# Patient Record
Sex: MALE | Race: WHITE | NOT HISPANIC OR LATINO | Employment: OTHER | URBAN - METROPOLITAN AREA
[De-identification: names, ages, dates, MRNs, and addresses within clinical notes are randomized per-mention and may not be internally consistent; named-entity substitution may affect disease eponyms.]

---

## 2017-10-24 ENCOUNTER — HOSPITAL ENCOUNTER (OUTPATIENT)
Dept: MRI IMAGING | Facility: HOSPITAL | Age: 62
Discharge: HOME | End: 2017-10-24
Attending: ORTHOPAEDIC SURGERY | Admitting: ORTHOPAEDIC SURGERY

## 2018-04-08 ENCOUNTER — INPATIENT HOSPITAL (OUTPATIENT)
Dept: URBAN - METROPOLITAN AREA HOSPITAL 84 | Facility: HOSPITAL | Age: 63
End: 2018-04-08
Payer: COMMERCIAL

## 2018-04-08 DIAGNOSIS — I50.9 HEART FAILURE, UNSPECIFIED: ICD-10-CM

## 2018-04-08 DIAGNOSIS — R06.00 DYSPNEA, UNSPECIFIED: ICD-10-CM

## 2018-04-08 DIAGNOSIS — I48.91 UNSPECIFIED ATRIAL FIBRILLATION: ICD-10-CM

## 2018-04-08 DIAGNOSIS — D50.9 IRON DEFICIENCY ANEMIA, UNSPECIFIED: ICD-10-CM

## 2018-04-08 PROCEDURE — 99221 1ST HOSP IP/OBS SF/LOW 40: CPT | Performed by: NURSE PRACTITIONER

## 2018-04-09 ENCOUNTER — INPATIENT HOSPITAL (OUTPATIENT)
Dept: URBAN - METROPOLITAN AREA HOSPITAL 84 | Facility: HOSPITAL | Age: 63
End: 2018-04-09
Payer: COMMERCIAL

## 2018-04-09 DIAGNOSIS — K29.50 UNSPECIFIED CHRONIC GASTRITIS WITHOUT BLEEDING: ICD-10-CM

## 2018-04-09 DIAGNOSIS — K64.8 OTHER HEMORRHOIDS: ICD-10-CM

## 2018-04-09 DIAGNOSIS — K57.30 DIVERTICULOSIS OF LARGE INTESTINE WITHOUT PERFORATION OR ABS: ICD-10-CM

## 2018-04-09 DIAGNOSIS — D50.9 IRON DEFICIENCY ANEMIA, UNSPECIFIED: ICD-10-CM

## 2018-04-09 DIAGNOSIS — D12.3 BENIGN NEOPLASM OF TRANSVERSE COLON: ICD-10-CM

## 2018-04-09 DIAGNOSIS — K52.9 NONINFECTIVE GASTROENTERITIS AND COLITIS, UNSPECIFIED: ICD-10-CM

## 2018-04-09 DIAGNOSIS — K44.9 DIAPHRAGMATIC HERNIA WITHOUT OBSTRUCTION OR GANGRENE: ICD-10-CM

## 2018-04-09 PROCEDURE — 45385 COLONOSCOPY W/LESION REMOVAL: CPT | Performed by: INTERNAL MEDICINE

## 2018-04-09 PROCEDURE — 43239 EGD BIOPSY SINGLE/MULTIPLE: CPT | Performed by: INTERNAL MEDICINE

## 2019-11-07 ENCOUNTER — APPOINTMENT (OUTPATIENT)
Dept: GENERAL RADIOLOGY | Facility: HOSPITAL | Age: 64
End: 2019-11-07

## 2019-11-07 ENCOUNTER — HOSPITAL ENCOUNTER (INPATIENT)
Facility: HOSPITAL | Age: 64
LOS: 5 days | Discharge: LEFT BEFORE DISCHARGE | End: 2019-11-13
Attending: EMERGENCY MEDICINE | Admitting: FAMILY MEDICINE

## 2019-11-07 DIAGNOSIS — I25.10 CORONARY ARTERY DISEASE INVOLVING NATIVE CORONARY ARTERY OF NATIVE HEART WITHOUT ANGINA PECTORIS: ICD-10-CM

## 2019-11-07 DIAGNOSIS — I16.1 HYPERTENSIVE EMERGENCY: Primary | ICD-10-CM

## 2019-11-07 DIAGNOSIS — R06.02 SHORTNESS OF BREATH: ICD-10-CM

## 2019-11-07 DIAGNOSIS — I48.20 ATRIAL FIBRILLATION, CHRONIC (HCC): ICD-10-CM

## 2019-11-07 DIAGNOSIS — I50.32 CHRONIC DIASTOLIC CHF (CONGESTIVE HEART FAILURE) (HCC): ICD-10-CM

## 2019-11-07 DIAGNOSIS — I10 ESSENTIAL HYPERTENSION: ICD-10-CM

## 2019-11-07 DIAGNOSIS — I50.9 CONGESTIVE HEART FAILURE, UNSPECIFIED HF CHRONICITY, UNSPECIFIED HEART FAILURE TYPE (HCC): ICD-10-CM

## 2019-11-07 PROCEDURE — 80048 BASIC METABOLIC PNL TOTAL CA: CPT | Performed by: EMERGENCY MEDICINE

## 2019-11-07 PROCEDURE — 93005 ELECTROCARDIOGRAM TRACING: CPT | Performed by: EMERGENCY MEDICINE

## 2019-11-07 PROCEDURE — 99285 EMERGENCY DEPT VISIT HI MDM: CPT

## 2019-11-07 PROCEDURE — 83880 ASSAY OF NATRIURETIC PEPTIDE: CPT | Performed by: EMERGENCY MEDICINE

## 2019-11-07 PROCEDURE — 71045 X-RAY EXAM CHEST 1 VIEW: CPT

## 2019-11-07 PROCEDURE — 85025 COMPLETE CBC W/AUTO DIFF WBC: CPT | Performed by: EMERGENCY MEDICINE

## 2019-11-07 PROCEDURE — 85007 BL SMEAR W/DIFF WBC COUNT: CPT | Performed by: EMERGENCY MEDICINE

## 2019-11-07 PROCEDURE — 84484 ASSAY OF TROPONIN QUANT: CPT | Performed by: EMERGENCY MEDICINE

## 2019-11-07 RX ORDER — ASPIRIN 325 MG
325 TABLET ORAL ONCE
Status: COMPLETED | OUTPATIENT
Start: 2019-11-07 | End: 2019-11-07

## 2019-11-07 RX ORDER — SODIUM CHLORIDE 0.9 % (FLUSH) 0.9 %
10 SYRINGE (ML) INJECTION AS NEEDED
Status: DISCONTINUED | OUTPATIENT
Start: 2019-11-07 | End: 2019-11-13 | Stop reason: HOSPADM

## 2019-11-07 RX ADMIN — SODIUM CHLORIDE 5 MG/HR: 900 INJECTION, SOLUTION INTRAVENOUS at 22:55

## 2019-11-07 RX ADMIN — ASPIRIN 325 MG ORAL TABLET 325 MG: 325 PILL ORAL at 22:47

## 2019-11-08 PROBLEM — L40.9 PSORIASIS: Chronic | Status: ACTIVE | Noted: 2019-11-08

## 2019-11-08 PROBLEM — G62.9 PERIPHERAL POLYNEUROPATHY: Chronic | Status: ACTIVE | Noted: 2019-11-08

## 2019-11-08 PROBLEM — R06.02 SHORTNESS OF BREATH: Status: ACTIVE | Noted: 2019-11-08

## 2019-11-08 PROBLEM — F63.0: Chronic | Status: ACTIVE | Noted: 2019-11-08

## 2019-11-08 PROBLEM — I10 ESSENTIAL HYPERTENSION: Chronic | Status: ACTIVE | Noted: 2019-11-08

## 2019-11-08 PROBLEM — K21.9 GERD (GASTROESOPHAGEAL REFLUX DISEASE): Chronic | Status: ACTIVE | Noted: 2019-11-08

## 2019-11-08 PROBLEM — Z86.73 HISTORY OF CVA (CEREBROVASCULAR ACCIDENT): Chronic | Status: ACTIVE | Noted: 2019-11-08

## 2019-11-08 PROBLEM — M51.379 DDD (DEGENERATIVE DISC DISEASE), LUMBOSACRAL: Chronic | Status: ACTIVE | Noted: 2019-11-08

## 2019-11-08 PROBLEM — M51.37 DDD (DEGENERATIVE DISC DISEASE), LUMBOSACRAL: Chronic | Status: ACTIVE | Noted: 2019-11-08

## 2019-11-08 PROBLEM — I48.20 CHRONIC ATRIAL FIBRILLATION (HCC): Chronic | Status: ACTIVE | Noted: 2019-11-08

## 2019-11-08 PROBLEM — I25.10 CORONARY ARTERY DISEASE INVOLVING NATIVE CORONARY ARTERY OF NATIVE HEART WITHOUT ANGINA PECTORIS: Chronic | Status: ACTIVE | Noted: 2019-11-08

## 2019-11-08 PROBLEM — I50.32 CHRONIC DIASTOLIC CHF (CONGESTIVE HEART FAILURE) (HCC): Chronic | Status: ACTIVE | Noted: 2019-11-08

## 2019-11-08 PROBLEM — N18.30 CKD (CHRONIC KIDNEY DISEASE) STAGE 3, GFR 30-59 ML/MIN: Chronic | Status: ACTIVE | Noted: 2019-11-08

## 2019-11-08 PROBLEM — Z91.199 MEDICALLY NONCOMPLIANT: Chronic | Status: ACTIVE | Noted: 2019-11-08

## 2019-11-08 PROBLEM — I16.1 HYPERTENSIVE EMERGENCY: Status: ACTIVE | Noted: 2019-11-08

## 2019-11-08 LAB
ANION GAP SERPL CALCULATED.3IONS-SCNC: 14 MMOL/L (ref 5–15)
ANION GAP SERPL CALCULATED.3IONS-SCNC: 14 MMOL/L (ref 5–15)
ANISOCYTOSIS BLD QL: NORMAL
BASOPHILS # BLD AUTO: 0.1 10*3/MM3 (ref 0–0.2)
BASOPHILS NFR BLD AUTO: 0.7 % (ref 0–1.5)
BUN BLD-MCNC: 24 MG/DL (ref 8–23)
BUN BLD-MCNC: 27 MG/DL (ref 8–23)
BUN/CREAT SERPL: 22.9 (ref 7–25)
BUN/CREAT SERPL: 23.1 (ref 7–25)
CALCIUM SPEC-SCNC: 9.1 MG/DL (ref 8.6–10.5)
CALCIUM SPEC-SCNC: 9.2 MG/DL (ref 8.6–10.5)
CHLORIDE SERPL-SCNC: 102 MMOL/L (ref 98–107)
CHLORIDE SERPL-SCNC: 109 MMOL/L (ref 98–107)
CO2 SERPL-SCNC: 22 MMOL/L (ref 22–29)
CO2 SERPL-SCNC: 26 MMOL/L (ref 22–29)
CREAT BLD-MCNC: 1.04 MG/DL (ref 0.76–1.27)
CREAT BLD-MCNC: 1.18 MG/DL (ref 0.76–1.27)
D DIMER PPP FEU-MCNC: 1.7 MCGFEU/ML (ref 0.17–0.59)
DEPRECATED RDW RBC AUTO: 52.9 FL (ref 37–54)
EOSINOPHIL # BLD AUTO: 0.1 10*3/MM3 (ref 0–0.4)
EOSINOPHIL NFR BLD AUTO: 0.8 % (ref 0.3–6.2)
ERYTHROCYTE [DISTWIDTH] IN BLOOD BY AUTOMATED COUNT: 17.1 % (ref 12.3–15.4)
GFR SERPL CREATININE-BSD FRML MDRD: 62 ML/MIN/1.73
GFR SERPL CREATININE-BSD FRML MDRD: 72 ML/MIN/1.73
GLUCOSE BLD-MCNC: 119 MG/DL (ref 65–99)
GLUCOSE BLD-MCNC: 123 MG/DL (ref 65–99)
HCT VFR BLD AUTO: 44.5 % (ref 37.5–51)
HGB BLD-MCNC: 14.6 G/DL (ref 13–17.7)
INR PPP: 1.22 (ref 0.9–1.1)
LYMPHOCYTES # BLD AUTO: 1 10*3/MM3 (ref 0.7–3.1)
LYMPHOCYTES NFR BLD AUTO: 9.6 % (ref 19.6–45.3)
MAGNESIUM SERPL-MCNC: 1.9 MG/DL (ref 1.6–2.4)
MAGNESIUM SERPL-MCNC: 1.9 MG/DL (ref 1.6–2.4)
MCH RBC QN AUTO: 28.5 PG (ref 26.6–33)
MCHC RBC AUTO-ENTMCNC: 32.9 G/DL (ref 31.5–35.7)
MCV RBC AUTO: 86.8 FL (ref 79–97)
MONOCYTES # BLD AUTO: 0.9 10*3/MM3 (ref 0.1–0.9)
MONOCYTES NFR BLD AUTO: 8.3 % (ref 5–12)
NEUTROPHILS # BLD AUTO: 8.3 10*3/MM3 (ref 1.7–7)
NEUTROPHILS NFR BLD AUTO: 80.6 % (ref 42.7–76)
NEUTS VAC BLD QL SMEAR: NORMAL
NRBC BLD AUTO-RTO: 0.1 /100 WBC (ref 0–0.2)
NT-PROBNP SERPL-MCNC: 7550 PG/ML (ref 5–900)
PLAT MORPH BLD: NORMAL
PLATELET # BLD AUTO: 155 10*3/MM3 (ref 140–450)
PMV BLD AUTO: 9.4 FL (ref 6–12)
POTASSIUM BLD-SCNC: 3.1 MMOL/L (ref 3.5–5.2)
POTASSIUM BLD-SCNC: 3.8 MMOL/L (ref 3.5–5.2)
PROTHROMBIN TIME: 12.4 SECONDS (ref 9.6–11.7)
RBC # BLD AUTO: 5.13 10*6/MM3 (ref 4.14–5.8)
SODIUM BLD-SCNC: 142 MMOL/L (ref 136–145)
SODIUM BLD-SCNC: 145 MMOL/L (ref 136–145)
TOXIC GRANULATION: NORMAL
TROPONIN T SERPL-MCNC: 0.01 NG/ML (ref 0–0.03)
TROPONIN T SERPL-MCNC: 0.01 NG/ML (ref 0–0.03)
TROPONIN T SERPL-MCNC: 0.02 NG/ML (ref 0–0.03)
WBC NRBC COR # BLD: 10.3 10*3/MM3 (ref 3.4–10.8)

## 2019-11-08 PROCEDURE — 25010000002 FUROSEMIDE PER 20 MG: Performed by: NURSE PRACTITIONER

## 2019-11-08 PROCEDURE — 84484 ASSAY OF TROPONIN QUANT: CPT | Performed by: NURSE PRACTITIONER

## 2019-11-08 PROCEDURE — 80048 BASIC METABOLIC PNL TOTAL CA: CPT | Performed by: NURSE PRACTITIONER

## 2019-11-08 PROCEDURE — 85379 FIBRIN DEGRADATION QUANT: CPT | Performed by: NURSE PRACTITIONER

## 2019-11-08 PROCEDURE — 25010000002 FUROSEMIDE PER 20 MG: Performed by: EMERGENCY MEDICINE

## 2019-11-08 PROCEDURE — 83735 ASSAY OF MAGNESIUM: CPT | Performed by: NURSE PRACTITIONER

## 2019-11-08 PROCEDURE — 99222 1ST HOSP IP/OBS MODERATE 55: CPT | Performed by: INTERNAL MEDICINE

## 2019-11-08 PROCEDURE — 85610 PROTHROMBIN TIME: CPT | Performed by: NURSE PRACTITIONER

## 2019-11-08 PROCEDURE — 25010000002 ENOXAPARIN PER 10 MG: Performed by: FAMILY MEDICINE

## 2019-11-08 RX ORDER — ATORVASTATIN CALCIUM 10 MG/1
10 TABLET, FILM COATED ORAL DAILY
Status: DISCONTINUED | OUTPATIENT
Start: 2019-11-09 | End: 2019-11-13 | Stop reason: HOSPADM

## 2019-11-08 RX ORDER — CARVEDILOL 25 MG/1
25 TABLET ORAL 2 TIMES DAILY WITH MEALS
Status: DISCONTINUED | OUTPATIENT
Start: 2019-11-08 | End: 2019-11-13 | Stop reason: HOSPADM

## 2019-11-08 RX ORDER — FUROSEMIDE 10 MG/ML
40 INJECTION INTRAMUSCULAR; INTRAVENOUS EVERY 12 HOURS
Status: DISCONTINUED | OUTPATIENT
Start: 2019-11-08 | End: 2019-11-13 | Stop reason: HOSPADM

## 2019-11-08 RX ORDER — HYDRALAZINE HYDROCHLORIDE 25 MG/1
25 TABLET, FILM COATED ORAL 2 TIMES DAILY
COMMUNITY
End: 2019-12-17 | Stop reason: HOSPADM

## 2019-11-08 RX ORDER — HYDRALAZINE HYDROCHLORIDE 25 MG/1
50 TABLET, FILM COATED ORAL 2 TIMES DAILY
Status: DISCONTINUED | OUTPATIENT
Start: 2019-11-08 | End: 2019-11-13 | Stop reason: HOSPADM

## 2019-11-08 RX ORDER — SODIUM CHLORIDE 0.9 % (FLUSH) 0.9 %
10 SYRINGE (ML) INJECTION EVERY 12 HOURS SCHEDULED
Status: DISCONTINUED | OUTPATIENT
Start: 2019-11-08 | End: 2019-11-13 | Stop reason: HOSPADM

## 2019-11-08 RX ORDER — SODIUM CHLORIDE 0.9 % (FLUSH) 0.9 %
10 SYRINGE (ML) INJECTION AS NEEDED
Status: DISCONTINUED | OUTPATIENT
Start: 2019-11-08 | End: 2019-11-13 | Stop reason: HOSPADM

## 2019-11-08 RX ORDER — WARFARIN SODIUM 5 MG/1
5 TABLET ORAL
Status: DISCONTINUED | OUTPATIENT
Start: 2019-11-08 | End: 2019-11-11

## 2019-11-08 RX ORDER — HEPARIN SODIUM 5000 [USP'U]/ML
5000 INJECTION, SOLUTION INTRAVENOUS; SUBCUTANEOUS EVERY 12 HOURS SCHEDULED
Status: DISCONTINUED | OUTPATIENT
Start: 2019-11-08 | End: 2019-11-08

## 2019-11-08 RX ORDER — PRAVASTATIN SODIUM 40 MG
40 TABLET ORAL NIGHTLY
COMMUNITY

## 2019-11-08 RX ORDER — CARVEDILOL 25 MG/1
25 TABLET ORAL 2 TIMES DAILY WITH MEALS
COMMUNITY
End: 2020-09-12 | Stop reason: HOSPADM

## 2019-11-08 RX ORDER — SPIRONOLACTONE 25 MG/1
25 TABLET ORAL DAILY
COMMUNITY
End: 2020-09-12 | Stop reason: HOSPADM

## 2019-11-08 RX ORDER — FUROSEMIDE 10 MG/ML
40 INJECTION INTRAMUSCULAR; INTRAVENOUS ONCE
Status: COMPLETED | OUTPATIENT
Start: 2019-11-08 | End: 2019-11-08

## 2019-11-08 RX ORDER — POTASSIUM CHLORIDE 20 MEQ/1
20 TABLET, EXTENDED RELEASE ORAL 2 TIMES DAILY
COMMUNITY
End: 2020-08-23 | Stop reason: HOSPADM

## 2019-11-08 RX ORDER — POTASSIUM CHLORIDE 20 MEQ/1
20 TABLET, EXTENDED RELEASE ORAL 2 TIMES DAILY
Status: DISCONTINUED | OUTPATIENT
Start: 2019-11-08 | End: 2019-11-13 | Stop reason: HOSPADM

## 2019-11-08 RX ADMIN — HYDRALAZINE HYDROCHLORIDE 50 MG: 25 TABLET, FILM COATED ORAL at 15:16

## 2019-11-08 RX ADMIN — FUROSEMIDE 40 MG: 10 INJECTION, SOLUTION INTRAMUSCULAR; INTRAVENOUS at 00:18

## 2019-11-08 RX ADMIN — Medication 10 ML: at 20:06

## 2019-11-08 RX ADMIN — SODIUM CHLORIDE 5 MG/HR: 900 INJECTION, SOLUTION INTRAVENOUS at 13:46

## 2019-11-08 RX ADMIN — POTASSIUM CHLORIDE 20 MEQ: 1500 TABLET, EXTENDED RELEASE ORAL at 20:06

## 2019-11-08 RX ADMIN — ENOXAPARIN SODIUM 120 MG: 120 INJECTION SUBCUTANEOUS at 17:05

## 2019-11-08 RX ADMIN — CARVEDILOL 25 MG: 25 TABLET, FILM COATED ORAL at 17:06

## 2019-11-08 RX ADMIN — Medication 10 ML: at 08:51

## 2019-11-08 RX ADMIN — WARFARIN 5 MG: 5 TABLET ORAL at 17:06

## 2019-11-08 RX ADMIN — POTASSIUM CHLORIDE 20 MEQ: 1500 TABLET, EXTENDED RELEASE ORAL at 15:16

## 2019-11-08 RX ADMIN — SODIUM CHLORIDE 5 MG/HR: 900 INJECTION, SOLUTION INTRAVENOUS at 09:11

## 2019-11-08 RX ADMIN — HYDRALAZINE HYDROCHLORIDE 50 MG: 25 TABLET, FILM COATED ORAL at 20:06

## 2019-11-08 RX ADMIN — FUROSEMIDE 40 MG: 10 INJECTION, SOLUTION INTRAMUSCULAR; INTRAVENOUS at 06:24

## 2019-11-08 RX ADMIN — Medication 10 ML: at 03:27

## 2019-11-08 RX ADMIN — FUROSEMIDE 40 MG: 10 INJECTION, SOLUTION INTRAMUSCULAR; INTRAVENOUS at 17:06

## 2019-11-08 NOTE — PROGRESS NOTES
Discharge Planning Assessment   Yoseph     Patient Name: Carlos Whipple  MRN: [id number]  Today's Date: 11/8/2019    Admit Date: 11/7/2019    Discharge Needs Assessment     Row Name 11/08/19 0756       Living Environment    Lives With  alone    Current Living Arrangements  home/apartment/condo    Primary Care Provided by  self    Provides Primary Care For  no one, unable/limited ability to care for self    Family Caregiver if Needed  none    Able to Return to Prior Arrangements  yes       Resource/Environmental Concerns    Resource/Environmental Concerns  none    Transportation Concerns  public transportation, none available;rides, unreliable from others       Transition Planning    Patient/Family Anticipates Transition to  home    Patient/Family Anticipated Services at Transition  none    Transportation Anticipated  public transportation;family or friend will provide       Discharge Needs Assessment    Readmission Within the Last 30 Days  no previous admission in last 30 days    Concerns to be Addressed  denies needs/concerns at this time;patient refuses services    Equipment Currently Used at Home  none    Anticipated Changes Related to Illness  inability to care for self        Discharge Plan     Row Name 11/08/19 0757       Plan    Plan  Anticipate return to home.  Spoke with Social work to see for community resources.      Patient/Family in Agreement with Plan  yes    Plan Comments  Upon entering patient room, patient was asleep.  When he awoke, he was confused and making several attempts to get out of bed unassisted.  Primary RN and CM able to get patient to side of bed before getting back in to bed.  Primary RN states patient has already pulled one IV out and typically is confused upon awakening however gets more alert as the day goes on.  Patient states he lives alone and has a friend in the area who can assist if needed.  He reports he takes buses and rides from friends as needed.  He states he prepares  his own meals andmedications and does not require assistance.  Patient referral to  for possible community resources versus lifespan referral.          Demographic Summary     Row Name 11/08/19 0752       General Information    Admission Type  inpatient    Arrived From  emergency department    Required Notices Provided  Important Message from Medicare given by registration    Referral Source  emergency department    Reason for Consult  discharge planning;care coordination/care conference    Preferred Language  English     Used During This Interaction  no        Functional Status     Row Name 11/08/19 0753       Functional Status    Current Activity Tolerance  fair       Functional Status, IADL    Medications  independent Patient is reportedly non-compliant with taking medications for CHF.  States he handles them on his own and does not need assistance    Meal Preparation  independent    Housekeeping  independent    Laundry  independent    Shopping  independent       Mental Status    General Appearance WDL  appearance    General Appearance  body odor;unclean;unkempt;unshaven       Mental Status Summary    Recent Changes in Mental Status/Cognitive Functioning  unable to assess Patient confused upon awakening.  Attempted repeatedly to get out of the bed without assistance.  Assisted the patient back in bed with help of primary RN.  RN states patient is usually like this upon waking up and gets more alert and oriented.         Aimee Sadler RN    /Utilization Review  [id number] [place] [address]    [phone] office  [phone] fax  [email or web address]  [email or web address]    Hospital NPI:   Hospital Tax ID: [id number]

## 2019-11-08 NOTE — ED NOTES
Third call placed to Dr. Stauffer's cell phone with no answer. Message left. KAVYA Stein made aware that we have not been able to reach MD for acceptance of pt.     Poilna Najera, RN  11/08/19 05

## 2019-11-08 NOTE — H&P
Patient Care Team:  Marisol Larson APRN as PCP - General    Chief complaint Shortness of breath    Subjective     Patient presents to the emergency room with progressively worsening shortness of breath.  He is a very poor historian normally but also seems to be almost disoriented on exam today.  He relates this to just having woke up.  He denies any chest pain.  He has had some swelling in both of his legs for a few weeks.  He reports that he has not taken any of his medications for 4 months.  He is still gambling and this is why he cannot afford his medications.  He has missed or not made follow-up appointments repeatedly in the past.      Shortness of Breath   The current episode started yesterday. The problem occurs constantly. The problem has been gradually improving. Associated symptoms include leg swelling. Pertinent negatives include no abdominal pain, chest pain, fever, sputum production, vomiting or wheezing. The symptoms are aggravated by any activity. His past medical history is significant for CAD and a heart failure.       Review of Systems   Constitutional: Positive for fatigue. Negative for chills and fever.   Respiratory: Positive for shortness of breath. Negative for sputum production, chest tightness and wheezing.    Cardiovascular: Positive for leg swelling. Negative for chest pain.   Gastrointestinal: Negative for abdominal pain, nausea and vomiting.   Genitourinary: Negative for decreased urine volume, difficulty urinating and dysuria.   Musculoskeletal: Positive for arthralgias and back pain.   Neurological: Negative for dizziness, syncope, speech difficulty, weakness and light-headedness.        Past Medical History:   Diagnosis Date   • A-fib (CMS/[facility])    • CHF (congestive heart failure) (CMS/[facility])    • Chronic atrial fibrillation 11/8/2019   • Chronic diastolic CHF (congestive heart failure) (CMS/[facility]) 11/8/2019   • CKD (chronic kidney disease) stage 3, GFR 30-59 ml/min (CMS/[facility])  11/8/2019   • Coronary artery disease involving native coronary artery of native heart without angina pectoris 11/8/2019   • DDD (degenerative disc disease), lumbosacral 11/8/2019   • Essential hypertension 11/8/2019   • Gambling disorder, persistent 11/8/2019   • GERD (gastroesophageal reflux disease) 11/8/2019   • History of CVA (cerebrovascular accident) 11/8/2019   • Hyperlipidemia    • Hypertension    • Medically noncompliant 11/8/2019   • Peripheral polyneuropathy 11/8/2019   • Psoriasis 11/8/2019     Past Surgical History:   Procedure Laterality Date   • CARDIAC CATHETERIZATION       History reviewed. No pertinent family history.  Social History     Tobacco Use   • Smoking status: Never Smoker   • Smokeless tobacco: Never Used   Substance Use Topics   • Alcohol use: No     Frequency: Never   • Drug use: No       (Not in a hospital admission)  Allergies:  Toradol [ketorolac tromethamine]    Objective      Vital Signs  Temp:  [94.6 °F (34.8 °C)-97.5 °F (36.4 °C)] 97.5 °F (36.4 °C)  Heart Rate:  [] 98  Resp:  [19-20] 19  BP: (123-199)/() 136/75    Physical Exam   Constitutional: He is oriented to person, place, and time. He is easily aroused.   Slovenly, poor hygeine   Cardiovascular: An irregularly irregular rhythm present. Tachycardia present.   Pulmonary/Chest: Effort normal and breath sounds normal.   Abdominal: Soft. Bowel sounds are normal. He exhibits no distension. There is no tenderness.   Musculoskeletal: He exhibits edema.   Neurological: He is alert, oriented to person, place, and time and easily aroused.   Skin: Skin is warm and dry.   Psychiatric: He has a normal mood and affect. His speech is normal and behavior is normal. Thought content is not paranoid. He expresses impulsivity. He expresses no homicidal and no suicidal ideation.       Results Review:  Lab Results (last 24 hours)     Procedure Component Value Units Date/Time    CBC & Differential [[id number]] Collected:  11/07/19 2840     Specimen:  Blood Updated:  11/08/19 0025    Narrative:       The following orders were created for panel order CBC & Differential.  Procedure                               Abnormality         Status                     ---------                               -----------         ------                     CBC Auto Differential[[id number]]        Abnormal            Final result                 Please view results for these tests on the individual orders.    CBC Auto Differential [[id number]]  (Abnormal) Collected:  11/07/19 2336    Specimen:  Blood Updated:  11/08/19 0025     WBC 10.30 10*3/mm3      RBC 5.13 10*6/mm3      Hemoglobin 14.6 g/dL      Hematocrit 44.5 %      MCV 86.8 fL      MCH 28.5 pg      MCHC 32.9 g/dL      RDW 17.1 %      RDW-SD 52.9 fl      MPV 9.4 fL      Platelets 155 10*3/mm3      Neutrophil % 80.6 %      Lymphocyte % 9.6 %      Monocyte % 8.3 %      Eosinophil % 0.8 %      Basophil % 0.7 %      Neutrophils, Absolute 8.30 10*3/mm3      Lymphocytes, Absolute 1.00 10*3/mm3      Monocytes, Absolute 0.90 10*3/mm3      Eosinophils, Absolute 0.10 10*3/mm3      Basophils, Absolute 0.10 10*3/mm3      nRBC 0.1 /100 WBC     Narrative:       Appended report. These results have been appended to a previously verified report.    Scan Slide [[id number]] Collected:  11/07/19 2336    Specimen:  Blood Updated:  11/08/19 0025     Anisocytosis Slight/1+     Toxic Granulation Slight/1+     Vacuolated Neutrophils Slight/1+     Platelet Morphology Normal    Troponin [[id number]]  (Normal) Collected:  11/07/19 2336    Specimen:  Blood from Hand, Right Updated:  11/08/19 0012     Troponin T 0.012 ng/mL     Narrative:       Troponin T Reference Range:  <= 0.03 ng/mL-   Negative for AMI  >0.03 ng/mL-     Abnormal for myocardial necrosis.  Clinicians would have to utilize clinical acumen, EKG, Troponin and serial changes to determine if it is an Acute Myocardial Infarction or myocardial injury due to an underlying chronic  condition.     Basic Metabolic Panel [[id number]]  (Abnormal) Collected:  11/07/19 2336    Specimen:  Blood from Hand, Right Updated:  11/08/19 0002     Glucose 119 mg/dL      BUN 27 mg/dL      Creatinine 1.18 mg/dL      Sodium 145 mmol/L      Potassium 3.8 mmol/L      Chloride 109 mmol/L      CO2 22.0 mmol/L      Calcium 9.1 mg/dL      eGFR Non African Amer 62 mL/min/1.73      BUN/Creatinine Ratio 22.9     Anion Gap 14.0 mmol/L     Narrative:       GFR Normal >60  Chronic Kidney Disease <60  Kidney Failure <15    BNP [[id number]]  (Abnormal) Collected:  11/07/19 2336    Specimen:  Blood from Hand, Right Updated:  11/08/19 0001     proBNP 7,550.0 pg/mL     Narrative:       Among patients with dyspnea, NT-proBNP is highly sensitive for the detection of acute congestive heart failure. In addition NT-proBNP of <300 pg/ml effectively rules out acute congestive heart failure with 99% negative predictive value.    Manual Differential [[id number]] Updated:  11/07/19 2325    Specimen:  Blood          Imaging Results (Last 24 Hours)     Procedure Component Value Units Date/Time    XR Chest 1 View [[id number]] Collected:  11/07/19 2229     Updated:  11/07/19 2232    Narrative:          DATE OF EXAM:   11/7/2019 10:16 PM     PROCEDURE:   XR CHEST 1 VW-     INDICATIONS:   Short of breath     COMPARISON:  10/13/2018     TECHNIQUE:   [Portable chest radiograph]     FINDINGS:    The patient is rotated to the right. The heart is enlarged. No  pneumothorax or large effusion. No focal area of consolidation. Osseous  structures grossly intact. Central pulmonary vascular congestion  suspected.       Impression:       1. Cardiomegaly and central pulmonary vascular congestion.  2. Clear lungs.     Electronically Signed By-Fareed Garcia On:11/7/2019 10:30 PM  This report was finalized on [id number] by  Fareed Garcia, .           I reviewed the patient's new clinical results.      Assessment/Plan       Shortness of breath    Hypertensive  emergency    Chronic atrial fibrillation    Chronic diastolic CHF (congestive heart failure) (CMS/[facility])    CKD (chronic kidney disease) stage 3, GFR 30-59 ml/min (CMS/[facility])    Essential hypertension    Gambling disorder, persistent    Coronary artery disease involving native coronary artery of native heart without angina pectoris    GERD (gastroesophageal reflux disease)    Medically noncompliant          Plan:   (Consult cardiology.  Continue Cardene drip.Continue gentle diuresis and monitor kidney function.  Consult social work as he continues to miguel and this is why he cannot afford his medications.).       I discussed the patients findings and my recommendations with patient, nursing staff and primary care team    CLARISSA Meraz  11/08/19  1:07 PM

## 2019-11-08 NOTE — SIGNIFICANT NOTE
Patient accepted in orders placed before confirmation that patient belongs to Dr. Stauffer's group.  ER to notify Dr. Stauffer.  We will be happy to see the patient if he does not belong to Dr. Stauffer's group.

## 2019-11-08 NOTE — ED NOTES
Message left at 's cell phone notifying him that hospitalist service had accepted pt and later realized the pt belonged to Marisol Larson. No answer-awating call back.      Polina Najera, JULIEN  11/08/19 7664

## 2019-11-08 NOTE — ED NOTES
Second call placed to 's cell phone inquiring about accepting this patient. Still no answer.     Polina Najera RN  11/08/19 0510

## 2019-11-08 NOTE — ED PROVIDER NOTES
Subjective   History of Present Illness  64-year-old male with a history of high blood pressure is been out of his medicine for about a week and now complains of increasing shortness of breath as well as swelling in the legs.  Patient denies any pain fever chills cough or congestion.  He denies any nausea or vomiting.  He has had previous episodes of congestive heart failure and has been noncompliant.  Previously  Review of Systems   Constitutional: Positive for fatigue.   HENT: Positive for congestion.    Eyes: Negative.    Respiratory: Positive for shortness of breath.    Cardiovascular: Positive for leg swelling.   Gastrointestinal: Negative.    Endocrine: Negative.    Genitourinary: Negative.    Musculoskeletal: Positive for back pain.   Skin: Negative.    Allergic/Immunologic: Negative.    Neurological: Negative.    Hematological: Negative.    Psychiatric/Behavioral: Negative.        No past medical history on file.    Allergies   Allergen Reactions   • Toradol [Ketorolac Tromethamine] Unknown (See Comments)     unknown       No past surgical history on file.    No family history on file.    Social History     Socioeconomic History   • Marital status: Single     Spouse name: Not on file   • Number of children: Not on file   • Years of education: Not on file   • Highest education level: Not on file           Objective   Physical Exam  Patient is awake and alert afebrile with stable vital signs the pressure was elevated at 177/154 initially and has decreased to 170/120.  The HEENT exam is remarkable his neck is supple with JVD his chest reveals rales in both bases cardiovascular exam reveals a regular rhythm the patient has pitting edema both lower extremities abdomen was soft nontender he has full range of motion of the extremities no rashes present neurologic exam is normal  Procedures           ED Course      EKG shows an atrial fibrillation with a rate of 122 and left ventricular hypertrophy which is  old    Results for orders placed or performed during the hospital encounter of 11/07/19   Basic Metabolic Panel   Result Value Ref Range    Glucose 119 (H) 65 - 99 mg/dL    BUN 27 (H) 8 - 23 mg/dL    Creatinine 1.18 0.76 - 1.27 mg/dL    Sodium 145 136 - 145 mmol/L    Potassium 3.8 3.5 - 5.2 mmol/L    Chloride 109 (H) 98 - 107 mmol/L    CO2 22.0 22.0 - 29.0 mmol/L    Calcium 9.1 8.6 - 10.5 mg/dL    eGFR Non African Amer 62 >60 mL/min/1.73    BUN/Creatinine Ratio 22.9 7.0 - 25.0    Anion Gap 14.0 5.0 - 15.0 mmol/L   BNP   Result Value Ref Range    proBNP 7,550.0 (H) 5.0 - 900.0 pg/mL   CBC Auto Differential   Result Value Ref Range    WBC 10.30 3.40 - 10.80 10*3/mm3    RBC 5.13 4.14 - 5.80 10*6/mm3    Hemoglobin 14.6 13.0 - 17.7 g/dL    Hematocrit 44.5 37.5 - 51.0 %    MCV 86.8 79.0 - 97.0 fL    MCH 28.5 26.6 - 33.0 pg    MCHC 32.9 31.5 - 35.7 g/dL    RDW 17.1 (H) 12.3 - 15.4 %    RDW-SD 52.9 37.0 - 54.0 fl    MPV 9.4 6.0 - 12.0 fL    Platelets 155 140 - 450 10*3/mm3     Medications   sodium chloride 0.9 % flush 10 mL (not administered)   niCARdipine (CARDENE) 25 mg/250 mL (0.1 mg/mL) 0.9% NS VTB infusion (5 mg/hr Intravenous Currently Infusing 11/7/19 7499)   aspirin tablet 325 mg (325 mg Oral Given 11/7/19 4608)     Xr Chest 1 View    Result Date: 11/7/2019  1. Cardiomegaly and central pulmonary vascular congestion. 2. Clear lungs.  Electronically Signed By-Fareed Garcia On:11/7/2019 10:30 PM This report was finalized on [id number] by  Fareed Garcia, .    Results for orders placed or performed during the hospital encounter of 11/07/19   Basic Metabolic Panel   Result Value Ref Range    Glucose 119 (H) 65 - 99 mg/dL    BUN 27 (H) 8 - 23 mg/dL    Creatinine 1.18 0.76 - 1.27 mg/dL    Sodium 145 136 - 145 mmol/L    Potassium 3.8 3.5 - 5.2 mmol/L    Chloride 109 (H) 98 - 107 mmol/L    CO2 22.0 22.0 - 29.0 mmol/L    Calcium 9.1 8.6 - 10.5 mg/dL    eGFR Non African Amer 62 >60 mL/min/1.73    BUN/Creatinine Ratio 22.9 7.0 -  25.0    Anion Gap 14.0 5.0 - 15.0 mmol/L   BNP   Result Value Ref Range    proBNP 7,550.0 (H) 5.0 - 900.0 pg/mL   CBC Auto Differential   Result Value Ref Range    WBC 10.30 3.40 - 10.80 10*3/mm3    RBC 5.13 4.14 - 5.80 10*6/mm3    Hemoglobin 14.6 13.0 - 17.7 g/dL    Hematocrit 44.5 37.5 - 51.0 %    MCV 86.8 79.0 - 97.0 fL    MCH 28.5 26.6 - 33.0 pg    MCHC 32.9 31.5 - 35.7 g/dL    RDW 17.1 (H) 12.3 - 15.4 %    RDW-SD 52.9 37.0 - 54.0 fl    MPV 9.4 6.0 - 12.0 fL    Platelets 155 140 - 450 10*3/mm3     Medications   sodium chloride 0.9 % flush 10 mL (not administered)   niCARdipine (CARDENE) 25 mg/250 mL (0.1 mg/mL) 0.9% NS VTB infusion (5 mg/hr Intravenous Currently Infusing 11/7/19 6050)   aspirin tablet 325 mg (325 mg Oral Given 11/7/19 2247)     Xr Chest 1 View    Result Date: 11/7/2019  1. Cardiomegaly and central pulmonary vascular congestion. 2. Clear lungs.  Electronically Signed By-Fareed Garcia On:11/7/2019 10:30 PM This report was finalized on [id number] by  Fareed Garcia, .            MDM  The patient's initial elevated blood pressure of 165/121 was treated with Cardene and his systolic was reduced to 145.  The patient was also given diuretics and felt much improved.  The patient's BNP was elevated at 7550 his hemoglobin was 14.6 basic metabolic shows an elevated BUN of 27 with a creatinine of 1.18.  Troponin is pending but he did not have any elevation of ST segment on EKG although he does have chronic atrial fibrillation.  The patient will be admitted for further hypertension control as well as diuretics  Final diagnoses:   Hypertensive emergency   Congestive heart failure, unspecified HF chronicity, unspecified heart failure type (CMS/HCC)   Atrial fibrillation, chronic              GravesMilind MD  11/08/19 0009

## 2019-11-08 NOTE — PLAN OF CARE
Problem: Fluid Volume Excess (Adult)  Goal: Identify Related Risk Factors and Signs and Symptoms  Outcome: Ongoing (interventions implemented as appropriate)    Goal: Optimal Fluid Balance  Outcome: Ongoing (interventions implemented as appropriate)      Problem: Hypertensive Disease/Crisis (Arterial) (Adult)  Goal: Signs and Symptoms of Listed Potential Problems Will be Absent, Minimized or Managed (Hypertensive Disease/Crisis)  Outcome: Ongoing (interventions implemented as appropriate)

## 2019-11-08 NOTE — ED NOTES
At 0225 patient stood up to urinate and pulled out IV. Cardene temporarily paused. New IV accessed and Cardene continued at 0235.      Polina Najera RN  11/08/19 4668

## 2019-11-08 NOTE — PROGRESS NOTES
Continued Stay Note  KAE Hameed     Patient Name: Carlos Whipple  MRN: [id number]  Today's Date: 11/8/2019    Admit Date: 11/7/2019    Discharge Plan     Row Name 11/08/19 1333       Plan    Plan Comments  SW attempted to see patient regarding potential need for community resources (possibly appropriate for [facility] referral) on 11/8, but pt was sleeping at time of attempt.      SHAKIR Das    Phone: [phone]  Cell: [phone]  Fax: [phone]  [email or web address]

## 2019-11-09 ENCOUNTER — APPOINTMENT (OUTPATIENT)
Dept: NUCLEAR MEDICINE | Facility: HOSPITAL | Age: 64
End: 2019-11-09

## 2019-11-09 ENCOUNTER — HOSPITAL ENCOUNTER (INPATIENT)
Dept: CARDIOLOGY | Facility: HOSPITAL | Age: 64
Discharge: HOME | End: 2019-11-09

## 2019-11-09 VITALS
SYSTOLIC BLOOD PRESSURE: 144 MMHG | DIASTOLIC BLOOD PRESSURE: 93 MMHG | HEIGHT: 72 IN | WEIGHT: 269 LBS | BODY MASS INDEX: 36.44 KG/M2

## 2019-11-09 LAB
ANION GAP SERPL CALCULATED.3IONS-SCNC: 11 MMOL/L (ref 5–15)
BASOPHILS # BLD AUTO: 0.1 10*3/MM3 (ref 0–0.2)
BASOPHILS NFR BLD AUTO: 0.7 % (ref 0–1.5)
BH CV NUCLEAR PRIOR STUDY: 3
BH CV STRESS BP STAGE 1: NORMAL
BH CV STRESS BP STAGE 2: NORMAL
BH CV STRESS BP STAGE 3: NORMAL
BH CV STRESS BP STAGE 4: NORMAL
BH CV STRESS COMMENTS STAGE 1: NORMAL
BH CV STRESS DOSE REGADENOSON STAGE 1: 0.4
BH CV STRESS DURATION MIN STAGE 1: 1
BH CV STRESS DURATION MIN STAGE 2: 1
BH CV STRESS DURATION MIN STAGE 3: 1
BH CV STRESS DURATION SEC STAGE 2: 0
BH CV STRESS DURATION SEC STAGE 4: 1
BH CV STRESS HR STAGE 1: 93
BH CV STRESS HR STAGE 2: 99
BH CV STRESS HR STAGE 3: 108
BH CV STRESS HR STAGE 4: 108
BH CV STRESS PROTOCOL 1: NORMAL
BH CV STRESS RECOVERY BP: 0 MMHG
BH CV STRESS RECOVERY HR: 0 BPM
BH CV STRESS STAGE 1: 1
BH CV STRESS STAGE 2: 2
BH CV STRESS STAGE 3: 3
BH CV STRESS STAGE 4: 4
BUN BLD-MCNC: 25 MG/DL (ref 8–23)
BUN/CREAT SERPL: 21.9 (ref 7–25)
CALCIUM SPEC-SCNC: 9.1 MG/DL (ref 8.6–10.5)
CHLORIDE SERPL-SCNC: 106 MMOL/L (ref 98–107)
CHOLEST SERPL-MCNC: 107 MG/DL (ref 0–200)
CO2 SERPL-SCNC: 25 MMOL/L (ref 22–29)
CREAT BLD-MCNC: 1.14 MG/DL (ref 0.76–1.27)
DEPRECATED RDW RBC AUTO: 52.9 FL (ref 37–54)
EOSINOPHIL # BLD AUTO: 0.2 10*3/MM3 (ref 0–0.4)
EOSINOPHIL NFR BLD AUTO: 2.1 % (ref 0.3–6.2)
ERYTHROCYTE [DISTWIDTH] IN BLOOD BY AUTOMATED COUNT: 17.2 % (ref 12.3–15.4)
GFR SERPL CREATININE-BSD FRML MDRD: 65 ML/MIN/1.73
GLUCOSE BLD-MCNC: 101 MG/DL (ref 65–99)
HBA1C MFR BLD: 5.1 % (ref 3.5–5.6)
HCT VFR BLD AUTO: 41 % (ref 37.5–51)
HDLC SERPL-MCNC: 25 MG/DL (ref 40–60)
HGB BLD-MCNC: 13.5 G/DL (ref 13–17.7)
INR PPP: 1.24 (ref 0.9–1.1)
LDLC SERPL CALC-MCNC: 67 MG/DL (ref 0–100)
LDLC/HDLC SERPL: 2.7 {RATIO}
LV EF NUC BP: 32 %
LYMPHOCYTES # BLD AUTO: 1.8 10*3/MM3 (ref 0.7–3.1)
LYMPHOCYTES NFR BLD AUTO: 21 % (ref 19.6–45.3)
MAGNESIUM SERPL-MCNC: 1.9 MG/DL (ref 1.6–2.4)
MAXIMAL PREDICTED HEART RATE: 157 BPM
MCH RBC QN AUTO: 28.6 PG (ref 26.6–33)
MCHC RBC AUTO-ENTMCNC: 32.8 G/DL (ref 31.5–35.7)
MCV RBC AUTO: 87.1 FL (ref 79–97)
MONOCYTES # BLD AUTO: 0.7 10*3/MM3 (ref 0.1–0.9)
MONOCYTES NFR BLD AUTO: 8.5 % (ref 5–12)
NEUTROPHILS # BLD AUTO: 5.7 10*3/MM3 (ref 1.7–7)
NEUTROPHILS NFR BLD AUTO: 67.7 % (ref 42.7–76)
NRBC BLD AUTO-RTO: 0.1 /100 WBC (ref 0–0.2)
NT-PROBNP SERPL-MCNC: 2112 PG/ML (ref 5–900)
PERCENT MAX PREDICTED HR: 68.79 %
PHOSPHATE SERPL-MCNC: 3.6 MG/DL (ref 2.5–4.5)
PLATELET # BLD AUTO: 165 10*3/MM3 (ref 140–450)
PMV BLD AUTO: 9.2 FL (ref 6–12)
POTASSIUM BLD-SCNC: 3.5 MMOL/L (ref 3.5–5.2)
PROTHROMBIN TIME: 12.5 SECONDS (ref 9.6–11.7)
RBC # BLD AUTO: 4.71 10*6/MM3 (ref 4.14–5.8)
SODIUM BLD-SCNC: 142 MMOL/L (ref 136–145)
STRESS BASELINE BP: NORMAL MMHG
STRESS BASELINE HR: 93 BPM
STRESS PERCENT HR: 81 %
STRESS POST PEAK BP: NORMAL MMHG
STRESS POST PEAK HR: 108 BPM
STRESS TARGET HR: 133 BPM
T4 FREE SERPL-MCNC: 1.07 NG/DL (ref 0.93–1.7)
TRIGL SERPL-MCNC: 73 MG/DL (ref 0–150)
TSH SERPL DL<=0.05 MIU/L-ACNC: 4.65 UIU/ML (ref 0.27–4.2)
VLDLC SERPL-MCNC: 14.6 MG/DL
WBC NRBC COR # BLD: 8.5 10*3/MM3 (ref 3.4–10.8)

## 2019-11-09 PROCEDURE — 84439 ASSAY OF FREE THYROXINE: CPT | Performed by: NURSE PRACTITIONER

## 2019-11-09 PROCEDURE — 84443 ASSAY THYROID STIM HORMONE: CPT | Performed by: NURSE PRACTITIONER

## 2019-11-09 PROCEDURE — 83880 ASSAY OF NATRIURETIC PEPTIDE: CPT | Performed by: NURSE PRACTITIONER

## 2019-11-09 PROCEDURE — A9500 TC99M SESTAMIBI: HCPCS | Performed by: FAMILY MEDICINE

## 2019-11-09 PROCEDURE — 93016 CV STRESS TEST SUPVJ ONLY: CPT | Performed by: NURSE PRACTITIONER

## 2019-11-09 PROCEDURE — 84100 ASSAY OF PHOSPHORUS: CPT | Performed by: NURSE PRACTITIONER

## 2019-11-09 PROCEDURE — 0 TECHNETIUM SESTAMIBI: Performed by: FAMILY MEDICINE

## 2019-11-09 PROCEDURE — 25010000002 REGADENOSON 0.4 MG/5ML SOLUTION: Performed by: FAMILY MEDICINE

## 2019-11-09 PROCEDURE — 78452 HT MUSCLE IMAGE SPECT MULT: CPT

## 2019-11-09 PROCEDURE — 80061 LIPID PANEL: CPT | Performed by: NURSE PRACTITIONER

## 2019-11-09 PROCEDURE — 93017 CV STRESS TEST TRACING ONLY: CPT

## 2019-11-09 PROCEDURE — 93005 ELECTROCARDIOGRAM TRACING: CPT | Performed by: NURSE PRACTITIONER

## 2019-11-09 PROCEDURE — 25010000002 FUROSEMIDE PER 20 MG: Performed by: NURSE PRACTITIONER

## 2019-11-09 PROCEDURE — 83036 HEMOGLOBIN GLYCOSYLATED A1C: CPT | Performed by: NURSE PRACTITIONER

## 2019-11-09 PROCEDURE — 93018 CV STRESS TEST I&R ONLY: CPT | Performed by: INTERNAL MEDICINE

## 2019-11-09 PROCEDURE — 78452 HT MUSCLE IMAGE SPECT MULT: CPT | Performed by: INTERNAL MEDICINE

## 2019-11-09 PROCEDURE — 80048 BASIC METABOLIC PNL TOTAL CA: CPT | Performed by: NURSE PRACTITIONER

## 2019-11-09 PROCEDURE — 93306 TTE W/DOPPLER COMPLETE: CPT

## 2019-11-09 PROCEDURE — 83735 ASSAY OF MAGNESIUM: CPT | Performed by: NURSE PRACTITIONER

## 2019-11-09 PROCEDURE — 99232 SBSQ HOSP IP/OBS MODERATE 35: CPT | Performed by: INTERNAL MEDICINE

## 2019-11-09 PROCEDURE — 85027 COMPLETE CBC AUTOMATED: CPT | Performed by: NURSE PRACTITIONER

## 2019-11-09 PROCEDURE — 85610 PROTHROMBIN TIME: CPT | Performed by: NURSE PRACTITIONER

## 2019-11-09 PROCEDURE — 25010000002 ENOXAPARIN PER 10 MG: Performed by: FAMILY MEDICINE

## 2019-11-09 RX ORDER — HYDRALAZINE HYDROCHLORIDE 20 MG/ML
10 INJECTION INTRAMUSCULAR; INTRAVENOUS EVERY 6 HOURS PRN
Status: DISCONTINUED | OUTPATIENT
Start: 2019-11-09 | End: 2019-11-13 | Stop reason: HOSPADM

## 2019-11-09 RX ORDER — LEVOTHYROXINE SODIUM 0.03 MG/1
25 TABLET ORAL
Status: DISCONTINUED | OUTPATIENT
Start: 2019-11-09 | End: 2019-11-13 | Stop reason: HOSPADM

## 2019-11-09 RX ORDER — ECHINACEA PURPUREA EXTRACT 125 MG
1 TABLET ORAL AS NEEDED
Status: DISCONTINUED | OUTPATIENT
Start: 2019-11-09 | End: 2019-11-09

## 2019-11-09 RX ORDER — ECHINACEA PURPUREA EXTRACT 125 MG
1 TABLET ORAL AS NEEDED
Status: DISCONTINUED | OUTPATIENT
Start: 2019-11-09 | End: 2019-11-13 | Stop reason: HOSPADM

## 2019-11-09 RX ADMIN — LEVOTHYROXINE SODIUM 25 MCG: 25 TABLET ORAL at 13:07

## 2019-11-09 RX ADMIN — HYDRALAZINE HYDROCHLORIDE 50 MG: 25 TABLET, FILM COATED ORAL at 20:47

## 2019-11-09 RX ADMIN — TECHNETIUM TC 99M SESTAMIBI 1 DOSE: 1 INJECTION INTRAVENOUS at 10:05

## 2019-11-09 RX ADMIN — SALINE NASAL SPRAY 1 SPRAY: 1.5 SOLUTION NASAL at 13:07

## 2019-11-09 RX ADMIN — CARVEDILOL 25 MG: 25 TABLET, FILM COATED ORAL at 11:33

## 2019-11-09 RX ADMIN — CARVEDILOL 25 MG: 25 TABLET, FILM COATED ORAL at 18:04

## 2019-11-09 RX ADMIN — POTASSIUM CHLORIDE 20 MEQ: 1500 TABLET, EXTENDED RELEASE ORAL at 07:46

## 2019-11-09 RX ADMIN — Medication 10 ML: at 20:58

## 2019-11-09 RX ADMIN — TECHNETIUM TC 99M SESTAMIBI 1 DOSE: 1 INJECTION INTRAVENOUS at 06:45

## 2019-11-09 RX ADMIN — Medication 10 ML: at 07:46

## 2019-11-09 RX ADMIN — FUROSEMIDE 40 MG: 10 INJECTION, SOLUTION INTRAMUSCULAR; INTRAVENOUS at 05:57

## 2019-11-09 RX ADMIN — WARFARIN 5 MG: 5 TABLET ORAL at 18:03

## 2019-11-09 RX ADMIN — POTASSIUM CHLORIDE 20 MEQ: 1500 TABLET, EXTENDED RELEASE ORAL at 20:47

## 2019-11-09 RX ADMIN — ENOXAPARIN SODIUM 120 MG: 120 INJECTION SUBCUTANEOUS at 05:57

## 2019-11-09 RX ADMIN — ENOXAPARIN SODIUM 120 MG: 120 INJECTION SUBCUTANEOUS at 18:04

## 2019-11-09 RX ADMIN — REGADENOSON 0.4 MG: 0.08 INJECTION, SOLUTION INTRAVENOUS at 10:05

## 2019-11-09 RX ADMIN — FUROSEMIDE 40 MG: 10 INJECTION, SOLUTION INTRAMUSCULAR; INTRAVENOUS at 18:03

## 2019-11-09 RX ADMIN — HYDRALAZINE HYDROCHLORIDE 50 MG: 25 TABLET, FILM COATED ORAL at 07:46

## 2019-11-09 RX ADMIN — ATORVASTATIN CALCIUM 10 MG: 10 TABLET, FILM COATED ORAL at 07:47

## 2019-11-09 NOTE — PROGRESS NOTES
LOS: 1 day   Patient Care Team:  Marisol Larson APRN as PCP - General    Chief Complaint:  Edema, SOA    Subjective     Interval History:     Patient Complaints: No c/o today  Patient Denies:  CP, SOA  History taken from: patient    Review of Systems:   Review of Systems   Constitutional: Negative.    HENT: Negative.    Eyes: Negative.    Respiratory: Negative.    Cardiovascular: Negative.    Gastrointestinal: Negative.    Endocrine: Negative.    Genitourinary: Negative.    Musculoskeletal: Negative.    Skin: Negative.    Allergic/Immunologic: Negative.    Neurological: Negative.    Hematological: Negative.    Psychiatric/Behavioral: Negative.    All other systems reviewed and are negative.    Objective     Vital Signs  Temp:  [97.5 °F (36.4 °C)-98.9 °F (37.2 °C)] 97.6 °F (36.4 °C)  Heart Rate:  [] 87  Resp:  [15-20] 16  BP: (115-168)/() 168/101    Physical Exam:     General Appearance:    Alert, cooperative, in no acute distress   Head:    Normocephalic, without obvious abnormality, atraumatic   Eyes:            Lids and lashes normal, conjunctivae and sclerae normal   Ears:    Ears appear intact with no abnormalities noted   Throat:   No oral lesions, no thrush, oral mucosa moist   Neck:   No adenopathy, supple, trachea midline, no thyromegaly, no   carotid bruit, no JVD   Back:     No kyphosis present, no scoliosis present, no skin lesions,      erythema or scars, no tenderness to percussion or                   palpation,   range of motion normal   Lungs:     Clear to auscultation,respirations regular, even and                  unlabored    Heart:    Sl. irreg rhythm and normal rate, normal S1 and S2, no            murmur, no gallop, no rub, no click   Chest Wall:    No abnormalities observed   Abdomen:     Normal bowel sounds, no masses, no organomegaly, soft        non-tender, non-distended, no guarding, no rebound                tenderness   Rectal:     Deferred   Extremities:   Moves all  extremities well, mild lower ext edema, no cyanosis, no             redness   Pulses:   Pulses palpable and equal bilaterally   Skin:   No bleeding, bruising or rash   Lymph nodes:   No palpable adenopathy   Neurologic:   Cranial nerves 2 - 12 grossly intact, sensation intact        Results Review:     I reviewed the patient's new clinical results.  I reviewed the patient's new imaging results and agree with the interpretation.    Medication Review:   Scheduled Meds:  atorvastatin 10 mg Oral Daily   carvedilol 25 mg Oral BID With Meals   enoxaparin 120 mg Subcutaneous Q12H   furosemide 40 mg Intravenous Q12H   hydrALAZINE 50 mg Oral BID   [START ON 11/10/2019] levothyroxine 25 mcg Oral Q AM   potassium chloride 20 mEq Oral BID   sodium chloride 10 mL Intravenous Q12H   warfarin 5 mg Oral Daily     Continuous Infusions:  niCARdipine 5-15 mg/hr Last Rate: Stopped (11/08/19 2000)   Pharmacy to dose warfarin       PRN Meds:.[COMPLETED] Insert peripheral IV **AND** sodium chloride  •  sodium chloride    Labs:  Lab Results (last 24 hours)     Procedure Component Value Units Date/Time    BNP [[id number]]  (Abnormal) Collected:  11/09/19 0311    Specimen:  Blood Updated:  11/09/19 0400     proBNP 2,112.0 pg/mL     Narrative:       Among patients with dyspnea, NT-proBNP is highly sensitive for the detection of acute congestive heart failure. In addition NT-proBNP of <300 pg/ml effectively rules out acute congestive heart failure with 99% negative predictive value.    TSH [[id number]]  (Abnormal) Collected:  11/09/19 0311    Specimen:  Blood Updated:  11/09/19 0400     TSH 4.650 uIU/mL     T4, Free [[id number]]  (Normal) Collected:  11/09/19 0311    Specimen:  Blood Updated:  11/09/19 0400     Free T4 1.07 ng/dL      Comment: Results may be falsely increased if patient taking Biotin.       Basic Metabolic Panel [[id number]]  (Abnormal) Collected:  11/09/19 0311    Specimen:  Blood Updated:  11/09/19 0359     Glucose 101 mg/dL       BUN 25 mg/dL      Creatinine 1.14 mg/dL      Sodium 142 mmol/L      Potassium 3.5 mmol/L      Chloride 106 mmol/L      CO2 25.0 mmol/L      Calcium 9.1 mg/dL      eGFR Non African Amer 65 mL/min/1.73      BUN/Creatinine Ratio 21.9     Anion Gap 11.0 mmol/L     Narrative:       GFR Normal >60  Chronic Kidney Disease <60  Kidney Failure <15    Magnesium [[id number]]  (Normal) Collected:  11/09/19 0311    Specimen:  Blood Updated:  11/09/19 0359     Magnesium 1.9 mg/dL     Lipid Panel [[id number]]  (Abnormal) Collected:  11/09/19 0311    Specimen:  Blood Updated:  11/09/19 0359     Total Cholesterol 107 mg/dL      Triglycerides 73 mg/dL      HDL Cholesterol 25 mg/dL      LDL Cholesterol  67 mg/dL      VLDL Cholesterol 14.6 mg/dL      LDL/HDL Ratio 2.70    Narrative:       Cholesterol Reference Ranges  (U.S. Department of Health and Human Services ATP III Classifications)    Desirable          <200 mg/dL  Borderline High    200-239 mg/dL  High Risk          >240 mg/dL      Triglyceride Reference Ranges  (U.S. Department of Health and Human Services ATP III Classifications)    Normal           <150 mg/dL  Borderline High  150-199 mg/dL  High             200-499 mg/dL  Very High        >500 mg/dL    HDL Reference Ranges  (U.S. Department of Health and Human Services ATP III Classifcations)    Low     <40 mg/dl (major risk factor for CHD)  High    >60 mg/dl ('negative' risk factor for CHD)        LDL Reference Ranges  (U.S. Department of Health and Human Services ATP III Classifcations)    Optimal          <100 mg/dL  Near Optimal     100-129 mg/dL  Borderline High  130-159 mg/dL  High             160-189 mg/dL  Very High        >189 mg/dL    Phosphorus [[id number]]  (Normal) Collected:  11/09/19 0311    Specimen:  Blood Updated:  11/09/19 0359     Phosphorus 3.6 mg/dL     CBC & Differential [[id number]] Collected:  11/09/19 0311    Specimen:  Blood Updated:  11/09/19 0338    Narrative:       The following orders were created  for panel order CBC & Differential.  Procedure                               Abnormality         Status                     ---------                               -----------         ------                     Manual Differential[[id number]]                                                         CBC Auto Differential[[id number]]        Abnormal            Final result                 Please view results for these tests on the individual orders.    CBC Auto Differential [[id number]]  (Abnormal) Collected:  11/09/19 0311    Specimen:  Blood Updated:  11/09/19 0338     WBC 8.50 10*3/mm3      RBC 4.71 10*6/mm3      Hemoglobin 13.5 g/dL      Hematocrit 41.0 %      MCV 87.1 fL      MCH 28.6 pg      MCHC 32.8 g/dL      RDW 17.2 %      RDW-SD 52.9 fl      MPV 9.2 fL      Platelets 165 10*3/mm3      Neutrophil % 67.7 %      Lymphocyte % 21.0 %      Monocyte % 8.5 %      Eosinophil % 2.1 %      Basophil % 0.7 %      Neutrophils, Absolute 5.70 10*3/mm3      Lymphocytes, Absolute 1.80 10*3/mm3      Monocytes, Absolute 0.70 10*3/mm3      Eosinophils, Absolute 0.20 10*3/mm3      Basophils, Absolute 0.10 10*3/mm3      nRBC 0.1 /100 WBC     Protime-INR [[id number]]  (Abnormal) Collected:  11/09/19 0311    Specimen:  Blood Updated:  11/09/19 0337     Protime 12.5 Seconds      INR 1.24    Hemoglobin A1c [[id number]] Collected:  11/09/19 0311    Specimen:  Blood Updated:  11/09/19 0326    Troponin [[id number]]  (Normal) Collected:  11/08/19 1702    Specimen:  Blood Updated:  11/08/19 1814     Troponin T 0.014 ng/mL     Narrative:       Troponin T Reference Range:  <= 0.03 ng/mL-   Negative for AMI  >0.03 ng/mL-     Abnormal for myocardial necrosis.  Clinicians would have to utilize clinical acumen, EKG, Troponin and serial changes to determine if it is an Acute Myocardial Infarction or myocardial injury due to an underlying chronic condition.     Troponin [[id number]]  (Normal) Collected:  11/08/19 1443    Specimen:  Blood Updated:   11/08/19 1532     Troponin T 0.017 ng/mL     Narrative:       Troponin T Reference Range:  <= 0.03 ng/mL-   Negative for AMI  >0.03 ng/mL-     Abnormal for myocardial necrosis.  Clinicians would have to utilize clinical acumen, EKG, Troponin and serial changes to determine if it is an Acute Myocardial Infarction or myocardial injury due to an underlying chronic condition.     Basic Metabolic Panel [[id number]]  (Abnormal) Collected:  11/08/19 1443    Specimen:  Blood Updated:  11/08/19 1532     Glucose 123 mg/dL      BUN 24 mg/dL      Creatinine 1.04 mg/dL      Sodium 142 mmol/L      Potassium 3.1 mmol/L      Chloride 102 mmol/L      CO2 26.0 mmol/L      Calcium 9.2 mg/dL      eGFR Non African Amer 72 mL/min/1.73      BUN/Creatinine Ratio 23.1     Anion Gap 14.0 mmol/L     Narrative:       GFR Normal >60  Chronic Kidney Disease <60  Kidney Failure <15    Magnesium [[id number]]  (Normal) Collected:  11/08/19 1443    Specimen:  Blood Updated:  11/08/19 1532     Magnesium 1.9 mg/dL     D-dimer, Quantitative [[id number]]  (Abnormal) Collected:  11/08/19 1443    Specimen:  Blood Updated:  11/08/19 1524     D-Dimer, Quantitative 1.70 MCGFEU/mL     Narrative:       Reference Range  --------------------------------------------------------------------     < 0.50   Negative Predictive Value  0.50-0.59   Indeterminate    >= 0.60   Probable VTE             A very low percentage of patients with DVT may yield D-Dimer results   below the cut-off of 0.50 MCGFEU/mL.  This is known to be more   prevalent in patients with distal DVT.             Results of this test should always be interpreted in conjunction with   the patient's medical history, clinical presentation and other   findings.  Clinical diagnosis should not be based on the result of   [facility] D-Dimer alone.    Protime-INR [[id number]]  (Abnormal) Collected:  11/08/19 1443    Specimen:  Blood Updated:  11/08/19 1524     Protime 12.4 Seconds      INR 1.22    Magnesium  [[id number]]  (Normal) Collected:  11/08/19 1443    Specimen:  Blood Updated:  11/08/19 1524     Magnesium 1.9 mg/dL            Assessment/Plan       Shortness of breath    Hypertensive emergency    Chronic atrial fibrillation    Chronic diastolic CHF (congestive heart failure) (CMS/[facility])    CKD (chronic kidney disease) stage 3, GFR 30-59 ml/min (CMS/[facility])    Essential hypertension    Gambling disorder, persistent    Coronary artery disease involving native coronary artery of native heart without angina pectoris    GERD (gastroesophageal reflux disease)    Medically noncompliant      Pt had chemical stress test this AM-await results, cont IV lasix as BNP remains >2000        Daylin Burris, CLARISSA  11/09/19  11:42 AM

## 2019-11-09 NOTE — PLAN OF CARE
Problem: Fluid Volume Excess (Adult)  Goal: Identify Related Risk Factors and Signs and Symptoms  Outcome: Ongoing (interventions implemented as appropriate)    Goal: Optimal Fluid Balance  Outcome: Ongoing (interventions implemented as appropriate)      Problem: Hypertensive Disease/Crisis (Arterial) (Adult)  Goal: Signs and Symptoms of Listed Potential Problems Will be Absent, Minimized or Managed (Hypertensive Disease/Crisis)  Outcome: Ongoing (interventions implemented as appropriate)      Problem: Fall Risk (Adult)  Goal: Identify Related Risk Factors and Signs and Symptoms  Outcome: Ongoing (interventions implemented as appropriate)    Goal: Absence of Fall  Outcome: Ongoing (interventions implemented as appropriate)

## 2019-11-09 NOTE — PROGRESS NOTES
"Referring Provider: Hospitalist    Reason for follow-up: Shortness of breath and congestive heart failure     Patient Care Team:  Marisol Larson APRN as PCP - General    Subjective .  Complains of shortness of breath but no chest pain    Objective  Lying in bed comfortably     Review of Systems   Constitution: Negative for fever and malaise/fatigue.   Cardiovascular: Negative for chest pain, dyspnea on exertion and palpitations.   Respiratory: Positive for shortness of breath. Negative for cough.    Skin: Negative for rash.   Gastrointestinal: Negative for abdominal pain, nausea and vomiting.   Neurological: Negative for focal weakness and headaches.   All other systems reviewed and are negative.      Toradol [ketorolac tromethamine]    Scheduled Meds:    atorvastatin 10 mg Oral Daily   carvedilol 25 mg Oral BID With Meals   enoxaparin 120 mg Subcutaneous Q12H   furosemide 40 mg Intravenous Q12H   hydrALAZINE 50 mg Oral BID   levothyroxine 25 mcg Oral Q AM   potassium chloride 20 mEq Oral BID   sodium chloride 10 mL Intravenous Q12H   warfarin 5 mg Oral Daily     Continuous Infusions:    niCARdipine 5-15 mg/hr Last Rate: Stopped (11/08/19 2000)   Pharmacy to dose warfarin       PRN Meds:.[COMPLETED] Insert peripheral IV **AND** sodium chloride  •  sodium chloride  •  sodium chloride        VITAL SIGNS  Vitals:    11/09/19 0509 11/09/19 0710 11/09/19 0746 11/09/19 1123   BP: (!) 128/25 144/93 144/93 (!) 168/101   BP Location:    Left arm   Patient Position:    Lying   Pulse: 93 87  87   Resp:  15  16   Temp:  98.2 °F (36.8 °C)  97.6 °F (36.4 °C)   TempSrc:  Oral     SpO2:  95%  96%   Weight:  122 kg (269 lb 13.5 oz)     Height:           Flowsheet Rows      First Filed Value   Admission Height  182.9 cm (72\") Documented at 11/07/2019 2146   Admission Weight  124 kg (273 lb 5.9 oz) Documented at 11/07/2019 2146           TELEMETRY: Atrial fibrillation    Physical Exam:  Physical Exam   Constitutional: He appears " well-developed and well-nourished.   HENT:   Head: Normocephalic and atraumatic.   Eyes: Conjunctivae are normal. No scleral icterus.   Neck: Normal range of motion. Neck supple. No JVD present. Carotid bruit is not present.   Cardiovascular: Normal rate, S1 normal, S2 normal, normal heart sounds and intact distal pulses. An irregularly irregular rhythm present. PMI is not displaced.   Pulmonary/Chest: Effort normal and breath sounds normal. He has no wheezes. He has no rales.   Abdominal: Soft. Bowel sounds are normal.   Neurological: He is alert. He has normal strength.   Skin: Skin is warm and dry. No rash noted.        Results Review:   I reviewed the patient's new clinical results.  Lab Results (last 24 hours)     Procedure Component Value Units Date/Time    Hemoglobin A1c [[id number]]  (Normal) Collected:  11/09/19 0311    Specimen:  Blood Updated:  11/09/19 1142     Hemoglobin A1C 5.1 %     Narrative:       Hemoglobin A1C Reference Range:    <5.7 %        Normal  5.7-6.4 %     Increased risk for diabetes  > 6.4 %        Diabetes       These guidelines have been recommended by the American Diabetic Association for Hgb A1c.      The following 2010 guidelines have been recommended by the American Diabetes Association for Hemoglobin A1c.    HBA1c 5.7-6.4% Increased risk for future diabetes (pre-diabetes)  HBA1c     >6.4% Diabetes    BNP [[id number]]  (Abnormal) Collected:  11/09/19 0311    Specimen:  Blood Updated:  11/09/19 0400     proBNP 2,112.0 pg/mL     Narrative:       Among patients with dyspnea, NT-proBNP is highly sensitive for the detection of acute congestive heart failure. In addition NT-proBNP of <300 pg/ml effectively rules out acute congestive heart failure with 99% negative predictive value.    TSH [[id number]]  (Abnormal) Collected:  11/09/19 0311    Specimen:  Blood Updated:  11/09/19 0400     TSH 4.650 uIU/mL     T4, Free [[id number]]  (Normal) Collected:  11/09/19 0311    Specimen:  Blood Updated:   11/09/19 0400     Free T4 1.07 ng/dL      Comment: Results may be falsely increased if patient taking Biotin.       Basic Metabolic Panel [[id number]]  (Abnormal) Collected:  11/09/19 0311    Specimen:  Blood Updated:  11/09/19 0359     Glucose 101 mg/dL      BUN 25 mg/dL      Creatinine 1.14 mg/dL      Sodium 142 mmol/L      Potassium 3.5 mmol/L      Chloride 106 mmol/L      CO2 25.0 mmol/L      Calcium 9.1 mg/dL      eGFR Non African Amer 65 mL/min/1.73      BUN/Creatinine Ratio 21.9     Anion Gap 11.0 mmol/L     Narrative:       GFR Normal >60  Chronic Kidney Disease <60  Kidney Failure <15    Magnesium [[id number]]  (Normal) Collected:  11/09/19 0311    Specimen:  Blood Updated:  11/09/19 0359     Magnesium 1.9 mg/dL     Lipid Panel [[id number]]  (Abnormal) Collected:  11/09/19 0311    Specimen:  Blood Updated:  11/09/19 0359     Total Cholesterol 107 mg/dL      Triglycerides 73 mg/dL      HDL Cholesterol 25 mg/dL      LDL Cholesterol  67 mg/dL      VLDL Cholesterol 14.6 mg/dL      LDL/HDL Ratio 2.70    Narrative:       Cholesterol Reference Ranges  (U.S. Department of Health and Human Services ATP III Classifications)    Desirable          <200 mg/dL  Borderline High    200-239 mg/dL  High Risk          >240 mg/dL      Triglyceride Reference Ranges  (U.S. Department of Health and Human Services ATP III Classifications)    Normal           <150 mg/dL  Borderline High  150-199 mg/dL  High             200-499 mg/dL  Very High        >500 mg/dL    HDL Reference Ranges  (U.S. Department of Health and Human Services ATP III Classifcations)    Low     <40 mg/dl (major risk factor for CHD)  High    >60 mg/dl ('negative' risk factor for CHD)        LDL Reference Ranges  (U.S. Department of Health and Human Services ATP III Classifcations)    Optimal          <100 mg/dL  Near Optimal     100-129 mg/dL  Borderline High  130-159 mg/dL  High             160-189 mg/dL  Very High        >189 mg/dL    Phosphorus [[id number]]   (Normal) Collected:  11/09/19 0311    Specimen:  Blood Updated:  11/09/19 0359     Phosphorus 3.6 mg/dL     CBC & Differential [[id number]] Collected:  11/09/19 0311    Specimen:  Blood Updated:  11/09/19 0338    Narrative:       The following orders were created for panel order CBC & Differential.  Procedure                               Abnormality         Status                     ---------                               -----------         ------                     Manual Differential[[id number]]                                                         CBC Auto Differential[[id number]]        Abnormal            Final result                 Please view results for these tests on the individual orders.    CBC Auto Differential [[id number]]  (Abnormal) Collected:  11/09/19 0311    Specimen:  Blood Updated:  11/09/19 0338     WBC 8.50 10*3/mm3      RBC 4.71 10*6/mm3      Hemoglobin 13.5 g/dL      Hematocrit 41.0 %      MCV 87.1 fL      MCH 28.6 pg      MCHC 32.8 g/dL      RDW 17.2 %      RDW-SD 52.9 fl      MPV 9.2 fL      Platelets 165 10*3/mm3      Neutrophil % 67.7 %      Lymphocyte % 21.0 %      Monocyte % 8.5 %      Eosinophil % 2.1 %      Basophil % 0.7 %      Neutrophils, Absolute 5.70 10*3/mm3      Lymphocytes, Absolute 1.80 10*3/mm3      Monocytes, Absolute 0.70 10*3/mm3      Eosinophils, Absolute 0.20 10*3/mm3      Basophils, Absolute 0.10 10*3/mm3      nRBC 0.1 /100 WBC     Protime-INR [[id number]]  (Abnormal) Collected:  11/09/19 0311    Specimen:  Blood Updated:  11/09/19 0337     Protime 12.5 Seconds      INR 1.24    Troponin [[id number]]  (Normal) Collected:  11/08/19 1702    Specimen:  Blood Updated:  11/08/19 1814     Troponin T 0.014 ng/mL     Narrative:       Troponin T Reference Range:  <= 0.03 ng/mL-   Negative for AMI  >0.03 ng/mL-     Abnormal for myocardial necrosis.  Clinicians would have to utilize clinical acumen, EKG, Troponin and serial changes to determine if it is an Acute Myocardial  Infarction or myocardial injury due to an underlying chronic condition.     Troponin [[id number]]  (Normal) Collected:  11/08/19 1443    Specimen:  Blood Updated:  11/08/19 1532     Troponin T 0.017 ng/mL     Narrative:       Troponin T Reference Range:  <= 0.03 ng/mL-   Negative for AMI  >0.03 ng/mL-     Abnormal for myocardial necrosis.  Clinicians would have to utilize clinical acumen, EKG, Troponin and serial changes to determine if it is an Acute Myocardial Infarction or myocardial injury due to an underlying chronic condition.     Basic Metabolic Panel [[id number]]  (Abnormal) Collected:  11/08/19 1443    Specimen:  Blood Updated:  11/08/19 1532     Glucose 123 mg/dL      BUN 24 mg/dL      Creatinine 1.04 mg/dL      Sodium 142 mmol/L      Potassium 3.1 mmol/L      Chloride 102 mmol/L      CO2 26.0 mmol/L      Calcium 9.2 mg/dL      eGFR Non African Amer 72 mL/min/1.73      BUN/Creatinine Ratio 23.1     Anion Gap 14.0 mmol/L     Narrative:       GFR Normal >60  Chronic Kidney Disease <60  Kidney Failure <15    Magnesium [[id number]]  (Normal) Collected:  11/08/19 1443    Specimen:  Blood Updated:  11/08/19 1532     Magnesium 1.9 mg/dL     D-dimer, Quantitative [[id number]]  (Abnormal) Collected:  11/08/19 1443    Specimen:  Blood Updated:  11/08/19 1524     D-Dimer, Quantitative 1.70 MCGFEU/mL     Narrative:       Reference Range  --------------------------------------------------------------------     < 0.50   Negative Predictive Value  0.50-0.59   Indeterminate    >= 0.60   Probable VTE             A very low percentage of patients with DVT may yield D-Dimer results   below the cut-off of 0.50 MCGFEU/mL.  This is known to be more   prevalent in patients with distal DVT.             Results of this test should always be interpreted in conjunction with   the patient's medical history, clinical presentation and other   findings.  Clinical diagnosis should not be based on the result of   [facility] D-Dimer alone.     Protime-INR [[id number]]  (Abnormal) Collected:  11/08/19 1443    Specimen:  Blood Updated:  11/08/19 1524     Protime 12.4 Seconds      INR 1.22    Magnesium [[id number]]  (Normal) Collected:  11/08/19 1443    Specimen:  Blood Updated:  11/08/19 1524     Magnesium 1.9 mg/dL           Imaging Results (Last 24 Hours)     ** No results found for the last 24 hours. **          EKG      I personally viewed and interpreted the patient's EKG/Telemetry data:    ECHOCARDIOGRAM:    STRESS MYOVIEW:    CARDIAC CATHETERIZATION:    OTHER:         Assessment/Plan     #1 shortness of breath  2.  Congestive heart failure  3.  Coronary artery disease  4.  Chronic atrial fibrillation  5.  Hypertension  6.  Chronic renal insufficiency  7.  Hyperlipidemia  8.  Medical noncompliance    Patient presented with shortness of breath and has congestive heart failure  Patient is ruled out for MI by EKG and enzymes  Patient is having an echocardiogram and a stress Myoview study performed today  Patient was not taking any medicines and was medically noncompliant  Patient is restarted on medications including warfarin Coreg atorvastatin and hydralazine  Patient cannot take ACE inhibitors because of renal insufficiency  I discussed the patients findings and my recommendations with patient and nurse    Edy Thomas MD  11/09/19  12:38 PM

## 2019-11-09 NOTE — CONSULTS
Referring Provider: Hospitalist  Reason for Consultation: Shortness of breath, congestive heart failure    Patient Care Team:  Marisol Larson APRN as PCP - General    Chief complaint shortness of breath    Subjective .     History of present illness:  Carlos Whipple is a 64 y.o. male with history of coronary artery disease congestive heart failure chronic atrial fibrillation hypertension chronic renal insufficiency hyperlipidemia history of CVA in the past presented to the hospital with complaints of shortness of breath of increasing severity.  Patient does not have any symptoms of chest pain.  No complaints of any PND orthopnea.  No palpitations dizziness syncope.  He has some swelling of the feet.  He says that is not taking his medicines regularly.  He admits to having a gambling problem and has not had any medications.    Review of Systems   Constitution: Negative for fever and malaise/fatigue.   HENT: Negative for ear pain and nosebleeds.    Eyes: Negative for blurred vision and double vision.   Cardiovascular: Positive for leg swelling. Negative for chest pain, dyspnea on exertion and palpitations.   Respiratory: Positive for shortness of breath. Negative for cough.    Skin: Negative for rash.   Musculoskeletal: Negative for joint pain.   Gastrointestinal: Negative for abdominal pain, nausea and vomiting.   Neurological: Negative for focal weakness and headaches.   Psychiatric/Behavioral: Negative for depression. The patient is not nervous/anxious.    All other systems reviewed and are negative.      History  Past Medical History:   Diagnosis Date   • A-fib (CMS/[facility])    • CHF (congestive heart failure) (CMS/[facility])    • Chronic atrial fibrillation 11/8/2019   • Chronic diastolic CHF (congestive heart failure) (CMS/[facility]) 11/8/2019   • CKD (chronic kidney disease) stage 3, GFR 30-59 ml/min (CMS/[facility]) 11/8/2019   • Coronary artery disease involving native coronary artery of native heart without angina pectoris  11/8/2019   • DDD (degenerative disc disease), lumbosacral 11/8/2019   • Essential hypertension 11/8/2019   • Gambling disorder, persistent 11/8/2019   • GERD (gastroesophageal reflux disease) 11/8/2019   • History of CVA (cerebrovascular accident) 11/8/2019   • Hyperlipidemia    • Hypertension    • Medically noncompliant 11/8/2019   • Peripheral polyneuropathy 11/8/2019   • Psoriasis 11/8/2019       Past Surgical History:   Procedure Laterality Date   • CARDIAC CATHETERIZATION         History reviewed. No pertinent family history.  There is history of coronary disease in the family    Social History     Tobacco Use   • Smoking status: Never Smoker   • Smokeless tobacco: Never Used   Substance Use Topics   • Alcohol use: No     Frequency: Never   • Drug use: No        Medications Prior to Admission   Medication Sig Dispense Refill Last Dose   • carvedilol (COREG) 25 MG tablet Take 25 mg by mouth 2 (Two) Times a Day With Meals.      • hydrALAZINE (APRESOLINE) 50 MG tablet Take 50 mg by mouth 2 (Two) Times a Day.      • potassium chloride (K-DUR,KLOR-CON) 20 MEQ CR tablet Take 20 mEq by mouth 2 (Two) Times a Day.      • pravastatin (PRAVACHOL) 40 MG tablet Take 40 mg by mouth Every Night.      • spironolactone (ALDACTONE) 25 MG tablet Take 25 mg by mouth Daily.            Toradol [ketorolac tromethamine]    Scheduled Meds:    [START ON 11/9/2019] atorvastatin 10 mg Oral Daily   carvedilol 25 mg Oral BID With Meals   [START ON 11/9/2019] enoxaparin 120 mg Subcutaneous Q12H   furosemide 40 mg Intravenous Q12H   hydrALAZINE 50 mg Oral BID   Pharmacy to dose warfarin  Does not apply Daily   potassium chloride 20 mEq Oral BID   sodium chloride 10 mL Intravenous Q12H   warfarin 5 mg Oral Daily     Continuous Infusions:    niCARdipine 5-15 mg/hr Last Rate: 7.5 mg/hr (11/08/19 1827)     PRN Meds:.[COMPLETED] Insert peripheral IV **AND** sodium chloride  •  sodium chloride    Objective     VITAL SIGNS  Vitals:    11/08/19 0621  "11/08/19 1100 11/08/19 1559 11/08/19 1900   BP: 133/82 136/75 164/83 140/75   BP Location:  Left arm     Patient Position:  Lying     Pulse: 98  97 102   Resp:  19 20 18   Temp:   98.4 °F (36.9 °C) 98.9 °F (37.2 °C)   TempSrc:   Oral Oral   SpO2: 91%  93% 98%   Weight:   122 kg (268 lb 15.4 oz)    Height:   182.9 cm (72\")        Flowsheet Rows      First Filed Value   Admission Height  182.9 cm (72\") Documented at 11/07/2019 2146   Admission Weight  124 kg (273 lb 5.9 oz) Documented at 11/07/2019 2146           TELEMETRY: Atrial fibrillation    Physical Exam:  Physical Exam   Constitutional: He appears well-developed and well-nourished.   HENT:   Head: Normocephalic and atraumatic.   Eyes: Conjunctivae and EOM are normal. Pupils are equal, round, and reactive to light. No scleral icterus.   Neck: Normal range of motion. Neck supple. No JVD present. Carotid bruit is not present.   Cardiovascular: Normal rate, S1 normal, S2 normal and intact distal pulses. An irregularly irregular rhythm present. PMI is not displaced.   Murmur heard.  Pulmonary/Chest: Effort normal and breath sounds normal. He has no wheezes. He has no rales.   Abdominal: Soft. Bowel sounds are normal.   Musculoskeletal: Normal range of motion.   Neurological: He is alert. He has normal strength.   No focal deficits   Skin: Skin is warm and dry. No rash noted.   Psychiatric: He has a normal mood and affect.        Results Review:   I reviewed the patient's new clinical results.  Lab Results (last 24 hours)     Procedure Component Value Units Date/Time    Troponin [[id number]]  (Normal) Collected:  11/08/19 1702    Specimen:  Blood Updated:  11/08/19 1814     Troponin T 0.014 ng/mL     Narrative:       Troponin T Reference Range:  <= 0.03 ng/mL-   Negative for AMI  >0.03 ng/mL-     Abnormal for myocardial necrosis.  Clinicians would have to utilize clinical acumen, EKG, Troponin and serial changes to determine if it is an Acute Myocardial Infarction or " myocardial injury due to an underlying chronic condition.     Troponin [[id number]]  (Normal) Collected:  11/08/19 1443    Specimen:  Blood Updated:  11/08/19 1532     Troponin T 0.017 ng/mL     Narrative:       Troponin T Reference Range:  <= 0.03 ng/mL-   Negative for AMI  >0.03 ng/mL-     Abnormal for myocardial necrosis.  Clinicians would have to utilize clinical acumen, EKG, Troponin and serial changes to determine if it is an Acute Myocardial Infarction or myocardial injury due to an underlying chronic condition.     Basic Metabolic Panel [[id number]]  (Abnormal) Collected:  11/08/19 1443    Specimen:  Blood Updated:  11/08/19 1532     Glucose 123 mg/dL      BUN 24 mg/dL      Creatinine 1.04 mg/dL      Sodium 142 mmol/L      Potassium 3.1 mmol/L      Chloride 102 mmol/L      CO2 26.0 mmol/L      Calcium 9.2 mg/dL      eGFR Non African Amer 72 mL/min/1.73      BUN/Creatinine Ratio 23.1     Anion Gap 14.0 mmol/L     Narrative:       GFR Normal >60  Chronic Kidney Disease <60  Kidney Failure <15    Magnesium [[id number]]  (Normal) Collected:  11/08/19 1443    Specimen:  Blood Updated:  11/08/19 1532     Magnesium 1.9 mg/dL     D-dimer, Quantitative [[id number]]  (Abnormal) Collected:  11/08/19 1443    Specimen:  Blood Updated:  11/08/19 1524     D-Dimer, Quantitative 1.70 MCGFEU/mL     Narrative:       Reference Range  --------------------------------------------------------------------     < 0.50   Negative Predictive Value  0.50-0.59   Indeterminate    >= 0.60   Probable VTE             A very low percentage of patients with DVT may yield D-Dimer results   below the cut-off of 0.50 MCGFEU/mL.  This is known to be more   prevalent in patients with distal DVT.             Results of this test should always be interpreted in conjunction with   the patient's medical history, clinical presentation and other   findings.  Clinical diagnosis should not be based on the result of   [place] D-Dimer alone.    Protime-INR  [[id number]]  (Abnormal) Collected:  11/08/19 1443    Specimen:  Blood Updated:  11/08/19 1524     Protime 12.4 Seconds      INR 1.22    Magnesium [[id number]]  (Normal) Collected:  11/08/19 1443    Specimen:  Blood Updated:  11/08/19 1524     Magnesium 1.9 mg/dL     CBC & Differential [[id number]] Collected:  11/07/19 2336    Specimen:  Blood Updated:  11/08/19 0025    Narrative:       The following orders were created for panel order CBC & Differential.  Procedure                               Abnormality         Status                     ---------                               -----------         ------                     CBC Auto Differential[[id number]]        Abnormal            Final result                 Please view results for these tests on the individual orders.    CBC Auto Differential [[id number]]  (Abnormal) Collected:  11/07/19 2336    Specimen:  Blood Updated:  11/08/19 0025     WBC 10.30 10*3/mm3      RBC 5.13 10*6/mm3      Hemoglobin 14.6 g/dL      Hematocrit 44.5 %      MCV 86.8 fL      MCH 28.5 pg      MCHC 32.9 g/dL      RDW 17.1 %      RDW-SD 52.9 fl      MPV 9.4 fL      Platelets 155 10*3/mm3      Neutrophil % 80.6 %      Lymphocyte % 9.6 %      Monocyte % 8.3 %      Eosinophil % 0.8 %      Basophil % 0.7 %      Neutrophils, Absolute 8.30 10*3/mm3      Lymphocytes, Absolute 1.00 10*3/mm3      Monocytes, Absolute 0.90 10*3/mm3      Eosinophils, Absolute 0.10 10*3/mm3      Basophils, Absolute 0.10 10*3/mm3      nRBC 0.1 /100 WBC     Narrative:       Appended report. These results have been appended to a previously verified report.    Scan Slide [[id number]] Collected:  11/07/19 2336    Specimen:  Blood Updated:  11/08/19 0025     Anisocytosis Slight/1+     Toxic Granulation Slight/1+     Vacuolated Neutrophils Slight/1+     Platelet Morphology Normal    Troponin [[id number]]  (Normal) Collected:  11/07/19 2336    Specimen:  Blood from Hand, Right Updated:  11/08/19 0012     Troponin T 0.012 ng/mL      Narrative:       Troponin T Reference Range:  <= 0.03 ng/mL-   Negative for AMI  >0.03 ng/mL-     Abnormal for myocardial necrosis.  Clinicians would have to utilize clinical acumen, EKG, Troponin and serial changes to determine if it is an Acute Myocardial Infarction or myocardial injury due to an underlying chronic condition.     Basic Metabolic Panel [[id number]]  (Abnormal) Collected:  11/07/19 2336    Specimen:  Blood from Hand, Right Updated:  11/08/19 0002     Glucose 119 mg/dL      BUN 27 mg/dL      Creatinine 1.18 mg/dL      Sodium 145 mmol/L      Potassium 3.8 mmol/L      Chloride 109 mmol/L      CO2 22.0 mmol/L      Calcium 9.1 mg/dL      eGFR Non African Amer 62 mL/min/1.73      BUN/Creatinine Ratio 22.9     Anion Gap 14.0 mmol/L     Narrative:       GFR Normal >60  Chronic Kidney Disease <60  Kidney Failure <15    BNP [[id number]]  (Abnormal) Collected:  11/07/19 2336    Specimen:  Blood from Hand, Right Updated:  11/08/19 0001     proBNP 7,550.0 pg/mL     Narrative:       Among patients with dyspnea, NT-proBNP is highly sensitive for the detection of acute congestive heart failure. In addition NT-proBNP of <300 pg/ml effectively rules out acute congestive heart failure with 99% negative predictive value.          Imaging Results (Last 24 Hours)     Procedure Component Value Units Date/Time    XR Chest 1 View [[id number]] Collected:  11/07/19 2229     Updated:  11/07/19 2232    Narrative:          DATE OF EXAM:   11/7/2019 10:16 PM     PROCEDURE:   XR CHEST 1 VW-     INDICATIONS:   Short of breath     COMPARISON:  10/13/2018     TECHNIQUE:   [Portable chest radiograph]     FINDINGS:    The patient is rotated to the right. The heart is enlarged. No  pneumothorax or large effusion. No focal area of consolidation. Osseous  structures grossly intact. Central pulmonary vascular congestion  suspected.       Impression:       1. Cardiomegaly and central pulmonary vascular congestion.  2. Clear lungs.      Electronically Signed By-Fareed Garcia On:11/7/2019 10:30 PM  This report was finalized on [id number] by  Fareed Garcia, .          EKG      I personally viewed and interpreted the patient's EKG/Telemetry data:    ECHOCARDIOGRAM:      STRESS MYOVIEW:    CARDIAC CATHETERIZATION:    OTHER:         Assessment/Plan     #1 shortness of breath  2.  History of coronary disease  3.  Congestive heart failure  4.  Chronic atrial fibrillation  5.  Hypertension with hypertensive emergency  6.  Chronic renal insufficiency  7.  Hyperlipidemia  8.  Gastroesophageal veins disease  9.  Medical noncompliance    Patient states that he has not been taking his medicines regularly and has significant swelling of the feet and shortness of breath  Patient has elevated BNP level  Patient also appears to have renal insufficiency  Patient will be ruled out for MI by EKG and enzymes  Patient will have an echocardiogram for LV function and valvular abnormalities  Patient will also have a stress Myoview to rule out ischemia.  Blood pressure and heart are stable with medications  Discussed with patient about importance of medications and being compliant with medications diet and exercise.  I discussed the patients findings and my recommendations with patient     Edy Thomas MD  11/08/19  7:53 PM

## 2019-11-09 NOTE — PLAN OF CARE
Problem: Fluid Volume Excess (Adult)  Goal: Identify Related Risk Factors and Signs and Symptoms  Outcome: Ongoing (interventions implemented as appropriate)   11/09/19 1535   Fluid Volume Excess (Adult)   Signs and Symptoms (Fluid Volume Excess) blood pressure/heart rate changes     Goal: Optimal Fluid Balance  Outcome: Ongoing (interventions implemented as appropriate)   11/09/19 1535   Fluid Volume Excess (Adult)   Optimal Fluid Balance making progress toward outcome       Problem: Hypertensive Disease/Crisis (Arterial) (Adult)  Goal: Signs and Symptoms of Listed Potential Problems Will be Absent, Minimized or Managed (Hypertensive Disease/Crisis)  Outcome: Ongoing (interventions implemented as appropriate)   11/09/19 1535   Goal/Outcome Evaluation   Problems Assessed (Hypertensive Disease/Crisis (Arterial)) chronic vascular complications;situational response   Problems Present (Hypertensive Disease) chronic vascular complications;situational response       Problem: Fall Risk (Adult)  Goal: Identify Related Risk Factors and Signs and Symptoms  Outcome: Ongoing (interventions implemented as appropriate)   11/09/19 1535   Fall Risk (Adult)   Related Risk Factors (Fall Risk) confusion/agitation;environment unfamiliar   Signs and Symptoms (Fall Risk) presence of risk factors     Goal: Absence of Fall  Outcome: Ongoing (interventions implemented as appropriate)   11/09/19 1535   Fall Risk (Adult)   Absence of Fall making progress toward outcome

## 2019-11-10 LAB
ALBUMIN SERPL-MCNC: 3.9 G/DL (ref 3.5–5.2)
ALBUMIN/GLOB SERPL: 1.2 G/DL
ALP SERPL-CCNC: 52 U/L (ref 39–117)
ALT SERPL W P-5'-P-CCNC: 20 U/L (ref 1–41)
ANION GAP SERPL CALCULATED.3IONS-SCNC: 12 MMOL/L (ref 5–15)
ANION GAP SERPL CALCULATED.3IONS-SCNC: 15 MMOL/L (ref 5–15)
AST SERPL-CCNC: 18 U/L (ref 1–40)
BASOPHILS # BLD AUTO: 0 10*3/MM3 (ref 0–0.2)
BASOPHILS # BLD AUTO: 0.1 10*3/MM3 (ref 0–0.2)
BASOPHILS NFR BLD AUTO: 0.2 % (ref 0–1.5)
BASOPHILS NFR BLD AUTO: 1.1 % (ref 0–1.5)
BH CV ECHO MEAS - % IVS THICK: 12.9 %
BH CV ECHO MEAS - % LVPW THICK: 18.3 %
BH CV ECHO MEAS - ACS: 2 CM
BH CV ECHO MEAS - AO MAX PG (FULL): 0.34 MMHG
BH CV ECHO MEAS - AO MAX PG: 2.9 MMHG
BH CV ECHO MEAS - AO MEAN PG (FULL): 0.39 MMHG
BH CV ECHO MEAS - AO MEAN PG: 1.7 MMHG
BH CV ECHO MEAS - AO ROOT AREA (BSA CORRECTED): 1.5
BH CV ECHO MEAS - AO ROOT AREA: 11 CM^2
BH CV ECHO MEAS - AO ROOT DIAM: 3.7 CM
BH CV ECHO MEAS - AO V2 MAX: 85.3 CM/SEC
BH CV ECHO MEAS - AO V2 MEAN: 63.3 CM/SEC
BH CV ECHO MEAS - AO V2 VTI: 14.2 CM
BH CV ECHO MEAS - AVA(I,A): 4.9 CM^2
BH CV ECHO MEAS - AVA(I,D): 4.9 CM^2
BH CV ECHO MEAS - AVA(V,A): 4.4 CM^2
BH CV ECHO MEAS - AVA(V,D): 4.4 CM^2
BH CV ECHO MEAS - BSA(HAYCOCK): 2.5 M^2
BH CV ECHO MEAS - BSA: 2.4 M^2
BH CV ECHO MEAS - BZI_BMI: 36.5 KILOGRAMS/M^2
BH CV ECHO MEAS - BZI_METRIC_HEIGHT: 182.9 CM
BH CV ECHO MEAS - BZI_METRIC_WEIGHT: 122 KG
BH CV ECHO MEAS - EDV(CUBED): 142.8 ML
BH CV ECHO MEAS - EDV(TEICH): 131.1 ML
BH CV ECHO MEAS - EF(CUBED): 44.6 %
BH CV ECHO MEAS - EF(MOD-BP): 37 %
BH CV ECHO MEAS - EF(TEICH): 36.9 %
BH CV ECHO MEAS - ESV(CUBED): 79.1 ML
BH CV ECHO MEAS - ESV(TEICH): 82.7 ML
BH CV ECHO MEAS - FS: 17.9 %
BH CV ECHO MEAS - IVS/LVPW: 0.95
BH CV ECHO MEAS - IVSD: 1.7 CM
BH CV ECHO MEAS - IVSS: 1.9 CM
BH CV ECHO MEAS - LA DIMENSION(2D): 5.2 CM
BH CV ECHO MEAS - LV MASS(C)D: 429.3 GRAMS
BH CV ECHO MEAS - LV MASS(C)DI: 177.7 GRAMS/M^2
BH CV ECHO MEAS - LV MASS(C)S: 411.8 GRAMS
BH CV ECHO MEAS - LV MASS(C)SI: 170.4 GRAMS/M^2
BH CV ECHO MEAS - LV MAX PG: 2.6 MMHG
BH CV ECHO MEAS - LV MEAN PG: 1.3 MMHG
BH CV ECHO MEAS - LV V1 MAX: 80.2 CM/SEC
BH CV ECHO MEAS - LV V1 MEAN: 52.2 CM/SEC
BH CV ECHO MEAS - LV V1 VTI: 14.8 CM
BH CV ECHO MEAS - LVIDD: 5.2 CM
BH CV ECHO MEAS - LVIDS: 4.3 CM
BH CV ECHO MEAS - LVOT AREA: 4.7 CM^2
BH CV ECHO MEAS - LVOT DIAM: 2.4 CM
BH CV ECHO MEAS - LVPWD: 1.8 CM
BH CV ECHO MEAS - LVPWS: 2.1 CM
BH CV ECHO MEAS - MV A MAX VEL: 0.58 CM/SEC
BH CV ECHO MEAS - MV DEC SLOPE: 1050 CM/SEC^2
BH CV ECHO MEAS - MV DEC TIME: 0.12 SEC
BH CV ECHO MEAS - MV E MAX VEL: 127.8 CM/SEC
BH CV ECHO MEAS - MV E/A: 220.5
BH CV ECHO MEAS - MV MAX PG: 6.5 MMHG
BH CV ECHO MEAS - MV MEAN PG: 2.6 MMHG
BH CV ECHO MEAS - MV V2 MAX: 127.9 CM/SEC
BH CV ECHO MEAS - MV V2 MEAN: 73.6 CM/SEC
BH CV ECHO MEAS - MV V2 VTI: 21.6 CM
BH CV ECHO MEAS - MVA(VTI): 3.2 CM^2
BH CV ECHO MEAS - PA ACC TIME: 0.1 SEC
BH CV ECHO MEAS - PA MAX PG (FULL): 0.34 MMHG
BH CV ECHO MEAS - PA MAX PG: 2.1 MMHG
BH CV ECHO MEAS - PA MEAN PG (FULL): 0.25 MMHG
BH CV ECHO MEAS - PA MEAN PG: 1.1 MMHG
BH CV ECHO MEAS - PA PR(ACCEL): 35.4 MMHG
BH CV ECHO MEAS - PA V2 MAX: 72.3 CM/SEC
BH CV ECHO MEAS - PA V2 MEAN: 50.2 CM/SEC
BH CV ECHO MEAS - PA V2 VTI: 14.2 CM
BH CV ECHO MEAS - RAP SYSTOLE: 10 MMHG
BH CV ECHO MEAS - RV MAX PG: 1.8 MMHG
BH CV ECHO MEAS - RV MEAN PG: 0.87 MMHG
BH CV ECHO MEAS - RV V1 MAX: 66.2 CM/SEC
BH CV ECHO MEAS - RV V1 MEAN: 44.2 CM/SEC
BH CV ECHO MEAS - RV V1 VTI: 12.7 CM
BH CV ECHO MEAS - RVDD: 3.3 CM
BH CV ECHO MEAS - RVSP: 29 MMHG
BH CV ECHO MEAS - SI(AO): 64.8 ML/M^2
BH CV ECHO MEAS - SI(CUBED): 26.4 ML/M^2
BH CV ECHO MEAS - SI(LVOT): 28.8 ML/M^2
BH CV ECHO MEAS - SI(TEICH): 20 ML/M^2
BH CV ECHO MEAS - SV(AO): 156.7 ML
BH CV ECHO MEAS - SV(CUBED): 63.7 ML
BH CV ECHO MEAS - SV(LVOT): 69.6 ML
BH CV ECHO MEAS - SV(TEICH): 48.3 ML
BH CV ECHO MEAS - TR MAX VEL: 218.1 CM/SEC
BILIRUB SERPL-MCNC: 0.7 MG/DL (ref 0.2–1.2)
BUN BLD-MCNC: 24 MG/DL (ref 8–23)
BUN BLD-MCNC: 27 MG/DL (ref 8–23)
BUN/CREAT SERPL: 20.5 (ref 7–25)
BUN/CREAT SERPL: 23.1 (ref 7–25)
CALCIUM SPEC-SCNC: 9.3 MG/DL (ref 8.6–10.5)
CALCIUM SPEC-SCNC: 9.9 MG/DL (ref 8.6–10.5)
CHLORIDE SERPL-SCNC: 102 MMOL/L (ref 98–107)
CHLORIDE SERPL-SCNC: 104 MMOL/L (ref 98–107)
CO2 SERPL-SCNC: 25 MMOL/L (ref 22–29)
CO2 SERPL-SCNC: 26 MMOL/L (ref 22–29)
CREAT BLD-MCNC: 1.17 MG/DL (ref 0.76–1.27)
CREAT BLD-MCNC: 1.17 MG/DL (ref 0.76–1.27)
DEPRECATED RDW RBC AUTO: 49.4 FL (ref 37–54)
DEPRECATED RDW RBC AUTO: 51.6 FL (ref 37–54)
EOSINOPHIL # BLD AUTO: 0.2 10*3/MM3 (ref 0–0.4)
EOSINOPHIL # BLD AUTO: 0.2 10*3/MM3 (ref 0–0.4)
EOSINOPHIL NFR BLD AUTO: 2.6 % (ref 0.3–6.2)
EOSINOPHIL NFR BLD AUTO: 3 % (ref 0.3–6.2)
ERYTHROCYTE [DISTWIDTH] IN BLOOD BY AUTOMATED COUNT: 16.4 % (ref 12.3–15.4)
ERYTHROCYTE [DISTWIDTH] IN BLOOD BY AUTOMATED COUNT: 17 % (ref 12.3–15.4)
GFR SERPL CREATININE-BSD FRML MDRD: 63 ML/MIN/1.73
GFR SERPL CREATININE-BSD FRML MDRD: 63 ML/MIN/1.73
GLOBULIN UR ELPH-MCNC: 3.2 GM/DL
GLUCOSE BLD-MCNC: 111 MG/DL (ref 65–99)
GLUCOSE BLD-MCNC: 135 MG/DL (ref 65–99)
HCT VFR BLD AUTO: 43 % (ref 37.5–51)
HCT VFR BLD AUTO: 44.4 % (ref 37.5–51)
HGB BLD-MCNC: 14.2 G/DL (ref 13–17.7)
HGB BLD-MCNC: 15.2 G/DL (ref 13–17.7)
INR PPP: 1.17 (ref 0.9–1.1)
LYMPHOCYTES # BLD AUTO: 1.4 10*3/MM3 (ref 0.7–3.1)
LYMPHOCYTES # BLD AUTO: 1.6 10*3/MM3 (ref 0.7–3.1)
LYMPHOCYTES NFR BLD AUTO: 18.3 % (ref 19.6–45.3)
LYMPHOCYTES NFR BLD AUTO: 20.9 % (ref 19.6–45.3)
MAGNESIUM SERPL-MCNC: 1.8 MG/DL (ref 1.6–2.4)
MAGNESIUM SERPL-MCNC: 2 MG/DL (ref 1.6–2.4)
MAXIMAL PREDICTED HEART RATE: 157 BPM
MCH RBC QN AUTO: 28.8 PG (ref 26.6–33)
MCH RBC QN AUTO: 29.6 PG (ref 26.6–33)
MCHC RBC AUTO-ENTMCNC: 33 G/DL (ref 31.5–35.7)
MCHC RBC AUTO-ENTMCNC: 34.2 G/DL (ref 31.5–35.7)
MCV RBC AUTO: 86.6 FL (ref 79–97)
MCV RBC AUTO: 87.1 FL (ref 79–97)
MONOCYTES # BLD AUTO: 0.8 10*3/MM3 (ref 0.1–0.9)
MONOCYTES # BLD AUTO: 0.9 10*3/MM3 (ref 0.1–0.9)
MONOCYTES NFR BLD AUTO: 10.8 % (ref 5–12)
MONOCYTES NFR BLD AUTO: 12 % (ref 5–12)
NEUTROPHILS # BLD AUTO: 5 10*3/MM3 (ref 1.7–7)
NEUTROPHILS # BLD AUTO: 5 10*3/MM3 (ref 1.7–7)
NEUTROPHILS NFR BLD AUTO: 64.3 % (ref 42.7–76)
NEUTROPHILS NFR BLD AUTO: 66.8 % (ref 42.7–76)
NRBC BLD AUTO-RTO: 0 /100 WBC (ref 0–0.2)
NRBC BLD AUTO-RTO: 0.2 /100 WBC (ref 0–0.2)
NT-PROBNP SERPL-MCNC: 2130 PG/ML (ref 5–900)
PLATELET # BLD AUTO: 169 10*3/MM3 (ref 140–450)
PLATELET # BLD AUTO: 182 10*3/MM3 (ref 140–450)
PMV BLD AUTO: 9.3 FL (ref 6–12)
PMV BLD AUTO: 9.6 FL (ref 6–12)
POTASSIUM BLD-SCNC: 3.3 MMOL/L (ref 3.5–5.2)
POTASSIUM BLD-SCNC: 3.8 MMOL/L (ref 3.5–5.2)
PROT SERPL-MCNC: 7.1 G/DL (ref 6–8.5)
PROTHROMBIN TIME: 11.9 SECONDS (ref 9.6–11.7)
RBC # BLD AUTO: 4.94 10*6/MM3 (ref 4.14–5.8)
RBC # BLD AUTO: 5.12 10*6/MM3 (ref 4.14–5.8)
SODIUM BLD-SCNC: 142 MMOL/L (ref 136–145)
SODIUM BLD-SCNC: 142 MMOL/L (ref 136–145)
STRESS TARGET HR: 133 BPM
WBC NRBC COR # BLD: 7.4 10*3/MM3 (ref 3.4–10.8)
WBC NRBC COR # BLD: 7.7 10*3/MM3 (ref 3.4–10.8)

## 2019-11-10 PROCEDURE — 80053 COMPREHEN METABOLIC PANEL: CPT | Performed by: NURSE PRACTITIONER

## 2019-11-10 PROCEDURE — 85025 COMPLETE CBC W/AUTO DIFF WBC: CPT | Performed by: NURSE PRACTITIONER

## 2019-11-10 PROCEDURE — 25010000002 ENOXAPARIN PER 10 MG: Performed by: FAMILY MEDICINE

## 2019-11-10 PROCEDURE — 85610 PROTHROMBIN TIME: CPT | Performed by: NURSE PRACTITIONER

## 2019-11-10 PROCEDURE — 93306 TTE W/DOPPLER COMPLETE: CPT | Performed by: INTERNAL MEDICINE

## 2019-11-10 PROCEDURE — 85025 COMPLETE CBC W/AUTO DIFF WBC: CPT | Performed by: INTERNAL MEDICINE

## 2019-11-10 PROCEDURE — 83735 ASSAY OF MAGNESIUM: CPT | Performed by: NURSE PRACTITIONER

## 2019-11-10 PROCEDURE — 99232 SBSQ HOSP IP/OBS MODERATE 35: CPT | Performed by: INTERNAL MEDICINE

## 2019-11-10 PROCEDURE — 83880 ASSAY OF NATRIURETIC PEPTIDE: CPT | Performed by: NURSE PRACTITIONER

## 2019-11-10 PROCEDURE — 25010000002 FUROSEMIDE PER 20 MG: Performed by: NURSE PRACTITIONER

## 2019-11-10 RX ORDER — MIDAZOLAM HYDROCHLORIDE 1 MG/ML
1 INJECTION INTRAMUSCULAR; INTRAVENOUS ONCE
Status: CANCELLED | OUTPATIENT
Start: 2019-11-10 | End: 2019-11-10

## 2019-11-10 RX ORDER — SODIUM CHLORIDE 9 MG/ML
1-3 INJECTION, SOLUTION INTRAVENOUS CONTINUOUS
Status: CANCELLED | OUTPATIENT
Start: 2019-11-10

## 2019-11-10 RX ORDER — FENTANYL CITRATE 50 UG/ML
25 INJECTION, SOLUTION INTRAMUSCULAR; INTRAVENOUS ONCE
Status: CANCELLED | OUTPATIENT
Start: 2019-11-10 | End: 2019-11-10

## 2019-11-10 RX ADMIN — HYDRALAZINE HYDROCHLORIDE 50 MG: 25 TABLET, FILM COATED ORAL at 21:25

## 2019-11-10 RX ADMIN — POTASSIUM CHLORIDE 20 MEQ: 1500 TABLET, EXTENDED RELEASE ORAL at 09:59

## 2019-11-10 RX ADMIN — CARVEDILOL 25 MG: 25 TABLET, FILM COATED ORAL at 17:54

## 2019-11-10 RX ADMIN — Medication 10 ML: at 21:25

## 2019-11-10 RX ADMIN — LEVOTHYROXINE SODIUM 25 MCG: 25 TABLET ORAL at 06:20

## 2019-11-10 RX ADMIN — ENOXAPARIN SODIUM 120 MG: 120 INJECTION SUBCUTANEOUS at 06:18

## 2019-11-10 RX ADMIN — ATORVASTATIN CALCIUM 10 MG: 10 TABLET, FILM COATED ORAL at 10:00

## 2019-11-10 RX ADMIN — HYDRALAZINE HYDROCHLORIDE 50 MG: 25 TABLET, FILM COATED ORAL at 09:59

## 2019-11-10 RX ADMIN — CARVEDILOL 25 MG: 25 TABLET, FILM COATED ORAL at 09:59

## 2019-11-10 RX ADMIN — FUROSEMIDE 40 MG: 10 INJECTION, SOLUTION INTRAMUSCULAR; INTRAVENOUS at 06:19

## 2019-11-10 RX ADMIN — FUROSEMIDE 40 MG: 10 INJECTION, SOLUTION INTRAMUSCULAR; INTRAVENOUS at 17:54

## 2019-11-10 RX ADMIN — Medication 10 ML: at 10:00

## 2019-11-10 RX ADMIN — POTASSIUM CHLORIDE 20 MEQ: 1500 TABLET, EXTENDED RELEASE ORAL at 21:25

## 2019-11-10 NOTE — PLAN OF CARE
Problem: Fall Risk (Adult)  Goal: Absence of Fall  Outcome: Ongoing (interventions implemented as appropriate)   11/10/19 0431   Fall Risk (Adult)   Absence of Fall achieves outcome

## 2019-11-10 NOTE — PROGRESS NOTES
LOS: 2 days   Patient Care Team:  Marisol Larson APRN as PCP - General    Chief Complaint:  Edema, SOA    Subjective     Interval History:     Patient Complaints: No c/o today  Patient Denies:  CP, SOA  History taken from: patient    Review of Systems:   Review of Systems   Constitutional: Negative.    HENT: Negative.    Eyes: Negative.    Respiratory: Negative.    Cardiovascular: Negative.    Gastrointestinal: Negative.    Endocrine: Negative.    Genitourinary: Negative.    Musculoskeletal: Negative.    Skin: Negative.    Allergic/Immunologic: Negative.    Neurological: Negative.    Hematological: Negative.    Psychiatric/Behavioral: Negative.    All other systems reviewed and are negative.    Objective     Vital Signs  Temp:  [97 °F (36.1 °C)-98.2 °F (36.8 °C)] 97.5 °F (36.4 °C)  Heart Rate:  [77-92] 85  Resp:  [14-16] 14  BP: (119-168)/() 155/119    Physical Exam:     General Appearance:    Alert, cooperative, in no acute distress   Head:    Normocephalic, without obvious abnormality, atraumatic   Eyes:            Lids and lashes normal, conjunctivae and sclerae normal   Ears:    Ears appear intact with no abnormalities noted   Throat:   No oral lesions, no thrush, oral mucosa moist   Neck:   No adenopathy, supple, trachea midline, no thyromegaly, no   carotid bruit, no JVD   Back:     No kyphosis present, no scoliosis present, no skin lesions,      erythema or scars, no tenderness to percussion or                   palpation,   range of motion normal   Lungs:     Clear to auscultation,respirations regular, even and                  unlabored    Heart:    Sl. irreg rhythm and normal rate, normal S1 and S2, no            murmur, no gallop, no rub, no click   Chest Wall:    No abnormalities observed   Abdomen:     Normal bowel sounds, no masses, no organomegaly, soft        non-tender, non-distended, no guarding, no rebound                tenderness   Rectal:     Deferred   Extremities:   Moves all  extremities well, mild lower ext edema, no cyanosis, no             redness   Pulses:   Pulses palpable and equal bilaterally   Skin:   No bleeding, bruising or rash   Lymph nodes:   No palpable adenopathy   Neurologic:   Cranial nerves 2 - 12 grossly intact, sensation intact        Results Review:     I reviewed the patient's new clinical results.  I reviewed the patient's new imaging results and agree with the interpretation.    Medication Review:   Scheduled Meds:    atorvastatin 10 mg Oral Daily   carvedilol 25 mg Oral BID With Meals   enoxaparin 120 mg Subcutaneous Q12H   furosemide 40 mg Intravenous Q12H   hydrALAZINE 50 mg Oral BID   levothyroxine 25 mcg Oral Q AM   potassium chloride 20 mEq Oral BID   sodium chloride 10 mL Intravenous Q12H   warfarin 5 mg Oral Daily     Continuous Infusions:    niCARdipine 5-15 mg/hr Last Rate: Stopped (11/08/19 2000)   Pharmacy to dose warfarin       PRN Meds:.hydrALAZINE  •  influenza vaccine  •  [COMPLETED] Insert peripheral IV **AND** sodium chloride  •  sodium chloride  •  sodium chloride    Labs:  Lab Results (last 24 hours)     Procedure Component Value Units Date/Time    Magnesium [[id number]]  (Normal) Collected:  11/10/19 0240    Specimen:  Blood Updated:  11/10/19 0334     Magnesium 2.0 mg/dL     Comprehensive Metabolic Panel [[id number]]  (Abnormal) Collected:  11/10/19 0240    Specimen:  Blood Updated:  11/10/19 0334     Glucose 111 mg/dL      BUN 24 mg/dL      Creatinine 1.17 mg/dL      Sodium 142 mmol/L      Potassium 3.3 mmol/L      Chloride 104 mmol/L      CO2 26.0 mmol/L      Calcium 9.3 mg/dL      Total Protein 7.1 g/dL      Albumin 3.90 g/dL      ALT (SGPT) 20 U/L      AST (SGOT) 18 U/L      Alkaline Phosphatase 52 U/L      Total Bilirubin 0.7 mg/dL      eGFR Non African Amer 63 mL/min/1.73      Globulin 3.2 gm/dL      A/G Ratio 1.2 g/dL      BUN/Creatinine Ratio 20.5     Anion Gap 12.0 mmol/L     Narrative:       GFR Normal >60  Chronic Kidney Disease  <60  Kidney Failure <15    BNP [[id number]]  (Abnormal) Collected:  11/10/19 0240    Specimen:  Blood Updated:  11/10/19 0330     proBNP 2,130.0 pg/mL     Narrative:       Among patients with dyspnea, NT-proBNP is highly sensitive for the detection of acute congestive heart failure. In addition NT-proBNP of <300 pg/ml effectively rules out acute congestive heart failure with 99% negative predictive value.    Protime-INR [[id number]]  (Abnormal) Collected:  11/10/19 0240    Specimen:  Blood Updated:  11/10/19 0319     Protime 11.9 Seconds      INR 1.17    CBC & Differential [[id number]] Collected:  11/10/19 0240    Specimen:  Blood Updated:  11/10/19 0310    Narrative:       The following orders were created for panel order CBC & Differential.  Procedure                               Abnormality         Status                     ---------                               -----------         ------                     CBC Auto Differential[[id number]]        Abnormal            Final result                 Please view results for these tests on the individual orders.    CBC Auto Differential [[id number]]  (Abnormal) Collected:  11/10/19 0240    Specimen:  Blood Updated:  11/10/19 0310     WBC 7.70 10*3/mm3      RBC 4.94 10*6/mm3      Hemoglobin 14.2 g/dL      Hematocrit 43.0 %      MCV 87.1 fL      MCH 28.8 pg      MCHC 33.0 g/dL      RDW 16.4 %      RDW-SD 49.4 fl      MPV 9.6 fL      Platelets 169 10*3/mm3      Neutrophil % 64.3 %      Lymphocyte % 20.9 %      Monocyte % 12.0 %      Eosinophil % 2.6 %      Basophil % 0.2 %      Neutrophils, Absolute 5.00 10*3/mm3      Lymphocytes, Absolute 1.60 10*3/mm3      Monocytes, Absolute 0.90 10*3/mm3      Eosinophils, Absolute 0.20 10*3/mm3      Basophils, Absolute 0.00 10*3/mm3      nRBC 0.0 /100 WBC     Hemoglobin A1c [[id number]]  (Normal) Collected:  11/09/19 0311    Specimen:  Blood Updated:  11/09/19 1142     Hemoglobin A1C 5.1 %     Narrative:       Hemoglobin A1C Reference  Range:    <5.7 %        Normal  5.7-6.4 %     Increased risk for diabetes  > 6.4 %        Diabetes       These guidelines have been recommended by the American Diabetic Association for Hgb A1c.      The following 2010 guidelines have been recommended by the American Diabetes Association for Hemoglobin A1c.    HBA1c 5.7-6.4% Increased risk for future diabetes (pre-diabetes)  HBA1c     >6.4% Diabetes           Assessment/Plan       Shortness of breath    Hypertensive emergency    Chronic atrial fibrillation    Chronic diastolic CHF (congestive heart failure) (CMS/[facility])    CKD (chronic kidney disease) stage 3, GFR 30-59 ml/min (CMS/[facility])    Essential hypertension    Gambling disorder, persistent    Coronary artery disease involving native coronary artery of native heart without angina pectoris    GERD (gastroesophageal reflux disease)    Medically noncompliant      Pt had chemical stress test yesterday that was abnormal, showed large area of ischemia-awaiting cardiology recommendations, cont IV lasix as BNP remains >2000, BP remains extremely elevated at times-cont to monitor and give PRN IV meds as needed        Daylin Burris, CLARISSA  11/10/19  8:46 AM

## 2019-11-10 NOTE — PLAN OF CARE
Problem: Fluid Volume Excess (Adult)  Goal: Identify Related Risk Factors and Signs and Symptoms  Outcome: Ongoing (interventions implemented as appropriate)   11/10/19 1540   Fluid Volume Excess (Adult)   Signs and Symptoms (Fluid Volume Excess) blood pressure/heart rate changes     Goal: Optimal Fluid Balance  Outcome: Ongoing (interventions implemented as appropriate)   11/09/19 1535   Fluid Volume Excess (Adult)   Optimal Fluid Balance making progress toward outcome       Problem: Hypertensive Disease/Crisis (Arterial) (Adult)  Goal: Signs and Symptoms of Listed Potential Problems Will be Absent, Minimized or Managed (Hypertensive Disease/Crisis)  Outcome: Ongoing (interventions implemented as appropriate)   11/09/19 1535   Goal/Outcome Evaluation   Problems Assessed (Hypertensive Disease/Crisis (Arterial)) chronic vascular complications;situational response   Problems Present (Hypertensive Disease) chronic vascular complications;situational response

## 2019-11-10 NOTE — PROGRESS NOTES
"Referring Provider: Hospitalist    Reason for follow-up: Shortness of breath and congestive heart failure     Patient Care Team:  Marisol Larson APRN as PCP - General    Subjective .  Complains of shortness of breath but no chest pain    Objective  Lying in bed comfortably     Review of Systems   Constitution: Negative for fever and malaise/fatigue.   Cardiovascular: Negative for chest pain, dyspnea on exertion and palpitations.   Respiratory: Positive for shortness of breath. Negative for cough.    Skin: Negative for rash.   Gastrointestinal: Negative for abdominal pain, nausea and vomiting.   Neurological: Negative for focal weakness and headaches.   All other systems reviewed and are negative.      Toradol [ketorolac tromethamine]    Scheduled Meds:    atorvastatin 10 mg Oral Daily   carvedilol 25 mg Oral BID With Meals   enoxaparin 120 mg Subcutaneous Q12H   furosemide 40 mg Intravenous Q12H   hydrALAZINE 50 mg Oral BID   levothyroxine 25 mcg Oral Q AM   potassium chloride 20 mEq Oral BID   sodium chloride 10 mL Intravenous Q12H   warfarin 5 mg Oral Daily     Continuous Infusions:    niCARdipine 5-15 mg/hr Last Rate: Stopped (11/08/19 2000)   Pharmacy to dose warfarin       PRN Meds:.hydrALAZINE  •  influenza vaccine  •  [COMPLETED] Insert peripheral IV **AND** sodium chloride  •  sodium chloride  •  sodium chloride        VITAL SIGNS  Vitals:    11/10/19 0300 11/10/19 0700 11/10/19 1022 11/10/19 1410   BP: 156/99 (!) 155/119 157/96 154/99   BP Location:   Left arm Left arm   Patient Position:   Lying Sitting   Pulse: 77 85 85 97   Resp: 14 14 22 20   Temp: 97 °F (36.1 °C) 97.5 °F (36.4 °C) 97.8 °F (36.6 °C) 97.8 °F (36.6 °C)   TempSrc:   Oral Oral   SpO2: 95% 94% 95% 98%   Weight:  119 kg (262 lb 12.6 oz)     Height:           Flowsheet Rows      First Filed Value   Admission Height  182.9 cm (72\") Documented at 11/07/2019 2146   Admission Weight  124 kg (273 lb 5.9 oz) Documented at 11/07/2019 2146         "   TELEMETRY: Atrial fibrillation    Physical Exam:  Physical Exam   Constitutional: He appears well-developed and well-nourished.   HENT:   Head: Normocephalic and atraumatic.   Eyes: Conjunctivae are normal. No scleral icterus.   Neck: Normal range of motion. Neck supple. No JVD present. Carotid bruit is not present.   Cardiovascular: Normal rate, S1 normal, S2 normal, normal heart sounds and intact distal pulses. An irregularly irregular rhythm present. PMI is not displaced.   Pulmonary/Chest: Effort normal and breath sounds normal. He has no wheezes. He has no rales.   Abdominal: Soft. Bowel sounds are normal.   Neurological: He is alert. He has normal strength.   Skin: Skin is warm and dry. No rash noted.        Results Review:   I reviewed the patient's new clinical results.  Lab Results (last 24 hours)     Procedure Component Value Units Date/Time    Magnesium [[id number]]  (Normal) Collected:  11/08/19 1702    Specimen:  Blood Updated:  11/10/19 1142     Magnesium 1.8 mg/dL     Magnesium [[id number]]  (Normal) Collected:  11/10/19 0240    Specimen:  Blood Updated:  11/10/19 0334     Magnesium 2.0 mg/dL     Comprehensive Metabolic Panel [[id number]]  (Abnormal) Collected:  11/10/19 0240    Specimen:  Blood Updated:  11/10/19 0334     Glucose 111 mg/dL      BUN 24 mg/dL      Creatinine 1.17 mg/dL      Sodium 142 mmol/L      Potassium 3.3 mmol/L      Chloride 104 mmol/L      CO2 26.0 mmol/L      Calcium 9.3 mg/dL      Total Protein 7.1 g/dL      Albumin 3.90 g/dL      ALT (SGPT) 20 U/L      AST (SGOT) 18 U/L      Alkaline Phosphatase 52 U/L      Total Bilirubin 0.7 mg/dL      eGFR Non African Amer 63 mL/min/1.73      Globulin 3.2 gm/dL      A/G Ratio 1.2 g/dL      BUN/Creatinine Ratio 20.5     Anion Gap 12.0 mmol/L     Narrative:       GFR Normal >60  Chronic Kidney Disease <60  Kidney Failure <15    BNP [[id number]]  (Abnormal) Collected:  11/10/19 0240    Specimen:  Blood Updated:  11/10/19 0330     proBNP 2,130.0  pg/mL     Narrative:       Among patients with dyspnea, NT-proBNP is highly sensitive for the detection of acute congestive heart failure. In addition NT-proBNP of <300 pg/ml effectively rules out acute congestive heart failure with 99% negative predictive value.    Protime-INR [[id number]]  (Abnormal) Collected:  11/10/19 0240    Specimen:  Blood Updated:  11/10/19 0319     Protime 11.9 Seconds      INR 1.17    CBC & Differential [[id number]] Collected:  11/10/19 0240    Specimen:  Blood Updated:  11/10/19 0310    Narrative:       The following orders were created for panel order CBC & Differential.  Procedure                               Abnormality         Status                     ---------                               -----------         ------                     CBC Auto Differential[[id number]]        Abnormal            Final result                 Please view results for these tests on the individual orders.    CBC Auto Differential [[id number]]  (Abnormal) Collected:  11/10/19 0240    Specimen:  Blood Updated:  11/10/19 0310     WBC 7.70 10*3/mm3      RBC 4.94 10*6/mm3      Hemoglobin 14.2 g/dL      Hematocrit 43.0 %      MCV 87.1 fL      MCH 28.8 pg      MCHC 33.0 g/dL      RDW 16.4 %      RDW-SD 49.4 fl      MPV 9.6 fL      Platelets 169 10*3/mm3      Neutrophil % 64.3 %      Lymphocyte % 20.9 %      Monocyte % 12.0 %      Eosinophil % 2.6 %      Basophil % 0.2 %      Neutrophils, Absolute 5.00 10*3/mm3      Lymphocytes, Absolute 1.60 10*3/mm3      Monocytes, Absolute 0.90 10*3/mm3      Eosinophils, Absolute 0.20 10*3/mm3      Basophils, Absolute 0.00 10*3/mm3      nRBC 0.0 /100 WBC           Imaging Results (Last 24 Hours)     ** No results found for the last 24 hours. **          EKG      I personally viewed and interpreted the patient's EKG/Telemetry data:    ECHOCARDIOGRAM:    STRESS MYOVIEW:    CARDIAC CATHETERIZATION:    OTHER:         Assessment/Plan     #1 shortness of breath  2.  Congestive  heart failure  3.  Coronary artery disease  4.  Chronic atrial fibrillation  5.  Hypertension  6.  Chronic renal insufficiency  7.  Hyperlipidemia  8.  Medical noncompliance    Patient presented with shortness of breath and has congestive heart failure  Patient is ruled out for MI by EKG and enzymes  Patient had an echocardiogram which showed LV dysfunction.  He had a stress Myoview which is abnormal  Patient will have a cardiac catheterization performed.  Discussed with patient about procedure risks and benefits  Patient was not taking any medicines and was medically noncompliant  Patient is restarted on medications including warfarin Coreg atorvastatin and hydralazine  Patient cannot take ACE inhibitors because of renal insufficiency  I discussed the patients findings and my recommendations with patient and nurse    Edy Thomas MD  11/10/19  3:16 PM

## 2019-11-11 LAB
ALBUMIN SERPL-MCNC: 4 G/DL (ref 3.5–5.2)
ALBUMIN/GLOB SERPL: 1 G/DL
ALP SERPL-CCNC: 59 U/L (ref 39–117)
ALT SERPL W P-5'-P-CCNC: 23 U/L (ref 1–41)
ANION GAP SERPL CALCULATED.3IONS-SCNC: 13 MMOL/L (ref 5–15)
AST SERPL-CCNC: 24 U/L (ref 1–40)
BASOPHILS # BLD AUTO: 0.1 10*3/MM3 (ref 0–0.2)
BASOPHILS NFR BLD AUTO: 1 % (ref 0–1.5)
BILIRUB SERPL-MCNC: 0.9 MG/DL (ref 0.2–1.2)
BUN BLD-MCNC: 26 MG/DL (ref 8–23)
BUN/CREAT SERPL: 22 (ref 7–25)
CALCIUM SPEC-SCNC: 9.8 MG/DL (ref 8.6–10.5)
CHLORIDE SERPL-SCNC: 102 MMOL/L (ref 98–107)
CO2 SERPL-SCNC: 23 MMOL/L (ref 22–29)
CREAT BLD-MCNC: 1.18 MG/DL (ref 0.76–1.27)
DEPRECATED RDW RBC AUTO: 50.3 FL (ref 37–54)
EOSINOPHIL # BLD AUTO: 0.2 10*3/MM3 (ref 0–0.4)
EOSINOPHIL NFR BLD AUTO: 1.7 % (ref 0.3–6.2)
ERYTHROCYTE [DISTWIDTH] IN BLOOD BY AUTOMATED COUNT: 16.8 % (ref 12.3–15.4)
GFR SERPL CREATININE-BSD FRML MDRD: 62 ML/MIN/1.73
GLOBULIN UR ELPH-MCNC: 3.9 GM/DL
GLUCOSE BLD-MCNC: 113 MG/DL (ref 65–99)
HCT VFR BLD AUTO: 49.4 % (ref 37.5–51)
HGB BLD-MCNC: 16.4 G/DL (ref 13–17.7)
INR PPP: 1.17 (ref 0.9–1.1)
LYMPHOCYTES # BLD AUTO: 0.9 10*3/MM3 (ref 0.7–3.1)
LYMPHOCYTES NFR BLD AUTO: 9.1 % (ref 19.6–45.3)
MAGNESIUM SERPL-MCNC: 2.3 MG/DL (ref 1.6–2.4)
MCH RBC QN AUTO: 28.8 PG (ref 26.6–33)
MCHC RBC AUTO-ENTMCNC: 33.3 G/DL (ref 31.5–35.7)
MCV RBC AUTO: 86.5 FL (ref 79–97)
MONOCYTES # BLD AUTO: 1.1 10*3/MM3 (ref 0.1–0.9)
MONOCYTES NFR BLD AUTO: 11.1 % (ref 5–12)
NEUTROPHILS # BLD AUTO: 7.4 10*3/MM3 (ref 1.7–7)
NEUTROPHILS NFR BLD AUTO: 77.1 % (ref 42.7–76)
NRBC BLD AUTO-RTO: 0 /100 WBC (ref 0–0.2)
NT-PROBNP SERPL-MCNC: 2721 PG/ML (ref 5–900)
PLATELET # BLD AUTO: 159 10*3/MM3 (ref 140–450)
PMV BLD AUTO: 9.4 FL (ref 6–12)
POTASSIUM BLD-SCNC: 4 MMOL/L (ref 3.5–5.2)
PROT SERPL-MCNC: 7.9 G/DL (ref 6–8.5)
PROTHROMBIN TIME: 11.9 SECONDS (ref 9.6–11.7)
RBC # BLD AUTO: 5.71 10*6/MM3 (ref 4.14–5.8)
SODIUM BLD-SCNC: 138 MMOL/L (ref 136–145)
WBC NRBC COR # BLD: 9.5 10*3/MM3 (ref 3.4–10.8)

## 2019-11-11 PROCEDURE — 80053 COMPREHEN METABOLIC PANEL: CPT | Performed by: NURSE PRACTITIONER

## 2019-11-11 PROCEDURE — 25010000002 LORAZEPAM PER 2 MG: Performed by: FAMILY MEDICINE

## 2019-11-11 PROCEDURE — 83880 ASSAY OF NATRIURETIC PEPTIDE: CPT | Performed by: NURSE PRACTITIONER

## 2019-11-11 PROCEDURE — 99232 SBSQ HOSP IP/OBS MODERATE 35: CPT | Performed by: INTERNAL MEDICINE

## 2019-11-11 PROCEDURE — 25010000002 FUROSEMIDE PER 20 MG: Performed by: NURSE PRACTITIONER

## 2019-11-11 PROCEDURE — 83735 ASSAY OF MAGNESIUM: CPT | Performed by: NURSE PRACTITIONER

## 2019-11-11 PROCEDURE — 25010000002 HYDRALAZINE PER 20 MG: Performed by: FAMILY MEDICINE

## 2019-11-11 PROCEDURE — 85025 COMPLETE CBC W/AUTO DIFF WBC: CPT | Performed by: NURSE PRACTITIONER

## 2019-11-11 PROCEDURE — 85610 PROTHROMBIN TIME: CPT | Performed by: NURSE PRACTITIONER

## 2019-11-11 RX ORDER — WARFARIN SODIUM 6 MG/1
6 TABLET ORAL
Status: DISCONTINUED | OUTPATIENT
Start: 2019-11-11 | End: 2019-11-12

## 2019-11-11 RX ORDER — LORAZEPAM 2 MG/ML
1 INJECTION INTRAMUSCULAR EVERY 4 HOURS PRN
Status: DISCONTINUED | OUTPATIENT
Start: 2019-11-11 | End: 2019-11-13 | Stop reason: HOSPADM

## 2019-11-11 RX ADMIN — FUROSEMIDE 40 MG: 10 INJECTION, SOLUTION INTRAMUSCULAR; INTRAVENOUS at 17:20

## 2019-11-11 RX ADMIN — LORAZEPAM 1 MG: 2 INJECTION INTRAMUSCULAR at 11:37

## 2019-11-11 RX ADMIN — Medication 10 ML: at 21:18

## 2019-11-11 RX ADMIN — WARFARIN SODIUM 6 MG: 6 TABLET ORAL at 17:20

## 2019-11-11 RX ADMIN — LORAZEPAM 1 MG: 2 INJECTION INTRAMUSCULAR at 01:32

## 2019-11-11 RX ADMIN — HYDRALAZINE HYDROCHLORIDE 10 MG: 20 INJECTION INTRAMUSCULAR; INTRAVENOUS at 11:37

## 2019-11-11 RX ADMIN — HYDRALAZINE HYDROCHLORIDE 50 MG: 25 TABLET, FILM COATED ORAL at 21:17

## 2019-11-11 RX ADMIN — POTASSIUM CHLORIDE 20 MEQ: 1500 TABLET, EXTENDED RELEASE ORAL at 21:17

## 2019-11-11 RX ADMIN — CARVEDILOL 25 MG: 25 TABLET, FILM COATED ORAL at 17:20

## 2019-11-11 RX ADMIN — Medication 10 ML: at 08:17

## 2019-11-11 NOTE — NURSING NOTE
Mr. Whipple has become more cofused. In bed now with all rails up and alarm off. He can sneak out of bed quickly. He was confused last night but not like this. He forgets this is his room. Was out at nursing station talking to an aide and nurse. They got him back in bed.resting now.

## 2019-11-11 NOTE — PROGRESS NOTES
"Referring Provider: Hospitalist    Reason for follow-up: Shortness of breath and congestive heart failure     Patient Care Team:  Marisol Larson APRN as PCP - General    Subjective .  No chest pain or shortness of breath  Objective  Lying in bed comfortably     Review of Systems   Constitution: Negative for fever and malaise/fatigue.   Cardiovascular: Negative for chest pain, dyspnea on exertion and palpitations.   Respiratory: Negative for cough.    Skin: Negative for rash.   Gastrointestinal: Negative for abdominal pain, nausea and vomiting.   Neurological: Negative for focal weakness and headaches.   All other systems reviewed and are negative.      Toradol [ketorolac tromethamine]    Scheduled Meds:    atorvastatin 10 mg Oral Daily   carvedilol 25 mg Oral BID With Meals   enoxaparin 120 mg Subcutaneous Q12H   furosemide 40 mg Intravenous Q12H   hydrALAZINE 50 mg Oral BID   levothyroxine 25 mcg Oral Q AM   potassium chloride 20 mEq Oral BID   sodium chloride 10 mL Intravenous Q12H   warfarin 6 mg Oral Daily     Continuous Infusions:    niCARdipine 5-15 mg/hr Last Rate: Stopped (11/08/19 2000)   Pharmacy to dose warfarin       PRN Meds:.hydrALAZINE  •  influenza vaccine  •  LORazepam  •  [COMPLETED] Insert peripheral IV **AND** sodium chloride  •  sodium chloride  •  sodium chloride        VITAL SIGNS  Vitals:    11/11/19 0300 11/11/19 0626 11/11/19 1115 11/11/19 1505   BP:  161/91 (!) 163/103 148/48   BP Location:       Patient Position:       Pulse: 102 87 95 106   Resp: 18 18 12 16   Temp: 97.5 °F (36.4 °C) 97.2 °F (36.2 °C) 99.5 °F (37.5 °C) 98.5 °F (36.9 °C)   TempSrc:  Axillary     SpO2: 97% 96% 93% 99%   Weight:  118 kg (260 lb 9.3 oz)     Height:           Flowsheet Rows      First Filed Value   Admission Height  182.9 cm (72\") Documented at 11/07/2019 2146   Admission Weight  124 kg (273 lb 5.9 oz) Documented at 11/07/2019 2146           TELEMETRY: Atrial fibrillation    Physical Exam:  Physical Exam "   Constitutional: He appears well-developed and well-nourished.   HENT:   Head: Normocephalic and atraumatic.   Eyes: Conjunctivae are normal. No scleral icterus.   Neck: Normal range of motion. Neck supple. No JVD present. Carotid bruit is not present.   Cardiovascular: Normal rate, S1 normal, S2 normal, normal heart sounds and intact distal pulses. An irregularly irregular rhythm present. PMI is not displaced.   Pulmonary/Chest: Effort normal and breath sounds normal. He has no wheezes. He has no rales.   Abdominal: Soft. Bowel sounds are normal.   Neurological: He is alert. He has normal strength.   Skin: Skin is warm and dry. No rash noted.        Results Review:   I reviewed the patient's new clinical results.  Lab Results (last 24 hours)     Procedure Component Value Units Date/Time    Comprehensive Metabolic Panel [[id number]]  (Abnormal) Collected:  11/11/19 0529    Specimen:  Blood Updated:  11/11/19 0610     Glucose 113 mg/dL      BUN 26 mg/dL      Creatinine 1.18 mg/dL      Sodium 138 mmol/L      Potassium 4.0 mmol/L      Chloride 102 mmol/L      CO2 23.0 mmol/L      Calcium 9.8 mg/dL      Total Protein 7.9 g/dL      Albumin 4.00 g/dL      ALT (SGPT) 23 U/L      AST (SGOT) 24 U/L      Alkaline Phosphatase 59 U/L      Total Bilirubin 0.9 mg/dL      eGFR Non African Amer 62 mL/min/1.73      Globulin 3.9 gm/dL      A/G Ratio 1.0 g/dL      BUN/Creatinine Ratio 22.0     Anion Gap 13.0 mmol/L     Narrative:       GFR Normal >60  Chronic Kidney Disease <60  Kidney Failure <15    Magnesium [[id number]]  (Normal) Collected:  11/11/19 0529    Specimen:  Blood Updated:  11/11/19 0610     Magnesium 2.3 mg/dL     BNP [[id number]]  (Abnormal) Collected:  11/11/19 0529    Specimen:  Blood Updated:  11/11/19 0609     proBNP 2,721.0 pg/mL     Narrative:       Among patients with dyspnea, NT-proBNP is highly sensitive for the detection of acute congestive heart failure. In addition NT-proBNP of <300 pg/ml effectively rules  out acute congestive heart failure with 99% negative predictive value.    Protime-INR [[id number]]  (Abnormal) Collected:  11/11/19 0529    Specimen:  Blood Updated:  11/11/19 0606     Protime 11.9 Seconds      INR 1.17    CBC & Differential [[id number]] Collected:  11/11/19 0529    Specimen:  Blood Updated:  11/11/19 0553    Narrative:       The following orders were created for panel order CBC & Differential.  Procedure                               Abnormality         Status                     ---------                               -----------         ------                     CBC Auto Differential[[id number]]        Abnormal            Final result                 Please view results for these tests on the individual orders.    CBC Auto Differential [[id number]]  (Abnormal) Collected:  11/11/19 0529    Specimen:  Blood Updated:  11/11/19 0553     WBC 9.50 10*3/mm3      RBC 5.71 10*6/mm3      Hemoglobin 16.4 g/dL      Hematocrit 49.4 %      MCV 86.5 fL      MCH 28.8 pg      MCHC 33.3 g/dL      RDW 16.8 %      RDW-SD 50.3 fl      MPV 9.4 fL      Platelets 159 10*3/mm3      Neutrophil % 77.1 %      Lymphocyte % 9.1 %      Monocyte % 11.1 %      Eosinophil % 1.7 %      Basophil % 1.0 %      Neutrophils, Absolute 7.40 10*3/mm3      Lymphocytes, Absolute 0.90 10*3/mm3      Monocytes, Absolute 1.10 10*3/mm3      Eosinophils, Absolute 0.20 10*3/mm3      Basophils, Absolute 0.10 10*3/mm3      nRBC 0.0 /100 WBC     Basic Metabolic Panel [[id number]]  (Abnormal) Collected:  11/10/19 1808    Specimen:  Blood Updated:  11/10/19 1915     Glucose 135 mg/dL      BUN 27 mg/dL      Creatinine 1.17 mg/dL      Sodium 142 mmol/L      Potassium 3.8 mmol/L      Chloride 102 mmol/L      CO2 25.0 mmol/L      Calcium 9.9 mg/dL      eGFR Non African Amer 63 mL/min/1.73      BUN/Creatinine Ratio 23.1     Anion Gap 15.0 mmol/L     Narrative:       GFR Normal >60  Chronic Kidney Disease <60  Kidney Failure <15    CBC & Differential [[id number]]  Collected:  11/10/19 1808    Specimen:  Blood Updated:  11/10/19 1858    Narrative:       The following orders were created for panel order CBC & Differential.  Procedure                               Abnormality         Status                     ---------                               -----------         ------                     CBC Auto Differential[[id number]]        Abnormal            Final result                 Please view results for these tests on the individual orders.    CBC Auto Differential [[id number]]  (Abnormal) Collected:  11/10/19 1808    Specimen:  Blood Updated:  11/10/19 1858     WBC 7.40 10*3/mm3      RBC 5.12 10*6/mm3      Hemoglobin 15.2 g/dL      Hematocrit 44.4 %      MCV 86.6 fL      MCH 29.6 pg      MCHC 34.2 g/dL      RDW 17.0 %      RDW-SD 51.6 fl      MPV 9.3 fL      Platelets 182 10*3/mm3      Neutrophil % 66.8 %      Lymphocyte % 18.3 %      Monocyte % 10.8 %      Eosinophil % 3.0 %      Basophil % 1.1 %      Neutrophils, Absolute 5.00 10*3/mm3      Lymphocytes, Absolute 1.40 10*3/mm3      Monocytes, Absolute 0.80 10*3/mm3      Eosinophils, Absolute 0.20 10*3/mm3      Basophils, Absolute 0.10 10*3/mm3      nRBC 0.2 /100 WBC           Imaging Results (Last 24 Hours)     ** No results found for the last 24 hours. **          EKG      I personally viewed and interpreted the patient's EKG/Telemetry data:    ECHOCARDIOGRAM:    STRESS MYOVIEW:    CARDIAC CATHETERIZATION:    OTHER:         Assessment/Plan     #1 shortness of breath  2.  Congestive heart failure  3.  Coronary artery disease  4.  Chronic atrial fibrillation  5.  Hypertension  6.  Chronic renal insufficiency  7.  Hyperlipidemia  8.  Medical noncompliance    Patient presented with shortness of breath and has congestive heart failure  Patient is ruled out for MI by EKG and enzymes  Patient had an echocardiogram which showed LV dysfunction.  He had a stress Myoview which is abnormal  Patient will have a cardiac catheterization  performed.  Discussed with patient about procedure risks and benefits  Patient wants to think about it in the meantime has some mild mental status changes and hence the procedure has been postponed.  Patient was not taking any medicines and was medically noncompliant  Patient is restarted on medications including warfarin Coreg atorvastatin and hydralazine  Patient cannot take ACE inhibitors because of renal insufficiency  I discussed the patients findings and my recommendations with patient and nurse    Edy Thomas MD  11/11/19  5:41 PM

## 2019-11-11 NOTE — PAYOR COMM NOTE
"AUTHORIZATION PENDING:   PLEASE CALL OR FAX DETERMINATION TO CONTACT BELOW. THANK YOU.        Allison Chaudhry RN MSN  /UR  [facility]  [phone] office  [phone] fax  [email or web address]    [facility]  NPI: [id number]  Tax: [id number]      Carlos Whipple (64 y.o. Male) 1955  Member ID# [id number]        PA Form with clincials for inpatient precert authorization.      Date of Birth Social Security Number Address Home Phone MRN    1955  [id number] [address] [phone] [id number]    Jain Marital Status          [employer] [facility]       Admission Date Admission Type Admitting Provider Attending Provider Department, Room/Bed    11/7/19 Emergency Dre Stauffer MD Heimer, Brian T, MD [facility] PROGRESS CARE, 2124/1    Discharge Date Discharge Disposition Discharge Destination                       Attending Provider:  Dre Stauffer MD    Allergies:  Toradol [Ketorolac Tromethamine]    Isolation:  None   Infection:  None   Code Status:  CPR    Ht:  182.9 cm (72\")   Wt:  118 kg (260 lb 9.3 oz)    Admission Cmt:  None   Principal Problem:  Shortness of breath [R06.02]                 Active Insurance as of 11/7/2019     Primary Coverage     Payor Plan Insurance Group Employer/Plan Group    [facility] MEDICARE REPLACEMENT AARP MEDICARE COMPLETE [id number]     Payor Plan Address Payor Plan Phone Number Payor Plan Fax Number Effective Dates    [facility] [phone]  4/1/2019 - None Entered    [address]       [facility] [address]       Subscriber Name Subscriber Birth Date Member ID       CARLOS WHIPPLE 1955 [id number]                 Emergency Contacts      (Rel.) Home Phone Work Phone Mobile Phone    KACEY DOW (Friend) [phone] -- [phone]          11/08/19 0011  Inpatient Admission Once    Completed   Level of Care: Telemetry    Diagnosis: Hypertensive emergency " [960050]    Admitting Physician: LOUANN SAEZ [[id number]]    Attending Physician: LOUANN SAEZ [[id number]]    Bed Request Comments: PCU    Certification: I certify that inpatient hospital services are medically necessary for greater than 2 midnights.           DX:  I50.32 CHF  R06.00 Dyspnea              History & Physical      Marisol Larson APRN at 11/08/19 1306     Attestation signed by Paty Polk DO at 11/08/19 1819    I reviewed the above documentation and agree.                        Patient Care Team:  Marisol Larson APRN as PCP - General    Chief complaint Shortness of breath    Subjective     Patient presents to the emergency room with progressively worsening shortness of breath.  He is a very poor historian normally but also seems to be almost disoriented on exam today.  He relates this to just having woke up.  He denies any chest pain.  He has had some swelling in both of his legs for a few weeks.  He reports that he has not taken any of his medications for 4 months.  He is still gambling and this is why he cannot afford his medications.  He has missed or not made follow-up appointments repeatedly in the past.      Shortness of Breath   The current episode started yesterday. The problem occurs constantly. The problem has been gradually improving. Associated symptoms include leg swelling. Pertinent negatives include no abdominal pain, chest pain, fever, sputum production, vomiting or wheezing. The symptoms are aggravated by any activity. His past medical history is significant for CAD and a heart failure.       Review of Systems   Constitutional: Positive for fatigue. Negative for chills and fever.   Respiratory: Positive for shortness of breath. Negative for sputum production, chest tightness and wheezing.    Cardiovascular: Positive for leg swelling. Negative for chest pain.   Gastrointestinal: Negative for abdominal pain, nausea and vomiting.   Genitourinary:  Negative for decreased urine volume, difficulty urinating and dysuria.   Musculoskeletal: Positive for arthralgias and back pain.   Neurological: Negative for dizziness, syncope, speech difficulty, weakness and light-headedness.        Past Medical History:   Diagnosis Date   • A-fib (CMS/[facility])    • CHF (congestive heart failure) (CMS/[facility])    • Chronic atrial fibrillation 11/8/2019   • Chronic diastolic CHF (congestive heart failure) (CMS/[facility]) 11/8/2019   • CKD (chronic kidney disease) stage 3, GFR 30-59 ml/min (CMS/[facility]) 11/8/2019   • Coronary artery disease involving native coronary artery of native heart without angina pectoris 11/8/2019   • DDD (degenerative disc disease), lumbosacral 11/8/2019   • Essential hypertension 11/8/2019   • Gambling disorder, persistent 11/8/2019   • GERD (gastroesophageal reflux disease) 11/8/2019   • History of CVA (cerebrovascular accident) 11/8/2019   • Hyperlipidemia    • Hypertension    • Medically noncompliant 11/8/2019   • Peripheral polyneuropathy 11/8/2019   • Psoriasis 11/8/2019     Past Surgical History:   Procedure Laterality Date   • CARDIAC CATHETERIZATION       History reviewed. No pertinent family history.  Social History     Tobacco Use   • Smoking status: Never Smoker   • Smokeless tobacco: Never Used   Substance Use Topics   • Alcohol use: No     Frequency: Never   • Drug use: No       (Not in a hospital admission)  Allergies:  Toradol [ketorolac tromethamine]    Objective      Vital Signs  Temp:  [94.6 °F (34.8 °C)-97.5 °F (36.4 °C)] 97.5 °F (36.4 °C)  Heart Rate:  [] 98  Resp:  [19-20] 19  BP: (123-199)/() 136/75    Physical Exam   Constitutional: He is oriented to person, place, and time. He is easily aroused.   Slovenly, poor hygeine   Cardiovascular: An irregularly irregular rhythm present. Tachycardia present.   Pulmonary/Chest: Effort normal and breath sounds normal.   Abdominal: Soft. Bowel sounds are normal. He exhibits no distension. There is no  tenderness.   Musculoskeletal: He exhibits edema.   Neurological: He is alert, oriented to person, place, and time and easily aroused.   Skin: Skin is warm and dry.   Psychiatric: He has a normal mood and affect. His speech is normal and behavior is normal. Thought content is not paranoid. He expresses impulsivity. He expresses no homicidal and no suicidal ideation.       Results Review:  Lab Results (last 24 hours)     Procedure Component Value Units Date/Time    CBC & Differential [[id number]] Collected:  11/07/19 2336    Specimen:  Blood Updated:  11/08/19 0025    Narrative:       The following orders were created for panel order CBC & Differential.  Procedure                               Abnormality         Status                     ---------                               -----------         ------                     CBC Auto Differential[[id number]]        Abnormal            Final result                 Please view results for these tests on the individual orders.    CBC Auto Differential [[id number]]  (Abnormal) Collected:  11/07/19 2336    Specimen:  Blood Updated:  11/08/19 0025     WBC 10.30 10*3/mm3      RBC 5.13 10*6/mm3      Hemoglobin 14.6 g/dL      Hematocrit 44.5 %      MCV 86.8 fL      MCH 28.5 pg      MCHC 32.9 g/dL      RDW 17.1 %      RDW-SD 52.9 fl      MPV 9.4 fL      Platelets 155 10*3/mm3      Neutrophil % 80.6 %      Lymphocyte % 9.6 %      Monocyte % 8.3 %      Eosinophil % 0.8 %      Basophil % 0.7 %      Neutrophils, Absolute 8.30 10*3/mm3      Lymphocytes, Absolute 1.00 10*3/mm3      Monocytes, Absolute 0.90 10*3/mm3      Eosinophils, Absolute 0.10 10*3/mm3      Basophils, Absolute 0.10 10*3/mm3      nRBC 0.1 /100 WBC     Narrative:       Appended report. These results have been appended to a previously verified report.    Scan Slide [[id number]] Collected:  11/07/19 2336    Specimen:  Blood Updated:  11/08/19 0025     Anisocytosis Slight/1+     Toxic Granulation Slight/1+     Vacuolated  Neutrophils Slight/1+     Platelet Morphology Normal    Troponin [[id number]]  (Normal) Collected:  11/07/19 2336    Specimen:  Blood from Hand, Right Updated:  11/08/19 0012     Troponin T 0.012 ng/mL     Narrative:       Troponin T Reference Range:  <= 0.03 ng/mL-   Negative for AMI  >0.03 ng/mL-     Abnormal for myocardial necrosis.  Clinicians would have to utilize clinical acumen, EKG, Troponin and serial changes to determine if it is an Acute Myocardial Infarction or myocardial injury due to an underlying chronic condition.     Basic Metabolic Panel [[id number]]  (Abnormal) Collected:  11/07/19 2336    Specimen:  Blood from Hand, Right Updated:  11/08/19 0002     Glucose 119 mg/dL      BUN 27 mg/dL      Creatinine 1.18 mg/dL      Sodium 145 mmol/L      Potassium 3.8 mmol/L      Chloride 109 mmol/L      CO2 22.0 mmol/L      Calcium 9.1 mg/dL      eGFR Non African Amer 62 mL/min/1.73      BUN/Creatinine Ratio 22.9     Anion Gap 14.0 mmol/L     Narrative:       GFR Normal >60  Chronic Kidney Disease <60  Kidney Failure <15    BNP [[id number]]  (Abnormal) Collected:  11/07/19 2336    Specimen:  Blood from Hand, Right Updated:  11/08/19 0001     proBNP 7,550.0 pg/mL     Narrative:       Among patients with dyspnea, NT-proBNP is highly sensitive for the detection of acute congestive heart failure. In addition NT-proBNP of <300 pg/ml effectively rules out acute congestive heart failure with 99% negative predictive value.    Manual Differential [[id number]] Updated:  11/07/19 2325    Specimen:  Blood          Imaging Results (Last 24 Hours)     Procedure Component Value Units Date/Time    XR Chest 1 View [[id number]] Collected:  11/07/19 2229     Updated:  11/07/19 2232    Narrative:          DATE OF EXAM:   11/7/2019 10:16 PM     PROCEDURE:   XR CHEST 1 VW-     INDICATIONS:   Short of breath     COMPARISON:  10/13/2018     TECHNIQUE:   [Portable chest radiograph]     FINDINGS:    The patient is rotated to the right. The  heart is enlarged. No  pneumothorax or large effusion. No focal area of consolidation. Osseous  structures grossly intact. Central pulmonary vascular congestion  suspected.       Impression:       1. Cardiomegaly and central pulmonary vascular congestion.  2. Clear lungs.     Electronically Signed By-Fareed Garcia On:11/7/2019 10:30 PM  This report was finalized on [id number] by  Fareed Garcia, .           I reviewed the patient's new clinical results.      Assessment/Plan       Shortness of breath    Hypertensive emergency    Chronic atrial fibrillation    Chronic diastolic CHF (congestive heart failure) (CMS/[facility])    CKD (chronic kidney disease) stage 3, GFR 30-59 ml/min (CMS/[facility])    Essential hypertension    Gambling disorder, persistent    Coronary artery disease involving native coronary artery of native heart without angina pectoris    GERD (gastroesophageal reflux disease)    Medically noncompliant          Plan:   (Consult cardiology.  Continue Cardene drip.Continue gentle diuresis and monitor kidney function.  Consult social work as he continues to miguel and this is why he cannot afford his medications.).       I discussed the patients findings and my recommendations with patient, nursing staff and primary care team    CLARISSA Meraz  11/08/19  1:07 PM        Electronically signed by Paty Polk DO at 11/08/19 1813          Emergency Department Notes      Milind Troy MD at 11/07/19 2202          Subjective   History of Present Illness  64-year-old male with a history of high blood pressure is been out of his medicine for about a week and now complains of increasing shortness of breath as well as swelling in the legs.  Patient denies any pain fever chills cough or congestion.  He denies any nausea or vomiting.  He has had previous episodes of congestive heart failure and has been noncompliant.  Previously  Review of Systems   Constitutional: Positive for fatigue.   HENT: Positive for  congestion.    Eyes: Negative.    Respiratory: Positive for shortness of breath.    Cardiovascular: Positive for leg swelling.   Gastrointestinal: Negative.    Endocrine: Negative.    Genitourinary: Negative.    Musculoskeletal: Positive for back pain.   Skin: Negative.    Allergic/Immunologic: Negative.    Neurological: Negative.    Hematological: Negative.    Psychiatric/Behavioral: Negative.        No past medical history on file.    Allergies   Allergen Reactions   • Toradol [Ketorolac Tromethamine] Unknown (See Comments)     unknown       No past surgical history on file.    No family history on file.    Social History     Socioeconomic History   • Marital status: Single     Spouse name: Not on file   • Number of children: Not on file   • Years of education: Not on file   • Highest education level: Not on file           Objective   Physical Exam  Patient is awake and alert afebrile with stable vital signs the pressure was elevated at 177/154 initially and has decreased to 170/120.  The HEENT exam is remarkable his neck is supple with JVD his chest reveals rales in both bases cardiovascular exam reveals a regular rhythm the patient has pitting edema both lower extremities abdomen was soft nontender he has full range of motion of the extremities no rashes present neurologic exam is normal  Procedures          ED Course      EKG shows an atrial fibrillation with a rate of 122 and left ventricular hypertrophy which is old    Results for orders placed or performed during the hospital encounter of 11/07/19   Basic Metabolic Panel   Result Value Ref Range    Glucose 119 (H) 65 - 99 mg/dL    BUN 27 (H) 8 - 23 mg/dL    Creatinine 1.18 0.76 - 1.27 mg/dL    Sodium 145 136 - 145 mmol/L    Potassium 3.8 3.5 - 5.2 mmol/L    Chloride 109 (H) 98 - 107 mmol/L    CO2 22.0 22.0 - 29.0 mmol/L    Calcium 9.1 8.6 - 10.5 mg/dL    eGFR Non African Amer 62 >60 mL/min/1.73    BUN/Creatinine Ratio 22.9 7.0 - 25.0    Anion Gap 14.0 5.0 -  15.0 mmol/L   BNP   Result Value Ref Range    proBNP 7,550.0 (H) 5.0 - 900.0 pg/mL   CBC Auto Differential   Result Value Ref Range    WBC 10.30 3.40 - 10.80 10*3/mm3    RBC 5.13 4.14 - 5.80 10*6/mm3    Hemoglobin 14.6 13.0 - 17.7 g/dL    Hematocrit 44.5 37.5 - 51.0 %    MCV 86.8 79.0 - 97.0 fL    MCH 28.5 26.6 - 33.0 pg    MCHC 32.9 31.5 - 35.7 g/dL    RDW 17.1 (H) 12.3 - 15.4 %    RDW-SD 52.9 37.0 - 54.0 fl    MPV 9.4 6.0 - 12.0 fL    Platelets 155 140 - 450 10*3/mm3     Medications   sodium chloride 0.9 % flush 10 mL (not administered)   niCARdipine (CARDENE) 25 mg/250 mL (0.1 mg/mL) 0.9% NS VTB infusion (5 mg/hr Intravenous Currently Infusing 11/7/19 7646)   aspirin tablet 325 mg (325 mg Oral Given 11/7/19 8977)     Xr Chest 1 View    Result Date: 11/7/2019  1. Cardiomegaly and central pulmonary vascular congestion. 2. Clear lungs.  Electronically Signed By-Fareed Garcia On:11/7/2019 10:30 PM This report was finalized on [id number] by  Fareed Garcia, .    Results for orders placed or performed during the hospital encounter of 11/07/19   Basic Metabolic Panel   Result Value Ref Range    Glucose 119 (H) 65 - 99 mg/dL    BUN 27 (H) 8 - 23 mg/dL    Creatinine 1.18 0.76 - 1.27 mg/dL    Sodium 145 136 - 145 mmol/L    Potassium 3.8 3.5 - 5.2 mmol/L    Chloride 109 (H) 98 - 107 mmol/L    CO2 22.0 22.0 - 29.0 mmol/L    Calcium 9.1 8.6 - 10.5 mg/dL    eGFR Non African Amer 62 >60 mL/min/1.73    BUN/Creatinine Ratio 22.9 7.0 - 25.0    Anion Gap 14.0 5.0 - 15.0 mmol/L   BNP   Result Value Ref Range    proBNP 7,550.0 (H) 5.0 - 900.0 pg/mL   CBC Auto Differential   Result Value Ref Range    WBC 10.30 3.40 - 10.80 10*3/mm3    RBC 5.13 4.14 - 5.80 10*6/mm3    Hemoglobin 14.6 13.0 - 17.7 g/dL    Hematocrit 44.5 37.5 - 51.0 %    MCV 86.8 79.0 - 97.0 fL    MCH 28.5 26.6 - 33.0 pg    MCHC 32.9 31.5 - 35.7 g/dL    RDW 17.1 (H) 12.3 - 15.4 %    RDW-SD 52.9 37.0 - 54.0 fl    MPV 9.4 6.0 - 12.0 fL    Platelets 155 140 - 450 10*3/mm3      Medications   sodium chloride 0.9 % flush 10 mL (not administered)   niCARdipine (CARDENE) 25 mg/250 mL (0.1 mg/mL) 0.9% NS VTB infusion (5 mg/hr Intravenous Currently Infusing 11/7/19 7291)   aspirin tablet 325 mg (325 mg Oral Given 11/7/19 8037)     Xr Chest 1 View    Result Date: 11/7/2019  1. Cardiomegaly and central pulmonary vascular congestion. 2. Clear lungs.  Electronically Signed By-Fareed Garcia On:11/7/2019 10:30 PM This report was finalized on [id number] by  Fareed Garcia, .            MDM  The patient's initial elevated blood pressure of 165/121 was treated with Cardene and his systolic was reduced to 145.  The patient was also given diuretics and felt much improved.  The patient's BNP was elevated at 7550 his hemoglobin was 14.6 basic metabolic shows an elevated BUN of 27 with a creatinine of 1.18.  Troponin is pending but he did not have any elevation of ST segment on EKG although he does have chronic atrial fibrillation.  The patient will be admitted for further hypertension control as well as diuretics  Final diagnoses:   Hypertensive emergency   Congestive heart failure, unspecified HF chronicity, unspecified heart failure type (CMS/[facility])   Atrial fibrillation, chronic              Milind Troy MD  11/08/19 0009      Electronically signed by Milind Troy MD at 11/08/19 0009     Polina Najera, RN at 11/08/19 0303        At 0225 patient stood up to urinate and pulled out IV. Cardene temporarily paused. New IV accessed and Cardene continued at 0235.      Polina Najera, RN  11/08/19 0306      Electronically signed by Polina Najera, RN at 11/08/19 0306     Polina Najera, RN at 11/08/19 0311        Message left at 's cell phone notifying him that hospitalist service had accepted pt and later realized the pt belonged to Marisol Larson. No answer-awating call back.      Polina Najera, JULIEN  11/08/19 0312      Electronically signed by Polina Najera,  RN at 11/08/19 0312     Polina Najera RN at 11/08/19 0513        Second call placed to 's cell phone inquiring about accepting this patient. Still no answer.     Polina Najera RN  11/08/19 0514      Electronically signed by Polina Najera RN at 11/08/19 0514     Polina Najera RN at 11/08/19 0535        Third call placed to Dr. Stauffer's cell phone with no answer. Message left. NP Sarita made aware that we have not been able to reach MD for acceptance of pt.     Polina Najera RN  11/08/19 0548      Electronically signed by Polina Najera RN at 11/08/19 0548     Polina Najera RN at 11/08/19 0613         returned message and has accepted pt.     Polina Najera RN  11/08/19 0614      Electronically signed by Polina Najera RN at 11/08/19 0614          Physician Progress Notes (last 72 hours) (Notes from 11/08/19 1636 through 11/11/19 1636)      Dre Stauffer MD at 11/11/19 0838               LOS: 3 days   Patient Care Team:  Marisol Larson APRN as PCP - General    Subjective:  Mental status changes last night    Objective:   Afebrile      Review of Systems:   Review of Systems   Reason unable to perform ROS: Quite sleepy.           Vital Signs  Temp:  [97.2 °F (36.2 °C)-98.1 °F (36.7 °C)] 97.2 °F (36.2 °C)  Heart Rate:  [] 87  Resp:  [18-22] 18  BP: (154-176)/() 161/91    Physical Exam:  Physical Exam   Constitutional: He appears well-developed and well-nourished.   HENT:   Head: Normocephalic and atraumatic.   Eyes: EOM are normal. Pupils are equal, round, and reactive to light.   Cardiovascular: Normal heart sounds.   Pulmonary/Chest: Effort normal and breath sounds normal.   Abdominal: Soft.   Musculoskeletal: Normal range of motion.   Neurological: He is alert.   Skin: Skin is warm.   Nursing note and vitals reviewed.       Radiology:  Xr Chest 1 View    Result Date: 11/7/2019  1. Cardiomegaly and central pulmonary vascular congestion.  2. Clear lungs.  Electronically Signed By-Fareed Garcia On:11/7/2019 10:30 PM This report was finalized on [id number] by  Fareed Garcia, .         Results Review:     I reviewed the patient's new clinical results.  I reviewed the patient's new imaging results and agree with the interpretation.    Medication Review:   Scheduled Meds:  atorvastatin 10 mg Oral Daily   carvedilol 25 mg Oral BID With Meals   enoxaparin 120 mg Subcutaneous Q12H   furosemide 40 mg Intravenous Q12H   hydrALAZINE 50 mg Oral BID   levothyroxine 25 mcg Oral Q AM   potassium chloride 20 mEq Oral BID   sodium chloride 10 mL Intravenous Q12H   warfarin 5 mg Oral Daily     Continuous Infusions:  niCARdipine 5-15 mg/hr Last Rate: Stopped (11/08/19 2000)   Pharmacy to dose warfarin       PRN Meds:.hydrALAZINE  •  influenza vaccine  •  LORazepam  •  [COMPLETED] Insert peripheral IV **AND** sodium chloride  •  sodium chloride  •  sodium chloride    Labs:    CBC    Results from last 7 days   Lab Units 11/11/19  0529 11/10/19  1808 11/10/19  0240 11/09/19  0311 11/07/19  2336   WBC 10*3/mm3 9.50 7.40 7.70 8.50 10.30   HEMOGLOBIN g/dL 16.4 15.2 14.2 13.5 14.6   PLATELETS 10*3/mm3 159 182 169 165 155     BMP Results from last 7 days   Lab Units 11/11/19  0529 11/10/19  1808 11/10/19  0240 11/09/19  0311 11/08/19  1702 11/08/19  1443 11/07/19  2336   SODIUM mmol/L 138 142 142 142  --  142 145   POTASSIUM mmol/L 4.0 3.8 3.3* 3.5  --  3.1* 3.8   CHLORIDE mmol/L 102 102 104 106  --  102 109*   CO2 mmol/L 23.0 25.0 26.0 25.0  --  26.0 22.0   BUN mg/dL 26* 27* 24* 25*  --  24* 27*   CREATININE mg/dL 1.18 1.17 1.17 1.14  --  1.04 1.18   GLUCOSE mg/dL 113* 135* 111* 101*  --  123* 119*   MAGNESIUM mg/dL 2.3  --  2.0 1.9 1.8 1.9  1.9  --    PHOSPHORUS mg/dL  --   --   --  3.6  --   --   --      Cr Clearance Estimated Creatinine Clearance: 85 mL/min (by C-G formula based on SCr of 1.18 mg/dL).  Coag   Results from last 7 days   Lab Units 11/11/19  8856  11/10/19  0240 11/09/19  0311 11/08/19  1443   INR  1.17* 1.17* 1.24* 1.22*     HbA1C   Lab Results   Component Value Date    HGBA1C 5.1 11/09/2019    HGBA1C 5.6 10/10/2018     Blood Glucose No results found for: POCGLU  Infection     CMP Results from last 7 days   Lab Units 11/11/19  0529 11/10/19  1808 11/10/19  0240 11/09/19  0311 11/08/19  1443 11/07/19  2336   SODIUM mmol/L 138 142 142 142 142 145   POTASSIUM mmol/L 4.0 3.8 3.3* 3.5 3.1* 3.8   CHLORIDE mmol/L 102 102 104 106 102 109*   CO2 mmol/L 23.0 25.0 26.0 25.0 26.0 22.0   BUN mg/dL 26* 27* 24* 25* 24* 27*   CREATININE mg/dL 1.18 1.17 1.17 1.14 1.04 1.18   GLUCOSE mg/dL 113* 135* 111* 101* 123* 119*   ALBUMIN g/dL 4.00  --  3.90  --   --   --    BILIRUBIN mg/dL 0.9  --  0.7  --   --   --    ALK PHOS U/L 59  --  52  --   --   --    AST (SGOT) U/L 24  --  18  --   --   --    ALT (SGPT) U/L 23  --  20  --   --   --      UA      Radiology(recent) No radiology results for the last day   Assessment:       Shortness of breath    Hypertensive emergency    Chronic atrial fibrillation    Chronic diastolic CHF (congestive heart failure) (CMS/[facility])    CKD (chronic kidney disease) stage 3, GFR 30-59 ml/min (CMS/[facility])    Essential hypertension    Gambling disorder, persistent    Coronary artery disease involving native coronary artery of native heart without angina pectoris    GERD (gastroesophageal reflux disease)    Medically noncompliant    Mental status changes with acute toxic metabolic encephalopathy        Pt had chemical stress test yesterday that was abnormal, showed large area of ischemia-awaiting cardiology recommendations, cont IV lasix as BNP remains >2000, BP remains extremely elevated at times-cont to monitor and give PRN IV meds as needed    Plan:  Coronary catheterization today        Dre Stauffer MD  11/11/19  8:38 AM          Electronically signed by Dre Stauffer MD at 11/11/19 2138     Edy Thomas MD at 11/10/19 7217          Referring  "Provider: Hospitalist    Reason for follow-up: Shortness of breath and congestive heart failure     Patient Care Team:  Marisol Larson APRN as PCP - General    Subjective .  Complains of shortness of breath but no chest pain    Objective  Lying in bed comfortably     Review of Systems   Constitution: Negative for fever and malaise/fatigue.   Cardiovascular: Negative for chest pain, dyspnea on exertion and palpitations.   Respiratory: Positive for shortness of breath. Negative for cough.    Skin: Negative for rash.   Gastrointestinal: Negative for abdominal pain, nausea and vomiting.   Neurological: Negative for focal weakness and headaches.   All other systems reviewed and are negative.      Toradol [ketorolac tromethamine]    Scheduled Meds:    atorvastatin 10 mg Oral Daily   carvedilol 25 mg Oral BID With Meals   enoxaparin 120 mg Subcutaneous Q12H   furosemide 40 mg Intravenous Q12H   hydrALAZINE 50 mg Oral BID   levothyroxine 25 mcg Oral Q AM   potassium chloride 20 mEq Oral BID   sodium chloride 10 mL Intravenous Q12H   warfarin 5 mg Oral Daily     Continuous Infusions:    niCARdipine 5-15 mg/hr Last Rate: Stopped (11/08/19 2000)   Pharmacy to dose warfarin       PRN Meds:.hydrALAZINE  •  influenza vaccine  •  [COMPLETED] Insert peripheral IV **AND** sodium chloride  •  sodium chloride  •  sodium chloride        VITAL SIGNS  Vitals:    11/10/19 0300 11/10/19 0700 11/10/19 1022 11/10/19 1410   BP: 156/99 (!) 155/119 157/96 154/99   BP Location:   Left arm Left arm   Patient Position:   Lying Sitting   Pulse: 77 85 85 97   Resp: 14 14 22 20   Temp: 97 °F (36.1 °C) 97.5 °F (36.4 °C) 97.8 °F (36.6 °C) 97.8 °F (36.6 °C)   TempSrc:   Oral Oral   SpO2: 95% 94% 95% 98%   Weight:  119 kg (262 lb 12.6 oz)     Height:           Flowsheet Rows      First Filed Value   Admission Height  182.9 cm (72\") Documented at 11/07/2019 2146   Admission Weight  124 kg (273 lb 5.9 oz) Documented at 11/07/2019 2146           TELEMETRY: " Atrial fibrillation    Physical Exam:  Physical Exam   Constitutional: He appears well-developed and well-nourished.   HENT:   Head: Normocephalic and atraumatic.   Eyes: Conjunctivae are normal. No scleral icterus.   Neck: Normal range of motion. Neck supple. No JVD present. Carotid bruit is not present.   Cardiovascular: Normal rate, S1 normal, S2 normal, normal heart sounds and intact distal pulses. An irregularly irregular rhythm present. PMI is not displaced.   Pulmonary/Chest: Effort normal and breath sounds normal. He has no wheezes. He has no rales.   Abdominal: Soft. Bowel sounds are normal.   Neurological: He is alert. He has normal strength.   Skin: Skin is warm and dry. No rash noted.        Results Review:   I reviewed the patient's new clinical results.  Lab Results (last 24 hours)     Procedure Component Value Units Date/Time    Magnesium [[id number]]  (Normal) Collected:  11/08/19 1702    Specimen:  Blood Updated:  11/10/19 1142     Magnesium 1.8 mg/dL     Magnesium [[id number]]  (Normal) Collected:  11/10/19 0240    Specimen:  Blood Updated:  11/10/19 0334     Magnesium 2.0 mg/dL     Comprehensive Metabolic Panel [[id number]]  (Abnormal) Collected:  11/10/19 0240    Specimen:  Blood Updated:  11/10/19 0334     Glucose 111 mg/dL      BUN 24 mg/dL      Creatinine 1.17 mg/dL      Sodium 142 mmol/L      Potassium 3.3 mmol/L      Chloride 104 mmol/L      CO2 26.0 mmol/L      Calcium 9.3 mg/dL      Total Protein 7.1 g/dL      Albumin 3.90 g/dL      ALT (SGPT) 20 U/L      AST (SGOT) 18 U/L      Alkaline Phosphatase 52 U/L      Total Bilirubin 0.7 mg/dL      eGFR Non African Amer 63 mL/min/1.73      Globulin 3.2 gm/dL      A/G Ratio 1.2 g/dL      BUN/Creatinine Ratio 20.5     Anion Gap 12.0 mmol/L     Narrative:       GFR Normal >60  Chronic Kidney Disease <60  Kidney Failure <15    BNP [[id number]]  (Abnormal) Collected:  11/10/19 0240    Specimen:  Blood Updated:  11/10/19 0330     proBNP 2,130.0 pg/mL      Narrative:       Among patients with dyspnea, NT-proBNP is highly sensitive for the detection of acute congestive heart failure. In addition NT-proBNP of <300 pg/ml effectively rules out acute congestive heart failure with 99% negative predictive value.    Protime-INR [[id number]]  (Abnormal) Collected:  11/10/19 0240    Specimen:  Blood Updated:  11/10/19 0319     Protime 11.9 Seconds      INR 1.17    CBC & Differential [[id number]] Collected:  11/10/19 0240    Specimen:  Blood Updated:  11/10/19 0310    Narrative:       The following orders were created for panel order CBC & Differential.  Procedure                               Abnormality         Status                     ---------                               -----------         ------                     CBC Auto Differential[[id number]]        Abnormal            Final result                 Please view results for these tests on the individual orders.    CBC Auto Differential [[id number]]  (Abnormal) Collected:  11/10/19 0240    Specimen:  Blood Updated:  11/10/19 0310     WBC 7.70 10*3/mm3      RBC 4.94 10*6/mm3      Hemoglobin 14.2 g/dL      Hematocrit 43.0 %      MCV 87.1 fL      MCH 28.8 pg      MCHC 33.0 g/dL      RDW 16.4 %      RDW-SD 49.4 fl      MPV 9.6 fL      Platelets 169 10*3/mm3      Neutrophil % 64.3 %      Lymphocyte % 20.9 %      Monocyte % 12.0 %      Eosinophil % 2.6 %      Basophil % 0.2 %      Neutrophils, Absolute 5.00 10*3/mm3      Lymphocytes, Absolute 1.60 10*3/mm3      Monocytes, Absolute 0.90 10*3/mm3      Eosinophils, Absolute 0.20 10*3/mm3      Basophils, Absolute 0.00 10*3/mm3      nRBC 0.0 /100 WBC           Imaging Results (Last 24 Hours)     ** No results found for the last 24 hours. **          EKG      I personally viewed and interpreted the patient's EKG/Telemetry data:    ECHOCARDIOGRAM:    STRESS MYOVIEW:    CARDIAC CATHETERIZATION:    OTHER:         Assessment/Plan     #1 shortness of breath  2.  Congestive heart  failure  3.  Coronary artery disease  4.  Chronic atrial fibrillation  5.  Hypertension  6.  Chronic renal insufficiency  7.  Hyperlipidemia  8.  Medical noncompliance    Patient presented with shortness of breath and has congestive heart failure  Patient is ruled out for MI by EKG and enzymes  Patient had an echocardiogram which showed LV dysfunction.  He had a stress Myoview which is abnormal  Patient will have a cardiac catheterization performed.  Discussed with patient about procedure risks and benefits  Patient was not taking any medicines and was medically noncompliant  Patient is restarted on medications including warfarin Coreg atorvastatin and hydralazine  Patient cannot take ACE inhibitors because of renal insufficiency  I discussed the patients findings and my recommendations with patient and nurse    Edy Thomas MD  11/10/19  3:16 PM                Electronically signed by Edy Thomas MD at 11/10/19 0566     Dre Stauffer MD at 11/10/19 8087               LOS: 2 days   Patient Care Team:  Marisol Larson APRN as PCP - General    Chief Complaint:  Edema, SOA    Subjective     Interval History:     Patient Complaints: No c/o today  Patient Denies:  CP, SOA  History taken from: patient    Review of Systems:   Review of Systems   Constitutional: Negative.    HENT: Negative.    Eyes: Negative.    Respiratory: Negative.    Cardiovascular: Negative.    Gastrointestinal: Negative.    Endocrine: Negative.    Genitourinary: Negative.    Musculoskeletal: Negative.    Skin: Negative.    Allergic/Immunologic: Negative.    Neurological: Negative.    Hematological: Negative.    Psychiatric/Behavioral: Negative.    All other systems reviewed and are negative.    Objective     Vital Signs  Temp:  [97 °F (36.1 °C)-98.2 °F (36.8 °C)] 97.5 °F (36.4 °C)  Heart Rate:  [77-92] 85  Resp:  [14-16] 14  BP: (119-168)/() 155/119    Physical Exam:     General Appearance:    Alert, cooperative, in no acute distress    Head:    Normocephalic, without obvious abnormality, atraumatic   Eyes:            Lids and lashes normal, conjunctivae and sclerae normal   Ears:    Ears appear intact with no abnormalities noted   Throat:   No oral lesions, no thrush, oral mucosa moist   Neck:   No adenopathy, supple, trachea midline, no thyromegaly, no   carotid bruit, no JVD   Back:     No kyphosis present, no scoliosis present, no skin lesions,      erythema or scars, no tenderness to percussion or                   palpation,   range of motion normal   Lungs:     Clear to auscultation,respirations regular, even and                  unlabored    Heart:    Sl. irreg rhythm and normal rate, normal S1 and S2, no            murmur, no gallop, no rub, no click   Chest Wall:    No abnormalities observed   Abdomen:     Normal bowel sounds, no masses, no organomegaly, soft        non-tender, non-distended, no guarding, no rebound                tenderness   Rectal:     Deferred   Extremities:   Moves all extremities well, mild lower ext edema, no cyanosis, no             redness   Pulses:   Pulses palpable and equal bilaterally   Skin:   No bleeding, bruising or rash   Lymph nodes:   No palpable adenopathy   Neurologic:   Cranial nerves 2 - 12 grossly intact, sensation intact        Results Review:     I reviewed the patient's new clinical results.  I reviewed the patient's new imaging results and agree with the interpretation.    Medication Review:   Scheduled Meds:    atorvastatin 10 mg Oral Daily   carvedilol 25 mg Oral BID With Meals   enoxaparin 120 mg Subcutaneous Q12H   furosemide 40 mg Intravenous Q12H   hydrALAZINE 50 mg Oral BID   levothyroxine 25 mcg Oral Q AM   potassium chloride 20 mEq Oral BID   sodium chloride 10 mL Intravenous Q12H   warfarin 5 mg Oral Daily     Continuous Infusions:    niCARdipine 5-15 mg/hr Last Rate: Stopped (11/08/19 2000)   Pharmacy to dose warfarin       PRN Meds:.hydrALAZINE  •  influenza vaccine  •   [COMPLETED] Insert peripheral IV **AND** sodium chloride  •  sodium chloride  •  sodium chloride    Labs:  Lab Results (last 24 hours)     Procedure Component Value Units Date/Time    Magnesium [[id number]]  (Normal) Collected:  11/10/19 0240    Specimen:  Blood Updated:  11/10/19 0334     Magnesium 2.0 mg/dL     Comprehensive Metabolic Panel [[id number]]  (Abnormal) Collected:  11/10/19 0240    Specimen:  Blood Updated:  11/10/19 0334     Glucose 111 mg/dL      BUN 24 mg/dL      Creatinine 1.17 mg/dL      Sodium 142 mmol/L      Potassium 3.3 mmol/L      Chloride 104 mmol/L      CO2 26.0 mmol/L      Calcium 9.3 mg/dL      Total Protein 7.1 g/dL      Albumin 3.90 g/dL      ALT (SGPT) 20 U/L      AST (SGOT) 18 U/L      Alkaline Phosphatase 52 U/L      Total Bilirubin 0.7 mg/dL      eGFR Non African Amer 63 mL/min/1.73      Globulin 3.2 gm/dL      A/G Ratio 1.2 g/dL      BUN/Creatinine Ratio 20.5     Anion Gap 12.0 mmol/L     Narrative:       GFR Normal >60  Chronic Kidney Disease <60  Kidney Failure <15    BNP [[id number]]  (Abnormal) Collected:  11/10/19 0240    Specimen:  Blood Updated:  11/10/19 0330     proBNP 2,130.0 pg/mL     Narrative:       Among patients with dyspnea, NT-proBNP is highly sensitive for the detection of acute congestive heart failure. In addition NT-proBNP of <300 pg/ml effectively rules out acute congestive heart failure with 99% negative predictive value.    Protime-INR [[id number]]  (Abnormal) Collected:  11/10/19 0240    Specimen:  Blood Updated:  11/10/19 0319     Protime 11.9 Seconds      INR 1.17    CBC & Differential [[id number]] Collected:  11/10/19 0240    Specimen:  Blood Updated:  11/10/19 0310    Narrative:       The following orders were created for panel order CBC & Differential.  Procedure                               Abnormality         Status                     ---------                               -----------         ------                     CBC Auto Differential[[id number]]         Abnormal            Final result                 Please view results for these tests on the individual orders.    CBC Auto Differential [[id number]]  (Abnormal) Collected:  11/10/19 0240    Specimen:  Blood Updated:  11/10/19 0310     WBC 7.70 10*3/mm3      RBC 4.94 10*6/mm3      Hemoglobin 14.2 g/dL      Hematocrit 43.0 %      MCV 87.1 fL      MCH 28.8 pg      MCHC 33.0 g/dL      RDW 16.4 %      RDW-SD 49.4 fl      MPV 9.6 fL      Platelets 169 10*3/mm3      Neutrophil % 64.3 %      Lymphocyte % 20.9 %      Monocyte % 12.0 %      Eosinophil % 2.6 %      Basophil % 0.2 %      Neutrophils, Absolute 5.00 10*3/mm3      Lymphocytes, Absolute 1.60 10*3/mm3      Monocytes, Absolute 0.90 10*3/mm3      Eosinophils, Absolute 0.20 10*3/mm3      Basophils, Absolute 0.00 10*3/mm3      nRBC 0.0 /100 WBC     Hemoglobin A1c [[id number]]  (Normal) Collected:  11/09/19 0311    Specimen:  Blood Updated:  11/09/19 1142     Hemoglobin A1C 5.1 %     Narrative:       Hemoglobin A1C Reference Range:    <5.7 %        Normal  5.7-6.4 %     Increased risk for diabetes  > 6.4 %        Diabetes       These guidelines have been recommended by the American Diabetic Association for Hgb A1c.      The following 2010 guidelines have been recommended by the American Diabetes Association for Hemoglobin A1c.    HBA1c 5.7-6.4% Increased risk for future diabetes (pre-diabetes)  HBA1c     >6.4% Diabetes           Assessment/Plan       Shortness of breath    Hypertensive emergency    Chronic atrial fibrillation    Chronic diastolic CHF (congestive heart failure) (CMS/[facility])    CKD (chronic kidney disease) stage 3, GFR 30-59 ml/min (CMS/[facility])    Essential hypertension    Gambling disorder, persistent    Coronary artery disease involving native coronary artery of native heart without angina pectoris    GERD (gastroesophageal reflux disease)    Medically noncompliant      Pt had chemical stress test yesterday that was abnormal, showed large area of  ischemia-awaiting cardiology recommendations, cont IV lasix as BNP remains >2000, BP remains extremely elevated at times-cont to monitor and give PRN IV meds as needed        CLARSISA Luna  11/10/19  8:46 AM        Electronically signed by Dre Stauffer MD at 11/11/19 0854     Edy Thomas MD at 11/09/19 8443          Referring Provider: Hospitalist    Reason for follow-up: Shortness of breath and congestive heart failure     Patient Care Team:  Marisol Larson APRN as PCP - General    Subjective .  Complains of shortness of breath but no chest pain    Objective  Lying in bed comfortably     Review of Systems   Constitution: Negative for fever and malaise/fatigue.   Cardiovascular: Negative for chest pain, dyspnea on exertion and palpitations.   Respiratory: Positive for shortness of breath. Negative for cough.    Skin: Negative for rash.   Gastrointestinal: Negative for abdominal pain, nausea and vomiting.   Neurological: Negative for focal weakness and headaches.   All other systems reviewed and are negative.      Toradol [ketorolac tromethamine]    Scheduled Meds:    atorvastatin 10 mg Oral Daily   carvedilol 25 mg Oral BID With Meals   enoxaparin 120 mg Subcutaneous Q12H   furosemide 40 mg Intravenous Q12H   hydrALAZINE 50 mg Oral BID   levothyroxine 25 mcg Oral Q AM   potassium chloride 20 mEq Oral BID   sodium chloride 10 mL Intravenous Q12H   warfarin 5 mg Oral Daily     Continuous Infusions:    niCARdipine 5-15 mg/hr Last Rate: Stopped (11/08/19 2000)   Pharmacy to dose warfarin       PRN Meds:.[COMPLETED] Insert peripheral IV **AND** sodium chloride  •  sodium chloride  •  sodium chloride        VITAL SIGNS  Vitals:    11/09/19 0509 11/09/19 0710 11/09/19 0746 11/09/19 1123   BP: (!) 128/25 144/93 144/93 (!) 168/101   BP Location:    Left arm   Patient Position:    Lying   Pulse: 93 87  87   Resp:  15  16   Temp:  98.2 °F (36.8 °C)  97.6 °F (36.4 °C)   TempSrc:  Oral     SpO2:  95%  96%  "  Weight:  122 kg (269 lb 13.5 oz)     Height:           Flowsheet Rows      First Filed Value   Admission Height  182.9 cm (72\") Documented at 11/07/2019 2146   Admission Weight  124 kg (273 lb 5.9 oz) Documented at 11/07/2019 2146           TELEMETRY: Atrial fibrillation    Physical Exam:  Physical Exam   Constitutional: He appears well-developed and well-nourished.   HENT:   Head: Normocephalic and atraumatic.   Eyes: Conjunctivae are normal. No scleral icterus.   Neck: Normal range of motion. Neck supple. No JVD present. Carotid bruit is not present.   Cardiovascular: Normal rate, S1 normal, S2 normal, normal heart sounds and intact distal pulses. An irregularly irregular rhythm present. PMI is not displaced.   Pulmonary/Chest: Effort normal and breath sounds normal. He has no wheezes. He has no rales.   Abdominal: Soft. Bowel sounds are normal.   Neurological: He is alert. He has normal strength.   Skin: Skin is warm and dry. No rash noted.        Results Review:   I reviewed the patient's new clinical results.  Lab Results (last 24 hours)     Procedure Component Value Units Date/Time    Hemoglobin A1c [[id number]]  (Normal) Collected:  11/09/19 0311    Specimen:  Blood Updated:  11/09/19 1142     Hemoglobin A1C 5.1 %     Narrative:       Hemoglobin A1C Reference Range:    <5.7 %        Normal  5.7-6.4 %     Increased risk for diabetes  > 6.4 %        Diabetes       These guidelines have been recommended by the American Diabetic Association for Hgb A1c.      The following 2010 guidelines have been recommended by the American Diabetes Association for Hemoglobin A1c.    HBA1c 5.7-6.4% Increased risk for future diabetes (pre-diabetes)  HBA1c     >6.4% Diabetes    BNP [[id number]]  (Abnormal) Collected:  11/09/19 0311    Specimen:  Blood Updated:  11/09/19 0400     proBNP 2,112.0 pg/mL     Narrative:       Among patients with dyspnea, NT-proBNP is highly sensitive for the detection of acute congestive heart failure. " In addition NT-proBNP of <300 pg/ml effectively rules out acute congestive heart failure with 99% negative predictive value.    TSH [[id number]]  (Abnormal) Collected:  11/09/19 0311    Specimen:  Blood Updated:  11/09/19 0400     TSH 4.650 uIU/mL     T4, Free [[id number]]  (Normal) Collected:  11/09/19 0311    Specimen:  Blood Updated:  11/09/19 0400     Free T4 1.07 ng/dL      Comment: Results may be falsely increased if patient taking Biotin.       Basic Metabolic Panel [[id number]]  (Abnormal) Collected:  11/09/19 0311    Specimen:  Blood Updated:  11/09/19 0359     Glucose 101 mg/dL      BUN 25 mg/dL      Creatinine 1.14 mg/dL      Sodium 142 mmol/L      Potassium 3.5 mmol/L      Chloride 106 mmol/L      CO2 25.0 mmol/L      Calcium 9.1 mg/dL      eGFR Non African Amer 65 mL/min/1.73      BUN/Creatinine Ratio 21.9     Anion Gap 11.0 mmol/L     Narrative:       GFR Normal >60  Chronic Kidney Disease <60  Kidney Failure <15    Magnesium [[id number]]  (Normal) Collected:  11/09/19 0311    Specimen:  Blood Updated:  11/09/19 0359     Magnesium 1.9 mg/dL     Lipid Panel [[id number]]  (Abnormal) Collected:  11/09/19 0311    Specimen:  Blood Updated:  11/09/19 0359     Total Cholesterol 107 mg/dL      Triglycerides 73 mg/dL      HDL Cholesterol 25 mg/dL      LDL Cholesterol  67 mg/dL      VLDL Cholesterol 14.6 mg/dL      LDL/HDL Ratio 2.70    Narrative:       Cholesterol Reference Ranges  (U.S. Department of Health and Human Services ATP III Classifications)    Desirable          <200 mg/dL  Borderline High    200-239 mg/dL  High Risk          >240 mg/dL      Triglyceride Reference Ranges  (U.S. Department of Health and Human Services ATP III Classifications)    Normal           <150 mg/dL  Borderline High  150-199 mg/dL  High             200-499 mg/dL  Very High        >500 mg/dL    HDL Reference Ranges  (U.S. Department of Health and Human Services ATP III Classifcations)    Low     <40 mg/dl (major risk factor for  CHD)  High    >60 mg/dl ('negative' risk factor for CHD)        LDL Reference Ranges  (U.S. Department of Health and Human Services ATP III Classifcations)    Optimal          <100 mg/dL  Near Optimal     100-129 mg/dL  Borderline High  130-159 mg/dL  High             160-189 mg/dL  Very High        >189 mg/dL    Phosphorus [[id number]]  (Normal) Collected:  11/09/19 0311    Specimen:  Blood Updated:  11/09/19 0359     Phosphorus 3.6 mg/dL     CBC & Differential [[id number]] Collected:  11/09/19 0311    Specimen:  Blood Updated:  11/09/19 0338    Narrative:       The following orders were created for panel order CBC & Differential.  Procedure                               Abnormality         Status                     ---------                               -----------         ------                     Manual Differential[[id number]]                                                         CBC Auto Differential[[id number]]        Abnormal            Final result                 Please view results for these tests on the individual orders.    CBC Auto Differential [[id number]]  (Abnormal) Collected:  11/09/19 0311    Specimen:  Blood Updated:  11/09/19 0338     WBC 8.50 10*3/mm3      RBC 4.71 10*6/mm3      Hemoglobin 13.5 g/dL      Hematocrit 41.0 %      MCV 87.1 fL      MCH 28.6 pg      MCHC 32.8 g/dL      RDW 17.2 %      RDW-SD 52.9 fl      MPV 9.2 fL      Platelets 165 10*3/mm3      Neutrophil % 67.7 %      Lymphocyte % 21.0 %      Monocyte % 8.5 %      Eosinophil % 2.1 %      Basophil % 0.7 %      Neutrophils, Absolute 5.70 10*3/mm3      Lymphocytes, Absolute 1.80 10*3/mm3      Monocytes, Absolute 0.70 10*3/mm3      Eosinophils, Absolute 0.20 10*3/mm3      Basophils, Absolute 0.10 10*3/mm3      nRBC 0.1 /100 WBC     Protime-INR [[id number]]  (Abnormal) Collected:  11/09/19 0311    Specimen:  Blood Updated:  11/09/19 0337     Protime 12.5 Seconds      INR 1.24    Troponin [[id number]]  (Normal) Collected:  11/08/19 1702     Specimen:  Blood Updated:  11/08/19 1814     Troponin T 0.014 ng/mL     Narrative:       Troponin T Reference Range:  <= 0.03 ng/mL-   Negative for AMI  >0.03 ng/mL-     Abnormal for myocardial necrosis.  Clinicians would have to utilize clinical acumen, EKG, Troponin and serial changes to determine if it is an Acute Myocardial Infarction or myocardial injury due to an underlying chronic condition.     Troponin [[id number]]  (Normal) Collected:  11/08/19 1443    Specimen:  Blood Updated:  11/08/19 1532     Troponin T 0.017 ng/mL     Narrative:       Troponin T Reference Range:  <= 0.03 ng/mL-   Negative for AMI  >0.03 ng/mL-     Abnormal for myocardial necrosis.  Clinicians would have to utilize clinical acumen, EKG, Troponin and serial changes to determine if it is an Acute Myocardial Infarction or myocardial injury due to an underlying chronic condition.     Basic Metabolic Panel [[id number]]  (Abnormal) Collected:  11/08/19 1443    Specimen:  Blood Updated:  11/08/19 1532     Glucose 123 mg/dL      BUN 24 mg/dL      Creatinine 1.04 mg/dL      Sodium 142 mmol/L      Potassium 3.1 mmol/L      Chloride 102 mmol/L      CO2 26.0 mmol/L      Calcium 9.2 mg/dL      eGFR Non African Amer 72 mL/min/1.73      BUN/Creatinine Ratio 23.1     Anion Gap 14.0 mmol/L     Narrative:       GFR Normal >60  Chronic Kidney Disease <60  Kidney Failure <15    Magnesium [[id number]]  (Normal) Collected:  11/08/19 1443    Specimen:  Blood Updated:  11/08/19 1532     Magnesium 1.9 mg/dL     D-dimer, Quantitative [[id number]]  (Abnormal) Collected:  11/08/19 1443    Specimen:  Blood Updated:  11/08/19 1524     D-Dimer, Quantitative 1.70 MCGFEU/mL     Narrative:       Reference Range  --------------------------------------------------------------------     < 0.50   Negative Predictive Value  0.50-0.59   Indeterminate    >= 0.60   Probable VTE             A very low percentage of patients with DVT may yield D-Dimer results   below the cut-off  of 0.50 MCGFEU/mL.  This is known to be more   prevalent in patients with distal DVT.             Results of this test should always be interpreted in conjunction with   the patient's medical history, clinical presentation and other   findings.  Clinical diagnosis should not be based on the result of   [place] D-Dimer alone.    Protime-INR [[id number]]  (Abnormal) Collected:  11/08/19 1443    Specimen:  Blood Updated:  11/08/19 1524     Protime 12.4 Seconds      INR 1.22    Magnesium [[id number]]  (Normal) Collected:  11/08/19 1443    Specimen:  Blood Updated:  11/08/19 1524     Magnesium 1.9 mg/dL           Imaging Results (Last 24 Hours)     ** No results found for the last 24 hours. **          EKG      I personally viewed and interpreted the patient's EKG/Telemetry data:    ECHOCARDIOGRAM:    STRESS MYOVIEW:    CARDIAC CATHETERIZATION:    OTHER:         Assessment/Plan     #1 shortness of breath  2.  Congestive heart failure  3.  Coronary artery disease  4.  Chronic atrial fibrillation  5.  Hypertension  6.  Chronic renal insufficiency  7.  Hyperlipidemia  8.  Medical noncompliance    Patient presented with shortness of breath and has congestive heart failure  Patient is ruled out for MI by EKG and enzymes  Patient is having an echocardiogram and a stress Myoview study performed today  Patient was not taking any medicines and was medically noncompliant  Patient is restarted on medications including warfarin Coreg atorvastatin and hydralazine  Patient cannot take ACE inhibitors because of renal insufficiency  I discussed the patients findings and my recommendations with patient and nurse    Edy Thomas MD  11/09/19  12:38 PM                Electronically signed by Edy Thomas MD at 11/09/19 1240     Dre Stauffer MD at 11/09/19 1141               LOS: 1 day   Patient Care Team:  Marisol Larson APRN as PCP - General    Chief Complaint:  Edema, SOA    Subjective     Interval History:     Patient Complaints:  No c/o today  Patient Denies:  CP, SOA  History taken from: patient    Review of Systems:   Review of Systems   Constitutional: Negative.    HENT: Negative.    Eyes: Negative.    Respiratory: Negative.    Cardiovascular: Negative.    Gastrointestinal: Negative.    Endocrine: Negative.    Genitourinary: Negative.    Musculoskeletal: Negative.    Skin: Negative.    Allergic/Immunologic: Negative.    Neurological: Negative.    Hematological: Negative.    Psychiatric/Behavioral: Negative.    All other systems reviewed and are negative.    Objective     Vital Signs  Temp:  [97.5 °F (36.4 °C)-98.9 °F (37.2 °C)] 97.6 °F (36.4 °C)  Heart Rate:  [] 87  Resp:  [15-20] 16  BP: (115-168)/() 168/101    Physical Exam:     General Appearance:    Alert, cooperative, in no acute distress   Head:    Normocephalic, without obvious abnormality, atraumatic   Eyes:            Lids and lashes normal, conjunctivae and sclerae normal   Ears:    Ears appear intact with no abnormalities noted   Throat:   No oral lesions, no thrush, oral mucosa moist   Neck:   No adenopathy, supple, trachea midline, no thyromegaly, no   carotid bruit, no JVD   Back:     No kyphosis present, no scoliosis present, no skin lesions,      erythema or scars, no tenderness to percussion or                   palpation,   range of motion normal   Lungs:     Clear to auscultation,respirations regular, even and                  unlabored    Heart:    Sl. irreg rhythm and normal rate, normal S1 and S2, no            murmur, no gallop, no rub, no click   Chest Wall:    No abnormalities observed   Abdomen:     Normal bowel sounds, no masses, no organomegaly, soft        non-tender, non-distended, no guarding, no rebound                tenderness   Rectal:     Deferred   Extremities:   Moves all extremities well, mild lower ext edema, no cyanosis, no             redness   Pulses:   Pulses palpable and equal bilaterally   Skin:   No bleeding, bruising or rash    Lymph nodes:   No palpable adenopathy   Neurologic:   Cranial nerves 2 - 12 grossly intact, sensation intact        Results Review:     I reviewed the patient's new clinical results.  I reviewed the patient's new imaging results and agree with the interpretation.    Medication Review:   Scheduled Meds:  atorvastatin 10 mg Oral Daily   carvedilol 25 mg Oral BID With Meals   enoxaparin 120 mg Subcutaneous Q12H   furosemide 40 mg Intravenous Q12H   hydrALAZINE 50 mg Oral BID   [START ON 11/10/2019] levothyroxine 25 mcg Oral Q AM   potassium chloride 20 mEq Oral BID   sodium chloride 10 mL Intravenous Q12H   warfarin 5 mg Oral Daily     Continuous Infusions:  niCARdipine 5-15 mg/hr Last Rate: Stopped (11/08/19 2000)   Pharmacy to dose warfarin       PRN Meds:.[COMPLETED] Insert peripheral IV **AND** sodium chloride  •  sodium chloride    Labs:  Lab Results (last 24 hours)     Procedure Component Value Units Date/Time    BNP [[id number]]  (Abnormal) Collected:  11/09/19 0311    Specimen:  Blood Updated:  11/09/19 0400     proBNP 2,112.0 pg/mL     Narrative:       Among patients with dyspnea, NT-proBNP is highly sensitive for the detection of acute congestive heart failure. In addition NT-proBNP of <300 pg/ml effectively rules out acute congestive heart failure with 99% negative predictive value.    TSH [[id number]]  (Abnormal) Collected:  11/09/19 0311    Specimen:  Blood Updated:  11/09/19 0400     TSH 4.650 uIU/mL     T4, Free [[id number]]  (Normal) Collected:  11/09/19 0311    Specimen:  Blood Updated:  11/09/19 0400     Free T4 1.07 ng/dL      Comment: Results may be falsely increased if patient taking Biotin.       Basic Metabolic Panel [[id number]]  (Abnormal) Collected:  11/09/19 0311    Specimen:  Blood Updated:  11/09/19 0359     Glucose 101 mg/dL      BUN 25 mg/dL      Creatinine 1.14 mg/dL      Sodium 142 mmol/L      Potassium 3.5 mmol/L      Chloride 106 mmol/L      CO2 25.0 mmol/L      Calcium 9.1 mg/dL       eGFR Non African Amer 65 mL/min/1.73      BUN/Creatinine Ratio 21.9     Anion Gap 11.0 mmol/L     Narrative:       GFR Normal >60  Chronic Kidney Disease <60  Kidney Failure <15    Magnesium [[id number]]  (Normal) Collected:  11/09/19 0311    Specimen:  Blood Updated:  11/09/19 0359     Magnesium 1.9 mg/dL     Lipid Panel [[id number]]  (Abnormal) Collected:  11/09/19 0311    Specimen:  Blood Updated:  11/09/19 0359     Total Cholesterol 107 mg/dL      Triglycerides 73 mg/dL      HDL Cholesterol 25 mg/dL      LDL Cholesterol  67 mg/dL      VLDL Cholesterol 14.6 mg/dL      LDL/HDL Ratio 2.70    Narrative:       Cholesterol Reference Ranges  (U.S. Department of Health and Human Services ATP III Classifications)    Desirable          <200 mg/dL  Borderline High    200-239 mg/dL  High Risk          >240 mg/dL      Triglyceride Reference Ranges  (U.S. Department of Health and Human Services ATP III Classifications)    Normal           <150 mg/dL  Borderline High  150-199 mg/dL  High             200-499 mg/dL  Very High        >500 mg/dL    HDL Reference Ranges  (U.S. Department of Health and Human Services ATP III Classifcations)    Low     <40 mg/dl (major risk factor for CHD)  High    >60 mg/dl ('negative' risk factor for CHD)        LDL Reference Ranges  (U.S. Department of Health and Human Services ATP III Classifcations)    Optimal          <100 mg/dL  Near Optimal     100-129 mg/dL  Borderline High  130-159 mg/dL  High             160-189 mg/dL  Very High        >189 mg/dL    Phosphorus [[id number]]  (Normal) Collected:  11/09/19 0311    Specimen:  Blood Updated:  11/09/19 0359     Phosphorus 3.6 mg/dL     CBC & Differential [[id number]] Collected:  11/09/19 0311    Specimen:  Blood Updated:  11/09/19 0338    Narrative:       The following orders were created for panel order CBC & Differential.  Procedure                               Abnormality         Status                     ---------                                -----------         ------                     Manual Differential[[id number]]                                                         CBC Auto Differential[[id number]]        Abnormal            Final result                 Please view results for these tests on the individual orders.    CBC Auto Differential [[id number]]  (Abnormal) Collected:  11/09/19 0311    Specimen:  Blood Updated:  11/09/19 0338     WBC 8.50 10*3/mm3      RBC 4.71 10*6/mm3      Hemoglobin 13.5 g/dL      Hematocrit 41.0 %      MCV 87.1 fL      MCH 28.6 pg      MCHC 32.8 g/dL      RDW 17.2 %      RDW-SD 52.9 fl      MPV 9.2 fL      Platelets 165 10*3/mm3      Neutrophil % 67.7 %      Lymphocyte % 21.0 %      Monocyte % 8.5 %      Eosinophil % 2.1 %      Basophil % 0.7 %      Neutrophils, Absolute 5.70 10*3/mm3      Lymphocytes, Absolute 1.80 10*3/mm3      Monocytes, Absolute 0.70 10*3/mm3      Eosinophils, Absolute 0.20 10*3/mm3      Basophils, Absolute 0.10 10*3/mm3      nRBC 0.1 /100 WBC     Protime-INR [[id number]]  (Abnormal) Collected:  11/09/19 0311    Specimen:  Blood Updated:  11/09/19 0337     Protime 12.5 Seconds      INR 1.24    Hemoglobin A1c [[id number]] Collected:  11/09/19 0311    Specimen:  Blood Updated:  11/09/19 0326    Troponin [[id number]]  (Normal) Collected:  11/08/19 1702    Specimen:  Blood Updated:  11/08/19 1814     Troponin T 0.014 ng/mL     Narrative:       Troponin T Reference Range:  <= 0.03 ng/mL-   Negative for AMI  >0.03 ng/mL-     Abnormal for myocardial necrosis.  Clinicians would have to utilize clinical acumen, EKG, Troponin and serial changes to determine if it is an Acute Myocardial Infarction or myocardial injury due to an underlying chronic condition.     Troponin [[id number]]  (Normal) Collected:  11/08/19 1443    Specimen:  Blood Updated:  11/08/19 1532     Troponin T 0.017 ng/mL     Narrative:       Troponin T Reference Range:  <= 0.03 ng/mL-   Negative for AMI  >0.03 ng/mL-     Abnormal for myocardial  necrosis.  Clinicians would have to utilize clinical acumen, EKG, Troponin and serial changes to determine if it is an Acute Myocardial Infarction or myocardial injury due to an underlying chronic condition.     Basic Metabolic Panel [[id number]]  (Abnormal) Collected:  11/08/19 1443    Specimen:  Blood Updated:  11/08/19 1532     Glucose 123 mg/dL      BUN 24 mg/dL      Creatinine 1.04 mg/dL      Sodium 142 mmol/L      Potassium 3.1 mmol/L      Chloride 102 mmol/L      CO2 26.0 mmol/L      Calcium 9.2 mg/dL      eGFR Non African Amer 72 mL/min/1.73      BUN/Creatinine Ratio 23.1     Anion Gap 14.0 mmol/L     Narrative:       GFR Normal >60  Chronic Kidney Disease <60  Kidney Failure <15    Magnesium [[id number]]  (Normal) Collected:  11/08/19 1443    Specimen:  Blood Updated:  11/08/19 1532     Magnesium 1.9 mg/dL     D-dimer, Quantitative [[id number]]  (Abnormal) Collected:  11/08/19 1443    Specimen:  Blood Updated:  11/08/19 1524     D-Dimer, Quantitative 1.70 MCGFEU/mL     Narrative:       Reference Range  --------------------------------------------------------------------     < 0.50   Negative Predictive Value  0.50-0.59   Indeterminate    >= 0.60   Probable VTE             A very low percentage of patients with DVT may yield D-Dimer results   below the cut-off of 0.50 MCGFEU/mL.  This is known to be more   prevalent in patients with distal DVT.             Results of this test should always be interpreted in conjunction with   the patient's medical history, clinical presentation and other   findings.  Clinical diagnosis should not be based on the result of   INNOVANCE D-Dimer alone.    Protime-INR [[id number]]  (Abnormal) Collected:  11/08/19 1443    Specimen:  Blood Updated:  11/08/19 1524     Protime 12.4 Seconds      INR 1.22    Magnesium [[id number]]  (Normal) Collected:  11/08/19 1443    Specimen:  Blood Updated:  11/08/19 1524     Magnesium 1.9 mg/dL            Assessment/Plan       Shortness of breath     Hypertensive emergency    Chronic atrial fibrillation    Chronic diastolic CHF (congestive heart failure) (CMS/[facility])    CKD (chronic kidney disease) stage 3, GFR 30-59 ml/min (CMS/[facility])    Essential hypertension    Gambling disorder, persistent    Coronary artery disease involving native coronary artery of native heart without angina pectoris    GERD (gastroesophageal reflux disease)    Medically noncompliant      Pt had chemical stress test this AM-await results, cont IV lasix as BNP remains >2000        CLARISSA Luna  11/09/19  11:42 AM        Electronically signed by Dre Stauffer MD at 11/11/19 0856          Consult Notes (last 72 hours) (Notes from 11/08/19 1636 through 11/11/19 1636)      Edy Thomas MD at 11/08/19 1953            Referring Provider: Hospitalist  Reason for Consultation: Shortness of breath, congestive heart failure    Patient Care Team:  Marisol Larson APRN as PCP - General    Chief complaint shortness of breath    Subjective .     History of present illness:  Carlos Whipple is a 64 y.o. male with history of coronary artery disease congestive heart failure chronic atrial fibrillation hypertension chronic renal insufficiency hyperlipidemia history of CVA in the past presented to the hospital with complaints of shortness of breath of increasing severity.  Patient does not have any symptoms of chest pain.  No complaints of any PND orthopnea.  No palpitations dizziness syncope.  He has some swelling of the feet.  He says that is not taking his medicines regularly.  He admits to having a gambling problem and has not had any medications.    Review of Systems   Constitution: Negative for fever and malaise/fatigue.   HENT: Negative for ear pain and nosebleeds.    Eyes: Negative for blurred vision and double vision.   Cardiovascular: Positive for leg swelling. Negative for chest pain, dyspnea on exertion and palpitations.   Respiratory: Positive for shortness of breath. Negative for  cough.    Skin: Negative for rash.   Musculoskeletal: Negative for joint pain.   Gastrointestinal: Negative for abdominal pain, nausea and vomiting.   Neurological: Negative for focal weakness and headaches.   Psychiatric/Behavioral: Negative for depression. The patient is not nervous/anxious.    All other systems reviewed and are negative.      History  Past Medical History:   Diagnosis Date   • A-fib (CMS/[facility])    • CHF (congestive heart failure) (CMS/[facility])    • Chronic atrial fibrillation 11/8/2019   • Chronic diastolic CHF (congestive heart failure) (CMS/[facility]) 11/8/2019   • CKD (chronic kidney disease) stage 3, GFR 30-59 ml/min (CMS/[facility]) 11/8/2019   • Coronary artery disease involving native coronary artery of native heart without angina pectoris 11/8/2019   • DDD (degenerative disc disease), lumbosacral 11/8/2019   • Essential hypertension 11/8/2019   • Gambling disorder, persistent 11/8/2019   • GERD (gastroesophageal reflux disease) 11/8/2019   • History of CVA (cerebrovascular accident) 11/8/2019   • Hyperlipidemia    • Hypertension    • Medically noncompliant 11/8/2019   • Peripheral polyneuropathy 11/8/2019   • Psoriasis 11/8/2019       Past Surgical History:   Procedure Laterality Date   • CARDIAC CATHETERIZATION         History reviewed. No pertinent family history.  There is history of coronary disease in the family    Social History     Tobacco Use   • Smoking status: Never Smoker   • Smokeless tobacco: Never Used   Substance Use Topics   • Alcohol use: No     Frequency: Never   • Drug use: No        Medications Prior to Admission   Medication Sig Dispense Refill Last Dose   • carvedilol (COREG) 25 MG tablet Take 25 mg by mouth 2 (Two) Times a Day With Meals.      • hydrALAZINE (APRESOLINE) 50 MG tablet Take 50 mg by mouth 2 (Two) Times a Day.      • potassium chloride (K-DUR,KLOR-CON) 20 MEQ CR tablet Take 20 mEq by mouth 2 (Two) Times a Day.      • pravastatin (PRAVACHOL) 40 MG tablet Take 40 mg by  "mouth Every Night.      • spironolactone (ALDACTONE) 25 MG tablet Take 25 mg by mouth Daily.            Toradol [ketorolac tromethamine]    Scheduled Meds:    [START ON 11/9/2019] atorvastatin 10 mg Oral Daily   carvedilol 25 mg Oral BID With Meals   [START ON 11/9/2019] enoxaparin 120 mg Subcutaneous Q12H   furosemide 40 mg Intravenous Q12H   hydrALAZINE 50 mg Oral BID   Pharmacy to dose warfarin  Does not apply Daily   potassium chloride 20 mEq Oral BID   sodium chloride 10 mL Intravenous Q12H   warfarin 5 mg Oral Daily     Continuous Infusions:    niCARdipine 5-15 mg/hr Last Rate: 7.5 mg/hr (11/08/19 1827)     PRN Meds:.[COMPLETED] Insert peripheral IV **AND** sodium chloride  •  sodium chloride    Objective     VITAL SIGNS  Vitals:    11/08/19 0621 11/08/19 1100 11/08/19 1559 11/08/19 1900   BP: 133/82 136/75 164/83 140/75   BP Location:  Left arm     Patient Position:  Lying     Pulse: 98  97 102   Resp:  19 20 18   Temp:   98.4 °F (36.9 °C) 98.9 °F (37.2 °C)   TempSrc:   Oral Oral   SpO2: 91%  93% 98%   Weight:   122 kg (268 lb 15.4 oz)    Height:   182.9 cm (72\")        Flowsheet Rows      First Filed Value   Admission Height  182.9 cm (72\") Documented at 11/07/2019 2146   Admission Weight  124 kg (273 lb 5.9 oz) Documented at 11/07/2019 2146           TELEMETRY: Atrial fibrillation    Physical Exam:  Physical Exam   Constitutional: He appears well-developed and well-nourished.   HENT:   Head: Normocephalic and atraumatic.   Eyes: Conjunctivae and EOM are normal. Pupils are equal, round, and reactive to light. No scleral icterus.   Neck: Normal range of motion. Neck supple. No JVD present. Carotid bruit is not present.   Cardiovascular: Normal rate, S1 normal, S2 normal and intact distal pulses. An irregularly irregular rhythm present. PMI is not displaced.   Murmur heard.  Pulmonary/Chest: Effort normal and breath sounds normal. He has no wheezes. He has no rales.   Abdominal: Soft. Bowel sounds are normal. "   Musculoskeletal: Normal range of motion.   Neurological: He is alert. He has normal strength.   No focal deficits   Skin: Skin is warm and dry. No rash noted.   Psychiatric: He has a normal mood and affect.        Results Review:   I reviewed the patient's new clinical results.  Lab Results (last 24 hours)     Procedure Component Value Units Date/Time    Troponin [[id number]]  (Normal) Collected:  11/08/19 1702    Specimen:  Blood Updated:  11/08/19 1814     Troponin T 0.014 ng/mL     Narrative:       Troponin T Reference Range:  <= 0.03 ng/mL-   Negative for AMI  >0.03 ng/mL-     Abnormal for myocardial necrosis.  Clinicians would have to utilize clinical acumen, EKG, Troponin and serial changes to determine if it is an Acute Myocardial Infarction or myocardial injury due to an underlying chronic condition.     Troponin [[id number]]  (Normal) Collected:  11/08/19 1443    Specimen:  Blood Updated:  11/08/19 1532     Troponin T 0.017 ng/mL     Narrative:       Troponin T Reference Range:  <= 0.03 ng/mL-   Negative for AMI  >0.03 ng/mL-     Abnormal for myocardial necrosis.  Clinicians would have to utilize clinical acumen, EKG, Troponin and serial changes to determine if it is an Acute Myocardial Infarction or myocardial injury due to an underlying chronic condition.     Basic Metabolic Panel [[id number]]  (Abnormal) Collected:  11/08/19 1443    Specimen:  Blood Updated:  11/08/19 1532     Glucose 123 mg/dL      BUN 24 mg/dL      Creatinine 1.04 mg/dL      Sodium 142 mmol/L      Potassium 3.1 mmol/L      Chloride 102 mmol/L      CO2 26.0 mmol/L      Calcium 9.2 mg/dL      eGFR Non African Amer 72 mL/min/1.73      BUN/Creatinine Ratio 23.1     Anion Gap 14.0 mmol/L     Narrative:       GFR Normal >60  Chronic Kidney Disease <60  Kidney Failure <15    Magnesium [[id number]]  (Normal) Collected:  11/08/19 1443    Specimen:  Blood Updated:  11/08/19 1532     Magnesium 1.9 mg/dL     D-dimer, Quantitative [[id number]]   (Abnormal) Collected:  11/08/19 1443    Specimen:  Blood Updated:  11/08/19 1524     D-Dimer, Quantitative 1.70 MCGFEU/mL     Narrative:       Reference Range  --------------------------------------------------------------------     < 0.50   Negative Predictive Value  0.50-0.59   Indeterminate    >= 0.60   Probable VTE             A very low percentage of patients with DVT may yield D-Dimer results   below the cut-off of 0.50 MCGFEU/mL.  This is known to be more   prevalent in patients with distal DVT.             Results of this test should always be interpreted in conjunction with   the patient's medical history, clinical presentation and other   findings.  Clinical diagnosis should not be based on the result of   INNOVANCE D-Dimer alone.    Protime-INR [[id number]]  (Abnormal) Collected:  11/08/19 1443    Specimen:  Blood Updated:  11/08/19 1524     Protime 12.4 Seconds      INR 1.22    Magnesium [[id number]]  (Normal) Collected:  11/08/19 1443    Specimen:  Blood Updated:  11/08/19 1524     Magnesium 1.9 mg/dL     CBC & Differential [[id number]] Collected:  11/07/19 2336    Specimen:  Blood Updated:  11/08/19 0025    Narrative:       The following orders were created for panel order CBC & Differential.  Procedure                               Abnormality         Status                     ---------                               -----------         ------                     CBC Auto Differential[[id number]]        Abnormal            Final result                 Please view results for these tests on the individual orders.    CBC Auto Differential [[id number]]  (Abnormal) Collected:  11/07/19 2336    Specimen:  Blood Updated:  11/08/19 0025     WBC 10.30 10*3/mm3      RBC 5.13 10*6/mm3      Hemoglobin 14.6 g/dL      Hematocrit 44.5 %      MCV 86.8 fL      MCH 28.5 pg      MCHC 32.9 g/dL      RDW 17.1 %      RDW-SD 52.9 fl      MPV 9.4 fL      Platelets 155 10*3/mm3      Neutrophil % 80.6 %      Lymphocyte % 9.6 %       Monocyte % 8.3 %      Eosinophil % 0.8 %      Basophil % 0.7 %      Neutrophils, Absolute 8.30 10*3/mm3      Lymphocytes, Absolute 1.00 10*3/mm3      Monocytes, Absolute 0.90 10*3/mm3      Eosinophils, Absolute 0.10 10*3/mm3      Basophils, Absolute 0.10 10*3/mm3      nRBC 0.1 /100 WBC     Narrative:       Appended report. These results have been appended to a previously verified report.    Scan Slide [[id number]] Collected:  11/07/19 2336    Specimen:  Blood Updated:  11/08/19 0025     Anisocytosis Slight/1+     Toxic Granulation Slight/1+     Vacuolated Neutrophils Slight/1+     Platelet Morphology Normal    Troponin [[id number]]  (Normal) Collected:  11/07/19 2336    Specimen:  Blood from Hand, Right Updated:  11/08/19 0012     Troponin T 0.012 ng/mL     Narrative:       Troponin T Reference Range:  <= 0.03 ng/mL-   Negative for AMI  >0.03 ng/mL-     Abnormal for myocardial necrosis.  Clinicians would have to utilize clinical acumen, EKG, Troponin and serial changes to determine if it is an Acute Myocardial Infarction or myocardial injury due to an underlying chronic condition.     Basic Metabolic Panel [[id number]]  (Abnormal) Collected:  11/07/19 2336    Specimen:  Blood from Hand, Right Updated:  11/08/19 0002     Glucose 119 mg/dL      BUN 27 mg/dL      Creatinine 1.18 mg/dL      Sodium 145 mmol/L      Potassium 3.8 mmol/L      Chloride 109 mmol/L      CO2 22.0 mmol/L      Calcium 9.1 mg/dL      eGFR Non African Amer 62 mL/min/1.73      BUN/Creatinine Ratio 22.9     Anion Gap 14.0 mmol/L     Narrative:       GFR Normal >60  Chronic Kidney Disease <60  Kidney Failure <15    BNP [[id number]]  (Abnormal) Collected:  11/07/19 2336    Specimen:  Blood from Hand, Right Updated:  11/08/19 0001     proBNP 7,550.0 pg/mL     Narrative:       Among patients with dyspnea, NT-proBNP is highly sensitive for the detection of acute congestive heart failure. In addition NT-proBNP of <300 pg/ml effectively rules out acute  congestive heart failure with 99% negative predictive value.          Imaging Results (Last 24 Hours)     Procedure Component Value Units Date/Time    XR Chest 1 View [[id number]] Collected:  11/07/19 2229     Updated:  11/07/19 2232    Narrative:          DATE OF EXAM:   11/7/2019 10:16 PM     PROCEDURE:   XR CHEST 1 VW-     INDICATIONS:   Short of breath     COMPARISON:  10/13/2018     TECHNIQUE:   [Portable chest radiograph]     FINDINGS:    The patient is rotated to the right. The heart is enlarged. No  pneumothorax or large effusion. No focal area of consolidation. Osseous  structures grossly intact. Central pulmonary vascular congestion  suspected.       Impression:       1. Cardiomegaly and central pulmonary vascular congestion.  2. Clear lungs.     Electronically Signed By-Fareed Garcia On:11/7/2019 10:30 PM  This report was finalized on [id number] by  Fareed Garcia, .          EKG      I personally viewed and interpreted the patient's EKG/Telemetry data:    ECHOCARDIOGRAM:      STRESS MYOVIEW:    CARDIAC CATHETERIZATION:    OTHER:         Assessment/Plan     #1 shortness of breath  2.  History of coronary disease  3.  Congestive heart failure  4.  Chronic atrial fibrillation  5.  Hypertension with hypertensive emergency  6.  Chronic renal insufficiency  7.  Hyperlipidemia  8.  Gastroesophageal veins disease  9.  Medical noncompliance    Patient states that he has not been taking his medicines regularly and has significant swelling of the feet and shortness of breath  Patient has elevated BNP level  Patient also appears to have renal insufficiency  Patient will be ruled out for MI by EKG and enzymes  Patient will have an echocardiogram for LV function and valvular abnormalities  Patient will also have a stress Myoview to rule out ischemia.  Blood pressure and heart are stable with medications  Discussed with patient about importance of medications and being compliant with medications diet and exercise.  I  discussed the patients findings and my recommendations with patient     Edy Thomas MD  11/08/19  7:53 PM              Electronically signed by Edy Thomas MD at 11/08/19 1957

## 2019-11-11 NOTE — NURSING NOTE
Patient very confused. Getting out of bed did find him out of room starting to go to anothre room. Pooped in bed was not aware he did that. You tell him something he repeats it to you. Called md.Dr. Seun rouse has no meds to help. She ordered ativan 1mg iv q 4hr prn dose given at 0123. He is resting.

## 2019-11-11 NOTE — PROGRESS NOTES
LOS: 3 days   Patient Care Team:  Marisol Larson APRN as PCP - General    Subjective:  Mental status changes last night    Objective:   Afebrile      Review of Systems:   Review of Systems   Reason unable to perform ROS: Quite sleepy.           Vital Signs  Temp:  [97.2 °F (36.2 °C)-98.1 °F (36.7 °C)] 97.2 °F (36.2 °C)  Heart Rate:  [] 87  Resp:  [18-22] 18  BP: (154-176)/() 161/91    Physical Exam:  Physical Exam   Constitutional: He appears well-developed and well-nourished.   HENT:   Head: Normocephalic and atraumatic.   Eyes: EOM are normal. Pupils are equal, round, and reactive to light.   Cardiovascular: Normal heart sounds.   Pulmonary/Chest: Effort normal and breath sounds normal.   Abdominal: Soft.   Musculoskeletal: Normal range of motion.   Neurological: He is alert.   Skin: Skin is warm.   Nursing note and vitals reviewed.       Radiology:  Xr Chest 1 View    Result Date: 11/7/2019  1. Cardiomegaly and central pulmonary vascular congestion. 2. Clear lungs.  Electronically Signed By-Fareed Garcia On:11/7/2019 10:30 PM This report was finalized on [id number] by  Fareed Garcia, .         Results Review:     I reviewed the patient's new clinical results.  I reviewed the patient's new imaging results and agree with the interpretation.    Medication Review:   Scheduled Meds:  atorvastatin 10 mg Oral Daily   carvedilol 25 mg Oral BID With Meals   enoxaparin 120 mg Subcutaneous Q12H   furosemide 40 mg Intravenous Q12H   hydrALAZINE 50 mg Oral BID   levothyroxine 25 mcg Oral Q AM   potassium chloride 20 mEq Oral BID   sodium chloride 10 mL Intravenous Q12H   warfarin 5 mg Oral Daily     Continuous Infusions:  niCARdipine 5-15 mg/hr Last Rate: Stopped (11/08/19 2000)   Pharmacy to dose warfarin       PRN Meds:.hydrALAZINE  •  influenza vaccine  •  LORazepam  •  [COMPLETED] Insert peripheral IV **AND** sodium chloride  •  sodium chloride  •  sodium chloride    Labs:    CBC    Results from last 7  days   Lab Units 11/11/19  0529 11/10/19  1808 11/10/19  0240 11/09/19  0311 11/07/19  2336   WBC 10*3/mm3 9.50 7.40 7.70 8.50 10.30   HEMOGLOBIN g/dL 16.4 15.2 14.2 13.5 14.6   PLATELETS 10*3/mm3 159 182 169 165 155     BMP Results from last 7 days   Lab Units 11/11/19  0529 11/10/19  1808 11/10/19  0240 11/09/19  0311 11/08/19  1702 11/08/19  1443 11/07/19  2336   SODIUM mmol/L 138 142 142 142  --  142 145   POTASSIUM mmol/L 4.0 3.8 3.3* 3.5  --  3.1* 3.8   CHLORIDE mmol/L 102 102 104 106  --  102 109*   CO2 mmol/L 23.0 25.0 26.0 25.0  --  26.0 22.0   BUN mg/dL 26* 27* 24* 25*  --  24* 27*   CREATININE mg/dL 1.18 1.17 1.17 1.14  --  1.04 1.18   GLUCOSE mg/dL 113* 135* 111* 101*  --  123* 119*   MAGNESIUM mg/dL 2.3  --  2.0 1.9 1.8 1.9  1.9  --    PHOSPHORUS mg/dL  --   --   --  3.6  --   --   --      Cr Clearance Estimated Creatinine Clearance: 85 mL/min (by C-G formula based on SCr of 1.18 mg/dL).  Coag   Results from last 7 days   Lab Units 11/11/19  0529 11/10/19  0240 11/09/19  0311 11/08/19  1443   INR  1.17* 1.17* 1.24* 1.22*     HbA1C   Lab Results   Component Value Date    HGBA1C 5.1 11/09/2019    HGBA1C 5.6 10/10/2018     Blood Glucose No results found for: POCGLU  Infection     CMP Results from last 7 days   Lab Units 11/11/19  0529 11/10/19  1808 11/10/19  0240 11/09/19  0311 11/08/19  1443 11/07/19  2336   SODIUM mmol/L 138 142 142 142 142 145   POTASSIUM mmol/L 4.0 3.8 3.3* 3.5 3.1* 3.8   CHLORIDE mmol/L 102 102 104 106 102 109*   CO2 mmol/L 23.0 25.0 26.0 25.0 26.0 22.0   BUN mg/dL 26* 27* 24* 25* 24* 27*   CREATININE mg/dL 1.18 1.17 1.17 1.14 1.04 1.18   GLUCOSE mg/dL 113* 135* 111* 101* 123* 119*   ALBUMIN g/dL 4.00  --  3.90  --   --   --    BILIRUBIN mg/dL 0.9  --  0.7  --   --   --    ALK PHOS U/L 59  --  52  --   --   --    AST (SGOT) U/L 24  --  18  --   --   --    ALT (SGPT) U/L 23  --  20  --   --   --      UA      Radiology(recent) No radiology results for the last day   Assessment:        Shortness of breath    Hypertensive emergency    Chronic atrial fibrillation    Chronic diastolic CHF (congestive heart failure) (CMS/[facility])    CKD (chronic kidney disease) stage 3, GFR 30-59 ml/min (CMS/[facility])    Essential hypertension    Gambling disorder, persistent    Coronary artery disease involving native coronary artery of native heart without angina pectoris    GERD (gastroesophageal reflux disease)    Medically noncompliant    Mental status changes with acute toxic metabolic encephalopathy        Pt had chemical stress test yesterday that was abnormal, showed large area of ischemia-awaiting cardiology recommendations, cont IV lasix as BNP remains >2000, BP remains extremely elevated at times-cont to monitor and give PRN IV meds as needed    Plan:  Coronary catheterization today        Dre Stauffer MD  11/11/19  8:38 AM

## 2019-11-11 NOTE — PROGRESS NOTES
Continued Stay Note  KAE Hameed     Patient Name: Carlos Whipple  MRN: [id number]  Today's Date: 11/11/2019    Admit Date: 11/7/2019    Discharge Plan     Row Name 11/11/19 1517       Plan    Plan  DC Plan: Anticipate return to home per CM's note.  Simultaneous filing. User may be unaware of other data.    Plan Comments  SW called [facility] ([phone]) & left voicemail to see if pt is current with their services, if so who the assigned  is. Waiting for return call at this time. Simultaneous filing. User may be unaware of other data.                     Florentin Lynn RN

## 2019-11-12 LAB
ALBUMIN SERPL-MCNC: 4 G/DL (ref 3.5–5.2)
ALBUMIN/GLOB SERPL: 1.1 G/DL
ALP SERPL-CCNC: 58 U/L (ref 39–117)
ALT SERPL W P-5'-P-CCNC: 31 U/L (ref 1–41)
ANION GAP SERPL CALCULATED.3IONS-SCNC: 14 MMOL/L (ref 5–15)
APTT PPP: 24.6 SECONDS (ref 24–31)
AST SERPL-CCNC: 28 U/L (ref 1–40)
BASOPHILS # BLD AUTO: 0 10*3/MM3 (ref 0–0.2)
BASOPHILS NFR BLD AUTO: 0.4 % (ref 0–1.5)
BILIRUB SERPL-MCNC: 1.1 MG/DL (ref 0.2–1.2)
BUN BLD-MCNC: 23 MG/DL (ref 8–23)
BUN/CREAT SERPL: 20.9 (ref 7–25)
CALCIUM SPEC-SCNC: 9.6 MG/DL (ref 8.6–10.5)
CHLORIDE SERPL-SCNC: 100 MMOL/L (ref 98–107)
CO2 SERPL-SCNC: 24 MMOL/L (ref 22–29)
CREAT BLD-MCNC: 1.1 MG/DL (ref 0.76–1.27)
DEPRECATED RDW RBC AUTO: 52.1 FL (ref 37–54)
EOSINOPHIL # BLD AUTO: 0.1 10*3/MM3 (ref 0–0.4)
EOSINOPHIL NFR BLD AUTO: 1 % (ref 0.3–6.2)
ERYTHROCYTE [DISTWIDTH] IN BLOOD BY AUTOMATED COUNT: 17.2 % (ref 12.3–15.4)
GFR SERPL CREATININE-BSD FRML MDRD: 68 ML/MIN/1.73
GLOBULIN UR ELPH-MCNC: 3.6 GM/DL
GLUCOSE BLD-MCNC: 106 MG/DL (ref 65–99)
HCT VFR BLD AUTO: 46.2 % (ref 37.5–51)
HGB BLD-MCNC: 16.1 G/DL (ref 13–17.7)
INR PPP: 1.24 (ref 0.9–1.1)
LYMPHOCYTES # BLD AUTO: 1 10*3/MM3 (ref 0.7–3.1)
LYMPHOCYTES NFR BLD AUTO: 11.9 % (ref 19.6–45.3)
MAGNESIUM SERPL-MCNC: 2.3 MG/DL (ref 1.6–2.4)
MCH RBC QN AUTO: 30 PG (ref 26.6–33)
MCHC RBC AUTO-ENTMCNC: 34.9 G/DL (ref 31.5–35.7)
MCV RBC AUTO: 86.2 FL (ref 79–97)
MONOCYTES # BLD AUTO: 1 10*3/MM3 (ref 0.1–0.9)
MONOCYTES NFR BLD AUTO: 12.2 % (ref 5–12)
NEUTROPHILS # BLD AUTO: 6.3 10*3/MM3 (ref 1.7–7)
NEUTROPHILS NFR BLD AUTO: 74.5 % (ref 42.7–76)
NRBC BLD AUTO-RTO: 0.1 /100 WBC (ref 0–0.2)
NT-PROBNP SERPL-MCNC: 1711 PG/ML (ref 5–900)
PLATELET # BLD AUTO: 165 10*3/MM3 (ref 140–450)
PMV BLD AUTO: 8.8 FL (ref 6–12)
POTASSIUM BLD-SCNC: 3.7 MMOL/L (ref 3.5–5.2)
PROT SERPL-MCNC: 7.6 G/DL (ref 6–8.5)
PROTHROMBIN TIME: 12.5 SECONDS (ref 9.6–11.7)
RBC # BLD AUTO: 5.35 10*6/MM3 (ref 4.14–5.8)
SODIUM BLD-SCNC: 138 MMOL/L (ref 136–145)
WBC NRBC COR # BLD: 8.5 10*3/MM3 (ref 3.4–10.8)

## 2019-11-12 PROCEDURE — 83880 ASSAY OF NATRIURETIC PEPTIDE: CPT | Performed by: NURSE PRACTITIONER

## 2019-11-12 PROCEDURE — 83735 ASSAY OF MAGNESIUM: CPT | Performed by: NURSE PRACTITIONER

## 2019-11-12 PROCEDURE — 25010000002 HYDRALAZINE PER 20 MG: Performed by: FAMILY MEDICINE

## 2019-11-12 PROCEDURE — 99232 SBSQ HOSP IP/OBS MODERATE 35: CPT | Performed by: INTERNAL MEDICINE

## 2019-11-12 PROCEDURE — 85025 COMPLETE CBC W/AUTO DIFF WBC: CPT | Performed by: NURSE PRACTITIONER

## 2019-11-12 PROCEDURE — 25010000002 ENOXAPARIN PER 10 MG: Performed by: FAMILY MEDICINE

## 2019-11-12 PROCEDURE — 25010000002 FUROSEMIDE PER 20 MG: Performed by: NURSE PRACTITIONER

## 2019-11-12 PROCEDURE — 85730 THROMBOPLASTIN TIME PARTIAL: CPT | Performed by: FAMILY MEDICINE

## 2019-11-12 PROCEDURE — 85610 PROTHROMBIN TIME: CPT | Performed by: NURSE PRACTITIONER

## 2019-11-12 PROCEDURE — 80053 COMPREHEN METABOLIC PANEL: CPT | Performed by: NURSE PRACTITIONER

## 2019-11-12 RX ORDER — WARFARIN SODIUM 6 MG/1
6 TABLET ORAL
Status: DISCONTINUED | OUTPATIENT
Start: 2019-11-13 | End: 2019-11-13 | Stop reason: HOSPADM

## 2019-11-12 RX ADMIN — Medication 10 ML: at 08:32

## 2019-11-12 RX ADMIN — LEVOTHYROXINE SODIUM 25 MCG: 25 TABLET ORAL at 06:27

## 2019-11-12 RX ADMIN — POTASSIUM CHLORIDE 20 MEQ: 1500 TABLET, EXTENDED RELEASE ORAL at 21:23

## 2019-11-12 RX ADMIN — Medication 10 ML: at 21:23

## 2019-11-12 RX ADMIN — FUROSEMIDE 40 MG: 10 INJECTION, SOLUTION INTRAMUSCULAR; INTRAVENOUS at 17:06

## 2019-11-12 RX ADMIN — HYDRALAZINE HYDROCHLORIDE 50 MG: 25 TABLET, FILM COATED ORAL at 08:19

## 2019-11-12 RX ADMIN — HYDRALAZINE HYDROCHLORIDE 10 MG: 20 INJECTION INTRAMUSCULAR; INTRAVENOUS at 02:07

## 2019-11-12 RX ADMIN — CARVEDILOL 25 MG: 25 TABLET, FILM COATED ORAL at 08:20

## 2019-11-12 RX ADMIN — FUROSEMIDE 40 MG: 10 INJECTION, SOLUTION INTRAMUSCULAR; INTRAVENOUS at 06:27

## 2019-11-12 RX ADMIN — CARVEDILOL 25 MG: 25 TABLET, FILM COATED ORAL at 17:06

## 2019-11-12 RX ADMIN — HYDRALAZINE HYDROCHLORIDE 50 MG: 25 TABLET, FILM COATED ORAL at 21:23

## 2019-11-12 RX ADMIN — ENOXAPARIN SODIUM 120 MG: 120 INJECTION SUBCUTANEOUS at 17:07

## 2019-11-12 NOTE — PROGRESS NOTES
"Referring Provider: Hospitalist    Reason for follow-up: Shortness of breath and congestive heart failure     Patient Care Team:  Marisol Larson APRN as PCP - General    Subjective .  No chest pain or shortness of breath  Objective  Lying in bed comfortably     Review of Systems   Constitution: Negative for fever and malaise/fatigue.   Cardiovascular: Negative for chest pain, dyspnea on exertion and palpitations.   Respiratory: Negative for cough.    Skin: Negative for rash.   Gastrointestinal: Negative for abdominal pain, nausea and vomiting.   Neurological: Negative for focal weakness and headaches.   All other systems reviewed and are negative.      Toradol [ketorolac tromethamine]    Scheduled Meds:    atorvastatin 10 mg Oral Daily   carvedilol 25 mg Oral BID With Meals   enoxaparin 120 mg Subcutaneous Q12H   furosemide 40 mg Intravenous Q12H   hydrALAZINE 50 mg Oral BID   levothyroxine 25 mcg Oral Q AM   potassium chloride 20 mEq Oral BID   sodium chloride 10 mL Intravenous Q12H   warfarin 6 mg Oral Daily     Continuous Infusions:    niCARdipine 5-15 mg/hr Last Rate: Stopped (11/08/19 2000)   Pharmacy to dose warfarin       PRN Meds:.hydrALAZINE  •  influenza vaccine  •  LORazepam  •  [COMPLETED] Insert peripheral IV **AND** sodium chloride  •  sodium chloride  •  sodium chloride        VITAL SIGNS  Vitals:    11/12/19 0205 11/12/19 0423 11/12/19 0553 11/12/19 1021   BP: (!) 170/107  161/86 158/88   BP Location:   Left arm    Patient Position:   Lying    Pulse: 94  113 106   Resp:   16 17   Temp:   98.8 °F (37.1 °C) 98.4 °F (36.9 °C)   TempSrc:   Oral Oral   SpO2: 97%  95% 93%   Weight:  112 kg (247 lb 2.2 oz)     Height:           Flowsheet Rows      First Filed Value   Admission Height  182.9 cm (72\") Documented at 11/07/2019 2146   Admission Weight  124 kg (273 lb 5.9 oz) Documented at 11/07/2019 2146           TELEMETRY: Atrial fibrillation    Physical Exam:  Physical Exam   Constitutional: He appears " well-developed and well-nourished.   HENT:   Head: Normocephalic and atraumatic.   Eyes: Conjunctivae are normal. No scleral icterus.   Neck: Normal range of motion. Neck supple. No JVD present. Carotid bruit is not present.   Cardiovascular: Normal rate, S1 normal, S2 normal, normal heart sounds and intact distal pulses. An irregularly irregular rhythm present. PMI is not displaced.   Pulmonary/Chest: Effort normal and breath sounds normal. He has no wheezes. He has no rales.   Abdominal: Soft. Bowel sounds are normal.   Neurological: He is alert. He has normal strength.   Skin: Skin is warm and dry. No rash noted.        Results Review:   I reviewed the patient's new clinical results.  Lab Results (last 24 hours)     Procedure Component Value Units Date/Time    Magnesium [[id number]]  (Normal) Collected:  11/12/19 0722    Specimen:  Blood Updated:  11/12/19 0826     Magnesium 2.3 mg/dL     Comprehensive Metabolic Panel [[id number]]  (Abnormal) Collected:  11/12/19 0722    Specimen:  Blood Updated:  11/12/19 0826     Glucose 106 mg/dL      BUN 23 mg/dL      Creatinine 1.10 mg/dL      Sodium 138 mmol/L      Potassium 3.7 mmol/L      Chloride 100 mmol/L      CO2 24.0 mmol/L      Calcium 9.6 mg/dL      Total Protein 7.6 g/dL      Albumin 4.00 g/dL      ALT (SGPT) 31 U/L      AST (SGOT) 28 U/L      Alkaline Phosphatase 58 U/L      Total Bilirubin 1.1 mg/dL      eGFR Non African Amer 68 mL/min/1.73      Globulin 3.6 gm/dL      A/G Ratio 1.1 g/dL      BUN/Creatinine Ratio 20.9     Anion Gap 14.0 mmol/L     Narrative:       GFR Normal >60  Chronic Kidney Disease <60  Kidney Failure <15    BNP [[id number]]  (Abnormal) Collected:  11/12/19 0722    Specimen:  Blood Updated:  11/12/19 0820     proBNP 1,711.0 pg/mL     Narrative:       Among patients with dyspnea, NT-proBNP is highly sensitive for the detection of acute congestive heart failure. In addition NT-proBNP of <300 pg/ml effectively rules out acute congestive heart  failure with 99% negative predictive value.    Protime-INR [[id number]]  (Abnormal) Collected:  11/12/19 0246    Specimen:  Blood Updated:  11/12/19 0353     Protime 12.5 Seconds      INR 1.24    aPTT [[id number]]  (Normal) Collected:  11/12/19 0246    Specimen:  Blood Updated:  11/12/19 0353     PTT 24.6 seconds     CBC & Differential [[id number]] Collected:  11/12/19 0246    Specimen:  Blood Updated:  11/12/19 0347    Narrative:       The following orders were created for panel order CBC & Differential.  Procedure                               Abnormality         Status                     ---------                               -----------         ------                     CBC Auto Differential[[id number]]        Abnormal            Final result                 Please view results for these tests on the individual orders.    CBC Auto Differential [[id number]]  (Abnormal) Collected:  11/12/19 0246    Specimen:  Blood Updated:  11/12/19 0347     WBC 8.50 10*3/mm3      RBC 5.35 10*6/mm3      Hemoglobin 16.1 g/dL      Hematocrit 46.2 %      MCV 86.2 fL      MCH 30.0 pg      MCHC 34.9 g/dL      RDW 17.2 %      RDW-SD 52.1 fl      MPV 8.8 fL      Platelets 165 10*3/mm3      Neutrophil % 74.5 %      Lymphocyte % 11.9 %      Monocyte % 12.2 %      Eosinophil % 1.0 %      Basophil % 0.4 %      Neutrophils, Absolute 6.30 10*3/mm3      Lymphocytes, Absolute 1.00 10*3/mm3      Monocytes, Absolute 1.00 10*3/mm3      Eosinophils, Absolute 0.10 10*3/mm3      Basophils, Absolute 0.00 10*3/mm3      nRBC 0.1 /100 WBC           Imaging Results (Last 24 Hours)     ** No results found for the last 24 hours. **          EKG      I personally viewed and interpreted the patient's EKG/Telemetry data:    ECHOCARDIOGRAM:    STRESS MYOVIEW:    CARDIAC CATHETERIZATION:    OTHER:         Assessment/Plan     #1 shortness of breath  2.  Congestive heart failure  3.  Coronary artery disease  4.  Chronic atrial fibrillation  5.  Hypertension  6.   Chronic renal insufficiency  7.  Hyperlipidemia  8.  Medical noncompliance    Patient presented with shortness of breath and has congestive heart failure  Patient is ruled out for MI by EKG and enzymes  Patient had an echocardiogram which showed LV dysfunction.  He had a stress Myoview which is abnormal  Patient will have a cardiac catheterization performed.  Discussed with patient about procedure risks and benefits  Patient wants to think about it in the meantime has some mild mental status changes and hence the procedure has been postponed.  Patient was not taking any medicines and was medically noncompliant  Patient is restarted on medications including warfarin Coreg atorvastatin and hydralazine  Patient cannot take ACE inhibitors because of renal insufficiency  I discussed the patients findings and my recommendations with patient and nurse    Edy Thomas MD  11/12/19  1:12 PM

## 2019-11-12 NOTE — PAYOR COMM NOTE
"Clinical update for case# [id number]    Patient remains on PCU   Acute CHF. CXR vascular congestion.   Abnormal myoview, patient to have cardiac cath today  IV lasix BID  V/s; tachycardia noted  ----------------  [place]:  Heart Failure (M-190)  Admission is indicated by 1 or more of the following  Hemodynamic instability as indicated by 1 or more of the following(1)(2)(3)(4)(5)(6)(7):   · Vital sign abnormality not readily corrected by appropriate treatment within 12 to 24 hours as indicated by 1 or more of the following:   · Tachycardia that persists despite appropriate treatment (eg, volume repletion, treatment of pain, treatment of underlying cause)  ------------------------    AUTHORIZATION PENDING:   PLEASE FAX OR CALL DETERMINATION TO CONTACT BELOW:     THANK YOU,  KATIUSKA Hansen, RN  Utilization Review  [facility]  Phone: [phone]  Fax: [phone]        Carlos Whipple (64 y.o. Male)     Date of Birth Social Security Number Address Home Phone MRN    1955  [address] [place] [address] [phone] [id number]    Jewish Marital Status          Buddhism Single       Admission Date Admission Type Admitting Provider Attending Provider Department, Room/Bed    11/7/19 Emergency Dre Stauffer MD Heimer, Brian T, MD [facility] PROGRESS CARE, 2124/1    Discharge Date Discharge Disposition Discharge Destination                       Attending Provider:  Dre Stauffer MD    Allergies:  Toradol [Ketorolac Tromethamine]    Isolation:  None   Infection:  None   Code Status:  CPR    Ht:  182.9 cm (72\")   Wt:  112 kg (247 lb 2.2 oz)    Admission Cmt:  None   Principal Problem:  Shortness of breath [R06.02]                 Active Insurance as of 11/7/2019     Primary Coverage     Payor Plan Insurance Group Employer/Plan Group    UNITED HEALTHC[employer] MEDICARE REPLACEMENT AARP MEDICARE COMPLETE [id number]     Payor Plan Address Payor Plan Phone Number Payor Plan " Fax Number Effective Dates    [facility] [phone]  4/1/2019 - None Entered    [address]       [facility] [address]       Subscriber Name Subscriber Birth Date Member ID       CHRISTOPH GOVEA [id number]                 Emergency Contacts      (Rel.) Home Phone Work Phone Mobile Phone    KACEY DOW (Friend) [phone] -- [phone]            Vital Signs (last day)     Date/Time   Temp   Temp src   Pulse   Resp   BP   Patient Position   SpO2    11/12/19 1021   98.4 (36.9)   Oral   106   17   158/88   --   93    11/12/19 0553   98.8 (37.1)   Oral   113   16   161/86   Lying   95    11/12/19 0205   --   --   94   --   170/107  (Abnormal)    --   97    11/12/19 0155   98.7 (37.1)   Oral   88   14   170/107  (Abnormal)    Lying   96    11/11/19 2130   97.7 (36.5)   Oral   87   20   155/88   Lying   97    11/11/19 2117   --   --   90   --   155/88   --   --    11/11/19 1815   98.8 (37.1)   --   88   16   147/69   --   93    11/11/19 1505   98.5 (36.9)   --   106   16   148/48   --   99    11/11/19 1115   99.5 (37.5)   --   95   12   163/103  (Abnormal)    --   93    11/11/19 0626   97.2 (36.2)   Axillary   87   18   161/91   --   96    11/11/19 0300   97.5 (36.4)   --   102   18   --   --   97              Hospital Medications (active)       Dose Frequency Start End    atorvastatin (LIPITOR) tablet 10 mg 10 mg Daily 11/9/2019     Sig - Route: Take 1 tablet by mouth Daily. - Oral    carvedilol (COREG) tablet 25 mg 25 mg 2 Times Daily With Meals 11/8/2019     Sig - Route: Take 1 tablet by mouth 2 (Two) Times a Day With Meals. - Oral    enoxaparin (LOVENOX) syringe 120 mg 120 mg Every 12 Hours 11/9/2019     Sig - Route: Inject 0.8 mL under the skin into the appropriate area as directed Every 12 (Twelve) Hours. - Subcutaneous    furosemide (LASIX) injection 40 mg 40 mg Every 12 Hours 11/8/2019     Sig - Route: Infuse 4 mL into a venous catheter Every 12 (Twelve) Hours. - Intravenous  "   hydrALAZINE (APRESOLINE) injection 10 mg 10 mg Every 6 Hours PRN 11/9/2019     Sig - Route: Infuse 0.5 mL into a venous catheter Every 6 (Six) Hours As Needed for High Blood Pressure. - Intravenous    hydrALAZINE (APRESOLINE) tablet 50 mg 50 mg 2 Times Daily 11/8/2019     Sig - Route: Take 2 tablets by mouth 2 (Two) Times a Day. - Oral    influenza vac split quad (FLUZONE,FLUARIX,AFLURIA) injection 0.5 mL 0.5 mL During Hospitalization 11/10/2019     Sig - Route: Inject 0.5 mL into the appropriate muscle as directed by prescriber During Hospitalization for Immunization. - Intramuscular    levothyroxine (SYNTHROID, LEVOTHROID) tablet 25 mcg 25 mcg Every Early Morning 11/9/2019     Sig - Route: Take 1 tablet by mouth Every Morning. - Oral    LORazepam (ATIVAN) injection 1 mg 1 mg Every 4 Hours PRN 11/11/2019 11/21/2019    Sig - Route: Infuse 0.5 mL into a venous catheter Every 4 (Four) Hours As Needed for Anxiety or Agitation. - Intravenous    niCARdipine (CARDENE) 25 mg/250 mL (0.1 mg/mL) 0.9% NS VTB infusion 5-15 mg/hr Titrated 11/7/2019     Sig - Route: Infuse 5-15 mg/hr into a venous catheter Dose Adjusted By Provider As Needed. - Intravenous    Pharmacy to dose warfarin  Continuous 11/9/2019     Sig - Route: Continuous. - Does not apply    potassium chloride (K-DUR,KLOR-CON) CR tablet 20 mEq 20 mEq 2 Times Daily 11/8/2019     Sig - Route: Take 1 tablet by mouth 2 (Two) Times a Day. - Oral    sodium chloride 0.9 % flush 10 mL 10 mL As Needed 11/7/2019     Sig - Route: Infuse 10 mL into a venous catheter As Needed for Line Care. - Intravenous    Linked Group 1:  \"And\" Linked Group Details        sodium chloride 0.9 % flush 10 mL 10 mL Every 12 Hours Scheduled 11/8/2019     Sig - Route: Infuse 10 mL into a venous catheter Every 12 (Twelve) Hours. - Intravenous    sodium chloride 0.9 % flush 10 mL 10 mL As Needed 11/8/2019     Sig - Route: Infuse 10 mL into a venous catheter As Needed for Line Care. - Intravenous "    sodium chloride nasal spray 1 spray 1 spray As Needed 11/9/2019     Sig - Route: 1 spray by Each Nare route As Needed for Congestion. - Each Nare    warfarin (COUMADIN) tablet 6 mg 6 mg Daily Warfarin 11/11/2019     Sig - Route: Take 1 tablet by mouth Daily. - Oral    warfarin (COUMADIN) tablet 5 mg (Discontinued) 5 mg Daily Warfarin 11/8/2019 11/11/2019    Sig - Route: Take 1 tablet by mouth Daily. - Oral             Physician Progress Notes (last 24 hours) (Notes from 11/11/19 1353 through 11/12/19 1353)      Edy Thomas MD at 11/12/19 1312          Referring Provider: Hospitalist    Reason for follow-up: Shortness of breath and congestive heart failure     Patient Care Team:  Marisol Larson APRN as PCP - General    Subjective .  No chest pain or shortness of breath  Objective  Lying in bed comfortably     Review of Systems   Constitution: Negative for fever and malaise/fatigue.   Cardiovascular: Negative for chest pain, dyspnea on exertion and palpitations.   Respiratory: Negative for cough.    Skin: Negative for rash.   Gastrointestinal: Negative for abdominal pain, nausea and vomiting.   Neurological: Negative for focal weakness and headaches.   All other systems reviewed and are negative.      Toradol [ketorolac tromethamine]    Scheduled Meds:    atorvastatin 10 mg Oral Daily   carvedilol 25 mg Oral BID With Meals   enoxaparin 120 mg Subcutaneous Q12H   furosemide 40 mg Intravenous Q12H   hydrALAZINE 50 mg Oral BID   levothyroxine 25 mcg Oral Q AM   potassium chloride 20 mEq Oral BID   sodium chloride 10 mL Intravenous Q12H   warfarin 6 mg Oral Daily     Continuous Infusions:    niCARdipine 5-15 mg/hr Last Rate: Stopped (11/08/19 2000)   Pharmacy to dose warfarin       PRN Meds:.hydrALAZINE  •  influenza vaccine  •  LORazepam  •  [COMPLETED] Insert peripheral IV **AND** sodium chloride  •  sodium chloride  •  sodium chloride        VITAL SIGNS  Vitals:    11/12/19 0205 11/12/19 0423 11/12/19 0553  "11/12/19 1021   BP: (!) 170/107  161/86 158/88   BP Location:   Left arm    Patient Position:   Lying    Pulse: 94  113 106   Resp:   16 17   Temp:   98.8 °F (37.1 °C) 98.4 °F (36.9 °C)   TempSrc:   Oral Oral   SpO2: 97%  95% 93%   Weight:  112 kg (247 lb 2.2 oz)     Height:           Flowsheet Rows      First Filed Value   Admission Height  182.9 cm (72\") Documented at 11/07/2019 2146   Admission Weight  124 kg (273 lb 5.9 oz) Documented at 11/07/2019 2146           TELEMETRY: Atrial fibrillation    Physical Exam:  Physical Exam   Constitutional: He appears well-developed and well-nourished.   HENT:   Head: Normocephalic and atraumatic.   Eyes: Conjunctivae are normal. No scleral icterus.   Neck: Normal range of motion. Neck supple. No JVD present. Carotid bruit is not present.   Cardiovascular: Normal rate, S1 normal, S2 normal, normal heart sounds and intact distal pulses. An irregularly irregular rhythm present. PMI is not displaced.   Pulmonary/Chest: Effort normal and breath sounds normal. He has no wheezes. He has no rales.   Abdominal: Soft. Bowel sounds are normal.   Neurological: He is alert. He has normal strength.   Skin: Skin is warm and dry. No rash noted.        Results Review:   I reviewed the patient's new clinical results.  Lab Results (last 24 hours)     Procedure Component Value Units Date/Time    Magnesium [[id number]]  (Normal) Collected:  11/12/19 0722    Specimen:  Blood Updated:  11/12/19 0826     Magnesium 2.3 mg/dL     Comprehensive Metabolic Panel [[id number]]  (Abnormal) Collected:  11/12/19 0722    Specimen:  Blood Updated:  11/12/19 0826     Glucose 106 mg/dL      BUN 23 mg/dL      Creatinine 1.10 mg/dL      Sodium 138 mmol/L      Potassium 3.7 mmol/L      Chloride 100 mmol/L      CO2 24.0 mmol/L      Calcium 9.6 mg/dL      Total Protein 7.6 g/dL      Albumin 4.00 g/dL      ALT (SGPT) 31 U/L      AST (SGOT) 28 U/L      Alkaline Phosphatase 58 U/L      Total Bilirubin 1.1 mg/dL      eGFR " Non  Amer 68 mL/min/1.73      Globulin 3.6 gm/dL      A/G Ratio 1.1 g/dL      BUN/Creatinine Ratio 20.9     Anion Gap 14.0 mmol/L     Narrative:       GFR Normal >60  Chronic Kidney Disease <60  Kidney Failure <15    BNP [[id number]]  (Abnormal) Collected:  11/12/19 0722    Specimen:  Blood Updated:  11/12/19 0820     proBNP 1,711.0 pg/mL     Narrative:       Among patients with dyspnea, NT-proBNP is highly sensitive for the detection of acute congestive heart failure. In addition NT-proBNP of <300 pg/ml effectively rules out acute congestive heart failure with 99% negative predictive value.    Protime-INR [[id number]]  (Abnormal) Collected:  11/12/19 0246    Specimen:  Blood Updated:  11/12/19 0353     Protime 12.5 Seconds      INR 1.24    aPTT [[id number]]  (Normal) Collected:  11/12/19 0246    Specimen:  Blood Updated:  11/12/19 0353     PTT 24.6 seconds     CBC & Differential [[id number]] Collected:  11/12/19 0246    Specimen:  Blood Updated:  11/12/19 0347    Narrative:       The following orders were created for panel order CBC & Differential.  Procedure                               Abnormality         Status                     ---------                               -----------         ------                     CBC Auto Differential[[id number]]        Abnormal            Final result                 Please view results for these tests on the individual orders.    CBC Auto Differential [[id number]]  (Abnormal) Collected:  11/12/19 0246    Specimen:  Blood Updated:  11/12/19 0347     WBC 8.50 10*3/mm3      RBC 5.35 10*6/mm3      Hemoglobin 16.1 g/dL      Hematocrit 46.2 %      MCV 86.2 fL      MCH 30.0 pg      MCHC 34.9 g/dL      RDW 17.2 %      RDW-SD 52.1 fl      MPV 8.8 fL      Platelets 165 10*3/mm3      Neutrophil % 74.5 %      Lymphocyte % 11.9 %      Monocyte % 12.2 %      Eosinophil % 1.0 %      Basophil % 0.4 %      Neutrophils, Absolute 6.30 10*3/mm3      Lymphocytes, Absolute 1.00 10*3/mm3       Monocytes, Absolute 1.00 10*3/mm3      Eosinophils, Absolute 0.10 10*3/mm3      Basophils, Absolute 0.00 10*3/mm3      nRBC 0.1 /100 WBC           Imaging Results (Last 24 Hours)     ** No results found for the last 24 hours. **          EKG      I personally viewed and interpreted the patient's EKG/Telemetry data:    ECHOCARDIOGRAM:    STRESS MYOVIEW:    CARDIAC CATHETERIZATION:    OTHER:         Assessment/Plan     #1 shortness of breath  2.  Congestive heart failure  3.  Coronary artery disease  4.  Chronic atrial fibrillation  5.  Hypertension  6.  Chronic renal insufficiency  7.  Hyperlipidemia  8.  Medical noncompliance    Patient presented with shortness of breath and has congestive heart failure  Patient is ruled out for MI by EKG and enzymes  Patient had an echocardiogram which showed LV dysfunction.  He had a stress Myoview which is abnormal  Patient will have a cardiac catheterization performed.  Discussed with patient about procedure risks and benefits  Patient wants to think about it in the meantime has some mild mental status changes and hence the procedure has been postponed.  Patient was not taking any medicines and was medically noncompliant  Patient is restarted on medications including warfarin Coreg atorvastatin and hydralazine  Patient cannot take ACE inhibitors because of renal insufficiency  I discussed the patients findings and my recommendations with patient and nurse    Edy Thomas MD  11/12/19  1:12 PM                Electronically signed by Edy Thomas MD at 11/12/19 1312     Dre Stauffer MD at 11/12/19 0813               LOS: 4 days   Patient Care Team:  Marisol Larson APRN as PCP - General    Subjective:  Overall    Objective:   Afebrile    Review of Systems:   Review of Systems   HENT: Negative.    Respiratory: Positive for chest tightness and shortness of breath.    Cardiovascular: Positive for chest pain and palpitations.   Gastrointestinal: Negative.    Genitourinary:  Negative.    Musculoskeletal: Positive for arthralgias and back pain.   Neurological: Positive for weakness.           Vital Signs  Temp:  [97.7 °F (36.5 °C)-99.5 °F (37.5 °C)] 98.8 °F (37.1 °C)  Heart Rate:  [] 113  Resp:  [12-20] 16  BP: (147-170)/() 161/86    Physical Exam:  Physical Exam   Constitutional: He is oriented to person, place, and time. He appears well-developed and well-nourished.   HENT:   Head: Normocephalic and atraumatic.   Eyes: Pupils are equal, round, and reactive to light.   Neck: Normal range of motion.   Cardiovascular: Normal heart sounds.   Pulmonary/Chest: Effort normal.   Abdominal: Soft.   Musculoskeletal: Normal range of motion.   Neurological: He is alert and oriented to person, place, and time.   Skin: Skin is warm.   Nursing note and vitals reviewed.       Radiology:  Xr Chest 1 View    Result Date: 11/7/2019  1. Cardiomegaly and central pulmonary vascular congestion. 2. Clear lungs.  Electronically Signed By-Fareed Garcia On:11/7/2019 10:30 PM This report was finalized on [id number] by  Fareed Garcia, .         Results Review:     I reviewed the patient's new clinical results.  I reviewed the patient's new imaging results and agree with the interpretation.    Medication Review:   Scheduled Meds:  atorvastatin 10 mg Oral Daily   carvedilol 25 mg Oral BID With Meals   enoxaparin 120 mg Subcutaneous Q12H   furosemide 40 mg Intravenous Q12H   hydrALAZINE 50 mg Oral BID   levothyroxine 25 mcg Oral Q AM   potassium chloride 20 mEq Oral BID   sodium chloride 10 mL Intravenous Q12H   warfarin 6 mg Oral Daily     Continuous Infusions:  niCARdipine 5-15 mg/hr Last Rate: Stopped (11/08/19 2000)   Pharmacy to dose warfarin       PRN Meds:.hydrALAZINE  •  influenza vaccine  •  LORazepam  •  [COMPLETED] Insert peripheral IV **AND** sodium chloride  •  sodium chloride  •  sodium chloride    Labs:    CBC    Results from last 7 days   Lab Units 11/12/19  0246 11/11/19  8645  11/10/19  1808 11/10/19  0240 11/09/19  0311 11/07/19  2336   WBC 10*3/mm3 8.50 9.50 7.40 7.70 8.50 10.30   HEMOGLOBIN g/dL 16.1 16.4 15.2 14.2 13.5 14.6   PLATELETS 10*3/mm3 165 159 182 169 165 155     BMP Results from last 7 days   Lab Units 11/12/19  0722 11/11/19  0529 11/10/19  1808 11/10/19  0240 11/09/19 0311 11/08/19  1702 11/08/19  1443 11/07/19  2336   SODIUM mmol/L 138 138 142 142 142  --  142 145   POTASSIUM mmol/L 3.7 4.0 3.8 3.3* 3.5  --  3.1* 3.8   CHLORIDE mmol/L 100 102 102 104 106  --  102 109*   CO2 mmol/L 24.0 23.0 25.0 26.0 25.0  --  26.0 22.0   BUN mg/dL 23 26* 27* 24* 25*  --  24* 27*   CREATININE mg/dL 1.10 1.18 1.17 1.17 1.14  --  1.04 1.18   GLUCOSE mg/dL 106* 113* 135* 111* 101*  --  123* 119*   MAGNESIUM mg/dL 2.3 2.3  --  2.0 1.9 1.8 1.9  1.9  --    PHOSPHORUS mg/dL  --   --   --   --  3.6  --   --   --      Cr Clearance Estimated Creatinine Clearance: 88.9 mL/min (by C-G formula based on SCr of 1.1 mg/dL).  Coag   Results from last 7 days   Lab Units 11/12/19  0246 11/11/19  0529 11/10/19  0240 11/09/19 0311 11/08/19  1443   INR  1.24* 1.17* 1.17* 1.24* 1.22*   APTT seconds 24.6  --   --   --   --      HbA1C   Lab Results   Component Value Date    HGBA1C 5.1 11/09/2019    HGBA1C 5.6 10/10/2018     Blood Glucose No results found for: POCGLU  Infection     CMP Results from last 7 days   Lab Units 11/12/19  0722 11/11/19  0529 11/10/19  1808 11/10/19  0240 11/09/19  0311 11/08/19  1443 11/07/19  2336   SODIUM mmol/L 138 138 142 142 142 142 145   POTASSIUM mmol/L 3.7 4.0 3.8 3.3* 3.5 3.1* 3.8   CHLORIDE mmol/L 100 102 102 104 106 102 109*   CO2 mmol/L 24.0 23.0 25.0 26.0 25.0 26.0 22.0   BUN mg/dL 23 26* 27* 24* 25* 24* 27*   CREATININE mg/dL 1.10 1.18 1.17 1.17 1.14 1.04 1.18   GLUCOSE mg/dL 106* 113* 135* 111* 101* 123* 119*   ALBUMIN g/dL 4.00 4.00  --  3.90  --   --   --    BILIRUBIN mg/dL 1.1 0.9  --  0.7  --   --   --    ALK PHOS U/L 58 59  --  52  --   --   --    AST (SGOT) U/L 28  24  --  18  --   --   --    ALT (SGPT) U/L 31 23  --  20  --   --   --      UA      Radiology(recent) No radiology results for the last day   Assessment:    Shortness of breath    Hypertensive emergency    Chronic atrial fibrillation    Chronic systolic CHF with acute exacerbation (CHFrEF) (CMS/[facility])    CKD (chronic kidney disease) stage 3, GFR 30-59 ml/min (CMS/[facility])    Essential hypertension    Gambling disorder, persistent    Coronary artery disease involving native coronary artery of native heart without angina pectoris    GERD (gastroesophageal reflux disease)    Medically noncompliant    Mental status changes with acute toxic metabolic encephalopathy    Plan:  Coronary catheterization today        Dre Stauffer MD  11/12/19  8:33 AM          Electronically signed by Dre Stauffer MD at 11/12/19 0835     Dre Stauffer MD at 11/12/19 0833        Acute exacerbation of chronic systolic congestive heart failure    Electronically signed by Dre Stauffer MD at 11/12/19 0833     Edy Thomas MD at 11/11/19 1740          Referring Provider: Hospitalist    Reason for follow-up: Shortness of breath and congestive heart failure     Patient Care Team:  Marisol Larson APRN as PCP - General    Subjective .  No chest pain or shortness of breath  Objective  Lying in bed comfortably     Review of Systems   Constitution: Negative for fever and malaise/fatigue.   Cardiovascular: Negative for chest pain, dyspnea on exertion and palpitations.   Respiratory: Negative for cough.    Skin: Negative for rash.   Gastrointestinal: Negative for abdominal pain, nausea and vomiting.   Neurological: Negative for focal weakness and headaches.   All other systems reviewed and are negative.      Toradol [ketorolac tromethamine]    Scheduled Meds:    atorvastatin 10 mg Oral Daily   carvedilol 25 mg Oral BID With Meals   enoxaparin 120 mg Subcutaneous Q12H   furosemide 40 mg Intravenous Q12H   hydrALAZINE 50 mg Oral BID   levothyroxine  "25 mcg Oral Q AM   potassium chloride 20 mEq Oral BID   sodium chloride 10 mL Intravenous Q12H   warfarin 6 mg Oral Daily     Continuous Infusions:    niCARdipine 5-15 mg/hr Last Rate: Stopped (11/08/19 2000)   Pharmacy to dose warfarin       PRN Meds:.hydrALAZINE  •  influenza vaccine  •  LORazepam  •  [COMPLETED] Insert peripheral IV **AND** sodium chloride  •  sodium chloride  •  sodium chloride        VITAL SIGNS  Vitals:    11/11/19 0300 11/11/19 0626 11/11/19 1115 11/11/19 1505   BP:  161/91 (!) 163/103 148/48   BP Location:       Patient Position:       Pulse: 102 87 95 106   Resp: 18 18 12 16   Temp: 97.5 °F (36.4 °C) 97.2 °F (36.2 °C) 99.5 °F (37.5 °C) 98.5 °F (36.9 °C)   TempSrc:  Axillary     SpO2: 97% 96% 93% 99%   Weight:  118 kg (260 lb 9.3 oz)     Height:           Flowsheet Rows      First Filed Value   Admission Height  182.9 cm (72\") Documented at 11/07/2019 2146   Admission Weight  124 kg (273 lb 5.9 oz) Documented at 11/07/2019 2146           TELEMETRY: Atrial fibrillation    Physical Exam:  Physical Exam   Constitutional: He appears well-developed and well-nourished.   HENT:   Head: Normocephalic and atraumatic.   Eyes: Conjunctivae are normal. No scleral icterus.   Neck: Normal range of motion. Neck supple. No JVD present. Carotid bruit is not present.   Cardiovascular: Normal rate, S1 normal, S2 normal, normal heart sounds and intact distal pulses. An irregularly irregular rhythm present. PMI is not displaced.   Pulmonary/Chest: Effort normal and breath sounds normal. He has no wheezes. He has no rales.   Abdominal: Soft. Bowel sounds are normal.   Neurological: He is alert. He has normal strength.   Skin: Skin is warm and dry. No rash noted.        Results Review:   I reviewed the patient's new clinical results.  Lab Results (last 24 hours)     Procedure Component Value Units Date/Time    Comprehensive Metabolic Panel [[id number]]  (Abnormal) Collected:  11/11/19 0529    Specimen:  Blood " Updated:  11/11/19 0610     Glucose 113 mg/dL      BUN 26 mg/dL      Creatinine 1.18 mg/dL      Sodium 138 mmol/L      Potassium 4.0 mmol/L      Chloride 102 mmol/L      CO2 23.0 mmol/L      Calcium 9.8 mg/dL      Total Protein 7.9 g/dL      Albumin 4.00 g/dL      ALT (SGPT) 23 U/L      AST (SGOT) 24 U/L      Alkaline Phosphatase 59 U/L      Total Bilirubin 0.9 mg/dL      eGFR Non African Amer 62 mL/min/1.73      Globulin 3.9 gm/dL      A/G Ratio 1.0 g/dL      BUN/Creatinine Ratio 22.0     Anion Gap 13.0 mmol/L     Narrative:       GFR Normal >60  Chronic Kidney Disease <60  Kidney Failure <15    Magnesium [[id number]]  (Normal) Collected:  11/11/19 0529    Specimen:  Blood Updated:  11/11/19 0610     Magnesium 2.3 mg/dL     BNP [[id number]]  (Abnormal) Collected:  11/11/19 0529    Specimen:  Blood Updated:  11/11/19 0609     proBNP 2,721.0 pg/mL     Narrative:       Among patients with dyspnea, NT-proBNP is highly sensitive for the detection of acute congestive heart failure. In addition NT-proBNP of <300 pg/ml effectively rules out acute congestive heart failure with 99% negative predictive value.    Protime-INR [[id number]]  (Abnormal) Collected:  11/11/19 0529    Specimen:  Blood Updated:  11/11/19 0606     Protime 11.9 Seconds      INR 1.17    CBC & Differential [[id number]] Collected:  11/11/19 0529    Specimen:  Blood Updated:  11/11/19 0553    Narrative:       The following orders were created for panel order CBC & Differential.  Procedure                               Abnormality         Status                     ---------                               -----------         ------                     CBC Auto Differential[[id number]]        Abnormal            Final result                 Please view results for these tests on the individual orders.    CBC Auto Differential [[id number]]  (Abnormal) Collected:  11/11/19 0529    Specimen:  Blood Updated:  11/11/19 0553     WBC 9.50 10*3/mm3      RBC 5.71 10*6/mm3       Hemoglobin 16.4 g/dL      Hematocrit 49.4 %      MCV 86.5 fL      MCH 28.8 pg      MCHC 33.3 g/dL      RDW 16.8 %      RDW-SD 50.3 fl      MPV 9.4 fL      Platelets 159 10*3/mm3      Neutrophil % 77.1 %      Lymphocyte % 9.1 %      Monocyte % 11.1 %      Eosinophil % 1.7 %      Basophil % 1.0 %      Neutrophils, Absolute 7.40 10*3/mm3      Lymphocytes, Absolute 0.90 10*3/mm3      Monocytes, Absolute 1.10 10*3/mm3      Eosinophils, Absolute 0.20 10*3/mm3      Basophils, Absolute 0.10 10*3/mm3      nRBC 0.0 /100 WBC     Basic Metabolic Panel [[id number]]  (Abnormal) Collected:  11/10/19 1808    Specimen:  Blood Updated:  11/10/19 1915     Glucose 135 mg/dL      BUN 27 mg/dL      Creatinine 1.17 mg/dL      Sodium 142 mmol/L      Potassium 3.8 mmol/L      Chloride 102 mmol/L      CO2 25.0 mmol/L      Calcium 9.9 mg/dL      eGFR Non African Amer 63 mL/min/1.73      BUN/Creatinine Ratio 23.1     Anion Gap 15.0 mmol/L     Narrative:       GFR Normal >60  Chronic Kidney Disease <60  Kidney Failure <15    CBC & Differential [[id number]] Collected:  11/10/19 1808    Specimen:  Blood Updated:  11/10/19 1858    Narrative:       The following orders were created for panel order CBC & Differential.  Procedure                               Abnormality         Status                     ---------                               -----------         ------                     CBC Auto Differential[[id number]]        Abnormal            Final result                 Please view results for these tests on the individual orders.    CBC Auto Differential [[id number]]  (Abnormal) Collected:  11/10/19 1808    Specimen:  Blood Updated:  11/10/19 1858     WBC 7.40 10*3/mm3      RBC 5.12 10*6/mm3      Hemoglobin 15.2 g/dL      Hematocrit 44.4 %      MCV 86.6 fL      MCH 29.6 pg      MCHC 34.2 g/dL      RDW 17.0 %      RDW-SD 51.6 fl      MPV 9.3 fL      Platelets 182 10*3/mm3      Neutrophil % 66.8 %      Lymphocyte % 18.3 %      Monocyte % 10.8  %      Eosinophil % 3.0 %      Basophil % 1.1 %      Neutrophils, Absolute 5.00 10*3/mm3      Lymphocytes, Absolute 1.40 10*3/mm3      Monocytes, Absolute 0.80 10*3/mm3      Eosinophils, Absolute 0.20 10*3/mm3      Basophils, Absolute 0.10 10*3/mm3      nRBC 0.2 /100 WBC           Imaging Results (Last 24 Hours)     ** No results found for the last 24 hours. **          EKG      I personally viewed and interpreted the patient's EKG/Telemetry data:    ECHOCARDIOGRAM:    STRESS MYOVIEW:    CARDIAC CATHETERIZATION:    OTHER:         Assessment/Plan     #1 shortness of breath  2.  Congestive heart failure  3.  Coronary artery disease  4.  Chronic atrial fibrillation  5.  Hypertension  6.  Chronic renal insufficiency  7.  Hyperlipidemia  8.  Medical noncompliance    Patient presented with shortness of breath and has congestive heart failure  Patient is ruled out for MI by EKG and enzymes  Patient had an echocardiogram which showed LV dysfunction.  He had a stress Myoview which is abnormal  Patient will have a cardiac catheterization performed.  Discussed with patient about procedure risks and benefits  Patient wants to think about it in the meantime has some mild mental status changes and hence the procedure has been postponed.  Patient was not taking any medicines and was medically noncompliant  Patient is restarted on medications including warfarin Coreg atorvastatin and hydralazine  Patient cannot take ACE inhibitors because of renal insufficiency  I discussed the patients findings and my recommendations with patient and nurse    Edy Thomas MD  11/11/19  5:41 PM                Electronically signed by Edy Thomas MD at 11/11/19 7956

## 2019-11-12 NOTE — PROGRESS NOTES
Continued Stay Note  KAE Hameed     Patient Name: Carlos Whipple  MRN: [id number]  Today's Date: 11/12/2019    Admit Date: 11/7/2019    Discharge Plan     Row Name 11/12/19 1410       Plan    Plan Comments  Barriers to D/C: Cardiac cath postponed until 11/13 due to altered mental status per MD note.             Elizabeth Jones

## 2019-11-12 NOTE — PROGRESS NOTES
Continued Stay Note  KAE Hameed     Patient Name: Carlos Whipple  MRN: [id number]  Today's Date: 11/12/2019    Admit Date: 11/7/2019    Discharge Plan     Row Name 11/12/19 1524       Plan    Plan Comments  SW met with patient at bedside regarding community resource needs. Patient reported to SW that his sister (Baylee) recently moved back to the area & lives right next door to him (two apartment home they rent). Per patient he frequents soup marivel d/t need for meals from lack of income/uninterested in making his own meals. Pt agreed to [facility] referral, but SW is unable to make until SW obtains pt's working number. Pt reports number on file is not correct. Pt reports that he doesn't work, but receives a disability income. SW will be providing pt with [facility] resource guide to connect to local resource needs on 11/13.      SHAKIR Das    Phone: [phone]  Cell: [phone]  Fax: [phone]  [email or web address]

## 2019-11-12 NOTE — PLAN OF CARE
Problem: Patient Care Overview  Goal: Plan of Care Review  Outcome: Ongoing (interventions implemented as appropriate)  Patient much more alert and oriented today but still refusing any type of home health or assistance with his gambling addiction. Patient extremely forgetful and impulsive. VSS will continue to monitor.

## 2019-11-12 NOTE — PLAN OF CARE
Problem: Fluid Volume Excess (Adult)  Goal: Identify Related Risk Factors and Signs and Symptoms  Outcome: Outcome(s) achieved Date Met: 11/12/19    Goal: Optimal Fluid Balance  Outcome: Ongoing (interventions implemented as appropriate)      Problem: Hypertensive Disease/Crisis (Arterial) (Adult)  Goal: Signs and Symptoms of Listed Potential Problems Will be Absent, Minimized or Managed (Hypertensive Disease/Crisis)  Outcome: Ongoing (interventions implemented as appropriate)      Problem: Fall Risk (Adult)  Goal: Identify Related Risk Factors and Signs and Symptoms  Outcome: Outcome(s) achieved Date Met: 11/12/19    Goal: Absence of Fall  Outcome: Ongoing (interventions implemented as appropriate)      Problem: Skin Injury Risk (Adult)  Goal: Identify Related Risk Factors and Signs and Symptoms  Outcome: Outcome(s) achieved Date Met: 11/12/19    Goal: Skin Health and Integrity  Outcome: Ongoing (interventions implemented as appropriate)      Problem: Cardiac: ACS (Acute Coronary Syndrome) (Adult)  Goal: Signs and Symptoms of Listed Potential Problems Will be Absent, Minimized or Managed (Cardiac: ACS)  Outcome: Ongoing (interventions implemented as appropriate)      Problem: Patient Care Overview  Goal: Plan of Care Review  Outcome: Ongoing (interventions implemented as appropriate)   11/12/19 0600   Coping/Psychosocial   Plan of Care Reviewed With patient   Plan of Care Review   Progress no change   OTHER   Outcome Summary Patient's BP became elevated overnight and one dose IV hydralazine. Patient remained confused throughout the night. No other changes noted.     Goal: Individualization and Mutuality  Outcome: Ongoing (interventions implemented as appropriate)    Goal: Discharge Needs Assessment  Outcome: Ongoing (interventions implemented as appropriate)

## 2019-11-12 NOTE — PROGRESS NOTES
LOS: 4 days   Patient Care Team:  Marisol Larson APRN as PCP - General    Subjective:  Overall    Objective:   Afebrile    Review of Systems:   Review of Systems   HENT: Negative.    Respiratory: Positive for chest tightness and shortness of breath.    Cardiovascular: Positive for chest pain and palpitations.   Gastrointestinal: Negative.    Genitourinary: Negative.    Musculoskeletal: Positive for arthralgias and back pain.   Neurological: Positive for weakness.           Vital Signs  Temp:  [97.7 °F (36.5 °C)-99.5 °F (37.5 °C)] 98.8 °F (37.1 °C)  Heart Rate:  [] 113  Resp:  [12-20] 16  BP: (147-170)/() 161/86    Physical Exam:  Physical Exam   Constitutional: He is oriented to person, place, and time. He appears well-developed and well-nourished.   HENT:   Head: Normocephalic and atraumatic.   Eyes: Pupils are equal, round, and reactive to light.   Neck: Normal range of motion.   Cardiovascular: Normal heart sounds.   Pulmonary/Chest: Effort normal.   Abdominal: Soft.   Musculoskeletal: Normal range of motion.   Neurological: He is alert and oriented to person, place, and time.   Skin: Skin is warm.   Nursing note and vitals reviewed.       Radiology:  Xr Chest 1 View    Result Date: 11/7/2019  1. Cardiomegaly and central pulmonary vascular congestion. 2. Clear lungs.  Electronically Signed By-Fareed Garcia On:11/7/2019 10:30 PM This report was finalized on [id number] by  Fareed Garcia, .         Results Review:     I reviewed the patient's new clinical results.  I reviewed the patient's new imaging results and agree with the interpretation.    Medication Review:   Scheduled Meds:  atorvastatin 10 mg Oral Daily   carvedilol 25 mg Oral BID With Meals   enoxaparin 120 mg Subcutaneous Q12H   furosemide 40 mg Intravenous Q12H   hydrALAZINE 50 mg Oral BID   levothyroxine 25 mcg Oral Q AM   potassium chloride 20 mEq Oral BID   sodium chloride 10 mL Intravenous Q12H   warfarin 6 mg Oral Daily      Continuous Infusions:  niCARdipine 5-15 mg/hr Last Rate: Stopped (11/08/19 2000)   Pharmacy to dose warfarin       PRN Meds:.hydrALAZINE  •  influenza vaccine  •  LORazepam  •  [COMPLETED] Insert peripheral IV **AND** sodium chloride  •  sodium chloride  •  sodium chloride    Labs:    CBC    Results from last 7 days   Lab Units 11/12/19  0246 11/11/19  0529 11/10/19  1808 11/10/19  0240 11/09/19  0311 11/07/19  2336   WBC 10*3/mm3 8.50 9.50 7.40 7.70 8.50 10.30   HEMOGLOBIN g/dL 16.1 16.4 15.2 14.2 13.5 14.6   PLATELETS 10*3/mm3 165 159 182 169 165 155     BMP Results from last 7 days   Lab Units 11/12/19  0722 11/11/19  0529 11/10/19  1808 11/10/19  0240 11/09/19  0311 11/08/19  1702 11/08/19  1443 11/07/19  2336   SODIUM mmol/L 138 138 142 142 142  --  142 145   POTASSIUM mmol/L 3.7 4.0 3.8 3.3* 3.5  --  3.1* 3.8   CHLORIDE mmol/L 100 102 102 104 106  --  102 109*   CO2 mmol/L 24.0 23.0 25.0 26.0 25.0  --  26.0 22.0   BUN mg/dL 23 26* 27* 24* 25*  --  24* 27*   CREATININE mg/dL 1.10 1.18 1.17 1.17 1.14  --  1.04 1.18   GLUCOSE mg/dL 106* 113* 135* 111* 101*  --  123* 119*   MAGNESIUM mg/dL 2.3 2.3  --  2.0 1.9 1.8 1.9  1.9  --    PHOSPHORUS mg/dL  --   --   --   --  3.6  --   --   --      Cr Clearance Estimated Creatinine Clearance: 88.9 mL/min (by C-G formula based on SCr of 1.1 mg/dL).  Coag   Results from last 7 days   Lab Units 11/12/19  0246 11/11/19  0529 11/10/19  0240 11/09/19  0311 11/08/19  1443   INR  1.24* 1.17* 1.17* 1.24* 1.22*   APTT seconds 24.6  --   --   --   --      HbA1C   Lab Results   Component Value Date    HGBA1C 5.1 11/09/2019    HGBA1C 5.6 10/10/2018     Blood Glucose No results found for: POCGLU  Infection     CMP Results from last 7 days   Lab Units 11/12/19  0722 11/11/19  0529 11/10/19  1808 11/10/19  0240 11/09/19  0311 11/08/19  1443 11/07/19  2336   SODIUM mmol/L 138 138 142 142 142 142 145   POTASSIUM mmol/L 3.7 4.0 3.8 3.3* 3.5 3.1* 3.8   CHLORIDE mmol/L 100 102 102 104 106  102 109*   CO2 mmol/L 24.0 23.0 25.0 26.0 25.0 26.0 22.0   BUN mg/dL 23 26* 27* 24* 25* 24* 27*   CREATININE mg/dL 1.10 1.18 1.17 1.17 1.14 1.04 1.18   GLUCOSE mg/dL 106* 113* 135* 111* 101* 123* 119*   ALBUMIN g/dL 4.00 4.00  --  3.90  --   --   --    BILIRUBIN mg/dL 1.1 0.9  --  0.7  --   --   --    ALK PHOS U/L 58 59  --  52  --   --   --    AST (SGOT) U/L 28 24  --  18  --   --   --    ALT (SGPT) U/L 31 23  --  20  --   --   --      UA      Radiology(recent) No radiology results for the last day   Assessment:    Shortness of breath    Hypertensive emergency    Chronic atrial fibrillation    Chronic systolic CHF with acute exacerbation (CHFrEF) (CMS/[facility])    CKD (chronic kidney disease) stage 3, GFR 30-59 ml/min (CMS/[facility])    Essential hypertension    Gambling disorder, persistent    Coronary artery disease involving native coronary artery of native heart without angina pectoris    GERD (gastroesophageal reflux disease)    Medically noncompliant    Mental status changes with acute toxic metabolic encephalopathy    Plan:  Coronary catheterization today        Dre Stauffer MD  11/12/19  8:33 AM

## 2019-11-13 VITALS
HEIGHT: 72 IN | SYSTOLIC BLOOD PRESSURE: 150 MMHG | RESPIRATION RATE: 14 BRPM | OXYGEN SATURATION: 94 % | HEART RATE: 81 BPM | WEIGHT: 247.14 LBS | BODY MASS INDEX: 33.47 KG/M2 | TEMPERATURE: 99.1 F | DIASTOLIC BLOOD PRESSURE: 87 MMHG

## 2019-11-13 LAB
ALBUMIN SERPL-MCNC: 3.9 G/DL (ref 3.5–5.2)
ALBUMIN/GLOB SERPL: 1.1 G/DL
ALP SERPL-CCNC: 56 U/L (ref 39–117)
ALT SERPL W P-5'-P-CCNC: 31 U/L (ref 1–41)
ANION GAP SERPL CALCULATED.3IONS-SCNC: 14 MMOL/L (ref 5–15)
AST SERPL-CCNC: 24 U/L (ref 1–40)
BASOPHILS # BLD AUTO: 0.1 10*3/MM3 (ref 0–0.2)
BASOPHILS NFR BLD AUTO: 1 % (ref 0–1.5)
BILIRUB SERPL-MCNC: 0.8 MG/DL (ref 0.2–1.2)
BUN BLD-MCNC: 28 MG/DL (ref 8–23)
BUN/CREAT SERPL: 24.6 (ref 7–25)
CALCIUM SPEC-SCNC: 9.3 MG/DL (ref 8.6–10.5)
CHLORIDE SERPL-SCNC: 100 MMOL/L (ref 98–107)
CO2 SERPL-SCNC: 24 MMOL/L (ref 22–29)
CREAT BLD-MCNC: 1.14 MG/DL (ref 0.76–1.27)
DEPRECATED RDW RBC AUTO: 51.2 FL (ref 37–54)
EOSINOPHIL # BLD AUTO: 0 10*3/MM3 (ref 0–0.4)
EOSINOPHIL NFR BLD AUTO: 0.5 % (ref 0.3–6.2)
ERYTHROCYTE [DISTWIDTH] IN BLOOD BY AUTOMATED COUNT: 16.8 % (ref 12.3–15.4)
GFR SERPL CREATININE-BSD FRML MDRD: 65 ML/MIN/1.73
GLOBULIN UR ELPH-MCNC: 3.5 GM/DL
GLUCOSE BLD-MCNC: 97 MG/DL (ref 65–99)
HCT VFR BLD AUTO: 49.2 % (ref 37.5–51)
HGB BLD-MCNC: 16 G/DL (ref 13–17.7)
INR PPP: 1.31 (ref 0.9–1.1)
LYMPHOCYTES # BLD AUTO: 0.9 10*3/MM3 (ref 0.7–3.1)
LYMPHOCYTES NFR BLD AUTO: 11.9 % (ref 19.6–45.3)
MCH RBC QN AUTO: 28.4 PG (ref 26.6–33)
MCHC RBC AUTO-ENTMCNC: 32.6 G/DL (ref 31.5–35.7)
MCV RBC AUTO: 87.1 FL (ref 79–97)
MONOCYTES # BLD AUTO: 1.2 10*3/MM3 (ref 0.1–0.9)
MONOCYTES NFR BLD AUTO: 15.5 % (ref 5–12)
NEUTROPHILS # BLD AUTO: 5.6 10*3/MM3 (ref 1.7–7)
NEUTROPHILS NFR BLD AUTO: 71.1 % (ref 42.7–76)
NRBC BLD AUTO-RTO: 0.1 /100 WBC (ref 0–0.2)
NT-PROBNP SERPL-MCNC: 1213 PG/ML (ref 5–900)
PLATELET # BLD AUTO: 146 10*3/MM3 (ref 140–450)
PMV BLD AUTO: 9.5 FL (ref 6–12)
POTASSIUM BLD-SCNC: 3.5 MMOL/L (ref 3.5–5.2)
PROT SERPL-MCNC: 7.4 G/DL (ref 6–8.5)
PROTHROMBIN TIME: 13.1 SECONDS (ref 9.6–11.7)
RBC # BLD AUTO: 5.66 10*6/MM3 (ref 4.14–5.8)
SODIUM BLD-SCNC: 138 MMOL/L (ref 136–145)
WBC NRBC COR # BLD: 7.9 10*3/MM3 (ref 3.4–10.8)

## 2019-11-13 PROCEDURE — 99232 SBSQ HOSP IP/OBS MODERATE 35: CPT | Performed by: INTERNAL MEDICINE

## 2019-11-13 PROCEDURE — 83880 ASSAY OF NATRIURETIC PEPTIDE: CPT | Performed by: NURSE PRACTITIONER

## 2019-11-13 PROCEDURE — 85025 COMPLETE CBC W/AUTO DIFF WBC: CPT | Performed by: NURSE PRACTITIONER

## 2019-11-13 PROCEDURE — 25010000002 FUROSEMIDE PER 20 MG: Performed by: NURSE PRACTITIONER

## 2019-11-13 PROCEDURE — 80053 COMPREHEN METABOLIC PANEL: CPT | Performed by: NURSE PRACTITIONER

## 2019-11-13 PROCEDURE — 85610 PROTHROMBIN TIME: CPT | Performed by: NURSE PRACTITIONER

## 2019-11-13 RX ADMIN — LEVOTHYROXINE SODIUM 25 MCG: 25 TABLET ORAL at 05:53

## 2019-11-13 RX ADMIN — FUROSEMIDE 40 MG: 10 INJECTION, SOLUTION INTRAMUSCULAR; INTRAVENOUS at 05:53

## 2019-11-13 RX ADMIN — Medication 10 ML: at 08:30

## 2019-11-13 RX ADMIN — ATORVASTATIN CALCIUM 10 MG: 10 TABLET, FILM COATED ORAL at 08:30

## 2019-11-13 RX ADMIN — CARVEDILOL 25 MG: 25 TABLET, FILM COATED ORAL at 08:30

## 2019-11-13 RX ADMIN — POTASSIUM CHLORIDE 20 MEQ: 1500 TABLET, EXTENDED RELEASE ORAL at 08:30

## 2019-11-13 RX ADMIN — HYDRALAZINE HYDROCHLORIDE 50 MG: 25 TABLET, FILM COATED ORAL at 08:30

## 2019-11-13 NOTE — NURSING NOTE
"Patient is refusing heart cath. Patient said \"I want to put off the heart cath until next week. Talked and explained to patient that the stress test  was abnormal and the importance of getting a heart cath to see and blockages and possible fix them. Patient verbalized understanding of education. States I will call and make an appointment in about 2 weeks to get a heart cath.     Call placed to Dr. parikh and informed of patients refusal.  And messaged Dr. Stauffer with a response that patient will have to leave AMA. Instructed patient on AMA. Patient verbalized understanding.   "

## 2019-11-13 NOTE — PROGRESS NOTES
Case Management Discharge Note    Final Note: Home-left AMA         Final Discharge Disposition Code: 07 - left AMA

## 2019-11-13 NOTE — PROGRESS NOTES
"Referring Provider: Hospitalist    Reason for follow-up: Shortness of breath and congestive heart failure     Patient Care Team:  Marisol Larson APRN as PCP - General    Subjective .  No chest pain or shortness of breath  Objective  Lying in bed comfortably     Review of Systems   Constitution: Negative for fever and malaise/fatigue.   Cardiovascular: Negative for chest pain, dyspnea on exertion and palpitations.   Respiratory: Negative for cough.    Skin: Negative for rash.   Gastrointestinal: Negative for abdominal pain, nausea and vomiting.   Neurological: Negative for focal weakness and headaches.   All other systems reviewed and are negative.      Toradol [ketorolac tromethamine]    Scheduled Meds:    Continuous Infusions:    No current facility-administered medications for this encounter.   PRN Meds:.        VITAL SIGNS  Vitals:    11/12/19 2123 11/12/19 2139 11/13/19 0130 11/13/19 0536   BP: 147/94 169/94 156/99 150/87   BP Location:  Left arm Right arm Right arm   Patient Position:  Lying Lying Lying   Pulse: 89 91 101 81   Resp:  20 14 14   Temp:  98 °F (36.7 °C) 98 °F (36.7 °C) 99.1 °F (37.3 °C)   TempSrc:  Oral Oral Oral   SpO2:  95% 99% 94%   Weight:    112 kg (247 lb 2.2 oz)   Height:           Flowsheet Rows      First Filed Value   Admission Height  182.9 cm (72\") Documented at 11/07/2019 2146   Admission Weight  124 kg (273 lb 5.9 oz) Documented at 11/07/2019 2146           TELEMETRY: Atrial fibrillation    Physical Exam:  Physical Exam   Constitutional: He appears well-developed and well-nourished.   HENT:   Head: Normocephalic and atraumatic.   Eyes: Conjunctivae are normal. No scleral icterus.   Neck: Normal range of motion. Neck supple. No JVD present. Carotid bruit is not present.   Cardiovascular: Normal rate, S1 normal, S2 normal, normal heart sounds and intact distal pulses. An irregularly irregular rhythm present. PMI is not displaced.   Pulmonary/Chest: Effort normal and breath sounds " normal. He has no wheezes. He has no rales.   Abdominal: Soft. Bowel sounds are normal.   Neurological: He is alert. He has normal strength.   Skin: Skin is warm and dry. No rash noted.        Results Review:   I reviewed the patient's new clinical results.  Lab Results (last 24 hours)     Procedure Component Value Units Date/Time    BNP [[id number]]  (Abnormal) Collected:  11/13/19 0301    Specimen:  Blood Updated:  11/13/19 0359     proBNP 1,213.0 pg/mL     Narrative:       Among patients with dyspnea, NT-proBNP is highly sensitive for the detection of acute congestive heart failure. In addition NT-proBNP of <300 pg/ml effectively rules out acute congestive heart failure with 99% negative predictive value.    Comprehensive Metabolic Panel [[id number]]  (Abnormal) Collected:  11/13/19 0301    Specimen:  Blood Updated:  11/13/19 0353     Glucose 97 mg/dL      BUN 28 mg/dL      Creatinine 1.14 mg/dL      Sodium 138 mmol/L      Potassium 3.5 mmol/L      Chloride 100 mmol/L      CO2 24.0 mmol/L      Calcium 9.3 mg/dL      Total Protein 7.4 g/dL      Albumin 3.90 g/dL      ALT (SGPT) 31 U/L      AST (SGOT) 24 U/L      Alkaline Phosphatase 56 U/L      Total Bilirubin 0.8 mg/dL      eGFR Non African Amer 65 mL/min/1.73      Globulin 3.5 gm/dL      A/G Ratio 1.1 g/dL      BUN/Creatinine Ratio 24.6     Anion Gap 14.0 mmol/L     Narrative:       GFR Normal >60  Chronic Kidney Disease <60  Kidney Failure <15    Protime-INR [[id number]]  (Abnormal) Collected:  11/13/19 0301    Specimen:  Blood Updated:  11/13/19 0347     Protime 13.1 Seconds      INR 1.31    CBC & Differential [[id number]] Collected:  11/13/19 0301    Specimen:  Blood Updated:  11/13/19 0333    Narrative:       The following orders were created for panel order CBC & Differential.  Procedure                               Abnormality         Status                     ---------                               -----------         ------                     CBC Auto  Differential[[id number]]        Abnormal            Final result                 Please view results for these tests on the individual orders.    CBC Auto Differential [[id number]]  (Abnormal) Collected:  11/13/19 0301    Specimen:  Blood Updated:  11/13/19 0333     WBC 7.90 10*3/mm3      RBC 5.66 10*6/mm3      Hemoglobin 16.0 g/dL      Hematocrit 49.2 %      MCV 87.1 fL      MCH 28.4 pg      MCHC 32.6 g/dL      RDW 16.8 %      RDW-SD 51.2 fl      MPV 9.5 fL      Platelets 146 10*3/mm3      Neutrophil % 71.1 %      Lymphocyte % 11.9 %      Monocyte % 15.5 %      Eosinophil % 0.5 %      Basophil % 1.0 %      Neutrophils, Absolute 5.60 10*3/mm3      Lymphocytes, Absolute 0.90 10*3/mm3      Monocytes, Absolute 1.20 10*3/mm3      Eosinophils, Absolute 0.00 10*3/mm3      Basophils, Absolute 0.10 10*3/mm3      nRBC 0.1 /100 WBC           Imaging Results (Last 24 Hours)     ** No results found for the last 24 hours. **          EKG      I personally viewed and interpreted the patient's EKG/Telemetry data:    ECHOCARDIOGRAM:    STRESS MYOVIEW:    CARDIAC CATHETERIZATION:    OTHER:         Assessment/Plan     #1 shortness of breath  2.  Congestive heart failure  3.  Coronary artery disease  4.  Chronic atrial fibrillation  5.  Hypertension  6.  Chronic renal insufficiency  7.  Hyperlipidemia  8.  Medical noncompliance    Patient presented with shortness of breath and has congestive heart failure  Patient is ruled out for MI by EKG and enzymes  Patient had an echocardiogram which showed LV dysfunction.  He had a stress Myoview which is abnormal  Patient needs cardiac catheterization because of his abnormal stress Myoview study  Patient was explained the risks and benefits  Patient had changed his decision several times whether he should have his cardiac catheterization or not.  Patient finally is leaving AGAINST MEDICAL ADVICE.  He understands the risks and benefits of cardiac catheterization.  He also understands the risk of  stroke MI and death.  Patient is currently on adequate medical therapy.  Patient cannot take ACE inhibitors because of renal insufficiency  I discussed the patients findings and my recommendations with patient and nurse    Edy Thomas MD  11/13/19  3:24 PM

## 2019-11-13 NOTE — PLAN OF CARE
Problem: Fluid Volume Excess (Adult)  Goal: Identify Related Risk Factors and Signs and Symptoms  Outcome: Ongoing (interventions implemented as appropriate)    Goal: Optimal Fluid Balance  Outcome: Ongoing (interventions implemented as appropriate)      Problem: Hypertensive Disease/Crisis (Arterial) (Adult)  Goal: Signs and Symptoms of Listed Potential Problems Will be Absent, Minimized or Managed (Hypertensive Disease/Crisis)  Outcome: Ongoing (interventions implemented as appropriate)      Problem: Fall Risk (Adult)  Goal: Identify Related Risk Factors and Signs and Symptoms  Outcome: Ongoing (interventions implemented as appropriate)    Goal: Absence of Fall  Outcome: Ongoing (interventions implemented as appropriate)      Problem: Skin Injury Risk (Adult)  Goal: Identify Related Risk Factors and Signs and Symptoms  Outcome: Ongoing (interventions implemented as appropriate)    Goal: Skin Health and Integrity  Outcome: Ongoing (interventions implemented as appropriate)      Problem: Cardiac: ACS (Acute Coronary Syndrome) (Adult)  Goal: Signs and Symptoms of Listed Potential Problems Will be Absent, Minimized or Managed (Cardiac: ACS)  Outcome: Ongoing (interventions implemented as appropriate)         [facility]

## 2019-11-13 NOTE — PROGRESS NOTES
LOS: 5 days   Patient Care Team:  Marisol Larson APRN as PCP - General    Subjective:  More alert and oriented    Objective:   Afebrile      Review of Systems:   Review of Systems   Constitutional: Negative.    HENT: Negative.    Respiratory: Negative.    Cardiovascular: Positive for palpitations.   Gastrointestinal: Negative.    Genitourinary: Negative.    Neurological: Negative.            Vital Signs  Temp:  [97.4 °F (36.3 °C)-99.1 °F (37.3 °C)] 99.1 °F (37.3 °C)  Heart Rate:  [] 81  Resp:  [14-20] 14  BP: (140-169)/(71-99) 150/87    Physical Exam:  Physical Exam   Constitutional: He is oriented to person, place, and time. He appears well-developed and well-nourished.   HENT:   Head: Normocephalic and atraumatic.   Eyes: Pupils are equal, round, and reactive to light.   Neck: Normal range of motion.   Cardiovascular: Regular rhythm.   Pulmonary/Chest: Effort normal.   Musculoskeletal: Normal range of motion.   Neurological: He is alert and oriented to person, place, and time.   Skin: Skin is warm.   Nursing note and vitals reviewed.       Radiology:  Xr Chest 1 View    Result Date: 11/7/2019  1. Cardiomegaly and central pulmonary vascular congestion. 2. Clear lungs.  Electronically Signed By-Fareed Garcia On:11/7/2019 10:30 PM This report was finalized on [id number] by  Fareed Garcia, .         Results Review:     I reviewed the patient's new clinical results.  I reviewed the patient's new imaging results and agree with the interpretation.    Medication Review:   Scheduled Meds:  atorvastatin 10 mg Oral Daily   carvedilol 25 mg Oral BID With Meals   enoxaparin 120 mg Subcutaneous Q12H   furosemide 40 mg Intravenous Q12H   hydrALAZINE 50 mg Oral BID   levothyroxine 25 mcg Oral Q AM   potassium chloride 20 mEq Oral BID   sodium chloride 10 mL Intravenous Q12H   warfarin 6 mg Oral Daily     Continuous Infusions:  niCARdipine 5-15 mg/hr Last Rate: Stopped (11/08/19 2000)   Pharmacy to dose warfarin        PRN Meds:.hydrALAZINE  •  influenza vaccine  •  LORazepam  •  [COMPLETED] Insert peripheral IV **AND** sodium chloride  •  sodium chloride  •  sodium chloride    Labs:    CBC    Results from last 7 days   Lab Units 11/13/19  0301 11/12/19  0246 11/11/19  0529 11/10/19  1808 11/10/19  0240 11/09/19  0311 11/07/19  2336   WBC 10*3/mm3 7.90 8.50 9.50 7.40 7.70 8.50 10.30   HEMOGLOBIN g/dL 16.0 16.1 16.4 15.2 14.2 13.5 14.6   PLATELETS 10*3/mm3 146 165 159 182 169 165 155     BMP Results from last 7 days   Lab Units 11/13/19 0301 11/12/19  0722 11/11/19  0529 11/10/19  1808 11/10/19  0240 11/09/19  0311 11/08/19  1702 11/08/19  1443   SODIUM mmol/L 138 138 138 142 142 142  --  142   POTASSIUM mmol/L 3.5 3.7 4.0 3.8 3.3* 3.5  --  3.1*   CHLORIDE mmol/L 100 100 102 102 104 106  --  102   CO2 mmol/L 24.0 24.0 23.0 25.0 26.0 25.0  --  26.0   BUN mg/dL 28* 23 26* 27* 24* 25*  --  24*   CREATININE mg/dL 1.14 1.10 1.18 1.17 1.17 1.14  --  1.04   GLUCOSE mg/dL 97 106* 113* 135* 111* 101*  --  123*   MAGNESIUM mg/dL  --  2.3 2.3  --  2.0 1.9 1.8 1.9  1.9   PHOSPHORUS mg/dL  --   --   --   --   --  3.6  --   --      Cr Clearance Estimated Creatinine Clearance: 85.7 mL/min (by C-G formula based on SCr of 1.14 mg/dL).  Coag   Results from last 7 days   Lab Units 11/13/19  0301 11/12/19  0246 11/11/19  0529 11/10/19  0240 11/09/19  0311 11/08/19  1443   INR  1.31* 1.24* 1.17* 1.17* 1.24* 1.22*   APTT seconds  --  24.6  --   --   --   --      HbA1C   Lab Results   Component Value Date    HGBA1C 5.1 11/09/2019    HGBA1C 5.6 10/10/2018     Blood Glucose No results found for: POCGLU  Infection     CMP Results from last 7 days   Lab Units 11/13/19  0301 11/12/19  0722 11/11/19  0529 11/10/19  1808 11/10/19  0240 11/09/19  0311 11/08/19  1443   SODIUM mmol/L 138 138 138 142 142 142 142   POTASSIUM mmol/L 3.5 3.7 4.0 3.8 3.3* 3.5 3.1*   CHLORIDE mmol/L 100 100 102 102 104 106 102   CO2 mmol/L 24.0 24.0 23.0 25.0 26.0 25.0 26.0   BUN  mg/dL 28* 23 26* 27* 24* 25* 24*   CREATININE mg/dL 1.14 1.10 1.18 1.17 1.17 1.14 1.04   GLUCOSE mg/dL 97 106* 113* 135* 111* 101* 123*   ALBUMIN g/dL 3.90 4.00 4.00  --  3.90  --   --    BILIRUBIN mg/dL 0.8 1.1 0.9  --  0.7  --   --    ALK PHOS U/L 56 58 59  --  52  --   --    AST (SGOT) U/L 24 28 24  --  18  --   --    ALT (SGPT) U/L 31 31 23  --  20  --   --      UA      Radiology(recent) No radiology results for the last day   Assessment:      Shortness of breath    Hypertensive emergency    Chronic atrial fibrillation    Chronic systolic CHF with acute exacerbation (CHFrEF) (CMS/[facility])    CKD (chronic kidney disease) stage 3, GFR 30-59 ml/min (CMS/[facility])    Essential hypertension    Gambling disorder, persistent    Coronary artery disease involving native coronary artery of native heart without angina pectoris    GERD (gastroesophageal reflux disease)    Medically noncompliant    Mental status changes with acute toxic metabolic encephalopathy    Plan:    Coronary catheterization today      Dre Stauffer MD  11/13/19  7:58 AM

## 2019-11-14 NOTE — PAYOR COMM NOTE
"Discharge notice for case# [id number]    Patient left AMA on 11/13.  No MD d/c summary available.   --------------------------------    AUTHORIZATION PENDING:   PLEASE FAX OR CALL DETERMINATION TO CONTACT BELOW:     THANK YOU,  KATIUSKA Hansen, RN  Utilization Review  [facility]  Phone: [phone]  Fax: [phone]          Carlos Whipple (64 y.o. Male)     Date of Birth Social Security Number Address Home Phone MRN    1955  [id number] [address] [phone] [id number]    Congregation Marital Status          [employer] Single       Admission Date Admission Type Admitting Provider Attending Provider Department, Room/Bed    11/7/19 Emergency Dre Stauffer MD  [facility], 2124/1    Discharge Date Discharge Disposition Discharge Destination        11/13/2019 Left Against Medical Advice              Attending Provider:  (none)   Allergies:  Toradol [Ketorolac Tromethamine]    Isolation:  None   Infection:  None   Code Status:  Prior    Ht:  182.9 cm (72\")   Wt:  112 kg (247 lb 2.2 oz)    Admission Cmt:  None   Principal Problem:  Shortness of breath [R06.02]                 Active Insurance as of 11/7/2019     Primary Coverage     Payor Plan Insurance Group Employer/Plan Group    [facility] MEDICARE REPLACEMENT AARP MEDICARE COMPLETE [id number]     Payor Plan Address Payor Plan Phone Number Payor Plan Fax Number Effective Dates    [facility] [phone]  4/1/2019 - None Entered    [address]       [facility] [address]       Subscriber Name Subscriber Birth Date Member ID       CARLOS WHIPPLE 1955 [id number]                 Emergency Contacts      (Rel.) Home Phone Work Phone Mobile Phone    KACYE DOW (Friend) [phone] -- [phone]               Physician Progress Notes (last 72 hours) (Notes from 11/11/19 0845 through 11/14/19 0845)      Edy Thomas MD at 11/13/19 1310          Referring Provider: " "Hospitalist    Reason for follow-up: Shortness of breath and congestive heart failure     Patient Care Team:  Marisol Larson APRN as PCP - General    Subjective .  No chest pain or shortness of breath  Objective  Lying in bed comfortably     Review of Systems   Constitution: Negative for fever and malaise/fatigue.   Cardiovascular: Negative for chest pain, dyspnea on exertion and palpitations.   Respiratory: Negative for cough.    Skin: Negative for rash.   Gastrointestinal: Negative for abdominal pain, nausea and vomiting.   Neurological: Negative for focal weakness and headaches.   All other systems reviewed and are negative.      Toradol [ketorolac tromethamine]    Scheduled Meds:    Continuous Infusions:    No current facility-administered medications for this encounter.   PRN Meds:.        VITAL SIGNS  Vitals:    11/12/19 2123 11/12/19 2139 11/13/19 0130 11/13/19 0536   BP: 147/94 169/94 156/99 150/87   BP Location:  Left arm Right arm Right arm   Patient Position:  Lying Lying Lying   Pulse: 89 91 101 81   Resp:  20 14 14   Temp:  98 °F (36.7 °C) 98 °F (36.7 °C) 99.1 °F (37.3 °C)   TempSrc:  Oral Oral Oral   SpO2:  95% 99% 94%   Weight:    112 kg (247 lb 2.2 oz)   Height:           Flowsheet Rows      First Filed Value   Admission Height  182.9 cm (72\") Documented at 11/07/2019 2146   Admission Weight  124 kg (273 lb 5.9 oz) Documented at 11/07/2019 2146           TELEMETRY: Atrial fibrillation    Physical Exam:  Physical Exam   Constitutional: He appears well-developed and well-nourished.   HENT:   Head: Normocephalic and atraumatic.   Eyes: Conjunctivae are normal. No scleral icterus.   Neck: Normal range of motion. Neck supple. No JVD present. Carotid bruit is not present.   Cardiovascular: Normal rate, S1 normal, S2 normal, normal heart sounds and intact distal pulses. An irregularly irregular rhythm present. PMI is not displaced.   Pulmonary/Chest: Effort normal and breath sounds normal. He has no " wheezes. He has no rales.   Abdominal: Soft. Bowel sounds are normal.   Neurological: He is alert. He has normal strength.   Skin: Skin is warm and dry. No rash noted.        Results Review:   I reviewed the patient's new clinical results.  Lab Results (last 24 hours)     Procedure Component Value Units Date/Time    BNP [[id number]]  (Abnormal) Collected:  11/13/19 0301    Specimen:  Blood Updated:  11/13/19 0359     proBNP 1,213.0 pg/mL     Narrative:       Among patients with dyspnea, NT-proBNP is highly sensitive for the detection of acute congestive heart failure. In addition NT-proBNP of <300 pg/ml effectively rules out acute congestive heart failure with 99% negative predictive value.    Comprehensive Metabolic Panel [[id number]]  (Abnormal) Collected:  11/13/19 0301    Specimen:  Blood Updated:  11/13/19 0353     Glucose 97 mg/dL      BUN 28 mg/dL      Creatinine 1.14 mg/dL      Sodium 138 mmol/L      Potassium 3.5 mmol/L      Chloride 100 mmol/L      CO2 24.0 mmol/L      Calcium 9.3 mg/dL      Total Protein 7.4 g/dL      Albumin 3.90 g/dL      ALT (SGPT) 31 U/L      AST (SGOT) 24 U/L      Alkaline Phosphatase 56 U/L      Total Bilirubin 0.8 mg/dL      eGFR Non African Amer 65 mL/min/1.73      Globulin 3.5 gm/dL      A/G Ratio 1.1 g/dL      BUN/Creatinine Ratio 24.6     Anion Gap 14.0 mmol/L     Narrative:       GFR Normal >60  Chronic Kidney Disease <60  Kidney Failure <15    Protime-INR [[id number]]  (Abnormal) Collected:  11/13/19 0301    Specimen:  Blood Updated:  11/13/19 0347     Protime 13.1 Seconds      INR 1.31    CBC & Differential [[id number]] Collected:  11/13/19 0301    Specimen:  Blood Updated:  11/13/19 0333    Narrative:       The following orders were created for panel order CBC & Differential.  Procedure                               Abnormality         Status                     ---------                               -----------         ------                     CBC Auto  Differential[[id number]]        Abnormal            Final result                 Please view results for these tests on the individual orders.    CBC Auto Differential [[id number]]  (Abnormal) Collected:  11/13/19 0301    Specimen:  Blood Updated:  11/13/19 0333     WBC 7.90 10*3/mm3      RBC 5.66 10*6/mm3      Hemoglobin 16.0 g/dL      Hematocrit 49.2 %      MCV 87.1 fL      MCH 28.4 pg      MCHC 32.6 g/dL      RDW 16.8 %      RDW-SD 51.2 fl      MPV 9.5 fL      Platelets 146 10*3/mm3      Neutrophil % 71.1 %      Lymphocyte % 11.9 %      Monocyte % 15.5 %      Eosinophil % 0.5 %      Basophil % 1.0 %      Neutrophils, Absolute 5.60 10*3/mm3      Lymphocytes, Absolute 0.90 10*3/mm3      Monocytes, Absolute 1.20 10*3/mm3      Eosinophils, Absolute 0.00 10*3/mm3      Basophils, Absolute 0.10 10*3/mm3      nRBC 0.1 /100 WBC           Imaging Results (Last 24 Hours)     ** No results found for the last 24 hours. **          EKG      I personally viewed and interpreted the patient's EKG/Telemetry data:    ECHOCARDIOGRAM:    STRESS MYOVIEW:    CARDIAC CATHETERIZATION:    OTHER:         Assessment/Plan     #1 shortness of breath  2.  Congestive heart failure  3.  Coronary artery disease  4.  Chronic atrial fibrillation  5.  Hypertension  6.  Chronic renal insufficiency  7.  Hyperlipidemia  8.  Medical noncompliance    Patient presented with shortness of breath and has congestive heart failure  Patient is ruled out for MI by EKG and enzymes  Patient had an echocardiogram which showed LV dysfunction.  He had a stress Myoview which is abnormal  Patient needs cardiac catheterization because of his abnormal stress Myoview study  Patient was explained the risks and benefits  Patient had changed his decision several times whether he should have his cardiac catheterization or not.  Patient finally is leaving AGAINST MEDICAL ADVICE.  He understands the risks and benefits of cardiac catheterization.  He also understands the risk of  stroke MI and death.  Patient is currently on adequate medical therapy.  Patient cannot take ACE inhibitors because of renal insufficiency  I discussed the patients findings and my recommendations with patient and nurse    Edy Thomas MD  11/13/19  3:24 PM                Electronically signed by Edy Thomas MD at 11/13/19 1527     Dre Stauffer MD at 11/13/19 0514               LOS: 5 days   Patient Care Team:  Marisol Larson APRN as PCP - General    Subjective:  More alert and oriented    Objective:   Afebrile      Review of Systems:   Review of Systems   Constitutional: Negative.    HENT: Negative.    Respiratory: Negative.    Cardiovascular: Positive for palpitations.   Gastrointestinal: Negative.    Genitourinary: Negative.    Neurological: Negative.            Vital Signs  Temp:  [97.4 °F (36.3 °C)-99.1 °F (37.3 °C)] 99.1 °F (37.3 °C)  Heart Rate:  [] 81  Resp:  [14-20] 14  BP: (140-169)/(71-99) 150/87    Physical Exam:  Physical Exam   Constitutional: He is oriented to person, place, and time. He appears well-developed and well-nourished.   HENT:   Head: Normocephalic and atraumatic.   Eyes: Pupils are equal, round, and reactive to light.   Neck: Normal range of motion.   Cardiovascular: Regular rhythm.   Pulmonary/Chest: Effort normal.   Musculoskeletal: Normal range of motion.   Neurological: He is alert and oriented to person, place, and time.   Skin: Skin is warm.   Nursing note and vitals reviewed.       Radiology:  Xr Chest 1 View    Result Date: 11/7/2019  1. Cardiomegaly and central pulmonary vascular congestion. 2. Clear lungs.  Electronically Signed By-Fareed Garcia On:11/7/2019 10:30 PM This report was finalized on [id number] by  Fareed Garcia, .         Results Review:     I reviewed the patient's new clinical results.  I reviewed the patient's new imaging results and agree with the interpretation.    Medication Review:   Scheduled Meds:  atorvastatin 10 mg Oral Daily   carvedilol 25  mg Oral BID With Meals   enoxaparin 120 mg Subcutaneous Q12H   furosemide 40 mg Intravenous Q12H   hydrALAZINE 50 mg Oral BID   levothyroxine 25 mcg Oral Q AM   potassium chloride 20 mEq Oral BID   sodium chloride 10 mL Intravenous Q12H   warfarin 6 mg Oral Daily     Continuous Infusions:  niCARdipine 5-15 mg/hr Last Rate: Stopped (11/08/19 2000)   Pharmacy to dose warfarin       PRN Meds:.hydrALAZINE  •  influenza vaccine  •  LORazepam  •  [COMPLETED] Insert peripheral IV **AND** sodium chloride  •  sodium chloride  •  sodium chloride    Labs:    CBC    Results from last 7 days   Lab Units 11/13/19  0301 11/12/19  0246 11/11/19  0529 11/10/19  1808 11/10/19  0240 11/09/19  0311 11/07/19  2336   WBC 10*3/mm3 7.90 8.50 9.50 7.40 7.70 8.50 10.30   HEMOGLOBIN g/dL 16.0 16.1 16.4 15.2 14.2 13.5 14.6   PLATELETS 10*3/mm3 146 165 159 182 169 165 155     BMP Results from last 7 days   Lab Units 11/13/19  0301 11/12/19  0722 11/11/19  0529 11/10/19  1808 11/10/19  0240 11/09/19  0311 11/08/19  1702 11/08/19  1443   SODIUM mmol/L 138 138 138 142 142 142  --  142   POTASSIUM mmol/L 3.5 3.7 4.0 3.8 3.3* 3.5  --  3.1*   CHLORIDE mmol/L 100 100 102 102 104 106  --  102   CO2 mmol/L 24.0 24.0 23.0 25.0 26.0 25.0  --  26.0   BUN mg/dL 28* 23 26* 27* 24* 25*  --  24*   CREATININE mg/dL 1.14 1.10 1.18 1.17 1.17 1.14  --  1.04   GLUCOSE mg/dL 97 106* 113* 135* 111* 101*  --  123*   MAGNESIUM mg/dL  --  2.3 2.3  --  2.0 1.9 1.8 1.9  1.9   PHOSPHORUS mg/dL  --   --   --   --   --  3.6  --   --      Cr Clearance Estimated Creatinine Clearance: 85.7 mL/min (by C-G formula based on SCr of 1.14 mg/dL).  Coag   Results from last 7 days   Lab Units 11/13/19  0301 11/12/19  0246 11/11/19  0529 11/10/19  0240 11/09/19  0311 11/08/19  1443   INR  1.31* 1.24* 1.17* 1.17* 1.24* 1.22*   APTT seconds  --  24.6  --   --   --   --      HbA1C   Lab Results   Component Value Date    HGBA1C 5.1 11/09/2019    HGBA1C 5.6 10/10/2018     Blood Glucose No  results found for: POCGLU  Infection     CMP Results from last 7 days   Lab Units 11/13/19  0301 11/12/19  0722 11/11/19  0529 11/10/19  1808 11/10/19  0240 11/09/19  0311 11/08/19  1443   SODIUM mmol/L 138 138 138 142 142 142 142   POTASSIUM mmol/L 3.5 3.7 4.0 3.8 3.3* 3.5 3.1*   CHLORIDE mmol/L 100 100 102 102 104 106 102   CO2 mmol/L 24.0 24.0 23.0 25.0 26.0 25.0 26.0   BUN mg/dL 28* 23 26* 27* 24* 25* 24*   CREATININE mg/dL 1.14 1.10 1.18 1.17 1.17 1.14 1.04   GLUCOSE mg/dL 97 106* 113* 135* 111* 101* 123*   ALBUMIN g/dL 3.90 4.00 4.00  --  3.90  --   --    BILIRUBIN mg/dL 0.8 1.1 0.9  --  0.7  --   --    ALK PHOS U/L 56 58 59  --  52  --   --    AST (SGOT) U/L 24 28 24  --  18  --   --    ALT (SGPT) U/L 31 31 23  --  20  --   --      UA      Radiology(recent) No radiology results for the last day   Assessment:      Shortness of breath    Hypertensive emergency    Chronic atrial fibrillation    Chronic systolic CHF with acute exacerbation (CHFrEF) (CMS/[facility])    CKD (chronic kidney disease) stage 3, GFR 30-59 ml/min (CMS/[facility])    Essential hypertension    Gambling disorder, persistent    Coronary artery disease involving native coronary artery of native heart without angina pectoris    GERD (gastroesophageal reflux disease)    Medically noncompliant    Mental status changes with acute toxic metabolic encephalopathy    Plan:    Coronary catheterization today      Dre Stauffer MD  11/13/19  7:58 AM          Electronically signed by Dre Stauffer MD at 11/13/19 3631     Edy Thomas MD at 11/12/19 7367          Referring Provider: Hospitalist    Reason for follow-up: Shortness of breath and congestive heart failure     Patient Care Team:  Marisol Larson APRN as PCP - General    Subjective .  No chest pain or shortness of breath  Objective  Lying in bed comfortably     Review of Systems   Constitution: Negative for fever and malaise/fatigue.   Cardiovascular: Negative for chest pain, dyspnea on exertion and  "palpitations.   Respiratory: Negative for cough.    Skin: Negative for rash.   Gastrointestinal: Negative for abdominal pain, nausea and vomiting.   Neurological: Negative for focal weakness and headaches.   All other systems reviewed and are negative.      Toradol [ketorolac tromethamine]    Scheduled Meds:    atorvastatin 10 mg Oral Daily   carvedilol 25 mg Oral BID With Meals   enoxaparin 120 mg Subcutaneous Q12H   furosemide 40 mg Intravenous Q12H   hydrALAZINE 50 mg Oral BID   levothyroxine 25 mcg Oral Q AM   potassium chloride 20 mEq Oral BID   sodium chloride 10 mL Intravenous Q12H   warfarin 6 mg Oral Daily     Continuous Infusions:    niCARdipine 5-15 mg/hr Last Rate: Stopped (11/08/19 2000)   Pharmacy to dose warfarin       PRN Meds:.hydrALAZINE  •  influenza vaccine  •  LORazepam  •  [COMPLETED] Insert peripheral IV **AND** sodium chloride  •  sodium chloride  •  sodium chloride        VITAL SIGNS  Vitals:    11/12/19 0205 11/12/19 0423 11/12/19 0553 11/12/19 1021   BP: (!) 170/107  161/86 158/88   BP Location:   Left arm    Patient Position:   Lying    Pulse: 94  113 106   Resp:   16 17   Temp:   98.8 °F (37.1 °C) 98.4 °F (36.9 °C)   TempSrc:   Oral Oral   SpO2: 97%  95% 93%   Weight:  112 kg (247 lb 2.2 oz)     Height:           Flowsheet Rows      First Filed Value   Admission Height  182.9 cm (72\") Documented at 11/07/2019 2146   Admission Weight  124 kg (273 lb 5.9 oz) Documented at 11/07/2019 2146           TELEMETRY: Atrial fibrillation    Physical Exam:  Physical Exam   Constitutional: He appears well-developed and well-nourished.   HENT:   Head: Normocephalic and atraumatic.   Eyes: Conjunctivae are normal. No scleral icterus.   Neck: Normal range of motion. Neck supple. No JVD present. Carotid bruit is not present.   Cardiovascular: Normal rate, S1 normal, S2 normal, normal heart sounds and intact distal pulses. An irregularly irregular rhythm present. PMI is not displaced.   Pulmonary/Chest: " Effort normal and breath sounds normal. He has no wheezes. He has no rales.   Abdominal: Soft. Bowel sounds are normal.   Neurological: He is alert. He has normal strength.   Skin: Skin is warm and dry. No rash noted.        Results Review:   I reviewed the patient's new clinical results.  Lab Results (last 24 hours)     Procedure Component Value Units Date/Time    Magnesium [[id number]]  (Normal) Collected:  11/12/19 0722    Specimen:  Blood Updated:  11/12/19 0826     Magnesium 2.3 mg/dL     Comprehensive Metabolic Panel [[id number]]  (Abnormal) Collected:  11/12/19 0722    Specimen:  Blood Updated:  11/12/19 0826     Glucose 106 mg/dL      BUN 23 mg/dL      Creatinine 1.10 mg/dL      Sodium 138 mmol/L      Potassium 3.7 mmol/L      Chloride 100 mmol/L      CO2 24.0 mmol/L      Calcium 9.6 mg/dL      Total Protein 7.6 g/dL      Albumin 4.00 g/dL      ALT (SGPT) 31 U/L      AST (SGOT) 28 U/L      Alkaline Phosphatase 58 U/L      Total Bilirubin 1.1 mg/dL      eGFR Non African Amer 68 mL/min/1.73      Globulin 3.6 gm/dL      A/G Ratio 1.1 g/dL      BUN/Creatinine Ratio 20.9     Anion Gap 14.0 mmol/L     Narrative:       GFR Normal >60  Chronic Kidney Disease <60  Kidney Failure <15    BNP [[id number]]  (Abnormal) Collected:  11/12/19 0722    Specimen:  Blood Updated:  11/12/19 0820     proBNP 1,711.0 pg/mL     Narrative:       Among patients with dyspnea, NT-proBNP is highly sensitive for the detection of acute congestive heart failure. In addition NT-proBNP of <300 pg/ml effectively rules out acute congestive heart failure with 99% negative predictive value.    Protime-INR [[id number]]  (Abnormal) Collected:  11/12/19 0246    Specimen:  Blood Updated:  11/12/19 0353     Protime 12.5 Seconds      INR 1.24    aPTT [[id number]]  (Normal) Collected:  11/12/19 0246    Specimen:  Blood Updated:  11/12/19 0353     PTT 24.6 seconds     CBC & Differential [[id number]] Collected:  11/12/19 0246    Specimen:  Blood Updated:   11/12/19 0347    Narrative:       The following orders were created for panel order CBC & Differential.  Procedure                               Abnormality         Status                     ---------                               -----------         ------                     CBC Auto Differential[[id number]]        Abnormal            Final result                 Please view results for these tests on the individual orders.    CBC Auto Differential [[id number]]  (Abnormal) Collected:  11/12/19 0246    Specimen:  Blood Updated:  11/12/19 0347     WBC 8.50 10*3/mm3      RBC 5.35 10*6/mm3      Hemoglobin 16.1 g/dL      Hematocrit 46.2 %      MCV 86.2 fL      MCH 30.0 pg      MCHC 34.9 g/dL      RDW 17.2 %      RDW-SD 52.1 fl      MPV 8.8 fL      Platelets 165 10*3/mm3      Neutrophil % 74.5 %      Lymphocyte % 11.9 %      Monocyte % 12.2 %      Eosinophil % 1.0 %      Basophil % 0.4 %      Neutrophils, Absolute 6.30 10*3/mm3      Lymphocytes, Absolute 1.00 10*3/mm3      Monocytes, Absolute 1.00 10*3/mm3      Eosinophils, Absolute 0.10 10*3/mm3      Basophils, Absolute 0.00 10*3/mm3      nRBC 0.1 /100 WBC           Imaging Results (Last 24 Hours)     ** No results found for the last 24 hours. **          EKG      I personally viewed and interpreted the patient's EKG/Telemetry data:    ECHOCARDIOGRAM:    STRESS MYOVIEW:    CARDIAC CATHETERIZATION:    OTHER:         Assessment/Plan     #1 shortness of breath  2.  Congestive heart failure  3.  Coronary artery disease  4.  Chronic atrial fibrillation  5.  Hypertension  6.  Chronic renal insufficiency  7.  Hyperlipidemia  8.  Medical noncompliance    Patient presented with shortness of breath and has congestive heart failure  Patient is ruled out for MI by EKG and enzymes  Patient had an echocardiogram which showed LV dysfunction.  He had a stress Myoview which is abnormal  Patient will have a cardiac catheterization performed.  Discussed with patient about procedure risks  and benefits  Patient wants to think about it in the meantime has some mild mental status changes and hence the procedure has been postponed.  Patient was not taking any medicines and was medically noncompliant  Patient is restarted on medications including warfarin Coreg atorvastatin and hydralazine  Patient cannot take ACE inhibitors because of renal insufficiency  I discussed the patients findings and my recommendations with patient and nurse    Edy Thomas MD  11/12/19  1:12 PM                Electronically signed by Edy Thomas MD at 11/12/19 1312     Dre Stauffer MD at 11/12/19 0892               LOS: 4 days   Patient Care Team:  Marisol Larson APRN as PCP - General    Subjective:  Overall    Objective:   Afebrile    Review of Systems:   Review of Systems   HENT: Negative.    Respiratory: Positive for chest tightness and shortness of breath.    Cardiovascular: Positive for chest pain and palpitations.   Gastrointestinal: Negative.    Genitourinary: Negative.    Musculoskeletal: Positive for arthralgias and back pain.   Neurological: Positive for weakness.           Vital Signs  Temp:  [97.7 °F (36.5 °C)-99.5 °F (37.5 °C)] 98.8 °F (37.1 °C)  Heart Rate:  [] 113  Resp:  [12-20] 16  BP: (147-170)/() 161/86    Physical Exam:  Physical Exam   Constitutional: He is oriented to person, place, and time. He appears well-developed and well-nourished.   HENT:   Head: Normocephalic and atraumatic.   Eyes: Pupils are equal, round, and reactive to light.   Neck: Normal range of motion.   Cardiovascular: Normal heart sounds.   Pulmonary/Chest: Effort normal.   Abdominal: Soft.   Musculoskeletal: Normal range of motion.   Neurological: He is alert and oriented to person, place, and time.   Skin: Skin is warm.   Nursing note and vitals reviewed.       Radiology:  Xr Chest 1 View    Result Date: 11/7/2019  1. Cardiomegaly and central pulmonary vascular congestion. 2. Clear lungs.  Electronically Signed  By-Fareed Garcia On:11/7/2019 10:30 PM This report was finalized on [id number] by  Fareed Garcia, .         Results Review:     I reviewed the patient's new clinical results.  I reviewed the patient's new imaging results and agree with the interpretation.    Medication Review:   Scheduled Meds:  atorvastatin 10 mg Oral Daily   carvedilol 25 mg Oral BID With Meals   enoxaparin 120 mg Subcutaneous Q12H   furosemide 40 mg Intravenous Q12H   hydrALAZINE 50 mg Oral BID   levothyroxine 25 mcg Oral Q AM   potassium chloride 20 mEq Oral BID   sodium chloride 10 mL Intravenous Q12H   warfarin 6 mg Oral Daily     Continuous Infusions:  niCARdipine 5-15 mg/hr Last Rate: Stopped (11/08/19 2000)   Pharmacy to dose warfarin       PRN Meds:.hydrALAZINE  •  influenza vaccine  •  LORazepam  •  [COMPLETED] Insert peripheral IV **AND** sodium chloride  •  sodium chloride  •  sodium chloride    Labs:    CBC    Results from last 7 days   Lab Units 11/12/19  0246 11/11/19  0529 11/10/19  1808 11/10/19  0240 11/09/19  0311 11/07/19  2336   WBC 10*3/mm3 8.50 9.50 7.40 7.70 8.50 10.30   HEMOGLOBIN g/dL 16.1 16.4 15.2 14.2 13.5 14.6   PLATELETS 10*3/mm3 165 159 182 169 165 155     BMP Results from last 7 days   Lab Units 11/12/19  0722 11/11/19  0529 11/10/19  1808 11/10/19  0240 11/09/19  0311 11/08/19  1702 11/08/19  1443 11/07/19  2336   SODIUM mmol/L 138 138 142 142 142  --  142 145   POTASSIUM mmol/L 3.7 4.0 3.8 3.3* 3.5  --  3.1* 3.8   CHLORIDE mmol/L 100 102 102 104 106  --  102 109*   CO2 mmol/L 24.0 23.0 25.0 26.0 25.0  --  26.0 22.0   BUN mg/dL 23 26* 27* 24* 25*  --  24* 27*   CREATININE mg/dL 1.10 1.18 1.17 1.17 1.14  --  1.04 1.18   GLUCOSE mg/dL 106* 113* 135* 111* 101*  --  123* 119*   MAGNESIUM mg/dL 2.3 2.3  --  2.0 1.9 1.8 1.9  1.9  --    PHOSPHORUS mg/dL  --   --   --   --  3.6  --   --   --      Cr Clearance Estimated Creatinine Clearance: 88.9 mL/min (by C-G formula based on SCr of 1.1 mg/dL).  Coag   Results from last  7 days   Lab Units 11/12/19  0246 11/11/19  0529 11/10/19  0240 11/09/19  0311 11/08/19  1443   INR  1.24* 1.17* 1.17* 1.24* 1.22*   APTT seconds 24.6  --   --   --   --      HbA1C   Lab Results   Component Value Date    HGBA1C 5.1 11/09/2019    HGBA1C 5.6 10/10/2018     Blood Glucose No results found for: POCGLU  Infection     CMP Results from last 7 days   Lab Units 11/12/19  0722 11/11/19  0529 11/10/19  1808 11/10/19  0240 11/09/19  0311 11/08/19  1443 11/07/19  2336   SODIUM mmol/L 138 138 142 142 142 142 145   POTASSIUM mmol/L 3.7 4.0 3.8 3.3* 3.5 3.1* 3.8   CHLORIDE mmol/L 100 102 102 104 106 102 109*   CO2 mmol/L 24.0 23.0 25.0 26.0 25.0 26.0 22.0   BUN mg/dL 23 26* 27* 24* 25* 24* 27*   CREATININE mg/dL 1.10 1.18 1.17 1.17 1.14 1.04 1.18   GLUCOSE mg/dL 106* 113* 135* 111* 101* 123* 119*   ALBUMIN g/dL 4.00 4.00  --  3.90  --   --   --    BILIRUBIN mg/dL 1.1 0.9  --  0.7  --   --   --    ALK PHOS U/L 58 59  --  52  --   --   --    AST (SGOT) U/L 28 24  --  18  --   --   --    ALT (SGPT) U/L 31 23  --  20  --   --   --      UA      Radiology(recent) No radiology results for the last day   Assessment:    Shortness of breath    Hypertensive emergency    Chronic atrial fibrillation    Chronic systolic CHF with acute exacerbation (CHFrEF) (CMS/[facility])    CKD (chronic kidney disease) stage 3, GFR 30-59 ml/min (CMS/[facility])    Essential hypertension    Gambling disorder, persistent    Coronary artery disease involving native coronary artery of native heart without angina pectoris    GERD (gastroesophageal reflux disease)    Medically noncompliant    Mental status changes with acute toxic metabolic encephalopathy    Plan:  Coronary catheterization today        Dre Stauffer MD  11/12/19  8:33 AM          Electronically signed by Dre Stauffer MD at 11/12/19 0835     Dre Stauffer MD at 11/12/19 0833        Acute exacerbation of chronic systolic congestive heart failure    Electronically signed by Dre Stauffer,  "MD at 11/12/19 0824     Edy Thomas MD at 11/11/19 1740          Referring Provider: Hospitalist    Reason for follow-up: Shortness of breath and congestive heart failure     Patient Care Team:  Marisol Larson APRN as PCP - General    Subjective .  No chest pain or shortness of breath  Objective  Lying in bed comfortably     Review of Systems   Constitution: Negative for fever and malaise/fatigue.   Cardiovascular: Negative for chest pain, dyspnea on exertion and palpitations.   Respiratory: Negative for cough.    Skin: Negative for rash.   Gastrointestinal: Negative for abdominal pain, nausea and vomiting.   Neurological: Negative for focal weakness and headaches.   All other systems reviewed and are negative.      Toradol [ketorolac tromethamine]    Scheduled Meds:    atorvastatin 10 mg Oral Daily   carvedilol 25 mg Oral BID With Meals   enoxaparin 120 mg Subcutaneous Q12H   furosemide 40 mg Intravenous Q12H   hydrALAZINE 50 mg Oral BID   levothyroxine 25 mcg Oral Q AM   potassium chloride 20 mEq Oral BID   sodium chloride 10 mL Intravenous Q12H   warfarin 6 mg Oral Daily     Continuous Infusions:    niCARdipine 5-15 mg/hr Last Rate: Stopped (11/08/19 2000)   Pharmacy to dose warfarin       PRN Meds:.hydrALAZINE  •  influenza vaccine  •  LORazepam  •  [COMPLETED] Insert peripheral IV **AND** sodium chloride  •  sodium chloride  •  sodium chloride        VITAL SIGNS  Vitals:    11/11/19 0300 11/11/19 0626 11/11/19 1115 11/11/19 1505   BP:  161/91 (!) 163/103 148/48   BP Location:       Patient Position:       Pulse: 102 87 95 106   Resp: 18 18 12 16   Temp: 97.5 °F (36.4 °C) 97.2 °F (36.2 °C) 99.5 °F (37.5 °C) 98.5 °F (36.9 °C)   TempSrc:  Axillary     SpO2: 97% 96% 93% 99%   Weight:  118 kg (260 lb 9.3 oz)     Height:           Flowsheet Rows      First Filed Value   Admission Height  182.9 cm (72\") Documented at 11/07/2019 2146   Admission Weight  124 kg (273 lb 5.9 oz) Documented at 11/07/2019 2146         "   TELEMETRY: Atrial fibrillation    Physical Exam:  Physical Exam   Constitutional: He appears well-developed and well-nourished.   HENT:   Head: Normocephalic and atraumatic.   Eyes: Conjunctivae are normal. No scleral icterus.   Neck: Normal range of motion. Neck supple. No JVD present. Carotid bruit is not present.   Cardiovascular: Normal rate, S1 normal, S2 normal, normal heart sounds and intact distal pulses. An irregularly irregular rhythm present. PMI is not displaced.   Pulmonary/Chest: Effort normal and breath sounds normal. He has no wheezes. He has no rales.   Abdominal: Soft. Bowel sounds are normal.   Neurological: He is alert. He has normal strength.   Skin: Skin is warm and dry. No rash noted.        Results Review:   I reviewed the patient's new clinical results.  Lab Results (last 24 hours)     Procedure Component Value Units Date/Time    Comprehensive Metabolic Panel [[id number]]  (Abnormal) Collected:  11/11/19 0529    Specimen:  Blood Updated:  11/11/19 0610     Glucose 113 mg/dL      BUN 26 mg/dL      Creatinine 1.18 mg/dL      Sodium 138 mmol/L      Potassium 4.0 mmol/L      Chloride 102 mmol/L      CO2 23.0 mmol/L      Calcium 9.8 mg/dL      Total Protein 7.9 g/dL      Albumin 4.00 g/dL      ALT (SGPT) 23 U/L      AST (SGOT) 24 U/L      Alkaline Phosphatase 59 U/L      Total Bilirubin 0.9 mg/dL      eGFR Non African Amer 62 mL/min/1.73      Globulin 3.9 gm/dL      A/G Ratio 1.0 g/dL      BUN/Creatinine Ratio 22.0     Anion Gap 13.0 mmol/L     Narrative:       GFR Normal >60  Chronic Kidney Disease <60  Kidney Failure <15    Magnesium [[id number]]  (Normal) Collected:  11/11/19 0529    Specimen:  Blood Updated:  11/11/19 0610     Magnesium 2.3 mg/dL     BNP [[id number]]  (Abnormal) Collected:  11/11/19 0529    Specimen:  Blood Updated:  11/11/19 0609     proBNP 2,721.0 pg/mL     Narrative:       Among patients with dyspnea, NT-proBNP is highly sensitive for the detection of acute congestive heart  failure. In addition NT-proBNP of <300 pg/ml effectively rules out acute congestive heart failure with 99% negative predictive value.    Protime-INR [[id number]]  (Abnormal) Collected:  11/11/19 0529    Specimen:  Blood Updated:  11/11/19 0606     Protime 11.9 Seconds      INR 1.17    CBC & Differential [[id number]] Collected:  11/11/19 0529    Specimen:  Blood Updated:  11/11/19 0553    Narrative:       The following orders were created for panel order CBC & Differential.  Procedure                               Abnormality         Status                     ---------                               -----------         ------                     CBC Auto Differential[[id number]]        Abnormal            Final result                 Please view results for these tests on the individual orders.    CBC Auto Differential [[id number]]  (Abnormal) Collected:  11/11/19 0529    Specimen:  Blood Updated:  11/11/19 0553     WBC 9.50 10*3/mm3      RBC 5.71 10*6/mm3      Hemoglobin 16.4 g/dL      Hematocrit 49.4 %      MCV 86.5 fL      MCH 28.8 pg      MCHC 33.3 g/dL      RDW 16.8 %      RDW-SD 50.3 fl      MPV 9.4 fL      Platelets 159 10*3/mm3      Neutrophil % 77.1 %      Lymphocyte % 9.1 %      Monocyte % 11.1 %      Eosinophil % 1.7 %      Basophil % 1.0 %      Neutrophils, Absolute 7.40 10*3/mm3      Lymphocytes, Absolute 0.90 10*3/mm3      Monocytes, Absolute 1.10 10*3/mm3      Eosinophils, Absolute 0.20 10*3/mm3      Basophils, Absolute 0.10 10*3/mm3      nRBC 0.0 /100 WBC     Basic Metabolic Panel [[id number]]  (Abnormal) Collected:  11/10/19 1808    Specimen:  Blood Updated:  11/10/19 1915     Glucose 135 mg/dL      BUN 27 mg/dL      Creatinine 1.17 mg/dL      Sodium 142 mmol/L      Potassium 3.8 mmol/L      Chloride 102 mmol/L      CO2 25.0 mmol/L      Calcium 9.9 mg/dL      eGFR Non African Amer 63 mL/min/1.73      BUN/Creatinine Ratio 23.1     Anion Gap 15.0 mmol/L     Narrative:       GFR Normal >60  Chronic Kidney  Disease <60  Kidney Failure <15    CBC & Differential [[id number]] Collected:  11/10/19 1808    Specimen:  Blood Updated:  11/10/19 1858    Narrative:       The following orders were created for panel order CBC & Differential.  Procedure                               Abnormality         Status                     ---------                               -----------         ------                     CBC Auto Differential[[id number]]        Abnormal            Final result                 Please view results for these tests on the individual orders.    CBC Auto Differential [[id number]]  (Abnormal) Collected:  11/10/19 1808    Specimen:  Blood Updated:  11/10/19 1858     WBC 7.40 10*3/mm3      RBC 5.12 10*6/mm3      Hemoglobin 15.2 g/dL      Hematocrit 44.4 %      MCV 86.6 fL      MCH 29.6 pg      MCHC 34.2 g/dL      RDW 17.0 %      RDW-SD 51.6 fl      MPV 9.3 fL      Platelets 182 10*3/mm3      Neutrophil % 66.8 %      Lymphocyte % 18.3 %      Monocyte % 10.8 %      Eosinophil % 3.0 %      Basophil % 1.1 %      Neutrophils, Absolute 5.00 10*3/mm3      Lymphocytes, Absolute 1.40 10*3/mm3      Monocytes, Absolute 0.80 10*3/mm3      Eosinophils, Absolute 0.20 10*3/mm3      Basophils, Absolute 0.10 10*3/mm3      nRBC 0.2 /100 WBC           Imaging Results (Last 24 Hours)     ** No results found for the last 24 hours. **          EKG      I personally viewed and interpreted the patient's EKG/Telemetry data:    ECHOCARDIOGRAM:    STRESS MYOVIEW:    CARDIAC CATHETERIZATION:    OTHER:         Assessment/Plan     #1 shortness of breath  2.  Congestive heart failure  3.  Coronary artery disease  4.  Chronic atrial fibrillation  5.  Hypertension  6.  Chronic renal insufficiency  7.  Hyperlipidemia  8.  Medical noncompliance    Patient presented with shortness of breath and has congestive heart failure  Patient is ruled out for MI by EKG and enzymes  Patient had an echocardiogram which showed LV dysfunction.  He had a stress  Myoview which is abnormal  Patient will have a cardiac catheterization performed.  Discussed with patient about procedure risks and benefits  Patient wants to think about it in the meantime has some mild mental status changes and hence the procedure has been postponed.  Patient was not taking any medicines and was medically noncompliant  Patient is restarted on medications including warfarin Coreg atorvastatin and hydralazine  Patient cannot take ACE inhibitors because of renal insufficiency  I discussed the patients findings and my recommendations with patient and nurse    Edy Thomas MD  11/11/19  5:41 PM                Electronically signed by Edy Thomas MD at 11/11/19 6739

## 2019-11-15 PROCEDURE — 93010 ELECTROCARDIOGRAM REPORT: CPT | Performed by: INTERNAL MEDICINE

## 2019-11-26 NOTE — DISCHARGE SUMMARY
The patient is a 64-year-old  male who was admitted to the hospital with hypertensive emergency and shortness of breath.  He had the assessment as below and was in the process of undergoing an ischemic cardiac evaluation.  He refused coronary catheterization and simply left the hospital.    His disposition is unknown and his whereabouts are unknown.  Final diagnoses as below:         Shortness of breath    Hypertensive emergency    Chronic atrial fibrillation    Chronic systolic CHF with acute exacerbation (CHFrEF) (CMS/[facility])    CKD (chronic kidney disease) stage 3, GFR 30-59 ml/min (CMS/[facility])    Essential hypertension    Gambling disorder, persistent    Coronary artery disease involving native coronary artery of native heart without angina pectoris    GERD (gastroesophageal reflux disease)    Medically noncompliant    Mental status changes with acute toxic metabolic encephalopathy      Patient left hospital AMA

## 2019-11-29 ENCOUNTER — HOSPITAL ENCOUNTER (EMERGENCY)
Facility: HOSPITAL | Age: 64
Discharge: HOME | End: 2019-11-29
Attending: EMERGENCY MEDICINE | Admitting: EMERGENCY MEDICINE

## 2019-11-29 VITALS
RESPIRATION RATE: 18 BRPM | SYSTOLIC BLOOD PRESSURE: 150 MMHG | TEMPERATURE: 97 F | OXYGEN SATURATION: 95 % | DIASTOLIC BLOOD PRESSURE: 69 MMHG | WEIGHT: 265.65 LBS | HEIGHT: 72 IN | HEART RATE: 111 BPM | BODY MASS INDEX: 35.98 KG/M2

## 2019-11-29 DIAGNOSIS — I10 UNCONTROLLED HYPERTENSION: Primary | ICD-10-CM

## 2019-11-29 DIAGNOSIS — I48.91 ATRIAL FIBRILLATION, UNSPECIFIED TYPE (HCC): ICD-10-CM

## 2019-11-29 PROCEDURE — 93005 ELECTROCARDIOGRAM TRACING: CPT

## 2019-11-29 PROCEDURE — 99283 EMERGENCY DEPT VISIT LOW MDM: CPT

## 2019-11-29 RX ORDER — CARVEDILOL 3.12 MG/1
25 TABLET ORAL 2 TIMES DAILY WITH MEALS
Qty: 20 TABLET | Refills: 0 | Status: SHIPPED | OUTPATIENT
Start: 2019-11-29 | End: 2019-12-06

## 2019-11-29 RX ORDER — HYDRALAZINE HYDROCHLORIDE 25 MG/1
50 TABLET, FILM COATED ORAL 2 TIMES DAILY
Qty: 20 TABLET | Refills: 0 | Status: SHIPPED | OUTPATIENT
Start: 2019-11-29 | End: 2019-12-06

## 2019-12-06 ENCOUNTER — APPOINTMENT (OUTPATIENT)
Dept: GENERAL RADIOLOGY | Facility: HOSPITAL | Age: 64
End: 2019-12-06

## 2019-12-06 ENCOUNTER — HOSPITAL ENCOUNTER (INPATIENT)
Facility: HOSPITAL | Age: 64
LOS: 11 days | Discharge: HOME | End: 2019-12-17
Attending: EMERGENCY MEDICINE | Admitting: INTERNAL MEDICINE

## 2019-12-06 DIAGNOSIS — I48.91 ATRIAL FIBRILLATION WITH RVR (HCC): ICD-10-CM

## 2019-12-06 DIAGNOSIS — I20.0 UNSTABLE ANGINA (HCC): ICD-10-CM

## 2019-12-06 DIAGNOSIS — I25.10 CORONARY ARTERY DISEASE INVOLVING NATIVE CORONARY ARTERY OF NATIVE HEART WITHOUT ANGINA PECTORIS: ICD-10-CM

## 2019-12-06 DIAGNOSIS — I50.9 ACUTE CONGESTIVE HEART FAILURE, UNSPECIFIED HEART FAILURE TYPE (HCC): ICD-10-CM

## 2019-12-06 DIAGNOSIS — R06.03 ACUTE RESPIRATORY DISTRESS: Primary | ICD-10-CM

## 2019-12-06 LAB
ANION GAP SERPL CALCULATED.3IONS-SCNC: 15 MMOL/L (ref 5–15)
ANISOCYTOSIS BLD QL: ABNORMAL
BUN BLD-MCNC: 27 MG/DL (ref 8–23)
BUN/CREAT SERPL: 22.9 (ref 7–25)
CALCIUM SPEC-SCNC: 9.3 MG/DL (ref 8.6–10.5)
CHLORIDE SERPL-SCNC: 108 MMOL/L (ref 98–107)
CO2 SERPL-SCNC: 19 MMOL/L (ref 22–29)
CREAT BLD-MCNC: 1.18 MG/DL (ref 0.76–1.27)
DEPRECATED RDW RBC AUTO: 50.3 FL (ref 37–54)
ERYTHROCYTE [DISTWIDTH] IN BLOOD BY AUTOMATED COUNT: 16.7 % (ref 12.3–15.4)
GFR SERPL CREATININE-BSD FRML MDRD: 62 ML/MIN/1.73
GLUCOSE BLD-MCNC: 117 MG/DL (ref 65–99)
HCT VFR BLD AUTO: 43 % (ref 37.5–51)
HGB BLD-MCNC: 14.3 G/DL (ref 13–17.7)
LYMPHOCYTES # BLD MANUAL: 1.33 10*3/MM3 (ref 0.7–3.1)
LYMPHOCYTES NFR BLD MANUAL: 14 % (ref 19.6–45.3)
LYMPHOCYTES NFR BLD MANUAL: 8 % (ref 5–12)
MAGNESIUM SERPL-MCNC: 2.1 MG/DL (ref 1.6–2.4)
MCH RBC QN AUTO: 28.6 PG (ref 26.6–33)
MCHC RBC AUTO-ENTMCNC: 33.2 G/DL (ref 31.5–35.7)
MCV RBC AUTO: 86 FL (ref 79–97)
MICROCYTES BLD QL: ABNORMAL
MONOCYTES # BLD AUTO: 0.76 10*3/MM3 (ref 0.1–0.9)
NEUTROPHILS # BLD AUTO: 7.41 10*3/MM3 (ref 1.7–7)
NEUTROPHILS NFR BLD MANUAL: 78 % (ref 42.7–76)
NT-PROBNP SERPL-MCNC: ABNORMAL PG/ML (ref 5–900)
PLAT MORPH BLD: NORMAL
PLATELET # BLD AUTO: 188 10*3/MM3 (ref 140–450)
PMV BLD AUTO: 8.9 FL (ref 6–12)
POLYCHROMASIA BLD QL SMEAR: ABNORMAL
POTASSIUM BLD-SCNC: 3.8 MMOL/L (ref 3.5–5.2)
RBC # BLD AUTO: 5 10*6/MM3 (ref 4.14–5.8)
SCAN SLIDE: NORMAL
SODIUM BLD-SCNC: 142 MMOL/L (ref 136–145)
TOXIC GRANULATION: ABNORMAL
TROPONIN T SERPL-MCNC: 0.02 NG/ML (ref 0–0.03)
WBC NRBC COR # BLD: 9.5 10*3/MM3 (ref 3.4–10.8)

## 2019-12-06 PROCEDURE — 93005 ELECTROCARDIOGRAM TRACING: CPT

## 2019-12-06 PROCEDURE — 83735 ASSAY OF MAGNESIUM: CPT | Performed by: EMERGENCY MEDICINE

## 2019-12-06 PROCEDURE — 85007 BL SMEAR W/DIFF WBC COUNT: CPT | Performed by: EMERGENCY MEDICINE

## 2019-12-06 PROCEDURE — 99284 EMERGENCY DEPT VISIT MOD MDM: CPT

## 2019-12-06 PROCEDURE — 71045 X-RAY EXAM CHEST 1 VIEW: CPT

## 2019-12-06 PROCEDURE — 93005 ELECTROCARDIOGRAM TRACING: CPT | Performed by: EMERGENCY MEDICINE

## 2019-12-06 PROCEDURE — 83880 ASSAY OF NATRIURETIC PEPTIDE: CPT | Performed by: EMERGENCY MEDICINE

## 2019-12-06 PROCEDURE — 80048 BASIC METABOLIC PNL TOTAL CA: CPT | Performed by: EMERGENCY MEDICINE

## 2019-12-06 PROCEDURE — 25010000002 FUROSEMIDE PER 20 MG: Performed by: EMERGENCY MEDICINE

## 2019-12-06 PROCEDURE — 85025 COMPLETE CBC W/AUTO DIFF WBC: CPT | Performed by: EMERGENCY MEDICINE

## 2019-12-06 PROCEDURE — 25010000002 ENOXAPARIN PER 10 MG: Performed by: EMERGENCY MEDICINE

## 2019-12-06 PROCEDURE — 84484 ASSAY OF TROPONIN QUANT: CPT | Performed by: EMERGENCY MEDICINE

## 2019-12-06 RX ORDER — SODIUM CHLORIDE 0.9 % (FLUSH) 0.9 %
10 SYRINGE (ML) INJECTION AS NEEDED
Status: DISCONTINUED | OUTPATIENT
Start: 2019-12-06 | End: 2019-12-17 | Stop reason: HOSPADM

## 2019-12-06 RX ORDER — DILTIAZEM HCL IN NACL,ISO-OSM 125 MG/125
5-15 PLASTIC BAG, INJECTION (ML) INTRAVENOUS
Status: DISCONTINUED | OUTPATIENT
Start: 2019-12-06 | End: 2019-12-16

## 2019-12-06 RX ORDER — FUROSEMIDE 10 MG/ML
40 INJECTION INTRAMUSCULAR; INTRAVENOUS ONCE
Status: COMPLETED | OUTPATIENT
Start: 2019-12-06 | End: 2019-12-06

## 2019-12-06 RX ORDER — DILTIAZEM HYDROCHLORIDE 5 MG/ML
20 INJECTION INTRAVENOUS ONCE
Status: COMPLETED | OUTPATIENT
Start: 2019-12-06 | End: 2019-12-06

## 2019-12-06 RX ORDER — LABETALOL HYDROCHLORIDE 5 MG/ML
20 INJECTION, SOLUTION INTRAVENOUS ONCE
Status: COMPLETED | OUTPATIENT
Start: 2019-12-06 | End: 2019-12-06

## 2019-12-06 RX ADMIN — DILTIAZEM HYDROCHLORIDE 5 MG/HR: 5 INJECTION INTRAVENOUS at 22:05

## 2019-12-06 RX ADMIN — LABETALOL 20 MG/4 ML (5 MG/ML) INTRAVENOUS SYRINGE 20 MG: at 23:07

## 2019-12-06 RX ADMIN — ENOXAPARIN SODIUM 130 MG: 150 INJECTION SUBCUTANEOUS at 23:07

## 2019-12-06 RX ADMIN — DILTIAZEM HYDROCHLORIDE 20 MG: 5 INJECTION INTRAVENOUS at 22:05

## 2019-12-06 RX ADMIN — FUROSEMIDE 40 MG: 10 INJECTION, SOLUTION INTRAMUSCULAR; INTRAVENOUS at 23:07

## 2019-12-07 LAB
ALBUMIN SERPL-MCNC: 3.8 G/DL (ref 3.5–5.2)
ALBUMIN/GLOB SERPL: 1 G/DL
ALP SERPL-CCNC: 58 U/L (ref 39–117)
ALT SERPL W P-5'-P-CCNC: 23 U/L (ref 1–41)
ANION GAP SERPL CALCULATED.3IONS-SCNC: 16 MMOL/L (ref 5–15)
AST SERPL-CCNC: 20 U/L (ref 1–40)
BASOPHILS # BLD AUTO: 0.1 10*3/MM3 (ref 0–0.2)
BASOPHILS NFR BLD AUTO: 0.8 % (ref 0–1.5)
BILIRUB SERPL-MCNC: 1.5 MG/DL (ref 0.2–1.2)
BUN BLD-MCNC: 26 MG/DL (ref 8–23)
BUN/CREAT SERPL: 21 (ref 7–25)
CALCIUM SPEC-SCNC: 9.3 MG/DL (ref 8.6–10.5)
CHLORIDE SERPL-SCNC: 106 MMOL/L (ref 98–107)
CO2 SERPL-SCNC: 19 MMOL/L (ref 22–29)
CREAT BLD-MCNC: 1.24 MG/DL (ref 0.76–1.27)
DEPRECATED RDW RBC AUTO: 52.5 FL (ref 37–54)
EOSINOPHIL # BLD AUTO: 0.1 10*3/MM3 (ref 0–0.4)
EOSINOPHIL NFR BLD AUTO: 0.7 % (ref 0.3–6.2)
ERYTHROCYTE [DISTWIDTH] IN BLOOD BY AUTOMATED COUNT: 17.1 % (ref 12.3–15.4)
GFR SERPL CREATININE-BSD FRML MDRD: 59 ML/MIN/1.73
GLOBULIN UR ELPH-MCNC: 3.7 GM/DL
GLUCOSE BLD-MCNC: 125 MG/DL (ref 65–99)
HCT VFR BLD AUTO: 40.6 % (ref 37.5–51)
HGB BLD-MCNC: 13.8 G/DL (ref 13–17.7)
LYMPHOCYTES # BLD AUTO: 1.2 10*3/MM3 (ref 0.7–3.1)
LYMPHOCYTES NFR BLD AUTO: 15.2 % (ref 19.6–45.3)
MAGNESIUM SERPL-MCNC: 1.9 MG/DL (ref 1.6–2.4)
MCH RBC QN AUTO: 29.5 PG (ref 26.6–33)
MCHC RBC AUTO-ENTMCNC: 34 G/DL (ref 31.5–35.7)
MCV RBC AUTO: 86.7 FL (ref 79–97)
MONOCYTES # BLD AUTO: 0.9 10*3/MM3 (ref 0.1–0.9)
MONOCYTES NFR BLD AUTO: 11.2 % (ref 5–12)
NEUTROPHILS # BLD AUTO: 5.6 10*3/MM3 (ref 1.7–7)
NEUTROPHILS NFR BLD AUTO: 72.1 % (ref 42.7–76)
NRBC BLD AUTO-RTO: 0.1 /100 WBC (ref 0–0.2)
NT-PROBNP SERPL-MCNC: 7573 PG/ML (ref 5–900)
NT-PROBNP SERPL-MCNC: 8648 PG/ML (ref 5–900)
PLATELET # BLD AUTO: 163 10*3/MM3 (ref 140–450)
PMV BLD AUTO: 9.7 FL (ref 6–12)
POTASSIUM BLD-SCNC: 3.3 MMOL/L (ref 3.5–5.2)
PROT SERPL-MCNC: 7.5 G/DL (ref 6–8.5)
RBC # BLD AUTO: 4.68 10*6/MM3 (ref 4.14–5.8)
SODIUM BLD-SCNC: 141 MMOL/L (ref 136–145)
TROPONIN T SERPL-MCNC: 0.02 NG/ML (ref 0–0.03)
TROPONIN T SERPL-MCNC: 0.02 NG/ML (ref 0–0.03)
TSH SERPL DL<=0.05 MIU/L-ACNC: 3.07 UIU/ML (ref 0.27–4.2)
WBC NRBC COR # BLD: 7.8 10*3/MM3 (ref 3.4–10.8)

## 2019-12-07 PROCEDURE — 83880 ASSAY OF NATRIURETIC PEPTIDE: CPT | Performed by: NURSE PRACTITIONER

## 2019-12-07 PROCEDURE — 84484 ASSAY OF TROPONIN QUANT: CPT | Performed by: FAMILY MEDICINE

## 2019-12-07 PROCEDURE — 83735 ASSAY OF MAGNESIUM: CPT | Performed by: NURSE PRACTITIONER

## 2019-12-07 PROCEDURE — 85025 COMPLETE CBC W/AUTO DIFF WBC: CPT | Performed by: NURSE PRACTITIONER

## 2019-12-07 PROCEDURE — 25010000002 HYDRALAZINE PER 20 MG

## 2019-12-07 PROCEDURE — 99222 1ST HOSP IP/OBS MODERATE 55: CPT | Performed by: NURSE PRACTITIONER

## 2019-12-07 PROCEDURE — 93005 ELECTROCARDIOGRAM TRACING: CPT | Performed by: FAMILY MEDICINE

## 2019-12-07 PROCEDURE — 84443 ASSAY THYROID STIM HORMONE: CPT | Performed by: NURSE PRACTITIONER

## 2019-12-07 PROCEDURE — 25010000002 FUROSEMIDE PER 20 MG: Performed by: NURSE PRACTITIONER

## 2019-12-07 PROCEDURE — 84484 ASSAY OF TROPONIN QUANT: CPT | Performed by: NURSE PRACTITIONER

## 2019-12-07 PROCEDURE — 80053 COMPREHEN METABOLIC PANEL: CPT | Performed by: NURSE PRACTITIONER

## 2019-12-07 PROCEDURE — 25010000002 ENOXAPARIN PER 10 MG: Performed by: NURSE PRACTITIONER

## 2019-12-07 RX ORDER — ONDANSETRON 2 MG/ML
4 INJECTION INTRAMUSCULAR; INTRAVENOUS EVERY 6 HOURS PRN
Status: DISCONTINUED | OUTPATIENT
Start: 2019-12-07 | End: 2019-12-17 | Stop reason: HOSPADM

## 2019-12-07 RX ORDER — ACETAMINOPHEN 325 MG/1
650 TABLET ORAL EVERY 6 HOURS PRN
Status: DISCONTINUED | OUTPATIENT
Start: 2019-12-07 | End: 2019-12-17 | Stop reason: HOSPADM

## 2019-12-07 RX ORDER — HYDRALAZINE HYDROCHLORIDE 25 MG/1
25 TABLET, FILM COATED ORAL 2 TIMES DAILY
Status: DISCONTINUED | OUTPATIENT
Start: 2019-12-07 | End: 2019-12-11

## 2019-12-07 RX ORDER — LABETALOL HYDROCHLORIDE 5 MG/ML
INJECTION, SOLUTION INTRAVENOUS
Status: COMPLETED
Start: 2019-12-07 | End: 2019-12-07

## 2019-12-07 RX ORDER — MORPHINE SULFATE 4 MG/ML
2 INJECTION, SOLUTION INTRAMUSCULAR; INTRAVENOUS
Status: ACTIVE | OUTPATIENT
Start: 2019-12-07 | End: 2019-12-17

## 2019-12-07 RX ORDER — FUROSEMIDE 10 MG/ML
40 INJECTION INTRAMUSCULAR; INTRAVENOUS
Status: DISCONTINUED | OUTPATIENT
Start: 2019-12-07 | End: 2019-12-17 | Stop reason: HOSPADM

## 2019-12-07 RX ORDER — LABETALOL HYDROCHLORIDE 5 MG/ML
20 INJECTION, SOLUTION INTRAVENOUS ONCE
Status: COMPLETED | OUTPATIENT
Start: 2019-12-07 | End: 2019-12-07

## 2019-12-07 RX ORDER — AMLODIPINE BESYLATE 5 MG/1
5 TABLET ORAL
Status: DISCONTINUED | OUTPATIENT
Start: 2019-12-07 | End: 2019-12-11

## 2019-12-07 RX ORDER — POTASSIUM CHLORIDE 20 MEQ/1
20 TABLET, EXTENDED RELEASE ORAL 2 TIMES DAILY
Status: DISCONTINUED | OUTPATIENT
Start: 2019-12-07 | End: 2019-12-17 | Stop reason: HOSPADM

## 2019-12-07 RX ORDER — HYDRALAZINE HYDROCHLORIDE 20 MG/ML
20 INJECTION INTRAMUSCULAR; INTRAVENOUS EVERY 6 HOURS PRN
Status: DISCONTINUED | OUTPATIENT
Start: 2019-12-07 | End: 2019-12-17 | Stop reason: HOSPADM

## 2019-12-07 RX ORDER — CARVEDILOL 25 MG/1
25 TABLET ORAL 2 TIMES DAILY WITH MEALS
Status: DISCONTINUED | OUTPATIENT
Start: 2019-12-07 | End: 2019-12-17 | Stop reason: HOSPADM

## 2019-12-07 RX ORDER — ATORVASTATIN CALCIUM 10 MG/1
10 TABLET, FILM COATED ORAL NIGHTLY
Status: DISCONTINUED | OUTPATIENT
Start: 2019-12-07 | End: 2019-12-12

## 2019-12-07 RX ORDER — HYDRALAZINE HYDROCHLORIDE 20 MG/ML
INJECTION INTRAMUSCULAR; INTRAVENOUS
Status: COMPLETED
Start: 2019-12-07 | End: 2019-12-07

## 2019-12-07 RX ORDER — NITROGLYCERIN 20 MG/100ML
10-50 INJECTION INTRAVENOUS
Status: DISCONTINUED | OUTPATIENT
Start: 2019-12-07 | End: 2019-12-17

## 2019-12-07 RX ORDER — SPIRONOLACTONE 25 MG/1
25 TABLET ORAL DAILY
Status: DISCONTINUED | OUTPATIENT
Start: 2019-12-07 | End: 2019-12-17 | Stop reason: HOSPADM

## 2019-12-07 RX ADMIN — FUROSEMIDE 40 MG: 10 INJECTION, SOLUTION INTRAMUSCULAR; INTRAVENOUS at 09:51

## 2019-12-07 RX ADMIN — HYDRALAZINE HYDROCHLORIDE 20 MG: 20 INJECTION INTRAMUSCULAR; INTRAVENOUS at 00:47

## 2019-12-07 RX ADMIN — HYDRALAZINE HYDROCHLORIDE 25 MG: 25 TABLET, FILM COATED ORAL at 20:20

## 2019-12-07 RX ADMIN — ENOXAPARIN SODIUM 130 MG: 150 INJECTION SUBCUTANEOUS at 13:43

## 2019-12-07 RX ADMIN — ATORVASTATIN CALCIUM 10 MG: 10 TABLET, FILM COATED ORAL at 20:20

## 2019-12-07 RX ADMIN — NITROGLYCERIN 10 MCG/MIN: 20 INJECTION INTRAVENOUS at 09:52

## 2019-12-07 RX ADMIN — CARVEDILOL 25 MG: 25 TABLET, FILM COATED ORAL at 16:57

## 2019-12-07 RX ADMIN — LABETALOL HYDROCHLORIDE 20 MG: 5 INJECTION, SOLUTION INTRAVENOUS at 03:12

## 2019-12-07 RX ADMIN — ENOXAPARIN SODIUM 130 MG: 150 INJECTION SUBCUTANEOUS at 23:50

## 2019-12-07 RX ADMIN — AMLODIPINE BESYLATE 5 MG: 5 TABLET ORAL at 16:58

## 2019-12-07 RX ADMIN — FUROSEMIDE 40 MG: 10 INJECTION, SOLUTION INTRAMUSCULAR; INTRAVENOUS at 16:58

## 2019-12-07 RX ADMIN — DILTIAZEM HYDROCHLORIDE 10 MG/HR: 5 INJECTION INTRAVENOUS at 09:28

## 2019-12-07 RX ADMIN — NITROGLYCERIN 10 MCG/MIN: 20 INJECTION INTRAVENOUS at 10:01

## 2019-12-07 RX ADMIN — LABETALOL 20 MG/4 ML (5 MG/ML) INTRAVENOUS SYRINGE 20 MG: at 03:12

## 2019-12-07 RX ADMIN — POTASSIUM CHLORIDE 20 MEQ: 1500 TABLET, EXTENDED RELEASE ORAL at 20:20

## 2019-12-08 LAB
ALBUMIN SERPL-MCNC: 3.6 G/DL (ref 3.5–5.2)
ALBUMIN/GLOB SERPL: 1.1 G/DL
ALP SERPL-CCNC: 53 U/L (ref 39–117)
ALT SERPL W P-5'-P-CCNC: 20 U/L (ref 1–41)
ANION GAP SERPL CALCULATED.3IONS-SCNC: 14 MMOL/L (ref 5–15)
AST SERPL-CCNC: 19 U/L (ref 1–40)
BASOPHILS # BLD AUTO: 0.1 10*3/MM3 (ref 0–0.2)
BASOPHILS NFR BLD AUTO: 0.9 % (ref 0–1.5)
BILIRUB SERPL-MCNC: 1 MG/DL (ref 0.2–1.2)
BUN BLD-MCNC: 25 MG/DL (ref 8–23)
BUN/CREAT SERPL: 21.2 (ref 7–25)
CALCIUM SPEC-SCNC: 9.1 MG/DL (ref 8.6–10.5)
CHLORIDE SERPL-SCNC: 103 MMOL/L (ref 98–107)
CO2 SERPL-SCNC: 24 MMOL/L (ref 22–29)
CREAT BLD-MCNC: 1.18 MG/DL (ref 0.76–1.27)
DEPRECATED RDW RBC AUTO: 49.9 FL (ref 37–54)
EOSINOPHIL # BLD AUTO: 0.2 10*3/MM3 (ref 0–0.4)
EOSINOPHIL NFR BLD AUTO: 1.9 % (ref 0.3–6.2)
ERYTHROCYTE [DISTWIDTH] IN BLOOD BY AUTOMATED COUNT: 16.8 % (ref 12.3–15.4)
GFR SERPL CREATININE-BSD FRML MDRD: 62 ML/MIN/1.73
GLOBULIN UR ELPH-MCNC: 3.2 GM/DL
GLUCOSE BLD-MCNC: 116 MG/DL (ref 65–99)
HCT VFR BLD AUTO: 39.4 % (ref 37.5–51)
HGB BLD-MCNC: 13.1 G/DL (ref 13–17.7)
LYMPHOCYTES # BLD AUTO: 1.2 10*3/MM3 (ref 0.7–3.1)
LYMPHOCYTES NFR BLD AUTO: 13.9 % (ref 19.6–45.3)
MAGNESIUM SERPL-MCNC: 1.9 MG/DL (ref 1.6–2.4)
MCH RBC QN AUTO: 28.3 PG (ref 26.6–33)
MCHC RBC AUTO-ENTMCNC: 33.4 G/DL (ref 31.5–35.7)
MCV RBC AUTO: 84.8 FL (ref 79–97)
MONOCYTES # BLD AUTO: 1 10*3/MM3 (ref 0.1–0.9)
MONOCYTES NFR BLD AUTO: 11.9 % (ref 5–12)
NEUTROPHILS # BLD AUTO: 6.2 10*3/MM3 (ref 1.7–7)
NEUTROPHILS NFR BLD AUTO: 71.4 % (ref 42.7–76)
NRBC BLD AUTO-RTO: 0.1 /100 WBC (ref 0–0.2)
NT-PROBNP SERPL-MCNC: 3333 PG/ML (ref 5–900)
PLATELET # BLD AUTO: 155 10*3/MM3 (ref 140–450)
PMV BLD AUTO: 8.7 FL (ref 6–12)
POTASSIUM BLD-SCNC: 3.1 MMOL/L (ref 3.5–5.2)
PROT SERPL-MCNC: 6.8 G/DL (ref 6–8.5)
RBC # BLD AUTO: 4.64 10*6/MM3 (ref 4.14–5.8)
SODIUM BLD-SCNC: 141 MMOL/L (ref 136–145)
WBC NRBC COR # BLD: 8.7 10*3/MM3 (ref 3.4–10.8)

## 2019-12-08 PROCEDURE — 83880 ASSAY OF NATRIURETIC PEPTIDE: CPT | Performed by: NURSE PRACTITIONER

## 2019-12-08 PROCEDURE — 80053 COMPREHEN METABOLIC PANEL: CPT | Performed by: NURSE PRACTITIONER

## 2019-12-08 PROCEDURE — 83735 ASSAY OF MAGNESIUM: CPT | Performed by: NURSE PRACTITIONER

## 2019-12-08 PROCEDURE — 99232 SBSQ HOSP IP/OBS MODERATE 35: CPT | Performed by: INTERNAL MEDICINE

## 2019-12-08 PROCEDURE — 85025 COMPLETE CBC W/AUTO DIFF WBC: CPT | Performed by: NURSE PRACTITIONER

## 2019-12-08 PROCEDURE — 25010000002 FUROSEMIDE PER 20 MG: Performed by: NURSE PRACTITIONER

## 2019-12-08 PROCEDURE — 93005 ELECTROCARDIOGRAM TRACING: CPT | Performed by: FAMILY MEDICINE

## 2019-12-08 PROCEDURE — 25010000002 ENOXAPARIN PER 10 MG: Performed by: NURSE PRACTITIONER

## 2019-12-08 RX ORDER — POTASSIUM CHLORIDE 20 MEQ/1
40 TABLET, EXTENDED RELEASE ORAL AS NEEDED
Status: DISCONTINUED | OUTPATIENT
Start: 2019-12-08 | End: 2019-12-17 | Stop reason: HOSPADM

## 2019-12-08 RX ORDER — POTASSIUM CHLORIDE 7.45 MG/ML
10 INJECTION INTRAVENOUS
Status: DISCONTINUED | OUTPATIENT
Start: 2019-12-08 | End: 2019-12-17 | Stop reason: HOSPADM

## 2019-12-08 RX ORDER — NYSTATIN 100000 U/G
CREAM TOPICAL EVERY 12 HOURS SCHEDULED
Status: DISCONTINUED | OUTPATIENT
Start: 2019-12-08 | End: 2019-12-17 | Stop reason: HOSPADM

## 2019-12-08 RX ORDER — POTASSIUM CHLORIDE 1.5 G/1.77G
40 POWDER, FOR SOLUTION ORAL AS NEEDED
Status: DISCONTINUED | OUTPATIENT
Start: 2019-12-08 | End: 2019-12-17 | Stop reason: HOSPADM

## 2019-12-08 RX ADMIN — POTASSIUM CHLORIDE 40 MEQ: 1500 TABLET, EXTENDED RELEASE ORAL at 11:47

## 2019-12-08 RX ADMIN — CARVEDILOL 25 MG: 25 TABLET, FILM COATED ORAL at 07:56

## 2019-12-08 RX ADMIN — ENOXAPARIN SODIUM 130 MG: 150 INJECTION SUBCUTANEOUS at 22:21

## 2019-12-08 RX ADMIN — POTASSIUM CHLORIDE 20 MEQ: 1500 TABLET, EXTENDED RELEASE ORAL at 20:28

## 2019-12-08 RX ADMIN — ATORVASTATIN CALCIUM 10 MG: 10 TABLET, FILM COATED ORAL at 20:27

## 2019-12-08 RX ADMIN — FUROSEMIDE 40 MG: 10 INJECTION, SOLUTION INTRAMUSCULAR; INTRAVENOUS at 17:56

## 2019-12-08 RX ADMIN — NYSTATIN: 100000 CREAM TOPICAL at 11:48

## 2019-12-08 RX ADMIN — HYDRALAZINE HYDROCHLORIDE 25 MG: 25 TABLET, FILM COATED ORAL at 20:27

## 2019-12-08 RX ADMIN — DILTIAZEM HYDROCHLORIDE 5 MG/HR: 5 INJECTION INTRAVENOUS at 13:02

## 2019-12-08 RX ADMIN — SPIRONOLACTONE 25 MG: 25 TABLET, FILM COATED ORAL at 07:56

## 2019-12-08 RX ADMIN — FUROSEMIDE 40 MG: 10 INJECTION, SOLUTION INTRAMUSCULAR; INTRAVENOUS at 07:56

## 2019-12-08 RX ADMIN — CARVEDILOL 25 MG: 25 TABLET, FILM COATED ORAL at 17:56

## 2019-12-08 RX ADMIN — NITROGLYCERIN 50 MCG/MIN: 20 INJECTION INTRAVENOUS at 04:45

## 2019-12-08 RX ADMIN — AMLODIPINE BESYLATE 5 MG: 5 TABLET ORAL at 07:56

## 2019-12-08 RX ADMIN — POTASSIUM CHLORIDE 20 MEQ: 1500 TABLET, EXTENDED RELEASE ORAL at 07:56

## 2019-12-08 RX ADMIN — ENOXAPARIN SODIUM 130 MG: 150 INJECTION SUBCUTANEOUS at 10:37

## 2019-12-08 RX ADMIN — HYDRALAZINE HYDROCHLORIDE 25 MG: 25 TABLET, FILM COATED ORAL at 07:55

## 2019-12-08 RX ADMIN — SODIUM CHLORIDE, PRESERVATIVE FREE 10 ML: 5 INJECTION INTRAVENOUS at 07:57

## 2019-12-08 RX ADMIN — NYSTATIN: 100000 CREAM TOPICAL at 20:28

## 2019-12-09 LAB
ALBUMIN SERPL-MCNC: 3.6 G/DL (ref 3.5–5.2)
ALBUMIN/GLOB SERPL: 1.1 G/DL
ALP SERPL-CCNC: 58 U/L (ref 39–117)
ALT SERPL W P-5'-P-CCNC: 16 U/L (ref 1–41)
ANION GAP SERPL CALCULATED.3IONS-SCNC: 11 MMOL/L (ref 5–15)
AST SERPL-CCNC: 13 U/L (ref 1–40)
BASOPHILS # BLD AUTO: 0.1 10*3/MM3 (ref 0–0.2)
BASOPHILS NFR BLD AUTO: 0.7 % (ref 0–1.5)
BILIRUB SERPL-MCNC: 0.8 MG/DL (ref 0.2–1.2)
BUN BLD-MCNC: 22 MG/DL (ref 8–23)
BUN/CREAT SERPL: 20.8 (ref 7–25)
CALCIUM SPEC-SCNC: 9.2 MG/DL (ref 8.6–10.5)
CHLORIDE SERPL-SCNC: 102 MMOL/L (ref 98–107)
CO2 SERPL-SCNC: 26 MMOL/L (ref 22–29)
CREAT BLD-MCNC: 1.06 MG/DL (ref 0.76–1.27)
DEPRECATED RDW RBC AUTO: 48.6 FL (ref 37–54)
EOSINOPHIL # BLD AUTO: 0.2 10*3/MM3 (ref 0–0.4)
EOSINOPHIL NFR BLD AUTO: 2.2 % (ref 0.3–6.2)
ERYTHROCYTE [DISTWIDTH] IN BLOOD BY AUTOMATED COUNT: 16.4 % (ref 12.3–15.4)
GFR SERPL CREATININE-BSD FRML MDRD: 71 ML/MIN/1.73
GLOBULIN UR ELPH-MCNC: 3.4 GM/DL
GLUCOSE BLD-MCNC: 107 MG/DL (ref 65–99)
HCT VFR BLD AUTO: 38.9 % (ref 37.5–51)
HGB BLD-MCNC: 13.1 G/DL (ref 13–17.7)
LYMPHOCYTES # BLD AUTO: 1.5 10*3/MM3 (ref 0.7–3.1)
LYMPHOCYTES NFR BLD AUTO: 18.8 % (ref 19.6–45.3)
MCH RBC QN AUTO: 28.6 PG (ref 26.6–33)
MCHC RBC AUTO-ENTMCNC: 33.8 G/DL (ref 31.5–35.7)
MCV RBC AUTO: 84.6 FL (ref 79–97)
MONOCYTES # BLD AUTO: 1 10*3/MM3 (ref 0.1–0.9)
MONOCYTES NFR BLD AUTO: 13 % (ref 5–12)
NEUTROPHILS # BLD AUTO: 5.1 10*3/MM3 (ref 1.7–7)
NEUTROPHILS NFR BLD AUTO: 65.3 % (ref 42.7–76)
NRBC BLD AUTO-RTO: 0.4 /100 WBC (ref 0–0.2)
PLATELET # BLD AUTO: 170 10*3/MM3 (ref 140–450)
PMV BLD AUTO: 8.5 FL (ref 6–12)
POTASSIUM BLD-SCNC: 3.3 MMOL/L (ref 3.5–5.2)
PROT SERPL-MCNC: 7 G/DL (ref 6–8.5)
RBC # BLD AUTO: 4.6 10*6/MM3 (ref 4.14–5.8)
SODIUM BLD-SCNC: 139 MMOL/L (ref 136–145)
WBC NRBC COR # BLD: 7.7 10*3/MM3 (ref 3.4–10.8)

## 2019-12-09 PROCEDURE — 97162 PT EVAL MOD COMPLEX 30 MIN: CPT

## 2019-12-09 PROCEDURE — 25010000002 FUROSEMIDE PER 20 MG: Performed by: NURSE PRACTITIONER

## 2019-12-09 PROCEDURE — 25010000002 HYDRALAZINE PER 20 MG: Performed by: NURSE PRACTITIONER

## 2019-12-09 PROCEDURE — 99233 SBSQ HOSP IP/OBS HIGH 50: CPT | Performed by: INTERNAL MEDICINE

## 2019-12-09 PROCEDURE — 25010000002 ENOXAPARIN PER 10 MG: Performed by: NURSE PRACTITIONER

## 2019-12-09 PROCEDURE — 85025 COMPLETE CBC W/AUTO DIFF WBC: CPT | Performed by: NURSE PRACTITIONER

## 2019-12-09 PROCEDURE — 80053 COMPREHEN METABOLIC PANEL: CPT | Performed by: NURSE PRACTITIONER

## 2019-12-09 PROCEDURE — 97116 GAIT TRAINING THERAPY: CPT

## 2019-12-09 RX ADMIN — SODIUM CHLORIDE, PRESERVATIVE FREE 10 ML: 5 INJECTION INTRAVENOUS at 20:39

## 2019-12-09 RX ADMIN — HYDRALAZINE HYDROCHLORIDE 25 MG: 25 TABLET, FILM COATED ORAL at 20:39

## 2019-12-09 RX ADMIN — SODIUM CHLORIDE, PRESERVATIVE FREE 10 ML: 5 INJECTION INTRAVENOUS at 08:33

## 2019-12-09 RX ADMIN — NYSTATIN: 100000 CREAM TOPICAL at 08:34

## 2019-12-09 RX ADMIN — SPIRONOLACTONE 25 MG: 25 TABLET, FILM COATED ORAL at 08:31

## 2019-12-09 RX ADMIN — AMLODIPINE BESYLATE 5 MG: 5 TABLET ORAL at 08:32

## 2019-12-09 RX ADMIN — FUROSEMIDE 40 MG: 10 INJECTION, SOLUTION INTRAMUSCULAR; INTRAVENOUS at 18:28

## 2019-12-09 RX ADMIN — HYDRALAZINE HYDROCHLORIDE 25 MG: 25 TABLET, FILM COATED ORAL at 08:32

## 2019-12-09 RX ADMIN — FUROSEMIDE 40 MG: 10 INJECTION, SOLUTION INTRAMUSCULAR; INTRAVENOUS at 08:32

## 2019-12-09 RX ADMIN — POTASSIUM CHLORIDE 40 MEQ: 1500 TABLET, EXTENDED RELEASE ORAL at 08:32

## 2019-12-09 RX ADMIN — NITROGLYCERIN 20 MCG/MIN: 20 INJECTION INTRAVENOUS at 21:02

## 2019-12-09 RX ADMIN — POTASSIUM CHLORIDE 20 MEQ: 1500 TABLET, EXTENDED RELEASE ORAL at 20:39

## 2019-12-09 RX ADMIN — POTASSIUM CHLORIDE 40 MEQ: 1500 TABLET, EXTENDED RELEASE ORAL at 18:28

## 2019-12-09 RX ADMIN — CARVEDILOL 25 MG: 25 TABLET, FILM COATED ORAL at 08:31

## 2019-12-09 RX ADMIN — DILTIAZEM HYDROCHLORIDE 2.5 MG/HR: 5 INJECTION INTRAVENOUS at 21:00

## 2019-12-09 RX ADMIN — POTASSIUM CHLORIDE 20 MEQ: 1500 TABLET, EXTENDED RELEASE ORAL at 08:33

## 2019-12-09 RX ADMIN — ENOXAPARIN SODIUM 130 MG: 150 INJECTION SUBCUTANEOUS at 15:32

## 2019-12-09 RX ADMIN — NITROGLYCERIN 15 MCG/MIN: 20 INJECTION INTRAVENOUS at 13:06

## 2019-12-09 RX ADMIN — HYDRALAZINE HYDROCHLORIDE 20 MG: 20 INJECTION INTRAMUSCULAR; INTRAVENOUS at 02:03

## 2019-12-09 RX ADMIN — ATORVASTATIN CALCIUM 10 MG: 10 TABLET, FILM COATED ORAL at 20:39

## 2019-12-09 RX ADMIN — DILTIAZEM HYDROCHLORIDE 2.5 MG/HR: 5 INJECTION INTRAVENOUS at 13:06

## 2019-12-09 RX ADMIN — NYSTATIN: 100000 CREAM TOPICAL at 20:39

## 2019-12-09 RX ADMIN — CARVEDILOL 25 MG: 25 TABLET, FILM COATED ORAL at 18:28

## 2019-12-10 PROBLEM — I20.0 UNSTABLE ANGINA (HCC): Status: ACTIVE | Noted: 2019-12-06

## 2019-12-10 LAB
ALBUMIN SERPL-MCNC: 3.6 G/DL (ref 3.5–5.2)
ALBUMIN/GLOB SERPL: 1 G/DL
ALP SERPL-CCNC: 59 U/L (ref 39–117)
ALT SERPL W P-5'-P-CCNC: 21 U/L (ref 1–41)
ANION GAP SERPL CALCULATED.3IONS-SCNC: 11 MMOL/L (ref 5–15)
ANION GAP SERPL CALCULATED.3IONS-SCNC: 12 MMOL/L (ref 5–15)
APTT PPP: 27.7 SECONDS (ref 24–31)
AST SERPL-CCNC: 19 U/L (ref 1–40)
BASOPHILS # BLD AUTO: 0 10*3/MM3 (ref 0–0.2)
BASOPHILS # BLD AUTO: 0.1 10*3/MM3 (ref 0–0.2)
BASOPHILS NFR BLD AUTO: 0.6 % (ref 0–1.5)
BASOPHILS NFR BLD AUTO: 1.1 % (ref 0–1.5)
BILIRUB SERPL-MCNC: 0.6 MG/DL (ref 0.2–1.2)
BUN BLD-MCNC: 21 MG/DL (ref 8–23)
BUN BLD-MCNC: 24 MG/DL (ref 8–23)
BUN/CREAT SERPL: 19.4 (ref 7–25)
BUN/CREAT SERPL: 21.1 (ref 7–25)
CALCIUM SPEC-SCNC: 9.4 MG/DL (ref 8.6–10.5)
CALCIUM SPEC-SCNC: 9.8 MG/DL (ref 8.6–10.5)
CHLORIDE SERPL-SCNC: 100 MMOL/L (ref 98–107)
CHLORIDE SERPL-SCNC: 102 MMOL/L (ref 98–107)
CO2 SERPL-SCNC: 26 MMOL/L (ref 22–29)
CO2 SERPL-SCNC: 30 MMOL/L (ref 22–29)
CREAT BLD-MCNC: 1.08 MG/DL (ref 0.76–1.27)
CREAT BLD-MCNC: 1.14 MG/DL (ref 0.76–1.27)
DEPRECATED RDW RBC AUTO: 52.9 FL (ref 37–54)
DEPRECATED RDW RBC AUTO: 52.9 FL (ref 37–54)
EOSINOPHIL # BLD AUTO: 0.2 10*3/MM3 (ref 0–0.4)
EOSINOPHIL # BLD AUTO: 0.2 10*3/MM3 (ref 0–0.4)
EOSINOPHIL NFR BLD AUTO: 2.7 % (ref 0.3–6.2)
EOSINOPHIL NFR BLD AUTO: 2.8 % (ref 0.3–6.2)
ERYTHROCYTE [DISTWIDTH] IN BLOOD BY AUTOMATED COUNT: 17.1 % (ref 12.3–15.4)
ERYTHROCYTE [DISTWIDTH] IN BLOOD BY AUTOMATED COUNT: 17.2 % (ref 12.3–15.4)
GFR SERPL CREATININE-BSD FRML MDRD: 65 ML/MIN/1.73
GFR SERPL CREATININE-BSD FRML MDRD: 69 ML/MIN/1.73
GLOBULIN UR ELPH-MCNC: 3.6 GM/DL
GLUCOSE BLD-MCNC: 101 MG/DL (ref 65–99)
GLUCOSE BLD-MCNC: 120 MG/DL (ref 65–99)
HCT VFR BLD AUTO: 41.5 % (ref 37.5–51)
HCT VFR BLD AUTO: 45 % (ref 37.5–51)
HGB BLD-MCNC: 13.8 G/DL (ref 13–17.7)
HGB BLD-MCNC: 14.8 G/DL (ref 13–17.7)
INR PPP: 1.09 (ref 0.9–1.1)
LYMPHOCYTES # BLD AUTO: 1 10*3/MM3 (ref 0.7–3.1)
LYMPHOCYTES # BLD AUTO: 1.5 10*3/MM3 (ref 0.7–3.1)
LYMPHOCYTES NFR BLD AUTO: 14 % (ref 19.6–45.3)
LYMPHOCYTES NFR BLD AUTO: 19 % (ref 19.6–45.3)
MAGNESIUM SERPL-MCNC: 2 MG/DL (ref 1.6–2.4)
MCH RBC QN AUTO: 28.7 PG (ref 26.6–33)
MCH RBC QN AUTO: 28.9 PG (ref 26.6–33)
MCHC RBC AUTO-ENTMCNC: 32.9 G/DL (ref 31.5–35.7)
MCHC RBC AUTO-ENTMCNC: 33.1 G/DL (ref 31.5–35.7)
MCV RBC AUTO: 87.2 FL (ref 79–97)
MCV RBC AUTO: 87.4 FL (ref 79–97)
MONOCYTES # BLD AUTO: 1 10*3/MM3 (ref 0.1–0.9)
MONOCYTES # BLD AUTO: 1.1 10*3/MM3 (ref 0.1–0.9)
MONOCYTES NFR BLD AUTO: 14 % (ref 5–12)
MONOCYTES NFR BLD AUTO: 14.7 % (ref 5–12)
NEUTROPHILS # BLD AUTO: 4.9 10*3/MM3 (ref 1.7–7)
NEUTROPHILS # BLD AUTO: 4.9 10*3/MM3 (ref 1.7–7)
NEUTROPHILS NFR BLD AUTO: 63 % (ref 42.7–76)
NEUTROPHILS NFR BLD AUTO: 68.1 % (ref 42.7–76)
NRBC BLD AUTO-RTO: 0 /100 WBC (ref 0–0.2)
NRBC BLD AUTO-RTO: 0.1 /100 WBC (ref 0–0.2)
PLATELET # BLD AUTO: 189 10*3/MM3 (ref 140–450)
PLATELET # BLD AUTO: 199 10*3/MM3 (ref 140–450)
PMV BLD AUTO: 9.1 FL (ref 6–12)
PMV BLD AUTO: 9.5 FL (ref 6–12)
POTASSIUM BLD-SCNC: 4.7 MMOL/L (ref 3.5–5.2)
POTASSIUM BLD-SCNC: 4.8 MMOL/L (ref 3.5–5.2)
PROT SERPL-MCNC: 7.2 G/DL (ref 6–8.5)
PROTHROMBIN TIME: 11.3 SECONDS (ref 9.6–11.7)
RBC # BLD AUTO: 4.76 10*6/MM3 (ref 4.14–5.8)
RBC # BLD AUTO: 5.15 10*6/MM3 (ref 4.14–5.8)
SODIUM BLD-SCNC: 140 MMOL/L (ref 136–145)
SODIUM BLD-SCNC: 141 MMOL/L (ref 136–145)
WBC NRBC COR # BLD: 7.1 10*3/MM3 (ref 3.4–10.8)
WBC NRBC COR # BLD: 7.7 10*3/MM3 (ref 3.4–10.8)

## 2019-12-10 PROCEDURE — 93454 CORONARY ARTERY ANGIO S&I: CPT | Performed by: INTERNAL MEDICINE

## 2019-12-10 PROCEDURE — 25010000002 FUROSEMIDE PER 20 MG: Performed by: NURSE PRACTITIONER

## 2019-12-10 PROCEDURE — C1894 INTRO/SHEATH, NON-LASER: HCPCS | Performed by: INTERNAL MEDICINE

## 2019-12-10 PROCEDURE — 85610 PROTHROMBIN TIME: CPT | Performed by: INTERNAL MEDICINE

## 2019-12-10 PROCEDURE — 85025 COMPLETE CBC W/AUTO DIFF WBC: CPT | Performed by: INTERNAL MEDICINE

## 2019-12-10 PROCEDURE — 0 IOPAMIDOL PER 1 ML: Performed by: INTERNAL MEDICINE

## 2019-12-10 PROCEDURE — B2111ZZ FLUOROSCOPY OF MULTIPLE CORONARY ARTERIES USING LOW OSMOLAR CONTRAST: ICD-10-PCS | Performed by: INTERNAL MEDICINE

## 2019-12-10 PROCEDURE — 4A023N7 MEASUREMENT OF CARDIAC SAMPLING AND PRESSURE, LEFT HEART, PERCUTANEOUS APPROACH: ICD-10-PCS | Performed by: INTERNAL MEDICINE

## 2019-12-10 PROCEDURE — 25010000002 MIDAZOLAM PER 1 MG: Performed by: INTERNAL MEDICINE

## 2019-12-10 PROCEDURE — 25010000002 FENTANYL CITRATE (PF) 100 MCG/2ML SOLUTION: Performed by: INTERNAL MEDICINE

## 2019-12-10 PROCEDURE — 83735 ASSAY OF MAGNESIUM: CPT | Performed by: FAMILY MEDICINE

## 2019-12-10 PROCEDURE — 99232 SBSQ HOSP IP/OBS MODERATE 35: CPT | Performed by: INTERNAL MEDICINE

## 2019-12-10 PROCEDURE — C1769 GUIDE WIRE: HCPCS | Performed by: INTERNAL MEDICINE

## 2019-12-10 PROCEDURE — 85730 THROMBOPLASTIN TIME PARTIAL: CPT | Performed by: INTERNAL MEDICINE

## 2019-12-10 PROCEDURE — 80053 COMPREHEN METABOLIC PANEL: CPT | Performed by: INTERNAL MEDICINE

## 2019-12-10 RX ORDER — ATROPINE SULFATE 1 MG/ML
0.5 INJECTION, SOLUTION INTRAMUSCULAR; INTRAVENOUS; SUBCUTANEOUS
Status: DISCONTINUED | OUTPATIENT
Start: 2019-12-10 | End: 2019-12-17 | Stop reason: HOSPADM

## 2019-12-10 RX ORDER — FENTANYL CITRATE 50 UG/ML
INJECTION, SOLUTION INTRAMUSCULAR; INTRAVENOUS AS NEEDED
Status: DISCONTINUED | OUTPATIENT
Start: 2019-12-10 | End: 2019-12-10 | Stop reason: HOSPADM

## 2019-12-10 RX ORDER — SODIUM CHLORIDE 9 MG/ML
75 INJECTION, SOLUTION INTRAVENOUS CONTINUOUS
Status: DISCONTINUED | OUTPATIENT
Start: 2019-12-10 | End: 2019-12-11

## 2019-12-10 RX ORDER — LORAZEPAM 2 MG/ML
0.5 INJECTION INTRAMUSCULAR EVERY 6 HOURS PRN
Status: DISCONTINUED | OUTPATIENT
Start: 2019-12-10 | End: 2019-12-11

## 2019-12-10 RX ORDER — SODIUM CHLORIDE 9 MG/ML
1-3 INJECTION, SOLUTION INTRAVENOUS CONTINUOUS
Status: DISCONTINUED | OUTPATIENT
Start: 2019-12-10 | End: 2019-12-10

## 2019-12-10 RX ORDER — SODIUM CHLORIDE 9 MG/ML
250 INJECTION, SOLUTION INTRAVENOUS ONCE AS NEEDED
Status: DISCONTINUED | OUTPATIENT
Start: 2019-12-10 | End: 2019-12-17 | Stop reason: HOSPADM

## 2019-12-10 RX ORDER — MIDAZOLAM HYDROCHLORIDE 1 MG/ML
1 INJECTION INTRAMUSCULAR; INTRAVENOUS ONCE
Status: DISCONTINUED | OUTPATIENT
Start: 2019-12-10 | End: 2019-12-10

## 2019-12-10 RX ORDER — MIDAZOLAM HYDROCHLORIDE 1 MG/ML
INJECTION INTRAMUSCULAR; INTRAVENOUS AS NEEDED
Status: DISCONTINUED | OUTPATIENT
Start: 2019-12-10 | End: 2019-12-10 | Stop reason: HOSPADM

## 2019-12-10 RX ORDER — LIDOCAINE HYDROCHLORIDE 20 MG/ML
INJECTION, SOLUTION INFILTRATION; PERINEURAL AS NEEDED
Status: DISCONTINUED | OUTPATIENT
Start: 2019-12-10 | End: 2019-12-10 | Stop reason: HOSPADM

## 2019-12-10 RX ORDER — FENTANYL CITRATE 50 UG/ML
25 INJECTION, SOLUTION INTRAMUSCULAR; INTRAVENOUS ONCE
Status: DISCONTINUED | OUTPATIENT
Start: 2019-12-10 | End: 2019-12-17 | Stop reason: HOSPADM

## 2019-12-10 RX ADMIN — CARVEDILOL 25 MG: 25 TABLET, FILM COATED ORAL at 19:58

## 2019-12-10 RX ADMIN — NYSTATIN: 100000 CREAM TOPICAL at 19:58

## 2019-12-10 RX ADMIN — SODIUM CHLORIDE, PRESERVATIVE FREE 10 ML: 5 INJECTION INTRAVENOUS at 08:39

## 2019-12-10 RX ADMIN — NYSTATIN: 100000 CREAM TOPICAL at 08:39

## 2019-12-10 RX ADMIN — SODIUM CHLORIDE, PRESERVATIVE FREE 10 ML: 5 INJECTION INTRAVENOUS at 19:58

## 2019-12-10 RX ADMIN — SPIRONOLACTONE 25 MG: 25 TABLET, FILM COATED ORAL at 08:39

## 2019-12-10 RX ADMIN — HYDRALAZINE HYDROCHLORIDE 25 MG: 25 TABLET, FILM COATED ORAL at 08:39

## 2019-12-10 RX ADMIN — HYDRALAZINE HYDROCHLORIDE 25 MG: 25 TABLET, FILM COATED ORAL at 19:58

## 2019-12-10 RX ADMIN — POTASSIUM CHLORIDE 20 MEQ: 1500 TABLET, EXTENDED RELEASE ORAL at 08:39

## 2019-12-10 RX ADMIN — CARVEDILOL 25 MG: 25 TABLET, FILM COATED ORAL at 08:39

## 2019-12-10 RX ADMIN — FUROSEMIDE 40 MG: 10 INJECTION, SOLUTION INTRAMUSCULAR; INTRAVENOUS at 08:39

## 2019-12-10 RX ADMIN — ATORVASTATIN CALCIUM 10 MG: 10 TABLET, FILM COATED ORAL at 19:58

## 2019-12-10 RX ADMIN — SODIUM CHLORIDE 75 ML/HR: 900 INJECTION, SOLUTION INTRAVENOUS at 19:58

## 2019-12-10 RX ADMIN — AMLODIPINE BESYLATE 5 MG: 5 TABLET ORAL at 08:39

## 2019-12-10 RX ADMIN — POTASSIUM CHLORIDE 20 MEQ: 1500 TABLET, EXTENDED RELEASE ORAL at 19:58

## 2019-12-11 LAB
ANION GAP SERPL CALCULATED.3IONS-SCNC: 10 MMOL/L (ref 5–15)
ARTERIAL PATENCY WRIST A: POSITIVE
ATMOSPHERIC PRESS: ABNORMAL MM[HG]
BASE EXCESS BLDA CALC-SCNC: 4.7 MMOL/L (ref 0–3)
BASOPHILS # BLD AUTO: 0 10*3/MM3 (ref 0–0.2)
BASOPHILS NFR BLD AUTO: 0.6 % (ref 0–1.5)
BDY SITE: ABNORMAL
BUN BLD-MCNC: 24 MG/DL (ref 8–23)
BUN/CREAT SERPL: 23.5 (ref 7–25)
CA-I BLDA-SCNC: 1.04 MMOL/L (ref 1.15–1.33)
CALCIUM SPEC-SCNC: 9.3 MG/DL (ref 8.6–10.5)
CHLORIDE SERPL-SCNC: 103 MMOL/L (ref 98–107)
CO2 BLDA-SCNC: 29.6 MMOL/L (ref 22–29)
CO2 SERPL-SCNC: 25 MMOL/L (ref 22–29)
CREAT BLD-MCNC: 1.02 MG/DL (ref 0.76–1.27)
D-LACTATE SERPL-SCNC: 0.9 MMOL/L (ref 0.5–2)
DEPRECATED RDW RBC AUTO: 49.9 FL (ref 37–54)
DEPRECATED RDW RBC AUTO: 52.1 FL (ref 37–54)
EOSINOPHIL # BLD AUTO: 0.2 10*3/MM3 (ref 0–0.4)
EOSINOPHIL NFR BLD AUTO: 3.1 % (ref 0.3–6.2)
ERYTHROCYTE [DISTWIDTH] IN BLOOD BY AUTOMATED COUNT: 16.5 % (ref 12.3–15.4)
ERYTHROCYTE [DISTWIDTH] IN BLOOD BY AUTOMATED COUNT: 16.8 % (ref 12.3–15.4)
GFR SERPL CREATININE-BSD FRML MDRD: 74 ML/MIN/1.73
GLUCOSE BLD-MCNC: 88 MG/DL (ref 65–99)
GLUCOSE BLDC GLUCOMTR-MCNC: 104 MG/DL (ref 70–105)
GLUCOSE BLDC GLUCOMTR-MCNC: 109 MG/DL (ref 74–100)
HCO3 BLDA-SCNC: 28.4 MMOL/L (ref 21–28)
HCT VFR BLD AUTO: 43.4 % (ref 37.5–51)
HCT VFR BLD AUTO: 46.7 % (ref 37.5–51)
HCT VFR BLDA CALC: 49 % (ref 38–51)
HEMODILUTION: NO
HGB BLD-MCNC: 14.3 G/DL (ref 13–17.7)
HGB BLD-MCNC: 15.4 G/DL (ref 13–17.7)
HGB BLDA-MCNC: 16.8 G/DL (ref 12–17)
HOROWITZ INDEX BLD+IHG-RTO: 21 %
LYMPHOCYTES # BLD AUTO: 1.2 10*3/MM3 (ref 0.7–3.1)
LYMPHOCYTES NFR BLD AUTO: 16.1 % (ref 19.6–45.3)
MCH RBC QN AUTO: 28.3 PG (ref 26.6–33)
MCH RBC QN AUTO: 28.8 PG (ref 26.6–33)
MCHC RBC AUTO-ENTMCNC: 32.9 G/DL (ref 31.5–35.7)
MCHC RBC AUTO-ENTMCNC: 32.9 G/DL (ref 31.5–35.7)
MCV RBC AUTO: 85.8 FL (ref 79–97)
MCV RBC AUTO: 87.5 FL (ref 79–97)
MODALITY: ABNORMAL
MONOCYTES # BLD AUTO: 1.4 10*3/MM3 (ref 0.1–0.9)
MONOCYTES NFR BLD AUTO: 17.9 % (ref 5–12)
NEUTROPHILS # BLD AUTO: 4.7 10*3/MM3 (ref 1.7–7)
NEUTROPHILS NFR BLD AUTO: 62.3 % (ref 42.7–76)
NRBC BLD AUTO-RTO: 0.1 /100 WBC (ref 0–0.2)
PCO2 BLDA: 38.2 MM HG (ref 35–48)
PH BLDA: 7.48 PH UNITS (ref 7.35–7.45)
PLATELET # BLD AUTO: 199 10*3/MM3 (ref 140–450)
PLATELET # BLD AUTO: 202 10*3/MM3 (ref 140–450)
PMV BLD AUTO: 8.8 FL (ref 6–12)
PMV BLD AUTO: 8.9 FL (ref 6–12)
PO2 BLDA: 82.7 MM HG (ref 83–108)
POTASSIUM BLD-SCNC: 5 MMOL/L (ref 3.5–5.2)
POTASSIUM BLDA-SCNC: 4.1 MMOL/L (ref 3.5–4.5)
RBC # BLD AUTO: 4.96 10*6/MM3 (ref 4.14–5.8)
RBC # BLD AUTO: 5.44 10*6/MM3 (ref 4.14–5.8)
SAO2 % BLDCOA: 96.9 % (ref 94–98)
SODIUM BLD-SCNC: 138 MMOL/L (ref 136–145)
SODIUM BLDA-SCNC: 137 MMOL/L (ref 138–146)
WBC NRBC COR # BLD: 7.2 10*3/MM3 (ref 3.4–10.8)
WBC NRBC COR # BLD: 7.5 10*3/MM3 (ref 3.4–10.8)

## 2019-12-11 PROCEDURE — 80053 COMPREHEN METABOLIC PANEL: CPT | Performed by: INTERNAL MEDICINE

## 2019-12-11 PROCEDURE — 99232 SBSQ HOSP IP/OBS MODERATE 35: CPT | Performed by: INTERNAL MEDICINE

## 2019-12-11 PROCEDURE — 25010000002 FUROSEMIDE PER 20 MG: Performed by: INTERNAL MEDICINE

## 2019-12-11 PROCEDURE — 85018 HEMOGLOBIN: CPT

## 2019-12-11 PROCEDURE — 84484 ASSAY OF TROPONIN QUANT: CPT | Performed by: FAMILY MEDICINE

## 2019-12-11 PROCEDURE — 85610 PROTHROMBIN TIME: CPT | Performed by: FAMILY MEDICINE

## 2019-12-11 PROCEDURE — 94799 UNLISTED PULMONARY SVC/PX: CPT

## 2019-12-11 PROCEDURE — 82803 BLOOD GASES ANY COMBINATION: CPT

## 2019-12-11 PROCEDURE — 36600 WITHDRAWAL OF ARTERIAL BLOOD: CPT

## 2019-12-11 PROCEDURE — 82330 ASSAY OF CALCIUM: CPT

## 2019-12-11 PROCEDURE — 80051 ELECTROLYTE PANEL: CPT

## 2019-12-11 PROCEDURE — 93010 ELECTROCARDIOGRAM REPORT: CPT | Performed by: INTERNAL MEDICINE

## 2019-12-11 PROCEDURE — 85025 COMPLETE CBC W/AUTO DIFF WBC: CPT | Performed by: FAMILY MEDICINE

## 2019-12-11 PROCEDURE — 82962 GLUCOSE BLOOD TEST: CPT

## 2019-12-11 PROCEDURE — 85027 COMPLETE CBC AUTOMATED: CPT | Performed by: INTERNAL MEDICINE

## 2019-12-11 PROCEDURE — 25010000002 LORAZEPAM PER 2 MG: Performed by: FAMILY MEDICINE

## 2019-12-11 PROCEDURE — 85730 THROMBOPLASTIN TIME PARTIAL: CPT | Performed by: FAMILY MEDICINE

## 2019-12-11 PROCEDURE — 97166 OT EVAL MOD COMPLEX 45 MIN: CPT

## 2019-12-11 PROCEDURE — 83605 ASSAY OF LACTIC ACID: CPT

## 2019-12-11 RX ORDER — HYDRALAZINE HYDROCHLORIDE 20 MG/ML
10 INJECTION INTRAMUSCULAR; INTRAVENOUS EVERY 6 HOURS PRN
Status: DISCONTINUED | OUTPATIENT
Start: 2019-12-11 | End: 2019-12-17 | Stop reason: HOSPADM

## 2019-12-11 RX ORDER — HYDRALAZINE HYDROCHLORIDE 25 MG/1
50 TABLET, FILM COATED ORAL EVERY 8 HOURS SCHEDULED
Status: DISCONTINUED | OUTPATIENT
Start: 2019-12-11 | End: 2019-12-17 | Stop reason: HOSPADM

## 2019-12-11 RX ORDER — AMLODIPINE BESYLATE 5 MG/1
10 TABLET ORAL
Status: DISCONTINUED | OUTPATIENT
Start: 2019-12-11 | End: 2019-12-17 | Stop reason: HOSPADM

## 2019-12-11 RX ORDER — LORAZEPAM 2 MG/ML
0.5 INJECTION INTRAMUSCULAR EVERY 6 HOURS PRN
Status: DISPENSED | OUTPATIENT
Start: 2019-12-11 | End: 2019-12-13

## 2019-12-11 RX ADMIN — POTASSIUM CHLORIDE 20 MEQ: 1500 TABLET, EXTENDED RELEASE ORAL at 19:33

## 2019-12-11 RX ADMIN — APIXABAN 5 MG: 5 TABLET, FILM COATED ORAL at 19:33

## 2019-12-11 RX ADMIN — FUROSEMIDE 40 MG: 10 INJECTION, SOLUTION INTRAMUSCULAR; INTRAVENOUS at 09:47

## 2019-12-11 RX ADMIN — HYDRALAZINE HYDROCHLORIDE 50 MG: 25 TABLET, FILM COATED ORAL at 19:33

## 2019-12-11 RX ADMIN — SODIUM CHLORIDE, PRESERVATIVE FREE 10 ML: 5 INJECTION INTRAVENOUS at 19:33

## 2019-12-11 RX ADMIN — CARVEDILOL 25 MG: 25 TABLET, FILM COATED ORAL at 18:26

## 2019-12-11 RX ADMIN — LORAZEPAM 0.5 MG: 2 INJECTION INTRAMUSCULAR; INTRAVENOUS at 13:56

## 2019-12-11 RX ADMIN — FUROSEMIDE 40 MG: 10 INJECTION, SOLUTION INTRAMUSCULAR; INTRAVENOUS at 18:26

## 2019-12-11 RX ADMIN — CARVEDILOL 25 MG: 25 TABLET, FILM COATED ORAL at 09:46

## 2019-12-11 RX ADMIN — NYSTATIN: 100000 CREAM TOPICAL at 19:34

## 2019-12-11 RX ADMIN — AMLODIPINE BESYLATE 10 MG: 5 TABLET ORAL at 09:46

## 2019-12-11 RX ADMIN — HYDRALAZINE HYDROCHLORIDE 50 MG: 25 TABLET, FILM COATED ORAL at 13:56

## 2019-12-11 RX ADMIN — ATORVASTATIN CALCIUM 10 MG: 10 TABLET, FILM COATED ORAL at 19:33

## 2019-12-11 RX ADMIN — APIXABAN 5 MG: 5 TABLET, FILM COATED ORAL at 13:56

## 2019-12-11 RX ADMIN — NYSTATIN: 100000 CREAM TOPICAL at 09:47

## 2019-12-11 RX ADMIN — SPIRONOLACTONE 25 MG: 25 TABLET, FILM COATED ORAL at 09:46

## 2019-12-12 ENCOUNTER — APPOINTMENT (OUTPATIENT)
Dept: CT IMAGING | Facility: HOSPITAL | Age: 64
End: 2019-12-12

## 2019-12-12 ENCOUNTER — APPOINTMENT (OUTPATIENT)
Dept: GENERAL RADIOLOGY | Facility: HOSPITAL | Age: 64
End: 2019-12-12

## 2019-12-12 ENCOUNTER — APPOINTMENT (OUTPATIENT)
Dept: MRI IMAGING | Facility: HOSPITAL | Age: 64
End: 2019-12-12

## 2019-12-12 LAB
ALBUMIN SERPL-MCNC: 3.7 G/DL (ref 3.5–5.2)
ALBUMIN/GLOB SERPL: 0.9 G/DL
ALP SERPL-CCNC: 63 U/L (ref 39–117)
ALT SERPL W P-5'-P-CCNC: 33 U/L (ref 1–41)
ANION GAP SERPL CALCULATED.3IONS-SCNC: 12 MMOL/L (ref 5–15)
APTT PPP: 25.3 SECONDS (ref 24–31)
AST SERPL-CCNC: 27 U/L (ref 1–40)
BILIRUB SERPL-MCNC: 0.4 MG/DL (ref 0.2–1.2)
BUN BLD-MCNC: 27 MG/DL (ref 8–23)
BUN/CREAT SERPL: 24.5 (ref 7–25)
CALCIUM SPEC-SCNC: 9.8 MG/DL (ref 8.6–10.5)
CHLORIDE SERPL-SCNC: 100 MMOL/L (ref 98–107)
CHOLEST SERPL-MCNC: 117 MG/DL (ref 0–200)
CO2 SERPL-SCNC: 27 MMOL/L (ref 22–29)
CREAT BLD-MCNC: 1.1 MG/DL (ref 0.76–1.27)
DEPRECATED RDW RBC AUTO: 49.4 FL (ref 37–54)
ERYTHROCYTE [DISTWIDTH] IN BLOOD BY AUTOMATED COUNT: 16.5 % (ref 12.3–15.4)
GFR SERPL CREATININE-BSD FRML MDRD: 68 ML/MIN/1.73
GLOBULIN UR ELPH-MCNC: 4.2 GM/DL
GLUCOSE BLD-MCNC: 115 MG/DL (ref 65–99)
GLUCOSE BLDC GLUCOMTR-MCNC: 116 MG/DL (ref 70–105)
GLUCOSE BLDC GLUCOMTR-MCNC: 128 MG/DL (ref 70–105)
HBA1C MFR BLD: 5.5 % (ref 3.5–5.6)
HCT VFR BLD AUTO: 48.3 % (ref 37.5–51)
HDLC SERPL-MCNC: 31 MG/DL (ref 40–60)
HGB BLD-MCNC: 15.7 G/DL (ref 13–17.7)
INR PPP: 1.15 (ref 0.9–1.1)
LDLC SERPL CALC-MCNC: 70 MG/DL (ref 0–100)
LDLC/HDLC SERPL: 2.26 {RATIO}
MCH RBC QN AUTO: 27.8 PG (ref 26.6–33)
MCHC RBC AUTO-ENTMCNC: 32.5 G/DL (ref 31.5–35.7)
MCV RBC AUTO: 85.6 FL (ref 79–97)
PLATELET # BLD AUTO: 199 10*3/MM3 (ref 140–450)
PMV BLD AUTO: 9.1 FL (ref 6–12)
POTASSIUM BLD-SCNC: 4.2 MMOL/L (ref 3.5–5.2)
PROT SERPL-MCNC: 7.9 G/DL (ref 6–8.5)
PROTHROMBIN TIME: 11.8 SECONDS (ref 9.6–11.7)
RBC # BLD AUTO: 5.64 10*6/MM3 (ref 4.14–5.8)
SODIUM BLD-SCNC: 139 MMOL/L (ref 136–145)
TRIGL SERPL-MCNC: 80 MG/DL (ref 0–150)
TROPONIN T SERPL-MCNC: 0.01 NG/ML (ref 0–0.03)
TSH SERPL DL<=0.05 MIU/L-ACNC: 3.6 UIU/ML (ref 0.27–4.2)
VIT B12 BLD-MCNC: 594 PG/ML (ref 211–946)
VLDLC SERPL-MCNC: 16 MG/DL
WBC NRBC COR # BLD: 7.1 10*3/MM3 (ref 3.4–10.8)

## 2019-12-12 PROCEDURE — 70450 CT HEAD/BRAIN W/O DYE: CPT

## 2019-12-12 PROCEDURE — 97164 PT RE-EVAL EST PLAN CARE: CPT

## 2019-12-12 PROCEDURE — 25010000002 GADOTERIDOL PER 1 ML: Performed by: FAMILY MEDICINE

## 2019-12-12 PROCEDURE — 85027 COMPLETE CBC AUTOMATED: CPT | Performed by: FAMILY MEDICINE

## 2019-12-12 PROCEDURE — A9576 INJ PROHANCE MULTIPACK: HCPCS | Performed by: FAMILY MEDICINE

## 2019-12-12 PROCEDURE — 83036 HEMOGLOBIN GLYCOSYLATED A1C: CPT | Performed by: FAMILY MEDICINE

## 2019-12-12 PROCEDURE — 25010000002 LORAZEPAM PER 2 MG: Performed by: FAMILY MEDICINE

## 2019-12-12 PROCEDURE — 70549 MR ANGIOGRAPH NECK W/O&W/DYE: CPT

## 2019-12-12 PROCEDURE — 92523 SPEECH SOUND LANG COMPREHEN: CPT

## 2019-12-12 PROCEDURE — 99232 SBSQ HOSP IP/OBS MODERATE 35: CPT | Performed by: INTERNAL MEDICINE

## 2019-12-12 PROCEDURE — 99222 1ST HOSP IP/OBS MODERATE 55: CPT | Performed by: NURSE PRACTITIONER

## 2019-12-12 PROCEDURE — 70544 MR ANGIOGRAPHY HEAD W/O DYE: CPT

## 2019-12-12 PROCEDURE — 25010000002 HYDRALAZINE PER 20 MG: Performed by: NURSE PRACTITIONER

## 2019-12-12 PROCEDURE — 80061 LIPID PANEL: CPT | Performed by: FAMILY MEDICINE

## 2019-12-12 PROCEDURE — 74018 RADEX ABDOMEN 1 VIEW: CPT

## 2019-12-12 PROCEDURE — 82962 GLUCOSE BLOOD TEST: CPT

## 2019-12-12 PROCEDURE — 84443 ASSAY THYROID STIM HORMONE: CPT | Performed by: FAMILY MEDICINE

## 2019-12-12 PROCEDURE — 70551 MRI BRAIN STEM W/O DYE: CPT

## 2019-12-12 PROCEDURE — 97116 GAIT TRAINING THERAPY: CPT

## 2019-12-12 PROCEDURE — 25010000002 FUROSEMIDE PER 20 MG: Performed by: INTERNAL MEDICINE

## 2019-12-12 PROCEDURE — 71045 X-RAY EXAM CHEST 1 VIEW: CPT

## 2019-12-12 PROCEDURE — 97168 OT RE-EVAL EST PLAN CARE: CPT

## 2019-12-12 PROCEDURE — 82607 VITAMIN B-12: CPT | Performed by: FAMILY MEDICINE

## 2019-12-12 PROCEDURE — 97535 SELF CARE MNGMENT TRAINING: CPT

## 2019-12-12 RX ORDER — LORAZEPAM 2 MG/ML
1 INJECTION INTRAMUSCULAR ONCE
Status: COMPLETED | OUTPATIENT
Start: 2019-12-12 | End: 2019-12-12

## 2019-12-12 RX ORDER — ATORVASTATIN CALCIUM 40 MG/1
40 TABLET, FILM COATED ORAL NIGHTLY
Status: DISCONTINUED | OUTPATIENT
Start: 2019-12-12 | End: 2019-12-17 | Stop reason: HOSPADM

## 2019-12-12 RX ADMIN — POTASSIUM CHLORIDE 20 MEQ: 1500 TABLET, EXTENDED RELEASE ORAL at 09:04

## 2019-12-12 RX ADMIN — FUROSEMIDE 40 MG: 10 INJECTION, SOLUTION INTRAMUSCULAR; INTRAVENOUS at 17:38

## 2019-12-12 RX ADMIN — NYSTATIN: 100000 CREAM TOPICAL at 13:05

## 2019-12-12 RX ADMIN — GADOTERIDOL 20 ML: 279.3 INJECTION, SOLUTION INTRAVENOUS at 15:17

## 2019-12-12 RX ADMIN — LORAZEPAM 1 MG: 2 INJECTION INTRAMUSCULAR; INTRAVENOUS at 08:59

## 2019-12-12 RX ADMIN — LORAZEPAM 1 MG: 2 INJECTION INTRAMUSCULAR; INTRAVENOUS at 14:16

## 2019-12-12 RX ADMIN — CARVEDILOL 25 MG: 25 TABLET, FILM COATED ORAL at 17:38

## 2019-12-12 RX ADMIN — HYDRALAZINE HYDROCHLORIDE 50 MG: 25 TABLET, FILM COATED ORAL at 05:23

## 2019-12-12 RX ADMIN — CARVEDILOL 25 MG: 25 TABLET, FILM COATED ORAL at 09:03

## 2019-12-12 RX ADMIN — HYDRALAZINE HYDROCHLORIDE 10 MG: 20 INJECTION INTRAMUSCULAR; INTRAVENOUS at 00:24

## 2019-12-12 RX ADMIN — FUROSEMIDE 40 MG: 10 INJECTION, SOLUTION INTRAMUSCULAR; INTRAVENOUS at 13:05

## 2019-12-12 RX ADMIN — HYDRALAZINE HYDROCHLORIDE 50 MG: 25 TABLET, FILM COATED ORAL at 14:17

## 2019-12-12 RX ADMIN — HYDRALAZINE HYDROCHLORIDE 50 MG: 25 TABLET, FILM COATED ORAL at 20:55

## 2019-12-12 RX ADMIN — APIXABAN 5 MG: 5 TABLET, FILM COATED ORAL at 09:01

## 2019-12-12 RX ADMIN — AMLODIPINE BESYLATE 10 MG: 5 TABLET ORAL at 09:02

## 2019-12-12 RX ADMIN — SPIRONOLACTONE 25 MG: 25 TABLET, FILM COATED ORAL at 09:04

## 2019-12-13 ENCOUNTER — APPOINTMENT (OUTPATIENT)
Dept: CARDIOLOGY | Facility: HOSPITAL | Age: 64
End: 2019-12-13

## 2019-12-13 LAB
BH CV XLRA MEAS LEFT CCA RATIO VEL: 159 CM/SEC
BH CV XLRA MEAS LEFT DIST CCA EDV: 9.8 CM/SEC
BH CV XLRA MEAS LEFT DIST CCA PSV: 43.4 CM/SEC
BH CV XLRA MEAS LEFT DIST ICA EDV: -10 CM/SEC
BH CV XLRA MEAS LEFT DIST ICA PSV: -45.8 CM/SEC
BH CV XLRA MEAS LEFT ICA RATIO VEL: -45.8 CM/SEC
BH CV XLRA MEAS LEFT ICA/CCA RATIO: -0.29
BH CV XLRA MEAS LEFT PROX CCA EDV: 23.4 CM/SEC
BH CV XLRA MEAS LEFT PROX CCA PSV: 159 CM/SEC
BH CV XLRA MEAS LEFT PROX ECA PSV: -78.6 CM/SEC
BH CV XLRA MEAS LEFT PROX ICA EDV: -11.3 CM/SEC
BH CV XLRA MEAS LEFT PROX ICA PSV: -29.8 CM/SEC
BH CV XLRA MEAS LEFT PROX SCLA PSV: 101 CM/SEC
BH CV XLRA MEAS LEFT VERTEBRAL A PSV: 44.5 CM/SEC
BH CV XLRA MEAS RIGHT CCA RATIO VEL: -61.9 CM/SEC
BH CV XLRA MEAS RIGHT DIST CCA EDV: 13.2 CM/SEC
BH CV XLRA MEAS RIGHT DIST CCA PSV: 59.7 CM/SEC
BH CV XLRA MEAS RIGHT DIST ICA EDV: -11.4 CM/SEC
BH CV XLRA MEAS RIGHT DIST ICA PSV: -34.3 CM/SEC
BH CV XLRA MEAS RIGHT ICA RATIO VEL: -34.3 CM/SEC
BH CV XLRA MEAS RIGHT ICA/CCA RATIO: 0.55
BH CV XLRA MEAS RIGHT PROX CCA EDV: -14.5 CM/SEC
BH CV XLRA MEAS RIGHT PROX CCA PSV: -61.9 CM/SEC
BH CV XLRA MEAS RIGHT PROX ECA PSV: -90.7 CM/SEC
BH CV XLRA MEAS RIGHT PROX ICA EDV: -9.3 CM/SEC
BH CV XLRA MEAS RIGHT PROX ICA PSV: -32.1 CM/SEC
BH CV XLRA MEAS RIGHT PROX SCLA PSV: 84.5 CM/SEC
BH CV XLRA MEAS RIGHT VERTEBRAL A PSV: -40.8 CM/SEC

## 2019-12-13 PROCEDURE — 97116 GAIT TRAINING THERAPY: CPT

## 2019-12-13 PROCEDURE — 99232 SBSQ HOSP IP/OBS MODERATE 35: CPT | Performed by: NURSE PRACTITIONER

## 2019-12-13 PROCEDURE — 99232 SBSQ HOSP IP/OBS MODERATE 35: CPT | Performed by: INTERNAL MEDICINE

## 2019-12-13 PROCEDURE — 25010000002 FUROSEMIDE PER 20 MG: Performed by: INTERNAL MEDICINE

## 2019-12-13 PROCEDURE — 97535 SELF CARE MNGMENT TRAINING: CPT

## 2019-12-13 PROCEDURE — 97530 THERAPEUTIC ACTIVITIES: CPT

## 2019-12-13 PROCEDURE — 92507 TX SP LANG VOICE COMM INDIV: CPT

## 2019-12-13 PROCEDURE — 93880 EXTRACRANIAL BILAT STUDY: CPT

## 2019-12-13 RX ADMIN — POTASSIUM CHLORIDE 20 MEQ: 1500 TABLET, EXTENDED RELEASE ORAL at 09:50

## 2019-12-13 RX ADMIN — NYSTATIN: 100000 CREAM TOPICAL at 09:52

## 2019-12-13 RX ADMIN — CARVEDILOL 25 MG: 25 TABLET, FILM COATED ORAL at 09:51

## 2019-12-13 RX ADMIN — AMLODIPINE BESYLATE 10 MG: 5 TABLET ORAL at 09:50

## 2019-12-13 RX ADMIN — CARVEDILOL 25 MG: 25 TABLET, FILM COATED ORAL at 17:53

## 2019-12-13 RX ADMIN — FUROSEMIDE 40 MG: 10 INJECTION, SOLUTION INTRAMUSCULAR; INTRAVENOUS at 09:51

## 2019-12-13 RX ADMIN — POTASSIUM CHLORIDE 20 MEQ: 1500 TABLET, EXTENDED RELEASE ORAL at 21:53

## 2019-12-13 RX ADMIN — SPIRONOLACTONE 25 MG: 25 TABLET, FILM COATED ORAL at 09:50

## 2019-12-13 RX ADMIN — APIXABAN 5 MG: 5 TABLET, FILM COATED ORAL at 09:51

## 2019-12-13 RX ADMIN — FUROSEMIDE 40 MG: 10 INJECTION, SOLUTION INTRAMUSCULAR; INTRAVENOUS at 17:53

## 2019-12-13 RX ADMIN — HYDRALAZINE HYDROCHLORIDE 50 MG: 25 TABLET, FILM COATED ORAL at 15:05

## 2019-12-13 RX ADMIN — HYDRALAZINE HYDROCHLORIDE 50 MG: 25 TABLET, FILM COATED ORAL at 21:53

## 2019-12-13 RX ADMIN — APIXABAN 5 MG: 5 TABLET, FILM COATED ORAL at 21:53

## 2019-12-13 RX ADMIN — ATORVASTATIN CALCIUM 40 MG: 40 TABLET, FILM COATED ORAL at 21:53

## 2019-12-13 RX ADMIN — SODIUM CHLORIDE, PRESERVATIVE FREE 10 ML: 5 INJECTION INTRAVENOUS at 09:51

## 2019-12-14 LAB
ALBUMIN SERPL-MCNC: 3.8 G/DL (ref 3.5–5.2)
ALBUMIN/GLOB SERPL: 0.9 G/DL
ALP SERPL-CCNC: 68 U/L (ref 39–117)
ALT SERPL W P-5'-P-CCNC: 43 U/L (ref 1–41)
ANION GAP SERPL CALCULATED.3IONS-SCNC: 13 MMOL/L (ref 5–15)
AST SERPL-CCNC: 36 U/L (ref 1–40)
BASOPHILS # BLD AUTO: 0 10*3/MM3 (ref 0–0.2)
BASOPHILS NFR BLD AUTO: 0.6 % (ref 0–1.5)
BILIRUB SERPL-MCNC: 0.5 MG/DL (ref 0.2–1.2)
BUN BLD-MCNC: 38 MG/DL (ref 8–23)
BUN/CREAT SERPL: 32.8 (ref 7–25)
CALCIUM SPEC-SCNC: 9.7 MG/DL (ref 8.6–10.5)
CHLORIDE SERPL-SCNC: 99 MMOL/L (ref 98–107)
CO2 SERPL-SCNC: 25 MMOL/L (ref 22–29)
CREAT BLD-MCNC: 1.16 MG/DL (ref 0.76–1.27)
DEPRECATED RDW RBC AUTO: 49 FL (ref 37–54)
EOSINOPHIL # BLD AUTO: 0.2 10*3/MM3 (ref 0–0.4)
EOSINOPHIL NFR BLD AUTO: 2 % (ref 0.3–6.2)
ERYTHROCYTE [DISTWIDTH] IN BLOOD BY AUTOMATED COUNT: 16.4 % (ref 12.3–15.4)
GFR SERPL CREATININE-BSD FRML MDRD: 64 ML/MIN/1.73
GLOBULIN UR ELPH-MCNC: 4.3 GM/DL
GLUCOSE BLD-MCNC: 107 MG/DL (ref 65–99)
HCT VFR BLD AUTO: 49.1 % (ref 37.5–51)
HGB BLD-MCNC: 16.2 G/DL (ref 13–17.7)
LYMPHOCYTES # BLD AUTO: 1.5 10*3/MM3 (ref 0.7–3.1)
LYMPHOCYTES NFR BLD AUTO: 19.5 % (ref 19.6–45.3)
MAGNESIUM SERPL-MCNC: 2.6 MG/DL (ref 1.6–2.4)
MCH RBC QN AUTO: 28.1 PG (ref 26.6–33)
MCHC RBC AUTO-ENTMCNC: 32.9 G/DL (ref 31.5–35.7)
MCV RBC AUTO: 85.4 FL (ref 79–97)
MONOCYTES # BLD AUTO: 1.1 10*3/MM3 (ref 0.1–0.9)
MONOCYTES NFR BLD AUTO: 13.3 % (ref 5–12)
NEUTROPHILS # BLD AUTO: 5.1 10*3/MM3 (ref 1.7–7)
NEUTROPHILS NFR BLD AUTO: 64.6 % (ref 42.7–76)
NRBC BLD AUTO-RTO: 0.3 /100 WBC (ref 0–0.2)
PLATELET # BLD AUTO: 224 10*3/MM3 (ref 140–450)
PMV BLD AUTO: 9 FL (ref 6–12)
POTASSIUM BLD-SCNC: 4.6 MMOL/L (ref 3.5–5.2)
PROT SERPL-MCNC: 8.1 G/DL (ref 6–8.5)
RBC # BLD AUTO: 5.75 10*6/MM3 (ref 4.14–5.8)
SODIUM BLD-SCNC: 137 MMOL/L (ref 136–145)
WBC NRBC COR # BLD: 7.9 10*3/MM3 (ref 3.4–10.8)

## 2019-12-14 PROCEDURE — 80053 COMPREHEN METABOLIC PANEL: CPT | Performed by: NURSE PRACTITIONER

## 2019-12-14 PROCEDURE — 85025 COMPLETE CBC W/AUTO DIFF WBC: CPT | Performed by: NURSE PRACTITIONER

## 2019-12-14 PROCEDURE — 83735 ASSAY OF MAGNESIUM: CPT | Performed by: NURSE PRACTITIONER

## 2019-12-14 PROCEDURE — 25010000002 FUROSEMIDE PER 20 MG: Performed by: INTERNAL MEDICINE

## 2019-12-14 RX ADMIN — ATORVASTATIN CALCIUM 40 MG: 40 TABLET, FILM COATED ORAL at 20:20

## 2019-12-14 RX ADMIN — SODIUM CHLORIDE, PRESERVATIVE FREE 10 ML: 5 INJECTION INTRAVENOUS at 20:21

## 2019-12-14 RX ADMIN — APIXABAN 5 MG: 5 TABLET, FILM COATED ORAL at 08:28

## 2019-12-14 RX ADMIN — POTASSIUM CHLORIDE 20 MEQ: 1500 TABLET, EXTENDED RELEASE ORAL at 20:20

## 2019-12-14 RX ADMIN — CARVEDILOL 25 MG: 25 TABLET, FILM COATED ORAL at 17:33

## 2019-12-14 RX ADMIN — HYDRALAZINE HYDROCHLORIDE 50 MG: 25 TABLET, FILM COATED ORAL at 21:23

## 2019-12-14 RX ADMIN — NYSTATIN: 100000 CREAM TOPICAL at 20:23

## 2019-12-14 RX ADMIN — SPIRONOLACTONE 25 MG: 25 TABLET, FILM COATED ORAL at 08:28

## 2019-12-14 RX ADMIN — HYDRALAZINE HYDROCHLORIDE 50 MG: 25 TABLET, FILM COATED ORAL at 05:12

## 2019-12-14 RX ADMIN — FUROSEMIDE 40 MG: 10 INJECTION, SOLUTION INTRAMUSCULAR; INTRAVENOUS at 08:21

## 2019-12-14 RX ADMIN — AMLODIPINE BESYLATE 10 MG: 5 TABLET ORAL at 08:28

## 2019-12-14 RX ADMIN — APIXABAN 5 MG: 5 TABLET, FILM COATED ORAL at 20:20

## 2019-12-14 RX ADMIN — CARVEDILOL 25 MG: 25 TABLET, FILM COATED ORAL at 08:22

## 2019-12-14 RX ADMIN — HYDRALAZINE HYDROCHLORIDE 50 MG: 25 TABLET, FILM COATED ORAL at 13:27

## 2019-12-14 RX ADMIN — NYSTATIN: 100000 CREAM TOPICAL at 08:29

## 2019-12-14 RX ADMIN — FUROSEMIDE 40 MG: 10 INJECTION, SOLUTION INTRAMUSCULAR; INTRAVENOUS at 17:32

## 2019-12-14 RX ADMIN — POTASSIUM CHLORIDE 20 MEQ: 1500 TABLET, EXTENDED RELEASE ORAL at 08:22

## 2019-12-15 PROCEDURE — 93010 ELECTROCARDIOGRAM REPORT: CPT | Performed by: INTERNAL MEDICINE

## 2019-12-15 PROCEDURE — 25010000002 FUROSEMIDE PER 20 MG: Performed by: INTERNAL MEDICINE

## 2019-12-15 RX ADMIN — APIXABAN 5 MG: 5 TABLET, FILM COATED ORAL at 21:24

## 2019-12-15 RX ADMIN — POTASSIUM CHLORIDE 20 MEQ: 1500 TABLET, EXTENDED RELEASE ORAL at 21:24

## 2019-12-15 RX ADMIN — NYSTATIN: 100000 CREAM TOPICAL at 21:26

## 2019-12-15 RX ADMIN — ATORVASTATIN CALCIUM 40 MG: 40 TABLET, FILM COATED ORAL at 21:24

## 2019-12-15 RX ADMIN — FUROSEMIDE 40 MG: 10 INJECTION, SOLUTION INTRAMUSCULAR; INTRAVENOUS at 10:40

## 2019-12-15 RX ADMIN — HYDRALAZINE HYDROCHLORIDE 50 MG: 25 TABLET, FILM COATED ORAL at 21:24

## 2019-12-15 RX ADMIN — NYSTATIN: 100000 CREAM TOPICAL at 10:42

## 2019-12-15 RX ADMIN — POTASSIUM CHLORIDE 20 MEQ: 1500 TABLET, EXTENDED RELEASE ORAL at 10:41

## 2019-12-15 RX ADMIN — CARVEDILOL 25 MG: 25 TABLET, FILM COATED ORAL at 18:41

## 2019-12-15 RX ADMIN — FUROSEMIDE 40 MG: 10 INJECTION, SOLUTION INTRAMUSCULAR; INTRAVENOUS at 18:41

## 2019-12-15 RX ADMIN — SODIUM CHLORIDE, PRESERVATIVE FREE 10 ML: 5 INJECTION INTRAVENOUS at 21:24

## 2019-12-15 RX ADMIN — APIXABAN 5 MG: 5 TABLET, FILM COATED ORAL at 10:41

## 2019-12-15 RX ADMIN — CARVEDILOL 25 MG: 25 TABLET, FILM COATED ORAL at 10:41

## 2019-12-15 RX ADMIN — HYDRALAZINE HYDROCHLORIDE 50 MG: 25 TABLET, FILM COATED ORAL at 05:57

## 2019-12-15 RX ADMIN — AMLODIPINE BESYLATE 10 MG: 5 TABLET ORAL at 10:40

## 2019-12-15 RX ADMIN — SPIRONOLACTONE 25 MG: 25 TABLET, FILM COATED ORAL at 10:42

## 2019-12-16 PROCEDURE — 25010000002 FUROSEMIDE PER 20 MG: Performed by: INTERNAL MEDICINE

## 2019-12-16 PROCEDURE — 97112 NEUROMUSCULAR REEDUCATION: CPT

## 2019-12-16 PROCEDURE — 97535 SELF CARE MNGMENT TRAINING: CPT

## 2019-12-16 PROCEDURE — 97116 GAIT TRAINING THERAPY: CPT

## 2019-12-16 PROCEDURE — 92507 TX SP LANG VOICE COMM INDIV: CPT

## 2019-12-16 PROCEDURE — 97530 THERAPEUTIC ACTIVITIES: CPT

## 2019-12-16 RX ADMIN — CARVEDILOL 25 MG: 25 TABLET, FILM COATED ORAL at 18:02

## 2019-12-16 RX ADMIN — SODIUM CHLORIDE, PRESERVATIVE FREE 10 ML: 5 INJECTION INTRAVENOUS at 09:38

## 2019-12-16 RX ADMIN — HYDRALAZINE HYDROCHLORIDE 50 MG: 25 TABLET, FILM COATED ORAL at 13:44

## 2019-12-16 RX ADMIN — ATORVASTATIN CALCIUM 40 MG: 40 TABLET, FILM COATED ORAL at 20:21

## 2019-12-16 RX ADMIN — APIXABAN 5 MG: 5 TABLET, FILM COATED ORAL at 20:21

## 2019-12-16 RX ADMIN — HYDRALAZINE HYDROCHLORIDE 50 MG: 25 TABLET, FILM COATED ORAL at 20:42

## 2019-12-16 RX ADMIN — APIXABAN 5 MG: 5 TABLET, FILM COATED ORAL at 09:38

## 2019-12-16 RX ADMIN — AMLODIPINE BESYLATE 10 MG: 5 TABLET ORAL at 09:39

## 2019-12-16 RX ADMIN — SPIRONOLACTONE 25 MG: 25 TABLET, FILM COATED ORAL at 09:39

## 2019-12-16 RX ADMIN — FUROSEMIDE 40 MG: 10 INJECTION, SOLUTION INTRAMUSCULAR; INTRAVENOUS at 09:40

## 2019-12-16 RX ADMIN — FUROSEMIDE 40 MG: 10 INJECTION, SOLUTION INTRAMUSCULAR; INTRAVENOUS at 18:02

## 2019-12-16 RX ADMIN — CARVEDILOL 25 MG: 25 TABLET, FILM COATED ORAL at 09:39

## 2019-12-16 RX ADMIN — POTASSIUM CHLORIDE 20 MEQ: 1500 TABLET, EXTENDED RELEASE ORAL at 20:22

## 2019-12-16 RX ADMIN — SODIUM CHLORIDE, PRESERVATIVE FREE 10 ML: 5 INJECTION INTRAVENOUS at 20:22

## 2019-12-16 RX ADMIN — NYSTATIN: 100000 CREAM TOPICAL at 09:40

## 2019-12-16 RX ADMIN — HYDRALAZINE HYDROCHLORIDE 50 MG: 25 TABLET, FILM COATED ORAL at 05:53

## 2019-12-16 RX ADMIN — POTASSIUM CHLORIDE 20 MEQ: 1500 TABLET, EXTENDED RELEASE ORAL at 09:38

## 2019-12-16 RX ADMIN — NYSTATIN 1 APPLICATION: 100000 CREAM TOPICAL at 20:23

## 2019-12-17 VITALS
HEART RATE: 67 BPM | HEIGHT: 72 IN | DIASTOLIC BLOOD PRESSURE: 77 MMHG | WEIGHT: 246.91 LBS | OXYGEN SATURATION: 94 % | RESPIRATION RATE: 17 BRPM | BODY MASS INDEX: 33.44 KG/M2 | TEMPERATURE: 97.4 F | SYSTOLIC BLOOD PRESSURE: 113 MMHG

## 2019-12-17 PROBLEM — IMO0002 DEGENERATION OF INTERVERTEBRAL DISC: Status: ACTIVE | Noted: 2019-12-17

## 2019-12-17 PROBLEM — E66.9 OBESITY: Status: ACTIVE | Noted: 2019-12-17

## 2019-12-17 PROBLEM — G95.9 CERVICAL MYELOPATHY (HCC): Status: ACTIVE | Noted: 2017-10-17

## 2019-12-17 PROBLEM — M54.17 LUMBOSACRAL RADICULOPATHY: Status: ACTIVE | Noted: 2017-10-17

## 2019-12-17 PROBLEM — I48.0 PAROXYSMAL ATRIAL FIBRILLATION (HCC): Status: ACTIVE | Noted: 2019-12-17

## 2019-12-17 PROBLEM — M51.04 THORACIC DISC DISEASE WITH MYELOPATHY: Status: ACTIVE | Noted: 2017-10-17

## 2019-12-17 PROBLEM — M50.10 CERVICAL DISC DISORDER WITH RADICULOPATHY: Status: ACTIVE | Noted: 2017-11-08

## 2019-12-17 PROBLEM — I10 ESSENTIAL (PRIMARY) HYPERTENSION: Status: ACTIVE | Noted: 2019-12-17

## 2019-12-17 PROBLEM — I63.9 COMPLETED STROKE (HCC): Status: ACTIVE | Noted: 2019-12-17

## 2019-12-17 PROBLEM — M48.061 SPINAL STENOSIS OF LUMBAR REGION: Status: ACTIVE | Noted: 2017-10-17

## 2019-12-17 PROBLEM — G31.84 MILD COGNITIVE IMPAIRMENT: Status: ACTIVE | Noted: 2019-12-17

## 2019-12-17 PROBLEM — I51.89 IMPAIRED LEFT VENTRICULAR FUNCTION: Status: ACTIVE | Noted: 2019-12-17

## 2019-12-17 PROCEDURE — 25010000002 FUROSEMIDE PER 20 MG: Performed by: INTERNAL MEDICINE

## 2019-12-17 RX ORDER — HYDRALAZINE HYDROCHLORIDE 50 MG/1
50 TABLET, FILM COATED ORAL 3 TIMES DAILY
Qty: 90 TABLET | Refills: 0 | OUTPATIENT
Start: 2019-12-17 | End: 2020-09-12 | Stop reason: HOSPADM

## 2019-12-17 RX ORDER — AMLODIPINE BESYLATE 10 MG/1
10 TABLET ORAL
Qty: 30 TABLET | Refills: 3 | Status: SHIPPED | OUTPATIENT
Start: 2019-12-17 | End: 2020-08-04 | Stop reason: HOSPADM

## 2019-12-17 RX ADMIN — NYSTATIN: 100000 CREAM TOPICAL at 10:22

## 2019-12-17 RX ADMIN — APIXABAN 5 MG: 5 TABLET, FILM COATED ORAL at 10:21

## 2019-12-17 RX ADMIN — POTASSIUM CHLORIDE 20 MEQ: 1500 TABLET, EXTENDED RELEASE ORAL at 10:20

## 2019-12-17 RX ADMIN — CARVEDILOL 25 MG: 25 TABLET, FILM COATED ORAL at 10:20

## 2019-12-17 RX ADMIN — SODIUM CHLORIDE, PRESERVATIVE FREE 10 ML: 5 INJECTION INTRAVENOUS at 10:23

## 2019-12-17 RX ADMIN — HYDRALAZINE HYDROCHLORIDE 50 MG: 25 TABLET, FILM COATED ORAL at 06:32

## 2019-12-17 RX ADMIN — FUROSEMIDE 40 MG: 10 INJECTION, SOLUTION INTRAMUSCULAR; INTRAVENOUS at 10:19

## 2019-12-27 ENCOUNTER — TELEPHONE (OUTPATIENT)
Dept: CARDIAC REHAB | Facility: HOSPITAL | Age: 64
End: 2019-12-27

## 2020-04-19 ENCOUNTER — HOSPITAL ENCOUNTER (EMERGENCY)
Facility: HOSPITAL | Age: 65
Discharge: HOME | End: 2020-04-19
Admitting: EMERGENCY MEDICINE

## 2020-04-19 ENCOUNTER — APPOINTMENT (OUTPATIENT)
Dept: GENERAL RADIOLOGY | Facility: HOSPITAL | Age: 65
End: 2020-04-19

## 2020-04-19 VITALS
BODY MASS INDEX: 36.57 KG/M2 | OXYGEN SATURATION: 99 % | TEMPERATURE: 97.8 F | SYSTOLIC BLOOD PRESSURE: 180 MMHG | HEART RATE: 95 BPM | WEIGHT: 270 LBS | RESPIRATION RATE: 18 BRPM | DIASTOLIC BLOOD PRESSURE: 109 MMHG | HEIGHT: 72 IN

## 2020-04-19 DIAGNOSIS — S92.102A CLOSED DISPLACED FRACTURE OF LEFT TALUS, UNSPECIFIED PORTION OF TALUS, INITIAL ENCOUNTER: ICD-10-CM

## 2020-04-19 DIAGNOSIS — S92.255A CLOSED NONDISPLACED FRACTURE OF NAVICULAR BONE OF LEFT FOOT, INITIAL ENCOUNTER: Primary | ICD-10-CM

## 2020-04-19 PROCEDURE — 73610 X-RAY EXAM OF ANKLE: CPT

## 2020-04-19 PROCEDURE — 99283 EMERGENCY DEPT VISIT LOW MDM: CPT

## 2020-04-19 PROCEDURE — 73630 X-RAY EXAM OF FOOT: CPT

## 2020-04-19 RX ORDER — HYDROCODONE BITARTRATE AND ACETAMINOPHEN 5; 325 MG/1; MG/1
1 TABLET ORAL EVERY 6 HOURS PRN
Qty: 12 TABLET | Refills: 0 | Status: SHIPPED | OUTPATIENT
Start: 2020-04-19 | End: 2020-04-21 | Stop reason: SDUPTHER

## 2020-04-19 NOTE — ED PROVIDER NOTES
Subjective   Chief complaint: Leg pain      Context: Patient is a 64-year-old male who comes in by private vehicle with complaints of left foot and ankle pain after he stepped in a hole in his floor.  He denies any knee hip pain.  He denies any other injuries from the incident.  He states his right leg went into the hole and his left leg twisted out in its the left foot and ankle that are bothering him.    Duration: Shortly prior to arrival    Timing:  Waxes and wanes  Severity: Moderate    Associated symptoms: Worse with weightbearing          PCP: yolanda            Review of Systems   Constitutional: Negative for fever.   HENT: Negative for congestion.    Respiratory: Negative for shortness of breath.    Cardiovascular: Negative for chest pain.   Gastrointestinal: Negative for abdominal pain.   Genitourinary: Negative.    Musculoskeletal: Positive for arthralgias.   Skin: Positive for wound.   Neurological:        Chronic neuropathy in the bilateral lower extremities       Past Medical History:   Diagnosis Date   • A-fib (CMS/[facility])    • CHF (congestive heart failure) (CMS/[facility])    • Chronic atrial fibrillation 11/8/2019   • Chronic diastolic CHF (congestive heart failure) (CMS/[facility]) 11/8/2019   • CKD (chronic kidney disease) stage 3, GFR 30-59 ml/min (CMS/[facility]) 11/8/2019   • Coronary artery disease involving native coronary artery of native heart without angina pectoris 11/8/2019   • DDD (degenerative disc disease), lumbosacral 11/8/2019   • Essential hypertension 11/8/2019   • Gambling disorder, persistent 11/8/2019   • GERD (gastroesophageal reflux disease) 11/8/2019   • History of CVA (cerebrovascular accident) 11/8/2019   • Hyperlipidemia    • Hypertension    • Medically noncompliant 11/8/2019   • Peripheral polyneuropathy 11/8/2019   • Psoriasis 11/8/2019       Allergies   Allergen Reactions   • Ketorolac Tromethamine Anaphylaxis     unknown       Past Surgical History:   Procedure Laterality Date   • CARDIAC  CATHETERIZATION     • CARDIAC CATHETERIZATION N/A 12/10/2019    Procedure: Left Heart Cath and coronary angiogram;  Surgeon: Edy Thomas MD;  [facility];  Service: Cardiovascular       History reviewed. No pertinent family history.    Social History     Socioeconomic History   • Marital status: Single     Spouse name: Not on file   • Number of children: Not on file   • Years of education: Not on file   • Highest education level: Not on file   Tobacco Use   • Smoking status: Never Smoker   • Smokeless tobacco: Never Used   Substance and Sexual Activity   • Alcohol use: No     Frequency: Never   • Drug use: No   • Sexual activity: Not Currently   Social History Narrative    Gambling addiction           Objective   Physical Exam     Vital signs and traige nurse note reviewed.  Constitutional:  Awake, alert; well developed and well nourished.  No acute distress, the patient is examined in hospital gown.  HEENT:  Normocephalic, atraumatic;  with intact EOM; oropharynx is pink and moist without edema or erythema.  Neck: Supple, full range of motion without pain;   Cardiovascular: Regular rate and rhythm,    Pulmonary: Respiratory effort regular, nonlabored;   Musculoskeletal: pain over the bilateral malleolus and proximal foot without ecchymosis or dislocation.  Superficial abrasion noted over the anterior left knee without any underlying swelling, no laxity with varus valgus and Lachman's.  Neuro: Alert oriented x3, speech is clear and appropriate.      Procedures           ED Course      Labs Reviewed - No data to display  Medications - No data to display  Xr Ankle 3+ View Left    Result Date: 4/19/2020   1. Minimally displaced avulsion fractures at the dorsum of the talar head and dorsal base of the navicular. 2. Multifocal enthesophytes, most prominent at the posterior calcaneus. 3. Nonspecific lucency at the distal cuboid, possible cyst. 4. Mild DJD at the tibiotalar joint and mild  multifocal DJD at the midfoot.  Electronically Signed By-Trevin Arroyo On:4/19/2020 7:15 PM This report was finalized on [id number] by  Trevin Arroyo, .    Xr Foot 3+ View Left    Result Date: 4/19/2020   1. Minimally displaced avulsion fractures at the dorsum of the talar head and dorsal base of the navicular. 2. Multifocal enthesophytes, most prominent at the posterior calcaneus. 3. Nonspecific lucency at the distal cuboid, possible cyst. 4. Mild DJD at the tibiotalar joint and mild multifocal DJD at the midfoot.  Electronically Signed By-Trevin Arroyo On:4/19/2020 7:15 PM This report was finalized on [id number] by  Trevin Arroyo, .                                         MDM  Number of Diagnoses or Management Options  Closed displaced fracture of left talus, unspecified portion of talus, initial encounter:   Closed nondisplaced fracture of navicular bone of left foot, initial encounter:   Diagnosis management comments:       Comorbidities:  has a past medical history of A-fib (CMS/[facility]), CHF (congestive heart failure) (CMS/[facility]), Chronic atrial fibrillation (11/8/2019), Chronic diastolic CHF (congestive heart failure) (CMS/[facility]) (11/8/2019), CKD (chronic kidney disease) stage 3, GFR 30-59 ml/min (CMS/[facility]) (11/8/2019), Coronary artery disease involving native coronary artery of native heart without angina pectoris (11/8/2019), DDD (degenerative disc disease), lumbosacral (11/8/2019), Essential hypertension (11/8/2019), Gambling disorder, persistent (11/8/2019), GERD (gastroesophageal reflux disease) (11/8/2019), History of CVA (cerebrovascular accident) (11/8/2019), Hyperlipidemia, Hypertension, Medically noncompliant (11/8/2019), Peripheral polyneuropathy (11/8/2019), and Psoriasis (11/8/2019).  Differentials: Fracture contusion not all inclusive of differentials considered  Radiology interpretation:  X-rays reviewed by me and interpreted by radiologist,   Xr Ankle 3+ View Left    Result Date: 4/19/2020   1.  Minimally displaced avulsion fractures at the dorsum of the talar head and dorsal base of the navicular. 2. Multifocal enthesophytes, most prominent at the posterior calcaneus. 3. Nonspecific lucency at the distal cuboid, possible cyst. 4. Mild DJD at the tibiotalar joint and mild multifocal DJD at the midfoot.  Electronically Signed By-Trevin Arroyo On:4/19/2020 7:15 PM This report was finalized on [id number] by  Trevin Arroyo .    Xr Foot 3+ View Left    Result Date: 4/19/2020   1. Minimally displaced avulsion fractures at the dorsum of the talar head and dorsal base of the navicular. 2. Multifocal enthesophytes, most prominent at the posterior calcaneus. 3. Nonspecific lucency at the distal cuboid, possible cyst. 4. Mild DJD at the tibiotalar joint and mild multifocal DJD at the midfoot.  Electronically Signed By-Trevin Arroyo On:4/19/2020 7:15 PM This report was finalized on [id number] by  Trevin Arroyo, .    Patient was placed in a tall cam walker and given a walker    Appropriate PPE worn during exam.  His blood pressure was noted to be mildly elevated on exam and he states he has not had his blood pressure medication this evening but he denies any symptoms associated with hypertensive crisis    i discussed findings with patient who voices understanding of discharge instructions, signs and symptoms requiring return to ED; discharged improved and in stable condition with follow up for re-evaluation.      Patient Progress  Patient progress: stable      Final diagnoses:   Closed nondisplaced fracture of navicular bone of left foot, initial encounter   Closed displaced fracture of left talus, unspecified portion of talus, initial encounter            Lynda Gibson, APRN  04/19/20 2005

## 2020-04-21 ENCOUNTER — HOSPITAL ENCOUNTER (EMERGENCY)
Facility: HOSPITAL | Age: 65
Discharge: HOME | End: 2020-04-21
Admitting: EMERGENCY MEDICINE

## 2020-04-21 VITALS
RESPIRATION RATE: 19 BRPM | DIASTOLIC BLOOD PRESSURE: 99 MMHG | HEIGHT: 72 IN | HEART RATE: 89 BPM | BODY MASS INDEX: 36.31 KG/M2 | WEIGHT: 268.08 LBS | SYSTOLIC BLOOD PRESSURE: 176 MMHG | TEMPERATURE: 98.2 F | OXYGEN SATURATION: 97 %

## 2020-04-21 DIAGNOSIS — S92.102D CLOSED DISPLACED FRACTURE OF LEFT TALUS WITH ROUTINE HEALING, UNSPECIFIED PORTION OF TALUS, SUBSEQUENT ENCOUNTER: ICD-10-CM

## 2020-04-21 DIAGNOSIS — S92.255A CLOSED NONDISPLACED FRACTURE OF NAVICULAR BONE OF LEFT FOOT, INITIAL ENCOUNTER: ICD-10-CM

## 2020-04-21 DIAGNOSIS — S92.255D CLOSED NONDISPLACED FRACTURE OF NAVICULAR BONE OF LEFT FOOT WITH ROUTINE HEALING, SUBSEQUENT ENCOUNTER: Primary | ICD-10-CM

## 2020-04-21 PROCEDURE — 99282 EMERGENCY DEPT VISIT SF MDM: CPT

## 2020-04-21 RX ORDER — HYDROCODONE BITARTRATE AND ACETAMINOPHEN 5; 325 MG/1; MG/1
1 TABLET ORAL EVERY 6 HOURS PRN
Qty: 12 TABLET | Refills: 0 | Status: SHIPPED | OUTPATIENT
Start: 2020-04-21 | End: 2020-07-30

## 2020-04-21 NOTE — ED PROVIDER NOTES
Subjective   History: Patient is a 64-year-old male who presents to the ER with worsening left ankle pain.  He reports that he lost his Rx for Norco after he left the ER.  He reports that his pain is slightly better with the boot but not completely under control with over-the-counter medication.  Denies any other symptoms such as nausea vomiting lightheadedness dizziness fever fatigue cough.      Onset: 2 days  Location: left ankle  Duration: constant  Character: sharp pain   Aggravating/Alleviating factors: None  Radiation None  Severity: moderate            Review of Systems   Constitutional: Negative for activity change, appetite change, chills, fatigue and fever.   HENT: Negative.    Respiratory: Negative for cough, choking, chest tightness and shortness of breath.    Cardiovascular: Negative for chest pain.   Gastrointestinal: Negative for abdominal distention, abdominal pain, nausea and vomiting.   Genitourinary: Negative.    Musculoskeletal:        Left foot and ankle pain   Skin: Negative.    Neurological: Negative.    Psychiatric/Behavioral: Negative.        Past Medical History:   Diagnosis Date   • A-fib (CMS/[facility])    • CHF (congestive heart failure) (CMS/[facility])    • Chronic atrial fibrillation 11/8/2019   • Chronic diastolic CHF (congestive heart failure) (CMS/[facility]) 11/8/2019   • CKD (chronic kidney disease) stage 3, GFR 30-59 ml/min (CMS/[facility]) 11/8/2019   • Coronary artery disease involving native coronary artery of native heart without angina pectoris 11/8/2019   • DDD (degenerative disc disease), lumbosacral 11/8/2019   • Essential hypertension 11/8/2019   • Gambling disorder, persistent 11/8/2019   • GERD (gastroesophageal reflux disease) 11/8/2019   • History of CVA (cerebrovascular accident) 11/8/2019   • Hyperlipidemia    • Hypertension    • Medically noncompliant 11/8/2019   • Peripheral polyneuropathy 11/8/2019   • Psoriasis 11/8/2019       Allergies   Allergen Reactions   • Ketorolac Tromethamine  Anaphylaxis     unknown       Past Surgical History:   Procedure Laterality Date   • CARDIAC CATHETERIZATION     • CARDIAC CATHETERIZATION N/A 12/10/2019    Procedure: Left Heart Cath and coronary angiogram;  Surgeon: Edy Thomas MD;  Location: [facility] CATH INVASIVE LOCATION;  Service: Cardiovascular       No family history on file.    Social History     Socioeconomic History   • Marital status: Single     Spouse name: Not on file   • Number of children: Not on file   • Years of education: Not on file   • Highest education level: Not on file   Tobacco Use   • Smoking status: Never Smoker   • Smokeless tobacco: Never Used   Substance and Sexual Activity   • Alcohol use: No     Frequency: Never   • Drug use: No   • Sexual activity: Not Currently   Social History Narrative    Gambling addiction           Objective   Physical Exam   Constitutional: He is oriented to person, place, and time. He appears well-developed and well-nourished.   HENT:   Head: Normocephalic and atraumatic.   Eyes: Pupils are equal, round, and reactive to light.   Neck: Normal range of motion.   Pulmonary/Chest: Effort normal.   Musculoskeletal: Normal range of motion.   Left ankle: No obvious edema erythema ecchymosis noted.  Increase in pain with left ankle ROM.  Pulses 2+.   Neurological: He is alert and oriented to person, place, and time.   Skin: Skin is warm and dry.   Psychiatric: He has a normal mood and affect. His behavior is normal.       Procedures           ED Course      Xr Ankle 3+ View Left    Result Date: 4/19/2020   1. Minimally displaced avulsion fractures at the dorsum of the talar head and dorsal base of the navicular. 2. Multifocal enthesophytes, most prominent at the posterior calcaneus. 3. Nonspecific lucency at the distal cuboid, possible cyst. 4. Mild DJD at the tibiotalar joint and mild multifocal DJD at the midfoot.  Electronically Signed By-Trevin Arroyo On:4/19/2020 7:15 PM This report was finalized on [id number]  by  Trevin Arroyo, .    Xr Foot 3+ View Left    Result Date: 4/19/2020   1. Minimally displaced avulsion fractures at the dorsum of the talar head and dorsal base of the navicular. 2. Multifocal enthesophytes, most prominent at the posterior calcaneus. 3. Nonspecific lucency at the distal cuboid, possible cyst. 4. Mild DJD at the tibiotalar joint and mild multifocal DJD at the midfoot.  Electronically Signed By-Trevin Arroyo On:4/19/2020 7:15 PM This report was finalized on [id number] by  Trevin Arroyo, .    Labs Reviewed - No data to display  Medications - No data to display                                       MDM  Number of Diagnoses or Management Options  Closed displaced fracture of left talus with routine healing, unspecified portion of talus, subsequent encounter:   Closed nondisplaced fracture of navicular bone of left foot with routine healing, subsequent encounter:   Diagnosis management comments: I examined the patient using the appropriate personal protective equipment.      DISPOSITION:   Chart Review:  Comorbidity:  has a past medical history of A-fib (CMS/[facility]), CHF (congestive heart failure) (CMS/[facility]), Chronic atrial fibrillation (11/8/2019), Chronic diastolic CHF (congestive heart failure) (CMS/[facility]) (11/8/2019), CKD (chronic kidney disease) stage 3, GFR 30-59 ml/min (CMS/[facility]) (11/8/2019), Coronary artery disease involving native coronary artery of native heart without angina pectoris (11/8/2019), DDD (degenerative disc disease), lumbosacral (11/8/2019), Essential hypertension (11/8/2019), Gambling disorder, persistent (11/8/2019), GERD (gastroesophageal reflux disease) (11/8/2019), History of CVA (cerebrovascular accident) (11/8/2019), Hyperlipidemia, Hypertension, Medically noncompliant (11/8/2019), Peripheral polyneuropathy (11/8/2019), and Psoriasis (11/8/2019).  Differentials:this list is not all inclusive and does not constitute the entirety of considered causes --> Worsening pain    Imaging: Was  interpreted by physician and reviewed by myself:  Xr Ankle 3+ View Left    Result Date: 4/19/2020   1. Minimally displaced avulsion fractures at the dorsum of the talar head and dorsal base of the navicular. 2. Multifocal enthesophytes, most prominent at the posterior calcaneus. 3. Nonspecific lucency at the distal cuboid, possible cyst. 4. Mild DJD at the tibiotalar joint and mild multifocal DJD at the midfoot.  Electronically Signed By-Trevin Arroyo On:4/19/2020 7:15 PM This report was finalized on [id number] by  Trevin Arroyo .    Xr Foot 3+ View Left    Result Date: 4/19/2020   1. Minimally displaced avulsion fractures at the dorsum of the talar head and dorsal base of the navicular. 2. Multifocal enthesophytes, most prominent at the posterior calcaneus. 3. Nonspecific lucency at the distal cuboid, possible cyst. 4. Mild DJD at the tibiotalar joint and mild multifocal DJD at the midfoot.  Electronically Signed By-Trevin Arroyo On:4/19/2020 7:15 PM This report was finalized on [id number] by  Trevin Arroyo, .      Disposition/Treatment:  Patient is 61-year-old male who was seen here over the weekend and was diagnosed with a navicular and talar fracture.  Patient lost his Rx for pain medication.  Inspect was performed and patient has not felt any pain medication in the last 2 years.  Patient's pain medication was refilled.  Patient did have slightly elevated blood pressure he was again counseled on following up with his primary care provider for further management of this.  He was stable and in agreement with plan.      Patient Progress  Patient progress: stable      Final diagnoses:   Closed nondisplaced fracture of navicular bone of left foot with routine healing, subsequent encounter   Closed displaced fracture of left talus with routine healing, unspecified portion of talus, subsequent encounter            Elida Barrett PA-C  04/21/20 1011

## 2020-04-21 NOTE — DISCHARGE INSTRUCTIONS
Return to the ER for any worsening symptoms  Follow-up with orthopedist today for appointment next week for further management.

## 2020-07-30 ENCOUNTER — APPOINTMENT (OUTPATIENT)
Dept: GENERAL RADIOLOGY | Facility: HOSPITAL | Age: 65
End: 2020-07-30

## 2020-07-30 ENCOUNTER — HOSPITAL ENCOUNTER (INPATIENT)
Facility: HOSPITAL | Age: 65
LOS: 5 days | Discharge: HOME | End: 2020-08-04
Attending: EMERGENCY MEDICINE | Admitting: FAMILY MEDICINE

## 2020-07-30 DIAGNOSIS — I50.32 CHRONIC DIASTOLIC CHF (CONGESTIVE HEART FAILURE) (HCC): Chronic | ICD-10-CM

## 2020-07-30 DIAGNOSIS — I50.9 ACUTE CONGESTIVE HEART FAILURE, UNSPECIFIED HEART FAILURE TYPE (HCC): Primary | ICD-10-CM

## 2020-07-30 DIAGNOSIS — I16.1 HYPERTENSIVE EMERGENCY: ICD-10-CM

## 2020-07-30 LAB
ALBUMIN SERPL-MCNC: 4 G/DL (ref 3.5–5.2)
ALBUMIN/GLOB SERPL: 1.1 G/DL
ALP SERPL-CCNC: 60 U/L (ref 39–117)
ALT SERPL W P-5'-P-CCNC: 17 U/L (ref 1–41)
ANION GAP SERPL CALCULATED.3IONS-SCNC: 14 MMOL/L (ref 5–15)
AST SERPL-CCNC: 21 U/L (ref 1–40)
BASOPHILS # BLD AUTO: 0 10*3/MM3 (ref 0–0.2)
BASOPHILS NFR BLD AUTO: 0.6 % (ref 0–1.5)
BILIRUB SERPL-MCNC: 0.8 MG/DL (ref 0–1.2)
BUN SERPL-MCNC: 19 MG/DL (ref 8–23)
BUN SERPL-MCNC: ABNORMAL MG/DL
BUN/CREAT SERPL: ABNORMAL
CALCIUM SPEC-SCNC: 9.1 MG/DL (ref 8.6–10.5)
CHLORIDE SERPL-SCNC: 106 MMOL/L (ref 98–107)
CO2 SERPL-SCNC: 24 MMOL/L (ref 22–29)
CREAT SERPL-MCNC: 1.25 MG/DL (ref 0.76–1.27)
D DIMER PPP FEU-MCNC: 2.6 MCGFEU/ML (ref 0.17–0.59)
DEPRECATED RDW RBC AUTO: 49 FL (ref 37–54)
EOSINOPHIL # BLD AUTO: 0.1 10*3/MM3 (ref 0–0.4)
EOSINOPHIL NFR BLD AUTO: 1.4 % (ref 0.3–6.2)
ERYTHROCYTE [DISTWIDTH] IN BLOOD BY AUTOMATED COUNT: 17 % (ref 12.3–15.4)
GFR SERPL CREATININE-BSD FRML MDRD: 58 ML/MIN/1.73
GLOBULIN UR ELPH-MCNC: 3.6 GM/DL
GLUCOSE BLDC GLUCOMTR-MCNC: 155 MG/DL (ref 70–105)
GLUCOSE SERPL-MCNC: 117 MG/DL (ref 65–99)
HCT VFR BLD AUTO: 43.3 % (ref 37.5–51)
HGB BLD-MCNC: 14.1 G/DL (ref 13–17.7)
LYMPHOCYTES # BLD AUTO: 1 10*3/MM3 (ref 0.7–3.1)
LYMPHOCYTES NFR BLD AUTO: 12.8 % (ref 19.6–45.3)
MCH RBC QN AUTO: 27 PG (ref 26.6–33)
MCHC RBC AUTO-ENTMCNC: 32.6 G/DL (ref 31.5–35.7)
MCV RBC AUTO: 82.6 FL (ref 79–97)
MONOCYTES # BLD AUTO: 0.5 10*3/MM3 (ref 0.1–0.9)
MONOCYTES NFR BLD AUTO: 6.3 % (ref 5–12)
NEUTROPHILS NFR BLD AUTO: 6.3 10*3/MM3 (ref 1.7–7)
NEUTROPHILS NFR BLD AUTO: 78.9 % (ref 42.7–76)
NRBC BLD AUTO-RTO: 0 /100 WBC (ref 0–0.2)
NT-PROBNP SERPL-MCNC: 8969 PG/ML (ref 0–900)
PLATELET # BLD AUTO: 157 10*3/MM3 (ref 140–450)
PMV BLD AUTO: 9 FL (ref 6–12)
POTASSIUM SERPL-SCNC: 3.1 MMOL/L (ref 3.5–5.2)
PROT SERPL-MCNC: 7.6 G/DL (ref 6–8.5)
RBC # BLD AUTO: 5.24 10*6/MM3 (ref 4.14–5.8)
SARS-COV-2 RNA PNL SPEC NAA+PROBE: NOT DETECTED
SODIUM SERPL-SCNC: 144 MMOL/L (ref 136–145)
TROPONIN T SERPL-MCNC: <0.01 NG/ML (ref 0–0.03)
TROPONIN T SERPL-MCNC: <0.01 NG/ML (ref 0–0.03)
WBC # BLD AUTO: 8 10*3/MM3 (ref 3.4–10.8)

## 2020-07-30 PROCEDURE — 71045 X-RAY EXAM CHEST 1 VIEW: CPT

## 2020-07-30 PROCEDURE — 25010000002 FUROSEMIDE PER 20 MG: Performed by: EMERGENCY MEDICINE

## 2020-07-30 PROCEDURE — 80053 COMPREHEN METABOLIC PANEL: CPT | Performed by: EMERGENCY MEDICINE

## 2020-07-30 PROCEDURE — 84484 ASSAY OF TROPONIN QUANT: CPT | Performed by: EMERGENCY MEDICINE

## 2020-07-30 PROCEDURE — G0378 HOSPITAL OBSERVATION PER HR: HCPCS

## 2020-07-30 PROCEDURE — 25010000002 FUROSEMIDE PER 20 MG: Performed by: FAMILY MEDICINE

## 2020-07-30 PROCEDURE — 83880 ASSAY OF NATRIURETIC PEPTIDE: CPT | Performed by: EMERGENCY MEDICINE

## 2020-07-30 PROCEDURE — 85025 COMPLETE CBC W/AUTO DIFF WBC: CPT | Performed by: EMERGENCY MEDICINE

## 2020-07-30 PROCEDURE — 93005 ELECTROCARDIOGRAM TRACING: CPT | Performed by: EMERGENCY MEDICINE

## 2020-07-30 PROCEDURE — 82962 GLUCOSE BLOOD TEST: CPT

## 2020-07-30 PROCEDURE — 85379 FIBRIN DEGRADATION QUANT: CPT | Performed by: FAMILY MEDICINE

## 2020-07-30 PROCEDURE — 93005 ELECTROCARDIOGRAM TRACING: CPT | Performed by: FAMILY MEDICINE

## 2020-07-30 PROCEDURE — 25010000002 HYDRALAZINE PER 20 MG: Performed by: FAMILY MEDICINE

## 2020-07-30 PROCEDURE — 87635 SARS-COV-2 COVID-19 AMP PRB: CPT | Performed by: EMERGENCY MEDICINE

## 2020-07-30 PROCEDURE — 84484 ASSAY OF TROPONIN QUANT: CPT | Performed by: FAMILY MEDICINE

## 2020-07-30 PROCEDURE — 99284 EMERGENCY DEPT VISIT MOD MDM: CPT

## 2020-07-30 RX ORDER — SODIUM CHLORIDE 0.9 % (FLUSH) 0.9 %
10 SYRINGE (ML) INJECTION AS NEEDED
Status: DISCONTINUED | OUTPATIENT
Start: 2020-07-30 | End: 2020-08-04 | Stop reason: HOSPADM

## 2020-07-30 RX ORDER — FUROSEMIDE 10 MG/ML
20 INJECTION INTRAMUSCULAR; INTRAVENOUS ONCE
Status: COMPLETED | OUTPATIENT
Start: 2020-07-30 | End: 2020-07-30

## 2020-07-30 RX ORDER — HYDRALAZINE HYDROCHLORIDE 25 MG/1
50 TABLET, FILM COATED ORAL 3 TIMES DAILY
Status: DISCONTINUED | OUTPATIENT
Start: 2020-07-30 | End: 2020-08-01

## 2020-07-30 RX ORDER — ATORVASTATIN CALCIUM 10 MG/1
10 TABLET, FILM COATED ORAL NIGHTLY
Status: DISCONTINUED | OUTPATIENT
Start: 2020-07-30 | End: 2020-08-04 | Stop reason: HOSPADM

## 2020-07-30 RX ORDER — POTASSIUM CHLORIDE 20 MEQ/1
20 TABLET, EXTENDED RELEASE ORAL 2 TIMES DAILY
Status: DISCONTINUED | OUTPATIENT
Start: 2020-07-30 | End: 2020-08-04 | Stop reason: HOSPADM

## 2020-07-30 RX ORDER — GABAPENTIN 100 MG/1
100 CAPSULE ORAL NIGHTLY
COMMUNITY
End: 2020-09-12 | Stop reason: HOSPADM

## 2020-07-30 RX ORDER — HYDRALAZINE HYDROCHLORIDE 50 MG/1
50 TABLET, FILM COATED ORAL 3 TIMES DAILY
COMMUNITY
End: 2020-08-04 | Stop reason: HOSPADM

## 2020-07-30 RX ORDER — POTASSIUM CHLORIDE 20 MEQ/1
40 TABLET, EXTENDED RELEASE ORAL ONCE
Status: COMPLETED | OUTPATIENT
Start: 2020-07-30 | End: 2020-07-30

## 2020-07-30 RX ORDER — SPIRONOLACTONE 25 MG/1
25 TABLET ORAL DAILY
Status: DISCONTINUED | OUTPATIENT
Start: 2020-07-31 | End: 2020-08-04 | Stop reason: HOSPADM

## 2020-07-30 RX ORDER — CARVEDILOL 25 MG/1
25 TABLET ORAL 2 TIMES DAILY WITH MEALS
Status: DISCONTINUED | OUTPATIENT
Start: 2020-07-30 | End: 2020-08-04 | Stop reason: HOSPADM

## 2020-07-30 RX ORDER — NITROGLYCERIN 20 MG/100ML
10-50 INJECTION INTRAVENOUS
Status: DISCONTINUED | OUTPATIENT
Start: 2020-07-30 | End: 2020-08-01

## 2020-07-30 RX ORDER — HYDRALAZINE HYDROCHLORIDE 20 MG/ML
10 INJECTION INTRAMUSCULAR; INTRAVENOUS EVERY 6 HOURS PRN
Status: DISCONTINUED | OUTPATIENT
Start: 2020-07-30 | End: 2020-07-30

## 2020-07-30 RX ORDER — DILTIAZEM HYDROCHLORIDE 5 MG/ML
20 INJECTION INTRAVENOUS ONCE
Status: COMPLETED | OUTPATIENT
Start: 2020-07-30 | End: 2020-07-30

## 2020-07-30 RX ORDER — LABETALOL HYDROCHLORIDE 5 MG/ML
10 INJECTION, SOLUTION INTRAVENOUS ONCE
Status: COMPLETED | OUTPATIENT
Start: 2020-07-30 | End: 2020-07-30

## 2020-07-30 RX ORDER — HYDRALAZINE HYDROCHLORIDE 20 MG/ML
20 INJECTION INTRAMUSCULAR; INTRAVENOUS EVERY 6 HOURS PRN
Status: DISCONTINUED | OUTPATIENT
Start: 2020-07-30 | End: 2020-08-04 | Stop reason: HOSPADM

## 2020-07-30 RX ORDER — FUROSEMIDE 10 MG/ML
40 INJECTION INTRAMUSCULAR; INTRAVENOUS ONCE
Status: COMPLETED | OUTPATIENT
Start: 2020-07-30 | End: 2020-07-30

## 2020-07-30 RX ADMIN — CARVEDILOL 25 MG: 25 TABLET, FILM COATED ORAL at 18:57

## 2020-07-30 RX ADMIN — LABETALOL 20 MG/4 ML (5 MG/ML) INTRAVENOUS SYRINGE 10 MG: at 12:28

## 2020-07-30 RX ADMIN — POTASSIUM CHLORIDE 40 MEQ: 1500 TABLET, EXTENDED RELEASE ORAL at 12:28

## 2020-07-30 RX ADMIN — RIVAROXABAN 20 MG: 20 TABLET, FILM COATED ORAL at 20:49

## 2020-07-30 RX ADMIN — HYDRALAZINE HYDROCHLORIDE 50 MG: 25 TABLET, FILM COATED ORAL at 20:49

## 2020-07-30 RX ADMIN — ATORVASTATIN CALCIUM 10 MG: 10 TABLET, FILM COATED ORAL at 20:49

## 2020-07-30 RX ADMIN — HYDRALAZINE HYDROCHLORIDE 10 MG: 20 INJECTION INTRAMUSCULAR; INTRAVENOUS at 17:33

## 2020-07-30 RX ADMIN — POTASSIUM CHLORIDE 20 MEQ: 1500 TABLET, EXTENDED RELEASE ORAL at 20:49

## 2020-07-30 RX ADMIN — NITROGLYCERIN 5 MCG/MIN: 20 INJECTION INTRAVENOUS at 09:56

## 2020-07-30 RX ADMIN — DILTIAZEM HYDROCHLORIDE 20 MG: 5 INJECTION INTRAVENOUS at 20:42

## 2020-07-30 RX ADMIN — Medication 10 ML: at 20:49

## 2020-07-30 RX ADMIN — FUROSEMIDE 20 MG: 10 INJECTION, SOLUTION INTRAMUSCULAR; INTRAVENOUS at 18:57

## 2020-07-30 RX ADMIN — SODIUM CHLORIDE 10 MG/HR: 900 INJECTION, SOLUTION INTRAVENOUS at 20:42

## 2020-07-30 RX ADMIN — FUROSEMIDE 40 MG: 10 INJECTION, SOLUTION INTRAMUSCULAR; INTRAVENOUS at 09:57

## 2020-07-31 ENCOUNTER — APPOINTMENT (OUTPATIENT)
Dept: CT IMAGING | Facility: HOSPITAL | Age: 65
End: 2020-07-31

## 2020-07-31 LAB
ANION GAP SERPL CALCULATED.3IONS-SCNC: 13 MMOL/L (ref 5–15)
BASOPHILS # BLD AUTO: 0 10*3/MM3 (ref 0–0.2)
BASOPHILS NFR BLD AUTO: 0.4 % (ref 0–1.5)
BUN SERPL-MCNC: 18 MG/DL (ref 8–23)
BUN SERPL-MCNC: ABNORMAL MG/DL
BUN/CREAT SERPL: ABNORMAL
CALCIUM SPEC-SCNC: 9.3 MG/DL (ref 8.6–10.5)
CHLORIDE SERPL-SCNC: 104 MMOL/L (ref 98–107)
CO2 SERPL-SCNC: 26 MMOL/L (ref 22–29)
CREAT SERPL-MCNC: 1.37 MG/DL (ref 0.76–1.27)
DEPRECATED RDW RBC AUTO: 48.6 FL (ref 37–54)
EOSINOPHIL # BLD AUTO: 0.1 10*3/MM3 (ref 0–0.4)
EOSINOPHIL NFR BLD AUTO: 1 % (ref 0.3–6.2)
ERYTHROCYTE [DISTWIDTH] IN BLOOD BY AUTOMATED COUNT: 16.9 % (ref 12.3–15.4)
GFR SERPL CREATININE-BSD FRML MDRD: 52 ML/MIN/1.73
GLUCOSE SERPL-MCNC: 131 MG/DL (ref 65–99)
HCT VFR BLD AUTO: 41.5 % (ref 37.5–51)
HGB BLD-MCNC: 13.4 G/DL (ref 13–17.7)
LYMPHOCYTES # BLD AUTO: 1.3 10*3/MM3 (ref 0.7–3.1)
LYMPHOCYTES NFR BLD AUTO: 12.7 % (ref 19.6–45.3)
MCH RBC QN AUTO: 26.7 PG (ref 26.6–33)
MCHC RBC AUTO-ENTMCNC: 32.2 G/DL (ref 31.5–35.7)
MCV RBC AUTO: 82.7 FL (ref 79–97)
MONOCYTES # BLD AUTO: 1.1 10*3/MM3 (ref 0.1–0.9)
MONOCYTES NFR BLD AUTO: 10.4 % (ref 5–12)
NEUTROPHILS NFR BLD AUTO: 7.9 10*3/MM3 (ref 1.7–7)
NEUTROPHILS NFR BLD AUTO: 75.5 % (ref 42.7–76)
NRBC BLD AUTO-RTO: 0 /100 WBC (ref 0–0.2)
PLATELET # BLD AUTO: 166 10*3/MM3 (ref 140–450)
PMV BLD AUTO: 9.1 FL (ref 6–12)
POTASSIUM SERPL-SCNC: 4 MMOL/L (ref 3.5–5.2)
RBC # BLD AUTO: 5.01 10*6/MM3 (ref 4.14–5.8)
SODIUM SERPL-SCNC: 143 MMOL/L (ref 136–145)
TROPONIN T SERPL-MCNC: <0.01 NG/ML (ref 0–0.03)
WBC # BLD AUTO: 10.5 10*3/MM3 (ref 3.4–10.8)

## 2020-07-31 PROCEDURE — 84484 ASSAY OF TROPONIN QUANT: CPT | Performed by: FAMILY MEDICINE

## 2020-07-31 PROCEDURE — 80048 BASIC METABOLIC PNL TOTAL CA: CPT | Performed by: FAMILY MEDICINE

## 2020-07-31 PROCEDURE — 71275 CT ANGIOGRAPHY CHEST: CPT

## 2020-07-31 PROCEDURE — 99222 1ST HOSP IP/OBS MODERATE 55: CPT | Performed by: INTERNAL MEDICINE

## 2020-07-31 PROCEDURE — 25010000002 FUROSEMIDE PER 20 MG: Performed by: FAMILY MEDICINE

## 2020-07-31 PROCEDURE — 85025 COMPLETE CBC W/AUTO DIFF WBC: CPT | Performed by: FAMILY MEDICINE

## 2020-07-31 PROCEDURE — 0 IOPAMIDOL PER 1 ML: Performed by: FAMILY MEDICINE

## 2020-07-31 RX ORDER — ISOSORBIDE MONONITRATE 20 MG/1
30 TABLET ORAL
Status: DISCONTINUED | OUTPATIENT
Start: 2020-07-31 | End: 2020-08-04 | Stop reason: HOSPADM

## 2020-07-31 RX ORDER — FUROSEMIDE 10 MG/ML
20 INJECTION INTRAMUSCULAR; INTRAVENOUS ONCE
Status: COMPLETED | OUTPATIENT
Start: 2020-07-31 | End: 2020-07-31

## 2020-07-31 RX ORDER — DILTIAZEM HYDROCHLORIDE 180 MG/1
180 CAPSULE, COATED, EXTENDED RELEASE ORAL
Status: DISCONTINUED | OUTPATIENT
Start: 2020-07-31 | End: 2020-08-04 | Stop reason: HOSPADM

## 2020-07-31 RX ADMIN — IOPAMIDOL 100 ML: 755 INJECTION, SOLUTION INTRAVENOUS at 05:15

## 2020-07-31 RX ADMIN — POTASSIUM CHLORIDE 20 MEQ: 1500 TABLET, EXTENDED RELEASE ORAL at 08:25

## 2020-07-31 RX ADMIN — ATORVASTATIN CALCIUM 10 MG: 10 TABLET, FILM COATED ORAL at 21:19

## 2020-07-31 RX ADMIN — CARVEDILOL 25 MG: 25 TABLET, FILM COATED ORAL at 08:25

## 2020-07-31 RX ADMIN — HYDRALAZINE HYDROCHLORIDE 50 MG: 25 TABLET, FILM COATED ORAL at 08:25

## 2020-07-31 RX ADMIN — DILTIAZEM HYDROCHLORIDE 180 MG: 180 CAPSULE, COATED, EXTENDED RELEASE ORAL at 11:49

## 2020-07-31 RX ADMIN — HYDRALAZINE HYDROCHLORIDE 50 MG: 25 TABLET, FILM COATED ORAL at 21:19

## 2020-07-31 RX ADMIN — POTASSIUM CHLORIDE 20 MEQ: 1500 TABLET, EXTENDED RELEASE ORAL at 21:19

## 2020-07-31 RX ADMIN — Medication 10 ML: at 08:26

## 2020-07-31 RX ADMIN — Medication 10 ML: at 21:18

## 2020-07-31 RX ADMIN — FUROSEMIDE 40 MG: 10 INJECTION, SOLUTION INTRAMUSCULAR; INTRAVENOUS at 08:29

## 2020-07-31 RX ADMIN — NITROGLYCERIN 15 MCG/MIN: 20 INJECTION INTRAVENOUS at 06:36

## 2020-07-31 RX ADMIN — CARVEDILOL 25 MG: 25 TABLET, FILM COATED ORAL at 17:59

## 2020-07-31 RX ADMIN — HYDRALAZINE HYDROCHLORIDE 50 MG: 25 TABLET, FILM COATED ORAL at 17:58

## 2020-07-31 RX ADMIN — RIVAROXABAN 20 MG: 20 TABLET, FILM COATED ORAL at 17:59

## 2020-07-31 RX ADMIN — SPIRONOLACTONE 25 MG: 25 TABLET ORAL at 08:25

## 2020-08-01 LAB
ANION GAP SERPL CALCULATED.3IONS-SCNC: 12 MMOL/L (ref 5–15)
BACTERIA UR QL AUTO: ABNORMAL /HPF
BILIRUB UR QL STRIP: NEGATIVE
BUN SERPL-MCNC: 20 MG/DL (ref 8–23)
BUN SERPL-MCNC: ABNORMAL MG/DL
BUN/CREAT SERPL: ABNORMAL
CALCIUM SPEC-SCNC: 9.1 MG/DL (ref 8.6–10.5)
CHLORIDE SERPL-SCNC: 103 MMOL/L (ref 98–107)
CLARITY UR: CLEAR
CO2 SERPL-SCNC: 23 MMOL/L (ref 22–29)
COLOR UR: YELLOW
CREAT SERPL-MCNC: 1.15 MG/DL (ref 0.76–1.27)
DEPRECATED RDW RBC AUTO: 49 FL (ref 37–54)
ERYTHROCYTE [DISTWIDTH] IN BLOOD BY AUTOMATED COUNT: 17.2 % (ref 12.3–15.4)
GFR SERPL CREATININE-BSD FRML MDRD: 64 ML/MIN/1.73
GLUCOSE SERPL-MCNC: 122 MG/DL (ref 65–99)
GLUCOSE UR STRIP-MCNC: NEGATIVE MG/DL
HCT VFR BLD AUTO: 38.9 % (ref 37.5–51)
HGB BLD-MCNC: 12.8 G/DL (ref 13–17.7)
HGB UR QL STRIP.AUTO: NEGATIVE
HYALINE CASTS UR QL AUTO: ABNORMAL /LPF
KETONES UR QL STRIP: NEGATIVE
LEUKOCYTE ESTERASE UR QL STRIP.AUTO: NEGATIVE
MCH RBC QN AUTO: 27.1 PG (ref 26.6–33)
MCHC RBC AUTO-ENTMCNC: 32.9 G/DL (ref 31.5–35.7)
MCV RBC AUTO: 82.4 FL (ref 79–97)
NITRITE UR QL STRIP: NEGATIVE
PH UR STRIP.AUTO: 5.5 [PH] (ref 5–8)
PLATELET # BLD AUTO: 142 10*3/MM3 (ref 140–450)
PMV BLD AUTO: 9.5 FL (ref 6–12)
POTASSIUM SERPL-SCNC: 3.6 MMOL/L (ref 3.5–5.2)
PROT UR QL STRIP: ABNORMAL
RBC # BLD AUTO: 4.72 10*6/MM3 (ref 4.14–5.8)
RBC # UR: ABNORMAL /HPF
REF LAB TEST METHOD: ABNORMAL
SODIUM SERPL-SCNC: 138 MMOL/L (ref 136–145)
SP GR UR STRIP: 1.02 (ref 1–1.03)
SQUAMOUS #/AREA URNS HPF: ABNORMAL /HPF
UROBILINOGEN UR QL STRIP: ABNORMAL
WBC # BLD AUTO: 9.6 10*3/MM3 (ref 3.4–10.8)
WBC UR QL AUTO: ABNORMAL /HPF

## 2020-08-01 PROCEDURE — 85027 COMPLETE CBC AUTOMATED: CPT | Performed by: FAMILY MEDICINE

## 2020-08-01 PROCEDURE — 80048 BASIC METABOLIC PNL TOTAL CA: CPT | Performed by: FAMILY MEDICINE

## 2020-08-01 PROCEDURE — 81001 URINALYSIS AUTO W/SCOPE: CPT | Performed by: NURSE PRACTITIONER

## 2020-08-01 PROCEDURE — 99232 SBSQ HOSP IP/OBS MODERATE 35: CPT | Performed by: INTERNAL MEDICINE

## 2020-08-01 RX ORDER — HYDRALAZINE HYDROCHLORIDE 25 MG/1
75 TABLET, FILM COATED ORAL 3 TIMES DAILY
Status: DISCONTINUED | OUTPATIENT
Start: 2020-08-01 | End: 2020-08-04 | Stop reason: HOSPADM

## 2020-08-01 RX ADMIN — POTASSIUM CHLORIDE 20 MEQ: 1500 TABLET, EXTENDED RELEASE ORAL at 20:22

## 2020-08-01 RX ADMIN — POTASSIUM CHLORIDE 20 MEQ: 1500 TABLET, EXTENDED RELEASE ORAL at 08:55

## 2020-08-01 RX ADMIN — DILTIAZEM HYDROCHLORIDE 180 MG: 180 CAPSULE, COATED, EXTENDED RELEASE ORAL at 08:55

## 2020-08-01 RX ADMIN — Medication 10 ML: at 20:22

## 2020-08-01 RX ADMIN — HYDRALAZINE HYDROCHLORIDE 50 MG: 25 TABLET, FILM COATED ORAL at 08:55

## 2020-08-01 RX ADMIN — SPIRONOLACTONE 25 MG: 25 TABLET ORAL at 08:55

## 2020-08-01 RX ADMIN — ATORVASTATIN CALCIUM 10 MG: 10 TABLET, FILM COATED ORAL at 20:22

## 2020-08-01 RX ADMIN — Medication 10 ML: at 08:55

## 2020-08-01 RX ADMIN — CARVEDILOL 25 MG: 25 TABLET, FILM COATED ORAL at 17:33

## 2020-08-01 RX ADMIN — ISOSORBIDE MONONITRATE 30 MG: 20 TABLET ORAL at 17:33

## 2020-08-01 RX ADMIN — RIVAROXABAN 20 MG: 20 TABLET, FILM COATED ORAL at 17:33

## 2020-08-01 RX ADMIN — ISOSORBIDE MONONITRATE 30 MG: 20 TABLET ORAL at 08:55

## 2020-08-01 RX ADMIN — HYDRALAZINE HYDROCHLORIDE 75 MG: 25 TABLET, FILM COATED ORAL at 20:22

## 2020-08-01 RX ADMIN — NITROGLYCERIN 30 MCG/MIN: 20 INJECTION INTRAVENOUS at 09:52

## 2020-08-01 RX ADMIN — CARVEDILOL 25 MG: 25 TABLET, FILM COATED ORAL at 08:55

## 2020-08-01 RX ADMIN — HYDRALAZINE HYDROCHLORIDE 75 MG: 25 TABLET, FILM COATED ORAL at 15:22

## 2020-08-01 NOTE — PLAN OF CARE
Pt remains on Nitro gtt to maintain systolic BP near 140. Pt denies any pain or distress at this time. Pt ambulates to bathroom with 1 assist, pt high falls risk, does not maintain balance well when walking. Bed alarm on. Will continue to monitor.

## 2020-08-01 NOTE — PROGRESS NOTES
"Attempted to do ABG and EKG on patient.  Explained ABG procedure to patient and he seemed to be cooperative and said ok to having abg done.   As I was getting ready to stick patient he pulled his hand away and said \"what are you doing\".  I told him I had just explained procedure to him and that he agreed.  Patient stated No he was not going to have that done.  Explained EKG to patient and he said he wasn't having that done either.  Miladis Yanez RN notified.  "

## 2020-08-01 NOTE — SIGNIFICANT NOTE
"This note also relates to the following rows which could not be included:  Heart Rate - Cannot attach notes to unvalidated device data    Attempted to do ABG and EKG on patient.  Explained ABG procedure to patient and he seemed to be cooperative and said ok to having abg done.   As I was getting ready to stick patient he pulled his hand away and said \"what are you doing\".  I told him I had just explained procedure to him and that he agreed.  Patient stated No he was not going to have that done.  Explained EKG to patient and he said he wasn't having that done either.  Miladis Yanez RN notified  "

## 2020-08-01 NOTE — PLAN OF CARE
Patient alert, oriented. Beginning of shift patient was oriented x3. By mid morning patient was increasingly forgetful, stating he couldn't remember what happened to him for him to be in the hospital. Patient was able to state he was in [place] in the hospital, and thought it was July 2020. Provider ordered labs, ecg, and ua, but patient refused. RN did talk patient into urinating for a clean catch, but RN was not allowed to touch patient. Attempted to call family in hopes of encouraging visitation to patient to help with confusion, frustration, but no answer. Patient weaned from nitro gtt. BP stable at this time. Discharge planned to [facility], pending acceptance

## 2020-08-01 NOTE — PROGRESS NOTES
LOS: 1 day   Admiting Physician- Dre Stauffer MD    Reason For Followup:    Atrial fibrillation  Hypertension  CAD  Hyperlipidemia  History of noncompliance    Subjective     Patient appears to be mildly confused.  He denies any chest pain.    Objective     Heart rate is 78 and blood pressure is stable patient is in atrial fibrillation    Review of Systems:   Review of Systems   Constitution: Negative for chills and fever.   HENT: Negative for ear discharge and nosebleeds.    Eyes: Negative for discharge and redness.   Cardiovascular: Negative for chest pain, orthopnea, palpitations, paroxysmal nocturnal dyspnea and syncope.   Respiratory: Positive for shortness of breath. Negative for cough and wheezing.    Endocrine: Negative for heat intolerance.   Skin: Negative for rash.   Musculoskeletal: Negative for arthritis and myalgias.   Gastrointestinal: Negative for abdominal pain, melena, nausea and vomiting.   Genitourinary: Negative for dysuria and hematuria.   Neurological: Negative for dizziness, light-headedness, numbness and tremors.   Psychiatric/Behavioral: Positive for altered mental status. Negative for depression. The patient is not nervous/anxious.          Vital Signs  Vitals:    08/01/20 0847 08/01/20 0900 08/01/20 1004 08/01/20 1130   BP: 179/83  132/78 133/67   BP Location: Left arm  Left arm Left arm   Patient Position: Sitting  Sitting Sitting   Pulse: 98   78   Resp: 17 19 22 20   Temp:   97.7 °F (36.5 °C)    TempSrc:   Oral    SpO2: (!) 87%  93% 96%   Weight:       Height:         Wt Readings from Last 1 Encounters:   08/01/20 115 kg (253 lb 15.5 oz)       Intake/Output Summary (Last 24 hours) at 8/1/2020 1313  Last data filed at 8/1/2020 1241  Gross per 24 hour   Intake 1012 ml   Output 425 ml   Net 587 ml     Physical Exam:  Physical Exam   Constitutional: He is oriented to person, place, and time. He appears well-developed and well-nourished.   HENT:   Head: Normocephalic and atraumatic.     Eyes: No scleral icterus.   Neck: No thyromegaly present.   Cardiovascular: Normal rate and normal heart sounds. Exam reveals no gallop and no friction rub.   No murmur heard.  Heart rate is irregular   Pulmonary/Chest: Effort normal and breath sounds normal. No respiratory distress. He has no wheezes. He has no rales.   Abdominal: There is no tenderness.   Musculoskeletal: He exhibits no edema.   Lymphadenopathy:     He has no cervical adenopathy.   Neurological: He is alert and oriented to person, place, and time.   Skin: No rash noted. No erythema.   Psychiatric: He has a normal mood and affect.       Results Review:   Lab Results (last 24 hours)     Procedure Component Value Units Date/Time    BUN [[id number]]  (Normal) Collected:  08/01/20 0310    Specimen:  Blood Updated:  08/01/20 0745     BUN 20 mg/dL     Basic Metabolic Panel [[id number]]  (Abnormal) Collected:  08/01/20 0310    Specimen:  Blood Updated:  08/01/20 0400     Glucose 122 mg/dL      BUN --     Comment: Testing performed by alternate method        Creatinine 1.15 mg/dL      Sodium 138 mmol/L      Potassium 3.6 mmol/L      Chloride 103 mmol/L      CO2 23.0 mmol/L      Calcium 9.1 mg/dL      eGFR Non African Amer 64 mL/min/1.73      BUN/Creatinine Ratio --     Comment: Testing not performed        Anion Gap 12.0 mmol/L     Narrative:       GFR Normal >60  Chronic Kidney Disease <60  Kidney Failure <15      CBC (No Diff) [[id number]]  (Abnormal) Collected:  08/01/20 0310    Specimen:  Blood Updated:  08/01/20 0338     WBC 9.60 10*3/mm3      RBC 4.72 10*6/mm3      Hemoglobin 12.8 g/dL      Hematocrit 38.9 %      MCV 82.4 fL      MCH 27.1 pg      MCHC 32.9 g/dL      RDW 17.2 %      RDW-SD 49.0 fl      MPV 9.5 fL      Platelets 142 10*3/mm3         Imaging Results (Last 72 Hours)     Procedure Component Value Units Date/Time    CT Chest Pulmonary Embolism [[id number]] Collected:  07/31/20 0230     Updated:  07/31/20 0435    Narrative:       EXAMINATION:   CTA CHEST WITH IV CONTRAST, CT PULMONARY ANGIOGRAM    DATE OF EXAM: 7/31/2020 4:08 AM    HISTORY: Chest pain. Elevated d-dimer.    TECHNIQUE: CTA examination of the chest was performed following the intravenous administration of 100 mL of Isovue 370. Sagittal, coronal and MIP reformatted images were provided. CT dose lowering techniques were used, to include: automated exposure   control, adjustment for patient size, and or use of iterative reconstruction.    COMPARISON: None.    VESSELS:    Aorta: The aorta shows no dissection or aneurysm.    Pulmonary Arteries: Normal.    FINDINGS:    Lungs: Extensive atelectasis in the left lower lobe. Mild mosaic attenuation of the lungs. Respiratory motion.    Pleura: Moderate left pleural fluid collection, 4 to 5 cm depth.    Mediastinum and Emily: Reactive appearing small mediastinal lymph nodes. Small hiatal hernia.    Cardiovascular: Cardiomegaly. Trace atherosclerotic calcification of the left anterior descending coronary artery.    Upper Abdomen: Normal.     Musculoskeletal Chest: Advanced degenerative disc disease in the thoracic spine.      Impression:         1. No pulmonary embolism, aortic dissection, or aortic aneurysm.  2. Cardiomegaly. Moderate left pleural fluid collection, 4 to 5 cm depth.  3. Compressive atelectasis in the left lower lobe.  4. Mild mosaic attenuation of the lungs, compatible with asthma in the correct clinical setting.    Electronically signed by:  Rodrigo Alva M.D.    7/31/2020 2:34 AM    XR Chest 1 View [[id number]] Collected:  07/30/20 1033     Updated:  07/30/20 1037    Narrative:       DATE OF EXAM:  7/30/2020 10:16 AM     PROCEDURE:  XR CHEST 1 VW-     INDICATIONS:  soa  shortness of breath.     COMPARISON:  12/12/2019     TECHNIQUE:   Single radiographic view of the chest was obtained.     FINDINGS:  There is new opacity in the left base suggesting combination of moderate  pleural effusions with associated atelectasis or infiltrate. This  is  difficult to more fully characterize due to the overlying enlarged heart  border. There is mild pulmonary vascular congestion.  There is  degenerative change in the spine.       Impression:          1. Cardiomegaly with pulmonary vascular congestion.  2. Left basilar opacity suggesting combination of moderate pleural  effusion with overlying infiltrate or atelectasis.     Electronically Signed By-Mandeep Connor On:7/30/2020 10:35 AM  This report was finalized on [id number] by  Mandeep Connor, .        ECG/EMG Results (most recent)     Procedure Component Value Units Date/Time    ECG 12 Lead [[id number]] Collected:  07/30/20 2001     Updated:  07/30/20 2003    Narrative:       HEART RATE= 107  bpm  RR Interval= 561  ms  SD Interval=   ms  P Horizontal Axis=   deg  P Front Axis=   deg  QRSD Interval= 90  ms  QT Interval= 336  ms  QRS Axis= 21  deg  T Wave Axis= 181  deg  - ABNORMAL ECG -  Atrial fibrillation  Ventricular premature complex  LVH with secondary repolarization abnormality  Electronically Signed By:   Date and Time of Study: 2020-07-30 20:01:57    ECG 12 Lead [[id number]] Collected:  07/30/20 0929     Updated:  07/31/20 1353    Narrative:       HEART RATE= 92  bpm  RR Interval= 566  ms  SD Interval=   ms  P Horizontal Axis=   deg  P Front Axis=   deg  QRSD Interval= 98  ms  QT Interval= 322  ms  QRS Axis= 8  deg  T Wave Axis= 162  deg  - ABNORMAL ECG -  Atrial fibrillation  Ventricular premature complex  LVH with secondary repolarization abnormality  When compared with ECG of 08-Dec-2019 10:38:36,  Significant repolarization change  Electronically Signed By: Theo Fuentes (Dav) 31-Jul-2020 13:49:02  Date and Time of Study: 2020-07-30 09:29:57        CBC    Results from last 7 days   Lab Units 08/01/20  0310 07/31/20  0226 07/30/20  0954   WBC 10*3/mm3 9.60 10.50 8.00   HEMOGLOBIN g/dL 12.8* 13.4 14.1   PLATELETS 10*3/mm3 142 166 157     BMP   Results from last 7 days   Lab Units 08/01/20 0310  07/31/20  0226 07/30/20  1107   SODIUM mmol/L 138 143 144   POTASSIUM mmol/L 3.6 4.0 3.1*   CHLORIDE mmol/L 103 104 106   CO2 mmol/L 23.0 26.0 24.0   BUN  20 18 19   CREATININE mg/dL 1.15 1.37* 1.25   GLUCOSE mg/dL 122* 131* 117*     CMP   Results from last 7 days   Lab Units 08/01/20  0310 07/31/20  0226 07/30/20  1107   SODIUM mmol/L 138 143 144   POTASSIUM mmol/L 3.6 4.0 3.1*   CHLORIDE mmol/L 103 104 106   CO2 mmol/L 23.0 26.0 24.0   BUN  20 18 19   CREATININE mg/dL 1.15 1.37* 1.25   GLUCOSE mg/dL 122* 131* 117*   ALBUMIN g/dL  --   --  4.00   BILIRUBIN mg/dL  --   --  0.8   ALK PHOS U/L  --   --  60   AST (SGOT) U/L  --   --  21   ALT (SGPT) U/L  --   --  17     Cardiac Studies:  Echo-   Results for orders placed during the hospital encounter of 11/07/19   Adult Transthoracic Echo Complete W/ Cont if Necessary Per Protocol    Narrative · The left ventricular cavity is severely dilated.  · Left ventricular systolic function is moderately decreased.  · Left atrial cavity size is moderately dilated.  · Moderate mitral valve regurgitation is present  · Moderate tricuspid valve regurgitation is present.  · The following left ventricular wall segments are hypokinetic: mid   anterior, apical anterior, basal anterolateral, mid anterolateral, apical   lateral, basal inferolateral, mid inferolateral, apical inferior, mid   inferior, apical septal, basal inferoseptal, mid inferoseptal, apex   hypokinetic, mid anteroseptal, basal anterior, basal inferior and basal   inferoseptal.  · LV ejection fraction is about 30 to 35%  · No pericardial effusion noted        Stress Myoview-  Cath-      Medication Review:   Scheduled Meds:  atorvastatin 10 mg Oral Nightly   carvedilol 25 mg Oral BID With Meals   dilTIAZem  mg Oral Q24H   hydrALAZINE 50 mg Oral TID   isosorbide mononitrate 30 mg Oral BID - Nitrates   potassium chloride 20 mEq Oral BID   rivaroxaban 20 mg Oral Daily With Dinner   spironolactone 25 mg Oral Daily      Continuous Infusions:   PRN Meds:.hydrALAZINE  •  [COMPLETED] Insert peripheral IV **AND** sodium chloride      Assessment/Plan   Patient Active Problem List   Diagnosis   • Hypertensive emergency   • Chronic atrial fibrillation (CMS/[facility])   • Chronic diastolic CHF (congestive heart failure) (CMS/[facility])   • CKD (chronic kidney disease) stage 3, GFR 30-59 ml/min (CMS/[facility])   • Essential hypertension   • Gambling disorder, persistent   • Shortness of breath   • Coronary artery disease involving native coronary artery of native heart without angina pectoris   • Psoriasis   • History of CVA (cerebrovascular accident)   • DDD (degenerative disc disease), lumbosacral   • Peripheral polyneuropathy   • GERD (gastroesophageal reflux disease)   • Medically noncompliant   • Acute respiratory distress   • Unstable angina (CMS/[facility])   • Cervical disc disorder with radiculopathy   • Completed stroke (CMS/[facility])   • Degeneration of intervertebral disc   • Excessive anticoagulation   • Impaired left ventricular function   • Liver lesion   • Lumbar radiculopathy   • Cervical myelopathy (CMS/[facility])   • Lumbosacral radiculopathy   • Spinal stenosis of lumbar region   • Obesity   • Mild cognitive impairment   • Thoracic back pain   • Thoracic disc disease with myelopathy   • Paroxysmal atrial fibrillation (CMS/[facility])   • Essential (primary) hypertension   • Acute congestive heart failure (CMS/[facility])     Plan:    Patient is slightly confused but no respiratory distress.  Heart rate is reasonably well controlled.  Patient is on Xarelto and beta-blocker and calcium channel blocker.  Patient has been noncompliant in the past.  I would discontinue intravenous nitroglycerin.  We shall follow    Kurt Mccullough MD  08/01/20  13:13

## 2020-08-01 NOTE — PROGRESS NOTES
LOS: 1 day   Patient Care Team:  Marisol Larson APRN as PCP - General    Subjective     Interval History:    Patient Complaints: soa   History taken from: patient    Review of Systems   Constitutional: Positive for fatigue. Negative for fever.   HENT: Negative for sore throat and trouble swallowing.    Eyes: Negative for visual disturbance.   Respiratory: Positive for shortness of breath and stridor. Negative for cough, chest tightness and wheezing.    Cardiovascular: Positive for leg swelling. Negative for chest pain and palpitations.   Gastrointestinal: Negative for abdominal pain, constipation, diarrhea, nausea and vomiting.   Genitourinary: Negative for difficulty urinating.   Musculoskeletal: Negative for gait problem.   Skin: Negative for pallor.   Psychiatric/Behavioral: Positive for confusion (aware of self/ person- is in [place] - otherwise not certain where he is/ knows the year is 2020  [facility] month day of the week or date ).           Objective     Vital Signs  Temp:  [97.7 °F (36.5 °C)-99 °F (37.2 °C)] 97.7 °F (36.5 °C)  Heart Rate:  [61-98] 78  Resp:  [17-22] 20  BP: (124-179)/() 133/67    Physical Exam:     General Appearance:    Asleep arouses with verbal stimulus and falls back to slep quickly - soa with conversation , cooperative, in no acute distress,   Head:    Normocephalic, without obvious abnormality, atraumatic   Eyes:            Lids and lashes normal, conjunctivae and sclerae normal, no   icterus, no pallor, corneas clear, PERRLA   Ears:    Ears appear intact with no abnormalities noted   Throat:   No oral lesions, no thrush, oral mucosa moist   Neck:   No adenopathy, supple, trachea midline, no thyromegaly, no   carotid bruit, no JVD   Lungs:     Left lung diminished. R lung clear respirations regular, even and                  unlabored    Heart:    IRR IRR and normal rate, normal S1 and S2,  no gallop, no rub, no click   Chest Wall:    No abnormalities observed   Abdomen:      Normal bowel sounds, no masses, no organomegaly, soft        Non-tender non-distended, no guarding,   Extremities:   Moves all extremities well, extremities with brawny edema tu , no cyanosis, no             Redness   Pulses:   Pulses palpable and equal bilaterally   Skin:   No bleeding, bruising or rash   Lymph nodes:   No palpable adenopathy   Neurologic:   Cranial nerves 2 - 12 grossly intact, sensation intact, DTR       present and equal bilaterally. Pt is confused.        Results Review:    Lab Results (last 24 hours)     Procedure Component Value Units Date/Time    BUN [[id number]]  (Normal) Collected:  08/01/20 0310    Specimen:  Blood Updated:  08/01/20 0745     BUN 20 mg/dL     Basic Metabolic Panel [[id number]]  (Abnormal) Collected:  08/01/20 0310    Specimen:  Blood Updated:  08/01/20 0400     Glucose 122 mg/dL      BUN --     Comment: Testing performed by alternate method        Creatinine 1.15 mg/dL      Sodium 138 mmol/L      Potassium 3.6 mmol/L      Chloride 103 mmol/L      CO2 23.0 mmol/L      Calcium 9.1 mg/dL      eGFR Non African Amer 64 mL/min/1.73      BUN/Creatinine Ratio --     Comment: Testing not performed        Anion Gap 12.0 mmol/L     Narrative:       GFR Normal >60  Chronic Kidney Disease <60  Kidney Failure <15      CBC (No Diff) [[id number]]  (Abnormal) Collected:  08/01/20 0310    Specimen:  Blood Updated:  08/01/20 0338     WBC 9.60 10*3/mm3      RBC 4.72 10*6/mm3      Hemoglobin 12.8 g/dL      Hematocrit 38.9 %      MCV 82.4 fL      MCH 27.1 pg      MCHC 32.9 g/dL      RDW 17.2 %      RDW-SD 49.0 fl      MPV 9.5 fL      Platelets 142 10*3/mm3            Imaging Results (Last 24 Hours)     ** No results found for the last 24 hours. **               I reviewed the patient's new clinical results.    Medication Review:   Scheduled Meds:  atorvastatin 10 mg Oral Nightly   carvedilol 25 mg Oral BID With Meals   dilTIAZem  mg Oral Q24H   hydrALAZINE 50 mg Oral TID   isosorbide  mononitrate 30 mg Oral BID - Nitrates   potassium chloride 20 mEq Oral BID   rivaroxaban 20 mg Oral Daily With Dinner   spironolactone 25 mg Oral Daily     Continuous Infusions:   PRN Meds:.hydrALAZINE  •  [COMPLETED] Insert peripheral IV **AND** sodium chloride     Assessment/Plan       Acute congestive heart failure (CMS/HCC)    1. CHF_ on spironolactone- IV ntg stopped per Cardio today   2.  Afib- on coreg/ cardizem rate controlled  3.  HTN w CKD stage 3- bp intermittently up - increasing hydralazine to      75 tid monitor lytes - BP stable on  hydralazine    4. Confusion - check ammonia level/ UA/ ABG  5. Left pleural effusion - consult Pulmonology [facility]   6. CAD- on isosorbide   Plan for disposition: TBANT Ngo, CLARISSA  08/01/20  13:35

## 2020-08-02 ENCOUNTER — APPOINTMENT (OUTPATIENT)
Dept: GENERAL RADIOLOGY | Facility: HOSPITAL | Age: 65
End: 2020-08-02

## 2020-08-02 PROCEDURE — 25010000002 FUROSEMIDE PER 20 MG: Performed by: INTERNAL MEDICINE

## 2020-08-02 PROCEDURE — 71046 X-RAY EXAM CHEST 2 VIEWS: CPT

## 2020-08-02 PROCEDURE — 99232 SBSQ HOSP IP/OBS MODERATE 35: CPT | Performed by: INTERNAL MEDICINE

## 2020-08-02 RX ORDER — TRIAMCINOLONE ACETONIDE 1 MG/G
CREAM TOPICAL EVERY 12 HOURS SCHEDULED
Status: DISCONTINUED | OUTPATIENT
Start: 2020-08-02 | End: 2020-08-04 | Stop reason: HOSPADM

## 2020-08-02 RX ORDER — FUROSEMIDE 10 MG/ML
40 INJECTION INTRAMUSCULAR; INTRAVENOUS
Status: DISCONTINUED | OUTPATIENT
Start: 2020-08-02 | End: 2020-08-04 | Stop reason: HOSPADM

## 2020-08-02 RX ADMIN — HYDRALAZINE HYDROCHLORIDE 75 MG: 25 TABLET, FILM COATED ORAL at 09:38

## 2020-08-02 RX ADMIN — POTASSIUM CHLORIDE 20 MEQ: 1500 TABLET, EXTENDED RELEASE ORAL at 20:25

## 2020-08-02 RX ADMIN — ISOSORBIDE MONONITRATE 30 MG: 20 TABLET ORAL at 09:39

## 2020-08-02 RX ADMIN — TRIAMCINOLONE ACETONIDE: 1 CREAM TOPICAL at 16:11

## 2020-08-02 RX ADMIN — SPIRONOLACTONE 25 MG: 25 TABLET ORAL at 09:40

## 2020-08-02 RX ADMIN — ISOSORBIDE MONONITRATE 30 MG: 20 TABLET ORAL at 17:28

## 2020-08-02 RX ADMIN — HYDRALAZINE HYDROCHLORIDE 75 MG: 25 TABLET, FILM COATED ORAL at 20:25

## 2020-08-02 RX ADMIN — DILTIAZEM HYDROCHLORIDE 180 MG: 180 CAPSULE, COATED, EXTENDED RELEASE ORAL at 09:38

## 2020-08-02 RX ADMIN — POTASSIUM CHLORIDE 20 MEQ: 1500 TABLET, EXTENDED RELEASE ORAL at 09:40

## 2020-08-02 RX ADMIN — ATORVASTATIN CALCIUM 10 MG: 10 TABLET, FILM COATED ORAL at 20:25

## 2020-08-02 RX ADMIN — RIVAROXABAN 20 MG: 20 TABLET, FILM COATED ORAL at 17:28

## 2020-08-02 RX ADMIN — FUROSEMIDE 40 MG: 10 INJECTION, SOLUTION INTRAMUSCULAR; INTRAVENOUS at 18:45

## 2020-08-02 RX ADMIN — HYDRALAZINE HYDROCHLORIDE 75 MG: 25 TABLET, FILM COATED ORAL at 15:36

## 2020-08-02 RX ADMIN — CARVEDILOL 25 MG: 25 TABLET, FILM COATED ORAL at 17:28

## 2020-08-02 RX ADMIN — CARVEDILOL 25 MG: 25 TABLET, FILM COATED ORAL at 09:40

## 2020-08-02 NOTE — PROGRESS NOTES
LOS: 2 days   Admiting Physician- Dre Stauffer MD    Reason For Followup:    Atrial fibrillation  Hypertension  CAD  Hyperlipidemia  History of noncompliance    Subjective     His mental status has improved slightly but he is still confused.    Objective     Patient still in atrial fibrillation    Review of Systems:   Review of Systems   Constitution: Negative for chills and fever.   HENT: Negative for ear discharge and nosebleeds.    Eyes: Negative for discharge and redness.   Cardiovascular: Negative for chest pain, orthopnea, palpitations, paroxysmal nocturnal dyspnea and syncope.   Respiratory: Positive for shortness of breath. Negative for cough and wheezing.    Endocrine: Negative for heat intolerance.   Skin: Negative for rash.   Musculoskeletal: Negative for arthritis and myalgias.   Gastrointestinal: Negative for abdominal pain, melena, nausea and vomiting.   Genitourinary: Negative for dysuria and hematuria.   Neurological: Negative for dizziness, light-headedness, numbness and tremors.   Psychiatric/Behavioral: Positive for altered mental status. Negative for depression. The patient is not nervous/anxious.          Vital Signs  Vitals:    08/02/20 0210 08/02/20 0600 08/02/20 0958 08/02/20 1408   BP: 143/81 176/92 138/89    BP Location: Right arm Right arm Right arm    Patient Position: Lying Lying Sitting    Pulse: 62 84 73    Resp: 12 12 21 22   Temp: 98.6 °F (37 °C) 98.6 °F (37 °C) 98.2 °F (36.8 °C) 97.9 °F (36.6 °C)   TempSrc: Oral Oral Oral Oral   SpO2: 93% 95% 94% 96%   Weight:  116 kg (256 lb 13.4 oz)     Height:         Wt Readings from Last 1 Encounters:   08/02/20 116 kg (256 lb 13.4 oz)       Intake/Output Summary (Last 24 hours) at 8/2/2020 1421  Last data filed at 8/2/2020 1217  Gross per 24 hour   Intake 1256 ml   Output 1000 ml   Net 256 ml     Physical Exam:  Physical Exam   Constitutional: He is oriented to person, place, and time. He appears well-developed and well-nourished.      HENT:   Head: Normocephalic and atraumatic.   Eyes: No scleral icterus.   Neck: No thyromegaly present.   Cardiovascular: Normal rate and normal heart sounds. Exam reveals no gallop and no friction rub.   No murmur heard.  Heart rate is irregular   Pulmonary/Chest: Effort normal and breath sounds normal. No respiratory distress. He has no wheezes. He has no rales.   Abdominal: There is no tenderness.   Musculoskeletal: He exhibits no edema.   Lymphadenopathy:     He has no cervical adenopathy.   Neurological: He is alert and oriented to person, place, and time.   Skin: No rash noted. No erythema.   Psychiatric: He has a normal mood and affect.       Results Review:   Lab Results (last 24 hours)     Procedure Component Value Units Date/Time    Urinalysis With Culture If Indicated - Urine, Clean Catch [[id number]]  (Abnormal) Collected:  08/01/20 1516    Specimen:  Urine, Clean Catch Updated:  08/01/20 1540     Color, UA Yellow     Appearance, UA Clear     pH, UA 5.5     Specific Gravity, UA 1.025     Glucose, UA Negative     Ketones, UA Negative     Bilirubin, UA Negative     Blood, UA Negative     Protein, UA 30 mg/dL (1+)     Leuk Esterase, UA Negative     Nitrite, UA Negative     Urobilinogen, UA 0.2 E.U./dL    Urinalysis, Microscopic Only - Urine, Clean Catch [[id number]]  (Abnormal) Collected:  08/01/20 1516    Specimen:  Urine, Clean Catch Updated:  08/01/20 1540     RBC, UA 0-2 /HPF      WBC, UA 0-2 /HPF      Bacteria, UA None Seen /HPF      Squamous Epithelial Cells, UA None Seen /HPF      Hyaline Casts, UA 0-2 /LPF      Methodology Automated Microscopy        Imaging Results (Last 72 Hours)     Procedure Component Value Units Date/Time    XR Chest PA & Lateral [[id number]] Resulted:  08/02/20 1358     Updated:  08/02/20 1409    CT Chest Pulmonary Embolism [[id number]] Collected:  07/31/20 0230     Updated:  07/31/20 0435    Narrative:       EXAMINATION:  CTA CHEST WITH IV CONTRAST, CT PULMONARY  ANGIOGRAM    DATE OF EXAM: 7/31/2020 4:08 AM    HISTORY: Chest pain. Elevated d-dimer.    TECHNIQUE: CTA examination of the chest was performed following the intravenous administration of 100 mL of Isovue 370. Sagittal, coronal and MIP reformatted images were provided. CT dose lowering techniques were used, to include: automated exposure   control, adjustment for patient size, and or use of iterative reconstruction.    COMPARISON: None.    VESSELS:    Aorta: The aorta shows no dissection or aneurysm.    Pulmonary Arteries: Normal.    FINDINGS:    Lungs: Extensive atelectasis in the left lower lobe. Mild mosaic attenuation of the lungs. Respiratory motion.    Pleura: Moderate left pleural fluid collection, 4 to 5 cm depth.    Mediastinum and Emily: Reactive appearing small mediastinal lymph nodes. Small hiatal hernia.    Cardiovascular: Cardiomegaly. Trace atherosclerotic calcification of the left anterior descending coronary artery.    Upper Abdomen: Normal.     Musculoskeletal Chest: Advanced degenerative disc disease in the thoracic spine.      Impression:         1. No pulmonary embolism, aortic dissection, or aortic aneurysm.  2. Cardiomegaly. Moderate left pleural fluid collection, 4 to 5 cm depth.  3. Compressive atelectasis in the left lower lobe.  4. Mild mosaic attenuation of the lungs, compatible with asthma in the correct clinical setting.    Electronically signed by:  Rodrigo Alva M.D.    7/31/2020 2:34 AM        ECG/EMG Results (most recent)     Procedure Component Value Units Date/Time    ECG 12 Lead [[id number]] Collected:  07/30/20 2001     Updated:  07/30/20 2003    Narrative:       HEART RATE= 107  bpm  RR Interval= 561  ms  ND Interval=   ms  P Horizontal Axis=   deg  P Front Axis=   deg  QRSD Interval= 90  ms  QT Interval= 336  ms  QRS Axis= 21  deg  T Wave Axis= 181  deg  - ABNORMAL ECG -  Atrial fibrillation  Ventricular premature complex  LVH with secondary repolarization  abnormality  Electronically Signed By:   Date and Time of Study: 2020-07-30 20:01:57    ECG 12 Lead [[id number]] Collected:  07/30/20 0929     Updated:  07/31/20 1353    Narrative:       HEART RATE= 92  bpm  RR Interval= 566  ms  MI Interval=   ms  P Horizontal Axis=   deg  P Front Axis=   deg  QRSD Interval= 98  ms  QT Interval= 322  ms  QRS Axis= 8  deg  T Wave Axis= 162  deg  - ABNORMAL ECG -  Atrial fibrillation  Ventricular premature complex  LVH with secondary repolarization abnormality  When compared with ECG of 08-Dec-2019 10:38:36,  Significant repolarization change  Electronically Signed By: Theo Fuentes (Dav) 31-Jul-2020 13:49:02  Date and Time of Study: 2020-07-30 09:29:57        CBC    Results from last 7 days   Lab Units 08/01/20 0310 07/31/20 0226 07/30/20  0954   WBC 10*3/mm3 9.60 10.50 8.00   HEMOGLOBIN g/dL 12.8* 13.4 14.1   PLATELETS 10*3/mm3 142 166 157     BMP   Results from last 7 days   Lab Units 08/01/20 0310 07/31/20 0226 07/30/20  1107   SODIUM mmol/L 138 143 144   POTASSIUM mmol/L 3.6 4.0 3.1*   CHLORIDE mmol/L 103 104 106   CO2 mmol/L 23.0 26.0 24.0   BUN  20 18 19   CREATININE mg/dL 1.15 1.37* 1.25   GLUCOSE mg/dL 122* 131* 117*     CMP   Results from last 7 days   Lab Units 08/01/20 0310 07/31/20 0226 07/30/20  1107   SODIUM mmol/L 138 143 144   POTASSIUM mmol/L 3.6 4.0 3.1*   CHLORIDE mmol/L 103 104 106   CO2 mmol/L 23.0 26.0 24.0   BUN  20 18 19   CREATININE mg/dL 1.15 1.37* 1.25   GLUCOSE mg/dL 122* 131* 117*   ALBUMIN g/dL  --   --  4.00   BILIRUBIN mg/dL  --   --  0.8   ALK PHOS U/L  --   --  60   AST (SGOT) U/L  --   --  21   ALT (SGPT) U/L  --   --  17     Cardiac Studies:  Echo-   Results for orders placed during the hospital encounter of 11/07/19   Adult Transthoracic Echo Complete W/ Cont if Necessary Per Protocol    Narrative · The left ventricular cavity is severely dilated.  · Left ventricular systolic function is moderately decreased.  · Left atrial cavity size is  moderately dilated.  · Moderate mitral valve regurgitation is present  · Moderate tricuspid valve regurgitation is present.  · The following left ventricular wall segments are hypokinetic: mid   anterior, apical anterior, basal anterolateral, mid anterolateral, apical   lateral, basal inferolateral, mid inferolateral, apical inferior, mid   inferior, apical septal, basal inferoseptal, mid inferoseptal, apex   hypokinetic, mid anteroseptal, basal anterior, basal inferior and basal   inferoseptal.  · LV ejection fraction is about 30 to 35%  · No pericardial effusion noted        Stress Myoview-  Cath-      Medication Review:   Scheduled Meds:    atorvastatin 10 mg Oral Nightly   carvedilol 25 mg Oral BID With Meals   dilTIAZem  mg Oral Q24H   hydrALAZINE 75 mg Oral TID   isosorbide mononitrate 30 mg Oral BID - Nitrates   potassium chloride 20 mEq Oral BID   rivaroxaban 20 mg Oral Daily With Dinner   spironolactone 25 mg Oral Daily   triamcinolone  Topical Q12H     Continuous Infusions:   PRN Meds:.hydrALAZINE  •  [COMPLETED] Insert peripheral IV **AND** sodium chloride      Assessment/Plan   Patient Active Problem List   Diagnosis   • Hypertensive emergency   • Chronic atrial fibrillation (CMS/[facility])   • Chronic diastolic CHF (congestive heart failure) (CMS/[facility])   • CKD (chronic kidney disease) stage 3, GFR 30-59 ml/min (CMS/[facility])   • Essential hypertension   • Gambling disorder, persistent   • Shortness of breath   • Coronary artery disease involving native coronary artery of native heart without angina pectoris   • Psoriasis   • History of CVA (cerebrovascular accident)   • DDD (degenerative disc disease), lumbosacral   • Peripheral polyneuropathy   • GERD (gastroesophageal reflux disease)   • Medically noncompliant   • Acute respiratory distress   • Unstable angina (CMS/[facility])   • Cervical disc disorder with radiculopathy   • Completed stroke (CMS/[facility])   • Degeneration of intervertebral disc   • Excessive  anticoagulation   • Impaired left ventricular function   • Liver lesion   • Lumbar radiculopathy   • Cervical myelopathy (CMS/HCC)   • Lumbosacral radiculopathy   • Spinal stenosis of lumbar region   • Obesity   • Mild cognitive impairment   • Thoracic back pain   • Thoracic disc disease with myelopathy   • Paroxysmal atrial fibrillation (CMS/HCC)   • Essential (primary) hypertension   • Acute congestive heart failure (CMS/HCC)     Plan:    Clinically from cardiac standpoint patient is stable and heart rate is well controlled and patient is on rate control and anticoagulation.  Patient will need cardioversion in future after 4 to 6 weeks of anticoagulation.  From a cardiac standpoint patient can be discharged.  Primary cardiologist to follow in the morning      Kurt Mccullough MD  08/02/20  14:21

## 2020-08-02 NOTE — PROGRESS NOTES
LOS: 2 days   Patient Care Team:  Marisol Larson APRN as PCP - General    Subjective     Interval History:    Patient Complaints: soa    History taken from: patient    Review of Systems   Constitutional: Positive for fatigue. Negative for chills and fever.   HENT: Negative for sinus pressure, sore throat and trouble swallowing.    Eyes: Negative for visual disturbance.   Respiratory: Positive for cough and shortness of breath. Negative for chest tightness.    Cardiovascular: Positive for leg swelling. Negative for chest pain and palpitations.   Gastrointestinal: Negative for abdominal pain.   Genitourinary: Negative for difficulty urinating.   Skin: Positive for rash (pt with hx psoriasis- scalp and abd lesion).   Neurological: Positive for weakness. Negative for tremors, speech difficulty and light-headedness.   Psychiatric/Behavioral: Positive for confusion (aware of self place and year - mental status improved today ).           Objective     Vital Signs  Temp:  [97.4 °F (36.3 °C)-98.9 °F (37.2 °C)] 98.2 °F (36.8 °C)  Heart Rate:  [62-84] 73  Resp:  [12-23] 21  BP: (130-176)/(71-92) 138/89    Physical Exam:     General Appearance:    Alert, cooperative, in no acute distress,   Head:    Normocephalic, without obvious abnormality, atraumatic silver plaque lesions to scalp   Eyes:            Lids and lashes normal, conjunctivae and sclerae normal, no   icterus, no pallor, corneas clear, PERRLA   Ears:    Ears appear intact with no abnormalities noted   Throat:   No oral lesions, no thrush, oral mucosa moist   Neck:   No adenopathy, supple, trachea midline, no thyromegaly, no   carotid bruit, no JVD   Lungs:     Diminished LLL otherwise lungs are clear. respirations regular, even and                  unlabored    Heart:    Regular rhythm and normal rate, normal S1 and S2, no            murmur, no gallop, no rub, no click   Chest Wall:    No abnormalities observed   Abdomen:     periumbilical well demarcated erythema  Normal bowel sounds, no masses, no organomegaly, soft        Non-tender non-distended, no guarding,   Extremities:   Moves all extremities well, no edema, no cyanosis, no             Redness   Pulses:   Pulses palpable and equal bilaterally   Skin:   No bleeding, bruising or rash   Lymph nodes:   No palpable adenopathy   Neurologic:   Cranial nerves 2 - 12 grossly intact, sensation intact, DTR       present and equal bilaterally        Results Review:    Lab Results (last 24 hours)     Procedure Component Value Units Date/Time    Urinalysis With Culture If Indicated - Urine, Clean Catch [[id number]]  (Abnormal) Collected:  08/01/20 1516    Specimen:  Urine, Clean Catch Updated:  08/01/20 1540     Color, UA Yellow     Appearance, UA Clear     pH, UA 5.5     Specific Gravity, UA 1.025     Glucose, UA Negative     Ketones, UA Negative     Bilirubin, UA Negative     Blood, UA Negative     Protein, UA 30 mg/dL (1+)     Leuk Esterase, UA Negative     Nitrite, UA Negative     Urobilinogen, UA 0.2 E.U./dL    Urinalysis, Microscopic Only - Urine, Clean Catch [[id number]]  (Abnormal) Collected:  08/01/20 1516    Specimen:  Urine, Clean Catch Updated:  08/01/20 1540     RBC, UA 0-2 /HPF      WBC, UA 0-2 /HPF      Bacteria, UA None Seen /HPF      Squamous Epithelial Cells, UA None Seen /HPF      Hyaline Casts, UA 0-2 /LPF      Methodology Automated Microscopy           Imaging Results (Last 24 Hours)     ** No results found for the last 24 hours. **               I reviewed the patient's new clinical results.    Medication Review:   Scheduled Meds:  atorvastatin 10 mg Oral Nightly   carvedilol 25 mg Oral BID With Meals   dilTIAZem  mg Oral Q24H   hydrALAZINE 75 mg Oral TID   isosorbide mononitrate 30 mg Oral BID - Nitrates   potassium chloride 20 mEq Oral BID   rivaroxaban 20 mg Oral Daily With Dinner   spironolactone 25 mg Oral Daily     Continuous Infusions:   PRN Meds:.hydrALAZINE  •  [COMPLETED] Insert peripheral IV  **AND** sodium chloride     Assessment/Plan       Acute congestive heart failure (CMS/HCC)    1. CHF- on spironolactone Dr Mccullough following   2. A fib - rate controlled- pt is on xarelto bb and calcium channel blocker    3. HTN w CKD stage 3- bp stable monitor   4. Left pleural effusion - repeat cxr [facility] consulted yesterday- CT neg for     PE neg COVID test- check cxr to eval effusion    5. CAD- Dr Mccullough following.    6. Confusion - yesterday ammonia UA and abg ordered- pt refused labs will try for labs in the AM - UA w protien/ rbc/ wbc - Unsure of this pts baseline mental status. It appears he has been somewhat confused intermittently.   7. Psoriasis- adding in topical steroid to affected area     Plan for disposition: CLARISSA Martin  08/02/20  13:14

## 2020-08-02 NOTE — CONSULTS
PULMONARY/ CRITICAL CARE/ SLEEP MEDICINE CONSULT NOTE        Patient Name:  Carlos Whipple    :  1955    Medical Record:  [id number]    REQUESTING PHYSICIAN    Marisol Larson APRN    PRIMARY CARE PHYSICIAN     Marisol Larson APRN    REASON FOR CONSULTATION    Carlos Whipple is a 64 y.o. male who was referred for consultation for pleural effusion    This is a 64-year-old with a history of systolic heart failure, A. fib, chronic kidney disease, coronary artery disease, hypertension with a previous history of CVA and psoriasis who presents with progressive pitting edema with associated orthopnea and paroxysmal nocturnal dyspnea.  The patient is little confused today.  Oriented to person place and time.  Says some off-the-wall things occasionally.  In the ER he was found to be significantly fluid overloaded and has since been admitted.  In his work-up he was noted to have a left-sided pleural effusion.  Denies any fevers chills or sweats.  Denies any nausea vomiting or diarrhea.  Has a cough that is nonproductive.  No active chest pain.    We are consulted to comment on this left-sided pleural effusion.  He had a CT done on 2020.  It was supposed to have a PA lateral done however the patient has since refused.  Cardiology is been following the patient as well.    REVIEW OF SYSTEMS    Unable to get a consistent review of systems.    MEDICAL HISTORY    Past Medical History:   Diagnosis Date   • A-fib (CMS/[facility])    • CHF (congestive heart failure) (CMS/[facility])    • Chronic atrial fibrillation (CMS/[facility]) 2019   • Chronic diastolic CHF (congestive heart failure) (CMS/[facility]) 2019   • CKD (chronic kidney disease) stage 3, GFR 30-59 ml/min (CMS/[facility]) 2019   • Coronary artery disease involving native coronary artery of native heart without angina pectoris 2019   • DDD (degenerative disc disease), lumbosacral 2019   • Essential hypertension 2019   • Gambling disorder, persistent 2019    • GERD (gastroesophageal reflux disease) 2019   • History of CVA (cerebrovascular accident) 2019   • Hyperlipidemia    • Hypertension    • Medically noncompliant 2019   • Peripheral polyneuropathy 2019   • Psoriasis 2019   • Stroke (CMS/HCC)         SURGICAL HISTORY    Past Surgical History:   Procedure Laterality Date   • CARDIAC CATHETERIZATION     • CARDIAC CATHETERIZATION N/A 12/10/2019    Procedure: Left Heart Cath and coronary angiogram;  Surgeon: Edy Thomas MD;  Location: [facility] CATH INVASIVE LOCATION;  Service: Cardiovascular        FAMILY HISTORY    Family History   Problem Relation Age of Onset   • Heart disease Mother        SOCIAL HISTORY    Social History     Tobacco Use   • Smoking status: Never Smoker   • Smokeless tobacco: Never Used   Substance Use Topics   • Alcohol use: No     Frequency: Never        ALLERGIES    Allergies   Allergen Reactions   • Ketorolac Tromethamine Anaphylaxis     unknown       MEDICATIONS    Scheduled Meds:  atorvastatin 10 mg Oral Nightly   carvedilol 25 mg Oral BID With Meals   dilTIAZem  mg Oral Q24H   hydrALAZINE 75 mg Oral TID   isosorbide mononitrate 30 mg Oral BID - Nitrates   potassium chloride 20 mEq Oral BID   rivaroxaban 20 mg Oral Daily With Dinner   spironolactone 25 mg Oral Daily   triamcinolone  Topical Q12H     Continuous Infusions:   PRN Meds:.hydrALAZINE  •  [COMPLETED] Insert peripheral IV **AND** sodium chloride      PHYSICAL EXAM    tMax 24 hrs:  Temp (24hrs), Av.1 °F (36.7 °C), Min:97.4 °F (36.3 °C), Max:98.6 °F (37 °C)    Vitals Ranges:  Temp:  [97.4 °F (36.3 °C)-98.6 °F (37 °C)] 97.9 °F (36.6 °C)  Heart Rate:  [60-84] 60  Resp:  [12-22] 22  BP: (130-176)/(71-92) 147/91  Intake and Output Last 3 Shifts:  I/O last 3 completed shifts:  In: 1257 [P.O.:1257]  Out: 1425 [Urine:1425]    Constitutional:  Well developed, well nourished, no acute distress, non-toxic appearance   Eyes:  PERRL, conjunctiva normal   HENT:   Atraumatic, external ears normal, nose normal, oropharynx moist, no pharyngeal exudates.   Neck- normal range of motion, no tenderness, supple   Respiratory:  No respiratory distress, normal breath sounds, no rales, no wheezing   Cardiovascular:  Normal rate, normal rhythm, no murmurs, no gallops, no rubs   GI:  Soft, nondistended, normal bowel sounds, nontender, no organomegaly, no mass, no rebound, no guarding   :  No costovertebral angle tenderness   Musculoskeletal:  No edema, no tenderness, no deformities. Back- no tenderness  Integument:  Well hydrated, no rash   Lymphatic:  No lymphadenopathy noted   Neurologic:  Alert & oriented x 3, CN 2-12 normal, normal motor function, normal sensory function, no focal deficits noted   Psychiatric:  Speech and behavior appropriate     LABS    Lab Results (last 24 hours)     ** No results found for the last 24 hours. **         Microbiology Results (last 10 days)     Procedure Component Value - Date/Time    COVID-19 [employer] IN-HOUSE, Nasal Swab No Transport Media - Swab, Nasal Cavity [[id number]]  (Normal) Collected:  07/30/20 0957    Lab Status:  Final result Specimen:  Swab from Nasal Cavity Updated:  07/30/20 1029     COVID19 Not Detected    Narrative:       Fact sheet for providers: https://www.fda.gov/media/258173/download     Fact sheet for patients: https://www.fda.gov/media/428427/download         CBC  Results from last 7 days   Lab Units 08/01/20 0310 07/31/20 0226 07/30/20  0954   WBC 10*3/mm3 9.60 10.50 8.00   RBC 10*6/mm3 4.72 5.01 5.24   HEMOGLOBIN g/dL 12.8* 13.4 14.1   HEMATOCRIT % 38.9 41.5 43.3   MCV fL 82.4 82.7 82.6   PLATELETS 10*3/mm3 142 166 157       BMP  Results from last 7 days   Lab Units 08/01/20 0310 07/31/20 0226 07/30/20  1107   SODIUM mmol/L 138 143 144   POTASSIUM mmol/L 3.6 4.0 3.1*   CHLORIDE mmol/L 103 104 106   CO2 mmol/L 23.0 26.0 24.0   BUN  20 18 19   CREATININE mg/dL 1.15 1.37* 1.25   GLUCOSE mg/dL 122* 131* 117*       CMP  Results from last 7 days   Lab Units 08/01/20  0310 07/31/20  0226 07/30/20  1107   SODIUM mmol/L 138 143 144   POTASSIUM mmol/L 3.6 4.0 3.1*   CHLORIDE mmol/L 103 104 106   CO2 mmol/L 23.0 26.0 24.0   BUN  20 18 19   CREATININE mg/dL 1.15 1.37* 1.25   GLUCOSE mg/dL 122* 131* 117*   ALBUMIN g/dL  --   --  4.00   BILIRUBIN mg/dL  --   --  0.8   ALK PHOS U/L  --   --  60   AST (SGOT) U/L  --   --  21   ALT (SGPT) U/L  --   --  17       TROPONIN  Results from last 7 days   Lab Units 07/31/20 0226   TROPONIN T ng/mL <0.010       CoAg        Creatinine Clearance  Estimated Creatinine Clearance: 86.9 mL/min (by C-G formula based on SCr of 1.15 mg/dL).    ABG      IMAGING & OTHER STUDIES    Imaging Results (Last 72 Hours)     Procedure Component Value Units Date/Time    CT Chest Pulmonary Embolism [[id number]] Collected:  07/31/20 0230     Updated:  07/31/20 0435    Narrative:       EXAMINATION:  CTA CHEST WITH IV CONTRAST, CT PULMONARY ANGIOGRAM    DATE OF EXAM: 7/31/2020 4:08 AM    HISTORY: Chest pain. Elevated d-dimer.    TECHNIQUE: CTA examination of the chest was performed following the intravenous administration of 100 mL of Isovue 370. Sagittal, coronal and MIP reformatted images were provided. CT dose lowering techniques were used, to include: automated exposure   control, adjustment for patient size, and or use of iterative reconstruction.    COMPARISON: None.    VESSELS:    Aorta: The aorta shows no dissection or aneurysm.    Pulmonary Arteries: Normal.    FINDINGS:    Lungs: Extensive atelectasis in the left lower lobe. Mild mosaic attenuation of the lungs. Respiratory motion.    Pleura: Moderate left pleural fluid collection, 4 to 5 cm depth.    Mediastinum and Emily: Reactive appearing small mediastinal lymph nodes. Small hiatal hernia.    Cardiovascular: Cardiomegaly. Trace atherosclerotic calcification of the left anterior descending coronary artery.    Upper Abdomen: Normal.     Musculoskeletal Chest:  Advanced degenerative disc disease in the thoracic spine.      Impression:         1. No pulmonary embolism, aortic dissection, or aortic aneurysm.  2. Cardiomegaly. Moderate left pleural fluid collection, 4 to 5 cm depth.  3. Compressive atelectasis in the left lower lobe.  4. Mild mosaic attenuation of the lungs, compatible with asthma in the correct clinical setting.    Electronically signed by:  Rodrigo Alva M.D.    7/31/2020 2:34 AM          ASSESSMENT      Acute congestive heart failure (CMS/HCC)  Left pleural effusion  Acute on chronic systolic and diastolic heart failure exacerbation  Acute metabolic encephalopathy    PLAN    Regards to the pleural effusion it is asymmetric and only left-sided.  I do agree the patient will likely need a thoracentesis to rule out malignancy versus infected effusion.  Asymmetric pleural effusions typically are not associated with heart failure however can be seen.    Agree with PA lateral since been 2 days since an image to further evaluate whether the pleural fluid is there.  He told me he is fine to get it now was initially refusing it.  Not in any distress so no rush to get this done today.  Remains very volume overloaded on examination with 2-3+ pitting edema in his lower extremities.    On spironolactone alone.  Will start on Lasix 40 twice daily.    We will follow-up on chest x-ray.  Pleural effusion still present will need to consider IR guided versus bedside thoracentesis to be done by us.  We will follow-up on this result tomorrow whenever the patient agrees to do the chest x-ray.  Okay to keep off on antibiotics for now.  Does not act infected.    In regards to encephalopathy unclear baseline.    I thank you for this opportunity to take part in this patient's care and will follow the patient along with you.

## 2020-08-02 NOTE — PLAN OF CARE
Pt's BP has remained stable at this point, since Nitro gtt has been d/c'd. Pt is confused at times, but has been able to be re-oriented. Pt refused to move from chair to bed during first assessment, but after education about skin breakdown, agreed to get into bed.Pt denies pain or distress at this time. Will continue to monitor.

## 2020-08-02 NOTE — PLAN OF CARE
Pt this AM was A&Ox3 and disoriented to situation. Currently he is A&Ox4. Requires frequent repetition of tx plan. CXR ordered today to assess L sided pleural effusion was originally refused by pt, but later accepted. Pending currently. Possible thoracentesis to be performed tomorrow. VS indicate Afib with controlled rate, but otherwise stable. Pt expresses no complaints. Will continue to monitor.

## 2020-08-03 LAB
ALBUMIN SERPL-MCNC: 3.6 G/DL (ref 3.5–5.2)
ALBUMIN/GLOB SERPL: 1 G/DL
ALP SERPL-CCNC: 60 U/L (ref 39–117)
ALT SERPL W P-5'-P-CCNC: 19 U/L (ref 1–41)
AMMONIA BLD-SCNC: 31 UMOL/L (ref 16–60)
ANION GAP SERPL CALCULATED.3IONS-SCNC: 13 MMOL/L (ref 5–15)
AST SERPL-CCNC: 18 U/L (ref 1–40)
BASOPHILS # BLD AUTO: 0.1 10*3/MM3 (ref 0–0.2)
BASOPHILS NFR BLD AUTO: 0.6 % (ref 0–1.5)
BILIRUB SERPL-MCNC: 0.5 MG/DL (ref 0–1.2)
BUN SERPL-MCNC: 21 MG/DL (ref 8–23)
BUN SERPL-MCNC: ABNORMAL MG/DL
BUN/CREAT SERPL: ABNORMAL
CALCIUM SPEC-SCNC: 9 MG/DL (ref 8.6–10.5)
CHLORIDE SERPL-SCNC: 103 MMOL/L (ref 98–107)
CO2 SERPL-SCNC: 24 MMOL/L (ref 22–29)
CREAT SERPL-MCNC: 1.09 MG/DL (ref 0.76–1.27)
DEPRECATED RDW RBC AUTO: 49 FL (ref 37–54)
EOSINOPHIL # BLD AUTO: 0.2 10*3/MM3 (ref 0–0.4)
EOSINOPHIL NFR BLD AUTO: 2 % (ref 0.3–6.2)
ERYTHROCYTE [DISTWIDTH] IN BLOOD BY AUTOMATED COUNT: 17.1 % (ref 12.3–15.4)
GFR SERPL CREATININE-BSD FRML MDRD: 68 ML/MIN/1.73
GLOBULIN UR ELPH-MCNC: 3.7 GM/DL
GLUCOSE SERPL-MCNC: 104 MG/DL (ref 65–99)
HCT VFR BLD AUTO: 41.7 % (ref 37.5–51)
HGB BLD-MCNC: 13.6 G/DL (ref 13–17.7)
LYMPHOCYTES # BLD AUTO: 1.3 10*3/MM3 (ref 0.7–3.1)
LYMPHOCYTES NFR BLD AUTO: 15 % (ref 19.6–45.3)
MCH RBC QN AUTO: 27.1 PG (ref 26.6–33)
MCHC RBC AUTO-ENTMCNC: 32.7 G/DL (ref 31.5–35.7)
MCV RBC AUTO: 83 FL (ref 79–97)
MONOCYTES # BLD AUTO: 1.1 10*3/MM3 (ref 0.1–0.9)
MONOCYTES NFR BLD AUTO: 13 % (ref 5–12)
NEUTROPHILS NFR BLD AUTO: 5.8 10*3/MM3 (ref 1.7–7)
NEUTROPHILS NFR BLD AUTO: 69.4 % (ref 42.7–76)
NRBC BLD AUTO-RTO: 0 /100 WBC (ref 0–0.2)
PLATELET # BLD AUTO: 170 10*3/MM3 (ref 140–450)
PMV BLD AUTO: 10.6 FL (ref 6–12)
POTASSIUM SERPL-SCNC: 4.2 MMOL/L (ref 3.5–5.2)
PROT SERPL-MCNC: 7.3 G/DL (ref 6–8.5)
RBC # BLD AUTO: 5.02 10*6/MM3 (ref 4.14–5.8)
SODIUM SERPL-SCNC: 140 MMOL/L (ref 136–145)
WBC # BLD AUTO: 8.4 10*3/MM3 (ref 3.4–10.8)

## 2020-08-03 PROCEDURE — 97161 PT EVAL LOW COMPLEX 20 MIN: CPT

## 2020-08-03 PROCEDURE — 85025 COMPLETE CBC W/AUTO DIFF WBC: CPT | Performed by: NURSE PRACTITIONER

## 2020-08-03 PROCEDURE — 99232 SBSQ HOSP IP/OBS MODERATE 35: CPT | Performed by: INTERNAL MEDICINE

## 2020-08-03 PROCEDURE — 80053 COMPREHEN METABOLIC PANEL: CPT | Performed by: NURSE PRACTITIONER

## 2020-08-03 PROCEDURE — 82140 ASSAY OF AMMONIA: CPT | Performed by: NURSE PRACTITIONER

## 2020-08-03 PROCEDURE — 99222 1ST HOSP IP/OBS MODERATE 55: CPT | Performed by: PHYSICIAN ASSISTANT

## 2020-08-03 PROCEDURE — 25010000002 FUROSEMIDE PER 20 MG: Performed by: INTERNAL MEDICINE

## 2020-08-03 PROCEDURE — 97116 GAIT TRAINING THERAPY: CPT

## 2020-08-03 RX ORDER — RISPERIDONE 0.25 MG/1
0.5 TABLET ORAL EVERY 12 HOURS PRN
Status: DISCONTINUED | OUTPATIENT
Start: 2020-08-03 | End: 2020-08-03

## 2020-08-03 RX ORDER — RISPERIDONE 0.25 MG/1
0.5 TABLET ORAL 2 TIMES DAILY
Status: DISCONTINUED | OUTPATIENT
Start: 2020-08-03 | End: 2020-08-04 | Stop reason: HOSPADM

## 2020-08-03 RX ADMIN — HYDRALAZINE HYDROCHLORIDE 75 MG: 25 TABLET, FILM COATED ORAL at 16:53

## 2020-08-03 RX ADMIN — TRIAMCINOLONE ACETONIDE 1 APPLICATION: 1 CREAM TOPICAL at 20:38

## 2020-08-03 RX ADMIN — HYDRALAZINE HYDROCHLORIDE 75 MG: 25 TABLET, FILM COATED ORAL at 09:52

## 2020-08-03 RX ADMIN — CARVEDILOL 25 MG: 25 TABLET, FILM COATED ORAL at 09:51

## 2020-08-03 RX ADMIN — DILTIAZEM HYDROCHLORIDE 180 MG: 180 CAPSULE, COATED, EXTENDED RELEASE ORAL at 09:50

## 2020-08-03 RX ADMIN — SPIRONOLACTONE 25 MG: 25 TABLET ORAL at 09:52

## 2020-08-03 RX ADMIN — ISOSORBIDE MONONITRATE 30 MG: 20 TABLET ORAL at 09:52

## 2020-08-03 RX ADMIN — RISPERIDONE 0.5 MG: 0.25 TABLET ORAL at 16:53

## 2020-08-03 RX ADMIN — CARVEDILOL 25 MG: 25 TABLET, FILM COATED ORAL at 18:07

## 2020-08-03 RX ADMIN — FUROSEMIDE 40 MG: 10 INJECTION, SOLUTION INTRAMUSCULAR; INTRAVENOUS at 18:07

## 2020-08-03 RX ADMIN — FUROSEMIDE 40 MG: 10 INJECTION, SOLUTION INTRAMUSCULAR; INTRAVENOUS at 09:50

## 2020-08-03 RX ADMIN — POTASSIUM CHLORIDE 20 MEQ: 1500 TABLET, EXTENDED RELEASE ORAL at 09:51

## 2020-08-03 RX ADMIN — ATORVASTATIN CALCIUM 10 MG: 10 TABLET, FILM COATED ORAL at 20:37

## 2020-08-03 RX ADMIN — TRIAMCINOLONE ACETONIDE: 1 CREAM TOPICAL at 09:50

## 2020-08-03 RX ADMIN — POTASSIUM CHLORIDE 20 MEQ: 1500 TABLET, EXTENDED RELEASE ORAL at 20:37

## 2020-08-03 RX ADMIN — HYDRALAZINE HYDROCHLORIDE 75 MG: 25 TABLET, FILM COATED ORAL at 20:37

## 2020-08-03 NOTE — NURSING NOTE
saw pt this am and does not think he needs thoracentesis, that effusion will clear with Lasix. Xarlto resumed per  and to follow up in clinic. At that time anticoagulant can be stopped for thoracentesis if needed.

## 2020-08-03 NOTE — PROGRESS NOTES
LOS: 3 days   Admiting Physician- Dre Stauffer MD    Reason For Followup:    Atrial fibrillation  Hypertension  CAD  Hyperlipidemia  History of noncompliance    Subjective     No chest pain or shortness of breath    Objective     Patient still in atrial fibrillation    Review of Systems:   Review of Systems   Constitution: Negative for chills and fever.   HENT: Negative for ear discharge and nosebleeds.    Eyes: Negative for discharge and redness.   Cardiovascular: Negative for chest pain, orthopnea, palpitations, paroxysmal nocturnal dyspnea and syncope.   Respiratory: Negative for cough, shortness of breath and wheezing.    Endocrine: Negative for heat intolerance.   Skin: Negative for rash.   Musculoskeletal: Negative for arthritis and myalgias.   Gastrointestinal: Negative for abdominal pain, melena, nausea and vomiting.   Genitourinary: Negative for dysuria and hematuria.   Neurological: Negative for dizziness, light-headedness, numbness and tremors.   Psychiatric/Behavioral: Negative for altered mental status and depression. The patient is not nervous/anxious.          Vital Signs  Vitals:    08/02/20 1917 08/03/20 0019 08/03/20 0258 08/03/20 0600   BP: 115/72 132/78 152/74    BP Location: Left arm Right arm Right arm    Patient Position: Sitting Sitting Lying    Pulse: 74 72 62 73   Resp: 16 18 17    Temp:  98 °F (36.7 °C) 97.8 °F (36.6 °C)    TempSrc:  Axillary Axillary    SpO2: 95% 93% 94%    Weight:       Height:         Wt Readings from Last 1 Encounters:   08/02/20 116 kg (256 lb 13.4 oz)       Intake/Output Summary (Last 24 hours) at 8/3/2020 1046  Last data filed at 8/3/2020 0917  Gross per 24 hour   Intake 1776 ml   Output 4505 ml   Net -2729 ml     Physical Exam:  Physical Exam   Constitutional: He is oriented to person, place, and time. He appears well-developed and well-nourished.   HENT:   Head: Normocephalic and atraumatic.   Eyes: No scleral icterus.   Neck: No thyromegaly present.      Cardiovascular: Normal rate and normal heart sounds. Exam reveals no gallop and no friction rub.   No murmur heard.  Heart rate is irregular   Pulmonary/Chest: Effort normal and breath sounds normal. No respiratory distress. He has no wheezes. He has no rales.   Abdominal: There is no tenderness.   Musculoskeletal: He exhibits no edema.   Lymphadenopathy:     He has no cervical adenopathy.   Neurological: He is alert and oriented to person, place, and time.   Skin: No rash noted. No erythema.   Psychiatric: He has a normal mood and affect.       Results Review:   Lab Results (last 24 hours)     Procedure Component Value Units Date/Time    BUN [[id number]]  (Normal) Collected:  08/03/20 0322    Specimen:  Blood Updated:  08/03/20 0718     BUN 21 mg/dL     Comprehensive Metabolic Panel [[id number]]  (Abnormal) Collected:  08/03/20 0322    Specimen:  Blood Updated:  08/03/20 0453     Glucose 104 mg/dL      BUN --     Comment: Testing performed by alternate method        Creatinine 1.09 mg/dL      Sodium 140 mmol/L      Potassium 4.2 mmol/L      Chloride 103 mmol/L      CO2 24.0 mmol/L      Calcium 9.0 mg/dL      Total Protein 7.3 g/dL      Albumin 3.60 g/dL      ALT (SGPT) 19 U/L      AST (SGOT) 18 U/L      Alkaline Phosphatase 60 U/L      Total Bilirubin 0.5 mg/dL      eGFR Non African Amer 68 mL/min/1.73      Globulin 3.7 gm/dL      A/G Ratio 1.0 g/dL      BUN/Creatinine Ratio --     Comment: Testing not performed        Anion Gap 13.0 mmol/L     Narrative:       GFR Normal >60  Chronic Kidney Disease <60  Kidney Failure <15      CBC & Differential [[id number]] Collected:  08/03/20 0322    Specimen:  Blood Updated:  08/03/20 0431    Narrative:       The following orders were created for panel order CBC & Differential.  Procedure                               Abnormality         Status                     ---------                               -----------         ------                     CBC Auto  Differential[[id number]]        Abnormal            Final result                 Please view results for these tests on the individual orders.    CBC Auto Differential [[id number]]  (Abnormal) Collected:  08/03/20 0322    Specimen:  Blood Updated:  08/03/20 0431     WBC 8.40 10*3/mm3      RBC 5.02 10*6/mm3      Hemoglobin 13.6 g/dL      Hematocrit 41.7 %      MCV 83.0 fL      MCH 27.1 pg      MCHC 32.7 g/dL      RDW 17.1 %      RDW-SD 49.0 fl      MPV 10.6 fL      Platelets 170 10*3/mm3      Neutrophil % 69.4 %      Lymphocyte % 15.0 %      Monocyte % 13.0 %      Eosinophil % 2.0 %      Basophil % 0.6 %      Neutrophils, Absolute 5.80 10*3/mm3      Lymphocytes, Absolute 1.30 10*3/mm3      Monocytes, Absolute 1.10 10*3/mm3      Eosinophils, Absolute 0.20 10*3/mm3      Basophils, Absolute 0.10 10*3/mm3      nRBC 0.0 /100 WBC     Ammonia [[id number]]  (Normal) Collected:  08/03/20 0322    Specimen:  Blood Updated:  08/03/20 0354     Ammonia 31 umol/L         Imaging Results (Last 72 Hours)     Procedure Component Value Units Date/Time    XR Chest PA & Lateral [[id number]] Collected:  08/02/20 2056     Updated:  08/02/20 2102    Narrative:       Examination: XR CHEST PA AND LATERAL-     Date of Exam: 8/2/2020 6:50 PM     Indication: left pleural effusion, pt now consents; I50.9-Heart failure,  unspecified; I16.1-Hypertensive emergency.     Comparison: 07/30/2020     Technique: 2 radiographic views of the chest were obtained.     Findings:  There is moderate enlargement of the cardiac silhouette. There is a  moderate opacity in the left lower chest, similar to prior given  difference in technique. Much of this is likely due to pleural effusion.  No pneumothorax is noted.     There is a faint 11 mm nodule projecting over the right upper chest. No  correlate is found on CT chest of 07/31/2020. This is therefore likely  outside the patient. There is degenerative change of the thoracic spine.       Impression:       1. Moderate  left pleural effusion is not significant changed.  2. Cardiomegaly with pulmonary vascular redistribution. Negative for  pulmonary edema..     Electronically Signed By-Nehal Way On:8/2/2020 9:00 PM  This report was finalized on [id number] by  Nehal Way, .        ECG/EMG Results (most recent)     Procedure Component Value Units Date/Time    ECG 12 Lead [[id number]] Collected:  07/30/20 2001     Updated:  07/30/20 2003    Narrative:       HEART RATE= 107  bpm  RR Interval= 561  ms  MN Interval=   ms  P Horizontal Axis=   deg  P Front Axis=   deg  QRSD Interval= 90  ms  QT Interval= 336  ms  QRS Axis= 21  deg  T Wave Axis= 181  deg  - ABNORMAL ECG -  Atrial fibrillation  Ventricular premature complex  LVH with secondary repolarization abnormality  Electronically Signed By:   Date and Time of Study: 2020-07-30 20:01:57    ECG 12 Lead [[id number]] Collected:  07/30/20 0929     Updated:  07/31/20 1353    Narrative:       HEART RATE= 92  bpm  RR Interval= 566  ms  MN Interval=   ms  P Horizontal Axis=   deg  P Front Axis=   deg  QRSD Interval= 98  ms  QT Interval= 322  ms  QRS Axis= 8  deg  T Wave Axis= 162  deg  - ABNORMAL ECG -  Atrial fibrillation  Ventricular premature complex  LVH with secondary repolarization abnormality  When compared with ECG of 08-Dec-2019 10:38:36,  Significant repolarization change  Electronically Signed By: Theo Fuentes ([facility]) 31-Jul-2020 13:49:02  Date and Time of Study: 2020-07-30 09:29:57        CBC    Results from last 7 days   Lab Units 08/03/20 0322 08/01/20 0310 07/31/20 0226 07/30/20  0954   WBC 10*3/mm3 8.40 9.60 10.50 8.00   HEMOGLOBIN g/dL 13.6 12.8* 13.4 14.1   PLATELETS 10*3/mm3 170 142 166 157     BMP   Results from last 7 days   Lab Units 08/03/20 0322 08/01/20 0310 07/31/20 0226 07/30/20  1107   SODIUM mmol/L 140 138 143 144   POTASSIUM mmol/L 4.2 3.6 4.0 3.1*   CHLORIDE mmol/L 103 103 104 106   CO2 mmol/L 24.0 23.0 26.0 24.0   BUN  21 20 18 19   CREATININE mg/dL  1.09 1.15 1.37* 1.25   GLUCOSE mg/dL 104* 122* 131* 117*     CMP   Results from last 7 days   Lab Units 08/03/20  0322 08/01/20  0310 07/31/20  0226 07/30/20  1107   SODIUM mmol/L 140 138 143 144   POTASSIUM mmol/L 4.2 3.6 4.0 3.1*   CHLORIDE mmol/L 103 103 104 106   CO2 mmol/L 24.0 23.0 26.0 24.0   BUN  21 20 18 19   CREATININE mg/dL 1.09 1.15 1.37* 1.25   GLUCOSE mg/dL 104* 122* 131* 117*   ALBUMIN g/dL 3.60  --   --  4.00   BILIRUBIN mg/dL 0.5  --   --  0.8   ALK PHOS U/L 60  --   --  60   AST (SGOT) U/L 18  --   --  21   ALT (SGPT) U/L 19  --   --  17   AMMONIA umol/L 31  --   --   --      Cardiac Studies:  Echo-   Results for orders placed during the hospital encounter of 11/07/19   Adult Transthoracic Echo Complete W/ Cont if Necessary Per Protocol    Narrative · The left ventricular cavity is severely dilated.  · Left ventricular systolic function is moderately decreased.  · Left atrial cavity size is moderately dilated.  · Moderate mitral valve regurgitation is present  · Moderate tricuspid valve regurgitation is present.  · The following left ventricular wall segments are hypokinetic: mid   anterior, apical anterior, basal anterolateral, mid anterolateral, apical   lateral, basal inferolateral, mid inferolateral, apical inferior, mid   inferior, apical septal, basal inferoseptal, mid inferoseptal, apex   hypokinetic, mid anteroseptal, basal anterior, basal inferior and basal   inferoseptal.  · LV ejection fraction is about 30 to 35%  · No pericardial effusion noted        Stress Myoview-  Cath-      Medication Review:   Scheduled Meds:    atorvastatin 10 mg Oral Nightly   carvedilol 25 mg Oral BID With Meals   dilTIAZem  mg Oral Q24H   furosemide 40 mg Intravenous BID   hydrALAZINE 75 mg Oral TID   isosorbide mononitrate 30 mg Oral BID - Nitrates   potassium chloride 20 mEq Oral BID   spironolactone 25 mg Oral Daily   triamcinolone  Topical Q12H     Continuous Infusions:   PRN Meds:.hydrALAZINE  •   risperiDONE  •  [COMPLETED] Insert peripheral IV **AND** sodium chloride      Assessment/Plan   Patient Active Problem List   Diagnosis   • Hypertensive emergency   • Chronic atrial fibrillation (CMS/HCC)   • Chronic diastolic CHF (congestive heart failure) (CMS/HCC)   • CKD (chronic kidney disease) stage 3, GFR 30-59 ml/min (CMS/HCC)   • Essential hypertension   • Gambling disorder, persistent   • Shortness of breath   • Coronary artery disease involving native coronary artery of native heart without angina pectoris   • Psoriasis   • History of CVA (cerebrovascular accident)   • DDD (degenerative disc disease), lumbosacral   • Peripheral polyneuropathy   • GERD (gastroesophageal reflux disease)   • Medically noncompliant   • Acute respiratory distress   • Unstable angina (CMS/HCC)   • Cervical disc disorder with radiculopathy   • Completed stroke (CMS/HCC)   • Degeneration of intervertebral disc   • Excessive anticoagulation   • Impaired left ventricular function   • Liver lesion   • Lumbar radiculopathy   • Cervical myelopathy (CMS/HCC)   • Lumbosacral radiculopathy   • Spinal stenosis of lumbar region   • Obesity   • Mild cognitive impairment   • Thoracic back pain   • Thoracic disc disease with myelopathy   • Paroxysmal atrial fibrillation (CMS/HCC)   • Essential (primary) hypertension   • Acute congestive heart failure (CMS/HCC)     Plan:    Clinically from cardiac standpoint patient is stable and heart rate is well controlled and patient is on rate control and anticoagulation.  Patient will need cardioversion in future after 4 to 6 weeks of anticoagulation.    Patient needs thoracentesis and hence Xarelto has been held  Blood pressure and heart rate stable  Continue current medical therapy and follow him as an outpatient      Edy Thomas MD  08/03/20  10:46

## 2020-08-03 NOTE — DISCHARGE PLACEMENT REQUEST
"Carlos Whipple (64 y.o. Male)     Date of Birth Social Security Number Address Home Phone MRN    1955  [id number]  [address]  [place] [facility] [phone] [id number]    Nondenominational Marital Status          [place]ru[place] Single       Admission Date Admission Type Admitting Provider Attending Provider Department, Room/Bed    7/30/20 Emergency Dre Stauffer MD Heimer, Brian T, MD [facility], 255/1    Discharge Date Discharge Disposition Discharge Destination                       Attending Provider:  Dre Stauffer MD    Allergies:  Ketorolac Tromethamine    Isolation:  None   Infection:  None   Code Status:  CPR    Ht:  182.9 cm (72\")   Wt:  116 kg (256 lb 13.4 oz)    Admission Cmt:  None   Principal Problem:  None                Active Insurance as of 7/30/2020     Primary Coverage     Payor Plan Insurance Group Employer/Plan Group    HUMANA MEDICARE REPLACEMENT HUMANA MEDICARE REPLACEMENT [id number]     Payor Plan Address Payor Plan Phone Number Payor Plan Fax Number Effective Dates    [address] [address] [phone]  4/1/2020 - None Entered    [facility] [address]       Subscriber Name Subscriber Birth Date Member ID       CARLOS WHIPPLE 1955 [id number]                 Emergency Contacts      (Rel.) Home Phone Work Phone Mobile Phone    KACEY DOW (Friend) [phone] -- [phone]        Zak [] (Order [id number])   Order   Date: 8/3/2020 Department: [facility] Ordering/Authorizing: Dre Stauffer MD   Order History   Outpatient   Date/Time Action Taken User Additional Information   08/03/20 1626 Sign Mary Pro RN Ordering Mode: Verbal with readback   Order Details     Frequency Duration Priority Order Class   None None Routine External   Start Date/Time     Start Date   08/03/20   Order Information     Order Date Service Start Date Start Time   08/03/20 Medicine 08/03/20    Reference Links     Associated " Reports   View Encounter   Order Questions     Question Answer Comment   Equipment  Walker Folding with Wheels    Length of Need (99 Months = Lifetime) 99 Months = Lifetime    Note:  Custom values to enter length of need for DME          Verbal Order Info     Action Created on Order Mode Entered by Responsible Provider Signed by Signed on   Ordering 08/03/20 1626 Verbal with readback Mary Pro RN Heimer, Brian T, MD     Source Order Set / Preference List     Preference List   AMB SUPPLIES [[id number]]   Clinical Indications      ICD-10-CM ICD-9-CM   Chronic diastolic CHF (congestive heart failure) (CMS/[facility])     I50.32 428.32  428.0   Reprint Order Requisition     Walker (Order #[id number]) on 8/3/20   Encounter-Level Cardiology Documents:     There are no encounter-level cardiology documents.   Encounter     View Encounter          Order Provider Info         Office phone Pager E-mail   Ordering User Mary Pro RN -- -- --   Authorizing Provider Dre Stauffer [phone] -- --   Attending Provider Dre Stauffer [phone] -- --   Entered By Mary Pro RN -- -- --   Ordering Provider Dre Stauffer [phone] -- --   Linked Charges     No charges linked to this procedure   Tracking Reports      Cosign Tracking Order Transmittal Tracking   Authorized by:  Dre Stauffer MD  (NPI: [id number])       Lab Component SmartPhrase Guide     Walker (Order #[id number]) on 8/3/20         Insurance Information                HUMANA MEDICARE REPLACEMENT/HUMANA MEDICARE REPLACEMENT Phone: [phone]    Subscriber: Carlos Whipple Subscriber#: [id number]    Group#: [id number] Precert#:              History & Physical      Dre Stauffer MD at 07/31/20 0755          Patient Care Team:  Marisol Larson APRN as PCP - General    Chief Conplaint  Subjective     The patient is a 64 y.o. male presents with progressive shortness of breath with orthopnea and paroxysmal nocturnal dyspnea.    HPI  The  patient is a 64-year-old  male with known history of chronic diastolic congestive heart failure who presented with a several day history of some progressive decompensation with orthopnea paroxysmal nocturnal dyspnea.  The patient is a poor historian but attempted to use home medications but decompensated and presented to the [facility] emergency room for evaluation where he was admitted with an acute exacerbation of diastolic congestive heart failure with identification of a large left-sided pleural effusion.  He was also found to have atrial fibrillation with rapid sugar response for which he had diltiazem initiated.  At present we are awaiting a cardiac evaluation and thorough investigation.  He denies substernal chest pressure or pain or hemoptysis.    Review of Systems  Review of Systems   Respiratory: Positive for shortness of breath.    Cardiovascular: Positive for palpitations and leg swelling.   Gastrointestinal: Negative.    Genitourinary: Negative.    Neurological: Positive for weakness.   Psychiatric/Behavioral: Positive for agitation. The patient is nervous/anxious.        History  Past Medical History:   Diagnosis Date   • A-fib (CMS/[facility])    • CHF (congestive heart failure) (CMS/[facility])    • Chronic atrial fibrillation (CMS/[facility]) 11/8/2019   • Chronic diastolic CHF (congestive heart failure) (CMS/[facility]) 11/8/2019   • CKD (chronic kidney disease) stage 3, GFR 30-59 ml/min (CMS/[facility]) 11/8/2019   • Coronary artery disease involving native coronary artery of native heart without angina pectoris 11/8/2019   • DDD (degenerative disc disease), lumbosacral 11/8/2019   • Essential hypertension 11/8/2019   • Gambling disorder, persistent 11/8/2019   • GERD (gastroesophageal reflux disease) 11/8/2019   • History of CVA (cerebrovascular accident) 11/8/2019   • Hyperlipidemia    • Hypertension    • Medically noncompliant 11/8/2019   • Peripheral polyneuropathy 11/8/2019   • Psoriasis 11/8/2019   • Stroke  (CMS/[facility])      Past Surgical History:   Procedure Laterality Date   • CARDIAC CATHETERIZATION     • CARDIAC CATHETERIZATION N/A 12/10/2019    Procedure: Left Heart Cath and coronary angiogram;  Surgeon: Edy Thomas MD;  Location: [facility] CATH INVASIVE LOCATION;  Service: Cardiovascular     Family History   Problem Relation Age of Onset   • Heart disease Mother      Social History     Tobacco Use   • Smoking status: Never Smoker   • Smokeless tobacco: Never Used   Substance Use Topics   • Alcohol use: No     Frequency: Never   • Drug use: No     Allergies:  Ketorolac tromethamine    Objective     Vital Signs  Temp:  [97.3 °F (36.3 °C)-98.4 °F (36.9 °C)] 98 °F (36.7 °C)  Heart Rate:  [] 98  Resp:  [10-25] 20  BP: (143-222)/() 157/96      Physical Exam:   Physical Exam   Constitutional: He is oriented to person, place, and time. He appears well-developed and well-nourished.   HENT:   Head: Normocephalic and atraumatic.   Eyes: Pupils are equal, round, and reactive to light. EOM are normal.   Cardiovascular: Normal heart sounds. An irregularly irregular rhythm present. Frequent extrasystoles are present.   Rapid ventricular response   Pulmonary/Chest: Effort normal.   Abdominal: Soft.   Musculoskeletal: Normal range of motion.   Neurological: He is alert and oriented to person, place, and time.   Skin: Skin is warm.   Nursing note and vitals reviewed.           Results Review:   CBC    Results from last 7 days   Lab Units 07/31/20  0226 07/30/20  0954   WBC 10*3/mm3 10.50 8.00   HEMOGLOBIN g/dL 13.4 14.1   PLATELETS 10*3/mm3 166 157     BMP   Results from last 7 days   Lab Units 07/31/20  0226 07/30/20  1107   SODIUM mmol/L 143 144   POTASSIUM mmol/L 4.0 3.1*   CHLORIDE mmol/L 104 106   CO2 mmol/L 26.0 24.0   BUN  18 19   CREATININE mg/dL 1.37* 1.25   GLUCOSE mg/dL 131* 117*     Cr Clearance Estimated Creatinine Clearance: 72.9 mL/min (A) (by C-G formula based on SCr of 1.37 mg/dL (H)).  Coag     HbA1C      Lab Results   Component Value Date    HGBA1C 5.5 12/12/2019    HGBA1C 5.1 11/09/2019    HGBA1C 5.6 10/10/2018     Blood Glucose   Glucose   Date/Time Value Ref Range Status   07/30/2020 1939 155 (H) 70 - 105 mg/dL Final     Comment:     Serial Number: [id number]:  [id number]     Infection     CMP   Results from last 7 days   Lab Units 07/31/20  0226 07/30/20  1107   SODIUM mmol/L 143 144   POTASSIUM mmol/L 4.0 3.1*   CHLORIDE mmol/L 104 106   CO2 mmol/L 26.0 24.0   BUN  18 19   CREATININE mg/dL 1.37* 1.25   GLUCOSE mg/dL 131* 117*   ALBUMIN g/dL  --  4.00   BILIRUBIN mg/dL  --  0.8   ALK PHOS U/L  --  60   AST (SGOT) U/L  --  21   ALT (SGPT) U/L  --  17     UA      Radiology(recent) Xr Chest 1 View    Result Date: 7/30/2020   1. Cardiomegaly with pulmonary vascular congestion. 2. Left basilar opacity suggesting combination of moderate pleural effusion with overlying infiltrate or atelectasis.  Electronically Signed By-Mandeep Connor On:7/30/2020 10:35 AM This report was finalized on [id number] by  Mandeep Connor, .    Ct Chest Pulmonary Embolism    Result Date: 7/31/2020  1. No pulmonary embolism, aortic dissection, or aortic aneurysm. 2. Cardiomegaly. Moderate left pleural fluid collection, 4 to 5 cm depth. 3. Compressive atelectasis in the left lower lobe. 4. Mild mosaic attenuation of the lungs, compatible with asthma in the correct clinical setting. Electronically signed by:  Rodrigo Alva M.D.  7/31/2020 2:34 AM       Assessment:      Acute congestive heart failure (CMS/HCC)  Acute exacerbation of diastolic congestive heart failure  CHFpEF  Acute left-sided pleural effusion secondary to the above  Atrial fibrillation with rapid ventricular response   Asthma  Chronic kidney disease stage III  Hypertension associated chronic kidney disease stage III  Atherosclerotic heart disease of native coronary arteries with angina pectoris  Cerebrovascular disease status post CVA  Degenerative disc disease lumbosacral  spine  Peripheral polyneuropathy  Cervical disc disease with radiculopathy  History of medication noncompliance  Adjustment disorder with anxiety  Gambling disorder  Gastroesophageal reflux disease without esophagitis  Psoriasis  Anemia of chronic disease  Dyslipidemia    Plan:  Cardiac evaluation//rate control//gentle diuresis//optimization of present medication management  Expected Length of Stay 2 days    I discussed the patients findings and my recommendations with patient and nursing staff.     Dre Stauffer MD  07/31/20  07:52          Electronically signed by Dre Stauffer MD at 07/31/20 0800          Physician Progress Notes (last 24 hours) (Notes from 08/02/20 1626 through 08/03/20 1626)      Edy Thomas MD at 08/03/20 1043             LOS: 3 days   Admiting Physician- Dre Stauffer MD    Reason For Followup:    Atrial fibrillation  Hypertension  CAD  Hyperlipidemia  History of noncompliance    Subjective     No chest pain or shortness of breath    Objective     Patient still in atrial fibrillation    Review of Systems:   Review of Systems   Constitution: Negative for chills and fever.   HENT: Negative for ear discharge and nosebleeds.    Eyes: Negative for discharge and redness.   Cardiovascular: Negative for chest pain, orthopnea, palpitations, paroxysmal nocturnal dyspnea and syncope.   Respiratory: Negative for cough, shortness of breath and wheezing.    Endocrine: Negative for heat intolerance.   Skin: Negative for rash.   Musculoskeletal: Negative for arthritis and myalgias.   Gastrointestinal: Negative for abdominal pain, melena, nausea and vomiting.   Genitourinary: Negative for dysuria and hematuria.   Neurological: Negative for dizziness, light-headedness, numbness and tremors.   Psychiatric/Behavioral: Negative for altered mental status and depression. The patient is not nervous/anxious.          Vital Signs  Vitals:    08/02/20 1917 08/03/20 0019 08/03/20 0258 08/03/20 0600   BP:  115/72 132/78 152/74    BP Location: Left arm Right arm Right arm    Patient Position: Sitting Sitting Lying    Pulse: 74 72 62 73   Resp: 16 18 17    Temp:  98 °F (36.7 °C) 97.8 °F (36.6 °C)    TempSrc:  Axillary Axillary    SpO2: 95% 93% 94%    Weight:       Height:         Wt Readings from Last 1 Encounters:   08/02/20 116 kg (256 lb 13.4 oz)       Intake/Output Summary (Last 24 hours) at 8/3/2020 1046  Last data filed at 8/3/2020 0917  Gross per 24 hour   Intake 1776 ml   Output 4505 ml   Net -2729 ml     Physical Exam:  Physical Exam   Constitutional: He is oriented to person, place, and time. He appears well-developed and well-nourished.   HENT:   Head: Normocephalic and atraumatic.   Eyes: No scleral icterus.   Neck: No thyromegaly present.   Cardiovascular: Normal rate and normal heart sounds. Exam reveals no gallop and no friction rub.   No murmur heard.  Heart rate is irregular   Pulmonary/Chest: Effort normal and breath sounds normal. No respiratory distress. He has no wheezes. He has no rales.   Abdominal: There is no tenderness.   Musculoskeletal: He exhibits no edema.   Lymphadenopathy:     He has no cervical adenopathy.   Neurological: He is alert and oriented to person, place, and time.   Skin: No rash noted. No erythema.   Psychiatric: He has a normal mood and affect.       Results Review:   Lab Results (last 24 hours)     Procedure Component Value Units Date/Time    BUN [[id number]]  (Normal) Collected:  08/03/20 0322    Specimen:  Blood Updated:  08/03/20 0718     BUN 21 mg/dL     Comprehensive Metabolic Panel [[id number]]  (Abnormal) Collected:  08/03/20 0322    Specimen:  Blood Updated:  08/03/20 0453     Glucose 104 mg/dL      BUN --     Comment: Testing performed by alternate method        Creatinine 1.09 mg/dL      Sodium 140 mmol/L      Potassium 4.2 mmol/L      Chloride 103 mmol/L      CO2 24.0 mmol/L      Calcium 9.0 mg/dL      Total Protein 7.3 g/dL      Albumin 3.60 g/dL      ALT (SGPT) 19  U/L      AST (SGOT) 18 U/L      Alkaline Phosphatase 60 U/L      Total Bilirubin 0.5 mg/dL      eGFR Non African Amer 68 mL/min/1.73      Globulin 3.7 gm/dL      A/G Ratio 1.0 g/dL      BUN/Creatinine Ratio --     Comment: Testing not performed        Anion Gap 13.0 mmol/L     Narrative:       GFR Normal >60  Chronic Kidney Disease <60  Kidney Failure <15      CBC & Differential [[id number]] Collected:  08/03/20 0322    Specimen:  Blood Updated:  08/03/20 0431    Narrative:       The following orders were created for panel order CBC & Differential.  Procedure                               Abnormality         Status                     ---------                               -----------         ------                     CBC Auto Differential[[id number]]        Abnormal            Final result                 Please view results for these tests on the individual orders.    CBC Auto Differential [[id number]]  (Abnormal) Collected:  08/03/20 0322    Specimen:  Blood Updated:  08/03/20 0431     WBC 8.40 10*3/mm3      RBC 5.02 10*6/mm3      Hemoglobin 13.6 g/dL      Hematocrit 41.7 %      MCV 83.0 fL      MCH 27.1 pg      MCHC 32.7 g/dL      RDW 17.1 %      RDW-SD 49.0 fl      MPV 10.6 fL      Platelets 170 10*3/mm3      Neutrophil % 69.4 %      Lymphocyte % 15.0 %      Monocyte % 13.0 %      Eosinophil % 2.0 %      Basophil % 0.6 %      Neutrophils, Absolute 5.80 10*3/mm3      Lymphocytes, Absolute 1.30 10*3/mm3      Monocytes, Absolute 1.10 10*3/mm3      Eosinophils, Absolute 0.20 10*3/mm3      Basophils, Absolute 0.10 10*3/mm3      nRBC 0.0 /100 WBC     Ammonia [[id number]]  (Normal) Collected:  08/03/20 0322    Specimen:  Blood Updated:  08/03/20 0354     Ammonia 31 umol/L         Imaging Results (Last 72 Hours)     Procedure Component Value Units Date/Time    XR Chest PA & Lateral [[id number]] Collected:  08/02/20 2056     Updated:  08/02/20 2102    Narrative:       Examination: XR CHEST PA AND LATERAL-     Date of Exam:  8/2/2020 6:50 PM     Indication: left pleural effusion, pt now consents; I50.9-Heart failure,  unspecified; I16.1-Hypertensive emergency.     Comparison: 07/30/2020     Technique: 2 radiographic views of the chest were obtained.     Findings:  There is moderate enlargement of the cardiac silhouette. There is a  moderate opacity in the left lower chest, similar to prior given  difference in technique. Much of this is likely due to pleural effusion.  No pneumothorax is noted.     There is a faint 11 mm nodule projecting over the right upper chest. No  correlate is found on CT chest of 07/31/2020. This is therefore likely  outside the patient. There is degenerative change of the thoracic spine.       Impression:       1. Moderate left pleural effusion is not significant changed.  2. Cardiomegaly with pulmonary vascular redistribution. Negative for  pulmonary edema..     Electronically Signed By-Nehal Way On:8/2/2020 9:00 PM  This report was finalized on [id number] by  Nehal Way, .        ECG/EMG Results (most recent)     Procedure Component Value Units Date/Time    ECG 12 Lead [[id number]] Collected:  07/30/20 2001     Updated:  07/30/20 2003    Narrative:       HEART RATE= 107  bpm  RR Interval= 561  ms  ID Interval=   ms  P Horizontal Axis=   deg  P Front Axis=   deg  QRSD Interval= 90  ms  QT Interval= 336  ms  QRS Axis= 21  deg  T Wave Axis= 181  deg  - ABNORMAL ECG -  Atrial fibrillation  Ventricular premature complex  LVH with secondary repolarization abnormality  Electronically Signed By:   Date and Time of Study: 2020-07-30 20:01:57    ECG 12 Lead [[id number]] Collected:  07/30/20 0929     Updated:  07/31/20 1353    Narrative:       HEART RATE= 92  bpm  RR Interval= 566  ms  ID Interval=   ms  P Horizontal Axis=   deg  P Front Axis=   deg  QRSD Interval= 98  ms  QT Interval= 322  ms  QRS Axis= 8  deg  T Wave Axis= 162  deg  - ABNORMAL ECG -  Atrial fibrillation  Ventricular premature complex  LVH with  secondary repolarization abnormality  When compared with ECG of 08-Dec-2019 10:38:36,  Significant repolarization change  Electronically Signed By: Theo Fuentes (Dav) 31-Jul-2020 13:49:02  Date and Time of Study: 2020-07-30 09:29:57        CBC    Results from last 7 days   Lab Units 08/03/20  0322 08/01/20 0310 07/31/20 0226 07/30/20  0954   WBC 10*3/mm3 8.40 9.60 10.50 8.00   HEMOGLOBIN g/dL 13.6 12.8* 13.4 14.1   PLATELETS 10*3/mm3 170 142 166 157     BMP   Results from last 7 days   Lab Units 08/03/20 0322 08/01/20 0310 07/31/20 0226 07/30/20  1107   SODIUM mmol/L 140 138 143 144   POTASSIUM mmol/L 4.2 3.6 4.0 3.1*   CHLORIDE mmol/L 103 103 104 106   CO2 mmol/L 24.0 23.0 26.0 24.0   BUN  21 20 18 19   CREATININE mg/dL 1.09 1.15 1.37* 1.25   GLUCOSE mg/dL 104* 122* 131* 117*     CMP   Results from last 7 days   Lab Units 08/03/20 0322 08/01/20 0310 07/31/20 0226 07/30/20  1107   SODIUM mmol/L 140 138 143 144   POTASSIUM mmol/L 4.2 3.6 4.0 3.1*   CHLORIDE mmol/L 103 103 104 106   CO2 mmol/L 24.0 23.0 26.0 24.0   BUN  21 20 18 19   CREATININE mg/dL 1.09 1.15 1.37* 1.25   GLUCOSE mg/dL 104* 122* 131* 117*   ALBUMIN g/dL 3.60  --   --  4.00   BILIRUBIN mg/dL 0.5  --   --  0.8   ALK PHOS U/L 60  --   --  60   AST (SGOT) U/L 18  --   --  21   ALT (SGPT) U/L 19  --   --  17   AMMONIA umol/L 31  --   --   --      Cardiac Studies:  Echo-   Results for orders placed during the hospital encounter of 11/07/19   Adult Transthoracic Echo Complete W/ Cont if Necessary Per Protocol    Narrative · The left ventricular cavity is severely dilated.  · Left ventricular systolic function is moderately decreased.  · Left atrial cavity size is moderately dilated.  · Moderate mitral valve regurgitation is present  · Moderate tricuspid valve regurgitation is present.  · The following left ventricular wall segments are hypokinetic: mid   anterior, apical anterior, basal anterolateral, mid anterolateral, apical   lateral, basal  inferolateral, mid inferolateral, apical inferior, mid   inferior, apical septal, basal inferoseptal, mid inferoseptal, apex   hypokinetic, mid anteroseptal, basal anterior, basal inferior and basal   inferoseptal.  · LV ejection fraction is about 30 to 35%  · No pericardial effusion noted        Stress Myoview-  Cath-      Medication Review:   Scheduled Meds:    atorvastatin 10 mg Oral Nightly   carvedilol 25 mg Oral BID With Meals   dilTIAZem  mg Oral Q24H   furosemide 40 mg Intravenous BID   hydrALAZINE 75 mg Oral TID   isosorbide mononitrate 30 mg Oral BID - Nitrates   potassium chloride 20 mEq Oral BID   spironolactone 25 mg Oral Daily   triamcinolone  Topical Q12H     Continuous Infusions:   PRN Meds:.hydrALAZINE  •  risperiDONE  •  [COMPLETED] Insert peripheral IV **AND** sodium chloride      Assessment/Plan   Patient Active Problem List   Diagnosis   • Hypertensive emergency   • Chronic atrial fibrillation (CMS/HCC)   • Chronic diastolic CHF (congestive heart failure) (CMS/HCC)   • CKD (chronic kidney disease) stage 3, GFR 30-59 ml/min (CMS/HCC)   • Essential hypertension   • Gambling disorder, persistent   • Shortness of breath   • Coronary artery disease involving native coronary artery of native heart without angina pectoris   • Psoriasis   • History of CVA (cerebrovascular accident)   • DDD (degenerative disc disease), lumbosacral   • Peripheral polyneuropathy   • GERD (gastroesophageal reflux disease)   • Medically noncompliant   • Acute respiratory distress   • Unstable angina (CMS/HCC)   • Cervical disc disorder with radiculopathy   • Completed stroke (CMS/HCC)   • Degeneration of intervertebral disc   • Excessive anticoagulation   • Impaired left ventricular function   • Liver lesion   • Lumbar radiculopathy   • Cervical myelopathy (CMS/HCC)   • Lumbosacral radiculopathy   • Spinal stenosis of lumbar region   • Obesity   • Mild cognitive impairment   • Thoracic back pain   • Thoracic disc  disease with myelopathy   • Paroxysmal atrial fibrillation (CMS/HCC)   • Essential (primary) hypertension   • Acute congestive heart failure (CMS/HCC)     Plan:    Clinically from cardiac standpoint patient is stable and heart rate is well controlled and patient is on rate control and anticoagulation.  Patient will need cardioversion in future after 4 to 6 weeks of anticoagulation.    Patient needs thoracentesis and hence Xarelto has been held  Blood pressure and heart rate stable  Continue current medical therapy and follow him as an outpatient      Edy Thomas MD  08/03/20  10:46          Electronically signed by Edy Thomas MD at 08/03/20 1047     Delicia Cochran MD at 08/03/20 0945          KPA/PULM/CC PROGRESS NOTE       SUBJECTIVE    Feels much better today.  Denies any shortness of breath.  Currently on room air.  OBJECTIVE    Vitals:    08/02/20 1917 08/03/20 0019 08/03/20 0258 08/03/20 0600   BP: 115/72 132/78 152/74    BP Location: Left arm Right arm Right arm    Patient Position: Sitting Sitting Lying    Pulse: 74 72 62 73   Resp: 16 18 17    Temp:  98 °F (36.7 °C) 97.8 °F (36.6 °C)    TempSrc:  Axillary Axillary    SpO2: 95% 93% 94%    Weight:       Height:          Intake/Output last 3 shifts:  I/O last 3 completed shifts:  In: 2456 [P.O.:2456]  Out: 4980 [Urine:4980]  Intake/Output this shift:  I/O this shift:  In: 100 [P.O.:100]  Out: 125 [Urine:125]    Constitutional:  Well developed, well nourished, no acute distress, non-toxic appearance   Eyes:  PERRL, conjunctiva normal   HENT:  Atraumatic, external ears normal, nose normal, oropharynx moist, no pharyngeal exudates.   Neck- normal range of motion, no tenderness, supple   Respiratory:  No respiratory distress, normal breath sounds, no rales, no wheezing, decreased air entry in the left lower lobe  Cardiovascular:  Normal rate, normal rhythm, no murmurs, no gallops, no rubs   GI:  Soft, nondistended, normal bowel sounds, nontender, no  organomegaly, no mass, no rebound, no guarding   :  No costovertebral angle tenderness   Musculoskeletal:  No edema, no tenderness, no deformities. Back- no tenderness  Integument:  Well hydrated, no rash   Lymphatic:  No lymphadenopathy noted   Neurologic:  Alert & oriented x 3, CN 2-12 normal, normal motor function, normal sensory function, no focal deficits noted   Psychiatric:  Speech and behavior appropriate     Scheduled Meds:  atorvastatin 10 mg Oral Nightly   carvedilol 25 mg Oral BID With Meals   dilTIAZem  mg Oral Q24H   furosemide 40 mg Intravenous BID   hydrALAZINE 75 mg Oral TID   isosorbide mononitrate 30 mg Oral BID - Nitrates   potassium chloride 20 mEq Oral BID   rivaroxaban 20 mg Oral Daily With Dinner   spironolactone 25 mg Oral Daily   triamcinolone  Topical Q12H       Continuous Infusions:     PRN Meds:hydrALAZINE  •  risperiDONE  •  [COMPLETED] Insert peripheral IV **AND** sodium chloride     LABS    CBC  Results from last 7 days   Lab Units 08/03/20  0322 08/01/20 0310 07/31/20 0226 07/30/20  0954   WBC 10*3/mm3 8.40 9.60 10.50 8.00   RBC 10*6/mm3 5.02 4.72 5.01 5.24   HEMOGLOBIN g/dL 13.6 12.8* 13.4 14.1   HEMATOCRIT % 41.7 38.9 41.5 43.3   MCV fL 83.0 82.4 82.7 82.6   PLATELETS 10*3/mm3 170 142 166 157       CMP Results from last 7 days   Lab Units 08/03/20  0322 08/01/20 0310 07/31/20 0226 07/30/20  1107   SODIUM mmol/L 140 138 143 144   POTASSIUM mmol/L 4.2 3.6 4.0 3.1*   CHLORIDE mmol/L 103 103 104 106   CO2 mmol/L 24.0 23.0 26.0 24.0   BUN  21 20 18 19   CREATININE mg/dL 1.09 1.15 1.37* 1.25   GLUCOSE mg/dL 104* 122* 131* 117*   ALBUMIN g/dL 3.60  --   --  4.00   BILIRUBIN mg/dL 0.5  --   --  0.8   ALK PHOS U/L 60  --   --  60   AST (SGOT) U/L 18  --   --  21   ALT (SGPT) U/L 19  --   --  17   AMMONIA umol/L 31  --   --   --        TROPONIN  Results from last 7 days   Lab Units 07/31/20  0226   TROPONIN T ng/mL <0.010       CoA        AB        Microbiology  Microbiology  Results (last 10 days)     Procedure Component Value - Date/Time    COVID-19 [facility] IN-HOUSE, Nasal Swab No Transport Media - Swab, Nasal Cavity [[id number]]  (Normal) Collected:  07/30/20 0957    Lab Status:  Final result Specimen:  Swab from Nasal Cavity Updated:  07/30/20 1029     COVID19 Not Detected    Narrative:       Fact sheet for providers: https://www.fda.gov/media/066599/download     Fact sheet for patients: https://www.fda.gov/media/671974/download          IMAGING & OTHER STUDIES    Imaging Results (Last 72 Hours)     Procedure Component Value Units Date/Time    XR Chest PA & Lateral [[id number]] Collected:  08/02/20 2056     Updated:  08/02/20 2102    Narrative:       Examination: XR CHEST PA AND LATERAL-     Date of Exam: 8/2/2020 6:50 PM     Indication: left pleural effusion, pt now consents; I50.9-Heart failure,  unspecified; I16.1-Hypertensive emergency.     Comparison: 07/30/2020     Technique: 2 radiographic views of the chest were obtained.     Findings:  There is moderate enlargement of the cardiac silhouette. There is a  moderate opacity in the left lower chest, similar to prior given  difference in technique. Much of this is likely due to pleural effusion.  No pneumothorax is noted.     There is a faint 11 mm nodule projecting over the right upper chest. No  correlate is found on CT chest of 07/31/2020. This is therefore likely  outside the patient. There is degenerative change of the thoracic spine.       Impression:       1. Moderate left pleural effusion is not significant changed.  2. Cardiomegaly with pulmonary vascular redistribution. Negative for  pulmonary edema..     Electronically Signed By-Nehal Way On:8/2/2020 9:00 PM  This report was finalized on [id number] by  Nehal Way, .        Results for orders placed during the hospital encounter of 11/07/19   Adult Transthoracic Echo Complete W/ Cont if Necessary Per Protocol    Narrative · The left ventricular cavity is severely  dilated.  · Left ventricular systolic function is moderately decreased.  · Left atrial cavity size is moderately dilated.  · Moderate mitral valve regurgitation is present  · Moderate tricuspid valve regurgitation is present.  · The following left ventricular wall segments are hypokinetic: mid   anterior, apical anterior, basal anterolateral, mid anterolateral, apical   lateral, basal inferolateral, mid inferolateral, apical inferior, mid   inferior, apical septal, basal inferoseptal, mid inferoseptal, apex   hypokinetic, mid anteroseptal, basal anterior, basal inferior and basal   inferoseptal.  · LV ejection fraction is about 30 to 35%  · No pericardial effusion noted             ASSESSMENT/PLAN:     Acute congestive heart failure (CMS/HCC)     Left pleural effusion  Acute on chronic systolic and diastolic heart failure exacerbation  Acute metabolic encephalopathy     PLAN    Regards to the pleural effusion it is asymmetric and only left-sided.  I do agree the patient will likely need a thoracentesis to rule out malignancy versus infected effusion.  Asymmetric pleural effusions typically are not associated with heart failure however can be seen. HOLD anticoagulation     Volume status improving.  Continue diuretics.     On spironolactone alone.   Continue Lasix 40 twice daily.  Cardiology following.       In regards to encephalopathy unclear baseline.  Psychiatry consulted      THIS DOCUMENT HAS BEEN ELECTRONICALLY SIGNED BY  Delicia Cochran MD  9:45 AM            Electronically signed by Delicia Cochran MD at 08/03/20 1541     Paty Polk DO at 08/03/20 0895               LOS: 3 days   Patient Care Team:  Marisol Larson APRN as PCP - General    Chief Complaint: Wants to go home, is refusing thoracentesis.  However was very combative with nursing this morning and through the night.  Was very hard to reorient at times.  He appears quite angry this morning    Subjective     Interval History:     Patient  Complaints: Feels that he is not getting any better, still shortness of breath  Patient Denies: No chest pain per se  History taken from: patient    Review of Systems:    All systems were reviewed and negative except for: No hemoptysis, still with somewhat of a dry cough and some shortness of breath.  See the rest of interval history.    Objective     Vital Signs  Temp:  [97.8 °F (36.6 °C)-98.2 °F (36.8 °C)] 97.8 °F (36.6 °C)  Heart Rate:  [60-74] 73  Resp:  [16-22] 17  BP: (115-154)/(72-92) 152/74    Physical Exam:     General Appearance:    Alert, cooperative, in no acute distress, was cooperative with me today but appeared somewhat angry and anxious about the thoracentesis   Head:    Normocephalic, without obvious abnormality, atraumatic   Eyes:            Conjunctivae and sclerae normal, no   icterus, no pallor, corneas clear, PERRLA   Throat:   No oral lesions, no thrush, oral mucosa moist   Neck:   No adenopathy, supple, trachea midline, no thyromegaly, no   carotid bruit, no JVD   Lungs:     Clear to auscultation,respirations regular, even and                  unlabored, somewhat distant on the left    Heart:    Regular rhythm and normal rate, normal S1 and S2, no            murmur, no gallop, no rub, no click   Chest Wall:    No abnormalities observed   Abdomen:     Normal bowel sounds, no masses, no organomegaly, soft        non-tender, non-distended, no guarding, no rebound                tenderness   Rectal:     Deferred   Extremities:   Moves all extremities well, no edema, no cyanosis, no             redness   Pulses:   Pulses palpable and equal bilaterally   Skin:   No bleeding, bruising or rash   Lymph nodes:   No palpable adenopathy   Neurologic:   Cranial nerves 2 - 12 grossly intact, sensation intact, DTR       present and equal bilaterally   Radiology:  Xr Chest 1 View    Result Date: 7/30/2020   1. Cardiomegaly with pulmonary vascular congestion. 2. Left basilar opacity suggesting combination of  moderate pleural effusion with overlying infiltrate or atelectasis.  Electronically Signed By-Mandeep Connor On:7/30/2020 10:35 AM This report was finalized on [id number] by  Mandeep Connor, .    Ct Chest Pulmonary Embolism    Result Date: 7/31/2020  1. No pulmonary embolism, aortic dissection, or aortic aneurysm. 2. Cardiomegaly. Moderate left pleural fluid collection, 4 to 5 cm depth. 3. Compressive atelectasis in the left lower lobe. 4. Mild mosaic attenuation of the lungs, compatible with asthma in the correct clinical setting. Electronically signed by:  Rodrigo Alva M.D.  7/31/2020 2:34 AM    Xr Chest Pa & Lateral    Result Date: 8/2/2020  1. Moderate left pleural effusion is not significant changed. 2. Cardiomegaly with pulmonary vascular redistribution. Negative for pulmonary edema..  Electronically Signed By-Nehal Way On:8/2/2020 9:00 PM This report was finalized on [id number] by  Nehal Way, .         Results Review:     I reviewed the patient's new clinical results.  I reviewed the patient's new imaging results and agree with the interpretation.    Medication Review:   Scheduled Meds:  atorvastatin 10 mg Oral Nightly   carvedilol 25 mg Oral BID With Meals   dilTIAZem  mg Oral Q24H   furosemide 40 mg Intravenous BID   hydrALAZINE 75 mg Oral TID   isosorbide mononitrate 30 mg Oral BID - Nitrates   potassium chloride 20 mEq Oral BID   rivaroxaban 20 mg Oral Daily With Dinner   spironolactone 25 mg Oral Daily   triamcinolone  Topical Q12H     Continuous Infusions:   PRN Meds:.hydrALAZINE  •  [COMPLETED] Insert peripheral IV **AND** sodium chloride    Labs:  Lab Results (last 24 hours)     Procedure Component Value Units Date/Time    BUN [[id number]]  (Normal) Collected:  08/03/20 0322    Specimen:  Blood Updated:  08/03/20 0718     BUN 21 mg/dL     Comprehensive Metabolic Panel [[id number]]  (Abnormal) Collected:  08/03/20 0322    Specimen:  Blood Updated:  08/03/20 0453     Glucose 104 mg/dL       BUN --     Comment: Testing performed by alternate method        Creatinine 1.09 mg/dL      Sodium 140 mmol/L      Potassium 4.2 mmol/L      Chloride 103 mmol/L      CO2 24.0 mmol/L      Calcium 9.0 mg/dL      Total Protein 7.3 g/dL      Albumin 3.60 g/dL      ALT (SGPT) 19 U/L      AST (SGOT) 18 U/L      Alkaline Phosphatase 60 U/L      Total Bilirubin 0.5 mg/dL      eGFR Non African Amer 68 mL/min/1.73      Globulin 3.7 gm/dL      A/G Ratio 1.0 g/dL      BUN/Creatinine Ratio --     Comment: Testing not performed        Anion Gap 13.0 mmol/L     Narrative:       GFR Normal >60  Chronic Kidney Disease <60  Kidney Failure <15      CBC & Differential [[id number]] Collected:  08/03/20 0322    Specimen:  Blood Updated:  08/03/20 0431    Narrative:       The following orders were created for panel order CBC & Differential.  Procedure                               Abnormality         Status                     ---------                               -----------         ------                     CBC Auto Differential[[id number]]        Abnormal            Final result                 Please view results for these tests on the individual orders.    CBC Auto Differential [[id number]]  (Abnormal) Collected:  08/03/20 0322    Specimen:  Blood Updated:  08/03/20 0431     WBC 8.40 10*3/mm3      RBC 5.02 10*6/mm3      Hemoglobin 13.6 g/dL      Hematocrit 41.7 %      MCV 83.0 fL      MCH 27.1 pg      MCHC 32.7 g/dL      RDW 17.1 %      RDW-SD 49.0 fl      MPV 10.6 fL      Platelets 170 10*3/mm3      Neutrophil % 69.4 %      Lymphocyte % 15.0 %      Monocyte % 13.0 %      Eosinophil % 2.0 %      Basophil % 0.6 %      Neutrophils, Absolute 5.80 10*3/mm3      Lymphocytes, Absolute 1.30 10*3/mm3      Monocytes, Absolute 1.10 10*3/mm3      Eosinophils, Absolute 0.20 10*3/mm3      Basophils, Absolute 0.10 10*3/mm3      nRBC 0.0 /100 WBC     Ammonia [[id number]]  (Normal) Collected:  08/03/20 0322    Specimen:  Blood Updated:  08/03/20 0350      Ammonia 31 umol/L            Assessment/Plan       Acute congestive heart failure (CMS/HCC)  Pulmonary edema with left pleural effusion, minimally changed since admission  Hypertensive urgency  Medical noncompliance  Atrial fibrillation, rate controlled  Confusion and combativeness    Following per cardiology, thank you for your kind input:  Chronic atrial fibrillation (CMS/HCC)     Chronic diastolic CHF (congestive heart failure) (CMS/HCC)    CKD (chronic kidney disease) stage 3, GFR 30-59 ml/min (CMS/HCC)    Essential hypertension    Gambling disorder, persistent    Shortness of breath    Coronary artery disease involving native coronary artery of native heart without angina pectoris    Psoriasis    History of CVA (cerebrovascular accident)    DDD (degenerative disc disease), lumbosacral    Peripheral polyneuropathy    GERD (gastroesophageal reflux disease)    Medically noncompliant    Acute respiratory distress    Unstable angina (CMS/HCC)    Cervical disc disorder with radiculopathy    Completed stroke (CMS/HCC)    Degeneration of intervertebral disc    Excessive anticoagulation    Impaired left ventricular function    Liver lesion    Lumbar radiculopathy    Cervical myelopathy (CMS/HCC)    Lumbosacral radiculopathy    Spinal stenosis of lumbar region    Obesity    Mild cognitive impairment    Thoracic back pain    Thoracic disc disease with myelopathy    Paroxysmal atrial fibrillation (CMS/HCC)    Essential (primary) hypertension       Continue current approach.  Patient initially said he would not get thoracentesis.  I spent nearly 15 minutes explaining to him the procedure and why it was so important for him to get this and comply with his medical interventions along with getting a thoracentesis.  Despite aggressive medical treatment, the pleural effusion has still not really changed this could be a either loculated effusion, there could be an underlying tumor and possibly an underlying infection as well  and I advised him that if he refused the procedure and went home there is an excellent chance he would end up back in the hospital.  I also advised him that his refusal to cooperate with our instructions and our recommendations could place him at significant risk of morbidity and mortality.  I did use a word death if he continues to refuse treatment.    For his combativeness, I will add PRN risperidone and have psychiatry see him as well as patient is unknown to me.  She seemed to be more at ease when I left the room today.  I also advised him to have his questions ready for the pulmonologist who could tell him even more about the intended procedure.    Discussed plan as above with nursing and the patient.            Paty Polk DO  08/03/20  08:39          Electronically signed by Paty Polk DO at 08/03/20 0847

## 2020-08-03 NOTE — PLAN OF CARE
Pt denies any pain or distress, and vitals have been stable. Pt frequently asking about his brother, and needs reminding that he is in the hospital. Pt up in recliner at bedside at this time, chair alarm in place. Will continue to monitor.

## 2020-08-03 NOTE — PLAN OF CARE
Pt has been intermittently confused throughout shift, but not combative. Risperidone was ordered and administered at 1653. Pt has since been drowsy. Possible discharge in the AM. Pt in Afib with rate controlled. Other VS stable. Will continue to monitor.

## 2020-08-03 NOTE — PLAN OF CARE
Problem: Patient Care Overview  Goal: Plan of Care Review  Outcome: Ongoing (interventions implemented as appropriate)  Flowsheets (Taken 8/3/2020 1602)  Progress: improving  Plan of Care Reviewed With: patient  Outcome Summary: Pt is 65 yo male admitted for SOA, AE CHF, pleural effusion, and hypertensive emergency.  Covid 19 testing (-).  Pt able to complete transfers with SUP.   Gait assesed without AD with pt exhibiting impaired balance and reaching for objects for UE support.  Gait then assessed using RW with improved balance and endurance noted.  Pt appears close to functional mobility baseline.  Plan home with HHPT and use of Rw for pt safety.  PPE donned: mask with faceshield, gloves.

## 2020-08-03 NOTE — PROGRESS NOTES
[facility]/PULM/CC PROGRESS NOTE       SUBJECTIVE    Feels much better today.  Denies any shortness of breath.  Currently on room air.  OBJECTIVE    Vitals:    08/02/20 1917 08/03/20 0019 08/03/20 0258 08/03/20 0600   BP: 115/72 132/78 152/74    BP Location: Left arm Right arm Right arm    Patient Position: Sitting Sitting Lying    Pulse: 74 72 62 73   Resp: 16 18 17    Temp:  98 °F (36.7 °C) 97.8 °F (36.6 °C)    TempSrc:  Axillary Axillary    SpO2: 95% 93% 94%    Weight:       Height:          Intake/Output last 3 shifts:  I/O last 3 completed shifts:  In: 2456 [P.O.:2456]  Out: 4980 [Urine:4980]  Intake/Output this shift:  I/O this shift:  In: 100 [P.O.:100]  Out: 125 [Urine:125]    Constitutional:  Well developed, well nourished, no acute distress, non-toxic appearance   Eyes:  PERRL, conjunctiva normal   HENT:  Atraumatic, external ears normal, nose normal, oropharynx moist, no pharyngeal exudates.   Neck- normal range of motion, no tenderness, supple   Respiratory:  No respiratory distress, normal breath sounds, no rales, no wheezing, decreased air entry in the left lower lobe  Cardiovascular:  Normal rate, normal rhythm, no murmurs, no gallops, no rubs   GI:  Soft, nondistended, normal bowel sounds, nontender, no organomegaly, no mass, no rebound, no guarding   :  No costovertebral angle tenderness   Musculoskeletal:  No edema, no tenderness, no deformities. Back- no tenderness  Integument:  Well hydrated, no rash   Lymphatic:  No lymphadenopathy noted   Neurologic:  Alert & oriented x 3, CN 2-12 normal, normal motor function, normal sensory function, no focal deficits noted   Psychiatric:  Speech and behavior appropriate     Scheduled Meds:  atorvastatin 10 mg Oral Nightly   carvedilol 25 mg Oral BID With Meals   dilTIAZem  mg Oral Q24H   furosemide 40 mg Intravenous BID   hydrALAZINE 75 mg Oral TID   isosorbide mononitrate 30 mg Oral BID - Nitrates   potassium chloride 20 mEq Oral BID   rivaroxaban 20 mg  Oral Daily With Dinner   spironolactone 25 mg Oral Daily   triamcinolone  Topical Q12H       Continuous Infusions:     PRN Meds:hydrALAZINE  •  risperiDONE  •  [COMPLETED] Insert peripheral IV **AND** sodium chloride     LABS    CBC  Results from last 7 days   Lab Units 08/03/20 0322 08/01/20 0310 07/31/20 0226 07/30/20  0954   WBC 10*3/mm3 8.40 9.60 10.50 8.00   RBC 10*6/mm3 5.02 4.72 5.01 5.24   HEMOGLOBIN g/dL 13.6 12.8* 13.4 14.1   HEMATOCRIT % 41.7 38.9 41.5 43.3   MCV fL 83.0 82.4 82.7 82.6   PLATELETS 10*3/mm3 170 142 166 157       CMP Results from last 7 days   Lab Units 08/03/20 0322 08/01/20 0310 07/31/20 0226 07/30/20  1107   SODIUM mmol/L 140 138 143 144   POTASSIUM mmol/L 4.2 3.6 4.0 3.1*   CHLORIDE mmol/L 103 103 104 106   CO2 mmol/L 24.0 23.0 26.0 24.0   BUN  21 20 18 19   CREATININE mg/dL 1.09 1.15 1.37* 1.25   GLUCOSE mg/dL 104* 122* 131* 117*   ALBUMIN g/dL 3.60  --   --  4.00   BILIRUBIN mg/dL 0.5  --   --  0.8   ALK PHOS U/L 60  --   --  60   AST (SGOT) U/L 18  --   --  21   ALT (SGPT) U/L 19  --   --  17   AMMONIA umol/L 31  --   --   --        TROPONIN  Results from last 7 days   Lab Units 07/31/20  0226   TROPONIN T ng/mL <0.010       CoAg        ABG        Microbiology  Microbiology Results (last 10 days)     Procedure Component Value - Date/Time    COVID-19 [facility] IN-HOUSE, Nasal Swab No Transport Media - Swab, Nasal Cavity [[id number]]  (Normal) Collected:  07/30/20 0957    Lab Status:  Final result Specimen:  Swab from Nasal Cavity Updated:  07/30/20 1029     COVID19 Not Detected    Narrative:       Fact sheet for providers: https://www.fda.gov/media/575139/download     Fact sheet for patients: https://www.fda.gov/media/906167/download          IMAGING & OTHER STUDIES    Imaging Results (Last 72 Hours)     Procedure Component Value Units Date/Time    XR Chest PA & Lateral [[id number]] Collected:  08/02/20 2056     Updated:  08/02/20 2102    Narrative:       Examination: XR CHEST PA  AND LATERAL-     Date of Exam: 8/2/2020 6:50 PM     Indication: left pleural effusion, pt now consents; I50.9-Heart failure,  unspecified; I16.1-Hypertensive emergency.     Comparison: 07/30/2020     Technique: 2 radiographic views of the chest were obtained.     Findings:  There is moderate enlargement of the cardiac silhouette. There is a  moderate opacity in the left lower chest, similar to prior given  difference in technique. Much of this is likely due to pleural effusion.  No pneumothorax is noted.     There is a faint 11 mm nodule projecting over the right upper chest. No  correlate is found on CT chest of 07/31/2020. This is therefore likely  outside the patient. There is degenerative change of the thoracic spine.       Impression:       1. Moderate left pleural effusion is not significant changed.  2. Cardiomegaly with pulmonary vascular redistribution. Negative for  pulmonary edema..     Electronically Signed By-Nehal Way On:8/2/2020 9:00 PM  This report was finalized on [id number] by  Nehal Way, .        Results for orders placed during the hospital encounter of 11/07/19   Adult Transthoracic Echo Complete W/ Cont if Necessary Per Protocol    Narrative · The left ventricular cavity is severely dilated.  · Left ventricular systolic function is moderately decreased.  · Left atrial cavity size is moderately dilated.  · Moderate mitral valve regurgitation is present  · Moderate tricuspid valve regurgitation is present.  · The following left ventricular wall segments are hypokinetic: mid   anterior, apical anterior, basal anterolateral, mid anterolateral, apical   lateral, basal inferolateral, mid inferolateral, apical inferior, mid   inferior, apical septal, basal inferoseptal, mid inferoseptal, apex   hypokinetic, mid anteroseptal, basal anterior, basal inferior and basal   inferoseptal.  · LV ejection fraction is about 30 to 35%  · No pericardial effusion noted             ASSESSMENT/PLAN:      Acute congestive heart failure (CMS/HCC)     Left pleural effusion  Acute on chronic systolic and diastolic heart failure exacerbation  Acute metabolic encephalopathy     PLAN    Regards to the pleural effusion it is asymmetric and only left-sided.  I do agree the patient will likely need a thoracentesis to rule out malignancy versus infected effusion.  Asymmetric pleural effusions typically are not associated with heart failure however can be seen. HOLD anticoagulation     Volume status improving.  Continue diuretics.     On spironolactone alone.  Continue Lasix 40 twice daily.  Cardiology following.       In regards to encephalopathy unclear baseline.  Psychiatry consulted      THIS DOCUMENT HAS BEEN ELECTRONICALLY SIGNED BY  Delicia Cochran MD  9:45 AM

## 2020-08-03 NOTE — CONSULTS
"  Referring Provider: Dr. Polk  Reason for Consultation: Combative      Chief complaint :     Subjective .     History of present illness:  The patient is a 64 y.o.white male with PMH significant for A-fib, CHF, CK, CAD, DDD, HTN, hx of CVA, HLD, GERD and gambling addiciton who was admitted secondary to progressive dyspnea.  Psych was consulted by Dr. Polk secondary to combative behavior last night.  Per his nurse today, he has been cooperative but paranoid each time his medication is due.  Patient is alert and oriented to person, place and situation.  He knew the year, but initially said February was the month but when redirected came up with August.  He could not recall the current President.  He says he owns two homes, and his sister lives in the other one next door.  He reports having depression \"many years ago\" and was on meds but has not been since that time.  He denies current depression, no SI.  He denies feeling hopeless or helpless.  He recalls being irritable yesterday but does not know why.  He reports sleeping well with good appetite.       Past Psych Hx: Remote hx of depression, denies hosptitalizations; denies any SAs.    Review of Systems   Constitutional: Negative.    Respiratory: Negative for cough and shortness of breath.    Cardiovascular: Negative for chest pain, palpitations and leg swelling.   Gastrointestinal: Negative for abdominal pain, nausea and vomiting.   Psychiatric/Behavioral: Positive for confusion. Negative for agitation, behavioral problems, decreased concentration, dysphoric mood, hallucinations, self-injury, sleep disturbance and suicidal ideas. The patient is not nervous/anxious and is not hyperactive.         History      Past Medical History:   Diagnosis Date   • A-fib (CMS/[facility])    • CHF (congestive heart failure) (CMS/[facility])    • Chronic atrial fibrillation (CMS/HCC) 11/8/2019   • Chronic diastolic CHF (congestive heart failure) (CMS/[facility]) 11/8/2019   • CKD (chronic kidney " disease) stage 3, GFR 30-59 ml/min (CMS/[facility]) 11/8/2019   • Coronary artery disease involving native coronary artery of native heart without angina pectoris 11/8/2019   • DDD (degenerative disc disease), lumbosacral 11/8/2019   • Essential hypertension 11/8/2019   • Gambling disorder, persistent 11/8/2019   • GERD (gastroesophageal reflux disease) 11/8/2019   • History of CVA (cerebrovascular accident) 11/8/2019   • Hyperlipidemia    • Hypertension    • Medically noncompliant 11/8/2019   • Peripheral polyneuropathy 11/8/2019   • Psoriasis 11/8/2019   • Stroke (CMS/[facility])           Family History   Problem Relation Age of Onset   • Heart disease Mother         Social History     Tobacco Use   • Smoking status: Never Smoker   • Smokeless tobacco: Never Used   Substance Use Topics   • Alcohol use: No     Frequency: Never   • Drug use: No          Medications Prior to Admission   Medication Sig Dispense Refill Last Dose   • amLODIPine (NORVASC) 10 MG tablet Take 1 tablet by mouth Daily. 30 tablet 3 Unknown at Unknown time   • carvedilol (COREG) 25 MG tablet Take 25 mg by mouth 2 (Two) Times a Day With Meals.   Unknown at Unknown time   • gabapentin (NEURONTIN) 100 MG capsule Take 100 mg by mouth Every Night.   Unknown at Unknown time   • hydrALAZINE (APRESOLINE) 50 MG tablet Take 50 mg by mouth 3 (Three) Times a Day.   Unknown at Unknown time   • potassium chloride (K-DUR,KLOR-CON) 20 MEQ CR tablet Take 20 mEq by mouth 2 (Two) Times a Day.   Unknown at Unknown time   • pravastatin (PRAVACHOL) 40 MG tablet Take 40 mg by mouth Every Night.   Unknown at Unknown time   • rivaroxaban (XARELTO) 20 MG tablet Take 20 mg by mouth Every Evening.   Unknown at Unknown time   • spironolactone (ALDACTONE) 25 MG tablet Take 25 mg by mouth Daily.   Unknown at Unknown time       Scheduled Meds:  atorvastatin 10 mg Oral Nightly   carvedilol 25 mg Oral BID With Meals   dilTIAZem  mg Oral Q24H   furosemide 40 mg Intravenous BID    "  hydrALAZINE 75 mg Oral TID   isosorbide mononitrate 30 mg Oral BID - Nitrates   potassium chloride 20 mEq Oral BID   rivaroxaban 10 mg Oral Daily With Dinner   spironolactone 25 mg Oral Daily   triamcinolone  Topical Q12H     Continuous Infusions:   PRN Meds:.hydrALAZINE  •  risperiDONE  •  [COMPLETED] Insert peripheral IV **AND** sodium chloride     Allergies:  Ketorolac tromethamine      Objective     Vital Signs   /79 (BP Location: Right arm, Patient Position: Sitting)   Pulse 78   Temp 97.9 °F (36.6 °C) (Oral)   Resp 11   Ht 182.9 cm (72\")   Wt 116 kg (256 lb 13.4 oz)   SpO2 96%   BMI 34.83 kg/m²     Physical Exam:     General Appearance:    NAD   Head:    Normocephalic, without obvious abnormality, atraumatic   Eyes:            Lids and lashes normal, conjunctivae and sclerae normal, no   icterus, no pallor, corneas clear, PERRLA   Skin:   No bleeding, bruising or rash          Neurologic:   Cranial nerves 2 - 12 grossly intact, sensation intact, DTR       present and equal bilaterally         Mental Status Exam:   Hygiene:   good  Cooperation:  Cooperative  Eye Contact:  Good  Psychomotor Behavior:  Appropriate  Affect:  Appropriate  Mood: normal  Hopelessness: Denies  Speech:  Normal  Thought Process:  Goal directed  Thought Content:  Normal  Suicidal:  None  Homicidal:  None  Hallucinations:  None  Delusion:  None  Memory:  Deficits  Orientation:  Person, Place and Situation  Reliability:  fair  Insight:  Fair  Judgement:  Fair  Impulse Control:  Impaired  Physical/Medical Issues:  Yes     Medications and allergies were reviewed by this provider.     Lab Results   Component Value Date    GLUCOSE 104 (H) 08/03/2020    CALCIUM 9.0 08/03/2020     08/03/2020    K 4.2 08/03/2020    CO2 24.0 08/03/2020     08/03/2020    BUN  08/03/2020      Comment:      Testing performed by alternate method    BUN 21 08/03/2020    CREATININE 1.09 08/03/2020    EGFRIFNONA 68 08/03/2020    BCR  08/03/2020 "      Comment:      Testing not performed    ANIONGAP 13.0 08/03/2020       Last Urine Toxicity     LAST URINE TOXICITY RESULTS Latest Ref Rng & Units 10/11/2018    CREATININE UR mg/dl 70.9          No results found for: PHENYTOIN, PHENOBARB, VALPROATE, CBMZ    Lab Results   Component Value Date     08/03/2020    BUN  08/03/2020      Comment:      Testing performed by alternate method    BUN 21 08/03/2020    CREATININE 1.09 08/03/2020    TSH 3.600 12/12/2019    WBC 8.40 08/03/2020       Brief Urine Lab Results  (Last result in the past 365 days)      Color   Clarity   Blood   Leuk Est   Nitrite   Protein   CREAT   Urine HCG        08/01/20 1516 Yellow Clear Negative Negative Negative 30 mg/dL (1+)               ECG/EMG Results (last 72 hours)     ** No results found for the last 72 hours. **            Assessment/Plan       Acute congestive heart failure (CMS/[facility])       LABS: Reivewed    Assessment:   Mood disorder   R/O cognitive impairment, possible dementia    Treatment Plan:   Patient is alert and mostly oriented today, some intermittent confusion, pleasant, no agitation but nursing says he is a bit paranoid when taking meds.   Agree with Risperdal but will change to BID scheduled to see if it helps his paranoia.  If it is sedating, decrease to 0.25mg or can change back to PRN  Patient would benefit from neuropsych testing as an outpatient after discharge.    Patient my need assisted living, home health, have concerns about him living alone, although his sister does live next door.  Will follow      Treatment Plan discussed with: Patient, nurising    I discussed the patients findings and my recommendations with patient and nursing staff    I have reviewed and approved the behavioral health treatment plans and problem list. Yes  Thank you for the consult   Referring MD has access to the consult report and progress notes in EMR     Nazia Rogers PA-C  08/03/20  15:00

## 2020-08-03 NOTE — PROGRESS NOTES
Continued Stay Note  KAE Hameed     Patient Name: Carlos Whipple  MRN: [id number]  Today's Date: 8/3/2020    Admit Date: 7/30/2020    Discharge Plan      PT recommends C. Spoke to patient per phone due to Covid process and he declined. States he does not have a rolling walker. Referral made to [facility]. Order completed and sent per Epic      -Mary Pro RN, CM  Office Phone [phone]  Cell [phone]

## 2020-08-03 NOTE — THERAPY EVALUATION
Patient Name: Carlos Whipple  : 1955    MRN: [id number]                              Today's Date: 8/3/2020       Admit Date: 2020    Visit Dx:     ICD-10-CM ICD-9-CM   1. Acute congestive heart failure, unspecified heart failure type (CMS/[facility]) I50.9 428.0   2. Hypertensive emergency I16.1 401.9     Patient Active Problem List   Diagnosis   • Hypertensive emergency   • Chronic atrial fibrillation (CMS/[facility])   • Chronic diastolic CHF (congestive heart failure) (CMS/[facility])   • CKD (chronic kidney disease) stage 3, GFR 30-59 ml/min (CMS/[facility])   • Essential hypertension   • Gambling disorder, persistent   • Shortness of breath   • Coronary artery disease involving native coronary artery of native heart without angina pectoris   • Psoriasis   • History of CVA (cerebrovascular accident)   • DDD (degenerative disc disease), lumbosacral   • Peripheral polyneuropathy   • GERD (gastroesophageal reflux disease)   • Medically noncompliant   • Acute respiratory distress   • Unstable angina (CMS/[facility])   • Cervical disc disorder with radiculopathy   • Completed stroke (CMS/[facility])   • Degeneration of intervertebral disc   • Excessive anticoagulation   • Impaired left ventricular function   • Liver lesion   • Lumbar radiculopathy   • Cervical myelopathy (CMS/[facility])   • Lumbosacral radiculopathy   • Spinal stenosis of lumbar region   • Obesity   • Mild cognitive impairment   • Thoracic back pain   • Thoracic disc disease with myelopathy   • Paroxysmal atrial fibrillation (CMS/[facility])   • Essential (primary) hypertension   • Acute congestive heart failure (CMS/[facility])     Past Medical History:   Diagnosis Date   • A-fib (CMS/[facility])    • CHF (congestive heart failure) (CMS/[facility])    • Chronic atrial fibrillation (CMS/[facility]) 2019   • Chronic diastolic CHF (congestive heart failure) (CMS/[facility]) 2019   • CKD (chronic kidney disease) stage 3, GFR 30-59 ml/min (CMS/[facility]) 2019   • Coronary artery disease involving native coronary  artery of native heart without angina pectoris 11/8/2019   • DDD (degenerative disc disease), lumbosacral 11/8/2019   • Essential hypertension 11/8/2019   • Gambling disorder, persistent 11/8/2019   • GERD (gastroesophageal reflux disease) 11/8/2019   • History of CVA (cerebrovascular accident) 11/8/2019   • Hyperlipidemia    • Hypertension    • Medically noncompliant 11/8/2019   • Peripheral polyneuropathy 11/8/2019   • Psoriasis 11/8/2019   • Stroke (CMS/HCC)      Past Surgical History:   Procedure Laterality Date   • CARDIAC CATHETERIZATION     • CARDIAC CATHETERIZATION N/A 12/10/2019    Procedure: Left Heart Cath and coronary angiogram;  Surgeon: Edy Thomas MD;  Location: [facility] CATH INVASIVE LOCATION;  Service: Cardiovascular     General Information     Row Name 08/03/20 1554          PT Evaluation Time/Intention    Document Type  evaluation  -HC     Mode of Treatment  physical therapy  -HC     Row Name 08/03/20 1554          General Information    Patient Profile Reviewed?  yes  -HC     Prior Level of Function  independent:  -HC     Existing Precautions/Restrictions  fall  -HC     Row Name 08/03/20 1554          Relationship/Environment    Lives With  alone sister lives next door  -HC     Row Name 08/03/20 1554          Resource/Environmental Concerns    Current Living Arrangements  home/apartment/condo  -     Row Name 08/03/20 1554          Home Main Entrance    Number of Stairs, Main Entrance  two  -HC     Row Name 08/03/20 155          Cognitive Assessment/Intervention- PT/OT    Orientation Status (Cognition)  oriented x 3  -HC     Row Name 08/03/20 1556          Safety Issues, Functional Mobility    Safety Issues Affecting Function (Mobility)  insight into deficits/self awareness;safety precaution awareness  -HC     Impairments Affecting Function (Mobility)  balance;endurance/activity tolerance;strength  -HC       User Key  (r) = Recorded By, (t) = Taken By, (c) = Cosigned By    Initials Name  Provider Type     Pauline Barrett, PT Physical Therapist        Mobility     Row Name 08/03/20 1555          Bed Mobility Assessment/Treatment    Bed Mobility Assessment/Treatment  -- up in bathroom  -     Row Name 08/03/20 1555          Sit-Stand Transfer    Sit-Stand [place] (Transfers)  supervision  -     Row Name 08/03/20 1555          Gait/Stairs Assessment/Training    Gait/Stairs Assessment/Training  gait/ambulation independence  -     [place] Level (Gait)  stand by assist  -     Assistive Device (Gait)  walker, front-wheeled  -     Distance in Feet (Gait)  125 ft  -     Pattern (Gait)  step-through  -     Comment (Gait/Stairs)  without AD, pt exhibiting impaired balance and reaching for objects for UE support; with RW, pt exhibiting improved balance and endurance  -       User Key  (r) = Recorded By, (t) = Taken By, (c) = Cosigned By    Initials Name Provider Type     Pauline Barrett, PT Physical Therapist        Obj/Interventions     Row Name 08/03/20 1559          General ROM    GENERAL ROM COMMENTS  BUEs WFL, BLEs WFL  -     Row Name 08/03/20 1559          MMT (Manual Muscle Testing)    General MMT Comments  BUEs 4/5 MMT, BLEs 4/5 MMT   -     Row Name 08/03/20 1550          Static Sitting Balance    Level of [place] (Unsupported Sitting, Static Balance)  independent  -     Sitting Position (Unsupported Sitting, Static Balance)  sitting on edge of bed  -     Time Able to Maintain Position (Unsupported Sitting, Static Balance)  2 to 3 minutes  -     Row Name 08/03/20 1558          Dynamic Sitting Balance    Level of [place], Reaches Outside Midline (Sitting, Dynamic Balance)  supervision  -     Sitting Position, Reaches Outside Midline (Sitting, Dynamic Balance)  sitting on edge of bed  -     Row Name 08/03/20 1557          Static Standing Balance    Level of [place] (Supported Standing, Static Balance)  standby assist  -     Time Able to  Maintain Position (Supported Standing, Static Balance)  3 to 4 minutes  -     Row Name 08/03/20 1559          Dynamic Standing Balance    Level of [place], Reaches Outside Midline (Standing, Dynamic Balance)  standby assist  -HC     Time Able to Maintain Position, Reaches Outside Midline (Standing, Dynamic Balance)  4 to 5 minutes  -HC     Assistive Device Utilized (Supported Standing, Dynamic Balance)  walker, rolling  -HC       User Key  (r) = Recorded By, (t) = Taken By, (c) = Cosigned By    Initials Name Provider Type     Pauline Barrett, PT Physical Therapist        Goals/Plan     Row Name 08/03/20 1601          Transfer Goal 1 (PT)    Activity/Assistive Device (Transfer Goal 1, PT)  transfers, all  -HC     [place] Level/Cues Needed (Transfer Goal 1, PT)  independent  -HC     Time Frame (Transfer Goal 1, PT)  2 weeks  -[facility] Name 08/03/20 1601          Gait Training Goal 1 (PT)    Activity/Assistive Device (Gait Training Goal 1, PT)  gait (walking locomotion);assistive device use  -HC     [place] Level (Gait Training Goal 1, PT)  supervision required  -HC     Distance (Gait Goal 1, PT)  400 ft  -HC     Time Frame (Gait Training Goal 1, PT)  2 weeks  -HC       User Key  (r) = Recorded By, (t) = Taken By, (c) = Cosigned By    Initials Name Provider Type     Pauline Barrett, PT Physical Therapist        Clinical Impression     Row Name 08/03/20 1600          Pain Assessment    Additional Documentation  Pain Scale: Numbers Pre/Post-Treatment (Group)  -HC     Row Name 08/03/20 1600          Pain Scale: Numbers Pre/Post-Treatment    Pain Scale: Numbers, Pretreatment  0/10 - no pain  -     Pain Scale: Numbers, Post-Treatment  0/10 - no pain  -HC     Row Name 08/03/20 1600          Physical Therapy Clinical Impression    Patient/Family Goals Statement (PT Clinical Impression)  Pt is 65 yo male admitted for SOA, AE CHF, pleural effusion, and hypertensive emergency.  Covid 19 testing (-).     -HC     Criteria for Skilled Interventions Met (PT Clinical Impression)  yes;treatment indicated  -HC     Rehab Potential (PT Clinical Summary)  good, to achieve stated therapy goals  -HC     Predicted Duration of Therapy (PT)  until d/c   -HC     Row Name 08/03/20 1600          Vital Signs    Intra SpO2 (%)  94  -HC     O2 Delivery Intra Treatment  room air  -HC     Post SpO2 (%)  96  -HC     O2 Delivery Post Treatment  room air  -HC     Row Name 08/03/20 1600          Positioning and Restraints    Pre-Treatment Position  bathroom  -HC     Post Treatment Position  chair  -HC     In Chair  notified nsg;call light within reach;encouraged to call for assist;exit alarm on  -HC       User Key  (r) = Recorded By, (t) = Taken By, (c) = Cosigned By    Initials Name Provider Type    Pauline Rodriguez PT Physical Therapist        Outcome Measures     Row Name 08/03/20 1601          How much help from another person do you currently need...    Turning from your back to your side while in flat bed without using bedrails?  3  -HC     Moving from lying on back to sitting on the side of a flat bed without bedrails?  3  -HC     Moving to and from a bed to a chair (including a wheelchair)?  3  -HC     Standing up from a chair using your arms (e.g., wheelchair, bedside chair)?  3  -HC     Climbing 3-5 steps with a railing?  2  -HC     To walk in hospital room?  3  -HC     AM-PAC 6 Clicks Score (PT)  17  -HC     Row Name 08/03/20 1601          Functional Assessment    Outcome Measure Options  AM-PAC 6 Clicks Basic Mobility (PT)  -HC       User Key  (r) = Recorded By, (t) = Taken By, (c) = Cosigned By    Initials Name Provider Type    Pauline Rodriguez, PT Physical Therapist        Physical Therapy Education                 Title: PT OT SLP Therapies (In Progress)     Topic: Physical Therapy (In Progress)     Point: Mobility training (Done)     Description:   Instruct learner(s) on safety and technique for assisting patient  out of bed, chair or wheelchair.  Instruct in the proper use of assistive devices, such as walker, crutches, cane or brace.              Patient Friendly Description:   It's important to get you on your feet again, but we need to do so in a way that is safe for you. Falling has serious consequences, and your personal safety is the most important thing of all.        When it's time to get out of bed, one of us or a family member will sit next to you on the bed to give you support.     If your doctor or nurse tells you to use a walker, crutches, a cane, or a brace, be sure you use it every time you get out of bed, even if you think you don't need it.    Learning Progress Summary           Patient Acceptance, E, VU by  at 8/3/2020 1602                   Point: Home exercise program (Not Started)     Description:   Instruct learner(s) on appropriate technique for monitoring, assisting and/or progressing patient with therapeutic exercises and activities.              Learner Progress:   Not documented in this visit.          Point: Body mechanics (Not Started)     Description:   Instruct learner(s) on proper positioning and spine alignment for patient and/or caregiver during mobility tasks and/or exercises.              Learner Progress:   Not documented in this visit.          Point: Precautions (Done)     Description:   Instruct learner(s) on prescribed precautions during mobility and gait tasks              Learning Progress Summary           Patient Acceptance, E, VU by  at 8/3/2020 1602                               User Key     Initials Effective Dates Name Provider Type Discipline     04/17/20 -  Pauline Barrett, PT Physical Therapist PT              PT Recommendation and Plan  Planned Therapy Interventions (PT Eval): balance training, gait training, postural re-education, transfer training, neuromuscular re-education, stair training, strengthening, patient/family education, home exercise program  Outcome  Summary/Treatment Plan (PT)  Anticipated Equipment Needs at Discharge (PT): front wheeled walker  Anticipated Discharge Disposition (PT): home with home health  Plan of Care Reviewed With: patient  Progress: improving  Outcome Summary: Pt is 65 yo male admitted for SOA, AE CHF, pleural effusion, and hypertensive emergency.  Covid 19 testing (-).  Pt able to complete transfers with SUP.   Gait assesed without AD with pt exhibiting impaired balance and reaching for objects for UE support.  Gait then assessed using RW with improved balance and endurance noted.  Pt appears close to functional mobility baseline.  Plan home with HHPT and use of Rw for pt safety.  PPE donned: mask with faceshield, gloves.     Time Calculation:   PT Charges     Row Name 08/03/20 1604             Time Calculation    Start Time  1426  -      Stop Time  1451  -      Time Calculation (min)  25 min  -      PT Received On  08/03/20  -      PT - Next Appointment  08/05/20  -      PT Goal Re-Cert Due Date  08/17/20  -         Time Calculation- PT    Total Timed Code Minutes- PT  10 minute(s)  -        User Key  (r) = Recorded By, (t) = Taken By, (c) = Cosigned By    Initials Name Provider Type     Pauline Barrett, PT Physical Therapist        Therapy Charges for Today     Code Description Service Date Service Provider Modifiers Qty    [id number]  PT EVAL LOW COMPLEXITY 3 8/3/2020 Pauline Barrett, PT GP 1    [id number]  GAIT TRAINING EA 15 MIN 8/3/2020 Pauline Barrett, PT GP 1          PT G-Codes  Outcome Measure Options: AM-PAC 6 Clicks Basic Mobility (PT)  AM-PAC 6 Clicks Score (PT): 17    Pauline Barrett, PT  8/3/2020

## 2020-08-04 VITALS
DIASTOLIC BLOOD PRESSURE: 55 MMHG | HEART RATE: 89 BPM | HEIGHT: 72 IN | WEIGHT: 241.62 LBS | TEMPERATURE: 97.4 F | SYSTOLIC BLOOD PRESSURE: 100 MMHG | OXYGEN SATURATION: 90 % | RESPIRATION RATE: 18 BRPM | BODY MASS INDEX: 32.73 KG/M2

## 2020-08-04 LAB
ALBUMIN SERPL-MCNC: 3.9 G/DL (ref 3.5–5.2)
ALBUMIN/GLOB SERPL: 1.1 G/DL
ALP SERPL-CCNC: 64 U/L (ref 39–117)
ALT SERPL W P-5'-P-CCNC: 28 U/L (ref 1–41)
ANION GAP SERPL CALCULATED.3IONS-SCNC: 14 MMOL/L (ref 5–15)
AST SERPL-CCNC: 20 U/L (ref 1–40)
BASOPHILS # BLD AUTO: 0 10*3/MM3 (ref 0–0.2)
BASOPHILS NFR BLD AUTO: 0.4 % (ref 0–1.5)
BILIRUB SERPL-MCNC: 0.6 MG/DL (ref 0–1.2)
BUN SERPL-MCNC: 25 MG/DL (ref 8–23)
BUN SERPL-MCNC: ABNORMAL MG/DL
BUN/CREAT SERPL: ABNORMAL
CALCIUM SPEC-SCNC: 9.5 MG/DL (ref 8.6–10.5)
CHLORIDE SERPL-SCNC: 99 MMOL/L (ref 98–107)
CO2 SERPL-SCNC: 26 MMOL/L (ref 22–29)
CREAT SERPL-MCNC: 1.38 MG/DL (ref 0.76–1.27)
DEPRECATED RDW RBC AUTO: 49.9 FL (ref 37–54)
EOSINOPHIL # BLD AUTO: 0.2 10*3/MM3 (ref 0–0.4)
EOSINOPHIL NFR BLD AUTO: 2.1 % (ref 0.3–6.2)
ERYTHROCYTE [DISTWIDTH] IN BLOOD BY AUTOMATED COUNT: 17.4 % (ref 12.3–15.4)
GFR SERPL CREATININE-BSD FRML MDRD: 52 ML/MIN/1.73
GLOBULIN UR ELPH-MCNC: 3.4 GM/DL
GLUCOSE SERPL-MCNC: 106 MG/DL (ref 65–99)
HCT VFR BLD AUTO: 43 % (ref 37.5–51)
HGB BLD-MCNC: 14.2 G/DL (ref 13–17.7)
LYMPHOCYTES # BLD AUTO: 1.4 10*3/MM3 (ref 0.7–3.1)
LYMPHOCYTES NFR BLD AUTO: 13.6 % (ref 19.6–45.3)
MCH RBC QN AUTO: 26.8 PG (ref 26.6–33)
MCHC RBC AUTO-ENTMCNC: 32.9 G/DL (ref 31.5–35.7)
MCV RBC AUTO: 81.5 FL (ref 79–97)
MONOCYTES # BLD AUTO: 1.3 10*3/MM3 (ref 0.1–0.9)
MONOCYTES NFR BLD AUTO: 13 % (ref 5–12)
NEUTROPHILS NFR BLD AUTO: 7.3 10*3/MM3 (ref 1.7–7)
NEUTROPHILS NFR BLD AUTO: 70.9 % (ref 42.7–76)
NRBC BLD AUTO-RTO: 0.1 /100 WBC (ref 0–0.2)
PLATELET # BLD AUTO: 177 10*3/MM3 (ref 140–450)
PMV BLD AUTO: 8.9 FL (ref 6–12)
POTASSIUM SERPL-SCNC: 4.2 MMOL/L (ref 3.5–5.2)
PROT SERPL-MCNC: 7.3 G/DL (ref 6–8.5)
RBC # BLD AUTO: 5.28 10*6/MM3 (ref 4.14–5.8)
SODIUM SERPL-SCNC: 139 MMOL/L (ref 136–145)
WBC # BLD AUTO: 10.2 10*3/MM3 (ref 3.4–10.8)

## 2020-08-04 PROCEDURE — 97129 THER IVNTJ 1ST 15 MIN: CPT

## 2020-08-04 PROCEDURE — 97166 OT EVAL MOD COMPLEX 45 MIN: CPT

## 2020-08-04 PROCEDURE — 99232 SBSQ HOSP IP/OBS MODERATE 35: CPT | Performed by: INTERNAL MEDICINE

## 2020-08-04 PROCEDURE — 85025 COMPLETE CBC W/AUTO DIFF WBC: CPT | Performed by: FAMILY MEDICINE

## 2020-08-04 PROCEDURE — 97530 THERAPEUTIC ACTIVITIES: CPT

## 2020-08-04 PROCEDURE — 80053 COMPREHEN METABOLIC PANEL: CPT | Performed by: FAMILY MEDICINE

## 2020-08-04 PROCEDURE — 25010000002 FUROSEMIDE PER 20 MG: Performed by: INTERNAL MEDICINE

## 2020-08-04 RX ORDER — RISPERIDONE 0.5 MG/1
0.25 TABLET ORAL 2 TIMES DAILY
Qty: 30 TABLET | Refills: 0 | Status: SHIPPED | OUTPATIENT
Start: 2020-08-04 | End: 2020-09-12 | Stop reason: HOSPADM

## 2020-08-04 RX ORDER — TRIAMCINOLONE ACETONIDE 1 MG/G
CREAM TOPICAL EVERY 12 HOURS SCHEDULED
Qty: 60 G | Refills: 0 | Status: SHIPPED | OUTPATIENT
Start: 2020-08-04 | End: 2020-08-10

## 2020-08-04 RX ORDER — HYDRALAZINE HYDROCHLORIDE 25 MG/1
50 TABLET, FILM COATED ORAL 3 TIMES DAILY
Qty: 180 TABLET | Refills: 5 | Status: SHIPPED | OUTPATIENT
Start: 2020-08-04 | End: 2020-09-12 | Stop reason: HOSPADM

## 2020-08-04 RX ORDER — DILTIAZEM HYDROCHLORIDE 180 MG/1
180 CAPSULE, COATED, EXTENDED RELEASE ORAL
Qty: 30 CAPSULE | Refills: 0 | Status: SHIPPED | OUTPATIENT
Start: 2020-08-05 | End: 2020-09-12 | Stop reason: HOSPADM

## 2020-08-04 RX ORDER — ISOSORBIDE MONONITRATE 10 MG/1
30 TABLET ORAL
Qty: 60 TABLET | Refills: 0 | Status: SHIPPED | OUTPATIENT
Start: 2020-08-04 | End: 2020-09-12 | Stop reason: HOSPADM

## 2020-08-04 RX ADMIN — SPIRONOLACTONE 25 MG: 25 TABLET ORAL at 08:01

## 2020-08-04 RX ADMIN — RISPERIDONE 0.5 MG: 0.25 TABLET ORAL at 09:27

## 2020-08-04 RX ADMIN — POTASSIUM CHLORIDE 20 MEQ: 1500 TABLET, EXTENDED RELEASE ORAL at 08:02

## 2020-08-04 RX ADMIN — ISOSORBIDE MONONITRATE 30 MG: 20 TABLET ORAL at 08:02

## 2020-08-04 RX ADMIN — CARVEDILOL 25 MG: 25 TABLET, FILM COATED ORAL at 08:02

## 2020-08-04 RX ADMIN — Medication 10 ML: at 08:02

## 2020-08-04 RX ADMIN — FUROSEMIDE 40 MG: 10 INJECTION, SOLUTION INTRAMUSCULAR; INTRAVENOUS at 08:02

## 2020-08-04 RX ADMIN — HYDRALAZINE HYDROCHLORIDE 75 MG: 25 TABLET, FILM COATED ORAL at 08:01

## 2020-08-04 RX ADMIN — DILTIAZEM HYDROCHLORIDE 180 MG: 180 CAPSULE, COATED, EXTENDED RELEASE ORAL at 08:02

## 2020-08-04 NOTE — THERAPY EVALUATION
Acute Care - Occupational Therapy Initial Evaluation   [facility]     Patient Name: Carlos Whipple  : 1955  MRN: [id number]  Today's Date: 2020             Admit Date: 2020       ICD-10-CM ICD-9-CM   1. Acute congestive heart failure, unspecified heart failure type (CMS/HCC) I50.9 428.0   2. Hypertensive emergency I16.1 401.9   3. Chronic diastolic CHF (congestive heart failure) (CMS/HCC) I50.32 428.32     428.0     Patient Active Problem List   Diagnosis   • Hypertensive emergency   • Chronic atrial fibrillation (CMS/HCC)   • Chronic diastolic CHF (congestive heart failure) (CMS/[facility])   • CKD (chronic kidney disease) stage 3, GFR 30-59 ml/min (CMS/[facility])   • Essential hypertension   • Gambling disorder, persistent   • Shortness of breath   • Coronary artery disease involving native coronary artery of native heart without angina pectoris   • Psoriasis   • History of CVA (cerebrovascular accident)   • DDD (degenerative disc disease), lumbosacral   • Peripheral polyneuropathy   • GERD (gastroesophageal reflux disease)   • Medically noncompliant   • Acute respiratory distress   • Unstable angina (CMS/[facility])   • Cervical disc disorder with radiculopathy   • Completed stroke (CMS/[facility])   • Degeneration of intervertebral disc   • Excessive anticoagulation   • Impaired left ventricular function   • Liver lesion   • Lumbar radiculopathy   • Cervical myelopathy (CMS/[facility])   • Lumbosacral radiculopathy   • Spinal stenosis of lumbar region   • Obesity   • Mild cognitive impairment   • Thoracic back pain   • Thoracic disc disease with myelopathy   • Paroxysmal atrial fibrillation (CMS/[facility])   • Essential (primary) hypertension   • Acute congestive heart failure (CMS/HCC)     Past Medical History:   Diagnosis Date   • A-fib (CMS/[facility])    • CHF (congestive heart failure) (CMS/HCC)    • Chronic atrial fibrillation (CMS/HCC) 2019   • Chronic diastolic CHF (congestive heart failure) (CMS/HCC) 2019   • CKD (chronic  kidney disease) stage 3, GFR 30-59 ml/min (CMS/[facility]) 11/8/2019   • Coronary artery disease involving native coronary artery of native heart without angina pectoris 11/8/2019   • DDD (degenerative disc disease), lumbosacral 11/8/2019   • Essential hypertension 11/8/2019   • Gambling disorder, persistent 11/8/2019   • GERD (gastroesophageal reflux disease) 11/8/2019   • History of CVA (cerebrovascular accident) 11/8/2019   • Hyperlipidemia    • Hypertension    • Medically noncompliant 11/8/2019   • Peripheral polyneuropathy 11/8/2019   • Psoriasis 11/8/2019   • Stroke (CMS/[facility])      Past Surgical History:   Procedure Laterality Date   • CARDIAC CATHETERIZATION     • CARDIAC CATHETERIZATION N/A 12/10/2019    Procedure: Left Heart Cath and coronary angiogram;  Surgeon: Edy Thomas MD;  Location: [facility] CATH INVASIVE LOCATION;  Service: Cardiovascular          OT ASSESSMENT FLOWSHEET (last 12 hours)      Occupational Therapy Evaluation     Row Name 08/04/20 0800                   OT Evaluation Time/Intention    Subjective Information  no complaints  -        Document Type  evaluation  -        Mode of Treatment  occupational therapy  -        Patient Effort  good  -           General Information    Patient Profile Reviewed?  yes  -        Prior Level of Function  independent:;all household mobility;ADL's;home management;cooking;cleaning;driving  -        Equipment Currently Used at Home  cane, straight;grab bar lost his cane but prefers one & refuses to use RW  -        Pertinent History of Current Functional Problem  Pt is 63 y/o M who was admitted w/ hypertensive emergency, Afib, & pleural effussion. He has declined recommended thoracentesis & states he is breathing much better. Pt needs cardioversion in 4-6 wks. Needs anticoagulation until then. Pt unable to verbalize any of this & impaired memory is demonstrated, as is impulsiveness.  -        Existing Precautions/Restrictions  fall  -         Limitations/Impairments  safety/cognitive  -        Equipment Issued to Patient  walker, front wheeled  -        Equipment Ordered for Patient  walker, front wheeled  -           Relationship/Environment    Lives With  alone  -        Primary Roles/Responsibilities  homemaker  -           Resource/Environmental Concerns    Current Living Arrangements  home/apartment/condo  -           Home Main Entrance    Number of Stairs, Main Entrance  two  -           Cognitive Assessment/Intervention- PT/OT    Orientation Status (Cognition)  oriented x 3  -           Safety Issues, Functional Mobility    Safety Issues Affecting Function (Mobility)  at risk behavior observed;awareness of need for assistance;impulsivity;insight into deficits/self awareness;judgment;problem solving;safety precaution awareness  -        Impairments Affecting Function (Mobility)  balance;cognition;endurance/activity tolerance;range of motion (ROM);sensation/sensory awareness  -           Functional Mobility    Functional Mobility- Ind. Level  supervision required  -        Functional Mobility- Device  rolling walker  -        Functional Mobility- Comment  pt plans to be non-compliant w/ RW at home, per his own repeated statement, but when he is supervised & asked to use it in the room he is pleasant & compliant. He needs repeated cues to locate the bathroom in his room. Pt scores in the severe impairment range for visuospatial & executive functioning on MOCA as well as the ST memory components. 15/30 was his score.   -           Sit-Stand Transfer    Sit-Stand [place] (Transfers)  stand by assist  -           ADL Assessment/Intervention    BADL Assessment/Intervention  lower body dressing;grooming;toileting  -           Lower Body Dressing Assessment/Training    Lower Body Dressing [place] Level  don;shoes/slippers;minimum assist (75% patient effort)  -        Comment (Lower Body Dressing)  pt was wearing  one shoe& unaware. Unable to bend to flooor to pick it up.  -           Grooming Assessment/Training    [place] Level (Grooming)  shave face;supervision;set up  -           Toileting Assessment/Training    [place] Level (Toileting)  adjust/manage clothing;perform perineal hygiene;set up  -        Assistive Devices (Toileting)  urinal;raised toilet seat  -        Comment (Toileting)  urinated X2 during OT eval.  -           BADL Safety/Performance    Skilled BADL Treatment/Intervention  adaptive equipment training;BADL process/adaptation training;cognitive/safety deficit modifications  -           General ROM    GENERAL ROM COMMENTS  WFL except at waist limited 25%  -           MMT (Manual Muscle Testing)    General MMT Comments  BUE 4/5  -           Positioning and Restraints    Pre-Treatment Position  sitting in chair/recliner  -        Post Treatment Position  bathroom  -        Bathroom  notified nsg;with other staff  -           Pain Scale: Numbers Pre/Post-Treatment    Pain Scale: Numbers, Pretreatment  0/10 - no pain  -        Pain Scale: Numbers, Post-Treatment  0/10 - no pain  -           Wound 12/12/19 0200 upper;midline abdomen     Wound - Properties Group Date first assessed: 12/12/19  -BG Time first assessed: 0200  -BG Orientation: upper;midline  -BG Location: abdomen  -BG Primary Wound Type: --  -BG, psoriasis lesions        Wound 12/12/19 0200 thoracic spine Other (comment)    Wound - Properties Group Date first assessed: 12/12/19  -BG Time first assessed: 0200  -BG Location: thoracic spine  -BG Primary Wound Type: Other  -BG, psoriasis lesions        Wound 12/12/19 0200 midline gluteal MASD ([place] associated skin damage)    Wound - Properties Group Date first assessed: 12/12/19  -BG Time first assessed: 0200  -BG Present on Hospital Admission: Y  -BG Orientation: midline  -BG Location: gluteal  -BG Primary Wound Type: MASD  -BG, psoriasis lesions with what  appears to be moisture related damage        Coping    Observed Emotional State  accepting;calm;cooperative;denial;pleasant  -        Verbalized Emotional State  acceptance  -           Plan of Care Review    Plan of Care Reviewed With  patient  -        Progress  improving  -        Outcome Summary  Pt is 65 y/o M who was admitted w/ hypertensive emergency, Afib, & pleural effussion. He has declined recommended thoracentesis & states he is breathing much better. Pt needs cardioversion in 4-6 wks. Needs anticoagulation until then. Pt unable to verbalize any of this & impaired memory is demonstrated, as is impulsiveness. Pt demonstrates impaired balance except when using RW, but he plans to be non-compliant w/ RW at home, per his own repeated statement. When he is supervised & asked to use it in the room he is pleasant & compliant. He needs repeated cues to locate the bathroom in his room. Pt scores in the severe impairment range for visuospatial & executive functioning on MOCA as well as the ST memory components. 15/30 was his score, which indicates moderate dementing disorder, & OT recommends OP neuropsych eval. Pt states he will comply but plans to drive himself & OT would request CM to make this appointment, as Pt will likely not remember. Family assist is recommended. Assisted living would be ideal. Pt declines all offers of assist & plans to go home alone today. PPE worn: mask, gloves, safety glasses.  -           Clinical Impression (OT)    Criteria for Skilled Therapeutic Interventions Met (OT Eval)  treatment indicated  -        Rehab Potential (OT Eval)  fair, will monitor progress closely  -        Therapy Frequency (OT Eval)  5 times/wk  -        Predicted Duration of Therapy Intervention (Therapy Eval)  until D/C  -        Anticipated Equipment Needs at Discharge (OT)  quad cane pt repoorts he would use a cane.  -        Anticipated Discharge Disposition (OT)  assisted living facility  ([facility])  -           Planned OT Interventions    Planned Therapy Interventions (OT Eval)  activity tolerance training;cognitive/visual perception retraining;functional balance retraining;occupation/activity based interventions;transfer/mobility retraining;patient/caregiver education/training;ROM/therapeutic exercise  -           OT Goals    Dressing Goal Selection (OT)  dressing, OT goal 1  -        Endurance Goal Selection (OT)  endurance, OT goal 1  -        Safety Awareness Goal Selection (OT)  safety awareness, OT goal 1  -        Additional Documentation  Balance Goal Selection (OT) (Row);Safety Awareness Goal Selection (OT) (Row);Endurance Goal Selection (OT) (Row)  -           Dressing Goal 1 (OT)    Activity/Assistive Device (Dressing Goal 1, OT)  dressing skills, all E/C compliant  -        [place]/Cues Needed (Dressing Goal 1, OT)  supervision required;verbal cues required  -        Time Frame (Dressing Goal 1, OT)  2 weeks  -            Endurance Goal 1 (OT)    Endurance Goal 1 (OT)  increase for 15 min upright activity  -        Activity Level (Endurance Goal 1, OT)  endurance 2 fair + w/ BP WFL  -        Time Frame (Endurance Goal 1, OT)  2 weeks  -           Safety Awareness Goal 1 (OT)    Activity (Safety Awareness Goal 1, OT)  insight into deficits/self awareness;judgment;safe use of assistive device/equipment;follow through of safety precautions;demonstration of safe behaviors;demonstrates compliance  -        [place]/Cues/Accuracy (Safety Awareness Goal 1, OT)  with 90% accuracy;with environmental modification/cues;with internal compensatory strategy  -        Time Frame (Safety Awareness Goal 1, OT)  2 weeks  -          User Key  (r) = Recorded By, (t) = Taken By, (c) = Cosigned By    Initials Name Effective Dates     Mena Menjivar OT 03/01/19 -     Radha Ferreira RN 03/01/19 -          Occupational Therapy Education                 Title: PT OT  SLP Therapies (In Progress)     Topic: Occupational Therapy (In Progress)     Point: ADL training (In Progress)     Description:   Instruct learner(s) on proper safety adaptation and remediation techniques during self care or transfers.   Instruct in proper use of assistive devices.              Learning Progress Summary           Patient Nonacceptance, E,D, NL by  at 8/4/2020 1002                   Point: Home exercise program (Not Started)     Description:   Instruct learner(s) on appropriate technique for monitoring, assisting and/or progressing therapeutic exercises/activities.              Learner Progress:   Not documented in this visit.          Point: Precautions (In Progress)     Description:   Instruct learner(s) on prescribed precautions during self-care and functional transfers.              Learning Progress Summary           Patient Nonacceptance, E,D, NL by  at 8/4/2020 1002                   Point: Body mechanics (In Progress)     Description:   Instruct learner(s) on proper positioning and spine alignment during self-care, functional mobility activities and/or exercises.              Learning Progress Summary           Patient Nonacceptance, E,D, NL by  at 8/4/2020 1002                               User Key     Initials Effective Dates Name Provider Type Discipline     03/01/19 -  Mena Menjivar, OT Occupational Therapist OT                  OT Recommendation and Plan  Outcome Summary/Treatment Plan (OT)  Anticipated Equipment Needs at Discharge (OT): quad cane(pt repoorts he would use a cane.)  Anticipated Discharge Disposition (OT): assisted living facility (intermediate)  Planned Therapy Interventions (OT Eval): activity tolerance training, cognitive/visual perception retraining, functional balance retraining, occupation/activity based interventions, transfer/mobility retraining, patient/caregiver education/training, ROM/therapeutic exercise  Therapy Frequency (OT Eval): 5 times/wk  Plan of Care  Review  Plan of Care Reviewed With: patient  Plan of Care Reviewed With: patient  Outcome Summary: Pt is 63 y/o M who was admitted w/ hypertensive emergency, Afib, & pleural effussion. He has declined recommended thoracentesis & states he is breathing much better. Pt needs cardioversion in 4-6 wks. Needs anticoagulation until then. Pt unable to verbalize any of this & impaired memory is demonstrated, as is impulsiveness. Pt demonstrates impaired balance except when using RW, but he plans to be non-compliant w/ RW at home, per his own repeated statement. When he is supervised & asked to use it in the room he is pleasant & compliant. He needs repeated cues to locate the bathroom in his room. Pt scores in the severe impairment range for visuospatial & executive functioning on MOCA as well as the ST memory components. 15/30 was his score, which indicates moderate dementing disorder, & OT recommends OP neuropsych eval. Pt states he will comply but plans to drive himself & OT would request CM to make this appointment, as Pt will likely not remember. Family assist is recommended. Assisted living would be ideal. Pt declines all offers of assist & plans to go home alone today. PPE worn: mask, gloves, safety glasses.    Outcome Measures     Row Name 08/04/20 1000             How much help from another person do you currently need...    Turning from your back to your side while in flat bed without using bedrails?  4  -MH      Moving from lying on back to sitting on the side of a flat bed without bedrails?  4  -MH      Moving to and from a bed to a chair (including a wheelchair)?  3  -MH      Standing up from a chair using your arms (e.g., wheelchair, bedside chair)?  4  -MH      Climbing 3-5 steps with a railing?  3  -MH      To walk in hospital room?  3  -MH      AM-PAC 6 Clicks Score (PT)  21  -MH        User Key  (r) = Recorded By, (t) = Taken By, (c) = Cosigned By    Initials Name Provider Type    Mena Mckeon, OT  Occupational Therapist          Time Calculation:   Time Calculation- OT     Row Name 08/04/20 1005             Time Calculation- OT    OT Start Time  0800  -      OT Stop Time  0848  -      OT Time Calculation (min)  48 min  -      Total Timed Code Minutes- OT  25 minute(s)  -      OT Received On  08/04/20  -      OT - Next Appointment  08/06/20  -      OT Goal Re-Cert Due Date  08/18/20  -        User Key  (r) = Recorded By, (t) = Taken By, (c) = Cosigned By    Initials Name Provider Type     Mena Menjivar OT Occupational Therapist        Therapy Charges for Today     Code Description Service Date Service Provider Modifiers Qty    91308412707  OT THERAPEUTIC ACT EA 15 MIN 8/4/2020 Mena Menjivar OT GO 1    [id number]  OT DEV OF COGN SKILLS INITIAL 15 MIN 8/4/2020 Mena Menjivar OT  1    [id number]  OT EVAL MOD COMPLEXITY 3 8/4/2020 Mena Menjivar OT GO 1               Mena Menjivar OT  8/4/2020

## 2020-08-04 NOTE — PLAN OF CARE
Problem: Patient Care Overview  Goal: Plan of Care Review  Outcome: Ongoing (interventions implemented as appropriate)  Flowsheets  Taken 8/4/2020 1003  Progress: improving  Plan of Care Reviewed With: patient  Taken 8/4/2020 0800  Outcome Summary: Pt is 63 y/o M who was admitted w/ hypertensive emergency, Afib, & pleural effussion. He has declined recommended thoracentesis & states he is breathing much better. Pt needs cardioversion in 4-6 wks. Needs anticoagulation until then. Pt unable to verbalize any of this, & impaired memory is demonstrated, as is impulsiveness. Pt demonstrates impaired balance except when using RW, but he plans to be non-compliant w/ RW at home, per his own repeated statement. When he is supervised & asked to use it in the room he is pleasant & compliant. He needs repeated cues to locate the bathroom in his room. Pt scores in the severe impairment range for visuospatial & executive functioning on MOCA as well as moderately impaired on the ST memory components. 15/30 was his score, which indicates moderate to severe dementing disorder. OT recommends OP neuropsych eval. Pt states he will comply but plans to drive himself & OT would request CM to make this appointment, as Pt will likely not remember. Family assist is recommended. Assisted living would be ideal. Pt declines all offers of HHC or assist & plans to go home alone today. PPE worn: mask, gloves, safety glasses.

## 2020-08-04 NOTE — PLAN OF CARE
Pt continues to have episodes of being confused/disoriented. Pt denies pain or distress,  VSS. Risperdal was added to pt's med today, and he seems to be resting better, with less frequent episodes of being agitated/confused. Pt resting in bed at this time. Will continue to monitor.

## 2020-08-04 NOTE — PROGRESS NOTES
KPA/PULM/CC PROGRESS NOTE       SUBJECTIVE    Feels much better today.  Denies any shortness of breath.  Currently on room air.  8/4: Remains on room air.  Shortness of breath significantly improved.  OBJECTIVE    Vitals:    08/04/20 0221 08/04/20 0615 08/04/20 0854 08/04/20 1021   BP: 145/85 155/88 127/86 100/55   BP Location: Left arm Left arm     Patient Position: Lying Sitting     Pulse: 71 89     Resp: 16 18  18   Temp: 98.5 °F (36.9 °C) 97.6 °F (36.4 °C)  97.4 °F (36.3 °C)   TempSrc: Oral Oral  Oral   SpO2: 97% 90%     Weight:  110 kg (241 lb 10 oz)     Height:          Intake/Output last 3 shifts:  I/O last 3 completed shifts:  In: 1876 [P.O.:1876]  Out: 8625 [Urine:8625]  Intake/Output this shift:  I/O this shift:  In: 600 [P.O.:600]  Out: -     Constitutional:  Well developed, well nourished, no acute distress, non-toxic appearance   Eyes:  PERRL, conjunctiva normal   HENT:  Atraumatic, external ears normal, nose normal, oropharynx moist, no pharyngeal exudates.   Neck- normal range of motion, no tenderness, supple   Respiratory:  No respiratory distress, normal breath sounds, no rales, no wheezing, decreased air entry in the left lower lobe  Cardiovascular:  Normal rate, normal rhythm, no murmurs, no gallops, no rubs   GI:  Soft, nondistended, normal bowel sounds, nontender, no organomegaly, no mass, no rebound, no guarding   :  No costovertebral angle tenderness   Musculoskeletal:  No edema, no tenderness, no deformities. Back- no tenderness  Integument:  Well hydrated, no rash   Lymphatic:  No lymphadenopathy noted   Neurologic:  Alert & oriented x 3, CN 2-12 normal, normal motor function, normal sensory function, no focal deficits noted   Psychiatric:  Speech and behavior appropriate     Scheduled Meds:    Continuous Infusions:  No current facility-administered medications for this encounter.     PRN Meds:     LABS    CBC  Results from last 7 days   Lab Units 08/04/20  0310 08/03/20  0322  08/01/20 0310 07/31/20 0226 07/30/20  0954   WBC 10*3/mm3 10.20 8.40 9.60 10.50 8.00   RBC 10*6/mm3 5.28 5.02 4.72 5.01 5.24   HEMOGLOBIN g/dL 14.2 13.6 12.8* 13.4 14.1   HEMATOCRIT % 43.0 41.7 38.9 41.5 43.3   MCV fL 81.5 83.0 82.4 82.7 82.6   PLATELETS 10*3/mm3 177 170 142 166 157       CMP   Results from last 7 days   Lab Units 08/04/20 0310 08/03/20  0322 08/01/20 0310 07/31/20 0226 07/30/20  1107   SODIUM mmol/L 139 140 138 143 144   POTASSIUM mmol/L 4.2 4.2 3.6 4.0 3.1*   CHLORIDE mmol/L 99 103 103 104 106   CO2 mmol/L 26.0 24.0 23.0 26.0 24.0   BUN  25* 21 20 18 19   CREATININE mg/dL 1.38* 1.09 1.15 1.37* 1.25   GLUCOSE mg/dL 106* 104* 122* 131* 117*   ALBUMIN g/dL 3.90 3.60  --   --  4.00   BILIRUBIN mg/dL 0.6 0.5  --   --  0.8   ALK PHOS U/L 64 60  --   --  60   AST (SGOT) U/L 20 18  --   --  21   ALT (SGPT) U/L 28 19  --   --  17   AMMONIA umol/L  --  31  --   --   --        TROPONIN  Results from last 7 days   Lab Units 07/31/20  0226   TROPONIN T ng/mL <0.010       CoAg        ABG        Microbiology  Microbiology Results (last 10 days)     Procedure Component Value - Date/Time    COVID-19 [employer] IN-HOUSE, Nasal Swab No Transport Media - Swab, Nasal Cavity [[id number]]  (Normal) Collected:  07/30/20 0957    Lab Status:  Final result Specimen:  Swab from Nasal Cavity Updated:  07/30/20 1029     COVID19 Not Detected    Narrative:       Fact sheet for providers: https://www.fda.gov/media/353513/download     Fact sheet for patients: https://www.fda.gov/media/034109/download          IMAGING & OTHER STUDIES    Imaging Results (Last 72 Hours)     Procedure Component Value Units Date/Time    XR Chest PA & Lateral [[id number]] Collected:  08/02/20 2056     Updated:  08/02/20 2102    Narrative:       Examination: XR CHEST PA AND LATERAL-     Date of Exam: 8/2/2020 6:50 PM     Indication: left pleural effusion, pt now consents; I50.9-Heart failure,  unspecified; I16.1-Hypertensive emergency.     Comparison:  07/30/2020     Technique: 2 radiographic views of the chest were obtained.     Findings:  There is moderate enlargement of the cardiac silhouette. There is a  moderate opacity in the left lower chest, similar to prior given  difference in technique. Much of this is likely due to pleural effusion.  No pneumothorax is noted.     There is a faint 11 mm nodule projecting over the right upper chest. No  correlate is found on CT chest of 07/31/2020. This is therefore likely  outside the patient. There is degenerative change of the thoracic spine.       Impression:       1. Moderate left pleural effusion is not significant changed.  2. Cardiomegaly with pulmonary vascular redistribution. Negative for  pulmonary edema..     Electronically Signed By-Nehal Way On:8/2/2020 9:00 PM  This report was finalized on [id number] by  Nehal Way, .        Results for orders placed during the hospital encounter of 11/07/19   Adult Transthoracic Echo Complete W/ Cont if Necessary Per Protocol    Narrative · The left ventricular cavity is severely dilated.  · Left ventricular systolic function is moderately decreased.  · Left atrial cavity size is moderately dilated.  · Moderate mitral valve regurgitation is present  · Moderate tricuspid valve regurgitation is present.  · The following left ventricular wall segments are hypokinetic: mid   anterior, apical anterior, basal anterolateral, mid anterolateral, apical   lateral, basal inferolateral, mid inferolateral, apical inferior, mid   inferior, apical septal, basal inferoseptal, mid inferoseptal, apex   hypokinetic, mid anteroseptal, basal anterior, basal inferior and basal   inferoseptal.  · LV ejection fraction is about 30 to 35%  · No pericardial effusion noted             ASSESSMENT/PLAN:     Acute congestive heart failure (CMS/HCC)     Left pleural effusion  Acute on chronic systolic and diastolic heart failure exacerbation  Acute metabolic encephalopathy     PLAN         Significant improvement in clinical status.     Volume status improving.  Continue diuretics.     Cardiology following.    Can do follow-up chest x-ray as outpatient and evaluate for thoracentesis as outpatient.  Okay to discharge from pulmonary standpoint.  Follow-up in our pulmonary clinic in 3 to 4 weeks.      THIS DOCUMENT HAS BEEN ELECTRONICALLY SIGNED BY  Delicia Cochran MD  9:45 AM

## 2020-08-04 NOTE — DISCHARGE SUMMARY
Date of Discharge:  8/4/2020    Discharge Diagnosis:   Acute congestive heart failure (CMS/HCC)  Pulmonary edema with left pleural effusion, minimally changed since admission  Hypertensive urgency  Medical noncompliance  Atrial fibrillation, rate controlled  Confusion and combativeness    Chronic diastolic CHF (congestive heart failure) (CMS/HCC)      CKD (chronic kidney disease) stage 3, GFR 30-59 ml/min (CMS/HCC)     Essential hypertension     Gambling disorder, persistent     Shortness of breath     Coronary artery disease involving native coronary artery of native heart without angina pectoris     Psoriasis     History of CVA (cerebrovascular accident)     DDD (degenerative disc disease), lumbosacral     Peripheral polyneuropathy     GERD (gastroesophageal reflux disease)     Medically noncompliant     Acute respiratory distress     Unstable angina (CMS/HCC)     Cervical disc disorder with radiculopathy     Completed stroke (CMS/[facility])     Degeneration of intervertebral disc     Excessive anticoagulation     Impaired left ventricular function     Liver lesion     Lumbar radiculopathy     Cervical myelopathy (CMS/HCC)     Lumbosacral radiculopathy     Spinal stenosis of lumbar region     Obesity     Mild cognitive impairment     Thoracic back pain     Thoracic disc disease with myelopathy     Paroxysmal atrial fibrillation (CMS/[facility])     Essential (primary) hypertension         Presenting Problem/History of Present Illness  Active Hospital Problems    Diagnosis  POA   • Acute congestive heart failure (CMS/HCC) [I50.9]  Yes      Resolved Hospital Problems   No resolved problems to display.        Condition at discharge guarded  Hospital Course  Patient is a 64 y.o. male presented with progressive shortness of breath with orthopnea and paroxysmal nocturnal dyspnea.  He has known history of chronic diastolic congestive heart failure.  Patient attempted to use home medications but decompensated and presented to  [facility] emergency department for evaluation.  He was admitted with an acute exacerbation of diastolic congestive heart failure and he was also found to have a large left-sided pleural effusion.  Patient also had atrial fibrillation with RVR.  During his hospital stay diltiazem was initiated.  Eventually this is able to be changed from IV to p.o. by his usual cardiologist.  He had extensive cardiac work-up at this juncture is stable for going home.  Patient was also seen by pulmonology and has there was minimal improvement in the left pleural effusion.  Patient refused thoracentesis multiple times and at this juncture was advised that if he goes home and he becomes more shortness of breath to return to the hospital for thoracentesis.  His amlodipine is stopped.  He is now on Xarelto and will follow-up with cardiology in 4 weeks.  Follow-up with his usual primary care physician in 2 weeks.  He will stop Norvasc and instead be on diltiazem.    During his hospital stay he was also had waxing and waning problems with combativeness.  He was placed on risperidone and slept well.  He was seen by psychiatry and they recommend that he continue lower dose risperidone every 12 hours and this is been sent to the pharmacy as well.    New prescriptions were sent to his pharmacy.  Patient was agreeable to this plan.  Of note, he also refused home health but now will have at least a walker sent to his home.  Apparently patient lives alone but his sister lives next door and apparently is willing to look after him.    Procedures Performed         Consults:   Consults     Date and Time Order Name Status Description    8/3/2020 0849 Inpatient Psychiatrist Consult Completed     8/3/2020 0849 Inpatient Pulmonology Consult      8/1/2020 1335 Inpatient Pulmonology Consult Completed     7/30/2020 8624 Inpatient Cardiology Consult Completed     7/30/2020 1206 Family Medicine Consult Completed           Pertinent Test Results:Xr  Chest 1 View    Result Date: 7/30/2020   1. Cardiomegaly with pulmonary vascular congestion. 2. Left basilar opacity suggesting combination of moderate pleural effusion with overlying infiltrate or atelectasis.  Electronically Signed By-Mandeep Connor On:7/30/2020 10:35 AM This report was finalized on [id number] by  Mandeep Connor, .    Ct Chest Pulmonary Embolism    Result Date: 7/31/2020  1. No pulmonary embolism, aortic dissection, or aortic aneurysm. 2. Cardiomegaly. Moderate left pleural fluid collection, 4 to 5 cm depth. 3. Compressive atelectasis in the left lower lobe. 4. Mild mosaic attenuation of the lungs, compatible with asthma in the correct clinical setting. Electronically signed by:  Rodrigo Alva M.D.  7/31/2020 2:34 AM    Xr Chest Pa & Lateral    Result Date: 8/2/2020  1. Moderate left pleural effusion is not significant changed. 2. Cardiomegaly with pulmonary vascular redistribution. Negative for pulmonary edema..  Electronically Signed By-Nehal Way On:8/2/2020 9:00 PM This report was finalized on [id number] by  Nehal Way, .      Imaging Results (Last 7 Days)     Procedure Component Value Units Date/Time    XR Chest PA & Lateral [[id number]] Collected:  08/02/20 2056     Updated:  08/02/20 2102    Narrative:       Examination: XR CHEST PA AND LATERAL-     Date of Exam: 8/2/2020 6:50 PM     Indication: left pleural effusion, pt now consents; I50.9-Heart failure,  unspecified; I16.1-Hypertensive emergency.     Comparison: 07/30/2020     Technique: 2 radiographic views of the chest were obtained.     Findings:  There is moderate enlargement of the cardiac silhouette. There is a  moderate opacity in the left lower chest, similar to prior given  difference in technique. Much of this is likely due to pleural effusion.  No pneumothorax is noted.     There is a faint 11 mm nodule projecting over the right upper chest. No  correlate is found on CT chest of 07/31/2020. This is therefore likely  outside  the patient. There is degenerative change of the thoracic spine.       Impression:       1. Moderate left pleural effusion is not significant changed.  2. Cardiomegaly with pulmonary vascular redistribution. Negative for  pulmonary edema..     Electronically Signed By-Nehal Way On:8/2/2020 9:00 PM  This report was finalized on [id number] by  Nehal Way, .    CT Chest Pulmonary Embolism [[id number]] Collected:  07/31/20 0230     Updated:  07/31/20 0435    Narrative:       EXAMINATION:  CTA CHEST WITH IV CONTRAST, CT PULMONARY ANGIOGRAM    DATE OF EXAM: 7/31/2020 4:08 AM    HISTORY: Chest pain. Elevated d-dimer.    TECHNIQUE: CTA examination of the chest was performed following the intravenous administration of 100 mL of Isovue 370. Sagittal, coronal and MIP reformatted images were provided. CT dose lowering techniques were used, to include: automated exposure   control, adjustment for patient size, and or use of iterative reconstruction.    COMPARISON: None.    VESSELS:    Aorta: The aorta shows no dissection or aneurysm.    Pulmonary Arteries: Normal.    FINDINGS:    Lungs: Extensive atelectasis in the left lower lobe. Mild mosaic attenuation of the lungs. Respiratory motion.    Pleura: Moderate left pleural fluid collection, 4 to 5 cm depth.    Mediastinum and Emily: Reactive appearing small mediastinal lymph nodes. Small hiatal hernia.    Cardiovascular: Cardiomegaly. Trace atherosclerotic calcification of the left anterior descending coronary artery.    Upper Abdomen: Normal.     Musculoskeletal Chest: Advanced degenerative disc disease in the thoracic spine.      Impression:         1. No pulmonary embolism, aortic dissection, or aortic aneurysm.  2. Cardiomegaly. Moderate left pleural fluid collection, 4 to 5 cm depth.  3. Compressive atelectasis in the left lower lobe.  4. Mild mosaic attenuation of the lungs, compatible with asthma in the correct clinical setting.    Electronically signed by:  Rodrigo  SABINE Alva    7/31/2020 2:34 AM    XR Chest 1 View [[id number]] Collected:  07/30/20 1033     Updated:  07/30/20 1037    Narrative:       DATE OF EXAM:  7/30/2020 10:16 AM     PROCEDURE:  XR CHEST 1 VW-     INDICATIONS:  soa  shortness of breath.     COMPARISON:  12/12/2019     TECHNIQUE:   Single radiographic view of the chest was obtained.     FINDINGS:  There is new opacity in the left base suggesting combination of moderate  pleural effusions with associated atelectasis or infiltrate. This is  difficult to more fully characterize due to the overlying enlarged heart  border. There is mild pulmonary vascular congestion.  There is  degenerative change in the spine.       Impression:          1. Cardiomegaly with pulmonary vascular congestion.  2. Left basilar opacity suggesting combination of moderate pleural  effusion with overlying infiltrate or atelectasis.     Electronically Signed By-Mandeep Connor On:7/30/2020 10:35 AM  This report was finalized on [id number] by  Mandeep Connor, .              Condition on Discharge:  Fair    Vital Signs  Temp:  [97.6 °F (36.4 °C)-98.5 °F (36.9 °C)] 97.6 °F (36.4 °C)  Heart Rate:  [69-95] 89  Resp:  [11-22] 18  BP: ()/(71-88) 155/88    Physical Exam:     General Appearance:    Alert, cooperative, in no acute distress, much more cooperative today than on previous exams.   Head:    Normocephalic, without obvious abnormality, atraumatic   Eyes:           Conjunctivae and sclerae normal, no   icterus, no pallor, corneas clear, PERRLA   Throat:   No oral lesions, no thrush, oral mucosa moist   Neck:   No adenopathy, supple, trachea midline, no thyromegaly, no   carotid bruit, no JVD   Lungs:     Clear to auscultation,respirations regular, even and                  unlabored    Heart:   Distant, irregularly irregular but rate is controlled between the 60s and 80s beats per minute   Chest Wall:    No abnormalities observed   Abdomen:     Normal bowel sounds, no masses, no  organomegaly, soft        non-tender, non-distended, no guarding, no rebound                tenderness   Rectal:     Deferred   Extremities:   Moves all extremities well, no edema, no cyanosis, no             redness   Pulses:   Pulses palpable and equal bilaterally   Skin:   No bleeding, bruising or rash   Lymph nodes:   No palpable adenopathy   Neurologic:   Cranial nerves 2 - 12 grossly intact, sensation intact, DTR       present and equal bilaterally         Discharge Disposition  Home or Self Care    Discharge Medications     Discharge Medications      New Medications      Instructions Start Date   dilTIAZem  MG 24 hr capsule  Commonly known as:  CARDIZEM CD   180 mg, Oral, Every 24 Hours Scheduled   Start Date:  August 5, 2020     risperiDONE 0.5 MG tablet  Commonly known as:  risperDAL   0.25 mg, Oral, 2 Times Daily      triamcinolone 0.1 % cream  Commonly known as:  KENALOG   Topical, Every 12 Hours Scheduled         Changes to Medications      Instructions Start Date   hydrALAZINE 25 MG tablet  Commonly known as:  APRESOLINE  What changed:  medication strength   50 mg, Oral, 3 Times Daily         Continue These Medications      Instructions Start Date   carvedilol 25 MG tablet  Commonly known as:  COREG   25 mg, Oral, 2 Times Daily With Meals      gabapentin 100 MG capsule  Commonly known as:  NEURONTIN   100 mg, Oral, Nightly      potassium chloride 20 MEQ CR tablet  Commonly known as:  K-DUR,KLOR-CON   20 mEq, Oral, 2 Times Daily      pravastatin 40 MG tablet  Commonly known as:  PRAVACHOL   40 mg, Oral, Nightly      rivaroxaban 20 MG tablet  Commonly known as:  XARELTO   20 mg, Oral, Every Evening      spironolactone 25 MG tablet  Commonly known as:  ALDACTONE   25 mg, Oral, Daily         Stop These Medications    amLODIPine 10 MG tablet  Commonly known as:  NORVASC            Discharge Diet:   Diet Instructions     Diet: Cardiac      Discharge Diet:  Cardiac          Activity at Discharge:    Activity Instructions     Activity as Tolerated      Gradually Increase Activity Until at Pre-Hospitalization Level            Follow-up Appointments  No future appointments.  Additional Instructions for the Follow-ups that You Need to Schedule     Call MD With Problems / Concerns   As directed      Instructions: Call PCP with any problems or questions    Order Comments:  Instructions: Call PCP with any problems or questions          Discharge Follow-up with PCP   As directed       Currently Documented PCP:    Marisol Larson APRN    PCP Phone Number:    [phone]     Follow Up Details:  2 weeks         Discharge Follow-up with Specified Provider: Cardiology, Dr. Thomas; 1 Month   As directed      To:  CardiologyDr. Thomas    Follow Up:  1 Month               Test Results Pending at Discharge: None    Patient will stop amlodipine and is now on diltiazem.    Patient is now on increased dose of hydralazine 50 mg p.o. 3 times daily.    Patient is now on Xarelto and will need to follow-up with cardiology to get outpatient cardioversion at some point within the next 4 to 6 weeks.    Patient refused therapeutic/diagnostic thoracentesis of the large left pleural effusion.    Patient also refused home health care but will have a walker available to him at home.       Paty Polk,   08/04/20  08:36

## 2020-08-04 NOTE — PLAN OF CARE
Pt will be d/c today. Pt is still in Afib but rate is controlled. Pt was offered HHC and and senior care, but he refused. He requires standby assist with ambulation, but is able to ambulate on his own. CXR indicated L pleural effusion, which pulmonology was consulted about. Pt refused thoracentesis. Pt reports no SOA at this time. No other complaints expressed. Will continue to monitor.

## 2020-08-04 NOTE — PROGRESS NOTES
LOS: 5 days   Admiting Physician- No att. providers found    Reason For Followup:    Atrial fibrillation  Hypertension  CAD  Hyperlipidemia  History of noncompliance    Subjective     No chest pain or shortness of breath    Objective     Patient still in atrial fibrillation    Review of Systems:   Review of Systems   Constitution: Negative for chills and fever.   HENT: Negative for ear discharge and nosebleeds.    Eyes: Negative for discharge and redness.   Cardiovascular: Negative for chest pain, orthopnea, palpitations, paroxysmal nocturnal dyspnea and syncope.   Respiratory: Negative for cough, shortness of breath and wheezing.    Endocrine: Negative for heat intolerance.   Skin: Negative for rash.   Musculoskeletal: Negative for arthritis and myalgias.   Gastrointestinal: Negative for abdominal pain, melena, nausea and vomiting.   Genitourinary: Negative for dysuria and hematuria.   Neurological: Negative for dizziness, light-headedness, numbness and tremors.   Psychiatric/Behavioral: Negative for altered mental status and depression. The patient is not nervous/anxious.          Vital Signs  Vitals:    08/04/20 0221 08/04/20 0615 08/04/20 0854 08/04/20 1021   BP: 145/85 155/88 127/86 100/55   BP Location: Left arm Left arm     Patient Position: Lying Sitting     Pulse: 71 89     Resp: 16 18  18   Temp: 98.5 °F (36.9 °C) 97.6 °F (36.4 °C)  97.4 °F (36.3 °C)   TempSrc: Oral Oral  Oral   SpO2: 97% 90%     Weight:  110 kg (241 lb 10 oz)     Height:         Wt Readings from Last 1 Encounters:   08/04/20 110 kg (241 lb 10 oz)       Intake/Output Summary (Last 24 hours) at 8/4/2020 1600  Last data filed at 8/4/2020 0815  Gross per 24 hour   Intake 1080 ml   Output 2850 ml   Net -1770 ml     Physical Exam:  Physical Exam   Constitutional: He is oriented to person, place, and time. He appears well-developed and well-nourished.   HENT:   Head: Normocephalic and atraumatic.   Eyes: No scleral icterus.   Neck: No  thyromegaly present.   Cardiovascular: Normal rate and normal heart sounds. Exam reveals no gallop and no friction rub.   No murmur heard.  Heart rate is irregular   Pulmonary/Chest: Effort normal and breath sounds normal. No respiratory distress. He has no wheezes. He has no rales.   Abdominal: There is no tenderness.   Musculoskeletal: He exhibits no edema.   Lymphadenopathy:     He has no cervical adenopathy.   Neurological: He is alert and oriented to person, place, and time.   Skin: No rash noted. No erythema.   Psychiatric: He has a normal mood and affect.       Results Review:   Lab Results (last 24 hours)     Procedure Component Value Units Date/Time    BUN [[id number]]  (Abnormal) Collected:  08/04/20 0310    Specimen:  Blood Updated:  08/04/20 0728     BUN 25 mg/dL     Comprehensive Metabolic Panel [[id number]]  (Abnormal) Collected:  08/04/20 0310    Specimen:  Blood Updated:  08/04/20 0449     Glucose 106 mg/dL      BUN --     Comment: Testing performed by alternate method        Creatinine 1.38 mg/dL      Sodium 139 mmol/L      Potassium 4.2 mmol/L      Chloride 99 mmol/L      CO2 26.0 mmol/L      Calcium 9.5 mg/dL      Total Protein 7.3 g/dL      Albumin 3.90 g/dL      ALT (SGPT) 28 U/L      AST (SGOT) 20 U/L      Alkaline Phosphatase 64 U/L      Total Bilirubin 0.6 mg/dL      eGFR Non African Amer 52 mL/min/1.73      Globulin 3.4 gm/dL      A/G Ratio 1.1 g/dL      BUN/Creatinine Ratio --     Comment: Testing not performed        Anion Gap 14.0 mmol/L     Narrative:       GFR Normal >60  Chronic Kidney Disease <60  Kidney Failure <15      CBC & Differential [[id number]] Collected:  08/04/20 0310    Specimen:  Blood Updated:  08/04/20 0446    Narrative:       The following orders were created for panel order CBC & Differential.  Procedure                               Abnormality         Status                     ---------                               -----------         ------                     CBC  Auto Differential[[id number]]        Abnormal            Final result                 Please view results for these tests on the individual orders.    CBC Auto Differential [[id number]]  (Abnormal) Collected:  08/04/20 0310    Specimen:  Blood Updated:  08/04/20 0446     WBC 10.20 10*3/mm3      RBC 5.28 10*6/mm3      Hemoglobin 14.2 g/dL      Hematocrit 43.0 %      MCV 81.5 fL      MCH 26.8 pg      MCHC 32.9 g/dL      RDW 17.4 %      RDW-SD 49.9 fl      MPV 8.9 fL      Platelets 177 10*3/mm3      Neutrophil % 70.9 %      Lymphocyte % 13.6 %      Monocyte % 13.0 %      Eosinophil % 2.1 %      Basophil % 0.4 %      Neutrophils, Absolute 7.30 10*3/mm3      Lymphocytes, Absolute 1.40 10*3/mm3      Monocytes, Absolute 1.30 10*3/mm3      Eosinophils, Absolute 0.20 10*3/mm3      Basophils, Absolute 0.00 10*3/mm3      nRBC 0.1 /100 WBC         Imaging Results (Last 72 Hours)     Procedure Component Value Units Date/Time    XR Chest PA & Lateral [[id number]] Collected:  08/02/20 2056     Updated:  08/02/20 2102    Narrative:       Examination: XR CHEST PA AND LATERAL-     Date of Exam: 8/2/2020 6:50 PM     Indication: left pleural effusion, pt now consents; I50.9-Heart failure,  unspecified; I16.1-Hypertensive emergency.     Comparison: 07/30/2020     Technique: 2 radiographic views of the chest were obtained.     Findings:  There is moderate enlargement of the cardiac silhouette. There is a  moderate opacity in the left lower chest, similar to prior given  difference in technique. Much of this is likely due to pleural effusion.  No pneumothorax is noted.     There is a faint 11 mm nodule projecting over the right upper chest. No  correlate is found on CT chest of 07/31/2020. This is therefore likely  outside the patient. There is degenerative change of the thoracic spine.       Impression:       1. Moderate left pleural effusion is not significant changed.  2. Cardiomegaly with pulmonary vascular redistribution. Negative  for  pulmonary edema..     Electronically Signed By-Nehal Way On:8/2/2020 9:00 PM  This report was finalized on [id number] by  Nehal Way, .        ECG/EMG Results (most recent)     Procedure Component Value Units Date/Time    ECG 12 Lead [[id number]] Collected:  07/30/20 2001     Updated:  07/30/20 2003    Narrative:       HEART RATE= 107  bpm  RR Interval= 561  ms  WY Interval=   ms  P Horizontal Axis=   deg  P Front Axis=   deg  QRSD Interval= 90  ms  QT Interval= 336  ms  QRS Axis= 21  deg  T Wave Axis= 181  deg  - ABNORMAL ECG -  Atrial fibrillation  Ventricular premature complex  LVH with secondary repolarization abnormality  Electronically Signed By:   Date and Time of Study: 2020-07-30 20:01:57    ECG 12 Lead [[id number]] Collected:  07/30/20 0929     Updated:  07/31/20 1353    Narrative:       HEART RATE= 92  bpm  RR Interval= 566  ms  WY Interval=   ms  P Horizontal Axis=   deg  P Front Axis=   deg  QRSD Interval= 98  ms  QT Interval= 322  ms  QRS Axis= 8  deg  T Wave Axis= 162  deg  - ABNORMAL ECG -  Atrial fibrillation  Ventricular premature complex  LVH with secondary repolarization abnormality  When compared with ECG of 08-Dec-2019 10:38:36,  Significant repolarization change  Electronically Signed By: Theo Fuentes ([facility]) 31-Jul-2020 13:49:02  Date and Time of Study: 2020-07-30 09:29:57        CBC    Results from last 7 days   Lab Units 08/04/20 0310 08/03/20 0322 08/01/20 0310 07/31/20 0226 07/30/20  0954   WBC 10*3/mm3 10.20 8.40 9.60 10.50 8.00   HEMOGLOBIN g/dL 14.2 13.6 12.8* 13.4 14.1   PLATELETS 10*3/mm3 177 170 142 166 157     BMP   Results from last 7 days   Lab Units 08/04/20 0310 08/03/20 0322 08/01/20 0310 07/31/20 0226 07/30/20  1107   SODIUM mmol/L 139 140 138 143 144   POTASSIUM mmol/L 4.2 4.2 3.6 4.0 3.1*   CHLORIDE mmol/L 99 103 103 104 106   CO2 mmol/L 26.0 24.0 23.0 26.0 24.0   BUN  25* 21 20 18 19   CREATININE mg/dL 1.38* 1.09 1.15 1.37* 1.25   GLUCOSE mg/dL 106*  104* 122* 131* 117*     CMP   Results from last 7 days   Lab Units 08/04/20  0310 08/03/20  0322 08/01/20  0310 07/31/20  0226 07/30/20  1107   SODIUM mmol/L 139 140 138 143 144   POTASSIUM mmol/L 4.2 4.2 3.6 4.0 3.1*   CHLORIDE mmol/L 99 103 103 104 106   CO2 mmol/L 26.0 24.0 23.0 26.0 24.0   BUN  25* 21 20 18 19   CREATININE mg/dL 1.38* 1.09 1.15 1.37* 1.25   GLUCOSE mg/dL 106* 104* 122* 131* 117*   ALBUMIN g/dL 3.90 3.60  --   --  4.00   BILIRUBIN mg/dL 0.6 0.5  --   --  0.8   ALK PHOS U/L 64 60  --   --  60   AST (SGOT) U/L 20 18  --   --  21   ALT (SGPT) U/L 28 19  --   --  17   AMMONIA umol/L  --  31  --   --   --      Cardiac Studies:  Echo-   Results for orders placed during the hospital encounter of 11/07/19   Adult Transthoracic Echo Complete W/ Cont if Necessary Per Protocol    Narrative · The left ventricular cavity is severely dilated.  · Left ventricular systolic function is moderately decreased.  · Left atrial cavity size is moderately dilated.  · Moderate mitral valve regurgitation is present  · Moderate tricuspid valve regurgitation is present.  · The following left ventricular wall segments are hypokinetic: mid   anterior, apical anterior, basal anterolateral, mid anterolateral, apical   lateral, basal inferolateral, mid inferolateral, apical inferior, mid   inferior, apical septal, basal inferoseptal, mid inferoseptal, apex   hypokinetic, mid anteroseptal, basal anterior, basal inferior and basal   inferoseptal.  · LV ejection fraction is about 30 to 35%  · No pericardial effusion noted        Stress Myoview-  Cath-      Medication Review:   Scheduled Meds:    Continuous Infusions:  No current facility-administered medications for this encounter.   PRN Meds:.      Assessment/Plan   Patient Active Problem List   Diagnosis   • Hypertensive emergency   • Chronic atrial fibrillation (CMS/HCC)   • Chronic diastolic CHF (congestive heart failure) (CMS/HCC)   • CKD (chronic kidney disease) stage 3, GFR  30-59 ml/min (CMS/HCC)   • Essential hypertension   • Gambling disorder, persistent   • Shortness of breath   • Coronary artery disease involving native coronary artery of native heart without angina pectoris   • Psoriasis   • History of CVA (cerebrovascular accident)   • DDD (degenerative disc disease), lumbosacral   • Peripheral polyneuropathy   • GERD (gastroesophageal reflux disease)   • Medically noncompliant   • Acute respiratory distress   • Unstable angina (CMS/HCC)   • Cervical disc disorder with radiculopathy   • Completed stroke (CMS/HCC)   • Degeneration of intervertebral disc   • Excessive anticoagulation   • Impaired left ventricular function   • Liver lesion   • Lumbar radiculopathy   • Cervical myelopathy (CMS/HCC)   • Lumbosacral radiculopathy   • Spinal stenosis of lumbar region   • Obesity   • Mild cognitive impairment   • Thoracic back pain   • Thoracic disc disease with myelopathy   • Paroxysmal atrial fibrillation (CMS/HCC)   • Essential (primary) hypertension   • Acute congestive heart failure (CMS/HCC)     Plan:    Clinically from cardiac standpoint patient is stable and heart rate is well controlled and patient is on rate control and anticoagulation.  Patient will need cardioversion in future after 4 to 6 weeks of anticoagulation.    Patient needs thoracentesis and hence Xarelto has been held  Blood pressure and heart rate stable  Continue current medical therapy and follow him as an outpatient      Edy Thomas MD  08/04/20  16:00

## 2020-08-05 ENCOUNTER — READMISSION MANAGEMENT (OUTPATIENT)
Dept: CALL CENTER | Facility: HOSPITAL | Age: 65
End: 2020-08-05

## 2020-08-05 PROCEDURE — 93010 ELECTROCARDIOGRAM REPORT: CPT | Performed by: INTERNAL MEDICINE

## 2020-08-05 NOTE — OUTREACH NOTE
Prep Survey      Responses   [facility] patient discharged from?  [facility]   Is LACE score < 7 ?  No   Eligibility  Readm Mgmt   Discharge diagnosis  Acute CHF, pulmonary edema with pleural effusion   COVID-19 Test Status  Negative   Does the patient have one of the following disease processes/diagnoses(primary or secondary)?  CHF   Does the patient have Home health ordered?  No   Is there a DME ordered?  No   Comments regarding appointments  Per AVS   Prep survey completed?  Yes          Adelina Medina RN

## 2020-08-11 ENCOUNTER — READMISSION MANAGEMENT (OUTPATIENT)
Dept: CALL CENTER | Facility: HOSPITAL | Age: 65
End: 2020-08-11

## 2020-08-11 NOTE — OUTREACH NOTE
"CHF Week 1 Survey      Responses   [facility] patient discharged from?  [facility]   Does the patient have one of the following disease processes/diagnoses(primary or secondary)?  CHF   CHF Week 1 attempt successful?  Yes [Spoke with Gregory today. Gregory updated patients number. Patient doesn't pay his phone so it's turned off at times.]   Call start time  0823   Call end time  0832   Discharge diagnosis  Acute CHF, pulmonary edema with pleural effusion   Person spoke with today (if not patient) and relationship  Gregory-friend   Meds reviewed with patient/caregiver?  Yes   Is the patient taking all medications as directed (includes completed medication regime)?  No   What is preventing the patient from taking all medications as directed?  Other [Gregory reports, \"he does not take his medicines. He does not fill his medicines.\"]   Does the patient have a primary care provider?   Yes   Does the patient have an appointment with their PCP within 7 days of discharge?  Greater than 7 days   Comments regarding PCP  8/18/20 @ 10:00 am    What is preventing the patient from scheduling follow up appointments within 7 days of discharge?  Earlier appointment not available   Nursing Interventions  Verified appointment date/time/provider   Has the patient kept scheduled appointments due by today?  N/A   Psychosocial issues?  Yes   What is the patient's perception of their health status since discharge?  Improving   Is the patient weighing daily?  -- [unsure]   Does the patient have scales?  -- [unsure]   Is the patient able to teach back Heart Failure diet management?  No [Provided education to Gregory as he reports, \"he lives on pizza and junk food. He goes to churches that provided food. That's probably the best food he gets.\"]   If the patient is a current smoker, are they able to teach back resources for cessation?  -- [Nonsmoker]   Is the patient/caregiver able to teach back the hierarchy of who to call/visit for symptoms/problems? " PCP, Specialist, Home health nurse, Urgent Care, ED, 911  Yes    CHF Week 1 call completed?  Yes          Juliette Strickland RN

## 2020-08-17 ENCOUNTER — READMISSION MANAGEMENT (OUTPATIENT)
Dept: CALL CENTER | Facility: HOSPITAL | Age: 65
End: 2020-08-17

## 2020-08-17 NOTE — OUTREACH NOTE
CHF Week 2 Survey      Responses   [facility] patient discharged from?  [facility]   Does the patient have one of the following disease processes/diagnoses(primary or secondary)?  CHF   Week 2 attempt successful?  No   Unsuccessful attempts  Attempt 1          Sarita Cadena LPN

## 2020-08-19 ENCOUNTER — READMISSION MANAGEMENT (OUTPATIENT)
Dept: CALL CENTER | Facility: HOSPITAL | Age: 65
End: 2020-08-19

## 2020-08-19 NOTE — OUTREACH NOTE
CHF Week 2 Survey      Responses   [facility] patient discharged from?  [facility]   Does the patient have one of the following disease processes/diagnoses(primary or secondary)?  CHF   Week 2 attempt successful?  No   Unsuccessful attempts  Attempt 2          Marybeth Rinaldi RN

## 2020-08-21 ENCOUNTER — HOSPITAL ENCOUNTER (OUTPATIENT)
Facility: HOSPITAL | Age: 65
Setting detail: OBSERVATION
Discharge: HOME | End: 2020-08-23
Attending: FAMILY MEDICINE | Admitting: FAMILY MEDICINE

## 2020-08-21 ENCOUNTER — APPOINTMENT (OUTPATIENT)
Dept: GENERAL RADIOLOGY | Facility: HOSPITAL | Age: 65
End: 2020-08-21

## 2020-08-21 DIAGNOSIS — I50.9 ACUTE ON CHRONIC CONGESTIVE HEART FAILURE, UNSPECIFIED HEART FAILURE TYPE (HCC): ICD-10-CM

## 2020-08-21 DIAGNOSIS — I48.91 ATRIAL FIBRILLATION WITH RVR (HCC): ICD-10-CM

## 2020-08-21 DIAGNOSIS — R06.00 DYSPNEA, UNSPECIFIED TYPE: Primary | ICD-10-CM

## 2020-08-21 DIAGNOSIS — I16.1 HYPERTENSIVE EMERGENCY: ICD-10-CM

## 2020-08-21 DIAGNOSIS — J90 PLEURAL EFFUSION: ICD-10-CM

## 2020-08-21 LAB
ANION GAP SERPL CALCULATED.3IONS-SCNC: 14 MMOL/L (ref 5–15)
APTT PPP: 29.3 SECONDS (ref 24–31)
BASOPHILS # BLD AUTO: 0.1 10*3/MM3 (ref 0–0.2)
BASOPHILS NFR BLD AUTO: 0.9 % (ref 0–1.5)
BUN SERPL-MCNC: 22 MG/DL (ref 8–23)
BUN SERPL-MCNC: ABNORMAL MG/DL
BUN/CREAT SERPL: ABNORMAL
CALCIUM SPEC-SCNC: 9.1 MG/DL (ref 8.6–10.5)
CHLORIDE SERPL-SCNC: 104 MMOL/L (ref 98–107)
CO2 SERPL-SCNC: 24 MMOL/L (ref 22–29)
CREAT SERPL-MCNC: 1.19 MG/DL (ref 0.76–1.27)
DEPRECATED RDW RBC AUTO: 49.9 FL (ref 37–54)
EOSINOPHIL # BLD AUTO: 0 10*3/MM3 (ref 0–0.4)
EOSINOPHIL NFR BLD AUTO: 0.3 % (ref 0.3–6.2)
ERYTHROCYTE [DISTWIDTH] IN BLOOD BY AUTOMATED COUNT: 17.5 % (ref 12.3–15.4)
GFR SERPL CREATININE-BSD FRML MDRD: 62 ML/MIN/1.73
GLUCOSE SERPL-MCNC: 125 MG/DL (ref 65–99)
HCT VFR BLD AUTO: 45.4 % (ref 37.5–51)
HGB BLD-MCNC: 14.7 G/DL (ref 13–17.7)
INR PPP: 1.23 (ref 0.93–1.1)
LYMPHOCYTES # BLD AUTO: 1.3 10*3/MM3 (ref 0.7–3.1)
LYMPHOCYTES NFR BLD AUTO: 11.2 % (ref 19.6–45.3)
MCH RBC QN AUTO: 26.5 PG (ref 26.6–33)
MCHC RBC AUTO-ENTMCNC: 32.3 G/DL (ref 31.5–35.7)
MCV RBC AUTO: 81.8 FL (ref 79–97)
MONOCYTES # BLD AUTO: 0.9 10*3/MM3 (ref 0.1–0.9)
MONOCYTES NFR BLD AUTO: 7.7 % (ref 5–12)
NEUTROPHILS NFR BLD AUTO: 79.9 % (ref 42.7–76)
NEUTROPHILS NFR BLD AUTO: 9.1 10*3/MM3 (ref 1.7–7)
NRBC BLD AUTO-RTO: 0.2 /100 WBC (ref 0–0.2)
NT-PROBNP SERPL-MCNC: ABNORMAL PG/ML (ref 0–900)
PLATELET # BLD AUTO: 166 10*3/MM3 (ref 140–450)
PMV BLD AUTO: 9.2 FL (ref 6–12)
POTASSIUM SERPL-SCNC: 3.4 MMOL/L (ref 3.5–5.2)
PROTHROMBIN TIME: 13.1 SECONDS (ref 9.6–11.7)
RBC # BLD AUTO: 5.54 10*6/MM3 (ref 4.14–5.8)
SODIUM SERPL-SCNC: 142 MMOL/L (ref 136–145)
TROPONIN T SERPL-MCNC: <0.01 NG/ML (ref 0–0.03)
WBC # BLD AUTO: 11.5 10*3/MM3 (ref 3.4–10.8)

## 2020-08-21 PROCEDURE — 96368 THER/DIAG CONCURRENT INF: CPT

## 2020-08-21 PROCEDURE — 25010000002 FUROSEMIDE PER 20 MG: Performed by: NURSE PRACTITIONER

## 2020-08-21 PROCEDURE — 85025 COMPLETE CBC W/AUTO DIFF WBC: CPT | Performed by: NURSE PRACTITIONER

## 2020-08-21 PROCEDURE — G0378 HOSPITAL OBSERVATION PER HR: HCPCS

## 2020-08-21 PROCEDURE — 71045 X-RAY EXAM CHEST 1 VIEW: CPT

## 2020-08-21 PROCEDURE — 93005 ELECTROCARDIOGRAM TRACING: CPT | Performed by: FAMILY MEDICINE

## 2020-08-21 PROCEDURE — 80048 BASIC METABOLIC PNL TOTAL CA: CPT | Performed by: NURSE PRACTITIONER

## 2020-08-21 PROCEDURE — 83880 ASSAY OF NATRIURETIC PEPTIDE: CPT | Performed by: NURSE PRACTITIONER

## 2020-08-21 PROCEDURE — 99284 EMERGENCY DEPT VISIT MOD MDM: CPT

## 2020-08-21 PROCEDURE — 85730 THROMBOPLASTIN TIME PARTIAL: CPT | Performed by: NURSE PRACTITIONER

## 2020-08-21 PROCEDURE — 96375 TX/PRO/DX INJ NEW DRUG ADDON: CPT

## 2020-08-21 PROCEDURE — 96366 THER/PROPH/DIAG IV INF ADDON: CPT

## 2020-08-21 PROCEDURE — 85610 PROTHROMBIN TIME: CPT | Performed by: NURSE PRACTITIONER

## 2020-08-21 PROCEDURE — 96365 THER/PROPH/DIAG IV INF INIT: CPT

## 2020-08-21 PROCEDURE — 93005 ELECTROCARDIOGRAM TRACING: CPT | Performed by: NURSE PRACTITIONER

## 2020-08-21 PROCEDURE — 84484 ASSAY OF TROPONIN QUANT: CPT | Performed by: NURSE PRACTITIONER

## 2020-08-21 PROCEDURE — 93005 ELECTROCARDIOGRAM TRACING: CPT

## 2020-08-21 RX ORDER — SODIUM CHLORIDE 0.9 % (FLUSH) 0.9 %
10 SYRINGE (ML) INJECTION AS NEEDED
Status: DISCONTINUED | OUTPATIENT
Start: 2020-08-21 | End: 2020-08-23 | Stop reason: HOSPADM

## 2020-08-21 RX ORDER — CLONIDINE HYDROCHLORIDE 0.1 MG/1
0.1 TABLET ORAL ONCE
Status: COMPLETED | OUTPATIENT
Start: 2020-08-21 | End: 2020-08-21

## 2020-08-21 RX ORDER — DILTIAZEM HYDROCHLORIDE 5 MG/ML
10 INJECTION INTRAVENOUS ONCE
Status: COMPLETED | OUTPATIENT
Start: 2020-08-21 | End: 2020-08-21

## 2020-08-21 RX ORDER — FUROSEMIDE 10 MG/ML
40 INJECTION INTRAMUSCULAR; INTRAVENOUS ONCE
Status: COMPLETED | OUTPATIENT
Start: 2020-08-21 | End: 2020-08-21

## 2020-08-21 RX ADMIN — CLONIDINE HYDROCHLORIDE 0.1 MG: 0.1 TABLET ORAL at 13:32

## 2020-08-21 RX ADMIN — SODIUM CHLORIDE 15 MG/HR: 900 INJECTION, SOLUTION INTRAVENOUS at 18:18

## 2020-08-21 RX ADMIN — Medication 10 ML: at 12:58

## 2020-08-21 RX ADMIN — SODIUM CHLORIDE 5 MG/HR: 900 INJECTION, SOLUTION INTRAVENOUS at 13:19

## 2020-08-21 RX ADMIN — SODIUM CHLORIDE 5 MG/HR: 9 INJECTION, SOLUTION INTRAVENOUS at 14:17

## 2020-08-21 RX ADMIN — SODIUM CHLORIDE 10 MG/HR: 9 INJECTION, SOLUTION INTRAVENOUS at 21:31

## 2020-08-21 RX ADMIN — DILTIAZEM HYDROCHLORIDE 10 MG: 5 INJECTION INTRAVENOUS at 13:09

## 2020-08-21 RX ADMIN — FUROSEMIDE 40 MG: 10 INJECTION, SOLUTION INTRAMUSCULAR; INTRAVENOUS at 15:04

## 2020-08-21 RX ADMIN — SODIUM CHLORIDE 10 MG/HR: 9 INJECTION, SOLUTION INTRAVENOUS at 18:17

## 2020-08-21 NOTE — ED PROVIDER NOTES
Subjective   Chief complaint: Shortness of breath      Context: Patient is a 64-year-old male who comes in complaining of shortness of breath.  He states he has had it since he was discharged a couple weeks ago but has been gradually getting worse.  He states he has had some swelling in his legs and feet.  He states he was discharged on Cardizem but never picked up the prescription.  He denies any fever chest pain or productive cough.  He states he has a chronic nonproductive cough.  He has symmetrical swelling in his lower extremities that is at baseline for him.  He denies any chest pain nausea vomiting diarrhea.  Denies any recent travel or ill contacts.  Denies any confusion headache visual changes unilateral focal deficits weakness or ataxia    Duration: 3 weeks    Timing: Waxes and wanes    Severity: Moderate worse with exertion    Associated symptoms:          PCP:  Marsha          Review of Systems   Constitutional: Negative for fever.   HENT: Negative.    Eyes: Negative for visual disturbance.   Respiratory: Positive for cough and shortness of breath.    Cardiovascular: Positive for leg swelling. Negative for chest pain.   Gastrointestinal: Negative.    Genitourinary: Negative.    Musculoskeletal: Negative.    Skin: Negative.    Allergic/Immunologic: Negative for immunocompromised state.   Neurological: Negative.    Hematological: Does not bruise/bleed easily.   Psychiatric/Behavioral: Negative for confusion.       Past Medical History:   Diagnosis Date   • A-fib (CMS/Formerly [facility])    • CHF (congestive heart failure) (CMS/Formerly [facility])    • Chronic atrial fibrillation (CMS/[facility]ly [facility]) 11/8/2019   • Chronic diastolic CHF (congestive heart failure) (CMS/[facility]ly [facility]) 11/8/2019   • CKD (chronic kidney disease) stage 3, GFR 30-59 ml/min (CMS/Formerly [facility]) 11/8/2019   • Coronary artery disease involving native coronary artery of native heart without angina pectoris 11/8/2019   • DDD (degenerative disc disease), lumbosacral 11/8/2019   • Essential  hypertension 11/8/2019   • Gambling disorder, persistent 11/8/2019   • GERD (gastroesophageal reflux disease) 11/8/2019   • History of CVA (cerebrovascular accident) 11/8/2019   • Hyperlipidemia    • Hypertension    • Medically noncompliant 11/8/2019   • Peripheral polyneuropathy 11/8/2019   • Psoriasis 11/8/2019   • Stroke (CMS/HCC)        Allergies   Allergen Reactions   • Ketorolac Tromethamine Anaphylaxis     unknown       Past Surgical History:   Procedure Laterality Date   • CARDIAC CATHETERIZATION     • CARDIAC CATHETERIZATION N/A 12/10/2019    Procedure: Left Heart Cath and coronary angiogram;  Surgeon: Edy Thomas MD;  Location: [facility] CATH INVASIVE LOCATION;  Service: Cardiovascular       Family History   Problem Relation Age of Onset   • Heart disease Mother        Social History     Socioeconomic History   • Marital status: Single     Spouse name: Not on file   • Number of children: Not on file   • Years of education: Not on file   • Highest education level: Not on file   Tobacco Use   • Smoking status: Never Smoker   • Smokeless tobacco: Never Used   Substance and Sexual Activity   • Alcohol use: No     Frequency: Never   • Drug use: No   • Sexual activity: Not Currently   Social History Narrative    Gambling addiction           Objective   Physical Exam     Vital signs and triage nurse note reviewed.  Constitutional: Awake, alert; disheveled  HEENT: Normocephalic, atraumatic; pupils are PERRL with intact EOM; oropharynx is pink and moist without exudate or erythema.Hard of hearing  Neck: Supple, full range of motion without pain;   Cardiovascular: Tachycardiac regularly irregular rate and rhythm, normal S1-S2.  Pulmonary: Respiratory effort regular mildly labored, breath sounds diminished bilateral lower lobes left worse than right  Abdomen: Soft, nontender nondistended with normoactive bowel sounds; no rebound or guarding.  Musculoskeletal: Independent range of motion of all extremities with no  palpable tenderness, 2-3+ pitting edema bilateral lower extremities, negative Anna  Neuro: Alert oriented x3, speech is clear and appropriate, GCS 15  Skin:  Fleshtone warm, dry, intact; no erythematous or petechial rash or lesion      ECG 12 Lead    Date/Time: 8/21/2020 2:38 PM  Performed by: Lynda Gibson APRN  Authorized by: Lynda Gibson APRN   Interpreted by physician (Alfredo)  Comparison: compared with previous ECG from 7/30/2020  Comparison to previous ECG: A. fib rate of 107  Rhythm: atrial fibrillation  BPM: 140  Comments: LVH                 ED Course           Labs Reviewed   BASIC METABOLIC PANEL - Abnormal; Notable for the following components:       Result Value    Glucose 125 (*)     Potassium 3.4 (*)     All other components within normal limits    Narrative:     GFR Normal >60  Chronic Kidney Disease <60  Kidney Failure <15     PROTIME-INR - Abnormal; Notable for the following components:    Protime 13.1 (*)     INR 1.23 (*)     All other components within normal limits   BNP (IN-HOUSE) - Abnormal; Notable for the following components:    proBNP 12,171.0 (*)     All other components within normal limits    Narrative:     Among patients with dyspnea, NT-proBNP is highly sensitive for the detection of acute congestive heart failure. In addition NT-proBNP of <300 pg/ml effectively rules out acute congestive heart failure with 99% negative predictive value.    Results may be falsely decreased if patient taking Biotin.     CBC WITH AUTO DIFFERENTIAL - Abnormal; Notable for the following components:    WBC 11.50 (*)     MCH 26.5 (*)     RDW 17.5 (*)     Neutrophil % 79.9 (*)     Lymphocyte % 11.2 (*)     Neutrophils, Absolute 9.10 (*)     All other components within normal limits   APTT - Normal   TROPONIN (IN-HOUSE) - Normal    Narrative:     Troponin T Reference Range:  <= 0.03 ng/mL-   Negative for AMI  >0.03 ng/mL-     Abnormal for myocardial necrosis.  Clinicians would have to utilize  clinical acumen, EKG, Troponin and serial changes to determine if it is an Acute Myocardial Infarction or myocardial injury due to an underlying chronic condition.       Results may be falsely decreased if patient taking Biotin.     BUN - Normal   CBC AND DIFFERENTIAL    Narrative:     The following orders were created for panel order CBC & Differential.  Procedure                               Abnormality         Status                     ---------                               -----------         ------                     CBC Auto Differential[[id number]]        Abnormal            Final result                 Please view results for these tests on the individual orders.     Medications   sodium chloride 0.9 % flush 10 mL (10 mL Intravenous Given 8/21/20 1258)   dilTIAZem (CARDIZEM) 100 mg in 100 mL NS (1 mg/mL) infusion (ADV) (10 mg/hr Intravenous Rate/Dose Change 8/21/20 1412)   niCARdipine (CARDENE) 25 mg in 250 mL NS (0.1 mg/mL) infusion (VTB) (5 mg/hr Intravenous New Bag 8/21/20 1417)   dilTIAZem (CARDIZEM) injection 10 mg (10 mg Intravenous Given 8/21/20 1309)   cloNIDine (CATAPRES) tablet 0.1 mg (0.1 mg Oral Given 8/21/20 1332)     Xr Chest 1 View    Result Date: 8/21/2020  Persistent left basilar consolidation with diagnostic considerations described above.  Electronically Signed By-Dre Fletcher On:8/21/2020 2:10 PM This report was finalized on [id number] by  Dre Fletcher, .                                    MDM  Number of Diagnoses or Management Options  Acute on chronic congestive heart failure, unspecified heart failure type (CMS/HCC):   Atrial fibrillation with RVR (CMS/HCC):   Dyspnea, unspecified type:   Hypertensive emergency:   Pleural effusion:   Diagnosis management comments: Chart review: 7/30-8/4/2020: acute CHF, pulm edema, htn, afib, confusion  - refused thoracentesis for large left pleural effusion  -refused home health  - neg covid, bnp 8969, dimer 2.60 (ct pe neg for pe), neg troponin x  2        Comorbidities:  has a past medical history of A-fib (CMS/[facility]), CHF (congestive heart failure) (CMS/[facility]), Chronic atrial fibrillation (CMS/[facility]) (11/8/2019), Chronic diastolic CHF (congestive heart failure) (CMS/[facility]) (11/8/2019), CKD (chronic kidney disease) stage 3, GFR 30-59 ml/min (CMS/[facility]) (11/8/2019), Coronary artery disease involving native coronary artery of native heart without angina pectoris (11/8/2019), DDD (degenerative disc disease), lumbosacral (11/8/2019), Essential hypertension (11/8/2019), Gambling disorder, persistent (11/8/2019), GERD (gastroesophageal reflux disease) (11/8/2019), History of CVA (cerebrovascular accident) (11/8/2019), Hyperlipidemia, Hypertension, Medically noncompliant (11/8/2019), Peripheral polyneuropathy (11/8/2019), Psoriasis (11/8/2019), and Stroke (CMS/[facility]).  Differentials: CHF exacerbation pleural effusion not all inclusive of differentials considered  Discussion with provider: lucius guerrero  Radiology interpretation:  X-rays reviewed by me and interpreted by radiologist,   Xr Chest 1 View    Result Date: 8/21/2020  Persistent left basilar consolidation with diagnostic considerations described above.  Electronically Signed By-Dre Fletcher On:8/21/2020 2:10 PM This report was finalized on [id number] by  Dre Fletcher, .    Lab interpretation:  Labs viewed by me significant for, proBNP 10828, white blood cell count 11.5    Appropriate PPE worn during exam.  Patient was started on Cardizem with improvement in heart rate.  Patient was noted to become increasingly hypertensive and was given clonidine with no relief and was started on Cardene.  Spoke with the PCP who agreed to admit.    i discussed findings with patient who voices understanding of admission        Final diagnoses:   Dyspnea, unspecified type   Atrial fibrillation with RVR (CMS/[facility])   Hypertensive emergency   Acute on chronic congestive heart failure, unspecified heart failure type (CMS/[facility])   Pleural effusion             Lynda Gibson, APRN  08/21/20 1440

## 2020-08-21 NOTE — ED NOTES
"Pt c/o SOB since he went home on August 4th. Denies worsening edema in legs. States his SOB got worse this morning. He states he had \"Hard breathing\" this morning with a dry cough, unable to get any sputum up. States he does not think he got his Cardizem prescription filled. Denies fever. Pt has labored breathing and pausing between words. Pt has wounds to his sacrum and right middle back where a large skin tag is.     Carolee Matt RN  08/21/20 1242    "

## 2020-08-21 NOTE — NURSING NOTE
Pt received to 2121 per stretcher from ED with RN, transporter.  Able to amb to bed. Report received from [place].

## 2020-08-22 ENCOUNTER — READMISSION MANAGEMENT (OUTPATIENT)
Dept: CALL CENTER | Facility: HOSPITAL | Age: 65
End: 2020-08-22

## 2020-08-22 LAB
MAGNESIUM SERPL-MCNC: 1.8 MG/DL (ref 1.6–2.4)
TROPONIN T SERPL-MCNC: <0.01 NG/ML (ref 0–0.03)

## 2020-08-22 PROCEDURE — 25010000002 FUROSEMIDE PER 20 MG: Performed by: FAMILY MEDICINE

## 2020-08-22 PROCEDURE — 96376 TX/PRO/DX INJ SAME DRUG ADON: CPT

## 2020-08-22 PROCEDURE — 94799 UNLISTED PULMONARY SVC/PX: CPT

## 2020-08-22 PROCEDURE — 96366 THER/PROPH/DIAG IV INF ADDON: CPT

## 2020-08-22 PROCEDURE — 84484 ASSAY OF TROPONIN QUANT: CPT | Performed by: FAMILY MEDICINE

## 2020-08-22 PROCEDURE — G0378 HOSPITAL OBSERVATION PER HR: HCPCS

## 2020-08-22 PROCEDURE — 83735 ASSAY OF MAGNESIUM: CPT | Performed by: FAMILY MEDICINE

## 2020-08-22 RX ORDER — ATORVASTATIN CALCIUM 10 MG/1
10 TABLET, FILM COATED ORAL DAILY
Status: DISCONTINUED | OUTPATIENT
Start: 2020-08-22 | End: 2020-08-23 | Stop reason: HOSPADM

## 2020-08-22 RX ORDER — ISOSORBIDE MONONITRATE 20 MG/1
20 TABLET ORAL
Status: DISCONTINUED | OUTPATIENT
Start: 2020-08-22 | End: 2020-08-23 | Stop reason: HOSPADM

## 2020-08-22 RX ORDER — POTASSIUM CHLORIDE 1.5 G/1.77G
40 POWDER, FOR SOLUTION ORAL AS NEEDED
Status: DISCONTINUED | OUTPATIENT
Start: 2020-08-22 | End: 2020-08-23 | Stop reason: HOSPADM

## 2020-08-22 RX ORDER — GABAPENTIN 100 MG/1
100 CAPSULE ORAL NIGHTLY
Status: DISCONTINUED | OUTPATIENT
Start: 2020-08-22 | End: 2020-08-23 | Stop reason: HOSPADM

## 2020-08-22 RX ORDER — POTASSIUM CHLORIDE 20 MEQ/1
40 TABLET, EXTENDED RELEASE ORAL AS NEEDED
Status: DISCONTINUED | OUTPATIENT
Start: 2020-08-22 | End: 2020-08-23 | Stop reason: HOSPADM

## 2020-08-22 RX ORDER — RISPERIDONE 0.25 MG/1
0.25 TABLET ORAL 2 TIMES DAILY
Status: DISCONTINUED | OUTPATIENT
Start: 2020-08-22 | End: 2020-08-23 | Stop reason: HOSPADM

## 2020-08-22 RX ORDER — SPIRONOLACTONE 25 MG/1
25 TABLET ORAL DAILY
Status: DISCONTINUED | OUTPATIENT
Start: 2020-08-22 | End: 2020-08-23 | Stop reason: HOSPADM

## 2020-08-22 RX ORDER — HYDRALAZINE HYDROCHLORIDE 25 MG/1
50 TABLET, FILM COATED ORAL 3 TIMES DAILY
Status: DISCONTINUED | OUTPATIENT
Start: 2020-08-22 | End: 2020-08-23 | Stop reason: HOSPADM

## 2020-08-22 RX ORDER — CARVEDILOL 25 MG/1
25 TABLET ORAL 2 TIMES DAILY WITH MEALS
Status: DISCONTINUED | OUTPATIENT
Start: 2020-08-22 | End: 2020-08-23 | Stop reason: HOSPADM

## 2020-08-22 RX ORDER — FUROSEMIDE 10 MG/ML
20 INJECTION INTRAMUSCULAR; INTRAVENOUS
Status: ACTIVE | OUTPATIENT
Start: 2020-08-22 | End: 2020-08-23

## 2020-08-22 RX ORDER — POTASSIUM CHLORIDE 7.45 MG/ML
10 INJECTION INTRAVENOUS
Status: DISCONTINUED | OUTPATIENT
Start: 2020-08-22 | End: 2020-08-23 | Stop reason: HOSPADM

## 2020-08-22 RX ADMIN — ISOSORBIDE MONONITRATE 20 MG: 20 TABLET ORAL at 18:17

## 2020-08-22 RX ADMIN — Medication 10 ML: at 10:28

## 2020-08-22 RX ADMIN — ISOSORBIDE MONONITRATE 20 MG: 20 TABLET ORAL at 10:52

## 2020-08-22 RX ADMIN — FUROSEMIDE 20 MG: 10 INJECTION, SOLUTION INTRAMUSCULAR; INTRAVENOUS at 10:28

## 2020-08-22 RX ADMIN — SODIUM CHLORIDE 5 MG/HR: 9 INJECTION, SOLUTION INTRAVENOUS at 02:31

## 2020-08-22 RX ADMIN — CARVEDILOL 25 MG: 25 TABLET, FILM COATED ORAL at 18:17

## 2020-08-22 RX ADMIN — ATORVASTATIN CALCIUM 10 MG: 10 TABLET, FILM COATED ORAL at 10:27

## 2020-08-22 RX ADMIN — RIVAROXABAN 20 MG: 20 TABLET, FILM COATED ORAL at 18:17

## 2020-08-22 RX ADMIN — RISPERIDONE 0.25 MG: 0.25 TABLET ORAL at 21:51

## 2020-08-22 RX ADMIN — GABAPENTIN 100 MG: 100 CAPSULE ORAL at 21:51

## 2020-08-22 RX ADMIN — HYDRALAZINE HYDROCHLORIDE 50 MG: 25 TABLET, FILM COATED ORAL at 10:27

## 2020-08-22 RX ADMIN — Medication 10 ML: at 21:51

## 2020-08-22 RX ADMIN — HYDRALAZINE HYDROCHLORIDE 50 MG: 25 TABLET, FILM COATED ORAL at 21:51

## 2020-08-22 RX ADMIN — SODIUM CHLORIDE 10 MG/HR: 900 INJECTION, SOLUTION INTRAVENOUS at 02:31

## 2020-08-22 RX ADMIN — RISPERIDONE 0.25 MG: 0.25 TABLET ORAL at 10:27

## 2020-08-22 RX ADMIN — CARVEDILOL 25 MG: 25 TABLET, FILM COATED ORAL at 10:27

## 2020-08-22 RX ADMIN — SPIRONOLACTONE 25 MG: 25 TABLET, FILM COATED ORAL at 10:27

## 2020-08-22 RX ADMIN — HYDRALAZINE HYDROCHLORIDE 50 MG: 25 TABLET, FILM COATED ORAL at 15:22

## 2020-08-22 NOTE — OUTREACH NOTE
CHF Week 3 Survey      Responses   [facility] patient discharged from?  [facility]   Does the patient have one of the following disease processes/diagnoses(primary or secondary)?  CHF   Week 3 attempt successful?  No   Revoke  Readmitted          Mara Perez RN

## 2020-08-22 NOTE — PLAN OF CARE
Pt. Received ultrasound, cardiology will see them tomorrow, Pt was weaned off Cardizem, heart rate was in the 80s to 110s.  Vitals have been stable, will continue to monitor.

## 2020-08-22 NOTE — H&P
Patient Care Team:  Marisol Larson APRN as PCP - General    Chief Conplaint  Subjective     The patient is a 64 y.o. male presents with history of atrial fibrillation who presented with some progressive shortness of breath with evidence of atrial fibrillation with rapid ventricular response and associated pleural effusion    HPI  The patient was in his usual state of health until several days prior to presentation he began to experience the rather insidious onset of some progressive shortness of breath.  He related some palpitations but denied overt substernal chest pain.  As a symptom complex grew more profound he presented to the [facility] emergency room for evaluation where he was found to have atrial fibrillation with rapid ventricular response.  Rate control agents were provided parenterally and he was also found to have hypertensive emergency.  Parenteral antihypertensives were administered and the patient began to do better overall.  At the time of my evaluation blood pressure is much better controlled, and the patient is doing well with diltiazem drip.  He denies constitutional complaints but remains rather sleepy this morning and has mild neurocognitive impairment and thus is not a good historian.    Review of Systems  Review of Systems   Constitutional: Positive for activity change.   Respiratory: Positive for shortness of breath.    Cardiovascular: Negative.    Musculoskeletal: Positive for back pain.   Neurological: Negative.        History  Past Medical History:   Diagnosis Date   • A-fib (CMS/[facility])    • CHF (congestive heart failure) (CMS/[facility])    • Chronic atrial fibrillation (CMS/[facility]) 11/8/2019   • Chronic diastolic CHF (congestive heart failure) (CMS/[facility]) 11/8/2019   • CKD (chronic kidney disease) stage 3, GFR 30-59 ml/min (CMS/[facility]) 11/8/2019   • Coronary artery disease involving native coronary artery of native heart without angina pectoris 11/8/2019   • DDD (degenerative disc disease),  lumbosacral 11/8/2019   • Essential hypertension 11/8/2019   • Gambling disorder, persistent 11/8/2019   • GERD (gastroesophageal reflux disease) 11/8/2019   • History of CVA (cerebrovascular accident) 11/8/2019   • Hyperlipidemia    • Hypertension    • Medically noncompliant 11/8/2019   • Peripheral polyneuropathy 11/8/2019   • Psoriasis 11/8/2019   • Stroke (CMS/HCC)      Past Surgical History:   Procedure Laterality Date   • CARDIAC CATHETERIZATION     • CARDIAC CATHETERIZATION N/A 12/10/2019    Procedure: Left Heart Cath and coronary angiogram;  Surgeon: Edy Thomas MD;  Location: [facility] CATH INVASIVE LOCATION;  Service: Cardiovascular     Family History   Problem Relation Age of Onset   • Heart disease Mother      Social History     Tobacco Use   • Smoking status: Never Smoker   • Smokeless tobacco: Never Used   Substance Use Topics   • Alcohol use: No     Frequency: Never   • Drug use: No     Allergies:  Ketorolac tromethamine    Objective     Vital Signs  Temp:  [97.7 °F (36.5 °C)-99.9 °F (37.7 °C)] 98.6 °F (37 °C)  Heart Rate:  [] 96  Resp:  [16-21] 16  BP: (128-211)/() 139/77      Physical Exam:   Physical Exam   Constitutional: He is oriented to person, place, and time. He appears well-developed and well-nourished.   Eyes: Pupils are equal, round, and reactive to light. EOM are normal.   Pulmonary/Chest: Effort normal.   Abdominal: Soft.   Musculoskeletal: Normal range of motion.   Neurological: He is alert and oriented to person, place, and time.   Nursing note and vitals reviewed.           Results Review:   CBC    Results from last 7 days   Lab Units 08/21/20  1258   WBC 10*3/mm3 11.50*   HEMOGLOBIN g/dL 14.7   PLATELETS 10*3/mm3 166     BMP   Results from last 7 days   Lab Units 08/21/20  1258   SODIUM mmol/L 142   POTASSIUM mmol/L 3.4*   CHLORIDE mmol/L 104   CO2 mmol/L 24.0   BUN  22   CREATININE mg/dL 1.19   GLUCOSE mg/dL 125*     Cr Clearance Estimated Creatinine Clearance: 82.9  mL/min (by C-G formula based on SCr of 1.19 mg/dL).  Coag   Results from last 7 days   Lab Units 08/21/20  1258   INR  1.23*   APTT seconds 29.3     HbA1C   Lab Results   Component Value Date    HGBA1C 5.5 12/12/2019    HGBA1C 5.1 11/09/2019    HGBA1C 5.6 10/10/2018     Blood Glucose No results found for: POCGLU  Infection     CMP   Results from last 7 days   Lab Units 08/21/20  1258   SODIUM mmol/L 142   POTASSIUM mmol/L 3.4*   CHLORIDE mmol/L 104   CO2 mmol/L 24.0   BUN  22   CREATININE mg/dL 1.19   GLUCOSE mg/dL 125*     UA      Radiology(recent) Xr Chest 1 View    Result Date: 8/21/2020  Persistent left basilar consolidation with diagnostic considerations described above.  Electronically Signed By-Dre Fletcher On:8/21/2020 2:10 PM This report was finalized on [id number] by  Dre Fletcher, .       Assessment:      Dyspnea  Atrial fibrillation with rapid ventricular response  Hypertensive emergency  Acute on chronic diastolic congestive heart failure  Acute pleural effusion secondary to the above  Chronic kidney disease stage III secondary to hypertensive nephropathy  Hypertension associated with chronic kidney disease stage III  Atherosclerotic heart disease of native coronary arteries with angina pectoris  History of medication noncompliance  Cerebrovascular disease status post CVA  Peripheral polyneuropathy  Gastrosoft reflux disease with esophagitis  Cervical degenerative disc disease  Degenerative disc disease lumbosacral spine with chronic lumbago  Mild neurocognitive impairment  Dyslipidemia  Exogenous obesity  Nonischemic cardiomyopathy  Hypokalemia    Plan:  Rate control with gentle diuresis//will continue to attempt to  for the patient about compliance ,lifestyle and exercise  Expected Length of Stay 3 days    I discussed the patients findings and my recommendations with patient and nursing staff.     Dre Stauffer MD  08/22/20  07:47

## 2020-08-23 VITALS
BODY MASS INDEX: 35 KG/M2 | DIASTOLIC BLOOD PRESSURE: 94 MMHG | SYSTOLIC BLOOD PRESSURE: 155 MMHG | HEIGHT: 72 IN | HEART RATE: 87 BPM | RESPIRATION RATE: 16 BRPM | TEMPERATURE: 97.8 F | WEIGHT: 258.38 LBS | OXYGEN SATURATION: 94 %

## 2020-08-23 PROBLEM — I48.91 ATRIAL FIBRILLATION WITH RVR (HCC): Status: ACTIVE | Noted: 2020-08-23

## 2020-08-23 LAB
ANION GAP SERPL CALCULATED.3IONS-SCNC: 11 MMOL/L (ref 5–15)
BASOPHILS # BLD AUTO: 0.1 10*3/MM3 (ref 0–0.2)
BASOPHILS NFR BLD AUTO: 0.9 % (ref 0–1.5)
BUN SERPL-MCNC: 22 MG/DL (ref 8–23)
BUN SERPL-MCNC: ABNORMAL MG/DL
BUN/CREAT SERPL: ABNORMAL
CALCIUM SPEC-SCNC: 8.4 MG/DL (ref 8.6–10.5)
CHLORIDE SERPL-SCNC: 107 MMOL/L (ref 98–107)
CO2 SERPL-SCNC: 22 MMOL/L (ref 22–29)
CREAT SERPL-MCNC: 1.09 MG/DL (ref 0.76–1.27)
DEPRECATED RDW RBC AUTO: 49.9 FL (ref 37–54)
EOSINOPHIL # BLD AUTO: 0.2 10*3/MM3 (ref 0–0.4)
EOSINOPHIL NFR BLD AUTO: 2.3 % (ref 0.3–6.2)
ERYTHROCYTE [DISTWIDTH] IN BLOOD BY AUTOMATED COUNT: 17.4 % (ref 12.3–15.4)
GFR SERPL CREATININE-BSD FRML MDRD: 68 ML/MIN/1.73
GLUCOSE SERPL-MCNC: 98 MG/DL (ref 65–99)
HCT VFR BLD AUTO: 38.6 % (ref 37.5–51)
HGB BLD-MCNC: 12.6 G/DL (ref 13–17.7)
LYMPHOCYTES # BLD AUTO: 1.5 10*3/MM3 (ref 0.7–3.1)
LYMPHOCYTES NFR BLD AUTO: 16.5 % (ref 19.6–45.3)
MCH RBC QN AUTO: 26.6 PG (ref 26.6–33)
MCHC RBC AUTO-ENTMCNC: 32.7 G/DL (ref 31.5–35.7)
MCV RBC AUTO: 81.6 FL (ref 79–97)
MONOCYTES # BLD AUTO: 1 10*3/MM3 (ref 0.1–0.9)
MONOCYTES NFR BLD AUTO: 10.4 % (ref 5–12)
NEUTROPHILS NFR BLD AUTO: 6.6 10*3/MM3 (ref 1.7–7)
NEUTROPHILS NFR BLD AUTO: 69.9 % (ref 42.7–76)
NRBC BLD AUTO-RTO: 0.1 /100 WBC (ref 0–0.2)
PLATELET # BLD AUTO: 149 10*3/MM3 (ref 140–450)
PMV BLD AUTO: 8.9 FL (ref 6–12)
POTASSIUM SERPL-SCNC: 3.7 MMOL/L (ref 3.5–5.2)
RBC # BLD AUTO: 4.74 10*6/MM3 (ref 4.14–5.8)
SODIUM SERPL-SCNC: 140 MMOL/L (ref 136–145)
WBC # BLD AUTO: 9.4 10*3/MM3 (ref 3.4–10.8)

## 2020-08-23 PROCEDURE — 85025 COMPLETE CBC W/AUTO DIFF WBC: CPT | Performed by: FAMILY MEDICINE

## 2020-08-23 PROCEDURE — 80048 BASIC METABOLIC PNL TOTAL CA: CPT | Performed by: FAMILY MEDICINE

## 2020-08-23 PROCEDURE — G0378 HOSPITAL OBSERVATION PER HR: HCPCS

## 2020-08-23 PROCEDURE — 99213 OFFICE O/P EST LOW 20 MIN: CPT | Performed by: INTERNAL MEDICINE

## 2020-08-23 RX ORDER — LOSARTAN POTASSIUM 50 MG/1
25 TABLET ORAL DAILY
Qty: 30 TABLET | Refills: 3 | Status: SHIPPED | OUTPATIENT
Start: 2020-08-23 | End: 2020-09-12 | Stop reason: HOSPADM

## 2020-08-23 RX ORDER — DILTIAZEM HYDROCHLORIDE 180 MG/1
180 CAPSULE, COATED, EXTENDED RELEASE ORAL
Status: DISCONTINUED | OUTPATIENT
Start: 2020-08-23 | End: 2020-08-23 | Stop reason: HOSPADM

## 2020-08-23 RX ORDER — FUROSEMIDE 40 MG/1
40 TABLET ORAL DAILY
Status: DISCONTINUED | OUTPATIENT
Start: 2020-08-23 | End: 2020-08-23 | Stop reason: HOSPADM

## 2020-08-23 RX ADMIN — FUROSEMIDE 40 MG: 40 TABLET ORAL at 12:23

## 2020-08-23 RX ADMIN — SPIRONOLACTONE 25 MG: 25 TABLET, FILM COATED ORAL at 10:18

## 2020-08-23 RX ADMIN — DILTIAZEM HYDROCHLORIDE 180 MG: 180 CAPSULE, COATED, EXTENDED RELEASE ORAL at 12:23

## 2020-08-23 RX ADMIN — ISOSORBIDE MONONITRATE 20 MG: 20 TABLET ORAL at 10:18

## 2020-08-23 RX ADMIN — CARVEDILOL 25 MG: 25 TABLET, FILM COATED ORAL at 10:18

## 2020-08-23 RX ADMIN — ATORVASTATIN CALCIUM 10 MG: 10 TABLET, FILM COATED ORAL at 10:19

## 2020-08-23 RX ADMIN — HYDRALAZINE HYDROCHLORIDE 50 MG: 25 TABLET, FILM COATED ORAL at 10:19

## 2020-08-23 RX ADMIN — RISPERIDONE 0.25 MG: 0.25 TABLET ORAL at 10:19

## 2020-08-23 NOTE — DISCHARGE SUMMARY
Date of Discharge:  8/23/2020    Discharge Diagnosis:     Atrial fibrillation with rapid ventricular response  Hypertensive emergency  Acute on chronic diastolic congestive heart failure  Acute pleural effusion secondary to the above  Chronic kidney disease stage III secondary to hypertensive nephropathy  Hypertension associated with chronic kidney disease stage III  Atherosclerotic heart disease of native coronary arteries with angina pectoris  History of medication noncompliance  Cerebrovascular disease status post CVA  Peripheral polyneuropathy  Secondary hyperaldosteronism  Gastrosoft reflux disease with esophagitis  Cervical degenerative disc disease  Degenerative disc disease lumbosacral spine with chronic lumbago  Mild neurocognitive impairment  Dyslipidemia  Exogenous obesity  Nonischemic cardiomyopathy  Hypokalemia    Presenting Problem/History of Present Illness  Active Hospital Problems    Diagnosis  POA   • **Atrial fibrillation with RVR (CMS/HCC) [I48.91]  Unknown   • Dyspnea [R06.00]  Yes      Resolved Hospital Problems   No resolved problems to display.          Hospital Course  Patient is a 64 y.o. male presented with progressive shortness of breath with evidence of acute atrial fibrillation with rapid sugar response necessitating inpatient hospitalization for definitive care.  The patient was admitted and parenteral rate control was obtained.  The patient improved overall and related to strong desire to be sent home.  The patient has a long history of noncompliance and has some combative behavior and was rather adamant about discharge; He was deemed stable for discharge today with medications per the discharge reconciliation.  He was encouraged to follow-up with us in the office this coming week and we will need to follow electrolytes quite carefully.  He does have a history of secondary hyperaldosteronism and is on aldosterone antagonist therapy.  We did add ARB therapy in the form of losartan and  we did discontinue his potassium supplementation today.  He will require follow-up with a basic metabolic panel this coming week and again in approximately 2 weeks time to ensure stability of his electrolytes.  He was made aware of this need and of its critical nature.  He was also encouraged to begin a more healthy lifestyle and encouraged to listen to his medical care team and their advice.    Procedures Performed         Consults:   Consults     Date and Time Order Name Status Description    8/22/2020 0754 Inpatient Cardiology Consult      8/21/2020 1400 Family Medicine Consult Completed     8/3/2020 0849 Inpatient Psychiatrist Consult Completed     8/1/2020 1335 Inpatient Pulmonology Consult Completed     7/30/2020 1854 Inpatient Cardiology Consult Completed     7/30/2020 1206 Family Medicine Consult Completed           Pertinent Test Results:Xr Chest 1 View    Result Date: 8/21/2020  Persistent left basilar consolidation with diagnostic considerations described above.  Electronically Signed By-Dre Fletcher On:8/21/2020 2:10 PM This report was finalized on [id number] by  Dre Fletcher, .      Imaging Results (Last 7 Days)     Procedure Component Value Units Date/Time    XR Chest 1 View [[id number]] Collected:  08/21/20 1409     Updated:  08/21/20 1412    Narrative:       DATE OF EXAM:  8/21/2020 1:52 PM     PROCEDURE:  XR CHEST 1 VW-     INDICATIONS:  Shortness of breath, heart disease.      COMPARISON:  8/2/2020.     TECHNIQUE:   Single radiographic view of the chest was obtained.     FINDINGS:  There is persistent left basilar consolidation felt to represent  pneumonia with an associated pleural effusion. Some of the increased  density also may be due to compressive atelectasis. The right lung and  pleural space are clear. The heart is enlarged. The pulmonary vascular  markings are normal.  There is degenerative spondylosis of the thoracic  spine.       Impression:       Persistent left basilar consolidation  with diagnostic considerations  described above.     Electronically Signed By-Dre Fletcher On:8/21/2020 2:10 PM  This report was finalized on [id number] by  Dre Fletcher, .              Condition on Discharge: Guarded secondary to his longstanding history of noncompliance    Vital Signs  Temp:  [97.3 °F (36.3 °C)-98.2 °F (36.8 °C)] 97.3 °F (36.3 °C)  Heart Rate:  [62-98] 77  Resp:  [16-20] 18  BP: (128-164)/() 152/95    Physical Exam:     General Appearance:    Alert, cooperative, in no acute distress   Head:    Normocephalic, without obvious abnormality, atraumatic   Eyes:           Conjunctivae and sclerae normal, no   icterus, no pallor, corneas clear, PERRLA   Throat:   No oral lesions, no thrush, oral mucosa moist   Neck:   No adenopathy, supple, trachea midline, no thyromegaly, no   carotid bruit, no JVD   Lungs:     Clear to auscultation,respirations regular, even and                  unlabored    Heart:    Regular rhythm and normal rate, normal S1 and S2, no            murmur, no gallop, no rub, no click   Chest Wall:    No abnormalities observed   Abdomen:     Normal bowel sounds, no masses, no organomegaly, soft        non-tender, non-distended, no guarding, no rebound                tenderness   Rectal:     Deferred   Extremities:   Moves all extremities well, no edema, no cyanosis, no             redness   Pulses:   Pulses palpable and equal bilaterally   Skin:   No bleeding, bruising or rash   Lymph nodes:   No palpable adenopathy   Neurologic:   Cranial nerves 2 - 12 grossly intact, sensation intact, DTR       present and equal bilaterally         Discharge Disposition  Home or Self Care    Discharge Medications     Discharge Medications      New Medications      Instructions Start Date   losartan 50 MG tablet  Commonly known as:  Cozaar   25 mg, Oral, Daily         Continue These Medications      Instructions Start Date   carvedilol 25 MG tablet  Commonly known as:  COREG   25 mg, Oral, 2 Times  Daily With Meals      dilTIAZem  MG 24 hr capsule  Commonly known as:  CARDIZEM CD   180 mg, Oral, Every 24 Hours Scheduled      gabapentin 100 MG capsule  Commonly known as:  NEURONTIN   100 mg, Oral, Nightly      hydrALAZINE 25 MG tablet  Commonly known as:  APRESOLINE   50 mg, Oral, 3 Times Daily      isosorbide mononitrate 10 MG tablet  Commonly known as:  ISMO,MONOKET   30 mg, Oral, 2 Times Daily (Nitrates)      pravastatin 40 MG tablet  Commonly known as:  PRAVACHOL   40 mg, Oral, Nightly      risperiDONE 0.5 MG tablet  Commonly known as:  risperDAL   0.25 mg, Oral, 2 Times Daily      rivaroxaban 20 MG tablet  Commonly known as:  XARELTO   20 mg, Oral, Every Evening      rivaroxaban 20 MG tablet  Commonly known as:  Xarelto   20 mg, Oral, Daily With Dinner      spironolactone 25 MG tablet  Commonly known as:  ALDACTONE   25 mg, Oral, Daily         Stop These Medications    potassium chloride 20 MEQ CR tablet  Commonly known as:  K-DUR,KLOR-CON            Discharge Diet:   Cardiac 1800-calorie  Activity at Discharge:   As tolerated  Follow-up Appointments  Follow-up with us this week.  Important to have blood work this week and next week.  (BMP critical)  Future Appointments   Date Time Provider Department Center   9/9/2020 12:30 PM Edy Thomas MD [facility]         Test Results Pending at Discharge  Pending BMP for this week     Dre Stauffer MD  08/23/20  07:45

## 2020-08-23 NOTE — CONSULTS
"I have personally performed a face-to-face diagnostic evaluation on this patient my findings are as follows   Patient presents with shortness of breath and had atrial fibrillation and hypertension patient was started on Cardizem and is currently stable  Patient is ruled out for MI by EKG and enzymes  Patient has issues with his medicines and is been noncompliant with medications  Discussed with him about taking medications regularly    I have personally performed a face-to-face diagnostic evaluation on this patient.  I have reviewed and agree with the care plan.  History and exam by me shows   On physical exam blood pressure and heart rate stable now  Cardiovascular and respiratory exams are stable  Continue current medicines and follow him as an outpatient  Referring Provider: Dr. Stauffer  Reason for Consultation: Afib     Patient Care Team:  Marisol Larson APRN as PCP - General    Chief complaint - shortness of breath     Subjective .     History of present illness:  Carlos Whipple is a 64 y.o. male who presents with shortness of breath on 8/21/2020.  Patient was found to be in Afib with RVR, rate 180s and hypertensive.  Patient was started on IV cardizem drip and weaned off.  This morning patient is sleepy and somewhat confused as to why he came in.  He states that \"I haven't woken up yet\" when questioning him..  Patient states \"I think my heart of racing\".  He currently denies any chest pain or shortness of breath.      Patient has chronic afib,chf, htn, hld, CAD     Review of Systems   Unable to perform ROS: psychiatric disorder       History  Past Medical History:   Diagnosis Date   • A-fib (CMS/[facility])    • CHF (congestive heart failure) (CMS/[facility])    • Chronic atrial fibrillation (CMS/[facility]) 11/8/2019   • Chronic diastolic CHF (congestive heart failure) (CMS/[facility]) 11/8/2019   • CKD (chronic kidney disease) stage 3, GFR 30-59 ml/min (CMS/[facility]) 11/8/2019   • Coronary artery disease involving native coronary artery of " native heart without angina pectoris 11/8/2019   • DDD (degenerative disc disease), lumbosacral 11/8/2019   • Essential hypertension 11/8/2019   • Gambling disorder, persistent 11/8/2019   • GERD (gastroesophageal reflux disease) 11/8/2019   • History of CVA (cerebrovascular accident) 11/8/2019   • Hyperlipidemia    • Hypertension    • Medically noncompliant 11/8/2019   • Peripheral polyneuropathy 11/8/2019   • Psoriasis 11/8/2019   • Stroke (CMS/HCC)        Past Surgical History:   Procedure Laterality Date   • CARDIAC CATHETERIZATION     • CARDIAC CATHETERIZATION N/A 12/10/2019    Procedure: Left Heart Cath and coronary angiogram;  Surgeon: Edy Thomas MD;  Location: [facility] CATH INVASIVE [facility];  Service: Cardiovascular       Family History   Problem Relation Age of Onset   • Heart disease Mother        Social History     Tobacco Use   • Smoking status: Never Smoker   • Smokeless tobacco: Never Used   Substance Use Topics   • Alcohol use: No     Frequency: Never   • Drug use: No        Medications Prior to Admission   Medication Sig Dispense Refill Last Dose   • carvedilol (COREG) 25 MG tablet Take 25 mg by mouth 2 (Two) Times a Day With Meals.   Unknown at Unknown time   • dilTIAZem CD (CARDIZEM CD) 180 MG 24 hr capsule Take 1 capsule by mouth Daily for 30 days. 30 capsule 0    • gabapentin (NEURONTIN) 100 MG capsule Take 100 mg by mouth Every Night.   Unknown at Unknown time   • hydrALAZINE (APRESOLINE) 25 MG tablet Take 2 tablets by mouth 3 (Three) Times a Day for 30 days. 180 tablet 5    • isosorbide mononitrate (ISMO,MONOKET) 10 MG tablet Take 3 tablets by mouth 2 (Two) Times a Day. 60 tablet 0    • potassium chloride (K-DUR,KLOR-CON) 20 MEQ CR tablet Take 20 mEq by mouth 2 (Two) Times a Day.   Unknown at Unknown time   • pravastatin (PRAVACHOL) 40 MG tablet Take 40 mg by mouth Every Night.   Unknown at Unknown time   • risperiDONE (risperDAL) 0.5 MG tablet Take 0.5 tablets by mouth 2 (Two) Times a Day  "for 30 days. 30 tablet 0    • rivaroxaban (XARELTO) 20 MG tablet Take 20 mg by mouth Every Evening.   Unknown at Unknown time   • rivaroxaban (Xarelto) 20 MG tablet Take 1 tablet by mouth Daily With Dinner for 30 days. 30 tablet 5    • spironolactone (ALDACTONE) 25 MG tablet Take 25 mg by mouth Daily.   Unknown at Unknown time       Allergies:  Ketorolac tromethamine    Scheduled Meds:  atorvastatin 10 mg Oral Daily   carvedilol 25 mg Oral BID With Meals   gabapentin 100 mg Oral Nightly   hydrALAZINE 50 mg Oral TID   isosorbide mononitrate 20 mg Oral BID - Nitrates   risperiDONE 0.25 mg Oral BID   rivaroxaban 20 mg Oral Daily With Dinner   spironolactone 25 mg Oral Daily     Continuous Infusions:  dilTIAZem 5-15 mg/hr Last Rate: Stopped (08/22/20 1609)     PRN Meds:.potassium chloride **OR** potassium chloride **OR** potassium chloride  •  [COMPLETED] Insert peripheral IV **AND** sodium chloride    Objective     VITAL SIGNS  Vitals:    08/22/20 2306 08/23/20 0301 08/23/20 0500 08/23/20 0619   BP: 164/97 137/50  152/95   Pulse: 89 80  77   Resp: 20 16  18   Temp: 97.9 °F (36.6 °C) 98.2 °F (36.8 °C)  97.3 °F (36.3 °C)   TempSrc:       SpO2: 94% 93%  92%   Weight:   117 kg (258 lb 6.1 oz)    Height:           Flowsheet Rows      First Filed Value   Admission Height  182.9 cm (72\") Documented at 08/21/2020 1218   Admission Weight  109 kg (240 lb) Documented at 08/21/2020 1218           TELEMETRY: Afib  Rate controlled     Physical Exam:  Physical Exam   Constitutional: He appears well-developed and well-nourished. He is sleeping. He is easily aroused.   HENT:   Head: Normocephalic.   Eyes: Conjunctivae are normal.   Neck: Normal range of motion. No JVD present.   Cardiovascular: Normal rate and intact distal pulses. An irregularly irregular rhythm present.   Pulmonary/Chest: He has rales.   Abdominal: Soft. Bowel sounds are normal.   Musculoskeletal: Normal range of motion. He exhibits no edema.   Neurological: He is " alert and easily aroused.   Skin: Skin is warm and dry.   Psychiatric: His speech is delayed. He is slowed.   Nursing note and vitals reviewed.       Results Review:   I reviewed the patient's new clinical results.  Lab Results (last 24 hours)     Procedure Component Value Units Date/Time    BUN [[id number]]  (Normal) Collected:  08/23/20 0351    Specimen:  Blood Updated:  08/23/20 0734     BUN 22 mg/dL     Basic Metabolic Panel [[id number]]  (Abnormal) Collected:  08/23/20 0351    Specimen:  Blood Updated:  08/23/20 0502     Glucose 98 mg/dL      BUN --     Comment: Testing performed by alternate method        Creatinine 1.09 mg/dL      Sodium 140 mmol/L      Potassium 3.7 mmol/L      Comment: Specimen hemolyzed.  Results may be affected.        Chloride 107 mmol/L      CO2 22.0 mmol/L      Calcium 8.4 mg/dL      eGFR Non African Amer 68 mL/min/1.73      BUN/Creatinine Ratio --     Comment: Testing not performed        Anion Gap 11.0 mmol/L     Narrative:       GFR Normal >60  Chronic Kidney Disease <60  Kidney Failure <15      CBC & Differential [[id number]] Collected:  08/23/20 0351    Specimen:  Blood Updated:  08/23/20 0446    Narrative:       The following orders were created for panel order CBC & Differential.  Procedure                               Abnormality         Status                     ---------                               -----------         ------                     CBC Auto Differential[[id number]]        Abnormal            Final result                 Please view results for these tests on the individual orders.    CBC Auto Differential [[id number]]  (Abnormal) Collected:  08/23/20 0351    Specimen:  Blood Updated:  08/23/20 0446     WBC 9.40 10*3/mm3      RBC 4.74 10*6/mm3      Hemoglobin 12.6 g/dL      Comment: Result checked         Hematocrit 38.6 %      MCV 81.6 fL      MCH 26.6 pg      MCHC 32.7 g/dL      RDW 17.4 %      RDW-SD 49.9 fl      MPV 8.9 fL      Platelets 149 10*3/mm3       Neutrophil % 69.9 %      Lymphocyte % 16.5 %      Monocyte % 10.4 %      Eosinophil % 2.3 %      Basophil % 0.9 %      Neutrophils, Absolute 6.60 10*3/mm3      Lymphocytes, Absolute 1.50 10*3/mm3      Monocytes, Absolute 1.00 10*3/mm3      Eosinophils, Absolute 0.20 10*3/mm3      Basophils, Absolute 0.10 10*3/mm3      nRBC 0.1 /100 WBC     Magnesium [[id number]]  (Normal) Collected:  08/22/20 0628    Specimen:  Blood Updated:  08/22/20 2348     Magnesium 1.8 mg/dL           Imaging Results (Last 24 Hours)     ** No results found for the last 24 hours. **          EKG          I personally viewed and interpreted the patient's EKG/Telemetry data:    ECHOCARDIOGRAM:      STRESS MYOVIEW:    CARDIAC CATHETERIZATION:    OTHER:         Assessment/Plan     Principal Problem:    Atrial fibrillation with RVR (CMS/HCC)  Active Problems:    Dyspnea  h/o CAD  Hypertension   Hyperlipidemia   Previous CVA  Long term anticoagulation   CKD      PLAN:  Patient presented with shortness of breath and palpitations   Afib RVR, rates 180s noted in ED and started on cardizem drip   Patient is off cardizem drip now    Continue beta blocker and PO Cardizem for rate control  Continue Xarelto for anticoagulation    Patient is apparently non compliant with medications   Pro bnp elevated 12,171 with CXR showing possible consolidation vs pleural effusion   Received IV lasix   Add PO lasix with aldactone and follow closely   Monitor renal function and electrolytes closely     Follow up as outpatient if discharged     I discussed the patients findings and my recommendations with patient and bedside nurse    Further recommendations per CLARISSA Aguero  08/23/20  11:43  Electronically signed by CLARISSA Etienne, 08/23/20, 12:18 PM.

## 2020-08-24 ENCOUNTER — READMISSION MANAGEMENT (OUTPATIENT)
Dept: CALL CENTER | Facility: HOSPITAL | Age: 65
End: 2020-08-24

## 2020-08-24 NOTE — OUTREACH NOTE
Prep Survey      Responses   [facility] patient discharged from?  [facility]   Is LACE score < 7 ?  No   Eligibility  Readm Mgmt   Discharge diagnosis  Atrial fibrillation with rapid ventricular response, dyspnea   COVID-19 Test Status  Not tested   Does the patient have one of the following disease processes/diagnoses(primary or secondary)?  CHF   Does the patient have Home health ordered?  No   Is there a DME ordered?  No   Comments regarding appointments  Per AVS   Medication alerts for this patient  on xarelto   Prep survey completed?  Yes          Adelina Medina RN

## 2020-08-24 NOTE — PROGRESS NOTES
Case Management Discharge Note      Final Note: Unable to case manage prior to discharge.         Final Discharge Disposition Code: 01 - home or self-care

## 2020-08-26 ENCOUNTER — READMISSION MANAGEMENT (OUTPATIENT)
Dept: CALL CENTER | Facility: HOSPITAL | Age: 65
End: 2020-08-26

## 2020-08-26 NOTE — OUTREACH NOTE
CHF Week 1 Survey      Responses   [facility] patient discharged from?  [facility]   Does the patient have one of the following disease processes/diagnoses(primary or secondary)?  CHF   CHF Week 1 attempt successful?  No   Unsuccessful attempts  Attempt 1          Nehal Nash RN

## 2020-08-27 ENCOUNTER — READMISSION MANAGEMENT (OUTPATIENT)
Dept: CALL CENTER | Facility: HOSPITAL | Age: 65
End: 2020-08-27

## 2020-08-27 NOTE — OUTREACH NOTE
CHF Week 1 Survey      Responses   [facility] patient discharged from?  [facility]   Does the patient have one of the following disease processes/diagnoses(primary or secondary)?  CHF   CHF Week 1 attempt successful?  No   Unsuccessful attempts  Attempt 2          Falguni Mckeon RN

## 2020-08-31 ENCOUNTER — READMISSION MANAGEMENT (OUTPATIENT)
Dept: CALL CENTER | Facility: HOSPITAL | Age: 65
End: 2020-08-31

## 2020-08-31 NOTE — OUTREACH NOTE
CHF Week 1 Survey      Responses   [facility] patient discharged from?  [facility]   Does the patient have one of the following disease processes/diagnoses(primary or secondary)?  CHF   CHF Week 1 attempt successful?  No   Unsuccessful attempts  Attempt 3          Brittany Moody RN

## 2020-09-01 ENCOUNTER — READMISSION MANAGEMENT (OUTPATIENT)
Dept: CALL CENTER | Facility: HOSPITAL | Age: 65
End: 2020-09-01

## 2020-09-01 ENCOUNTER — HOSPITAL ENCOUNTER (INPATIENT)
Facility: HOSPITAL | Age: 65
LOS: 11 days | Discharge: HOME HEALTH | End: 2020-09-12
Attending: EMERGENCY MEDICINE | Admitting: FAMILY MEDICINE

## 2020-09-01 ENCOUNTER — APPOINTMENT (OUTPATIENT)
Dept: GENERAL RADIOLOGY | Facility: HOSPITAL | Age: 65
End: 2020-09-01

## 2020-09-01 DIAGNOSIS — I48.91 ATRIAL FIBRILLATION WITH RAPID VENTRICULAR RESPONSE (HCC): Primary | ICD-10-CM

## 2020-09-01 DIAGNOSIS — I10 UNCONTROLLED HYPERTENSION: ICD-10-CM

## 2020-09-01 DIAGNOSIS — R06.00 DYSPNEA, UNSPECIFIED TYPE: ICD-10-CM

## 2020-09-01 DIAGNOSIS — N18.30 CKD (CHRONIC KIDNEY DISEASE) STAGE 3, GFR 30-59 ML/MIN (HCC): Chronic | ICD-10-CM

## 2020-09-01 LAB
ALBUMIN SERPL-MCNC: 4 G/DL (ref 3.5–5.2)
ALBUMIN/GLOB SERPL: 1.2 G/DL
ALP SERPL-CCNC: 71 U/L (ref 39–117)
ALT SERPL W P-5'-P-CCNC: 20 U/L (ref 1–41)
ANION GAP SERPL CALCULATED.3IONS-SCNC: 13 MMOL/L (ref 5–15)
AST SERPL-CCNC: 17 U/L (ref 1–40)
BASOPHILS # BLD AUTO: 0.1 10*3/MM3 (ref 0–0.2)
BASOPHILS NFR BLD AUTO: 1.1 % (ref 0–1.5)
BILIRUB SERPL-MCNC: 1 MG/DL (ref 0–1.2)
BUN SERPL-MCNC: 23 MG/DL (ref 8–23)
BUN SERPL-MCNC: ABNORMAL MG/DL
BUN/CREAT SERPL: ABNORMAL
CALCIUM SPEC-SCNC: 9.4 MG/DL (ref 8.6–10.5)
CHLORIDE SERPL-SCNC: 108 MMOL/L (ref 98–107)
CO2 SERPL-SCNC: 19 MMOL/L (ref 22–29)
CREAT SERPL-MCNC: 1.35 MG/DL (ref 0.76–1.27)
D-LACTATE SERPL-SCNC: 1.5 MMOL/L (ref 0.5–2)
DEPRECATED RDW RBC AUTO: 50.3 FL (ref 37–54)
EOSINOPHIL # BLD AUTO: 0.1 10*3/MM3 (ref 0–0.4)
EOSINOPHIL NFR BLD AUTO: 0.5 % (ref 0.3–6.2)
ERYTHROCYTE [DISTWIDTH] IN BLOOD BY AUTOMATED COUNT: 17.9 % (ref 12.3–15.4)
GFR SERPL CREATININE-BSD FRML MDRD: 53 ML/MIN/1.73
GLOBULIN UR ELPH-MCNC: 3.3 GM/DL
GLUCOSE SERPL-MCNC: 110 MG/DL (ref 65–99)
HCT VFR BLD AUTO: 42.6 % (ref 37.5–51)
HGB BLD-MCNC: 13.7 G/DL (ref 13–17.7)
HOLD SPECIMEN: NORMAL
LYMPHOCYTES # BLD AUTO: 2.1 10*3/MM3 (ref 0.7–3.1)
LYMPHOCYTES NFR BLD AUTO: 18.7 % (ref 19.6–45.3)
MCH RBC QN AUTO: 26.4 PG (ref 26.6–33)
MCHC RBC AUTO-ENTMCNC: 32.3 G/DL (ref 31.5–35.7)
MCV RBC AUTO: 81.9 FL (ref 79–97)
MONOCYTES # BLD AUTO: 0.9 10*3/MM3 (ref 0.1–0.9)
MONOCYTES NFR BLD AUTO: 8.1 % (ref 5–12)
NEUTROPHILS NFR BLD AUTO: 71.6 % (ref 42.7–76)
NEUTROPHILS NFR BLD AUTO: 8.1 10*3/MM3 (ref 1.7–7)
NRBC BLD AUTO-RTO: 0.1 /100 WBC (ref 0–0.2)
PLATELET # BLD AUTO: 195 10*3/MM3 (ref 140–450)
PMV BLD AUTO: 9.1 FL (ref 6–12)
POTASSIUM SERPL-SCNC: 4 MMOL/L (ref 3.5–5.2)
PROT SERPL-MCNC: 7.3 G/DL (ref 6–8.5)
RBC # BLD AUTO: 5.2 10*6/MM3 (ref 4.14–5.8)
SARS-COV-2 RNA PNL SPEC NAA+PROBE: NOT DETECTED
SODIUM SERPL-SCNC: 140 MMOL/L (ref 136–145)
TROPONIN T SERPL-MCNC: <0.01 NG/ML (ref 0–0.03)
TROPONIN T SERPL-MCNC: <0.01 NG/ML (ref 0–0.03)
WBC # BLD AUTO: 11.3 10*3/MM3 (ref 3.4–10.8)
WHOLE BLOOD HOLD SPECIMEN: NORMAL
WHOLE BLOOD HOLD SPECIMEN: NORMAL

## 2020-09-01 PROCEDURE — 85025 COMPLETE CBC W/AUTO DIFF WBC: CPT | Performed by: EMERGENCY MEDICINE

## 2020-09-01 PROCEDURE — G0378 HOSPITAL OBSERVATION PER HR: HCPCS

## 2020-09-01 PROCEDURE — 87150 DNA/RNA AMPLIFIED PROBE: CPT | Performed by: EMERGENCY MEDICINE

## 2020-09-01 PROCEDURE — 87635 SARS-COV-2 COVID-19 AMP PRB: CPT | Performed by: EMERGENCY MEDICINE

## 2020-09-01 PROCEDURE — 80053 COMPREHEN METABOLIC PANEL: CPT | Performed by: EMERGENCY MEDICINE

## 2020-09-01 PROCEDURE — 25010000002 DIGOXIN PER 500 MCG: Performed by: INTERNAL MEDICINE

## 2020-09-01 PROCEDURE — 71045 X-RAY EXAM CHEST 1 VIEW: CPT

## 2020-09-01 PROCEDURE — 84484 ASSAY OF TROPONIN QUANT: CPT | Performed by: NURSE PRACTITIONER

## 2020-09-01 PROCEDURE — 99284 EMERGENCY DEPT VISIT MOD MDM: CPT

## 2020-09-01 PROCEDURE — 25010000002 HYDRALAZINE PER 20 MG: Performed by: FAMILY MEDICINE

## 2020-09-01 PROCEDURE — 87147 CULTURE TYPE IMMUNOLOGIC: CPT | Performed by: EMERGENCY MEDICINE

## 2020-09-01 PROCEDURE — 87040 BLOOD CULTURE FOR BACTERIA: CPT | Performed by: EMERGENCY MEDICINE

## 2020-09-01 PROCEDURE — 84484 ASSAY OF TROPONIN QUANT: CPT | Performed by: EMERGENCY MEDICINE

## 2020-09-01 PROCEDURE — 25010000002 HYDRALAZINE PER 20 MG: Performed by: NURSE PRACTITIONER

## 2020-09-01 PROCEDURE — 83605 ASSAY OF LACTIC ACID: CPT

## 2020-09-01 PROCEDURE — 93005 ELECTROCARDIOGRAM TRACING: CPT | Performed by: EMERGENCY MEDICINE

## 2020-09-01 RX ORDER — HYDRALAZINE HYDROCHLORIDE 25 MG/1
50 TABLET, FILM COATED ORAL 3 TIMES DAILY
Status: DISCONTINUED | OUTPATIENT
Start: 2020-09-01 | End: 2020-09-03

## 2020-09-01 RX ORDER — DIGOXIN 0.25 MG/ML
250 INJECTION INTRAMUSCULAR; INTRAVENOUS ONCE
Status: COMPLETED | OUTPATIENT
Start: 2020-09-01 | End: 2020-09-01

## 2020-09-01 RX ORDER — RISPERIDONE 0.25 MG/1
0.25 TABLET ORAL 2 TIMES DAILY
Status: DISCONTINUED | OUTPATIENT
Start: 2020-09-01 | End: 2020-09-02

## 2020-09-01 RX ORDER — ASPIRIN 81 MG/1
81 TABLET ORAL DAILY
Status: DISCONTINUED | OUTPATIENT
Start: 2020-09-02 | End: 2020-09-12 | Stop reason: HOSPADM

## 2020-09-01 RX ORDER — SODIUM CHLORIDE 0.9 % (FLUSH) 0.9 %
10 SYRINGE (ML) INJECTION AS NEEDED
Status: DISCONTINUED | OUTPATIENT
Start: 2020-09-01 | End: 2020-09-12 | Stop reason: HOSPADM

## 2020-09-01 RX ORDER — ASPIRIN 81 MG/1
81 TABLET ORAL DAILY
COMMUNITY

## 2020-09-01 RX ORDER — HYDRALAZINE HYDROCHLORIDE 20 MG/ML
10 INJECTION INTRAMUSCULAR; INTRAVENOUS EVERY 4 HOURS PRN
Status: DISCONTINUED | OUTPATIENT
Start: 2020-09-01 | End: 2020-09-12 | Stop reason: HOSPADM

## 2020-09-01 RX ORDER — CARVEDILOL 25 MG/1
25 TABLET ORAL 2 TIMES DAILY WITH MEALS
Status: DISCONTINUED | OUTPATIENT
Start: 2020-09-01 | End: 2020-09-01

## 2020-09-01 RX ORDER — SPIRONOLACTONE 25 MG/1
25 TABLET ORAL DAILY
Status: DISCONTINUED | OUTPATIENT
Start: 2020-09-02 | End: 2020-09-05

## 2020-09-01 RX ORDER — METOPROLOL TARTRATE 5 MG/5ML
5 INJECTION INTRAVENOUS ONCE
Status: COMPLETED | OUTPATIENT
Start: 2020-09-01 | End: 2020-09-01

## 2020-09-01 RX ORDER — LOSARTAN POTASSIUM 25 MG/1
25 TABLET ORAL DAILY
Status: DISCONTINUED | OUTPATIENT
Start: 2020-09-02 | End: 2020-09-02

## 2020-09-01 RX ORDER — ATORVASTATIN CALCIUM 10 MG/1
10 TABLET, FILM COATED ORAL NIGHTLY
Status: DISCONTINUED | OUTPATIENT
Start: 2020-09-01 | End: 2020-09-12 | Stop reason: HOSPADM

## 2020-09-01 RX ORDER — SODIUM CHLORIDE 0.9 % (FLUSH) 0.9 %
10 SYRINGE (ML) INJECTION EVERY 12 HOURS SCHEDULED
Status: DISCONTINUED | OUTPATIENT
Start: 2020-09-01 | End: 2020-09-12 | Stop reason: HOSPADM

## 2020-09-01 RX ORDER — GABAPENTIN 100 MG/1
100 CAPSULE ORAL NIGHTLY
Status: DISCONTINUED | OUTPATIENT
Start: 2020-09-01 | End: 2020-09-12 | Stop reason: HOSPADM

## 2020-09-01 RX ORDER — METOPROLOL TARTRATE 50 MG/1
50 TABLET, FILM COATED ORAL EVERY 12 HOURS SCHEDULED
Status: DISCONTINUED | OUTPATIENT
Start: 2020-09-01 | End: 2020-09-12 | Stop reason: HOSPADM

## 2020-09-01 RX ORDER — ISOSORBIDE MONONITRATE 20 MG/1
30 TABLET ORAL
Status: DISCONTINUED | OUTPATIENT
Start: 2020-09-01 | End: 2020-09-12 | Stop reason: HOSPADM

## 2020-09-01 RX ADMIN — RIVAROXABAN 20 MG: 20 TABLET, FILM COATED ORAL at 20:14

## 2020-09-01 RX ADMIN — GABAPENTIN 100 MG: 100 CAPSULE ORAL at 20:14

## 2020-09-01 RX ADMIN — Medication 10 ML: at 20:14

## 2020-09-01 RX ADMIN — METOPROLOL TARTRATE 50 MG: 50 TABLET, FILM COATED ORAL at 22:01

## 2020-09-01 RX ADMIN — HYDRALAZINE HYDROCHLORIDE 50 MG: 25 TABLET, FILM COATED ORAL at 20:14

## 2020-09-01 RX ADMIN — ISOSORBIDE MONONITRATE 30 MG: 20 TABLET ORAL at 20:14

## 2020-09-01 RX ADMIN — METOPROLOL TARTRATE 5 MG: 1 INJECTION, SOLUTION INTRAVENOUS at 14:36

## 2020-09-01 RX ADMIN — HYDRALAZINE HYDROCHLORIDE 10 MG: 20 INJECTION INTRAMUSCULAR; INTRAVENOUS at 17:58

## 2020-09-01 RX ADMIN — DIGOXIN 250 MCG: 250 INJECTION, SOLUTION INTRAMUSCULAR; INTRAVENOUS; PARENTERAL at 22:01

## 2020-09-01 RX ADMIN — SODIUM CHLORIDE 5 MG/HR: 9 INJECTION, SOLUTION INTRAVENOUS at 19:18

## 2020-09-01 RX ADMIN — ATORVASTATIN CALCIUM 10 MG: 10 TABLET, FILM COATED ORAL at 20:14

## 2020-09-01 RX ADMIN — RISPERIDONE 0.25 MG: 0.25 TABLET ORAL at 20:14

## 2020-09-01 RX ADMIN — DILTIAZEM HYDROCHLORIDE 5 MG/HR: 100 INJECTION, POWDER, LYOPHILIZED, FOR SOLUTION INTRAVENOUS at 15:36

## 2020-09-01 RX ADMIN — CARVEDILOL 25 MG: 25 TABLET, FILM COATED ORAL at 20:14

## 2020-09-01 NOTE — ED NOTES
Congested cough x 2 days. Soa x 3 months. Worse the past few hours. Generalized fatigue     Madhavi Jones, RN  09/01/20 1353

## 2020-09-01 NOTE — ED PROVIDER NOTES
Subjective   History of Present Illness  Shortness of breath heart racing  64-year-old male with history of chronic atrial fibrillation presents with heart racing and shortness of breath been increasing over last couple of days.  Patient is been in and out of the hospital over the last few months with similar presentation and felt to be noncompliant with his medications.  Today stating that he has lost some of his medications.  He reports no chest pain or abdominal pain or fevers or chills or cough.  Review of Systems   Constitutional: Negative.    HENT: Negative.    Eyes: Negative.    Respiratory: Positive for shortness of breath. Negative for cough.    Cardiovascular: Positive for palpitations.   Gastrointestinal: Negative.    Genitourinary: Negative.    Musculoskeletal: Negative.    Skin: Negative.    Neurological: Negative.    Hematological: Negative.    Psychiatric/Behavioral: Negative.        Past Medical History:   Diagnosis Date   • A-fib (CMS/[facility])    • CHF (congestive heart failure) (CMS/[facility])    • Chronic atrial fibrillation (CMS/[facility]) 11/8/2019   • Chronic diastolic CHF (congestive heart failure) (CMS/[facility]) 11/8/2019   • CKD (chronic kidney disease) stage 3, GFR 30-59 ml/min (CMS/[facility]) 11/8/2019   • Coronary artery disease involving native coronary artery of native heart without angina pectoris 11/8/2019   • DDD (degenerative disc disease), lumbosacral 11/8/2019   • Essential hypertension 11/8/2019   • Gambling disorder, persistent 11/8/2019   • GERD (gastroesophageal reflux disease) 11/8/2019   • History of CVA (cerebrovascular accident) 11/8/2019   • Hyperlipidemia    • Hypertension    • Medically noncompliant 11/8/2019   • Peripheral polyneuropathy 11/8/2019   • Psoriasis 11/8/2019   • Stroke (CMS/[facility])        Allergies   Allergen Reactions   • Ketorolac Tromethamine Anaphylaxis     unknown       Past Surgical History:   Procedure Laterality Date   • CARDIAC CATHETERIZATION     • CARDIAC CATHETERIZATION N/A  12/10/2019    Procedure: Left Heart Cath and coronary angiogram;  Surgeon: Edy Thomas MD;  Location: [facility] CATH INVASIVE LOCATION;  Service: Cardiovascular       Family History   Problem Relation Age of Onset   • Heart disease Mother        Social History     Socioeconomic History   • Marital status: Single     Spouse name: Not on file   • Number of children: Not on file   • Years of education: Not on file   • Highest education level: Not on file   Tobacco Use   • Smoking status: Never Smoker   • Smokeless tobacco: Never Used   Substance and Sexual Activity   • Alcohol use: No     Frequency: Never   • Drug use: No   • Sexual activity: Not Currently   Social History Narrative    Gambling addiction       Prior to Admission medications    Medication Sig Start Date End Date Taking? Authorizing Provider   carvedilol (COREG) 25 MG tablet Take 25 mg by mouth 2 (Two) Times a Day With Meals.    Celso Mendosa MD   dilTIAZem CD (CARDIZEM CD) 180 MG 24 hr capsule Take 1 capsule by mouth Daily for 30 days. 8/5/20 9/4/20  Paty Polk DO   gabapentin (NEURONTIN) 100 MG capsule Take 100 mg by mouth Every Night.    Celso Mendosa MD   hydrALAZINE (APRESOLINE) 25 MG tablet Take 2 tablets by mouth 3 (Three) Times a Day for 30 days. 8/4/20 9/3/20  Paty Polk DO   isosorbide mononitrate (ISMO,MONOKET) 10 MG tablet Take 3 tablets by mouth 2 (Two) Times a Day. 8/4/20   Paty Polk DO   losartan (Cozaar) 50 MG tablet Take 0.5 tablets by mouth Daily for 30 days. 8/23/20 9/22/20  Dre Stauffer MD   pravastatin (PRAVACHOL) 40 MG tablet Take 40 mg by mouth Every Night.    Celso Mendosa MD   risperiDONE (risperDAL) 0.5 MG tablet Take 0.5 tablets by mouth 2 (Two) Times a Day for 30 days. 8/4/20 9/3/20  Paty Polk DO   rivaroxaban (XARELTO) 20 MG tablet Take 20 mg by mouth Every Evening.    Celso Mendosa MD   rivaroxaban (Xarelto) 20 MG tablet Take 1 tablet by mouth Daily With Dinner for 30  "days. 8/4/20 9/3/20  Paty Polk,    spironolactone (ALDACTONE) 25 MG tablet Take 25 mg by mouth Daily.    Provider, MD Celso     BP (!) 197/134   Pulse 89   Temp 97.6 °F (36.4 °C) (Oral)   Resp 19   Ht 182.9 cm (72\")   Wt 114 kg (252 lb)   SpO2 94%   BMI 34.18 kg/m²   I examined the patient using the appropriate personal protective equipment.        Objective   Physical Exam  General: Well-developed male who is awake and alert slightly anxious, appears mildly short of breath  Eyes: sclera nonicteric  HEENT: Mucous membranes moist, no mucosal swelling  Neck: Supple, no nuchal rigidity, no lymphadenopathy  Respirations: Some coarse breath sounds bilaterally, respirations mildly tachypneic  Heart increased rate, irregular rhythm, no murmurs rubs or gallops,   Abdomen soft nontender nondistended, no hepatosplenomegaly, no hernia, no mass, normal bowel sounds, no CVA tenderness  Extremities no clubbing cyanosis or edema, calves are symmetric and nontender  Neuro cranial nerves grossly intact, no focal limb deficits  Psych oriented, pleasant affect  Skin no rash, brisk cap refill  Procedures           ED Course      My EKG interpretation atrial fibrillation with a rapid ventricular rate of 133, prolonged QT interval           Results for orders placed or performed during the hospital encounter of 09/01/20   COVID-19,[facility] [employer] IN-HOUSE,Nasal Swab No Transport Media 3-4 HR TAT - Swab, Nasal Cavity   Result Value Ref Range    COVID19 Not Detected Not Detected - Ref. Range   Comprehensive Metabolic Panel   Result Value Ref Range    Glucose 110 (H) 65 - 99 mg/dL    BUN      Creatinine 1.35 (H) 0.76 - 1.27 mg/dL    Sodium 140 136 - 145 mmol/L    Potassium 4.0 3.5 - 5.2 mmol/L    Chloride 108 (H) 98 - 107 mmol/L    CO2 19.0 (L) 22.0 - 29.0 mmol/L    Calcium 9.4 8.6 - 10.5 mg/dL    Total Protein 7.3 6.0 - 8.5 g/dL    Albumin 4.00 3.50 - 5.20 g/dL    ALT (SGPT) 20 1 - 41 U/L    AST (SGOT) 17 1 - 40 U/L    " Alkaline Phosphatase 71 39 - 117 U/L    Total Bilirubin 1.0 0.0 - 1.2 mg/dL    eGFR Non African Amer 53 (L) >60 mL/min/1.73    Globulin 3.3 gm/dL    A/G Ratio 1.2 g/dL    BUN/Creatinine Ratio      Anion Gap 13.0 5.0 - 15.0 mmol/L   CBC Auto Differential   Result Value Ref Range    WBC 11.30 (H) 3.40 - 10.80 10*3/mm3    RBC 5.20 4.14 - 5.80 10*6/mm3    Hemoglobin 13.7 13.0 - 17.7 g/dL    Hematocrit 42.6 37.5 - 51.0 %    MCV 81.9 79.0 - 97.0 fL    MCH 26.4 (L) 26.6 - 33.0 pg    MCHC 32.3 31.5 - 35.7 g/dL    RDW 17.9 (H) 12.3 - 15.4 %    RDW-SD 50.3 37.0 - 54.0 fl    MPV 9.1 6.0 - 12.0 fL    Platelets 195 140 - 450 10*3/mm3    Neutrophil % 71.6 42.7 - 76.0 %    Lymphocyte % 18.7 (L) 19.6 - 45.3 %    Monocyte % 8.1 5.0 - 12.0 %    Eosinophil % 0.5 0.3 - 6.2 %    Basophil % 1.1 0.0 - 1.5 %    Neutrophils, Absolute 8.10 (H) 1.70 - 7.00 10*3/mm3    Lymphocytes, Absolute 2.10 0.70 - 3.10 10*3/mm3    Monocytes, Absolute 0.90 0.10 - 0.90 10*3/mm3    Eosinophils, Absolute 0.10 0.00 - 0.40 10*3/mm3    Basophils, Absolute 0.10 0.00 - 0.20 10*3/mm3    nRBC 0.1 0.0 - 0.2 /100 WBC   Troponin   Result Value Ref Range    Troponin T <0.010 0.000 - 0.030 ng/mL   BUN   Result Value Ref Range    BUN 23 8 - 23 mg/dL   POC Lactate   Result Value Ref Range    Lactate 1.5 0.5 - 2.0 mmol/L   Light Blue Top   Result Value Ref Range    Extra Tube hold for add-on    Lavender Top   Result Value Ref Range    Extra Tube hold for add-on    Gold Top - SST   Result Value Ref Range    Extra Tube Hold for add-ons.      Xr Chest 1 View    Result Date: 9/1/2020  1.Left basilar consolidation appears unchanged, which could reflect atelectasis or pneumonia. 2.Cardiomegaly with possible small left pleural effusion.  Electronically Signed By-DR. Salty Joseph MD On:9/1/2020 3:35 PM This report was finalized on [id number] by DR. Salty Joseph MD.                                 MDM  Patient presents with dyspnea and rapid A. fib.  He is not having  cough or fever to suggest pneumonia.  I suspect he does have some mild rate related congestive failure.  Also related to his uncontrolled hypertension.  He was given Lopressor and started on Cardizem bolus and drip blood pressure was improving and heart rate was improving.  The case discussed with Paulette Larson nurse practitioner and patient be placed hospital observation for further management.  We did discuss the recurring problem of him having decompensation when he goes home.  It appears that he is not compliant with his current medication regimen.  Patient is afebrile and is not septic  Final diagnoses:   Atrial fibrillation with rapid ventricular response (CMS/[facility])   Uncontrolled hypertension   Dyspnea, unspecified type            Theo Fuentes MD  09/01/20 1600       Theo Fuentes MD  09/01/20 1600

## 2020-09-01 NOTE — H&P
Patient Care Team:  Marisol Larson APRN as PCP - General    Chief complaint shortness of breath    Subjective     Patient presented to the emergency room today with shortness of breath worsening over the past few days.  He was recently discharged from the hospital and has been staying with his brother.  He is still not getting his medications as prescribed and his family cannot take care of him at home.  I received a phone call from a  working with [employer] who has been out to see him and reports that he is incontinent of stool and definitely not able to care for himself. His own home is not suitable to live in either and according to the , the county is involved due to hoarding/cleanliness.  She spoke with his family members about getting guardianship due to his cognitive impairment, but reportedly no one will take guardianship.  Patient will likely need ECF placement.  Patient is aware that he is not making good decisions and that he is not caring for himself.  He also mentions that for the past several days he feels like his heart has felt weak whenever he stands and tries to walk.  He has to stand for several minutes before he has enough energy and oxygen to move.    Shortness of Breath   Associated symptoms include leg swelling. Pertinent negatives include no chest pain, fever, sputum production or wheezing.       Review of Systems   Constitutional: Positive for fatigue. Negative for chills and fever.   Respiratory: Positive for shortness of breath. Negative for cough, sputum production and wheezing.    Cardiovascular: Positive for palpitations and leg swelling. Negative for chest pain.   Genitourinary: Negative for difficulty urinating and dysuria.   Neurological: Negative for dizziness.        Past Medical History:   Diagnosis Date   • A-fib (CMS/HCC)    • CHF (congestive heart failure) (CMS/HCC)    • Chronic atrial fibrillation (CMS/HCC) 11/8/2019   • Chronic diastolic CHF  (congestive heart failure) (CMS/[facility]) 11/8/2019   • CKD (chronic kidney disease) stage 3, GFR 30-59 ml/min (CMS/[facility]) 11/8/2019   • Coronary artery disease involving native coronary artery of native heart without angina pectoris 11/8/2019   • DDD (degenerative disc disease), lumbosacral 11/8/2019   • Essential hypertension 11/8/2019   • Gambling disorder, persistent 11/8/2019   • GERD (gastroesophageal reflux disease) 11/8/2019   • History of CVA (cerebrovascular accident) 11/8/2019   • Hyperlipidemia    • Hypertension    • Medically noncompliant 11/8/2019   • Peripheral polyneuropathy 11/8/2019   • Psoriasis 11/8/2019   • Stroke (CMS/[facility])      Past Surgical History:   Procedure Laterality Date   • CARDIAC CATHETERIZATION     • CARDIAC CATHETERIZATION N/A 12/10/2019    Procedure: Left Heart Cath and coronary angiogram;  Surgeon: Edy Thomas MD;  Location: [facility] CATH INVASIVE LOCATION;  Service: Cardiovascular     Family History   Problem Relation Age of Onset   • Heart disease Mother      Social History     Tobacco Use   • Smoking status: Never Smoker   • Smokeless tobacco: Never Used   Substance Use Topics   • Alcohol use: No     Frequency: Never   • Drug use: No     Medications Prior to Admission   Medication Sig Dispense Refill Last Dose   • aspirin 81 MG EC tablet Take 81 mg by mouth Daily.      • carvedilol (COREG) 25 MG tablet Take 25 mg by mouth 2 (Two) Times a Day With Meals.   Unknown at Unknown time   • dilTIAZem CD (CARDIZEM CD) 180 MG 24 hr capsule Take 1 capsule by mouth Daily for 30 days. 30 capsule 0    • gabapentin (NEURONTIN) 100 MG capsule Take 100 mg by mouth Every Night.   Unknown at Unknown time   • hydrALAZINE (APRESOLINE) 25 MG tablet Take 2 tablets by mouth 3 (Three) Times a Day for 30 days. 180 tablet 5    • isosorbide mononitrate (ISMO,MONOKET) 10 MG tablet Take 3 tablets by mouth 2 (Two) Times a Day. 60 tablet 0    • losartan (Cozaar) 50 MG tablet Take 0.5 tablets by mouth Daily for 30  days. 30 tablet 3    • pravastatin (PRAVACHOL) 40 MG tablet Take 40 mg by mouth Every Night.   Unknown at Unknown time   • risperiDONE (risperDAL) 0.5 MG tablet Take 0.5 tablets by mouth 2 (Two) Times a Day for 30 days. 30 tablet 0    • rivaroxaban (Xarelto) 20 MG tablet Take 1 tablet by mouth Daily With Dinner for 30 days. 30 tablet 5    • spironolactone (ALDACTONE) 25 MG tablet Take 25 mg by mouth Daily.   Unknown at Unknown time     Allergies:  Ketorolac tromethamine    Objective      Vital Signs  Temp:  [97.6 °F (36.4 °C)-98.7 °F (37.1 °C)] 98.7 °F (37.1 °C)  Heart Rate:  [] 100  Resp:  [18-33] 33  BP: (137-220)/() 192/99    Physical Exam   Constitutional: He is oriented to person, place, and time. He appears well-developed and well-nourished.  Non-toxic appearance. He has a sickly appearance.   Cardiovascular: An irregularly irregular rhythm present. Tachycardia present.   Pulmonary/Chest: Effort normal. He has rales.   Abdominal: Soft. Bowel sounds are normal.   Musculoskeletal: He exhibits edema.   Neurological: He is alert and oriented to person, place, and time.   Skin: Skin is warm and dry.   Psychiatric: He has a normal mood and affect. Thought content normal. His speech is tangential. Cognition and memory are impaired.       Results Review:  Lab Results (last 24 hours)     Procedure Component Value Units Date/Time    Troponin [[id number]]  (Normal) Collected:  09/01/20 1431    Specimen:  Blood Updated:  09/01/20 1548     Troponin T <0.010 ng/mL     Narrative:       Troponin T Reference Range:  <= 0.03 ng/mL-   Negative for AMI  >0.03 ng/mL-     Abnormal for myocardial necrosis.  Clinicians would have to utilize clinical acumen, EKG, Troponin and serial changes to determine if it is an Acute Myocardial Infarction or myocardial injury due to an underlying chronic condition.       Results may be falsely decreased if patient taking Biotin.      [place] Draw [[id number]] Collected:  09/01/20 1431     Specimen:  Blood Updated:  09/01/20 1546    Narrative:       The following orders were created for panel order [place] Draw.  Procedure                               Abnormality         Status                     ---------                               -----------         ------                     Light Blue Top[[id number]]                                   Final result               Green Top (Gel)[[id number]]                                                             Lavender Top[[id number]]                                     Final result               Gold Top - SST[[id number]]                                   Final result                 Please view results for these tests on the individual orders.    Light Blue Top [[id number]] Collected:  09/01/20 1431    Specimen:  Blood Updated:  09/01/20 1546     Extra Tube hold for add-on     Comment: Auto resulted       Lavender Top [[id number]] Collected:  09/01/20 1431    Specimen:  Blood Updated:  09/01/20 1546     Extra Tube hold for add-on     Comment: Auto resulted       Gold Top - SST [[id number]] Collected:  09/01/20 1431    Specimen:  Blood Updated:  09/01/20 1546     Extra Tube Hold for add-ons.     Comment: Auto resulted.       BUN [[id number]]  (Normal) Collected:  09/01/20 1431    Specimen:  Blood Updated:  09/01/20 1534     BUN 23 mg/dL     Comprehensive Metabolic Panel [[id number]]  (Abnormal) Collected:  09/01/20 1431    Specimen:  Blood Updated:  09/01/20 1522     Glucose 110 mg/dL      BUN --     Comment: Testing performed by alternate method        Creatinine 1.35 mg/dL      Sodium 140 mmol/L      Potassium 4.0 mmol/L      Chloride 108 mmol/L      CO2 19.0 mmol/L      Calcium 9.4 mg/dL      Total Protein 7.3 g/dL      Albumin 4.00 g/dL      ALT (SGPT) 20 U/L      AST (SGOT) 17 U/L      Alkaline Phosphatase 71 U/L      Total Bilirubin 1.0 mg/dL      eGFR Non African Amer 53 mL/min/1.73      Globulin 3.3 gm/dL      A/G Ratio 1.2 g/dL      BUN/Creatinine Ratio  --     Comment: Testing not performed        Anion Gap 13.0 mmol/L     Narrative:       GFR Normal >60  Chronic Kidney Disease <60  Kidney Failure <15      CBC & Differential [[id number]] Collected:  09/01/20 1431    Specimen:  Blood Updated:  09/01/20 1456    Narrative:       The following orders were created for panel order CBC & Differential.  Procedure                               Abnormality         Status                     ---------                               -----------         ------                     CBC Auto Differential[[id number]]        Abnormal            Final result                 Please view results for these tests on the individual orders.    CBC Auto Differential [[id number]]  (Abnormal) Collected:  09/01/20 1431    Specimen:  Blood Updated:  09/01/20 1456     WBC 11.30 10*3/mm3      RBC 5.20 10*6/mm3      Hemoglobin 13.7 g/dL      Hematocrit 42.6 %      MCV 81.9 fL      MCH 26.4 pg      MCHC 32.3 g/dL      RDW 17.9 %      RDW-SD 50.3 fl      MPV 9.1 fL      Platelets 195 10*3/mm3      Neutrophil % 71.6 %      Lymphocyte % 18.7 %      Monocyte % 8.1 %      Eosinophil % 0.5 %      Basophil % 1.1 %      Neutrophils, Absolute 8.10 10*3/mm3      Lymphocytes, Absolute 2.10 10*3/mm3      Monocytes, Absolute 0.90 10*3/mm3      Eosinophils, Absolute 0.10 10*3/mm3      Basophils, Absolute 0.10 10*3/mm3      nRBC 0.1 /100 WBC     Blood Culture - Blood, Hand, Right [[id number]] Collected:  09/01/20 1431    Specimen:  Blood from Hand, Right Updated:  09/01/20 1452    Blood Culture - Blood, Hand, Left [[id number]] Collected:  09/01/20 1431    Specimen:  Blood from Hand, Left Updated:  09/01/20 1452    COVID-19,[facility] [employer] IN-HOUSE,Nasal Swab No Transport Media 3-4 HR TAT - Swab, Nasal Cavity [[id number]]  (Normal) Collected:  09/01/20 1414    Specimen:  Swab from Nasal Cavity Updated:  09/01/20 1452     COVID19 Not Detected    Narrative:       Fact sheet for providers: https://www.fda.gov/media/536598/download      Fact sheet for patients: https://www.fda.gov/media/387262/download    POC Lactate [[id number]]  (Normal) Collected:  09/01/20 1451    Specimen:  Blood Updated:  09/01/20 1452     Lactate 1.5 mmol/L      Comment: Serial Number: [id number]:  [id number]            Imaging Results (Last 24 Hours)     Procedure Component Value Units Date/Time    XR Chest 1 View [[id number]] Collected:  09/01/20 1531     Updated:  09/01/20 1537    Narrative:       XR CHEST 1 VW-     Date of Exam: 9/1/2020 3:10 PM     Indication: soa.     Comparison Exams: 08/21/2020     Technique: Single AP chest radiograph     FINDINGS:  A left basilar consolidation appears unchanged. There may be an  associated small left pleural effusion. The heart is enlarged. The  pulmonary vasculature appears normal. The osseous structures appear  intact.       Impression:       1.Left basilar consolidation appears unchanged, which could reflect  atelectasis or pneumonia.  2.Cardiomegaly with possible small left pleural effusion.     Electronically Signed By-DR. Salty Joseph MD On:9/1/2020 3:35 PM  This report was finalized on [id number] by DR. Salty Joseph MD.           I reviewed the patient's new clinical results.      Assessment/Plan       Atrial fibrillation with rapid ventricular response (CMS/HCC)    Chronic atrial fibrillation (CMS/HCC)    Chronic diastolic CHF (congestive heart failure) (CMS/HCC)    CKD (chronic kidney disease) stage 3, GFR 30-59 ml/min (CMS/HCC)    Gambling disorder, persistent    Coronary artery disease involving native coronary artery of native heart without angina pectoris    Psoriasis    History of CVA (cerebrovascular accident)    DDD (degenerative disc disease), lumbosacral    Peripheral polyneuropathy    GERD (gastroesophageal reflux disease)    Medically noncompliant    Mild cognitive impairment    Uncontrolled hypertension    Dyspnea      Assessment:    Condition: In serious condition.       Plan:   (Consult  cardiology and nephrology.  Check serial troponin and AM labs. He is currently on a Cardene drip.  Consult  and case management as he is not safe to go home.  He really needs ECF placement.).       I discussed the patients findings and my recommendations with patient and primary care team    CLARISSA Meraz  09/01/20  19:40

## 2020-09-01 NOTE — PLAN OF CARE
"Patient arrived to [facility] with critically high blood pressure at 220/151.  Cardizem drip started for atrial fibrillation. Hydralazine PRN ordered for blood pressure.  Cardizem drip later stopped after talking with Dr. Mccullough and Cardene drip initiated.    Patient states he has not had his home medications in \"a while\", and he will need new prescriptions upon discharge.    Skin:  Moisture associated barely blanching redness in the gluteal fold and groin.  Crusted lesions observed around the gluteal fold, mid back, and mid abdomen.    Problem: Skin Injury Risk (Adult)  Goal: Identify Related Risk Factors and Signs and Symptoms  Outcome: Ongoing (interventions implemented as appropriate)  Flowsheets (Taken 9/1/2020 5406)  Related Risk Factors (Skin Injury Risk): body weight extremes;moisture;mobility impaired  Goal: Skin Health and Integrity  Outcome: Ongoing (interventions implemented as appropriate)     Problem: Fall Risk (Adult)  Goal: Identify Related Risk Factors and Signs and Symptoms  Outcome: Ongoing (interventions implemented as appropriate)  Goal: Absence of Fall  Outcome: Ongoing (interventions implemented as appropriate)     Problem: Arrhythmia/Dysrhythmia (Symptomatic) (Adult)  Goal: Signs and Symptoms of Listed Potential Problems Will be Absent, Minimized or Managed (Arrhythmia/Dysrhythmia)  Outcome: Ongoing (interventions implemented as appropriate)     Problem: Hypertensive Disease/Crisis (Arterial) (Adult)  Goal: Signs and Symptoms of Listed Potential Problems Will be Absent, Minimized or Managed (Hypertensive Disease/Crisis)  Outcome: Ongoing (interventions implemented as appropriate)       "

## 2020-09-02 ENCOUNTER — APPOINTMENT (OUTPATIENT)
Dept: ULTRASOUND IMAGING | Facility: HOSPITAL | Age: 65
End: 2020-09-02

## 2020-09-02 ENCOUNTER — APPOINTMENT (OUTPATIENT)
Dept: CT IMAGING | Facility: HOSPITAL | Age: 65
End: 2020-09-02

## 2020-09-02 PROBLEM — L30.8 DERMATITIS ASSOCIATED WITH MOISTURE: Status: ACTIVE | Noted: 2020-09-02

## 2020-09-02 LAB
ANION GAP SERPL CALCULATED.3IONS-SCNC: 11 MMOL/L (ref 5–15)
BACTERIA BLD CULT: ABNORMAL
BACTERIA UR QL AUTO: ABNORMAL /HPF
BASOPHILS # BLD AUTO: 0.1 10*3/MM3 (ref 0–0.2)
BASOPHILS NFR BLD AUTO: 0.5 % (ref 0–1.5)
BILIRUB UR QL STRIP: NEGATIVE
BOTTLE TYPE: ABNORMAL
BUN SERPL-MCNC: 21 MG/DL (ref 8–23)
BUN SERPL-MCNC: ABNORMAL MG/DL
BUN/CREAT SERPL: ABNORMAL
CALCIUM SPEC-SCNC: 8.6 MG/DL (ref 8.6–10.5)
CHLORIDE SERPL-SCNC: 108 MMOL/L (ref 98–107)
CK SERPL-CCNC: 33 U/L (ref 20–200)
CLARITY UR: ABNORMAL
CO2 SERPL-SCNC: 23 MMOL/L (ref 22–29)
COLOR UR: ABNORMAL
CREAT SERPL-MCNC: 1.18 MG/DL (ref 0.76–1.27)
DEPRECATED RDW RBC AUTO: 51.6 FL (ref 37–54)
DIGOXIN SERPL-MCNC: 0.3 NG/ML (ref 0.6–1.2)
EOSINOPHIL # BLD AUTO: 0.1 10*3/MM3 (ref 0–0.4)
EOSINOPHIL NFR BLD AUTO: 0.6 % (ref 0.3–6.2)
EOSINOPHIL SPEC QL MICRO: 0 % EOS/100 CELLS (ref 0–0)
ERYTHROCYTE [DISTWIDTH] IN BLOOD BY AUTOMATED COUNT: 18.1 % (ref 12.3–15.4)
GFR SERPL CREATININE-BSD FRML MDRD: 62 ML/MIN/1.73
GLUCOSE SERPL-MCNC: 128 MG/DL (ref 65–99)
GLUCOSE UR STRIP-MCNC: NEGATIVE MG/DL
HCT VFR BLD AUTO: 39.9 % (ref 37.5–51)
HGB BLD-MCNC: 13 G/DL (ref 13–17.7)
HGB UR QL STRIP.AUTO: NEGATIVE
HYALINE CASTS UR QL AUTO: ABNORMAL /LPF
KETONES UR QL STRIP: NEGATIVE
LEUKOCYTE ESTERASE UR QL STRIP.AUTO: NEGATIVE
LYMPHOCYTES # BLD AUTO: 1.1 10*3/MM3 (ref 0.7–3.1)
LYMPHOCYTES NFR BLD AUTO: 11.6 % (ref 19.6–45.3)
MAGNESIUM SERPL-MCNC: 2.1 MG/DL (ref 1.6–2.4)
MCH RBC QN AUTO: 26.6 PG (ref 26.6–33)
MCHC RBC AUTO-ENTMCNC: 32.6 G/DL (ref 31.5–35.7)
MCV RBC AUTO: 81.8 FL (ref 79–97)
MONOCYTES # BLD AUTO: 0.9 10*3/MM3 (ref 0.1–0.9)
MONOCYTES NFR BLD AUTO: 9.2 % (ref 5–12)
NEUTROPHILS NFR BLD AUTO: 7.5 10*3/MM3 (ref 1.7–7)
NEUTROPHILS NFR BLD AUTO: 78.1 % (ref 42.7–76)
NITRITE UR QL STRIP: NEGATIVE
NT-PROBNP SERPL-MCNC: 7458 PG/ML (ref 0–900)
PH UR STRIP.AUTO: 5.5 [PH] (ref 5–8)
PHOSPHATE SERPL-MCNC: 3 MG/DL (ref 2.5–4.5)
PLATELET # BLD AUTO: 164 10*3/MM3 (ref 140–450)
PMV BLD AUTO: 8.6 FL (ref 6–12)
POTASSIUM SERPL-SCNC: 3.6 MMOL/L (ref 3.5–5.2)
PROCALCITONIN SERPL-MCNC: 0.08 NG/ML (ref 0–0.25)
PROT UR QL STRIP: ABNORMAL
PTH-INTACT SERPL-MCNC: 44.3 PG/ML (ref 15–65)
RBC # BLD AUTO: 4.87 10*6/MM3 (ref 4.14–5.8)
RBC # UR: ABNORMAL /HPF
REF LAB TEST METHOD: ABNORMAL
SODIUM SERPL-SCNC: 142 MMOL/L (ref 136–145)
SODIUM UR-SCNC: 68 MMOL/L
SP GR UR STRIP: 1.02 (ref 1–1.03)
SQUAMOUS #/AREA URNS HPF: ABNORMAL /HPF
T4 FREE SERPL-MCNC: 1.23 NG/DL (ref 0.93–1.7)
TROPONIN T SERPL-MCNC: <0.01 NG/ML (ref 0–0.03)
TROPONIN T SERPL-MCNC: <0.01 NG/ML (ref 0–0.03)
TSH SERPL DL<=0.05 MIU/L-ACNC: 2.76 UIU/ML (ref 0.27–4.2)
URATE SERPL-MCNC: 7.8 MG/DL (ref 3.4–7)
UROBILINOGEN UR QL STRIP: ABNORMAL
WBC # BLD AUTO: 9.7 10*3/MM3 (ref 3.4–10.8)
WBC UR QL AUTO: ABNORMAL /HPF

## 2020-09-02 PROCEDURE — 84550 ASSAY OF BLOOD/URIC ACID: CPT | Performed by: INTERNAL MEDICINE

## 2020-09-02 PROCEDURE — 84484 ASSAY OF TROPONIN QUANT: CPT | Performed by: NURSE PRACTITIONER

## 2020-09-02 PROCEDURE — G0378 HOSPITAL OBSERVATION PER HR: HCPCS

## 2020-09-02 PROCEDURE — 97167 OT EVAL HIGH COMPLEX 60 MIN: CPT

## 2020-09-02 PROCEDURE — 99212 OFFICE O/P EST SF 10 MIN: CPT | Performed by: NURSE PRACTITIONER

## 2020-09-02 PROCEDURE — 70450 CT HEAD/BRAIN W/O DYE: CPT

## 2020-09-02 PROCEDURE — 25010000002 HYDRALAZINE PER 20 MG: Performed by: NURSE PRACTITIONER

## 2020-09-02 PROCEDURE — 87205 SMEAR GRAM STAIN: CPT | Performed by: INTERNAL MEDICINE

## 2020-09-02 PROCEDURE — 84145 PROCALCITONIN (PCT): CPT | Performed by: FAMILY MEDICINE

## 2020-09-02 PROCEDURE — 81001 URINALYSIS AUTO W/SCOPE: CPT | Performed by: INTERNAL MEDICINE

## 2020-09-02 PROCEDURE — 76775 US EXAM ABDO BACK WALL LIM: CPT

## 2020-09-02 PROCEDURE — 97161 PT EVAL LOW COMPLEX 20 MIN: CPT

## 2020-09-02 PROCEDURE — 82550 ASSAY OF CK (CPK): CPT | Performed by: INTERNAL MEDICINE

## 2020-09-02 PROCEDURE — 94640 AIRWAY INHALATION TREATMENT: CPT

## 2020-09-02 PROCEDURE — 94799 UNLISTED PULMONARY SVC/PX: CPT

## 2020-09-02 PROCEDURE — 99222 1ST HOSP IP/OBS MODERATE 55: CPT | Performed by: INTERNAL MEDICINE

## 2020-09-02 PROCEDURE — 84439 ASSAY OF FREE THYROXINE: CPT | Performed by: FAMILY MEDICINE

## 2020-09-02 PROCEDURE — 84300 ASSAY OF URINE SODIUM: CPT | Performed by: INTERNAL MEDICINE

## 2020-09-02 PROCEDURE — 80048 BASIC METABOLIC PNL TOTAL CA: CPT | Performed by: NURSE PRACTITIONER

## 2020-09-02 PROCEDURE — 83880 ASSAY OF NATRIURETIC PEPTIDE: CPT | Performed by: NURSE PRACTITIONER

## 2020-09-02 PROCEDURE — 83735 ASSAY OF MAGNESIUM: CPT | Performed by: NURSE PRACTITIONER

## 2020-09-02 PROCEDURE — 80162 ASSAY OF DIGOXIN TOTAL: CPT | Performed by: INTERNAL MEDICINE

## 2020-09-02 PROCEDURE — 83970 ASSAY OF PARATHORMONE: CPT | Performed by: INTERNAL MEDICINE

## 2020-09-02 PROCEDURE — 84443 ASSAY THYROID STIM HORMONE: CPT | Performed by: INTERNAL MEDICINE

## 2020-09-02 PROCEDURE — 25010000002 FUROSEMIDE PER 20 MG: Performed by: FAMILY MEDICINE

## 2020-09-02 PROCEDURE — 84100 ASSAY OF PHOSPHORUS: CPT | Performed by: NURSE PRACTITIONER

## 2020-09-02 PROCEDURE — 97535 SELF CARE MNGMENT TRAINING: CPT

## 2020-09-02 PROCEDURE — 85027 COMPLETE CBC AUTOMATED: CPT | Performed by: NURSE PRACTITIONER

## 2020-09-02 RX ORDER — IPRATROPIUM BROMIDE AND ALBUTEROL SULFATE 2.5; .5 MG/3ML; MG/3ML
3 SOLUTION RESPIRATORY (INHALATION)
Status: DISCONTINUED | OUTPATIENT
Start: 2020-09-02 | End: 2020-09-12 | Stop reason: HOSPADM

## 2020-09-02 RX ORDER — BUMETANIDE 1 MG/1
1 TABLET ORAL
Status: DISCONTINUED | OUTPATIENT
Start: 2020-09-02 | End: 2020-09-05

## 2020-09-02 RX ORDER — RISPERIDONE 0.25 MG/1
0.25 TABLET ORAL 2 TIMES DAILY
Status: DISCONTINUED | OUTPATIENT
Start: 2020-09-02 | End: 2020-09-05

## 2020-09-02 RX ORDER — ALBUTEROL SULFATE 2.5 MG/3ML
2.5 SOLUTION RESPIRATORY (INHALATION) EVERY 6 HOURS PRN
Status: DISCONTINUED | OUTPATIENT
Start: 2020-09-02 | End: 2020-09-12 | Stop reason: HOSPADM

## 2020-09-02 RX ORDER — CLOTRIMAZOLE AND BETAMETHASONE DIPROPIONATE 10; .64 MG/G; MG/G
CREAM TOPICAL EVERY 12 HOURS SCHEDULED
Status: DISCONTINUED | OUTPATIENT
Start: 2020-09-02 | End: 2020-09-12 | Stop reason: HOSPADM

## 2020-09-02 RX ORDER — ACETAMINOPHEN 325 MG/1
650 TABLET ORAL EVERY 6 HOURS PRN
Status: DISCONTINUED | OUTPATIENT
Start: 2020-09-02 | End: 2020-09-12 | Stop reason: HOSPADM

## 2020-09-02 RX ORDER — FUROSEMIDE 10 MG/ML
20 INJECTION INTRAMUSCULAR; INTRAVENOUS
Status: DISCONTINUED | OUTPATIENT
Start: 2020-09-02 | End: 2020-09-02

## 2020-09-02 RX ADMIN — HYDRALAZINE HYDROCHLORIDE 10 MG: 20 INJECTION INTRAMUSCULAR; INTRAVENOUS at 23:06

## 2020-09-02 RX ADMIN — HYDRALAZINE HYDROCHLORIDE 50 MG: 25 TABLET, FILM COATED ORAL at 20:28

## 2020-09-02 RX ADMIN — ISOSORBIDE MONONITRATE 30 MG: 20 TABLET ORAL at 17:13

## 2020-09-02 RX ADMIN — RIVAROXABAN 20 MG: 20 TABLET, FILM COATED ORAL at 17:14

## 2020-09-02 RX ADMIN — RISPERIDONE 0.25 MG: 0.25 TABLET ORAL at 20:28

## 2020-09-02 RX ADMIN — CLOTRIMAZOLE AND BETAMETHASONE DIPROPIONATE: 10; .64 CREAM TOPICAL at 20:27

## 2020-09-02 RX ADMIN — ISOSORBIDE MONONITRATE 30 MG: 20 TABLET ORAL at 08:22

## 2020-09-02 RX ADMIN — Medication 10 ML: at 20:28

## 2020-09-02 RX ADMIN — ASPIRIN 81 MG: 81 TABLET, COATED ORAL at 08:22

## 2020-09-02 RX ADMIN — METOPROLOL TARTRATE 50 MG: 50 TABLET, FILM COATED ORAL at 08:21

## 2020-09-02 RX ADMIN — FUROSEMIDE 20 MG: 10 INJECTION, SOLUTION INTRAMUSCULAR; INTRAVENOUS at 11:47

## 2020-09-02 RX ADMIN — METOPROLOL TARTRATE 50 MG: 50 TABLET, FILM COATED ORAL at 20:28

## 2020-09-02 RX ADMIN — ATORVASTATIN CALCIUM 10 MG: 10 TABLET, FILM COATED ORAL at 20:28

## 2020-09-02 RX ADMIN — HYDRALAZINE HYDROCHLORIDE 50 MG: 25 TABLET, FILM COATED ORAL at 08:21

## 2020-09-02 RX ADMIN — CLOTRIMAZOLE AND BETAMETHASONE DIPROPIONATE: 10; .64 CREAM TOPICAL at 11:48

## 2020-09-02 RX ADMIN — RISPERIDONE 0.25 MG: 0.25 TABLET ORAL at 08:22

## 2020-09-02 RX ADMIN — Medication 10 ML: at 08:22

## 2020-09-02 RX ADMIN — GABAPENTIN 100 MG: 100 CAPSULE ORAL at 20:28

## 2020-09-02 RX ADMIN — SPIRONOLACTONE 25 MG: 25 TABLET, FILM COATED ORAL at 08:22

## 2020-09-02 RX ADMIN — HYDRALAZINE HYDROCHLORIDE 50 MG: 25 TABLET, FILM COATED ORAL at 17:13

## 2020-09-02 RX ADMIN — LOSARTAN POTASSIUM 25 MG: 25 TABLET ORAL at 08:21

## 2020-09-02 RX ADMIN — IPRATROPIUM BROMIDE AND ALBUTEROL SULFATE 3 ML: 2.5; .5 SOLUTION RESPIRATORY (INHALATION) at 18:49

## 2020-09-02 RX ADMIN — BUMETANIDE 1 MG: 1 TABLET ORAL at 17:14

## 2020-09-02 RX ADMIN — IPRATROPIUM BROMIDE AND ALBUTEROL SULFATE 3 ML: 2.5; .5 SOLUTION RESPIRATORY (INHALATION) at 15:04

## 2020-09-02 NOTE — CONSULTS
Referring Provider: Dr. Stauffer  Reason for Consultation: Atrial fibrillation    Patient Care Team:  Marisol Larson APRN as PCP - General  Shwetha Barber MD as Consulting Physician (Nephrology)    Chief complaint palpitations    Subjective .     History of present illness:  Carlos Whipple is a 64 y.o. male with history of coronary artery disease congestive heart failure with diastolic dysfunction chronic renal insufficiency atrial fibrillation hypertension hyperlipidemia presented to the hospital complains of palpitations and shortness of breath.  Patient was in the hospital recently and was placed on medications and was sent home with medical therapy but we are not sure whether he is taking his medicines regularly or not.  Patient did not have any chest pains.  No complains of any PND orthopnea.  No dizziness syncope or swelling of the feet.    Review of Systems   Constitution: Negative for fever and malaise/fatigue.   HENT: Negative for ear pain and nosebleeds.    Eyes: Negative for blurred vision and double vision.   Cardiovascular: Positive for palpitations. Negative for chest pain and dyspnea on exertion.   Respiratory: Positive for shortness of breath. Negative for cough.    Skin: Negative for rash.   Musculoskeletal: Negative for joint pain.   Gastrointestinal: Negative for abdominal pain, nausea and vomiting.   Neurological: Negative for focal weakness and headaches.   Psychiatric/Behavioral: Negative for depression. The patient is not nervous/anxious.    All other systems reviewed and are negative.      History  Past Medical History:   Diagnosis Date   • A-fib (CMS/[facility])    • CHF (congestive heart failure) (CMS/[facility])    • Chronic atrial fibrillation (CMS/[facility]) 11/8/2019   • Chronic diastolic CHF (congestive heart failure) (CMS/[facility]) 11/8/2019   • CKD (chronic kidney disease) stage 3, GFR 30-59 ml/min (CMS/[facility]) 11/8/2019   • Coronary artery disease involving native coronary artery of native heart  without angina pectoris 11/8/2019   • DDD (degenerative disc disease), lumbosacral 11/8/2019   • Essential hypertension 11/8/2019   • Gambling disorder, persistent 11/8/2019   • GERD (gastroesophageal reflux disease) 11/8/2019   • History of CVA (cerebrovascular accident) 11/8/2019   • Hyperlipidemia    • Hypertension    • Medically noncompliant 11/8/2019   • Peripheral polyneuropathy 11/8/2019   • Psoriasis 11/8/2019   • Stroke (CMS/HCC)        Past Surgical History:   Procedure Laterality Date   • CARDIAC CATHETERIZATION     • CARDIAC CATHETERIZATION N/A 12/10/2019    Procedure: Left Heart Cath and coronary angiogram;  Surgeon: Edy Thomas MD;  Location: [facility] CATH INVASIVE LOC[facility];  Service: Cardiovascular       Family History   Problem Relation Age of Onset   • Heart disease Mother        Social History     Tobacco Use   • Smoking status: Never Smoker   • Smokeless tobacco: Never Used   Substance Use Topics   • Alcohol use: No     Frequency: Never   • Drug use: No        Medications Prior to Admission   Medication Sig Dispense Refill Last Dose   • aspirin 81 MG EC tablet Take 81 mg by mouth Daily.      • carvedilol (COREG) 25 MG tablet Take 25 mg by mouth 2 (Two) Times a Day With Meals.   Unknown at Unknown time   • dilTIAZem CD (CARDIZEM CD) 180 MG 24 hr capsule Take 1 capsule by mouth Daily for 30 days. 30 capsule 0    • gabapentin (NEURONTIN) 100 MG capsule Take 100 mg by mouth Every Night.   Unknown at Unknown time   • hydrALAZINE (APRESOLINE) 25 MG tablet Take 2 tablets by mouth 3 (Three) Times a Day for 30 days. 180 tablet 5    • isosorbide mononitrate (ISMO,MONOKET) 10 MG tablet Take 3 tablets by mouth 2 (Two) Times a Day. 60 tablet 0    • losartan (Cozaar) 50 MG tablet Take 0.5 tablets by mouth Daily for 30 days. 30 tablet 3    • pravastatin (PRAVACHOL) 40 MG tablet Take 40 mg by mouth Every Night.   Unknown at Unknown time   • risperiDONE (risperDAL) 0.5 MG tablet Take 0.5 tablets by mouth 2 (Two)  "Times a Day for 30 days. 30 tablet 0    • rivaroxaban (Xarelto) 20 MG tablet Take 1 tablet by mouth Daily With Dinner for 30 days. 30 tablet 5    • spironolactone (ALDACTONE) 25 MG tablet Take 25 mg by mouth Daily.   Unknown at Unknown time         Ketorolac tromethamine    Scheduled Meds:  aspirin 81 mg Oral Daily   atorvastatin 10 mg Oral Nightly   bumetanide 1 mg Oral BID   clotrimazole-betamethasone  Topical Q12H   gabapentin 100 mg Oral Nightly   hydrALAZINE 50 mg Oral TID   ipratropium-albuterol 3 mL Nebulization 4x Daily - RT   isosorbide mononitrate 30 mg Oral BID - Nitrates   metoprolol tartrate 50 mg Oral Q12H   risperiDONE 0.25 mg Oral BID   rivaroxaban 20 mg Oral Daily With Dinner   sodium chloride 10 mL Intravenous Q12H   spironolactone 25 mg Oral Daily     Continuous Infusions:  niCARdipine 5-15 mg/hr Last Rate: Stopped (09/02/20 0019)     PRN Meds:.•  acetaminophen  •  albuterol  •  hydrALAZINE  •  magnesium hydroxide  •  sodium chloride  •  sodium chloride    Objective     VITAL SIGNS  Vitals:    09/02/20 1013 09/02/20 1412 09/02/20 1504 09/02/20 1508   BP: 136/81 143/86     BP Location: Left arm Right arm     Patient Position: Lying Sitting     Pulse:   102 96   Resp: 25 25 18 18   Temp: 97.5 °F (36.4 °C) 98.6 °F (37 °C)     TempSrc: Oral Oral     SpO2: 92% 93% 93% 93%   Weight:       Height:           Flowsheet Rows      First Filed Value   Admission Height  182.9 cm (72\") Documented at 09/01/2020 1338   Admission Weight  114 kg (252 lb) Documented at 09/01/2020 1338           TELEMETRY: Atrial fibrillation with rapid ventricular response  Physical Exam:  Physical Exam   Constitutional: He appears well-developed and well-nourished.   HENT:   Head: Normocephalic and atraumatic.   Eyes: Pupils are equal, round, and reactive to light. Conjunctivae and EOM are normal. No scleral icterus.   Neck: Normal range of motion. Neck supple. No JVD present. Carotid bruit is not present.   Cardiovascular: Normal " rate, S1 normal, S2 normal, normal heart sounds and intact distal pulses. An irregularly irregular rhythm present. PMI is not displaced.   Pulmonary/Chest: Effort normal and breath sounds normal. He has no wheezes. He has no rales.   Abdominal: Soft. Bowel sounds are normal.   Musculoskeletal: Normal range of motion.   Neurological: He is alert. He has normal strength.   No focal deficits   Skin: Skin is warm and dry. No rash noted.   Psychiatric: He has a normal mood and affect.        Results Review:   I reviewed the patient's new clinical results.  Lab Results (last 24 hours)     Procedure Component Value Units Date/Time    Blood Culture - Blood, Hand, Right [[id number]] Collected:  09/01/20 1431    Specimen:  Blood from Hand, Right Updated:  09/02/20 1500     Blood Culture No growth at 24 hours    Blood Culture - Blood, Hand, Left [[id number]]  (Abnormal) Collected:  09/01/20 1431    Specimen:  Blood from Hand, Left Updated:  09/02/20 1317     Blood Culture Abnormal Stain     Gram Stain Anaerobic Bottle Gram positive cocci in clusters    Blood Culture ID, PCR - Blood, Hand, Left [[id number]]  (Abnormal) Collected:  09/01/20 1431    Specimen:  Blood from Hand, Left Updated:  09/02/20 1317     BCID, PCR Staphylococcus spp, not aureus. Identification by BCID PCR.     BOTTLE TYPE Anaerobic Bottle    Procalcitonin [[id number]]  (Normal) Collected:  09/02/20 0252    Specimen:  Blood Updated:  09/02/20 1130     Procalcitonin 0.08 ng/mL     Narrative:       As a Marker for Sepsis (Non-Neonates):   1. <0.5 ng/mL represents a low risk of severe sepsis and/or septic shock.  1. >2 ng/mL represents a high risk of severe sepsis and/or septic shock.    As a Marker for Lower Respiratory Tract Infections that require antibiotic therapy:  PCT on Admission     Antibiotic Therapy             6-12 Hrs later  > 0.5                Strongly Recommended            >0.25 - <0.5         Recommended  0.1 - 0.25           Discouraged             "       Remeasure/reassess PCT  <0.1                 Strongly Discouraged          Remeasure/reassess PCT      As 28 day mortality risk marker: \"Change in Procalcitonin Result\" (> 80 % or <=80 %) if Day 0 (or Day 1) and Day 4 values are available. Refer to [email or web address]-[email or web address]-[email or web address]   Change in PCT <=80 %   A decrease of PCT levels below or equal to 80 % defines a positive change in PCT test result representing a higher risk for 28-day all-cause mortality of patients diagnosed with severe sepsis or septic shock.  Change in PCT > 80 %   A decrease of PCT levels of more than 80 % defines a negative change in PCT result representing a lower risk for 28-day all-cause mortality of patients diagnosed with severe sepsis or septic shock.                Results may be falsely decreased if patient taking Biotin.     Troponin [[id number]]  (Normal) Collected:  09/02/20 1033    Specimen:  Blood Updated:  09/02/20 1128     Troponin T <0.010 ng/mL     Narrative:       Troponin T Reference Range:  <= 0.03 ng/mL-   Negative for AMI  >0.03 ng/mL-     Abnormal for myocardial necrosis.  Clinicians would have to utilize clinical acumen, EKG, Troponin and serial changes to determine if it is an Acute Myocardial Infarction or myocardial injury due to an underlying chronic condition.       Results may be falsely decreased if patient taking Biotin.      T4, Free [[id number]]  (Normal) Collected:  09/02/20 1033    Specimen:  Blood Updated:  09/02/20 1128     Free T4 1.23 ng/dL     Narrative:       Results may be falsely increased if patient taking Biotin.      Eosinophil Smear - Urine, Urine, Clean Catch [[id number]]  (Normal) Collected:  09/02/20 0928    Specimen:  Urine, Clean Catch Updated:  09/02/20 1123     Eosinophil Smear 0 % EOS/100 Cells     PTH, Intact [[id number]]  (Normal) Collected:  09/02/20 1033    Specimen:  Blood Updated:  09/02/20 1119     PTH, Intact 44.3 pg/mL     Narrative:       Results may be falsely decreased " if patient taking Biotin.      Sodium, Urine, Random - Urine, Clean Catch [[id number]] Collected:  09/02/20 0928    Specimen:  Urine, Clean Catch Updated:  09/02/20 1026     Sodium, Urine 68 mmol/L     Narrative:       Reference intervals for random urine have not been established.  Clinical usage is dependent upon physician's interpretation in combination with other laboratory tests.       TSH [[id number]]  (Normal) Collected:  09/02/20 0252    Specimen:  Blood Updated:  09/02/20 0855     TSH 2.760 uIU/mL     CK [[id number]]  (Normal) Collected:  09/02/20 0252    Specimen:  Blood Updated:  09/02/20 0848     Creatine Kinase 33 U/L     Digoxin Level [[id number]]  (Abnormal) Collected:  09/02/20 0252    Specimen:  Blood Updated:  09/02/20 0848     Digoxin 0.30 ng/mL     Uric Acid [[id number]]  (Abnormal) Collected:  09/02/20 0252    Specimen:  Blood Updated:  09/02/20 0848     Uric Acid 7.8 mg/dL     BUN [[id number]]  (Normal) Collected:  09/02/20 0252    Specimen:  Blood Updated:  09/02/20 0817     BUN 21 mg/dL     Troponin [[id number]]  (Normal) Collected:  09/02/20 0252    Specimen:  Blood Updated:  09/02/20 0344     Troponin T <0.010 ng/mL     Narrative:       Troponin T Reference Range:  <= 0.03 ng/mL-   Negative for AMI  >0.03 ng/mL-     Abnormal for myocardial necrosis.  Clinicians would have to utilize clinical acumen, EKG, Troponin and serial changes to determine if it is an Acute Myocardial Infarction or myocardial injury due to an underlying chronic condition.       Results may be falsely decreased if patient taking Biotin.      BNP [[id number]]  (Abnormal) Collected:  09/02/20 0252    Specimen:  Blood Updated:  09/02/20 0344     proBNP 7,458.0 pg/mL     Narrative:       Among patients with dyspnea, NT-proBNP is highly sensitive for the detection of acute congestive heart failure. In addition NT-proBNP of <300 pg/ml effectively rules out acute congestive heart failure with 99% negative predictive value.    Results  may be falsely decreased if patient taking Biotin.      Magnesium [[id number]]  (Normal) Collected:  09/02/20 0252    Specimen:  Blood Updated:  09/02/20 0335     Magnesium 2.1 mg/dL     Basic Metabolic Panel [[id number]]  (Abnormal) Collected:  09/02/20 0252    Specimen:  Blood Updated:  09/02/20 0335     Glucose 128 mg/dL      BUN --     Comment: Testing performed by alternate method        Creatinine 1.18 mg/dL      Sodium 142 mmol/L      Potassium 3.6 mmol/L      Chloride 108 mmol/L      CO2 23.0 mmol/L      Calcium 8.6 mg/dL      eGFR Non African Amer 62 mL/min/1.73      BUN/Creatinine Ratio --     Comment: Testing not performed        Anion Gap 11.0 mmol/L     Narrative:       GFR Normal >60  Chronic Kidney Disease <60  Kidney Failure <15      Phosphorus [[id number]]  (Normal) Collected:  09/02/20 0252    Specimen:  Blood Updated:  09/02/20 0335     Phosphorus 3.0 mg/dL     CBC & Differential [[id number]] Collected:  09/02/20 0252    Specimen:  Blood Updated:  09/02/20 0329    Narrative:       The following orders were created for panel order CBC & Differential.  Procedure                               Abnormality         Status                     ---------                               -----------         ------                     Manual Differential[[id number]]                                                         CBC Auto Differential[[id number]]        Abnormal            Final result                 Please view results for these tests on the individual orders.    CBC Auto Differential [[id number]]  (Abnormal) Collected:  09/02/20 0252    Specimen:  Blood Updated:  09/02/20 0329     WBC 9.70 10*3/mm3      RBC 4.87 10*6/mm3      Hemoglobin 13.0 g/dL      Hematocrit 39.9 %      MCV 81.8 fL      MCH 26.6 pg      MCHC 32.6 g/dL      RDW 18.1 %      RDW-SD 51.6 fl      MPV 8.6 fL      Platelets 164 10*3/mm3      Neutrophil % 78.1 %      Lymphocyte % 11.6 %      Monocyte % 9.2 %      Eosinophil % 0.6 %       Basophil % 0.5 %      Neutrophils, Absolute 7.50 10*3/mm3      Lymphocytes, Absolute 1.10 10*3/mm3      Monocytes, Absolute 0.90 10*3/mm3      Eosinophils, Absolute 0.10 10*3/mm3      Basophils, Absolute 0.10 10*3/mm3     Troponin [[id number]]  (Normal) Collected:  09/01/20 2028    Specimen:  Blood Updated:  09/01/20 2105     Troponin T <0.010 ng/mL     Narrative:       Troponin T Reference Range:  <= 0.03 ng/mL-   Negative for AMI  >0.03 ng/mL-     Abnormal for myocardial necrosis.  Clinicians would have to utilize clinical acumen, EKG, Troponin and serial changes to determine if it is an Acute Myocardial Infarction or myocardial injury due to an underlying chronic condition.       Results may be falsely decreased if patient taking Biotin.      Troponin [[id number]]  (Normal) Collected:  09/01/20 1431    Specimen:  Blood Updated:  09/01/20 1548     Troponin T <0.010 ng/mL     Narrative:       Troponin T Reference Range:  <= 0.03 ng/mL-   Negative for AMI  >0.03 ng/mL-     Abnormal for myocardial necrosis.  Clinicians would have to utilize clinical acumen, EKG, Troponin and serial changes to determine if it is an Acute Myocardial Infarction or myocardial injury due to an underlying chronic condition.       Results may be falsely decreased if patient taking Biotin.      [facility] [[id number]] Collected:  09/01/20 1431    Specimen:  Blood Updated:  09/01/20 1546    Narrative:       The following orders were created for panel order [facility].  Procedure                               Abnormality         Status                     ---------                               -----------         ------                     Light Blue Top[[id number]]                                   Final result               Green Top (Gel)[[id number]]                                                             Lavender Top[[id number]]                                     Final result               Gold Top - SST[[id number]]                                    Final result                 Please view results for these tests on the individual orders.    Light Blue Top [[id number]] Collected:  09/01/20 1431    Specimen:  Blood Updated:  09/01/20 1546     Extra Tube hold for add-on     Comment: Auto resulted       Lavender Top [[id number]] Collected:  09/01/20 1431    Specimen:  Blood Updated:  09/01/20 1546     Extra Tube hold for add-on     Comment: Auto resulted       Gold Top - SST [[id number]] Collected:  09/01/20 1431    Specimen:  Blood Updated:  09/01/20 1546     Extra Tube Hold for add-ons.     Comment: Auto resulted.       BUN [[id number]]  (Normal) Collected:  09/01/20 1431    Specimen:  Blood Updated:  09/01/20 1534     BUN 23 mg/dL     Comprehensive Metabolic Panel [[id number]]  (Abnormal) Collected:  09/01/20 1431    Specimen:  Blood Updated:  09/01/20 1522     Glucose 110 mg/dL      BUN --     Comment: Testing performed by alternate method        Creatinine 1.35 mg/dL      Sodium 140 mmol/L      Potassium 4.0 mmol/L      Chloride 108 mmol/L      CO2 19.0 mmol/L      Calcium 9.4 mg/dL      Total Protein 7.3 g/dL      Albumin 4.00 g/dL      ALT (SGPT) 20 U/L      AST (SGOT) 17 U/L      Alkaline Phosphatase 71 U/L      Total Bilirubin 1.0 mg/dL      eGFR Non African Amer 53 mL/min/1.73      Globulin 3.3 gm/dL      A/G Ratio 1.2 g/dL      BUN/Creatinine Ratio --     Comment: Testing not performed        Anion Gap 13.0 mmol/L     Narrative:       GFR Normal >60  Chronic Kidney Disease <60  Kidney Failure <15            Imaging Results (Last 24 Hours)     Procedure Component Value Units Date/Time    US Renal Bilateral [[id number]] Collected:  09/02/20 1127     Updated:  09/02/20 1132    Narrative:       DATE OF EXAM:  9/2/2020 11:06 AM     PROCEDURE:  US RENAL BILATERAL-     INDICATIONS:  CRF/CKD; I48.91-Unspecified atrial fibrillation; I10-Essential (primary)  hypertension; R06.00-Dyspnea, unspecified; N18.3-Chronic kidney disease,  stage 3 (moderate)        COMPARISON:  Renal ultrasound 04/10/2018, CT abdomen pelvis without contrast  10/09/2018.     TECHNIQUE:   Grayscale and color Doppler ultrasound evaluation of the kidneys and  urinary bladder was performed.     FINDINGS:  The right kidney measures 11.3 x 4.0 x 5.0 cm. The left kidney is more  difficult to visualize due to overlying bowel gas, but measures  approximately 11.0 x 4.6 x 4.6 cm. Renal cortical echogenicity appears  within normal limits. No renal mass or hydronephrosis is seen.     Urinary bladder is not well-distended. Estimated bladder volume is 25  mL.       Impression:       Unremarkable sonographic appearance of the kidneys.     Electronically Signed By-Teri Persaud On:9/2/2020 11:30 AM  This report was finalized on [id number] by  Teri Persaud, .    XR Chest 1 View [[id number]] Collected:  09/01/20 1531     Updated:  09/01/20 1537    Narrative:       XR CHEST 1 VW-     Date of Exam: 9/1/2020 3:10 PM     Indication: soa.     Comparison Exams: 08/21/2020     Technique: Single AP chest radiograph     FINDINGS:  A left basilar consolidation appears unchanged. There may be an  associated small left pleural effusion. The heart is enlarged. The  pulmonary vasculature appears normal. The osseous structures appear  intact.       Impression:       1.Left basilar consolidation appears unchanged, which could reflect  atelectasis or pneumonia.  2.Cardiomegaly with possible small left pleural effusion.     Electronically Signed By-DR. Salty Joseph MD On:9/1/2020 3:35 PM  This report was finalized on [id number] by DR. Salty Joseph MD.          EKG      I personally viewed and interpreted the patient's EKG/Telemetry data:    ECHOCARDIOGRAM:      STRESS MYOVIEW:    CARDIAC CATHETERIZATION:    OTHER:         Assessment/Plan     Principal Problem:    Atrial fibrillation with rapid ventricular response (CMS/HCC)  Active Problems:    Chronic atrial fibrillation (CMS/HCC)    Chronic diastolic CHF  (congestive heart failure) (CMS/[facility])    CKD (chronic kidney disease) stage 3, GFR 30-59 ml/min (CMS/[facility])    Gambling disorder, persistent    Coronary artery disease involving native coronary artery of native heart without angina pectoris    Psoriasis    History of CVA (cerebrovascular accident)    DDD (degenerative disc disease), lumbosacral    Peripheral polyneuropathy    GERD (gastroesophageal reflux disease)    Medically noncompliant    Mild cognitive impairment    Uncontrolled hypertension    Dyspnea    Dermatitis associated with moisture      Patient presents with shortness of breath and palpitations and has atrial fibrillation with rapid ventricular response  Patient digoxin level is on the low side  Question significant medical noncompliance  Patient blood pressure stable heart rate is high and hence will restart his home medicines along with Eliquis  Patient is ruled out for MI by enzymes  Patient blood cultures are growing organisms in clusters.  Will wait for final culture results.  Patient also had a CVA in the past because of his medical noncompliance and has been discussed with him several times to take his medications regularly including his anticoagulation.    I discussed the patients findings and my recommendations with patient and nurse    Edy Thomas MD  09/02/20  15:19

## 2020-09-02 NOTE — CONSULTS
NEPHROLOGY CONSULTATION-----[facility]    [facility]  [phone]  Sterling Barber MD    Patient Care Team:  Marisol Larson APRN as PCP - Shwetha Guadarrama MD as Consulting Physician (Nephrology)    CC/REASON FOR CONSULTATION: RENAL FAILURE/ELEVATED SERUM CREATININE  PHYSICIAN REQUESTING CONSULTATION:     History of Present Illness     HPI    Patient is a 64 y.o. WM whom I was asked to see in consultation for evaluation and management of renal failure/elevated serum creatinine and HTN. Patient was admitted after presenting to ER with c/o SOB and palpitations. Found to have RVR with his atrial fibrillation.  Patient denies prior knowledge of functional kidney disease, proteinuria, or hematuria. No NSAIDs or recent IV dye exposure. No known h/o hepatitis, TB, rheumatic fever, jaundice, SLE. Does bleed/bruise easily as he is chronically anticoagulated on Eliquis. Some urinary frequency and hesitancy.  +Compliance with home meds. Was on diuretics in the form of Aldactone  prior to admission. Was not on ACE-I/ARB prior to admission. No herbal med use.      Review of Systems   Constitutional: Positive for activity change and fatigue. Negative for appetite change, chills, diaphoresis, fever and unexpected weight change.   HENT: Negative for congestion, dental problem, drooling, ear discharge, ear pain, facial swelling, hearing loss, mouth sores, nosebleeds, postnasal drip, rhinorrhea, sinus pressure, sinus pain, sneezing, sore throat, tinnitus, trouble swallowing and voice change.    Eyes: Negative for photophobia, pain, discharge, redness, itching and visual disturbance.   Respiratory: Negative for apnea, cough, choking, chest tightness, shortness of breath, wheezing and stridor.    Cardiovascular: Positive for palpitations. Negative for chest pain and leg swelling.   Gastrointestinal: Negative for abdominal distention, abdominal pain, anal  bleeding, blood in stool, constipation, diarrhea, nausea, rectal pain and vomiting.   Endocrine: Negative for cold intolerance, heat intolerance, polydipsia, polyphagia and polyuria.   Genitourinary: Positive for frequency. Negative for decreased urine volume, difficulty urinating, dysuria, enuresis, flank pain, genital sores, hematuria and urgency.   Musculoskeletal: Positive for arthralgias. Negative for back pain, gait problem, joint swelling, myalgias, neck pain and neck stiffness.   Skin: Negative for color change, pallor, rash and wound.   Allergic/Immunologic: Negative for environmental allergies, food allergies and immunocompromised state.   Neurological: Negative for dizziness, tremors, seizures, syncope, facial asymmetry, speech difficulty, weakness, light-headedness, numbness and headaches.   Hematological: Negative for adenopathy. Bruises/bleeds easily.   Psychiatric/Behavioral: Negative for agitation, behavioral problems, confusion, decreased concentration, dysphoric mood, hallucinations, self-injury, sleep disturbance and suicidal ideas. The patient is not nervous/anxious and is not hyperactive.           Past Medical History:   Diagnosis Date   • A-fib (CMS/[facility])    • CHF (congestive heart failure) (CMS/[facility])    • Chronic atrial fibrillation (CMS/[facility]) 11/8/2019   • Chronic diastolic CHF (congestive heart failure) (CMS/[facility]) 11/8/2019   • CKD (chronic kidney disease) stage 3, GFR 30-59 ml/min (CMS/[facility]) 11/8/2019   • Coronary artery disease involving native coronary artery of native heart without angina pectoris 11/8/2019   • DDD (degenerative disc disease), lumbosacral 11/8/2019   • Essential hypertension 11/8/2019   • Gambling disorder, persistent 11/8/2019   • GERD (gastroesophageal reflux disease) 11/8/2019   • History of CVA (cerebrovascular accident) 11/8/2019   • Hyperlipidemia    • Hypertension    • Medically noncompliant 11/8/2019   • Peripheral polyneuropathy 11/8/2019   • Psoriasis 11/8/2019   •  Stroke (CMS/HCC)        Past Surgical History:   Procedure Laterality Date   • CARDIAC CATHETERIZATION     • CARDIAC CATHETERIZATION N/A 12/10/2019    Procedure: Left Heart Cath and coronary angiogram;  Surgeon: Edy Thomas MD;  Location: [facility] CATH INVASIVE LOCATION;  Service: Cardiovascular       Family History   Problem Relation Age of Onset   • Heart disease Mother        Social History     Tobacco Use   • Smoking status: Never Smoker   • Smokeless tobacco: Never Used   Substance Use Topics   • Alcohol use: No     Frequency: Never   • Drug use: No       Home Meds:   Medications Prior to Admission   Medication Sig Dispense Refill Last Dose   • aspirin 81 MG EC tablet Take 81 mg by mouth Daily.      • carvedilol (COREG) 25 MG tablet Take 25 mg by mouth 2 (Two) Times a Day With Meals.   Unknown at Unknown time   • dilTIAZem CD (CARDIZEM CD) 180 MG 24 hr capsule Take 1 capsule by mouth Daily for 30 days. 30 capsule 0    • gabapentin (NEURONTIN) 100 MG capsule Take 100 mg by mouth Every Night.   Unknown at Unknown time   • hydrALAZINE (APRESOLINE) 25 MG tablet Take 2 tablets by mouth 3 (Three) Times a Day for 30 days. 180 tablet 5    • isosorbide mononitrate (ISMO,MONOKET) 10 MG tablet Take 3 tablets by mouth 2 (Two) Times a Day. 60 tablet 0    • losartan (Cozaar) 50 MG tablet Take 0.5 tablets by mouth Daily for 30 days. 30 tablet 3    • pravastatin (PRAVACHOL) 40 MG tablet Take 40 mg by mouth Every Night.   Unknown at Unknown time   • risperiDONE (risperDAL) 0.5 MG tablet Take 0.5 tablets by mouth 2 (Two) Times a Day for 30 days. 30 tablet 0    • rivaroxaban (Xarelto) 20 MG tablet Take 1 tablet by mouth Daily With Dinner for 30 days. 30 tablet 5    • spironolactone (ALDACTONE) 25 MG tablet Take 25 mg by mouth Daily.   Unknown at Unknown time       Scheduled Meds:    aspirin 81 mg Oral Daily   atorvastatin 10 mg Oral Nightly   gabapentin 100 mg Oral Nightly   hydrALAZINE 50 mg Oral TID   isosorbide mononitrate 30  mg Oral BID - Nitrates   losartan 25 mg Oral Daily   metoprolol tartrate 50 mg Oral Q12H   risperiDONE 0.25 mg Oral BID   rivaroxaban 20 mg Oral Daily With Dinner   sodium chloride 10 mL Intravenous Q12H   spironolactone 25 mg Oral Daily       Continuous Infusions:    niCARdipine 5-15 mg/hr Last Rate: Stopped (09/02/20 0019)       PRN Meds:  hydrALAZINE  •  sodium chloride  •  sodium chloride    Allergies:  Ketorolac tromethamine    OBJECTIVE    Vital Signs  Temp:  [97.6 °F (36.4 °C)-98.7 °F (37.1 °C)] 97.8 °F (36.6 °C)  Heart Rate:  [] 100  Resp:  [18-33] 18  BP: (125-220)/() 160/88    No intake/output data recorded.  I/O last 3 completed shifts:  In: 480 [P.O.:480]  Out: 1330 [Urine:1330]    Physical Exam:  General Appearance: alert, appears stated age and cooperative  Head: normocephalic, without obvious abnormality and atraumatic  Eyes: conjunctivae and sclerae normal and no icterus  Neck: supple and no JVD  Lungs: clear to auscultation and respirations regular  Heart: IRREG IRREG +AFUA. NO GALLOP, RUB OR S3  Chest Wall: no abnormalities observed  Abdomen: normal bowel sounds and soft non-tender  Extremities: moves extremities well, no edema, no cyanosis and no redness  Skin: no bleeding, bruising or rash  Neurologic: Alert, and oriented. No focal deficits    Results Review:    I reviewed the patient's new clinical results.    WBC WBC   Date Value Ref Range Status   09/02/2020 9.70 3.40 - 10.80 10*3/mm3 Final   09/01/2020 11.30 (H) 3.40 - 10.80 10*3/mm3 Final      HGB Hemoglobin   Date Value Ref Range Status   09/02/2020 13.0 13.0 - 17.7 g/dL Final   09/01/2020 13.7 13.0 - 17.7 g/dL Final      HCT Hematocrit   Date Value Ref Range Status   09/02/2020 39.9 37.5 - 51.0 % Final   09/01/2020 42.6 37.5 - 51.0 % Final      Platlets No results found for: LABPLAT   MCV MCV   Date Value Ref Range Status   09/02/2020 81.8 79.0 - 97.0 fL Final   09/01/2020 81.9 79.0 - 97.0 fL Final          Sodium Sodium   Date  Value Ref Range Status   09/02/2020 142 136 - 145 mmol/L Final   09/01/2020 140 136 - 145 mmol/L Final      Potassium Potassium   Date Value Ref Range Status   09/02/2020 3.6 3.5 - 5.2 mmol/L Final   09/01/2020 4.0 3.5 - 5.2 mmol/L Final      Chloride Chloride   Date Value Ref Range Status   09/02/2020 108 (H) 98 - 107 mmol/L Final   09/01/2020 108 (H) 98 - 107 mmol/L Final      CO2 CO2   Date Value Ref Range Status   09/02/2020 23.0 22.0 - 29.0 mmol/L Final   09/01/2020 19.0 (L) 22.0 - 29.0 mmol/L Final      BUN BUN   Date Value Ref Range Status   09/02/2020   Final     Comment:     Testing performed by alternate method   09/01/2020   Final     Comment:     Testing performed by alternate method   09/01/2020 23 8 - 23 mg/dL Final      Creatinine Creatinine   Date Value Ref Range Status   09/02/2020 1.18 0.76 - 1.27 mg/dL Final   09/01/2020 1.35 (H) 0.76 - 1.27 mg/dL Final      Calcium Calcium   Date Value Ref Range Status   09/02/2020 8.6 8.6 - 10.5 mg/dL Final   09/01/2020 9.4 8.6 - 10.5 mg/dL Final      PO4 No results found for: CAPO4   Albumin Albumin   Date Value Ref Range Status   09/01/2020 4.00 3.50 - 5.20 g/dL Final      Magnesium Magnesium   Date Value Ref Range Status   09/02/2020 2.1 1.6 - 2.4 mg/dL Final      Uric Acid No results found for: URICACID       Imaging Results (Last 72 Hours)     Procedure Component Value Units Date/Time    XR Chest 1 View [[id number]] Collected:  09/01/20 1531     Updated:  09/01/20 1537    Narrative:       XR CHEST 1 VW-     Date of Exam: 9/1/2020 3:10 PM     Indication: soa.     Comparison Exams: 08/21/2020     Technique: Single AP chest radiograph     FINDINGS:  A left basilar consolidation appears unchanged. There may be an  associated small left pleural effusion. The heart is enlarged. The  pulmonary vasculature appears normal. The osseous structures appear  intact.       Impression:       1.Left basilar consolidation appears unchanged, which could reflect  atelectasis or  pneumonia.  2.Cardiomegaly with possible small left pleural effusion.     Electronically Signed By-DR. Salty Joseph MD On:9/1/2020 3:35 PM  This report was finalized on [id number] by DR. Salty Joseph MD.            Results for orders placed during the hospital encounter of 09/01/20   XR Chest 1 View    Narrative XR CHEST 1 VW-     Date of Exam: 9/1/2020 3:10 PM     Indication: soa.     Comparison Exams: 08/21/2020     Technique: Single AP chest radiograph     FINDINGS:  A left basilar consolidation appears unchanged. There may be an  associated small left pleural effusion. The heart is enlarged. The  pulmonary vasculature appears normal. The osseous structures appear  intact.       Impression 1.Left basilar consolidation appears unchanged, which could reflect  atelectasis or pneumonia.  2.Cardiomegaly with possible small left pleural effusion.     Electronically Signed By-DR. Salty Joseph MD On:9/1/2020 3:35 PM  This report was finalized on [id number] by DR. Salty Joseph MD.         Results for orders placed during the hospital encounter of 12/06/19   Duplex Carotid Ultrasound CAR    Narrative · Proximal right internal carotid artery is normal.  · Mid right internal carotid artery is normal.  · Proximal left internal carotid artery is normal.  · Mid left internal carotid artery is normal.          ASSESSMENT / PLAN      Atrial fibrillation with rapid ventricular response (CMS/HCC)    Chronic atrial fibrillation (CMS/HCC)    Chronic diastolic CHF (congestive heart failure) (CMS/HCC)    CKD (chronic kidney disease) stage 3, GFR 30-59 ml/min (CMS/HCC)    Gambling disorder, persistent    Coronary artery disease involving native coronary artery of native heart without angina pectoris    Psoriasis    History of CVA (cerebrovascular accident)    DDD (degenerative disc disease), lumbosacral    Peripheral polyneuropathy    GERD (gastroesophageal reflux disease)    Medically noncompliant    Mild  cognitive impairment    Uncontrolled hypertension    Dyspnea      1. RENAL FAILURE------Nonoliguric. Appears to have CRF/CKD STG 2 with current serum creatinine at baseline. Unknown if patient has baseline proteinuria or hematuria. In the long run we may have to accept a higher baseline serum creatinine in order to keep euvolemic. Follow with cautious diuresis. CRF/CKD is likely secondary to HTN NS. Check urine and serum studies and renal US and PVR to further characterize CKD and to assess for secondary disease states associated with CKD. Dose meds for CrCl 60 cc/min    2. ACIDOSIS------Resolved    3. CHRONIC DIASTOLIC CHF/ELEVATED BNP------Cautious diuresis. Change to po Bumex with Aldactone.    4. HTN WITH CKD------BP okay. No ACE/ARB/DRI for now    5. OA/DJD------No NSAIDs. Check uric acid level    6. ATRIAL FIBRILLATION WITH RVR------per Cardiology. Check Digoxin level    7. HYPERLIPIDEMIA------Check CK, TSH    I discussed the patients findings and my recommendations with patient, nursing staff and consulting provider    Will follow along closely. Thank you for allowing us to see this patient in renal consultation.    [facility]  [phone]  MD Sterling Lancaster MD  09/02/20  07:54

## 2020-09-02 NOTE — OUTREACH NOTE
CHF Week 2 Survey      Responses   [facility] patient discharged from?  [facility]   Does the patient have one of the following disease processes/diagnoses(primary or secondary)?  CHF   Week 2 attempt successful?  No   Revoke  Readmitted          Gianna Bess RN

## 2020-09-02 NOTE — PLAN OF CARE
Problem: Skin Injury Risk (Adult)  Goal: Identify Related Risk Factors and Signs and Symptoms  Outcome: Ongoing (interventions implemented as appropriate)  Goal: Skin Health and Integrity  Outcome: Ongoing (interventions implemented as appropriate)     Problem: Fall Risk (Adult)  Goal: Identify Related Risk Factors and Signs and Symptoms  Outcome: Ongoing (interventions implemented as appropriate)  Goal: Absence of Fall  Outcome: Ongoing (interventions implemented as appropriate)     Problem: Arrhythmia/Dysrhythmia (Symptomatic) (Adult)  Goal: Signs and Symptoms of Listed Potential Problems Will be Absent, Minimized or Managed (Arrhythmia/Dysrhythmia)  Outcome: Ongoing (interventions implemented as appropriate)     Problem: Hypertensive Disease/Crisis (Arterial) (Adult)  Goal: Signs and Symptoms of Listed Potential Problems Will be Absent, Minimized or Managed (Hypertensive Disease/Crisis)  Outcome: Ongoing (interventions implemented as appropriate)     Problem: Patient Care Overview  Goal: Plan of Care Review  Outcome: Ongoing (interventions implemented as appropriate)  Flowsheets (Taken 9/2/2020 0231)  Progress: no change  Plan of Care Reviewed With: patient  Goal: Individualization and Mutuality  Outcome: Ongoing (interventions implemented as appropriate)  Goal: Discharge Needs Assessment  Outcome: Ongoing (interventions implemented as appropriate)  Goal: Interprofessional Rounds/Family Conf  Outcome: Ongoing (interventions implemented as appropriate)     Problem: Pain, Chronic (Adult)  Goal: Identify Related Risk Factors and Signs and Symptoms  Outcome: Ongoing (interventions implemented as appropriate)  Goal: Acceptable Pain/Comfort Level and Functional Ability  Outcome: Ongoing (interventions implemented as appropriate)

## 2020-09-02 NOTE — THERAPY EVALUATION
Acute Care - Occupational Therapy Initial Evaluation   Yoseph     Patient Name: Carlos Whipple  : 1955  MRN: [id number]  Today's Date: 2020             Admit Date: 2020       ICD-10-CM ICD-9-CM   1. Atrial fibrillation with rapid ventricular response (CMS/HCC) I48.91 427.31   2. Uncontrolled hypertension I10 401.9   3. Dyspnea, unspecified type R06.00 786.09   4. CKD (chronic kidney disease) stage 3, GFR 30-59 ml/min (CMS/HCC) N18.3 585.3     Patient Active Problem List   Diagnosis   • Hypertensive emergency   • Chronic atrial fibrillation (CMS/HCC)   • Chronic diastolic CHF (congestive heart failure) (CMS/HCC)   • CKD (chronic kidney disease) stage 3, GFR 30-59 ml/min (CMS/HCC)   • Essential hypertension   • Gambling disorder, persistent   • Shortness of breath   • Coronary artery disease involving native coronary artery of native heart without angina pectoris   • Psoriasis   • History of CVA (cerebrovascular accident)   • DDD (degenerative disc disease), lumbosacral   • Peripheral polyneuropathy   • GERD (gastroesophageal reflux disease)   • Medically noncompliant   • Acute respiratory distress   • Unstable angina (CMS/HCC)   • Cervical disc disorder with radiculopathy   • Completed stroke (CMS/HCC)   • Degeneration of intervertebral disc   • Excessive anticoagulation   • Impaired left ventricular function   • Liver lesion   • Lumbar radiculopathy   • Cervical myelopathy (CMS/HCC)   • Lumbosacral radiculopathy   • Spinal stenosis of lumbar region   • Obesity   • Mild cognitive impairment   • Thoracic back pain   • Thoracic disc disease with myelopathy   • Paroxysmal atrial fibrillation (CMS/HCC)   • Uncontrolled hypertension   • Acute congestive heart failure (CMS/HCC)   • Dyspnea   • Atrial fibrillation with RVR (CMS/HCC)   • Atrial fibrillation with rapid ventricular response (CMS/HCC)   • Dermatitis associated with moisture     Past Medical History:   Diagnosis Date   • A-fib (CMS/HCC)    •  CHF (congestive heart failure) (CMS/[facility])    • Chronic atrial fibrillation (CMS/[facility]) 11/8/2019   • Chronic diastolic CHF (congestive heart failure) (CMS/[facility]) 11/8/2019   • CKD (chronic kidney disease) stage 3, GFR 30-59 ml/min (CMS/[facility]) 11/8/2019   • Coronary artery disease involving native coronary artery of native heart without angina pectoris 11/8/2019   • DDD (degenerative disc disease), lumbosacral 11/8/2019   • Essential hypertension 11/8/2019   • Gambling disorder, persistent 11/8/2019   • GERD (gastroesophageal reflux disease) 11/8/2019   • History of CVA (cerebrovascular accident) 11/8/2019   • Hyperlipidemia    • Hypertension    • Medically noncompliant 11/8/2019   • Peripheral polyneuropathy 11/8/2019   • Psoriasis 11/8/2019   • Stroke (CMS/[facility])      Past Surgical History:   Procedure Laterality Date   • CARDIAC CATHETERIZATION     • CARDIAC CATHETERIZATION N/A 12/10/2019    Procedure: Left Heart Cath and coronary angiogram;  Surgeon: Edy Thomas MD;  Location: [facility] CATH INVASIVE LOCATION;  Service: Cardiovascular          OT ASSESSMENT FLOWSHEET (last 12 hours)      Occupational Therapy Evaluation     Row Name 09/02/20 1538                   OT Evaluation Time/Intention    Document Type  evaluation  -ES        Mode of Treatment  occupational therapy  -ES        Patient Effort  fair  -ES           General Information    Patient Profile Reviewed?  yes  -ES        Equipment Currently Used at Home  cane, straight;grab bar lost his cane but prefers one & refuses to use RW  -ES        Pertinent History of Current Functional Problem  Pt is 63 yo male admitted for SOA, A-fib with RVR, HTN, fatigue, cough.  Covid 19 testing (-).  Pt provides questionable baseline, stating he lives with brother and has 1-3 steps to enter. Pt states he is independent, drives, and works, but is unable to state where. Appears very lethargic this date.  -ES        Existing Precautions/Restrictions  fall;oxygen therapy device and  L/min  -ES        Limitations/Impairments  safety/cognitive  -ES        Barriers to Rehab  cognitive status  -ES           Relationship/Environment    Primary Source of Support/Comfort  sibling(s)  -ES        Lives With  sibling(s)  -ES        Name(s) of Who Lives With Patient  States he lives with brother  -ES        Primary Roles/Responsibilities  -- States he works  -ES        Concerns About Impact on Relationships  questionable history provided  -ES           Resource/Environmental Concerns    Current Living Arrangements  home/apartment/condo  -ES        Resource/Environmental Concerns  none  -ES           Home Main Entrance    Number of Stairs, Main Entrance  one  -ES           Cognitive Assessment/Intervention- PT/OT    Orientation Status (Cognition)  oriented to;person;time  -ES        Cognitive Assessment/Intervention Comment  States we are at [employer]  -ES           Safety Issues, Functional Mobility    Safety Issues Affecting Function (Mobility)  awareness of need for assistance;judgment;impulsivity;steps too close to assistive device;safety precautions follow-through/compliance;safety precaution awareness;problem solving;ability to follow commands  -ES        Impairments Affecting Function (Mobility)  balance;postural/trunk control;endurance/activity tolerance;strength;shortness of breath  -ES           Bed Mobility Assessment/Treatment    Bed Mobility Assessment/Treatment  supine-sit;sit-supine  -ES        Supine-Sit [place] (Bed Mobility)  maximum assist (25% patient effort)  -ES        Assistive Device (Bed Mobility)  bed rails  -ES           Transfer Assessment/Treatment    Transfer Assessment/Treatment  --  -ES        Comment (Transfers)  not tested secondary to lethargy  -ES           Sit-Stand Transfer    Sit-Stand [place] (Transfers)  --  -ES        Assistive Device (Sit-Stand Transfers)  --  -ES           ADL Assessment/Intervention    BADL Assessment/Intervention  upper  body dressing;lower body dressing  -ES           Upper Body Dressing Assessment/Training    Upper Body Dressing [place] Level  moderate assist (50% patient effort)  -ES           Lower Body Dressing Assessment/Training    Lower Body Dressing [place] Level  dependent (less than 25% patient effort)  -ES           General ROM    GENERAL ROM COMMENTS  Difficulty to assess due to congitive stauts, but grossly WFL  -ES           MMT (Manual Muscle Testing)    General MMT Comments  Unable to assess secondary to cognitive level  -ES           Motor Assessment/Interventions    Additional Documentation  Balance (Group)  -ES           Balance    Balance  static sitting balance;static standing balance;dynamic standing balance;dynamic sitting balance  -ES           Static Sitting Balance    Level of [place] (Unsupported Sitting, Static Balance)  moderate assist, 50 to 74% patient effort  -ES        Sitting Position (Unsupported Sitting, Static Balance)  sitting on edge of bed  -ES        Time Able to Maintain Position (Unsupported Sitting, Static Balance)  1 to 2 minutes  -ES           Dynamic Sitting Balance    Level of [place], Reaches Outside Midline (Sitting, Dynamic Balance)  maximal assist, 25 to 49% patient effort  -ES        Sitting Position, Reaches Outside Midline (Sitting, Dynamic Balance)  sitting on edge of bed  -ES           Sensory Assessment/Intervention    Sensory General Assessment  no sensation deficits identified  -ES           Positioning and Restraints    Pre-Treatment Position  in bed  -ES        Post Treatment Position  bed  -ES        In Chair  notified nsg;call light within reach;exit alarm on;encouraged to call for assist  -ES           Pain Scale: Numbers Pre/Post-Treatment    Pain Scale: Numbers, Pretreatment  0/10 - no pain  -ES        Pain Scale: Numbers, Post-Treatment  0/10 - no pain  -ES           Wound 12/12/19 0200 midline gluteal MASD ([place] associated skin damage)     Wound - Properties Group Date first assessed: 12/12/19  -BG Time first assessed: 0200  -BG Present on Hospital Admission: Y  -BG Orientation: midline  -BG Location: gluteal  -BG Primary Wound Type: MASD  -BG, psoriasis lesions with what appears to be moisture related damage        Wound 12/12/19 0200 thoracic spine Other (comment)    Wound - Properties Group Date first assessed: 12/12/19  -BG Time first assessed: 0200  -BG Location: thoracic spine  -BG Primary Wound Type: Other  -BG, psoriasis lesions        Wound 12/12/19 0200 upper;midline abdomen     Wound - Properties Group Date first assessed: 12/12/19  -BG Time first assessed: 0200  -BG Orientation: upper;midline  -BG Location: abdomen  -BG Primary Wound Type: --  -BG, psoriasis lesions        Wound 09/01/20 1839 Bilateral anterior groin MASD (Moisture associated skin damage)    Wound - Properties Group Date first assessed: 09/01/20  -KD Time first assessed: 1839  -KD Present on Hospital Admission: Y  -KD Side: Bilateral  -KD Orientation: anterior  -KD Location: groin  -KD Primary Wound Type: MASD  -KD       Plan of Care Review    Plan of Care Reviewed With  patient  -ES        Outcome Summary  Pt is 63 yo male admitted for SOA, A-fib with RVR, HTN, fatigue, cough.  Covid 19 testing (-).  Pt provides questionable baseline, stating he lives with brother and has 1-3 steps to enter. Pt states he is independent, drives, and works, but is unable to state where. Appears very lethargic this date. Mod A for supine>sit, and then Mod-max A to maintain balance EOB. Unable to complete most MMT, and pt provides various PLOF responses. Pt markedly different from PT assessment. Alerted NSG. Unsure of pt's PLOF, but pt unsafe to return home at this time. Recommend IP rehab. PPE: gloves, mask, eyewear  -ES           Clinical Impression (OT)    Criteria for Skilled Therapeutic Interventions Met (OT Eval)  yes  -ES        Rehab Potential (OT Eval)  fair, will monitor progress  closely  -ES        Therapy Frequency (OT Eval)  5 times/wk  -ES        Predicted Duration of Therapy Intervention (Therapy Eval)  until discharge  -ES        Anticipated Discharge Disposition (OT)  inpatient rehabilitation facility  -ES           Vital Signs    Pre Systolic BP Rehab  128  -ES        Pre Treatment Diastolic BP  70  -ES        Pre SpO2 (%)  94  -ES        O2 Delivery Pre Treatment  supplemental O2 3l  -ES        Intra SpO2 (%)  84  -ES        O2 Delivery Intra Treatment  supplemental O2  -ES        Post SpO2 (%)  91  -ES        O2 Delivery Post Treatment  supplemental O2  -ES        Pre Patient Position  Supine  -ES        Intra Patient Position  Sitting  -ES        Post Patient Position  Supine  -ES           Planned OT Interventions    Planned Therapy Interventions (OT Eval)  activity tolerance training;BADL retraining;cognitive/visual perception retraining;IADL retraining;manual therapy/joint mobilization;neuromuscular control/coordination retraining;occupation/activity based interventions;passive ROM/stretching;ROM/therapeutic exercise;strengthening exercise  -ES           OT Goals    Bathing Goal Selection (OT)  bathing, OT goal 1  -ES        Dressing Goal Selection (OT)  dressing, OT goal 1  -ES        Toileting Goal Selection (OT)  toileting, OT goal 1  -ES           Bathing Goal 1 (OT)    Activity/Assistive Device (Bathing Goal 1, OT)  bathing skills, all  -ES        [place] Level/Cues Needed (Bathing Goal 1, OT)  moderate assist (50-74% patient effort)  -ES        Time Frame (Bathing Goal 1, OT)  2 weeks  -ES           Dressing Goal 1 (OT)    Activity/Assistive Device (Dressing Goal 1, OT)  upper body dressing  -ES        [place]/Cues Needed (Dressing Goal 1, OT)  minimum assist (75% or more patient effort)  -ES        Time Frame (Dressing Goal 1, OT)  2 weeks  -ES           Toileting Goal 1 (OT)    Activity/Device (Toileting Goal 1, OT)  toileting skills, all  -ES         [place] Level/Cues Needed (Toileting Goal 1, OT)  minimum assist (75% or more patient effort)  -ES        Time Frame (Toileting Goal 1, OT)  2 weeks  -ES           Living Environment    Home Accessibility  tub/shower is not walk in  -ES          User Key  (r) = Recorded By, (t) = Taken By, (c) = Cosigned By    Initials Name Effective Dates     Allison Friedman OT 03/01/19 -     Radha Ferreira RN 03/01/19 -     Nehal Elkins RN 08/21/19 -          Occupational Therapy Education                 Title: PT OT SLP Therapies (In Progress)     Topic: Occupational Therapy (In Progress)     Point: ADL training (Not Started)     Description:   Instruct learner(s) on proper safety adaptation and remediation techniques during self care or transfers.   Instruct in proper use of assistive devices.              Learner Progress:   Not documented in this visit.          Point: Home exercise program (Not Started)     Description:   Instruct learner(s) on appropriate technique for monitoring, assisting and/or progressing therapeutic exercises/activities.              Learner Progress:   Not documented in this visit.          Point: Precautions (In Progress)     Description:   Instruct learner(s) on prescribed precautions during self-care and functional transfers.              Learning Progress Summary           Patient Acceptance, E,TB, NR,NL by  at 9/2/2020 1554                   Point: Body mechanics (In Progress)     Description:   Instruct learner(s) on proper positioning and spine alignment during self-care, functional mobility activities and/or exercises.              Learning Progress Summary           Patient Acceptance, E,TB, NR,NL by  at 9/2/2020 1554                               User Key     Initials Effective Dates Name Provider Type Discipline     03/01/19 -  Allison Friedman OT Occupational Therapist OT                  OT Recommendation and Plan  Outcome Summary/Treatment Plan  (OT)  Anticipated Discharge Disposition (OT): inpatient rehabilitation facility  Planned Therapy Interventions (OT Eval): activity tolerance training, BADL retraining, cognitive/visual perception retraining, IADL retraining, manual therapy/joint mobilization, neuromuscular control/coordination retraining, occupation/activity based interventions, passive ROM/stretching, ROM/therapeutic exercise, strengthening exercise  Therapy Frequency (OT Eval): 5 times/wk  Plan of Care Review  Plan of Care Reviewed With: patient  Plan of Care Reviewed With: patient  Outcome Summary: Pt is 63 yo male admitted for SOA, A-fib with RVR, HTN, fatigue, cough.  Covid 19 testing (-).  Pt provides questionable baseline, stating he lives with brother and has 1-3 steps to enter. Pt states he is independent, drives, and works, but is unable to state where. Appears very lethargic this date. Mod A for supine>sit, and then Mod-max A to maintain balance EOB. Unable to complete most MMT, and pt provides various PLOF responses. Pt markedly different from PT assessment. Alerted NSG. Unsure of pt's PLOF, but pt unsafe to return home at this time. Recommend IP rehab. PPE: gloves, mask, eyewear        Time Calculation:   Time Calculation- OT     Row Name 09/02/20 1555             Time Calculation- OT    OT Start Time  1532  -ES      OT Stop Time  1555  -ES      OT Time Calculation (min)  23 min  -ES      Total Timed Code Minutes- OT  13 minute(s)  -ES      OT Non-Billable Time (min)  10 min  -ES      OT Received On  09/02/20  -ES      OT - Next Appointment  09/03/20  -ES      OT Goal Re-Cert Due Date  09/16/20  -ES        User Key  (r) = Recorded By, (t) = Taken By, (c) = Cosigned By    Initials Name Provider Type    ES Allison Friedman OT Occupational Therapist        Therapy Charges for Today     Code Description Service Date Service Provider Modifiers Qty    [id number] HC OT SELF CARE/MGMT/TRAIN EA 15 MIN 9/2/2020 Allison Friedman OT GO 1     [id number]  OT EVAL HIGH COMPLEXITY 3 9/2/2020 Allison Friedman, OT GO 1               Allison Friedman OT  9/2/2020

## 2020-09-02 NOTE — PROGRESS NOTES
"Heart Failure Program  Nurse Navigator  Discharge Planning    Patient Name:Carlos Whipple  :1955  Cardiologist:Martha  Current Admission Date: 2020   Previous Admission: 20  Admission frequency: 3 admissions in 6 months    Heart Failure history per record:    Symptoms on admission:c/o SOA worsening last few days, recent discharge from hospital 20, pt was discharged to [facility]. Pt admits to medication non-adherence, advises he does not take his xarelto due to cost and it casued him to have a runny nose.  Advises he has not taken his xarelto in \"9 months or so\", pt advise he has not been taking other medications either because \"I forget, I didn't realize they were that important till now\".  Pt has been admitted to the hospital 3 times in the last 6 weeks.  Pt advises, \"I am not good at going to the doctor\", when discussing keeping MD appts for follow-up.       Admission Weight:  Flowsheet Rows      First Filed Value   Admission Height  182.9 cm (72\") Documented at 2020 1338   Admission Weight  114 kg (252 lb) Documented at 2020 1338            Current Home Medications:  Prior to Admission medications    Medication Sig Start Date End Date Taking? Authorizing Provider   aspirin 81 MG EC tablet Take 81 mg by mouth Daily.   Yes ProviderCelso MD   carvedilol (COREG) 25 MG tablet Take 25 mg by mouth 2 (Two) Times a Day With Meals.   Yes Celso Mendosa MD   dilTIAZem CD (CARDIZEM CD) 180 MG 24 hr capsule Take 1 capsule by mouth Daily for 30 days. 20 Yes Paty Polk DO   gabapentin (NEURONTIN) 100 MG capsule Take 100 mg by mouth Every Night.   Yes Celso Mendosa MD   hydrALAZINE (APRESOLINE) 25 MG tablet Take 2 tablets by mouth 3 (Three) Times a Day for 30 days. 8/4/20 9/3/20 Yes Paty Polk DO   isosorbide mononitrate (ISMO,MONOKET) 10 MG tablet Take 3 tablets by mouth 2 (Two) Times a Day. 20  Yes Paty Polk DO   losartan (Cozaar) 50 " "MG tablet Take 0.5 tablets by mouth Daily for 30 days. 8/23/20 9/22/20 Yes Dre Stauffer MD   pravastatin (PRAVACHOL) 40 MG tablet Take 40 mg by mouth Every Night.   Yes Provider, MD Celso   risperiDONE (risperDAL) 0.5 MG tablet Take 0.5 tablets by mouth 2 (Two) Times a Day for 30 days. 8/4/20 9/3/20 Yes Paty Polk DO   rivaroxaban (Xarelto) 20 MG tablet Take 1 tablet by mouth Daily With Dinner for 30 days. 8/4/20 9/3/20 Yes Paty Polk DO   spironolactone (ALDACTONE) 25 MG tablet Take 25 mg by mouth Daily.   Yes Provider, MD Celso       Social history:   Pt living with his brother past 10 days since last discharge, advises he has not been able to get around as well and his brother \"does not have air and makes it hard to breath\",  Admits to medication non-adherence, does not follow daily weights, does not follow low salt diet.  Limited mobility due to gait unsteady, \"I have trouble getting around due to weakness\"    Smoking status:never    Diagnostics Testing:  proBNP level: 7458    Echocardiogram:  Results for orders placed during the hospital encounter of 11/07/19   Adult Transthoracic Echo Complete W/ Cont if Necessary Per Protocol    Narrative · The left ventricular cavity is severely dilated.  · Left ventricular systolic function is moderately decreased.  · Left atrial cavity size is moderately dilated.  · Moderate mitral valve regurgitation is present  · Moderate tricuspid valve regurgitation is present.  · The following left ventricular wall segments are hypokinetic: mid   anterior, apical anterior, basal anterolateral, mid anterolateral, apical   lateral, basal inferolateral, mid inferolateral, apical inferior, mid   inferior, apical septal, basal inferoseptal, mid inferoseptal, apex   hypokinetic, mid anteroseptal, basal anterior, basal inferior and basal   inferoseptal.  · LV ejection fraction is about 30 to 35%  · No pericardial effusion noted            Patient Assessment:   Pt " "lying in bed, no SOA with conversation, resp even and unlabored. Minimal pedal edema, abd rounded and tight, pt advises \"my stomach swells up sometimes\". Last echo 11/7/19    Current O2: 2L NC  Home O2: non    Education provided to patient:  yes- Heart Failure disease education  yes -Symptom identification/management  yes -Daily Weights  yes- Diet education  n/a- Fluid restriction (if ordered)  yes- Activity education  yes- Medication education  n/a- Smoking cessation  yes- Follow-up Appointments    Extensive discussion on pt safety and ability caring for self. Pt voices he wants to go home and \"I know I need to take my medicines and take care of myself\", pt also advises he is unable to walk without assistance and has lots of weakness.  Pt agreeable to \"look at possible going to rehab to get stronger\" but not discussing anything further.    Acceptance of learning: acceptable, cooperative, repeat education needed, pt ask questions, unable to teachback.    Heart Failure education interactive teaching session time: 40  minutes    Identified needs/barriers:   Mobility, medication non-adherence, cardiology consult    Intervention:   CM discussion    Patient goal:         "

## 2020-09-02 NOTE — PLAN OF CARE
Patient's blood pressure elevated today; medication changes noted.  Patient disoriented to situation.  Remains on 3L per NC. Patient is also uncooperative with bed alarm and fall precautions.  Education provided.  Will continue to monitor.

## 2020-09-02 NOTE — PLAN OF CARE
Problem: Patient Care Overview  Goal: Plan of Care Review  Outcome: Ongoing (interventions implemented as appropriate)  Flowsheets (Taken 9/2/2020 8272)  Outcome Summary: Pt is 65 yo male admitted for SOA, A-fib with RVR, HTN, fatigue, cough.  Covid 19 testing (-).  Pt provides questionable baseline, stating he lives with brother and has 1-3 steps to enter. Pt states he is independent, drives, and works, but is unable to state where. Appears very lethargic this date. Mod A for supine>sit, and then Mod-max A to maintain balance EOB. Unable to complete most MMT, and pt provides various PLOF responses. Pt markedly different from PT assessment. Alerted NSG. Unsure of pt's PLOF, but pt unsafe to return home at this time. Recommend IP rehab. PPE: gloves, mask, eyewear

## 2020-09-02 NOTE — PROGRESS NOTES
LOS: 0 days   Patient Care Team:  Marisol Larson APRN as PCP - General  Shwetha Barber MD as Consulting Physician (Nephrology)    Chief Complaint:   Still in atrial fibrillation, denies any chest pain today. More interested in what medications he is on    Subjective :   Appears chronically ill, markedly older than stated age    Interval History:     Patient Complaints:  None this morning  Patient Denies:   No chest pain, no palpitations, no shortness of breath. No edema or cough.  History taken from: patient    Review of Systems:    All systems were reviewed and negative except for:   See  intervall history  Objective     Vital Signs  Temp:  [97.6 °F (36.4 °C)-98.7 °F (37.1 °C)] 97.8 °F (36.6 °C)  Heart Rate:  [] 98  Resp:  [18-33] 18  BP: (125-220)/() 172/113    Physical Exam:     General Appearance:    Alert, cooperative, in no acute distress   Head:    Normocephalic, without obvious abnormality, atraumatic   Eyes:            Conjunctivae and sclerae normal, no   icterus, no pallor, corneas clear, PERRLA   Throat:   No oral lesions, no thrush, oral mucosa moist   Neck:   No adenopathy, supple, trachea midline, no thyromegaly, no   carotid bruit, no JVD   Lungs:     Clear to auscultation,respirations regular, even and                  Unlabored, distant that adequate adventitial flow    Heart:     Distant, irregularly irregular and tachycardic outpatient speaking to me.   Chest Wall:    No abnormalities observed   Abdomen:     Normal bowel sounds, no masses, no organomegaly, soft        non-tender, non-distended, no guarding, no rebound                tenderness   Rectal:     Deferred   Extremities:   Moves all extremities , no edema, no cyanosis, no             redness   Pulses:   Pulses palpable and equal bilaterally   Skin:   No bleeding, bruising or rash   Lymph nodes:   No palpable adenopathy   Neurologic:   Cranial nerves 2 - 12 grossly intact, sensation intact, DTR       present  and equal bilaterally   Radiology:  Xr Chest 1 View    Result Date: 9/1/2020  1.Left basilar consolidation appears unchanged, which could reflect atelectasis or pneumonia. 2.Cardiomegaly with possible small left pleural effusion.  Electronically Signed By-DR. Salty Joseph MD On:9/1/2020 3:35 PM This report was finalized on [id number] by DR. Salty Joseph MD.         Results Review:     I reviewed the patient's new clinical results.  I reviewed the patient's new imaging results and agree with the interpretation.    Medication Review:   Scheduled Meds:  aspirin 81 mg Oral Daily   atorvastatin 10 mg Oral Nightly   gabapentin 100 mg Oral Nightly   hydrALAZINE 50 mg Oral TID   isosorbide mononitrate 30 mg Oral BID - Nitrates   losartan 25 mg Oral Daily   metoprolol tartrate 50 mg Oral Q12H   risperiDONE 0.25 mg Oral BID   rivaroxaban 20 mg Oral Daily With Dinner   sodium chloride 10 mL Intravenous Q12H   spironolactone 25 mg Oral Daily     Continuous Infusions:  niCARdipine 5-15 mg/hr Last Rate: Stopped (09/02/20 0019)     PRN Meds:.hydrALAZINE  •  sodium chloride  •  sodium chloride    Labs:  Lab Results (last 24 hours)     Procedure Component Value Units Date/Time    TSH [[id number]]  (Normal) Collected:  09/02/20 0252    Specimen:  Blood Updated:  09/02/20 0855     TSH 2.760 uIU/mL     CK [[id number]]  (Normal) Collected:  09/02/20 0252    Specimen:  Blood Updated:  09/02/20 0848     Creatine Kinase 33 U/L     Digoxin Level [[id number]]  (Abnormal) Collected:  09/02/20 0252    Specimen:  Blood Updated:  09/02/20 0848     Digoxin 0.30 ng/mL     Uric Acid [[id number]]  (Abnormal) Collected:  09/02/20 0252    Specimen:  Blood Updated:  09/02/20 0848     Uric Acid 7.8 mg/dL     BUN [[id number]]  (Normal) Collected:  09/02/20 0252    Specimen:  Blood Updated:  09/02/20 0817     BUN 21 mg/dL     Troponin [[id number]]  (Normal) Collected:  09/02/20 0252    Specimen:  Blood Updated:  09/02/20 0344     Troponin T <0.010  ng/mL     Narrative:       Troponin T Reference Range:  <= 0.03 ng/mL-   Negative for AMI  >0.03 ng/mL-     Abnormal for myocardial necrosis.  Clinicians would have to utilize clinical acumen, EKG, Troponin and serial changes to determine if it is an Acute Myocardial Infarction or myocardial injury due to an underlying chronic condition.       Results may be falsely decreased if patient taking Biotin.      BNP [[id number]]  (Abnormal) Collected:  09/02/20 0252    Specimen:  Blood Updated:  09/02/20 0344     proBNP 7,458.0 pg/mL     Narrative:       Among patients with dyspnea, NT-proBNP is highly sensitive for the detection of acute congestive heart failure. In addition NT-proBNP of <300 pg/ml effectively rules out acute congestive heart failure with 99% negative predictive value.    Results may be falsely decreased if patient taking Biotin.      Magnesium [[id number]]  (Normal) Collected:  09/02/20 0252    Specimen:  Blood Updated:  09/02/20 0335     Magnesium 2.1 mg/dL     Basic Metabolic Panel [[id number]]  (Abnormal) Collected:  09/02/20 0252    Specimen:  Blood Updated:  09/02/20 0335     Glucose 128 mg/dL      BUN --     Comment: Testing performed by alternate method        Creatinine 1.18 mg/dL      Sodium 142 mmol/L      Potassium 3.6 mmol/L      Chloride 108 mmol/L      CO2 23.0 mmol/L      Calcium 8.6 mg/dL      eGFR Non African Amer 62 mL/min/1.73      BUN/Creatinine Ratio --     Comment: Testing not performed        Anion Gap 11.0 mmol/L     Narrative:       GFR Normal >60  Chronic Kidney Disease <60  Kidney Failure <15      Phosphorus [[id number]]  (Normal) Collected:  09/02/20 0252    Specimen:  Blood Updated:  09/02/20 0335     Phosphorus 3.0 mg/dL     CBC & Differential [[id number]] Collected:  09/02/20 0252    Specimen:  Blood Updated:  09/02/20 0329    Narrative:       The following orders were created for panel order CBC & Differential.  Procedure                               Abnormality          Status                     ---------                               -----------         ------                     Manual Differential[[id number]]                                                         CBC Auto Differential[[id number]]        Abnormal            Final result                 Please view results for these tests on the individual orders.    CBC Auto Differential [[id number]]  (Abnormal) Collected:  09/02/20 0252    Specimen:  Blood Updated:  09/02/20 0329     WBC 9.70 10*3/mm3      RBC 4.87 10*6/mm3      Hemoglobin 13.0 g/dL      Hematocrit 39.9 %      MCV 81.8 fL      MCH 26.6 pg      MCHC 32.6 g/dL      RDW 18.1 %      RDW-SD 51.6 fl      MPV 8.6 fL      Platelets 164 10*3/mm3      Neutrophil % 78.1 %      Lymphocyte % 11.6 %      Monocyte % 9.2 %      Eosinophil % 0.6 %      Basophil % 0.5 %      Neutrophils, Absolute 7.50 10*3/mm3      Lymphocytes, Absolute 1.10 10*3/mm3      Monocytes, Absolute 0.90 10*3/mm3      Eosinophils, Absolute 0.10 10*3/mm3      Basophils, Absolute 0.10 10*3/mm3     Troponin [[id number]]  (Normal) Collected:  09/01/20 2028    Specimen:  Blood Updated:  09/01/20 2105     Troponin T <0.010 ng/mL     Narrative:       Troponin T Reference Range:  <= 0.03 ng/mL-   Negative for AMI  >0.03 ng/mL-     Abnormal for myocardial necrosis.  Clinicians would have to utilize clinical acumen, EKG, Troponin and serial changes to determine if it is an Acute Myocardial Infarction or myocardial injury due to an underlying chronic condition.       Results may be falsely decreased if patient taking Biotin.      Troponin [[id number]]  (Normal) Collected:  09/01/20 1431    Specimen:  Blood Updated:  09/01/20 1548     Troponin T <0.010 ng/mL     Narrative:       Troponin T Reference Range:  <= 0.03 ng/mL-   Negative for AMI  >0.03 ng/mL-     Abnormal for myocardial necrosis.  Clinicians would have to utilize clinical acumen, EKG, Troponin and serial changes to determine if it is an Acute Myocardial  Infarction or myocardial injury due to an underlying chronic condition.       Results may be falsely decreased if patient taking Biotin.      [place] Draw [[id number]] Collected:  09/01/20 1431    Specimen:  Blood Updated:  09/01/20 1546    Narrative:       The following orders were created for panel order [place] Draw.  Procedure                               Abnormality         Status                     ---------                               -----------         ------                     Light Blue Top[[id number]]                                   Final result               Green Top (Gel)[[id number]]                                                             Lavender Top[[id number]]                                     Final result               Gold Top - SST[[id number]]                                   Final result                 Please view results for these tests on the individual orders.    Light Blue Top [[id number]] Collected:  09/01/20 1431    Specimen:  Blood Updated:  09/01/20 1546     Extra Tube hold for add-on     Comment: Auto resulted       Lavender Top [[id number]] Collected:  09/01/20 1431    Specimen:  Blood Updated:  09/01/20 1546     Extra Tube hold for add-on     Comment: Auto resulted       Gold Top - SST [[id number]] Collected:  09/01/20 1431    Specimen:  Blood Updated:  09/01/20 1546     Extra Tube Hold for add-ons.     Comment: Auto resulted.       BUN [[id number]]  (Normal) Collected:  09/01/20 1431    Specimen:  Blood Updated:  09/01/20 1534     BUN 23 mg/dL     Comprehensive Metabolic Panel [[id number]]  (Abnormal) Collected:  09/01/20 1431    Specimen:  Blood Updated:  09/01/20 1522     Glucose 110 mg/dL      BUN --     Comment: Testing performed by alternate method        Creatinine 1.35 mg/dL      Sodium 140 mmol/L      Potassium 4.0 mmol/L      Chloride 108 mmol/L      CO2 19.0 mmol/L      Calcium 9.4 mg/dL      Total Protein 7.3 g/dL      Albumin 4.00 g/dL      ALT (SGPT) 20 U/L       AST (SGOT) 17 U/L      Alkaline Phosphatase 71 U/L      Total Bilirubin 1.0 mg/dL      eGFR Non African Amer 53 mL/min/1.73      Globulin 3.3 gm/dL      A/G Ratio 1.2 g/dL      BUN/Creatinine Ratio --     Comment: Testing not performed        Anion Gap 13.0 mmol/L     Narrative:       GFR Normal >60  Chronic Kidney Disease <60  Kidney Failure <15      CBC & Differential [[id number]] Collected:  09/01/20 1431    Specimen:  Blood Updated:  09/01/20 1456    Narrative:       The following orders were created for panel order CBC & Differential.  Procedure                               Abnormality         Status                     ---------                               -----------         ------                     CBC Auto Differential[[id number]]        Abnormal            Final result                 Please view results for these tests on the individual orders.    CBC Auto Differential [[id number]]  (Abnormal) Collected:  09/01/20 1431    Specimen:  Blood Updated:  09/01/20 1456     WBC 11.30 10*3/mm3      RBC 5.20 10*6/mm3      Hemoglobin 13.7 g/dL      Hematocrit 42.6 %      MCV 81.9 fL      MCH 26.4 pg      MCHC 32.3 g/dL      RDW 17.9 %      RDW-SD 50.3 fl      MPV 9.1 fL      Platelets 195 10*3/mm3      Neutrophil % 71.6 %      Lymphocyte % 18.7 %      Monocyte % 8.1 %      Eosinophil % 0.5 %      Basophil % 1.1 %      Neutrophils, Absolute 8.10 10*3/mm3      Lymphocytes, Absolute 2.10 10*3/mm3      Monocytes, Absolute 0.90 10*3/mm3      Eosinophils, Absolute 0.10 10*3/mm3      Basophils, Absolute 0.10 10*3/mm3      nRBC 0.1 /100 WBC     Blood Culture - Blood, Hand, Right [[id number]] Collected:  09/01/20 1431    Specimen:  Blood from Hand, Right Updated:  09/01/20 1452    Blood Culture - Blood, Hand, Left [[id number]] Collected:  09/01/20 1431    Specimen:  Blood from Hand, Left Updated:  09/01/20 1452    COVID-19,[facility] IN-HOUSE,Nasal Swab No Transport Media 3-4 HR TAT - Swab, Nasal Cavity [[id number]]  (Normal)  Collected:  09/01/20 1414    Specimen:  Swab from Nasal Cavity Updated:  09/01/20 1452     COVID19 Not Detected    Narrative:       Fact sheet for providers: https://www.fda.gov/media/750258/download     Fact sheet for patients: https://www.fda.gov/media/056330/download    POC Lactate [[id number]]  (Normal) Collected:  09/01/20 1451    Specimen:  Blood Updated:  09/01/20 1452     Lactate 1.5 mmol/L      Comment: Serial Number: [id number]:  [id number]              Assessment/Plan       Atrial fibrillation with rapid ventricular response (CMS/HCC)    Chronic atrial fibrillation (CMS/HCC)    Chronic diastolic CHF (congestive heart failure) (CMS/HCC)    CKD (chronic kidney disease) stage 3, GFR 30-59 ml/min (CMS/HCC)    Gambling disorder, persistent    Coronary artery disease involving native coronary artery of native heart without angina pectoris    Psoriasis    History of CVA (cerebrovascular accident)    DDD (degenerative disc disease), lumbosacral    Peripheral polyneuropathy    GERD (gastroesophageal reflux disease)    Medically noncompliant    Mild cognitive impairment    Uncontrolled hypertension    Dyspnea      Cardiology input greatly appreciated, thank you await final note on if patient needs echocardiogram were not as he is not had 1 done through our office nor do I see 1 on epic. Nephrology input greatly appreciated as well, thank you. The patient still needs better blood pressure control on just had losartan added today.   Check pro calcitonin to see if possibly the patient has a pneumonic insult in might need antibiotics given his living conditions prior to coming to the hospital for admission. If this is positive, will need to start him on empiric antibiotic therapy. Nebulized treatments already ordered for possible atelectasis in conjunction with pneumonic insult. The patient does appear hemodynamically stable with the exception of markedly elevated blood pressures. Continue rest of current therapy.    Monitor electrolytes replaced p.r.n.. Labs already ordered for the morning.      Paty Polk,   09/02/20  09:01

## 2020-09-02 NOTE — PROGRESS NOTES
Discharge Planning Assessment   [facility]     Patient Name: Carlos Whipple  MRN: [id number]  Today's Date: 9/2/2020    Admit Date: 9/1/2020    Discharge Needs Assessment     Row Name 09/02/20 1100       Living Environment    Lives With  sibling(s) Spoke to patient per phone due to Covid process - States went home to brother's home after last admission to hospital     Current Living Arrangements  home/apartment/condo    Primary Care Provided by  self;homecare agency    Able to Return to Prior Arrangements  -- Pending PT and OT  [facility]        Resource/Environmental Concerns    Resource/Environmental Concerns  none       Transition Planning    Patient/Family Anticipates Transition to  inpatient rehabilitation facility    Patient/Family Anticipated Services at Transition  rehabilitation services    Transportation Anticipated  family or friend will provide       Discharge Needs Assessment    Concerns to be Addressed  discharge planning    Equipment Currently Used at Home  -- Patient states he thinks he has a walker at home but not sure         Discharge Plan     Row Name 09/02/20 1119       Plan    Plan  Pending PT and OT Evals         Confirmed PCP and Pharmacy        Demographic Summary     Row Name 09/02/20 1058       General Information    Admission Type  observation    Arrived From  emergency department    Required Notices Provided  Observation Status Notice    Referral Source  admission list    Reason for Consult  discharge planning    Preferred Language  English        Functional Status     Row Name 09/02/20 1058       Functional Status, IADL    Medications  assistive person    Meal Preparation  assistive person    Housekeeping  assistive person    Laundry  assistive person    Shopping  assistive person    IADL Comments  Patient states he has [employer] for 20 hrs per week who help him at home         DC Barriers - Elevated ProBNP, IV Adilia Pro RN, CM  Office Phone [phone]  Cell  [phone]

## 2020-09-02 NOTE — THERAPY EVALUATION
Patient Name: Carlos Whipple  : 1955    MRN: [id number]                              Today's Date: 2020       Admit Date: 2020    Visit Dx:     ICD-10-CM ICD-9-CM   1. Atrial fibrillation with rapid ventricular response (CMS/HCC) I48.91 427.31   2. Uncontrolled hypertension I10 401.9   3. Dyspnea, unspecified type R06.00 786.09   4. CKD (chronic kidney disease) stage 3, GFR 30-59 ml/min (CMS/HCC) N18.3 585.3     Patient Active Problem List   Diagnosis   • Hypertensive emergency   • Chronic atrial fibrillation (CMS/HCC)   • Chronic diastolic CHF (congestive heart failure) (CMS/HCC)   • CKD (chronic kidney disease) stage 3, GFR 30-59 ml/min (CMS/HCC)   • Essential hypertension   • Gambling disorder, persistent   • Shortness of breath   • Coronary artery disease involving native coronary artery of native heart without angina pectoris   • Psoriasis   • History of CVA (cerebrovascular accident)   • DDD (degenerative disc disease), lumbosacral   • Peripheral polyneuropathy   • GERD (gastroesophageal reflux disease)   • Medically noncompliant   • Acute respiratory distress   • Unstable angina (CMS/HCC)   • Cervical disc disorder with radiculopathy   • Completed stroke (CMS/HCC)   • Degeneration of intervertebral disc   • Excessive anticoagulation   • Impaired left ventricular function   • Liver lesion   • Lumbar radiculopathy   • Cervical myelopathy (CMS/HCC)   • Lumbosacral radiculopathy   • Spinal stenosis of lumbar region   • Obesity   • Mild cognitive impairment   • Thoracic back pain   • Thoracic disc disease with myelopathy   • Paroxysmal atrial fibrillation (CMS/HCC)   • Uncontrolled hypertension   • Acute congestive heart failure (CMS/HCC)   • Dyspnea   • Atrial fibrillation with RVR (CMS/HCC)   • Atrial fibrillation with rapid ventricular response (CMS/HCC)   • Dermatitis associated with moisture     Past Medical History:   Diagnosis Date   • A-fib (CMS/HCC)    • CHF (congestive heart failure)  (CMS/[facility])    • Chronic atrial fibrillation (CMS/[facility]) 11/8/2019   • Chronic diastolic CHF (congestive heart failure) (CMS/[facility]) 11/8/2019   • CKD (chronic kidney disease) stage 3, GFR 30-59 ml/min (CMS/HCC) 11/8/2019   • Coronary artery disease involving native coronary artery of native heart without angina pectoris 11/8/2019   • DDD (degenerative disc disease), lumbosacral 11/8/2019   • Essential hypertension 11/8/2019   • Gambling disorder, persistent 11/8/2019   • GERD (gastroesophageal reflux disease) 11/8/2019   • History of CVA (cerebrovascular accident) 11/8/2019   • Hyperlipidemia    • Hypertension    • Medically noncompliant 11/8/2019   • Peripheral polyneuropathy 11/8/2019   • Psoriasis 11/8/2019   • Stroke (CMS/HCC)      Past Surgical History:   Procedure Laterality Date   • CARDIAC CATHETERIZATION     • CARDIAC CATHETERIZATION N/A 12/10/2019    Procedure: Left Heart Cath and coronary angiogram;  Surgeon: Edy Thomas MD;  Location: [facility] CATH INVASIVE LOCATION;  Service: Cardiovascular     General Information     Row Name 09/02/20 1153          PT Evaluation Time/Intention    Document Type  evaluation  -     Mode of Treatment  physical therapy  -     Row Name 09/02/20 1153          General Information    Patient Profile Reviewed?  yes  -     Existing Precautions/Restrictions  fall;oxygen therapy device and L/min  -HC     Row Name 09/02/20 1153          Relationship/Environment    Lives With  alone  -     Row Name 09/02/20 1153          Resource/Environmental Concerns    Current Living Arrangements  home/apartment/condo  -     Row Name 09/02/20 1153          Home Main Entrance    Number of Stairs, Main Entrance  one  -     Row Name 09/02/20 1153          Cognitive Assessment/Intervention- PT/OT    Orientation Status (Cognition)  oriented x 3  -HC     Row Name 09/02/20 1153          Safety Issues, Functional Mobility    Safety Issues Affecting Function (Mobility)  insight into deficits/self  awareness;judgment;safety precaution awareness  -     Impairments Affecting Function (Mobility)  balance;postural/trunk control;endurance/activity tolerance;strength;shortness of breath  -       User Key  (r) = Recorded By, (t) = Taken By, (c) = Cosigned By    Initials Name Provider Type     Pauline Barrett, PT Physical Therapist        Mobility     Row Name 09/02/20 1154          Bed Mobility Assessment/Treatment    Bed Mobility Assessment/Treatment  supine-sit;sit-supine  -HC     Supine-Sit [place] (Bed Mobility)  minimum assist (75% patient effort)  -     Assistive Device (Bed Mobility)  bed rails  -     Row Name 09/02/20 1154          Sit-Stand Transfer    Sit-Stand [place] (Transfers)  minimum assist (75% patient effort);contact guard  -     Assistive Device (Sit-Stand Transfers)  walker, front-wheeled  -HC     Row Name 09/02/20 1154          Gait/Stairs Assessment/Training    Gait/Stairs Assessment/Training  gait/ambulation independence;gait/ambulation assistive device  -     [place] Level (Gait)  minimum assist (75% patient effort)  -     Assistive Device (Gait)  walker, front-wheeled  -HC     Distance in Feet (Gait)  50 ft, 60 ft   -     Pattern (Gait)  step-through  -     Comment (Gait/Stairs)  mild lateral sway noted, Janell for safety with directional turns and occasional navigation of RW   -       User Key  (r) = Recorded By, (t) = Taken By, (c) = Cosigned By    Initials Name Provider Type     Pauline Barrett, PT Physical Therapist        Obj/Interventions     Row Name 09/02/20 1156          General ROM    GENERAL ROM COMMENTS  BLEs WFL  -     Row Name 09/02/20 1156          MMT (Manual Muscle Testing)    General MMT Comments  BLEs 4/5 MMT  -     Row Name 09/02/20 1156          Static Sitting Balance    Level of [place] (Unsupported Sitting, Static Balance)  standby assist  -     Sitting Position (Unsupported Sitting, Static Balance)  sitting on edge of  bed  -HC     Time Able to Maintain Position (Unsupported Sitting, Static Balance)  2 to 3 minutes  -     Row Name 09/02/20 1156          Dynamic Sitting Balance    Level of [place], Reaches Outside Midline (Sitting, Dynamic Balance)  standby assist  -HC     Sitting Position, Reaches Outside Midline (Sitting, Dynamic Balance)  sitting on edge of bed  -     Row Name 09/02/20 1156          Static Standing Balance    Level of [place] (Supported Standing, Static Balance)  contact guard assist  -HC     Time Able to Maintain Position (Supported Standing, Static Balance)  1 to 2 minutes  -HC     Assistive Device Utilized (Supported Standing, Static Balance)  walker, rolling  -HC     Row Name 09/02/20 1156          Dynamic Standing Balance    Level of [place], Reaches Outside Midline (Standing, Dynamic Balance)  minimal assist, 75% patient effort  -     Time Able to Maintain Position, Reaches Outside Midline (Standing, Dynamic Balance)  1 to 2 minutes  -     Assistive Device Utilized (Supported Standing, Dynamic Balance)  walker, rolling  -     Row Name 09/02/20 1156          Sensory Assessment/Intervention    Sensory General Assessment  no sensation deficits identified  -       User Key  (r) = Recorded By, (t) = Taken By, (c) = Cosigned By    Initials Name Provider Type    HC Pauline Barrett, PT Physical Therapist        Goals/Plan     Row Name 09/02/20 1159          Bed Mobility Goal 1 (PT)    Activity/Assistive Device (Bed Mobility Goal 1, PT)  bed mobility activities, all  -HC     [place] Level/Cues Needed (Bed Mobility Goal 1, PT)  conditional independence  -HC     Time Frame (Bed Mobility Goal 1, PT)  2 weeks  -     Row Name 09/02/20 1159          Transfer Goal 1 (PT)    Activity/Assistive Device (Transfer Goal 1, PT)  transfers, all  -HC     [place] Level/Cues Needed (Transfer Goal 1, PT)  conditional independence  -HC     Time Frame (Transfer Goal 1, PT)  2 weeks  -     Row  Name 09/02/20 1159          Gait Training Goal 1 (PT)    Activity/Assistive Device (Gait Training Goal 1, PT)  gait (walking locomotion);assistive device use  -HC     Price Level (Gait Training Goal 1, PT)  conditional independence  -HC     Distance (Gait Goal 1, PT)  125 ft  -HC     Time Frame (Gait Training Goal 1, PT)  2 weeks  -HC       User Key  (r) = Recorded By, (t) = Taken By, (c) = Cosigned By    Initials Name Provider Type    HC Pauline Barrett, PT Physical Therapist        Clinical Impression     Row Name 09/02/20 1157          Pain Assessment    Additional Documentation  Pain Scale: Numbers Pre/Post-Treatment (Group)  -HC     Row Name 09/02/20 1157          Pain Scale: Numbers Pre/Post-Treatment    Pain Scale: Numbers, Pretreatment  0/10 - no pain  -HC     Pain Scale: Numbers, Post-Treatment  0/10 - no pain  -HC     Row Name 09/02/20 1157          Physical Therapy Clinical Impression    Patient/Family Goals Statement (PT Clinical Impression)  Pt is 65 yo male admitted for SOA, A-fib with RVR, HTN, fatigue, cough.  Covid 19 testing (-).    -HC     Criteria for Skilled Interventions Met (PT Clinical Impression)  yes;treatment indicated  -HC     Rehab Potential (PT Clinical Summary)  good, to achieve stated therapy goals  -HC     Predicted Duration of Therapy (PT)  until d/c  -HC     Row Name 09/02/20 1157          Vital Signs    Pre SpO2 (%)  96  -HC     O2 Delivery Pre Treatment  supplemental O2 2L  -HC     Intra SpO2 (%)  87  -HC     O2 Delivery Intra Treatment  room air  -HC     Post SpO2 (%)  93  -HC     O2 Delivery Post Treatment  supplemental O2 2L  -HC     Row Name 09/02/20 1157          Positioning and Restraints    Pre-Treatment Position  in bed  -HC     Post Treatment Position  chair  -HC     In Chair  notified nsg;call light within reach;encouraged to call for assist;exit alarm on  -HC       User Key  (r) = Recorded By, (t) = Taken By, (c) = Cosigned By    Initials Name Provider Type      Pauline Barrett, PT Physical Therapist        Outcome Measures     Row Name 09/02/20 1159          How much help from another person do you currently need...    Turning from your back to your side while in flat bed without using bedrails?  4  -HC     Moving from lying on back to sitting on the side of a flat bed without bedrails?  3  -HC     Moving to and from a bed to a chair (including a wheelchair)?  3  -HC     Standing up from a chair using your arms (e.g., wheelchair, bedside chair)?  3  -HC     Climbing 3-5 steps with a railing?  2  -HC     To walk in hospital room?  3  -HC     AM-PAC 6 Clicks Score (PT)  18  -     Row Name 09/02/20 1159          Modified [place] Scale    Modified [place] Scale  4 - Moderately severe disability.  Unable to walk without assistance, and unable to attend to own bodily needs without assistance.  -     Row Name 09/02/20 1159          Functional Assessment    Outcome Measure Options  AM-PAC 6 Clicks Basic Mobility (PT);Modified [place]  -HC       User Key  (r) = Recorded By, (t) = Taken By, (c) = Cosigned By    Initials Name Provider Type     Pauline Barrett, PT Physical Therapist        Physical Therapy Education                 Title: PT OT SLP Therapies (In Progress)     Topic: Physical Therapy (In Progress)     Point: Mobility training (In Progress)     Description:   Instruct learner(s) on safety and technique for assisting patient out of bed, chair or wheelchair.  Instruct in the proper use of assistive devices, such as walker, crutches, cane or brace.              Patient Friendly Description:   It's important to get you on your feet again, but we need to do so in a way that is safe for you. Falling has serious consequences, and your personal safety is the most important thing of all.        When it's time to get out of bed, one of us or a family member will sit next to you on the bed to give you support.     If your doctor or nurse tells you to use a walker,  crutches, a cane, or a brace, be sure you use it every time you get out of bed, even if you think you don't need it.    Learning Progress Summary           Patient Acceptance, E, NR by  at 9/2/2020 1200                   Point: Precautions (In Progress)     Description:   Instruct learner(s) on prescribed precautions during mobility and gait tasks              Learning Progress Summary           Patient Acceptance, E, NR by  at 9/2/2020 1200                               User Key     Initials Effective Dates Name Provider Type Discipline     04/17/20 -  Pauline Barrett, PT Physical Therapist PT              PT Recommendation and Plan  Planned Therapy Interventions (PT Eval): balance training, bed mobility training, gait training, postural re-education, transfer training, home exercise program, neuromuscular re-education, strengthening, stair training, patient/family education  Outcome Summary/Treatment Plan (PT)  Anticipated Discharge Disposition (PT): inpatient rehabilitation facility  Plan of Care Reviewed With: patient  Outcome Summary: Pt is 65 yo male admitted for SOA, A-fib with RVR, HTN, fatigue, cough.  Covid 19 testing (-).  Pt presents with SOA during mobility, weakness and impaired balance.  Gait assessed using RW due to impaired balance, however pt requiring Janell for pt safety.  Pt does not appear safe to return home alone due to weakness and risk for falls.  PT is recommending IP rehab to address deficits and increase pt safety.  PT will follow 5x/week while at [facility].  PPE donned: mask with faceshield, gloves.     Time Calculation:   PT Charges     Row Name 09/02/20 1203             Time Calculation    Start Time  1016  -      Stop Time  1049  -      Time Calculation (min)  33 min  -      PT Received On  09/02/20  -      PT - Next Appointment  09/03/20  -      PT Goal Re-Cert Due Date  09/16/20  -         Time Calculation- PT    Total Timed Code Minutes- PT  0 minute(s)  -         User Key  (r) = Recorded By, (t) = Taken By, (c) = Cosigned By    Initials Name Provider Type    HC Pauline Barrett, PT Physical Therapist        Therapy Charges for Today     Code Description Service Date Service Provider Modifiers Qty    [id number]  PT EVAL LOW COMPLEXITY 4 9/2/2020 Pauline Barrett, PT GP 1          PT G-Codes  Outcome Measure Options: AM-PAC 6 Clicks Basic Mobility (PT), Modified [place]  AM-PAC 6 Clicks Score (PT): 18  Modified [place] Scale: 4 - Moderately severe disability.  Unable to walk without assistance, and unable to attend to own bodily needs without assistance.    Pauline Barrett, PT  9/2/2020

## 2020-09-02 NOTE — PROGRESS NOTES
Wound Initial Evaluation  KAE CRUM     Patient Name: Carlos Whipple  : 1955  MRN: [id number]  Today's Date: 2020 Room Number: 256/1      Admit Date: 2020  Attending: Dre Stauffer MD    Consult Requested By: Dr Murguia    Reason For Consult: moisture associated dermatitis    Chief Complaint: 64-year-old male who is made to the hospital with shortness of breath.  Consult received for moisture associated dermatitis to the buttock fold as well as groin area.  Per patient he denies any issues with incontinence of urine or stool    Visit Dx:    ICD-10-CM ICD-9-CM   1. Atrial fibrillation with rapid ventricular response (CMS/[facility]) I48.91 427.31   2. Uncontrolled hypertension I10 401.9   3. Dyspnea, unspecified type R06.00 786.09   4. CKD (chronic kidney disease) stage 3, GFR 30-59 ml/min (CMS/HCC) N18.3 585.3     Patient Active Problem List   Diagnosis   • Hypertensive emergency   • Chronic atrial fibrillation (CMS/HCC)   • Chronic diastolic CHF (congestive heart failure) (CMS/HCC)   • CKD (chronic kidney disease) stage 3, GFR 30-59 ml/min (CMS/HCC)   • Essential hypertension   • Gambling disorder, persistent   • Shortness of breath   • Coronary artery disease involving native coronary artery of native heart without angina pectoris   • Psoriasis   • History of CVA (cerebrovascular accident)   • DDD (degenerative disc disease), lumbosacral   • Peripheral polyneuropathy   • GERD (gastroesophageal reflux disease)   • Medically noncompliant   • Acute respiratory distress   • Unstable angina (CMS/[facility])   • Cervical disc disorder with radiculopathy   • Completed stroke (CMS/HCC)   • Degeneration of intervertebral disc   • Excessive anticoagulation   • Impaired left ventricular function   • Liver lesion   • Lumbar radiculopathy   • Cervical myelopathy (CMS/HCC)   • Lumbosacral radiculopathy   • Spinal stenosis of lumbar region   • Obesity   • Mild cognitive impairment   • Thoracic back pain   • Thoracic disc  disease with myelopathy   • Paroxysmal atrial fibrillation (CMS/[facility])   • Uncontrolled hypertension   • Acute congestive heart failure (CMS/[facility])   • Dyspnea   • Atrial fibrillation with RVR (CMS/[facility])   • Atrial fibrillation with rapid ventricular response (CMS/[facility])   • Dermatitis associated with moisture       History:   Past Medical History:   Diagnosis Date   • A-fib (CMS/HCC)    • CHF (congestive heart failure) (CMS/HCC)    • Chronic atrial fibrillation (CMS/HCC) 11/8/2019   • Chronic diastolic CHF (congestive heart failure) (CMS/HCC) 11/8/2019   • CKD (chronic kidney disease) stage 3, GFR 30-59 ml/min (CMS/HCC) 11/8/2019   • Coronary artery disease involving native coronary artery of native heart without angina pectoris 11/8/2019   • DDD (degenerative disc disease), lumbosacral 11/8/2019   • Essential hypertension 11/8/2019   • Gambling disorder, persistent 11/8/2019   • GERD (gastroesophageal reflux disease) 11/8/2019   • History of CVA (cerebrovascular accident) 11/8/2019   • Hyperlipidemia    • Hypertension    • Medically noncompliant 11/8/2019   • Peripheral polyneuropathy 11/8/2019   • Psoriasis 11/8/2019   • Stroke (CMS/HCC)      Past Surgical History:   Procedure Laterality Date   • CARDIAC CATHETERIZATION     • CARDIAC CATHETERIZATION N/A 12/10/2019    Procedure: Left Heart Cath and coronary angiogram;  Surgeon: Edy Thomas MD;  Location: [facility] INVASIVE LOCATION;  Service: Cardiovascular     Social History     Socioeconomic History   • Marital status: Single     Spouse name: Not on file   • Number of children: Not on file   • Years of education: Not on file   • Highest education level: Not on file   Tobacco Use   • Smoking status: Never Smoker   • Smokeless tobacco: Never Used   Substance and Sexual Activity   • Alcohol use: No     Frequency: Never   • Drug use: No   • Sexual activity: Not Currently   Social History Narrative    Gambling addiction       Allergies:  Allergies   Allergen Reactions    • Ketorolac Tromethamine Anaphylaxis     unknown       Medications:    Current Facility-Administered Medications:   •  acetaminophen (TYLENOL) tablet 650 mg, 650 mg, Oral, Q6H PRN, Paty Polk DO  •  albuterol (PROVENTIL) nebulizer solution 0.083% 2.5 mg/3mL, 2.5 mg, Nebulization, Q6H PRN, Paty Polk DO  •  aspirin EC tablet 81 mg, 81 mg, Oral, Daily, Marisol Larson, APRN, 81 mg at 09/02/20 0822  •  atorvastatin (LIPITOR) tablet 10 mg, 10 mg, Oral, Nightly, Marisol Larson, APRN, 10 mg at 09/01/20 2014  •  clotrimazole-betamethasone (LOTRISONE) 1-0.05 % cream, , Topical, Q12H, Sarah Koehler APRN  •  furosemide (LASIX) injection 20 mg, 20 mg, Intravenous, BID, Paty Polk DO  •  gabapentin (NEURONTIN) capsule 100 mg, 100 mg, Oral, Nightly, Marisol Larson, APRN, 100 mg at 09/01/20 2014  •  hydrALAZINE (APRESOLINE) injection 10 mg, 10 mg, Intravenous, Q4H PRN, Marisol Larson APRN, 10 mg at 09/01/20 1758  •  hydrALAZINE (APRESOLINE) tablet 50 mg, 50 mg, Oral, TID, Marisol Larson, APRN, 50 mg at 09/02/20 0821  •  ipratropium-albuterol (DUO-NEB) nebulizer solution 3 mL, 3 mL, Nebulization, 4x Daily - RT, Paty Polk DO  •  isosorbide mononitrate (ISMO,MONOKET) tablet 30 mg, 30 mg, Oral, BID - Nitrates, Marisol Larson, APRCRISTIAN, 30 mg at 09/02/20 0822  •  losartan (COZAAR) tablet 25 mg, 25 mg, Oral, Daily, Marisol Larson, APRN, 25 mg at 09/02/20 0821  •  magnesium hydroxide (MILK OF MAGNESIA) suspension 2400 mg/10mL 10 mL, 10 mL, Oral, Nightly PRN, Polk, Paty A, DO  •  metoprolol tartrate (LOPRESSOR) tablet 50 mg, 50 mg, Oral, Q12H, Kurt Mccullough MD, 50 mg at 09/02/20 0821  •  niCARdipine (CARDENE) 25 mg in 250 mL NS (0.1 mg/mL) infusion (VTB), 5-15 mg/hr, Intravenous, Titrated, Marisol Larson APRN, Stopped at 09/02/20 0019  •  risperiDONE (risperDAL) tablet 0.25 mg, 0.25 mg, Oral, BID, Marisol Larson APRN, 0.25 mg at 09/02/20 0822  •  rivaroxaban (XARELTO) tablet 20 mg, 20 mg, Oral, Daily With Dinner, Marsha,  Marisol, APRN, 20 mg at 09/01/20 2014  •  sodium chloride 0.9 % flush 10 mL, 10 mL, Intravenous, PRN, Marsha, Marisol, APRN  •  sodium chloride 0.9 % flush 10 mL, 10 mL, Intravenous, Q12H, Marsha, Marisol, APRN, 10 mL at 09/02/20 0822  •  sodium chloride 0.9 % flush 10 mL, 10 mL, Intravenous, PRN, Marsha, Marisol, APRN  •  spironolactone (ALDACTONE) tablet 25 mg, 25 mg, Oral, Daily, Marsha, Marisol, APRN, 25 mg at 09/02/20 0822    Results Review:  Lab Results (last 48 hours)     Procedure Component Value Units Date/Time    Sodium, Urine, Random - Urine, Clean Catch [[id number]] Collected:  09/02/20 0928    Specimen:  Urine, Clean Catch Updated:  09/02/20 1026     Sodium, Urine 68 mmol/L     Narrative:       Reference intervals for random urine have not been established.  Clinical usage is dependent upon physician's interpretation in combination with other laboratory tests.       Procalcitonin [[id number]] Collected:  09/02/20 0252    Specimen:  Blood Updated:  09/02/20 0942    Eosinophil Smear - Urine, Urine, Clean Catch [[id number]] Collected:  09/02/20 0928    Specimen:  Urine, Clean Catch Updated:  09/02/20 0941    TSH [[id number]]  (Normal) Collected:  09/02/20 0252    Specimen:  Blood Updated:  09/02/20 0855     TSH 2.760 uIU/mL     CK [[id number]]  (Normal) Collected:  09/02/20 0252    Specimen:  Blood Updated:  09/02/20 0848     Creatine Kinase 33 U/L     Digoxin Level [[id number]]  (Abnormal) Collected:  09/02/20 0252    Specimen:  Blood Updated:  09/02/20 0848     Digoxin 0.30 ng/mL     Uric Acid [[id number]]  (Abnormal) Collected:  09/02/20 0252    Specimen:  Blood Updated:  09/02/20 0848     Uric Acid 7.8 mg/dL     BUN [[id number]]  (Normal) Collected:  09/02/20 0252    Specimen:  Blood Updated:  09/02/20 0817     BUN 21 mg/dL     Troponin [[id number]]  (Normal) Collected:  09/02/20 0252    Specimen:  Blood Updated:  09/02/20 0344     Troponin T <0.010 ng/mL     Narrative:       Troponin T Reference Range:  <= 0.03 ng/mL-    Negative for AMI  >0.03 ng/mL-     Abnormal for myocardial necrosis.  Clinicians would have to utilize clinical acumen, EKG, Troponin and serial changes to determine if it is an Acute Myocardial Infarction or myocardial injury due to an underlying chronic condition.       Results may be falsely decreased if patient taking Biotin.      BNP [[id number]]  (Abnormal) Collected:  09/02/20 0252    Specimen:  Blood Updated:  09/02/20 0344     proBNP 7,458.0 pg/mL     Narrative:       Among patients with dyspnea, NT-proBNP is highly sensitive for the detection of acute congestive heart failure. In addition NT-proBNP of <300 pg/ml effectively rules out acute congestive heart failure with 99% negative predictive value.    Results may be falsely decreased if patient taking Biotin.      Magnesium [[id number]]  (Normal) Collected:  09/02/20 0252    Specimen:  Blood Updated:  09/02/20 0335     Magnesium 2.1 mg/dL     Basic Metabolic Panel [[id number]]  (Abnormal) Collected:  09/02/20 0252    Specimen:  Blood Updated:  09/02/20 0335     Glucose 128 mg/dL      BUN --     Comment: Testing performed by alternate method        Creatinine 1.18 mg/dL      Sodium 142 mmol/L      Potassium 3.6 mmol/L      Chloride 108 mmol/L      CO2 23.0 mmol/L      Calcium 8.6 mg/dL      eGFR Non African Amer 62 mL/min/1.73      BUN/Creatinine Ratio --     Comment: Testing not performed        Anion Gap 11.0 mmol/L     Narrative:       GFR Normal >60  Chronic Kidney Disease <60  Kidney Failure <15      Phosphorus [[id number]]  (Normal) Collected:  09/02/20 0252    Specimen:  Blood Updated:  09/02/20 0335     Phosphorus 3.0 mg/dL     CBC & Differential [[id number]] Collected:  09/02/20 0252    Specimen:  Blood Updated:  09/02/20 0329    Narrative:       The following orders were created for panel order CBC & Differential.  Procedure                               Abnormality         Status                     ---------                                -----------         ------                     Manual Differential[[id number]]                                                         CBC Auto Differential[[id number]]        Abnormal            Final result                 Please view results for these tests on the individual orders.    CBC Auto Differential [[id number]]  (Abnormal) Collected:  09/02/20 0252    Specimen:  Blood Updated:  09/02/20 0329     WBC 9.70 10*3/mm3      RBC 4.87 10*6/mm3      Hemoglobin 13.0 g/dL      Hematocrit 39.9 %      MCV 81.8 fL      MCH 26.6 pg      MCHC 32.6 g/dL      RDW 18.1 %      RDW-SD 51.6 fl      MPV 8.6 fL      Platelets 164 10*3/mm3      Neutrophil % 78.1 %      Lymphocyte % 11.6 %      Monocyte % 9.2 %      Eosinophil % 0.6 %      Basophil % 0.5 %      Neutrophils, Absolute 7.50 10*3/mm3      Lymphocytes, Absolute 1.10 10*3/mm3      Monocytes, Absolute 0.90 10*3/mm3      Eosinophils, Absolute 0.10 10*3/mm3      Basophils, Absolute 0.10 10*3/mm3     Troponin [[id number]]  (Normal) Collected:  09/01/20 2028    Specimen:  Blood Updated:  09/01/20 2105     Troponin T <0.010 ng/mL     Narrative:       Troponin T Reference Range:  <= 0.03 ng/mL-   Negative for AMI  >0.03 ng/mL-     Abnormal for myocardial necrosis.  Clinicians would have to utilize clinical acumen, EKG, Troponin and serial changes to determine if it is an Acute Myocardial Infarction or myocardial injury due to an underlying chronic condition.       Results may be falsely decreased if patient taking Biotin.      Troponin [[id number]]  (Normal) Collected:  09/01/20 1431    Specimen:  Blood Updated:  09/01/20 1548     Troponin T <0.010 ng/mL     Narrative:       Troponin T Reference Range:  <= 0.03 ng/mL-   Negative for AMI  >0.03 ng/mL-     Abnormal for myocardial necrosis.  Clinicians would have to utilize clinical acumen, EKG, Troponin and serial changes to determine if it is an Acute Myocardial Infarction or myocardial injury due to an underlying chronic  condition.       Results may be falsely decreased if patient taking Biotin.      [place] Draw [[id number]] Collected:  09/01/20 1431    Specimen:  Blood Updated:  09/01/20 1546    Narrative:       The following orders were created for panel order [place] Draw.  Procedure                               Abnormality         Status                     ---------                               -----------         ------                     Light Blue Top[[id number]]                                   Final result               Green Top (Gel)[[id number]]                                                             Lavender Top[[id number]]                                     Final result               Gold Top - SST[[id number]]                                   Final result                 Please view results for these tests on the individual orders.    Light Blue Top [[id number]] Collected:  09/01/20 1431    Specimen:  Blood Updated:  09/01/20 1546     Extra Tube hold for add-on     Comment: Auto resulted       Lavender Top [[id number]] Collected:  09/01/20 1431    Specimen:  Blood Updated:  09/01/20 1546     Extra Tube hold for add-on     Comment: Auto resulted       Gold Top - SST [[id number]] Collected:  09/01/20 1431    Specimen:  Blood Updated:  09/01/20 1546     Extra Tube Hold for add-ons.     Comment: Auto resulted.       BUN [[id number]]  (Normal) Collected:  09/01/20 1431    Specimen:  Blood Updated:  09/01/20 1534     BUN 23 mg/dL     Comprehensive Metabolic Panel [[id number]]  (Abnormal) Collected:  09/01/20 1431    Specimen:  Blood Updated:  09/01/20 1522     Glucose 110 mg/dL      BUN --     Comment: Testing performed by alternate method        Creatinine 1.35 mg/dL      Sodium 140 mmol/L      Potassium 4.0 mmol/L      Chloride 108 mmol/L      CO2 19.0 mmol/L      Calcium 9.4 mg/dL      Total Protein 7.3 g/dL      Albumin 4.00 g/dL      ALT (SGPT) 20 U/L      AST (SGOT) 17 U/L      Alkaline Phosphatase 71 U/L      Total  Bilirubin 1.0 mg/dL      eGFR Non African Amer 53 mL/min/1.73      Globulin 3.3 gm/dL      A/G Ratio 1.2 g/dL      BUN/Creatinine Ratio --     Comment: Testing not performed        Anion Gap 13.0 mmol/L     Narrative:       GFR Normal >60  Chronic Kidney Disease <60  Kidney Failure <15      CBC & Differential [[id number]] Collected:  09/01/20 1431    Specimen:  Blood Updated:  09/01/20 1456    Narrative:       The following orders were created for panel order CBC & Differential.  Procedure                               Abnormality         Status                     ---------                               -----------         ------                     CBC Auto Differential[[id number]]        Abnormal            Final result                 Please view results for these tests on the individual orders.    CBC Auto Differential [[id number]]  (Abnormal) Collected:  09/01/20 1431    Specimen:  Blood Updated:  09/01/20 1456     WBC 11.30 10*3/mm3      RBC 5.20 10*6/mm3      Hemoglobin 13.7 g/dL      Hematocrit 42.6 %      MCV 81.9 fL      MCH 26.4 pg      MCHC 32.3 g/dL      RDW 17.9 %      RDW-SD 50.3 fl      MPV 9.1 fL      Platelets 195 10*3/mm3      Neutrophil % 71.6 %      Lymphocyte % 18.7 %      Monocyte % 8.1 %      Eosinophil % 0.5 %      Basophil % 1.1 %      Neutrophils, Absolute 8.10 10*3/mm3      Lymphocytes, Absolute 2.10 10*3/mm3      Monocytes, Absolute 0.90 10*3/mm3      Eosinophils, Absolute 0.10 10*3/mm3      Basophils, Absolute 0.10 10*3/mm3      nRBC 0.1 /100 WBC     Blood Culture - Blood, Hand, Right [[id number]] Collected:  09/01/20 1431    Specimen:  Blood from Hand, Right Updated:  09/01/20 1452    Blood Culture - Blood, Hand, Left [[id number]] Collected:  09/01/20 1431    Specimen:  Blood from Hand, Left Updated:  09/01/20 1452    COVID-19,[facility] IN-HOUSE,Nasal Swab No Transport Media 3-4 HR TAT - Swab, Nasal Cavity [[id number]]  (Normal) Collected:  09/01/20 1414    Specimen:  Swab from Nasal Cavity  Updated:  09/01/20 1452     COVID19 Not Detected    Narrative:       Fact sheet for providers: https://www.fda.gov/media/708174/download     Fact sheet for patients: https://www.fda.gov/media/865726/download    POC Lactate [[id number]]  (Normal) Collected:  09/01/20 1451    Specimen:  Blood Updated:  09/01/20 1452     Lactate 1.5 mmol/L      Comment: Serial Number: [id number]:  [id number]           Imaging Results (Last 72 Hours)     Procedure Component Value Units Date/Time    XR Chest 1 View [[id number]] Collected:  09/01/20 1531     Updated:  09/01/20 1537    Narrative:       XR CHEST 1 VW-     Date of Exam: 9/1/2020 3:10 PM     Indication: soa.     Comparison Exams: 08/21/2020     Technique: Single AP chest radiograph     FINDINGS:  A left basilar consolidation appears unchanged. There may be an  associated small left pleural effusion. The heart is enlarged. The  pulmonary vasculature appears normal. The osseous structures appear  intact.       Impression:       1.Left basilar consolidation appears unchanged, which could reflect  atelectasis or pneumonia.  2.Cardiomegaly with possible small left pleural effusion.     Electronically Signed By-DR. Salty Joseph MD On:9/1/2020 3:35 PM  This report was finalized on [id number] by DR. Salty Joseph MD.          Review of Systems:  Review of Systems   Respiratory: Negative for shortness of breath.    Cardiovascular: Negative for chest pain.   Gastrointestinal: Negative for diarrhea.   Genitourinary: Negative for dysuria.   Skin: Positive for rash.   Psychiatric/Behavioral: Negative.        Physical Assessment:  Wound 12/12/19 0200 midline gluteal MASD (Moisutre associated skin damage) (Active)   Wound Image   9/1/2020  6:36 PM                                Wound 12/12/19 0200 thoracic spine Other (comment) (Active)   Wound Image   9/1/2020  6:38 PM                                                     Wound 09/01/20 1839 Bilateral anterior groin MASD  (Moisture associated skin damage) (Active)   Wound Image    9/1/2020  6:40 PM                  Moisture associated dermatitis: Patient presents with plaque of erythema complains of scratching at the area due to itching and discomfort no open or denuded areas noted patient does have a history of psoriasis particularly to his back    Recommendation and Plan  We will recommend treating with Lotrisone twice daily    CLARISSA Gregory   9/2/2020   10:37

## 2020-09-03 LAB
ALBUMIN SERPL-MCNC: 3.5 G/DL (ref 3.5–5.2)
ALBUMIN/GLOB SERPL: 1.1 G/DL
ALP SERPL-CCNC: 64 U/L (ref 39–117)
ALT SERPL W P-5'-P-CCNC: 16 U/L (ref 1–41)
ANION GAP SERPL CALCULATED.3IONS-SCNC: 10 MMOL/L (ref 5–15)
AST SERPL-CCNC: 18 U/L (ref 1–40)
BACTERIA SPEC AEROBE CULT: ABNORMAL
BILIRUB SERPL-MCNC: 0.7 MG/DL (ref 0–1.2)
BUN SERPL-MCNC: 24 MG/DL (ref 8–23)
BUN SERPL-MCNC: ABNORMAL MG/DL
BUN/CREAT SERPL: ABNORMAL
CA-I SERPL ISE-MCNC: 1.21 MMOL/L (ref 1.2–1.3)
CALCIUM SPEC-SCNC: 8.7 MG/DL (ref 8.6–10.5)
CHLORIDE SERPL-SCNC: 105 MMOL/L (ref 98–107)
CO2 SERPL-SCNC: 25 MMOL/L (ref 22–29)
CREAT SERPL-MCNC: 1.18 MG/DL (ref 0.76–1.27)
DEPRECATED RDW RBC AUTO: 52.9 FL (ref 37–54)
ERYTHROCYTE [DISTWIDTH] IN BLOOD BY AUTOMATED COUNT: 18.5 % (ref 12.3–15.4)
GFR SERPL CREATININE-BSD FRML MDRD: 62 ML/MIN/1.73
GLOBULIN UR ELPH-MCNC: 3.2 GM/DL
GLUCOSE SERPL-MCNC: 103 MG/DL (ref 65–99)
GRAM STN SPEC: ABNORMAL
HCT VFR BLD AUTO: 40.4 % (ref 37.5–51)
HGB BLD-MCNC: 13.2 G/DL (ref 13–17.7)
ISOLATED FROM: ABNORMAL
MAGNESIUM SERPL-MCNC: 2.1 MG/DL (ref 1.6–2.4)
MCH RBC QN AUTO: 27.2 PG (ref 26.6–33)
MCHC RBC AUTO-ENTMCNC: 32.6 G/DL (ref 31.5–35.7)
MCV RBC AUTO: 83.5 FL (ref 79–97)
PHOSPHATE SERPL-MCNC: 4 MG/DL (ref 2.5–4.5)
PLATELET # BLD AUTO: 175 10*3/MM3 (ref 140–450)
PMV BLD AUTO: 8.6 FL (ref 6–12)
POTASSIUM SERPL-SCNC: 3.6 MMOL/L (ref 3.5–5.2)
PROT SERPL-MCNC: 6.7 G/DL (ref 6–8.5)
RBC # BLD AUTO: 4.84 10*6/MM3 (ref 4.14–5.8)
SODIUM SERPL-SCNC: 140 MMOL/L (ref 136–145)
WBC # BLD AUTO: 10.6 10*3/MM3 (ref 3.4–10.8)

## 2020-09-03 PROCEDURE — 25010000002 HYDRALAZINE PER 20 MG: Performed by: NURSE PRACTITIONER

## 2020-09-03 PROCEDURE — 94799 UNLISTED PULMONARY SVC/PX: CPT

## 2020-09-03 PROCEDURE — 85027 COMPLETE CBC AUTOMATED: CPT | Performed by: INTERNAL MEDICINE

## 2020-09-03 PROCEDURE — 84100 ASSAY OF PHOSPHORUS: CPT | Performed by: INTERNAL MEDICINE

## 2020-09-03 PROCEDURE — 97116 GAIT TRAINING THERAPY: CPT

## 2020-09-03 PROCEDURE — 97530 THERAPEUTIC ACTIVITIES: CPT

## 2020-09-03 PROCEDURE — 82330 ASSAY OF CALCIUM: CPT | Performed by: INTERNAL MEDICINE

## 2020-09-03 PROCEDURE — 99233 SBSQ HOSP IP/OBS HIGH 50: CPT | Performed by: INTERNAL MEDICINE

## 2020-09-03 PROCEDURE — 80053 COMPREHEN METABOLIC PANEL: CPT | Performed by: INTERNAL MEDICINE

## 2020-09-03 PROCEDURE — 83735 ASSAY OF MAGNESIUM: CPT | Performed by: NURSE PRACTITIONER

## 2020-09-03 RX ORDER — HYDRALAZINE HYDROCHLORIDE 25 MG/1
75 TABLET, FILM COATED ORAL 3 TIMES DAILY
Status: DISCONTINUED | OUTPATIENT
Start: 2020-09-03 | End: 2020-09-04

## 2020-09-03 RX ORDER — LOSARTAN POTASSIUM 50 MG/1
50 TABLET ORAL
Status: DISCONTINUED | OUTPATIENT
Start: 2020-09-03 | End: 2020-09-12 | Stop reason: HOSPADM

## 2020-09-03 RX ORDER — ALLOPURINOL 100 MG/1
100 TABLET ORAL DAILY
Status: DISCONTINUED | OUTPATIENT
Start: 2020-09-03 | End: 2020-09-12 | Stop reason: HOSPADM

## 2020-09-03 RX ORDER — DILTIAZEM HYDROCHLORIDE 5 MG/ML
10 INJECTION INTRAVENOUS ONCE
Status: COMPLETED | OUTPATIENT
Start: 2020-09-03 | End: 2020-09-03

## 2020-09-03 RX ADMIN — LOSARTAN POTASSIUM 50 MG: 50 TABLET, FILM COATED ORAL at 09:23

## 2020-09-03 RX ADMIN — RISPERIDONE 0.25 MG: 0.25 TABLET ORAL at 20:03

## 2020-09-03 RX ADMIN — ASPIRIN 81 MG: 81 TABLET, COATED ORAL at 09:22

## 2020-09-03 RX ADMIN — Medication 10 ML: at 20:07

## 2020-09-03 RX ADMIN — ATORVASTATIN CALCIUM 10 MG: 10 TABLET, FILM COATED ORAL at 20:03

## 2020-09-03 RX ADMIN — RIVAROXABAN 20 MG: 20 TABLET, FILM COATED ORAL at 18:13

## 2020-09-03 RX ADMIN — BUMETANIDE 1 MG: 1 TABLET ORAL at 18:13

## 2020-09-03 RX ADMIN — HYDRALAZINE HYDROCHLORIDE 10 MG: 20 INJECTION INTRAMUSCULAR; INTRAVENOUS at 06:50

## 2020-09-03 RX ADMIN — ISOSORBIDE MONONITRATE 30 MG: 20 TABLET ORAL at 18:13

## 2020-09-03 RX ADMIN — SODIUM CHLORIDE 10 MG/HR: 900 INJECTION, SOLUTION INTRAVENOUS at 10:09

## 2020-09-03 RX ADMIN — BUMETANIDE 1 MG: 1 TABLET ORAL at 09:22

## 2020-09-03 RX ADMIN — SODIUM CHLORIDE 5 MG/HR: 900 INJECTION, SOLUTION INTRAVENOUS at 19:51

## 2020-09-03 RX ADMIN — METOPROLOL TARTRATE 50 MG: 50 TABLET, FILM COATED ORAL at 09:22

## 2020-09-03 RX ADMIN — HYDRALAZINE HYDROCHLORIDE 75 MG: 25 TABLET, FILM COATED ORAL at 20:03

## 2020-09-03 RX ADMIN — HYDRALAZINE HYDROCHLORIDE 75 MG: 25 TABLET, FILM COATED ORAL at 15:44

## 2020-09-03 RX ADMIN — ISOSORBIDE MONONITRATE 30 MG: 20 TABLET ORAL at 09:22

## 2020-09-03 RX ADMIN — GABAPENTIN 100 MG: 100 CAPSULE ORAL at 20:03

## 2020-09-03 RX ADMIN — CLOTRIMAZOLE AND BETAMETHASONE DIPROPIONATE: 10; .64 CREAM TOPICAL at 20:03

## 2020-09-03 RX ADMIN — ALLOPURINOL 100 MG: 100 TABLET ORAL at 09:26

## 2020-09-03 RX ADMIN — IPRATROPIUM BROMIDE AND ALBUTEROL SULFATE 3 ML: 2.5; .5 SOLUTION RESPIRATORY (INHALATION) at 10:55

## 2020-09-03 RX ADMIN — SPIRONOLACTONE 25 MG: 25 TABLET, FILM COATED ORAL at 09:22

## 2020-09-03 RX ADMIN — HYDRALAZINE HYDROCHLORIDE 75 MG: 25 TABLET, FILM COATED ORAL at 09:26

## 2020-09-03 RX ADMIN — CLOTRIMAZOLE AND BETAMETHASONE DIPROPIONATE: 10; .64 CREAM TOPICAL at 09:27

## 2020-09-03 RX ADMIN — Medication 10 ML: at 09:23

## 2020-09-03 RX ADMIN — METOPROLOL TARTRATE 50 MG: 50 TABLET, FILM COATED ORAL at 20:03

## 2020-09-03 RX ADMIN — RISPERIDONE 0.25 MG: 0.25 TABLET ORAL at 09:26

## 2020-09-03 RX ADMIN — IPRATROPIUM BROMIDE AND ALBUTEROL SULFATE 3 ML: 2.5; .5 SOLUTION RESPIRATORY (INHALATION) at 18:38

## 2020-09-03 RX ADMIN — DILTIAZEM HYDROCHLORIDE 10 MG: 5 INJECTION INTRAVENOUS at 10:13

## 2020-09-03 NOTE — PROGRESS NOTES
Continued Stay Note  KAE Hameed     Patient Name: Carlos Whipple  MRN: [id number]  Today's Date: 9/3/2020    Admit Date: 9/1/2020    Discharge Plan     Row Name 09/03/20 8981       Plan    Plan Comments  DC barriers: IV Cardizem gtt         Case Management Readmission Assessment Note       Case Management Readmission Assessment (all recorded)      Readmission Interview     Row Name 09/03/20 2599             Readmission Indications    Is this hospitalization related to the prior hospital diagnosis?  Yes      What was the reason you were admitted?  CHF      Row Name 09/03/20 2278             Recommendation for rehospitalization    Did you speak with your physician prior to coming to the hospital  No      Who recommended you return to the hospital?  Other (comment) self      Did you seek care elsewhere prior to coming to the hospital?  No      Row Name 09/03/20 6336             Follow up appointment    Do you have a PCP?  Yes      Did you have an appointment with PCP/specialist after hospitalization within 7 days?  No      Row Name 09/03/20 2980             Medications    Did you have newly prescribed medications at discharge?  -- patient says he does not remember. there were new medications listed on AVS      Did you understand the reasons for your medications at discharge and how to take them?  -- unsure per patient       Did you understand the side effects of your medications?  No      Are you taking all of you prescribed medications?  No      If not, why?  Patient non-compliance      Row Name 09/03/20 0699             Discharge Instructions    Did you understand your discharge instructions?  Yes      Did your family/caregiver hear your instructions?  No      Were you told to eat a special diet?  No      Were you given a number of someone to call if you had questions or concerns?  Yes      Row Name 09/03/20 4280             Index discharge location/services    Where did you go upon discharge?  Home      Do you have  supportive family or friends in the home?  -- discharged to [facility] home during last admission       Row Name 09/03/20 8782             Discharge Readiness    On a scale of 1-5 (5 being well prepared), how ready were you for discharge  4      Recommendation based on interview  Education on diagnosis/self management;Goals of care discussion/advanced care planning

## 2020-09-03 NOTE — DISCHARGE PLACEMENT REQUEST
"Carlos Whipple (64 y.o. Male)     Date of Birth Social Security Number Address Home Phone MRN    1955  [id number]  [address]  [place] [facility]  [id number]    Anabaptist Marital Status          Evangelical Single       Admission Date Admission Type Admitting Provider Attending Provider Department, Room/Bed    9/1/20 Emergency Dre Stauffer MD Heimer, Brian T, MD [facility] NEURO HEART, 256/1    Discharge Date Discharge Disposition Discharge Destination                       Attending Provider:  Dre Stauffer MD    Allergies:  Ketorolac Tromethamine    Isolation:  None   Infection:  None   Code Status:  CPR    Ht:  182.9 cm (72.01\")   Wt:  113 kg (248 lb 0.3 oz)    Admission Cmt:  None   Principal Problem:  Atrial fibrillation with rapid ventricular response (CMS/HCC) [I48.91]                 Active Insurance as of 9/1/2020     Primary Coverage     Payor Plan Insurance Group Employer/Plan Group    HUMANA MEDICARE REPLACEMENT HUMANA MEDICARE REPLACEMENT [id number]     Payor Plan Address Payor Plan Phone Number Payor Plan Fax Number Effective Dates    [address] [address] [phone]  4/1/2020 - None Entered    [facility] [id number]       Subscriber Name Subscriber Birth Date Member ID       CARLOS WHIPPLE 1955 [id number]                 Emergency Contacts      (Rel.) Home Phone Work Phone Mobile Phone    MACYKACEY GORMAN (Friend) [phone] -- [phone]               History & Physical      Dre Stauffer MD at 09/01/20 1940                Patient Care Team:  Marisol Larson APRN as PCP - General    Chief complaint shortness of breath    Subjective     Patient presented to the emergency room today with shortness of breath worsening over the past few days.  He was recently discharged from the hospital and has been staying with his brother.  He is still not getting his medications as prescribed and his family cannot take care of him at home.  I received a phone call " from a  working with [facility] who has been out to see him and reports that he is incontinent of stool and definitely not able to care for himself. His own home is not suitable to live in either and according to the , the county is involved due to hoarding/cleanliness.  She spoke with his family members about getting guardianship due to his cognitive impairment, but reportedly no one will take guardianship.  Patient will likely need ECF placement.  Patient is aware that he is not making good decisions and that he is not caring for himself.  He also mentions that for the past several days he feels like his heart has felt weak whenever he stands and tries to walk.  He has to stand for several minutes before he has enough energy and oxygen to move.    Shortness of Breath   Associated symptoms include leg swelling. Pertinent negatives include no chest pain, fever, sputum production or wheezing.       Review of Systems   Constitutional: Positive for fatigue. Negative for chills and fever.   Respiratory: Positive for shortness of breath. Negative for cough, sputum production and wheezing.    Cardiovascular: Positive for palpitations and leg swelling. Negative for chest pain.   Genitourinary: Negative for difficulty urinating and dysuria.   Neurological: Negative for dizziness.        Past Medical History:   Diagnosis Date   • A-fib (CMS/[facility])    • CHF (congestive heart failure) (CMS/[facility])    • Chronic atrial fibrillation (CMS/[facility]) 11/8/2019   • Chronic diastolic CHF (congestive heart failure) (CMS/[facility]) 11/8/2019   • CKD (chronic kidney disease) stage 3, GFR 30-59 ml/min (CMS/[facility]) 11/8/2019   • Coronary artery disease involving native coronary artery of native heart without angina pectoris 11/8/2019   • DDD (degenerative disc disease), lumbosacral 11/8/2019   • Essential hypertension 11/8/2019   • Gambling disorder, persistent 11/8/2019   • GERD (gastroesophageal reflux disease) 11/8/2019   • History  of CVA (cerebrovascular accident) 11/8/2019   • Hyperlipidemia    • Hypertension    • Medically noncompliant 11/8/2019   • Peripheral polyneuropathy 11/8/2019   • Psoriasis 11/8/2019   • Stroke (CMS/HCC)      Past Surgical History:   Procedure Laterality Date   • CARDIAC CATHETERIZATION     • CARDIAC CATHETERIZATION N/A 12/10/2019    Procedure: Left Heart Cath and coronary angiogram;  Surgeon: Edy Thomas MD;  Location: [facility] CATH INVASIVE LOCATION;  Service: Cardiovascular     Family History   Problem Relation Age of Onset   • Heart disease Mother      Social History     Tobacco Use   • Smoking status: Never Smoker   • Smokeless tobacco: Never Used   Substance Use Topics   • Alcohol use: No     Frequency: Never   • Drug use: No     Medications Prior to Admission   Medication Sig Dispense Refill Last Dose   • aspirin 81 MG EC tablet Take 81 mg by mouth Daily.      • carvedilol (COREG) 25 MG tablet Take 25 mg by mouth 2 (Two) Times a Day With Meals.   Unknown at Unknown time   • dilTIAZem CD (CARDIZEM CD) 180 MG 24 hr capsule Take 1 capsule by mouth Daily for 30 days. 30 capsule 0    • gabapentin (NEURONTIN) 100 MG capsule Take 100 mg by mouth Every Night.   Unknown at Unknown time   • hydrALAZINE (APRESOLINE) 25 MG tablet Take 2 tablets by mouth 3 (Three) Times a Day for 30 days. 180 tablet 5    • isosorbide mononitrate (ISMO,MONOKET) 10 MG tablet Take 3 tablets by mouth 2 (Two) Times a Day. 60 tablet 0    • losartan (Cozaar) 50 MG tablet Take 0.5 tablets by mouth Daily for 30 days. 30 tablet 3    • pravastatin (PRAVACHOL) 40 MG tablet Take 40 mg by mouth Every Night.   Unknown at Unknown time   • risperiDONE (risperDAL) 0.5 MG tablet Take 0.5 tablets by mouth 2 (Two) Times a Day for 30 days. 30 tablet 0    • rivaroxaban (Xarelto) 20 MG tablet Take 1 tablet by mouth Daily With Dinner for 30 days. 30 tablet 5    • spironolactone (ALDACTONE) 25 MG tablet Take 25 mg by mouth Daily.   Unknown at Unknown time          Allergies:  Ketorolac tromethamine    Objective      Vital Signs  Temp:  [97.6 °F (36.4 °C)-98.7 °F (37.1 °C)] 98.7 °F (37.1 °C)  Heart Rate:  [] 100  Resp:  [18-33] 33  BP: (137-220)/() 192/99    Physical Exam   Constitutional: He is oriented to person, place, and time. He appears well-developed and well-nourished.  Non-toxic appearance. He has a sickly appearance.   Cardiovascular: An irregularly irregular rhythm present. Tachycardia present.   Pulmonary/Chest: Effort normal. He has rales.   Abdominal: Soft. Bowel sounds are normal.   Musculoskeletal: He exhibits edema.   Neurological: He is alert and oriented to person, place, and time.   Skin: Skin is warm and dry.   Psychiatric: He has a normal mood and affect. Thought content normal. His speech is tangential. Cognition and memory are impaired.       Results Review:  Lab Results (last 24 hours)     Procedure Component Value Units Date/Time    Troponin [[id number]]  (Normal) Collected:  09/01/20 1431    Specimen:  Blood Updated:  09/01/20 1548     Troponin T <0.010 ng/mL     Narrative:       Troponin T Reference Range:  <= 0.03 ng/mL-   Negative for AMI  >0.03 ng/mL-     Abnormal for myocardial necrosis.  Clinicians would have to utilize clinical acumen, EKG, Troponin and serial changes to determine if it is an Acute Myocardial Infarction or myocardial injury due to an underlying chronic condition.       Results may be falsely decreased if patient taking Biotin.      [employer] Draw [[id number]] Collected:  09/01/20 1431    Specimen:  Blood Updated:  09/01/20 1546    Narrative:       The following orders were created for panel order [employer] Draw.  Procedure                               Abnormality         Status                     ---------                               -----------         ------                     Light Blue Top[[id number]]                                   Final result               Green Top (Gel)[[id number]]                                                              Lavender Top[[id number]]                                     Final result               Gold Top - SST[[id number]]                                   Final result                 Please view results for these tests on the individual orders.    Light Blue Top [[id number]] Collected:  09/01/20 1431    Specimen:  Blood Updated:  09/01/20 1546     Extra Tube hold for add-on     Comment: Auto resulted       Lavender Top [[id number]] Collected:  09/01/20 1431    Specimen:  Blood Updated:  09/01/20 1546     Extra Tube hold for add-on     Comment: Auto resulted       Gold Top - SST [[id number]] Collected:  09/01/20 1431    Specimen:  Blood Updated:  09/01/20 1546     Extra Tube Hold for add-ons.     Comment: Auto resulted.       BUN [[id number]]  (Normal) Collected:  09/01/20 1431    Specimen:  Blood Updated:  09/01/20 1534     BUN 23 mg/dL     Comprehensive Metabolic Panel [[id number]]  (Abnormal) Collected:  09/01/20 1431    Specimen:  Blood Updated:  09/01/20 1522     Glucose 110 mg/dL      BUN --     Comment: Testing performed by alternate method        Creatinine 1.35 mg/dL      Sodium 140 mmol/L      Potassium 4.0 mmol/L      Chloride 108 mmol/L      CO2 19.0 mmol/L      Calcium 9.4 mg/dL      Total Protein 7.3 g/dL      Albumin 4.00 g/dL      ALT (SGPT) 20 U/L      AST (SGOT) 17 U/L      Alkaline Phosphatase 71 U/L      Total Bilirubin 1.0 mg/dL      eGFR Non African Amer 53 mL/min/1.73      Globulin 3.3 gm/dL      A/G Ratio 1.2 g/dL      BUN/Creatinine Ratio --     Comment: Testing not performed        Anion Gap 13.0 mmol/L     Narrative:       GFR Normal >60  Chronic Kidney Disease <60  Kidney Failure <15      CBC & Differential [[id number]] Collected:  09/01/20 1431    Specimen:  Blood Updated:  09/01/20 1456    Narrative:       The following orders were created for panel order CBC & Differential.  Procedure                               Abnormality         Status                      ---------                               -----------         ------                     CBC Auto Differential[[id number]]        Abnormal            Final result                 Please view results for these tests on the individual orders.    CBC Auto Differential [[id number]]  (Abnormal) Collected:  09/01/20 1431    Specimen:  Blood Updated:  09/01/20 1456     WBC 11.30 10*3/mm3      RBC 5.20 10*6/mm3      Hemoglobin 13.7 g/dL      Hematocrit 42.6 %      MCV 81.9 fL      MCH 26.4 pg      MCHC 32.3 g/dL      RDW 17.9 %      RDW-SD 50.3 fl      MPV 9.1 fL      Platelets 195 10*3/mm3      Neutrophil % 71.6 %      Lymphocyte % 18.7 %      Monocyte % 8.1 %      Eosinophil % 0.5 %      Basophil % 1.1 %      Neutrophils, Absolute 8.10 10*3/mm3      Lymphocytes, Absolute 2.10 10*3/mm3      Monocytes, Absolute 0.90 10*3/mm3      Eosinophils, Absolute 0.10 10*3/mm3      Basophils, Absolute 0.10 10*3/mm3      nRBC 0.1 /100 WBC     Blood Culture - Blood, Hand, Right [[id number]] Collected:  09/01/20 1431    Specimen:  Blood from Hand, Right Updated:  09/01/20 1452    Blood Culture - Blood, Hand, Left [[id number]] Collected:  09/01/20 1431    Specimen:  Blood from Hand, Left Updated:  09/01/20 1452    COVID-19,[employer] IN-HOUSE,Nasal Swab No Transport Media 3-4 HR TAT - Swab, Nasal Cavity [[id number]]  (Normal) Collected:  09/01/20 1414    Specimen:  Swab from Nasal Cavity Updated:  09/01/20 1452     COVID19 Not Detected    Narrative:       Fact sheet for providers: https://www.fda.gov/media/023488/download     Fact sheet for patients: https://www.fda.gov/media/575913/download    POC Lactate [[id number]]  (Normal) Collected:  09/01/20 1451    Specimen:  Blood Updated:  09/01/20 1452     Lactate 1.5 mmol/L      Comment: Serial Number: [id number]:  [id number]            Imaging Results (Last 24 Hours)     Procedure Component Value Units Date/Time    XR Chest 1 View [[id number]] Collected:  09/01/20 1531     Updated:  09/01/20  1537    Narrative:       XR CHEST 1 VW-     Date of Exam: 9/1/2020 3:10 PM     Indication: soa.     Comparison Exams: 08/21/2020     Technique: Single AP chest radiograph     FINDINGS:  A left basilar consolidation appears unchanged. There may be an  associated small left pleural effusion. The heart is enlarged. The  pulmonary vasculature appears normal. The osseous structures appear  intact.       Impression:       1.Left basilar consolidation appears unchanged, which could reflect  atelectasis or pneumonia.  2.Cardiomegaly with possible small left pleural effusion.     Electronically Signed By-DR. Salty Joseph MD On:9/1/2020 3:35 PM  This report was finalized on [id number] by DR. Salty Joseph MD.           I reviewed the patient's new clinical results.      Assessment/Plan       Atrial fibrillation with rapid ventricular response (CMS/HCC)    Chronic atrial fibrillation (CMS/HCC)    Chronic diastolic CHF (congestive heart failure) (CMS/HCC)    CKD (chronic kidney disease) stage 3, GFR 30-59 ml/min (CMS/HCC)    Gambling disorder, persistent    Coronary artery disease involving native coronary artery of native heart without angina pectoris    Psoriasis    History of CVA (cerebrovascular accident)    DDD (degenerative disc disease), lumbosacral    Peripheral polyneuropathy    GERD (gastroesophageal reflux disease)    Medically noncompliant    Mild cognitive impairment    Uncontrolled hypertension    Dyspnea      Assessment:    Condition: In serious condition.       Plan:   (Consult cardiology and nephrology.  Check serial troponin and AM labs. He is currently on a Cardene drip.  Consult  and case management as he is not safe to go home.  He really needs ECF placement.).       I discussed the patients findings and my recommendations with patient and primary care team    CLARISSA Meraz  09/01/20  19:40        Electronically signed by Dre Stauffer MD at 09/02/20 0713        {Outbreak/Travel/Exposure Documentation......;  Question Available Choices Patient Response   Outbreak Screen: Do you currently have a new onset of the following symptoms?        Fever/Chils, Cough, Shortness of air, Loss of taste or smell, No, Unknown  (!) Shortness of Air (09/01/20 1337)   Outbreak Screen: In the last 14 days, have you had contact with anyone who is ill, has show any of the symptoms listed above and/or has been diagnosis with the 2019 Novel Coronavirus? This includes any immediate household members but excludes any patients with whom you have been in contact within your normal work duties wearing proper PPE, if you are a healthcare worker.  Yes, No, Unknown              No (09/01/20 1337)   Outbreak Screen: Who was notified?    Free text  Lucy (09/01/20 1337)   Travel Screen: Have you traveled in the last month? If so, to what country have you traveled? If [place] what state? Yes, No, Unknown  List of all countries  List of all States No (09/01/20 1337)  (not recorded)  (not recorded)   Infection Risk: Do you currently have the following symptoms?  (If cough is selected, the Tuberculosis Screen is performed.) Cough, Fever, Rash, No No (09/01/20 1337)   Tuberculosis Screen: Do you have any of the following Tuberculosis Risks?  · Have you lived or spent time with anyone who had or may have TB?  · Have you lived in or visited any of the following areas for more than one month: [place], [place], [place], [place] or [place], the [place] or [place]?  · Do you have HIV/AIDS?  · Have you lived in or worked in a nursing home, homeless shelter, correctional facility, or substance abuse treatment facility?   · No    If Yes do you have any of the following symptoms? Yes responses display to the right    If Yes, symptoms listed are:  Cough greater than or equal to 3 weeks, Loss of appetite, Unexplained weight loss, Night sweats, Bloody sputum or hemoptysis, Hoarseness, Fever, Fatigue, Chest pain, No  (not recorded)  (not recorded)   Exposure Screen: Have you been exposed to any of these contagious diseases in the last month? Measles, Chickenpox, Meningitis, Pertussis, Whooping Cough, No No (09/01/20 1127)       Current Facility-Administered Medications   Medication Dose Route Frequency Provider Last Rate Last Dose   • acetaminophen (TYLENOL) tablet 650 mg  650 mg Oral Q6H PRN Ptay Polk DO       • albuterol (PROVENTIL) nebulizer solution 0.083% 2.5 mg/3mL  2.5 mg Nebulization Q6H PRN Paty Polk DO       • allopurinol (ZYLOPRIM) tablet 100 mg  100 mg Oral Daily Shwetha Barber MD   100 mg at 09/03/20 0926   • aspirin EC tablet 81 mg  81 mg Oral Daily Marisol Larson APRN   81 mg at 09/03/20 0922   • atorvastatin (LIPITOR) tablet 10 mg  10 mg Oral Nightly Marisol Larson APRN   10 mg at 09/02/20 2028   • bumetanide (BUMEX) tablet 1 mg  1 mg Oral BID Shwetha Barber MD   1 mg at 09/03/20 0922   • clotrimazole-betamethasone (LOTRISONE) 1-0.05 % cream   Topical Q12H Sarah Koehler APRN       • dilTIAZem (CARDIZEM) 100 mg in 100 mL NS (1 mg/mL) infusion (ADV)  5-15 mg/hr Intravenous Titrated Edy Thomas MD 5 mL/hr at 09/03/20 1203 5 mg/hr at 09/03/20 1203   • gabapentin (NEURONTIN) capsule 100 mg  100 mg Oral Nightly Marisol Larson APRN   100 mg at 09/02/20 2028   • hydrALAZINE (APRESOLINE) injection 10 mg  10 mg Intravenous Q4H PRN Marisol Larson APRN   10 mg at 09/03/20 0650   • hydrALAZINE (APRESOLINE) tablet 75 mg  75 mg Oral TID Shwetha Barber MD   75 mg at 09/03/20 0926   • ipratropium-albuterol (DUO-NEB) nebulizer solution 3 mL  3 mL Nebulization 4x Daily - RT Paty Polk DO   3 mL at 09/03/20 1055   • isosorbide mononitrate (ISMO,MONOKET) tablet 30 mg  30 mg Oral BID - Nitrates Marisol Larson APRN   30 mg at 09/03/20 0922   • losartan (COZAAR) tablet 50 mg  50 mg Oral Q24H Daylin Burris APRN   50 mg at 09/03/20 0923   • magnesium hydroxide (MILK OF  MAGNESIA) suspension 2400 mg/10mL 10 mL  10 mL Oral Nightly PRN Paty Polk DO       • metoprolol tartrate (LOPRESSOR) tablet 50 mg  50 mg Oral Q12H Kurt Mccullough MD   50 mg at 09/03/20 0922   • niCARdipine (CARDENE) 25 mg in 250 mL NS (0.1 mg/mL) infusion (VTB)  5-15 mg/hr Intravenous Titrated Marisol Larson APRN   Stopped at 09/02/20 0019   • risperiDONE (risperDAL) tablet 0.25 mg  0.25 mg Oral BID Paty Polk DO   0.25 mg at 09/03/20 0926   • rivaroxaban (XARELTO) tablet 20 mg  20 mg Oral Daily With Dinner Paty Polk DO   20 mg at 09/02/20 1714   • sodium chloride 0.9 % flush 10 mL  10 mL Intravenous PRN Marisol Larson APRN       • sodium chloride 0.9 % flush 10 mL  10 mL Intravenous Q12H Marisol Larson APRN   10 mL at 09/03/20 0923   • sodium chloride 0.9 % flush 10 mL  10 mL Intravenous PRN Marisol Larson APRN       • spironolactone (ALDACTONE) tablet 25 mg  25 mg Oral Daily Marisol Larson APRN   25 mg at 09/03/20 0922        Physician Progress Notes (most recent note)      Edy Thomas MD at 09/03/20 1053          Referring Provider: Dr. Stauffer    Reason for follow-up: Atrial fibrillation     Patient Care Team:  Marisol Larson APRN as PCP - Shwetha Guadarrama MD as Consulting Physician (Nephrology)  Edy Thomas MD as Consulting Physician (Cardiology)    Subjective .  No chest pain but has shortness of breath and palpitations    Objective  Sitting in chair comfortably     Review of Systems   Constitution: Negative for fever and malaise/fatigue.   HENT: Negative for ear pain and nosebleeds.    Eyes: Negative for blurred vision and double vision.   Cardiovascular: Positive for palpitations. Negative for chest pain and dyspnea on exertion.   Respiratory: Positive for shortness of breath. Negative for cough.    Skin: Negative for rash.   Musculoskeletal: Negative for joint pain.   Gastrointestinal: Negative for abdominal pain, nausea and vomiting.   Neurological: Negative for  "focal weakness and headaches.   Psychiatric/Behavioral: Negative for depression. The patient is not nervous/anxious.    All other systems reviewed and are negative.      Ketorolac tromethamine    Scheduled Meds:    allopurinol 100 mg Oral Daily   aspirin 81 mg Oral Daily   atorvastatin 10 mg Oral Nightly   bumetanide 1 mg Oral BID   clotrimazole-betamethasone  Topical Q12H   gabapentin 100 mg Oral Nightly   hydrALAZINE 75 mg Oral TID   ipratropium-albuterol 3 mL Nebulization 4x Daily - RT   isosorbide mononitrate 30 mg Oral BID - Nitrates   losartan 50 mg Oral Q24H   metoprolol tartrate 50 mg Oral Q12H   risperiDONE 0.25 mg Oral BID   rivaroxaban 20 mg Oral Daily With Dinner   sodium chloride 10 mL Intravenous Q12H   spironolactone 25 mg Oral Daily     Continuous Infusions:    dilTIAZem 5-15 mg/hr Last Rate: 10 mg/hr (09/03/20 1009)   niCARdipine 5-15 mg/hr Last Rate: Stopped (09/02/20 0019)     PRN Meds:.•  acetaminophen  •  albuterol  •  hydrALAZINE  •  magnesium hydroxide  •  sodium chloride  •  sodium chloride        VITAL SIGNS  Vitals:    09/03/20 0643 09/03/20 0645 09/03/20 0650 09/03/20 1027   BP: (!) 197/125 (!) 170/116 (!) 171/109 156/93   BP Location:  Right arm  Right arm   Patient Position:  Lying  Lying   Pulse: 92 84 94    Resp:  15  14   Temp:  97.7 °F (36.5 °C)  98.6 °F (37 °C)   TempSrc:  Oral  Oral   SpO2: 94% 96%     Weight:  113 kg (248 lb 0.3 oz)     Height:  182.9 cm (72.01\")         Flowsheet Rows      First Filed Value   Admission Height  182.9 cm (72\") Documented at 09/01/2020 1338   Admission Weight  114 kg (252 lb) Documented at 09/01/2020 1338           TELEMETRY: Atrial fibrillation with rapid ventricle response    Physical Exam:  Physical Exam   Constitutional: He appears well-developed and well-nourished.   HENT:   Head: Normocephalic and atraumatic.   Eyes: Pupils are equal, round, and reactive to light. Conjunctivae and EOM are normal. No scleral icterus.   Neck: Normal range of " motion. Neck supple. No JVD present. Carotid bruit is not present.   Cardiovascular: Normal rate, regular rhythm, S1 normal, S2 normal, normal heart sounds and intact distal pulses. PMI is not displaced.   Pulmonary/Chest: Effort normal and breath sounds normal. He has no wheezes. He has no rales.   Abdominal: Soft. Bowel sounds are normal.   Musculoskeletal: Normal range of motion.   Neurological: He is alert. He has normal strength.   No focal deficits   Skin: Skin is warm and dry. No rash noted.   Psychiatric: He has a normal mood and affect.        Results Review:   I reviewed the patient's new clinical results.  Lab Results (last 24 hours)     Procedure Component Value Units Date/Time    BUN [[id number]]  (Abnormal) Collected:  09/03/20 0352    Specimen:  Blood Updated:  09/03/20 0753     BUN 24 mg/dL     Blood Culture - Blood, Hand, Left [[id number]]  (Abnormal) Collected:  09/01/20 1431    Specimen:  Blood from Hand, Left Updated:  09/03/20 0617     Blood Culture Staphylococcus, coagulase negative     Comment: Probable contaminant requires clinical correlation, susceptibility not performed unless requested by physician.          Isolated from Anaerobic Bottle     Gram Stain Anaerobic Bottle Gram positive cocci in clusters    Phosphorus [[id number]]  (Normal) Collected:  09/03/20 0352    Specimen:  Blood Updated:  09/03/20 0439     Phosphorus 4.0 mg/dL     Magnesium [[id number]]  (Normal) Collected:  09/03/20 0352    Specimen:  Blood Updated:  09/03/20 0436     Magnesium 2.1 mg/dL     Comprehensive Metabolic Panel [[id number]]  (Abnormal) Collected:  09/03/20 0352    Specimen:  Blood Updated:  09/03/20 0436     Glucose 103 mg/dL      BUN --     Comment: Testing performed by alternate method        Creatinine 1.18 mg/dL      Sodium 140 mmol/L      Potassium 3.6 mmol/L      Chloride 105 mmol/L      CO2 25.0 mmol/L      Calcium 8.7 mg/dL      Total Protein 6.7 g/dL      Albumin 3.50 g/dL      ALT (SGPT) 16 U/L       AST (SGOT) 18 U/L      Alkaline Phosphatase 64 U/L      Total Bilirubin 0.7 mg/dL      eGFR Non African Amer 62 mL/min/1.73      Globulin 3.2 gm/dL      A/G Ratio 1.1 g/dL      BUN/Creatinine Ratio --     Comment: Testing not performed        Anion Gap 10.0 mmol/L     Narrative:       GFR Normal >60  Chronic Kidney Disease <60  Kidney Failure <15      Calcium, Ionized [[id number]]  (Normal) Collected:  09/03/20 0352    Specimen:  Blood Updated:  09/03/20 0419     Ionized Calcium 1.21 mmol/L     CBC (No Diff) [[id number]]  (Abnormal) Collected:  09/03/20 0352    Specimen:  Blood Updated:  09/03/20 0416     WBC 10.60 10*3/mm3      RBC 4.84 10*6/mm3      Hemoglobin 13.2 g/dL      Hematocrit 40.4 %      MCV 83.5 fL      MCH 27.2 pg      MCHC 32.6 g/dL      RDW 18.5 %      RDW-SD 52.9 fl      MPV 8.6 fL      Platelets 175 10*3/mm3     Urinalysis, Microscopic Only - Urine, Clean Catch [[id number]]  (Abnormal) Collected:  09/02/20 0928    Specimen:  Urine, Clean Catch Updated:  09/02/20 1730     RBC, UA 0-2 /HPF      WBC, UA 0-2 /HPF      Bacteria, UA None Seen /HPF      Squamous Epithelial Cells, UA 0-2 /HPF      Hyaline Casts, UA 3-6 /LPF      Methodology Automated Microscopy    Urinalysis With Culture If Indicated - Urine, Clean Catch [[id number]]  (Abnormal) Collected:  09/02/20 0928    Specimen:  Urine, Clean Catch Updated:  09/02/20 1721     Color, UA Dark Yellow     Comment: Result checked         Appearance, UA Turbid     Comment: Result checked         pH, UA 5.5     Specific Gravity, UA 1.020     Glucose, UA Negative     Ketones, UA Negative     Bilirubin, UA Negative     Blood, UA Negative     Protein,  mg/dL (2+)     Leuk Esterase, UA Negative     Nitrite, UA Negative     Urobilinogen, UA 0.2 E.U./dL    Blood Culture - Blood, Hand, Right [[id number]] Collected:  09/01/20 1431    Specimen:  Blood from Hand, Right Updated:  09/02/20 1500     Blood Culture No growth at 24 hours    Blood Culture ID, PCR - Blood,  "Hand, Left [[id number]]  (Abnormal) Collected:  09/01/20 1431    Specimen:  Blood from Hand, Left Updated:  09/02/20 1317     BCID, PCR Staphylococcus spp, not aureus. Identification by BCID PCR.     BOTTLE TYPE Anaerobic Bottle    Procalcitonin [[id number]]  (Normal) Collected:  09/02/20 0252    Specimen:  Blood Updated:  09/02/20 1130     Procalcitonin 0.08 ng/mL     Narrative:       As a Marker for Sepsis (Non-Neonates):   1. <0.5 ng/mL represents a low risk of severe sepsis and/or septic shock.  1. >2 ng/mL represents a high risk of severe sepsis and/or septic shock.    As a Marker for Lower Respiratory Tract Infections that require antibiotic therapy:  PCT on Admission     Antibiotic Therapy             6-12 Hrs later  > 0.5                Strongly Recommended            >0.25 - <0.5         Recommended  0.1 - 0.25           Discouraged                   Remeasure/reassess PCT  <0.1                 Strongly Discouraged          Remeasure/reassess PCT      As 28 day mortality risk marker: \"Change in Procalcitonin Result\" (> 80 % or <=80 %) if Day 0 (or Day 1) and Day 4 values are available. Refer to http://www.[employer]-calculator.com/   Change in PCT <=80 %   A decrease of PCT levels below or equal to 80 % defines a positive change in PCT test result representing a higher risk for 28-day all-cause mortality of patients diagnosed with severe sepsis or septic shock.  Change in PCT > 80 %   A decrease of PCT levels of more than 80 % defines a negative change in PCT result representing a lower risk for 28-day all-cause mortality of patients diagnosed with severe sepsis or septic shock.                Results may be falsely decreased if patient taking Biotin.     Troponin [[id number]]  (Normal) Collected:  09/02/20 1033    Specimen:  Blood Updated:  09/02/20 1128     Troponin T <0.010 ng/mL     Narrative:       Troponin T Reference Range:  <= 0.03 ng/mL-   Negative for AMI  >0.03 ng/mL-     Abnormal for myocardial " necrosis.  Clinicians would have to utilize clinical acumen, EKG, Troponin and serial changes to determine if it is an Acute Myocardial Infarction or myocardial injury due to an underlying chronic condition.       Results may be falsely decreased if patient taking Biotin.      T4, Free [[id number]]  (Normal) Collected:  09/02/20 1033    Specimen:  Blood Updated:  09/02/20 1128     Free T4 1.23 ng/dL     Narrative:       Results may be falsely increased if patient taking Biotin.      Eosinophil Smear - Urine, Urine, Clean Catch [[id number]]  (Normal) Collected:  09/02/20 0928    Specimen:  Urine, Clean Catch Updated:  09/02/20 1123     Eosinophil Smear 0 % EOS/100 Cells     PTH, Intact [[id number]]  (Normal) Collected:  09/02/20 1033    Specimen:  Blood Updated:  09/02/20 1119     PTH, Intact 44.3 pg/mL     Narrative:       Results may be falsely decreased if patient taking Biotin.            Imaging Results (Last 24 Hours)     Procedure Component Value Units Date/Time    CT Head Without Contrast [[id number]] Collected:  09/02/20 2014     Updated:  09/02/20 2019    Narrative:       CT HEAD WO CONTRAST-     Date of Exam: 9/2/2020 7:47 PM     Indication: Confusion/delirium, altered LOC, unexplained;  I48.91-Unspecified atrial fibrillation; I10-Essential (primary)  hypertension; R06.00-Dyspnea, unspecified; N18.3-Chronic kidney disease,  stage 3 (moderate).     Comparison: 12/11/2019     Technique:  Without contrast, contiguous axial CT images of the head  were obtained from skull base to vertex.  Coronal and sagittal  reconstructions were performed.  Automated exposure control and  iterative reconstruction methods were used.     FINDINGS  There is no intracranial hemorrhage. There is moderate decreased density  in the white matter. There is a large area of encephalomalacia in the  left occipital lobe. There are old thalamic and basal ganglia lacunar  infarcts. There is no new abnormality identified in the brain. There  is  no mass effect. No skull abnormality is noted. No sinus fluid.       Impression:       1. Stable exam, with no acute process demonstrated.  2. Stable moderate chronic white matter disease and left occipital  encephalomalacia.     Electronically Signed By-Nehal Way On:9/2/2020 8:17 PM  This report was finalized on [id number] by  Nehal Way .    US Renal Bilateral [[id number]] Collected:  09/02/20 1127     Updated:  09/02/20 1132    Narrative:       DATE OF EXAM:  9/2/2020 11:06 AM     PROCEDURE:  US RENAL BILATERAL-     INDICATIONS:  CRF/CKD; I48.91-Unspecified atrial fibrillation; I10-Essential (primary)  hypertension; R06.00-Dyspnea, unspecified; N18.3-Chronic kidney disease,  stage 3 (moderate)     COMPARISON:  Renal ultrasound 04/10/2018, CT abdomen pelvis without contrast  10/09/2018.     TECHNIQUE:   Grayscale and color Doppler ultrasound evaluation of the kidneys and  urinary bladder was performed.     FINDINGS:  The right kidney measures 11.3 x 4.0 x 5.0 cm. The left kidney is more  difficult to visualize due to overlying bowel gas, but measures  approximately 11.0 x 4.6 x 4.6 cm. Renal cortical echogenicity appears  within normal limits. No renal mass or hydronephrosis is seen.     Urinary bladder is not well-distended. Estimated bladder volume is 25  mL.       Impression:       Unremarkable sonographic appearance of the kidneys.     Electronically Signed By-Teri Persaud On:9/2/2020 11:30 AM  This report was finalized on [id number] by  Teri Persaud, .          EKG      I personally viewed and interpreted the patient's EKG/Telemetry data:    ECHOCARDIOGRAM:    STRESS MYOVIEW:    CARDIAC CATHETERIZATION:    OTHER:         Assessment/Plan     Principal Problem:    Atrial fibrillation with rapid ventricular response (CMS/HCC)  Active Problems:    Chronic atrial fibrillation (CMS/HCC)    Chronic diastolic CHF (congestive heart failure) (CMS/HCC)    CKD (chronic kidney disease) stage 3, GFR  30-59 ml/min (CMS/[facility])    Gambling disorder, persistent    Coronary artery disease involving native coronary artery of native heart without angina pectoris    Psoriasis    History of CVA (cerebrovascular accident)    DDD (degenerative disc disease), lumbosacral    Peripheral polyneuropathy    GERD (gastroesophageal reflux disease)    Medically noncompliant    Mild cognitive impairment    Uncontrolled hypertension    Dyspnea    Dermatitis associated with moisture       Patient presented with atrial fibrillation and rapid ventricle response and his heart rate is still very high and hence will place him on IV Cardizem now.  Patient is also on beta-blockers and anticoagulation with Eliquis  Patient also is growing coagulase-negative in clusters in the blood and will have further work-up done  Patient's blood pressure is high and is on multiple medications  Patient has chronic renal insufficiency and is followed by nephrologist  Patient had history of CVA and discussed with him about the importance of medicines including anticoagulation      I discussed the patients findings and my recommendations with patient and nurse    Edy Thomas MD  09/03/20  10:53                Electronically signed by Edy Thomas MD at 09/03/20 1058          Consult Notes (most recent note)      Edy Thomas MD at 09/02/20 1519      Consult Orders    1. Inpatient Cardiology Consult [[id number]] ordered by Paty Polk DO at 09/02/20 0859                  Referring Provider: Dr. Stauffer  Reason for Consultation: Atrial fibrillation    Patient Care Team:  Marisol Larson APRN as PCP - Shwetha Guadarrama MD as Consulting Physician (Nephrology)    Chief complaint palpitations    Subjective .     History of present illness:  Carlos Whipple is a 64 y.o. male with history of coronary artery disease congestive heart failure with diastolic dysfunction chronic renal insufficiency atrial fibrillation hypertension hyperlipidemia  presented to the hospital complains of palpitations and shortness of breath.  Patient was in the hospital recently and was placed on medications and was sent home with medical therapy but we are not sure whether he is taking his medicines regularly or not.  Patient did not have any chest pains.  No complains of any PND orthopnea.  No dizziness syncope or swelling of the feet.    Review of Systems   Constitution: Negative for fever and malaise/fatigue.   HENT: Negative for ear pain and nosebleeds.    Eyes: Negative for blurred vision and double vision.   Cardiovascular: Positive for palpitations. Negative for chest pain and dyspnea on exertion.   Respiratory: Positive for shortness of breath. Negative for cough.    Skin: Negative for rash.   Musculoskeletal: Negative for joint pain.   Gastrointestinal: Negative for abdominal pain, nausea and vomiting.   Neurological: Negative for focal weakness and headaches.   Psychiatric/Behavioral: Negative for depression. The patient is not nervous/anxious.    All other systems reviewed and are negative.      History  Past Medical History:   Diagnosis Date   • A-fib (CMS/[facility])    • CHF (congestive heart failure) (CMS/[facility])    • Chronic atrial fibrillation (CMS/[facility]) 11/8/2019   • Chronic diastolic CHF (congestive heart failure) (CMS/[facility]) 11/8/2019   • CKD (chronic kidney disease) stage 3, GFR 30-59 ml/min (CMS/[facility]) 11/8/2019   • Coronary artery disease involving native coronary artery of native heart without angina pectoris 11/8/2019   • DDD (degenerative disc disease), lumbosacral 11/8/2019   • Essential hypertension 11/8/2019   • Gambling disorder, persistent 11/8/2019   • GERD (gastroesophageal reflux disease) 11/8/2019   • History of CVA (cerebrovascular accident) 11/8/2019   • Hyperlipidemia    • Hypertension    • Medically noncompliant 11/8/2019   • Peripheral polyneuropathy 11/8/2019   • Psoriasis 11/8/2019   • Stroke (CMS/[facility])        Past Surgical History:   Procedure  Laterality Date   • CARDIAC CATHETERIZATION     • CARDIAC CATHETERIZATION N/A 12/10/2019    Procedure: Left Heart Cath and coronary angiogram;  Surgeon: Edy Thomas MD;  Location: [facility] CATH INVASIVE LOCATION;  Service: Cardiovascular       Family History   Problem Relation Age of Onset   • Heart disease Mother        Social History     Tobacco Use   • Smoking status: Never Smoker   • Smokeless tobacco: Never Used   Substance Use Topics   • Alcohol use: No     Frequency: Never   • Drug use: No        Medications Prior to Admission   Medication Sig Dispense Refill Last Dose   • aspirin 81 MG EC tablet Take 81 mg by mouth Daily.      • carvedilol (COREG) 25 MG tablet Take 25 mg by mouth 2 (Two) Times a Day With Meals.   Unknown at Unknown time   • dilTIAZem CD (CARDIZEM CD) 180 MG 24 hr capsule Take 1 capsule by mouth Daily for 30 days. 30 capsule 0    • gabapentin (NEURONTIN) 100 MG capsule Take 100 mg by mouth Every Night.   Unknown at Unknown time   • hydrALAZINE (APRESOLINE) 25 MG tablet Take 2 tablets by mouth 3 (Three) Times a Day for 30 days. 180 tablet 5    • isosorbide mononitrate (ISMO,MONOKET) 10 MG tablet Take 3 tablets by mouth 2 (Two) Times a Day. 60 tablet 0    • losartan (Cozaar) 50 MG tablet Take 0.5 tablets by mouth Daily for 30 days. 30 tablet 3    • pravastatin (PRAVACHOL) 40 MG tablet Take 40 mg by mouth Every Night.   Unknown at Unknown time   • risperiDONE (risperDAL) 0.5 MG tablet Take 0.5 tablets by mouth 2 (Two) Times a Day for 30 days. 30 tablet 0    • rivaroxaban (Xarelto) 20 MG tablet Take 1 tablet by mouth Daily With Dinner for 30 days. 30 tablet 5    • spironolactone (ALDACTONE) 25 MG tablet Take 25 mg by mouth Daily.   Unknown at Unknown time         Ketorolac tromethamine    Scheduled Meds:  aspirin 81 mg Oral Daily   atorvastatin 10 mg Oral Nightly   bumetanide 1 mg Oral BID   clotrimazole-betamethasone  Topical Q12H   gabapentin 100 mg Oral Nightly   hydrALAZINE 50 mg Oral TID  "  ipratropium-albuterol 3 mL Nebulization 4x Daily - RT   isosorbide mononitrate 30 mg Oral BID - Nitrates   metoprolol tartrate 50 mg Oral Q12H   risperiDONE 0.25 mg Oral BID   rivaroxaban 20 mg Oral Daily With Dinner   sodium chloride 10 mL Intravenous Q12H   spironolactone 25 mg Oral Daily     Continuous Infusions:  niCARdipine 5-15 mg/hr Last Rate: Stopped (09/02/20 0019)     PRN Meds:.•  acetaminophen  •  albuterol  •  hydrALAZINE  •  magnesium hydroxide  •  sodium chloride  •  sodium chloride    Objective     VITAL SIGNS  Vitals:    09/02/20 1013 09/02/20 1412 09/02/20 1504 09/02/20 1508   BP: 136/81 143/86     BP Location: Left arm Right arm     Patient Position: Lying Sitting     Pulse:   102 96   Resp: 25 25 18 18   Temp: 97.5 °F (36.4 °C) 98.6 °F (37 °C)     TempSrc: Oral Oral     SpO2: 92% 93% 93% 93%   Weight:       Height:           Flowsheet Rows      First Filed Value   Admission Height  182.9 cm (72\") Documented at 09/01/2020 1338   Admission Weight  114 kg (252 lb) Documented at 09/01/2020 1338           TELEMETRY: Atrial fibrillation with rapid ventricular response  Physical Exam:  Physical Exam   Constitutional: He appears well-developed and well-nourished.   HENT:   Head: Normocephalic and atraumatic.   Eyes: Pupils are equal, round, and reactive to light. Conjunctivae and EOM are normal. No scleral icterus.   Neck: Normal range of motion. Neck supple. No JVD present. Carotid bruit is not present.   Cardiovascular: Normal rate, S1 normal, S2 normal, normal heart sounds and intact distal pulses. An irregularly irregular rhythm present. PMI is not displaced.   Pulmonary/Chest: Effort normal and breath sounds normal. He has no wheezes. He has no rales.   Abdominal: Soft. Bowel sounds are normal.   Musculoskeletal: Normal range of motion.   Neurological: He is alert. He has normal strength.   No focal deficits   Skin: Skin is warm and dry. No rash noted.   Psychiatric: He has a normal mood and " "affect.        Results Review:   I reviewed the patient's new clinical results.  Lab Results (last 24 hours)     Procedure Component Value Units Date/Time    Blood Culture - Blood, Hand, Right [[id number]] Collected:  09/01/20 1431    Specimen:  Blood from Hand, Right Updated:  09/02/20 1500     Blood Culture No growth at 24 hours    Blood Culture - Blood, Hand, Left [[id number]]  (Abnormal) Collected:  09/01/20 1431    Specimen:  Blood from Hand, Left Updated:  09/02/20 1317     Blood Culture Abnormal Stain     Gram Stain Anaerobic Bottle Gram positive cocci in clusters    Blood Culture ID, PCR - Blood, Hand, Left [[id number]]  (Abnormal) Collected:  09/01/20 1431    Specimen:  Blood from Hand, Left Updated:  09/02/20 1317     BCID, PCR Staphylococcus spp, not aureus. Identification by BCID PCR.     BOTTLE TYPE Anaerobic Bottle    Procalcitonin [[id number]]  (Normal) Collected:  09/02/20 0252    Specimen:  Blood Updated:  09/02/20 1130     Procalcitonin 0.08 ng/mL     Narrative:       As a Marker for Sepsis (Non-Neonates):   1. <0.5 ng/mL represents a low risk of severe sepsis and/or septic shock.  1. >2 ng/mL represents a high risk of severe sepsis and/or septic shock.    As a Marker for Lower Respiratory Tract Infections that require antibiotic therapy:  PCT on Admission     Antibiotic Therapy             6-12 Hrs later  > 0.5                Strongly Recommended            >0.25 - <0.5         Recommended  0.1 - 0.25           Discouraged                   Remeasure/reassess PCT  <0.1                 Strongly Discouraged          Remeasure/reassess PCT      As 28 day mortality risk marker: \"Change in Procalcitonin Result\" (> 80 % or <=80 %) if Day 0 (or Day 1) and Day 4 values are available. Refer to http://www.[employer]-pct-calculator.com/   Change in PCT <=80 %   A decrease of PCT levels below or equal to 80 % defines a positive change in PCT test result representing a higher risk for 28-day all-cause mortality of " patients diagnosed with severe sepsis or septic shock.  Change in PCT > 80 %   A decrease of PCT levels of more than 80 % defines a negative change in PCT result representing a lower risk for 28-day all-cause mortality of patients diagnosed with severe sepsis or septic shock.                Results may be falsely decreased if patient taking Biotin.     Troponin [[id number]]  (Normal) Collected:  09/02/20 1033    Specimen:  Blood Updated:  09/02/20 1128     Troponin T <0.010 ng/mL     Narrative:       Troponin T Reference Range:  <= 0.03 ng/mL-   Negative for AMI  >0.03 ng/mL-     Abnormal for myocardial necrosis.  Clinicians would have to utilize clinical acumen, EKG, Troponin and serial changes to determine if it is an Acute Myocardial Infarction or myocardial injury due to an underlying chronic condition.       Results may be falsely decreased if patient taking Biotin.      T4, Free [[id number]]  (Normal) Collected:  09/02/20 1033    Specimen:  Blood Updated:  09/02/20 1128     Free T4 1.23 ng/dL     Narrative:       Results may be falsely increased if patient taking Biotin.      Eosinophil Smear - Urine, Urine, Clean Catch [[id number]]  (Normal) Collected:  09/02/20 0928    Specimen:  Urine, Clean Catch Updated:  09/02/20 1123     Eosinophil Smear 0 % EOS/100 Cells     PTH, Intact [[id number]]  (Normal) Collected:  09/02/20 1033    Specimen:  Blood Updated:  09/02/20 1119     PTH, Intact 44.3 pg/mL     Narrative:       Results may be falsely decreased if patient taking Biotin.      Sodium, Urine, Random - Urine, Clean Catch [[id number]] Collected:  09/02/20 0928    Specimen:  Urine, Clean Catch Updated:  09/02/20 1026     Sodium, Urine 68 mmol/L     Narrative:       Reference intervals for random urine have not been established.  Clinical usage is dependent upon physician's interpretation in combination with other laboratory tests.       TSH [[id number]]  (Normal) Collected:  09/02/20 0252    Specimen:  Blood Updated:   09/02/20 0855     TSH 2.760 uIU/mL     CK [[id number]]  (Normal) Collected:  09/02/20 0252    Specimen:  Blood Updated:  09/02/20 0848     Creatine Kinase 33 U/L     Digoxin Level [[id number]]  (Abnormal) Collected:  09/02/20 0252    Specimen:  Blood Updated:  09/02/20 0848     Digoxin 0.30 ng/mL     Uric Acid [[id number]]  (Abnormal) Collected:  09/02/20 0252    Specimen:  Blood Updated:  09/02/20 0848     Uric Acid 7.8 mg/dL     BUN [[id number]]  (Normal) Collected:  09/02/20 0252    Specimen:  Blood Updated:  09/02/20 0817     BUN 21 mg/dL     Troponin [[id number]]  (Normal) Collected:  09/02/20 0252    Specimen:  Blood Updated:  09/02/20 0344     Troponin T <0.010 ng/mL     Narrative:       Troponin T Reference Range:  <= 0.03 ng/mL-   Negative for AMI  >0.03 ng/mL-     Abnormal for myocardial necrosis.  Clinicians would have to utilize clinical acumen, EKG, Troponin and serial changes to determine if it is an Acute Myocardial Infarction or myocardial injury due to an underlying chronic condition.       Results may be falsely decreased if patient taking Biotin.      BNP [[id number]]  (Abnormal) Collected:  09/02/20 0252    Specimen:  Blood Updated:  09/02/20 0344     proBNP 7,458.0 pg/mL     Narrative:       Among patients with dyspnea, NT-proBNP is highly sensitive for the detection of acute congestive heart failure. In addition NT-proBNP of <300 pg/ml effectively rules out acute congestive heart failure with 99% negative predictive value.    Results may be falsely decreased if patient taking Biotin.      Magnesium [[id number]]  (Normal) Collected:  09/02/20 0252    Specimen:  Blood Updated:  09/02/20 0335     Magnesium 2.1 mg/dL     Basic Metabolic Panel [[id number]]  (Abnormal) Collected:  09/02/20 0252    Specimen:  Blood Updated:  09/02/20 0335     Glucose 128 mg/dL      BUN --     Comment: Testing performed by alternate method        Creatinine 1.18 mg/dL      Sodium 142 mmol/L      Potassium 3.6 mmol/L       Chloride 108 mmol/L      CO2 23.0 mmol/L      Calcium 8.6 mg/dL      eGFR Non African Amer 62 mL/min/1.73      BUN/Creatinine Ratio --     Comment: Testing not performed        Anion Gap 11.0 mmol/L     Narrative:       GFR Normal >60  Chronic Kidney Disease <60  Kidney Failure <15      Phosphorus [[id number]]  (Normal) Collected:  09/02/20 0252    Specimen:  Blood Updated:  09/02/20 0335     Phosphorus 3.0 mg/dL     CBC & Differential [[id number]] Collected:  09/02/20 0252    Specimen:  Blood Updated:  09/02/20 0329    Narrative:       The following orders were created for panel order CBC & Differential.  Procedure                               Abnormality         Status                     ---------                               -----------         ------                     Manual Differential[[id number]]                                                         CBC Auto Differential[[id number]]        Abnormal            Final result                 Please view results for these tests on the individual orders.    CBC Auto Differential [[id number]]  (Abnormal) Collected:  09/02/20 0252    Specimen:  Blood Updated:  09/02/20 0329     WBC 9.70 10*3/mm3      RBC 4.87 10*6/mm3      Hemoglobin 13.0 g/dL      Hematocrit 39.9 %      MCV 81.8 fL      MCH 26.6 pg      MCHC 32.6 g/dL      RDW 18.1 %      RDW-SD 51.6 fl      MPV 8.6 fL      Platelets 164 10*3/mm3      Neutrophil % 78.1 %      Lymphocyte % 11.6 %      Monocyte % 9.2 %      Eosinophil % 0.6 %      Basophil % 0.5 %      Neutrophils, Absolute 7.50 10*3/mm3      Lymphocytes, Absolute 1.10 10*3/mm3      Monocytes, Absolute 0.90 10*3/mm3      Eosinophils, Absolute 0.10 10*3/mm3      Basophils, Absolute 0.10 10*3/mm3     Troponin [[id number]]  (Normal) Collected:  09/01/20 2028    Specimen:  Blood Updated:  09/01/20 2105     Troponin T <0.010 ng/mL     Narrative:       Troponin T Reference Range:  <= 0.03 ng/mL-   Negative for AMI  >0.03 ng/mL-     Abnormal for myocardial  necrosis.  Clinicians would have to utilize clinical acumen, EKG, Troponin and serial changes to determine if it is an Acute Myocardial Infarction or myocardial injury due to an underlying chronic condition.       Results may be falsely decreased if patient taking Biotin.      Troponin [[id number]]  (Normal) Collected:  09/01/20 1431    Specimen:  Blood Updated:  09/01/20 1548     Troponin T <0.010 ng/mL     Narrative:       Troponin T Reference Range:  <= 0.03 ng/mL-   Negative for AMI  >0.03 ng/mL-     Abnormal for myocardial necrosis.  Clinicians would have to utilize clinical acumen, EKG, Troponin and serial changes to determine if it is an Acute Myocardial Infarction or myocardial injury due to an underlying chronic condition.       Results may be falsely decreased if patient taking Biotin.      [place] Draw [[id number]] Collected:  09/01/20 1431    Specimen:  Blood Updated:  09/01/20 1546    Narrative:       The following orders were created for panel order [place] Draw.  Procedure                               Abnormality         Status                     ---------                               -----------         ------                     Light Blue Top[[id number]]                                   Final result               Green Top (Gel)[[id number]]                                                             Lavender Top[[id number]]                                     Final result               Gold Top - SST[[id number]]                                   Final result                 Please view results for these tests on the individual orders.    Light Blue Top [[id number]] Collected:  09/01/20 1431    Specimen:  Blood Updated:  09/01/20 1546     Extra Tube hold for add-on     Comment: Auto resulted       Lavender Top [[id number]] Collected:  09/01/20 1431    Specimen:  Blood Updated:  09/01/20 1546     Extra Tube hold for add-on     Comment: Auto resulted       Gold Top - SST [[id number]] Collected:  09/01/20  1431    Specimen:  Blood Updated:  09/01/20 1546     Extra Tube Hold for add-ons.     Comment: Auto resulted.       BUN [[id number]]  (Normal) Collected:  09/01/20 1431    Specimen:  Blood Updated:  09/01/20 1534     BUN 23 mg/dL     Comprehensive Metabolic Panel [[id number]]  (Abnormal) Collected:  09/01/20 1431    Specimen:  Blood Updated:  09/01/20 1522     Glucose 110 mg/dL      BUN --     Comment: Testing performed by alternate method        Creatinine 1.35 mg/dL      Sodium 140 mmol/L      Potassium 4.0 mmol/L      Chloride 108 mmol/L      CO2 19.0 mmol/L      Calcium 9.4 mg/dL      Total Protein 7.3 g/dL      Albumin 4.00 g/dL      ALT (SGPT) 20 U/L      AST (SGOT) 17 U/L      Alkaline Phosphatase 71 U/L      Total Bilirubin 1.0 mg/dL      eGFR Non African Amer 53 mL/min/1.73      Globulin 3.3 gm/dL      A/G Ratio 1.2 g/dL      BUN/Creatinine Ratio --     Comment: Testing not performed        Anion Gap 13.0 mmol/L     Narrative:       GFR Normal >60  Chronic Kidney Disease <60  Kidney Failure <15            Imaging Results (Last 24 Hours)     Procedure Component Value Units Date/Time    US Renal Bilateral [[id number]] Collected:  09/02/20 1127     Updated:  09/02/20 1132    Narrative:       DATE OF EXAM:  9/2/2020 11:06 AM     PROCEDURE:  US RENAL BILATERAL-     INDICATIONS:  CRF/CKD; I48.91-Unspecified atrial fibrillation; I10-Essential (primary)  hypertension; R06.00-Dyspnea, unspecified; N18.3-Chronic kidney disease,  stage 3 (moderate)     COMPARISON:  Renal ultrasound 04/10/2018, CT abdomen pelvis without contrast  10/09/2018.     TECHNIQUE:   Grayscale and color Doppler ultrasound evaluation of the kidneys and  urinary bladder was performed.     FINDINGS:  The right kidney measures 11.3 x 4.0 x 5.0 cm. The left kidney is more  difficult to visualize due to overlying bowel gas, but measures  approximately 11.0 x 4.6 x 4.6 cm. Renal cortical echogenicity appears  within normal limits. No renal mass or  hydronephrosis is seen.     Urinary bladder is not well-distended. Estimated bladder volume is 25  mL.       Impression:       Unremarkable sonographic appearance of the kidneys.     Electronically Signed By-Teri Persaud On:9/2/2020 11:30 AM  This report was finalized on [id number] by  Teri Persaud, .    XR Chest 1 View [[id number]] Collected:  09/01/20 1531     Updated:  09/01/20 1537    Narrative:       XR CHEST 1 VW-     Date of Exam: 9/1/2020 3:10 PM     Indication: soa.     Comparison Exams: 08/21/2020     Technique: Single AP chest radiograph     FINDINGS:  A left basilar consolidation appears unchanged. There may be an  associated small left pleural effusion. The heart is enlarged. The  pulmonary vasculature appears normal. The osseous structures appear  intact.       Impression:       1.Left basilar consolidation appears unchanged, which could reflect  atelectasis or pneumonia.  2.Cardiomegaly with possible small left pleural effusion.     Electronically Signed By-DR. Salty Joseph MD On:9/1/2020 3:35 PM  This report was finalized on [id number] by DR. Salty Joseph MD.          EKG      I personally viewed and interpreted the patient's EKG/Telemetry data:    ECHOCARDIOGRAM:      STRESS MYOVIEW:    CARDIAC CATHETERIZATION:    OTHER:         Assessment/Plan     Principal Problem:    Atrial fibrillation with rapid ventricular response (CMS/HCC)  Active Problems:    Chronic atrial fibrillation (CMS/HCC)    Chronic diastolic CHF (congestive heart failure) (CMS/HCC)    CKD (chronic kidney disease) stage 3, GFR 30-59 ml/min (CMS/HCC)    Gambling disorder, persistent    Coronary artery disease involving native coronary artery of native heart without angina pectoris    Psoriasis    History of CVA (cerebrovascular accident)    DDD (degenerative disc disease), lumbosacral    Peripheral polyneuropathy    GERD (gastroesophageal reflux disease)    Medically noncompliant    Mild cognitive impairment     Uncontrolled hypertension    Dyspnea    Dermatitis associated with moisture      Patient presents with shortness of breath and palpitations and has atrial fibrillation with rapid ventricular response  Patient digoxin level is on the low side  Question significant medical noncompliance  Patient blood pressure stable heart rate is high and hence will restart his home medicines along with Eliquis  Patient is ruled out for MI by enzymes  Patient blood cultures are growing organisms in clusters.  Will wait for final culture results.  Patient also had a CVA in the past because of his medical noncompliance and has been discussed with him several times to take his medications regularly including his anticoagulation.    I discussed the patients findings and my recommendations with patient and nurse    Edy Thomas MD  20  15:19              Electronically signed by Edy Thomas MD at 20 1524          Physical Therapy Notes (most recent note)      Pauline Barrett, PT at 20 1203  Version 1 of 1         Patient Name: Carlos Whipple  : 1955    MRN: [id number]                              Today's Date: 2020       Admit Date: 2020    Visit Dx:     ICD-10-CM ICD-9-CM   1. Atrial fibrillation with rapid ventricular response (CMS/HCC) I48.91 427.31   2. Uncontrolled hypertension I10 401.9   3. Dyspnea, unspecified type R06.00 786.09   4. CKD (chronic kidney disease) stage 3, GFR 30-59 ml/min (CMS/HCC) N18.3 585.3     Patient Active Problem List   Diagnosis   • Hypertensive emergency   • Chronic atrial fibrillation (CMS/HCC)   • Chronic diastolic CHF (congestive heart failure) (CMS/HCC)   • CKD (chronic kidney disease) stage 3, GFR 30-59 ml/min (CMS/HCC)   • Essential hypertension   • Gambling disorder, persistent   • Shortness of breath   • Coronary artery disease involving native coronary artery of native heart without angina pectoris   • Psoriasis   • History of CVA (cerebrovascular  accident)   • DDD (degenerative disc disease), lumbosacral   • Peripheral polyneuropathy   • GERD (gastroesophageal reflux disease)   • Medically noncompliant   • Acute respiratory distress   • Unstable angina (CMS/[facility])   • Cervical disc disorder with radiculopathy   • Completed stroke (CMS/[facility])   • Degeneration of intervertebral disc   • Excessive anticoagulation   • Impaired left ventricular function   • Liver lesion   • Lumbar radiculopathy   • Cervical myelopathy (CMS/[facility])   • Lumbosacral radiculopathy   • Spinal stenosis of lumbar region   • Obesity   • Mild cognitive impairment   • Thoracic back pain   • Thoracic disc disease with myelopathy   • Paroxysmal atrial fibrillation (CMS/[facility])   • Uncontrolled hypertension   • Acute congestive heart failure (CMS/[facility])   • Dyspnea   • Atrial fibrillation with RVR (CMS/[facility])   • Atrial fibrillation with rapid ventricular response (CMS/[facility])   • Dermatitis associated with moisture     Past Medical History:   Diagnosis Date   • A-fib (CMS/[facility])    • CHF (congestive heart failure) (CMS/[facility])    • Chronic atrial fibrillation (CMS/[facility]) 11/8/2019   • Chronic diastolic CHF (congestive heart failure) (CMS/HCC) 11/8/2019   • CKD (chronic kidney disease) stage 3, GFR 30-59 ml/min (CMS/[facility]) 11/8/2019   • Coronary artery disease involving native coronary artery of native heart without angina pectoris 11/8/2019   • DDD (degenerative disc disease), lumbosacral 11/8/2019   • Essential hypertension 11/8/2019   • Gambling disorder, persistent 11/8/2019   • GERD (gastroesophageal reflux disease) 11/8/2019   • History of CVA (cerebrovascular accident) 11/8/2019   • Hyperlipidemia    • Hypertension    • Medically noncompliant 11/8/2019   • Peripheral polyneuropathy 11/8/2019   • Psoriasis 11/8/2019   • Stroke (CMS/[facility])      Past Surgical History:   Procedure Laterality Date   • CARDIAC CATHETERIZATION     • CARDIAC CATHETERIZATION N/A 12/10/2019    Procedure: Left Heart Cath and coronary  angiogram;  Surgeon: Edy Thomas MD;  Location:  [facility]AS[facility];  Service: Cardiovascular     General Information     Row Name 09/02/20 1153          PT Evaluation Time/Intention    Document Type  evaluation  -     Mode of Treatment  physical therapy  -     Row Name 09/02/20 1153          General Information    Patient Profile Reviewed?  yes  -HC     Existing Precautions/Restrictions  fall;oxygen therapy device and L/min  -     Row Name 09/02/20 1153          Relationship/Environment    Lives With  alone  -     Row Name 09/02/20 1153          Resource/Environmental Concerns    Current Living Arrangements  home/apartment/condo  -     Row Name 09/02/20 1153          Home Main Entrance    Number of Stairs, Main Entrance  one  -HC     Row Name 09/02/20 1153          Cognitive Assessment/Intervention- PT/OT    Orientation Status (Cognition)  oriented x 3  -     Row Name 09/02/20 1153          Safety Issues, Functional Mobility    Safety Issues Affecting Function (Mobility)  insight into deficits/self awareness;judgment;safety precaution awareness  -     Impairments Affecting Function (Mobility)  balance;postural/trunk control;endurance/activity tolerance;strength;shortness of breath  -       User Key  (r) = Recorded By, (t) = Taken By, (c) = Cosigned By    Initials Name Provider Type     Pauline Barrett, PT Physical Therapist        Mobility     Row Name 09/02/20 1154          Bed Mobility Assessment/Treatment    Bed Mobility Assessment/Treatment  supine-sit;sit-supine  -     Supine-Sit [place] (Bed Mobility)  minimum assist (75% patient effort)  -     Assistive Device (Bed Mobility)  bed rails  -     Row Name 09/02/20 1154          Sit-Stand Transfer    Sit-Stand [place] (Transfers)  minimum assist (75% patient effort);contact guard  -     Assistive Device (Sit-Stand Transfers)  walker, front-wheeled  -     Row Name 09/02/20 1154          Gait/Stairs  Assessment/Training    Gait/Stairs Assessment/Training  gait/ambulation independence;gait/ambulation assistive device  -     [place] Level (Gait)  minimum assist (75% patient effort)  -     Assistive Device (Gait)  walker, front-wheeled  -     Distance in Feet (Gait)  50 ft, 60 ft   -     Pattern (Gait)  step-through  -     Comment (Gait/Stairs)  mild lateral sway noted, Janell for safety with directional turns and occasional navigation of RW   -       User Key  (r) = Recorded By, (t) = Taken By, (c) = Cosigned By    Initials Name Provider Type    HC Pauline Barrett, PT Physical Therapist        Obj/Interventions     Row Name 09/02/20 1156          General ROM    GENERAL ROM COMMENTS  BLEs WFL  -     Row Name 09/02/20 1156          MMT (Manual Muscle Testing)    General MMT Comments  BLEs 4/5 MMT  -     Row Name 09/02/20 1156          Static Sitting Balance    Level of [place] (Unsupported Sitting, Static Balance)  standby assist  -     Sitting Position (Unsupported Sitting, Static Balance)  sitting on edge of bed  -     Time Able to Maintain Position (Unsupported Sitting, Static Balance)  2 to 3 minutes  -     Row Name 09/02/20 1156          Dynamic Sitting Balance    Level of [place], Reaches Outside Midline (Sitting, Dynamic Balance)  standby assist  -     Sitting Position, Reaches Outside Midline (Sitting, Dynamic Balance)  sitting on edge of bed  -     Row Name 09/02/20 1156          Static Standing Balance    Level of [place] (Supported Standing, Static Balance)  contact guard assist  -     Time Able to Maintain Position (Supported Standing, Static Balance)  1 to 2 minutes  -     Assistive Device Utilized (Supported Standing, Static Balance)  walker, rolling  -     Row Name 09/02/20 1156          Dynamic Standing Balance    Level of [place], Reaches Outside Midline (Standing, Dynamic Balance)  minimal assist, 75% patient effort  -     Time Able to  Maintain Position, Reaches Outside Midline (Standing, Dynamic Balance)  1 to 2 minutes  -     Assistive Device Utilized (Supported Standing, Dynamic Balance)  walker, rolling  -     Row Name 09/02/20 1156          Sensory Assessment/Intervention    Sensory General Assessment  no sensation deficits identified  -       User Key  (r) = Recorded By, (t) = Taken By, (c) = Cosigned By    Initials Name Provider Type     Pauline Barrett, PT Physical Therapist        Goals/Plan     Row Name 09/02/20 1159          Bed Mobility Goal 1 (PT)    Activity/Assistive Device (Bed Mobility Goal 1, PT)  bed mobility activities, all  -HC     [place] Level/Cues Needed (Bed Mobility Goal 1, PT)  conditional independence  -HC     Time Frame (Bed Mobility Goal 1, PT)  2 weeks  -     Row Name 09/02/20 1159          Transfer Goal 1 (PT)    Activity/Assistive Device (Transfer Goal 1, PT)  transfers, all  -HC     [place] Level/Cues Needed (Transfer Goal 1, PT)  conditional independence  -HC     Time Frame (Transfer Goal 1, PT)  2 weeks  -     Row Name 09/02/20 1159          Gait Training Goal 1 (PT)    Activity/Assistive Device (Gait Training Goal 1, PT)  gait (walking locomotion);assistive device use  -HC     [place] Level (Gait Training Goal 1, PT)  conditional independence  -HC     Distance (Gait Goal 1, PT)  125 ft  -HC     Time Frame (Gait Training Goal 1, PT)  2 weeks  -       User Key  (r) = Recorded By, (t) = Taken By, (c) = Cosigned By    Initials Name Provider Type     Pauline Barrett, PT Physical Therapist        Clinical Impression     Row Name 09/02/20 1157          Pain Assessment    Additional Documentation  Pain Scale: Numbers Pre/Post-Treatment (Group)  -     Row Name 09/02/20 1157          Pain Scale: Numbers Pre/Post-Treatment    Pain Scale: Numbers, Pretreatment  0/10 - no pain  -     Pain Scale: Numbers, Post-Treatment  0/10 - no pain  -     Row Name 09/02/20 1157          Physical  Therapy Clinical Impression    Patient/Family Goals Statement (PT Clinical Impression)  Pt is 63 yo male admitted for SOA, A-fib with RVR, HTN, fatigue, cough.  Covid 19 testing (-).    -HC     Criteria for Skilled Interventions Met (PT Clinical Impression)  yes;treatment indicated  -HC     Rehab Potential (PT Clinical Summary)  good, to achieve stated therapy goals  -HC     Predicted Duration of Therapy (PT)  until d/c  -HC     Row Name 09/02/20 1157          Vital Signs    Pre SpO2 (%)  96  -HC     O2 Delivery Pre Treatment  supplemental O2 2L  -HC     Intra SpO2 (%)  87  -HC     O2 Delivery Intra Treatment  room air  -HC     Post SpO2 (%)  93  -HC     O2 Delivery Post Treatment  supplemental O2 2L  -HC     Row Name 09/02/20 1157          Positioning and Restraints    Pre-Treatment Position  in bed  -HC     Post Treatment Position  chair  -HC     In Chair  notified nsg;call light within reach;encouraged to call for assist;exit alarm on  -HC       User Key  (r) = Recorded By, (t) = Taken By, (c) = Cosigned By    Initials Name Provider Type    HC Pauline Barrett, PT Physical Therapist        Outcome Measures     Row Name 09/02/20 1150          How much help from another person do you currently need...    Turning from your back to your side while in flat bed without using bedrails?  4  -HC     Moving from lying on back to sitting on the side of a flat bed without bedrails?  3  -HC     Moving to and from a bed to a chair (including a wheelchair)?  3  -HC     Standing up from a chair using your arms (e.g., wheelchair, bedside chair)?  3  -HC     Climbing 3-5 steps with a railing?  2  -HC     To walk in hospital room?  3  -HC     AM-PAC 6 Clicks Score (PT)  18  -HC     Row Name 09/02/20 1150          Modified [place] Scale    Modified [place] Scale  4 - Moderately severe disability.  Unable to walk without assistance, and unable to attend to own bodily needs without assistance.  -HC     Row Name 09/02/20 6215           Functional Assessment    Outcome Measure Options  AM-PAC 6 Clicks Basic Mobility (PT);Modified [place]  -       User Key  (r) = Recorded By, (t) = Taken By, (c) = Cosigned By    Initials Name Provider Type     Pauline Barrett, PT Physical Therapist        Physical Therapy Education                 Title: PT OT SLP Therapies (In Progress)     Topic: Physical Therapy (In Progress)     Point: Mobility training (In Progress)     Description:   Instruct learner(s) on safety and technique for assisting patient out of bed, chair or wheelchair.  Instruct in the proper use of assistive devices, such as walker, crutches, cane or brace.              Patient Friendly Description:   It's important to get you on your feet again, but we need to do so in a way that is safe for you. Falling has serious consequences, and your personal safety is the most important thing of all.        When it's time to get out of bed, one of us or a family member will sit next to you on the bed to give you support.     If your doctor or nurse tells you to use a walker, crutches, a cane, or a brace, be sure you use it every time you get out of bed, even if you think you don't need it.    Learning Progress Summary           Patient Acceptance, E, NR by  at 9/2/2020 1200                   Point: Precautions (In Progress)     Description:   Instruct learner(s) on prescribed precautions during mobility and gait tasks              Learning Progress Summary           Patient Acceptance, E, NR by  at 9/2/2020 1200                               User Key     Initials Effective Dates Name Provider Type [facility] 04/17/20 -  Pauline Barrett PT Physical Therapist PT              PT Recommendation and Plan  Planned Therapy Interventions (PT Eval): balance training, bed mobility training, gait training, postural re-education, transfer training, home exercise program, neuromuscular re-education, strengthening, stair training, patient/family  education  Outcome Summary/Treatment Plan (PT)  Anticipated Discharge Disposition (PT): inpatient rehabilitation facility  Plan of Care Reviewed With: patient  Outcome Summary: Pt is 63 yo male admitted for SOA, A-fib with RVR, HTN, fatigue, cough.  Covid 19 testing (-).  Pt presents with SOA during mobility, weakness and impaired balance.  Gait assessed using RW due to impaired balance, however pt requiring Janell for pt safety.  Pt does not appear safe to return home alone due to weakness and risk for falls.  PT is recommending IP rehab to address deficits and increase pt safety.  PT will follow 5x/week while at [facility].  PPE donned: mask with faceshield, gloves.     Time Calculation:   PT Charges     Row Name 09/02/20 1203             Time Calculation    Start Time  1016  -      Stop Time  1049  -      Time Calculation (min)  33 min  -      PT Received On  09/02/20  -      PT - Next Appointment  09/03/20  -      PT Goal Re-Cert Due Date  09/16/20  -         Time Calculation- PT    Total Timed Code Minutes- PT  0 minute(s)  -        User Key  (r) = Recorded By, (t) = Taken By, (c) = Cosigned By    Initials Name Provider Type     Pauline Barrett, PT Physical Therapist        Therapy Charges for Today     Code Description Service Date Service Provider Modifiers Qty    [id number]  PT EVAL LOW COMPLEXITY 4 9/2/2020 Pauline Barrett, PT GP 1          PT G-Codes  Outcome Measure Options: AM-PAC 6 Clicks Basic Mobility (PT), Modified [place]  AM-PAC 6 Clicks Score (PT): 18  Modified [place] Scale: 4 - Moderately severe disability.  Unable to walk without assistance, and unable to attend to own bodily needs without assistance.    Pauline Barrett, PT  9/2/2020         Electronically signed by Pauline Barrett, PT at 09/02/20 1203          Occupational Therapy Notes (most recent note)      Allison Friedman, OT at 09/02/20 1041          Acute Care - Occupational Therapy Initial Evaluation   [place]      Patient Name: Carlos Whipple  : 1955  MRN: [id number]  Today's Date: 2020             Admit Date: 2020       ICD-10-CM ICD-9-CM   1. Atrial fibrillation with rapid ventricular response (CMS/HCC) I48.91 427.31   2. Uncontrolled hypertension I10 401.9   3. Dyspnea, unspecified type R06.00 786.09   4. CKD (chronic kidney disease) stage 3, GFR 30-59 ml/min (CMS/HCC) N18.3 585.3     Patient Active Problem List   Diagnosis   • Hypertensive emergency   • Chronic atrial fibrillation (CMS/HCC)   • Chronic diastolic CHF (congestive heart failure) (CMS/HCC)   • CKD (chronic kidney disease) stage 3, GFR 30-59 ml/min (CMS/HCC)   • Essential hypertension   • Gambling disorder, persistent   • Shortness of breath   • Coronary artery disease involving native coronary artery of native heart without angina pectoris   • Psoriasis   • History of CVA (cerebrovascular accident)   • DDD (degenerative disc disease), lumbosacral   • Peripheral polyneuropathy   • GERD (gastroesophageal reflux disease)   • Medically noncompliant   • Acute respiratory distress   • Unstable angina (CMS/HCC)   • Cervical disc disorder with radiculopathy   • Completed stroke (CMS/HCC)   • Degeneration of intervertebral disc   • Excessive anticoagulation   • Impaired left ventricular function   • Liver lesion   • Lumbar radiculopathy   • Cervical myelopathy (CMS/HCC)   • Lumbosacral radiculopathy   • Spinal stenosis of lumbar region   • Obesity   • Mild cognitive impairment   • Thoracic back pain   • Thoracic disc disease with myelopathy   • Paroxysmal atrial fibrillation (CMS/HCC)   • Uncontrolled hypertension   • Acute congestive heart failure (CMS/HCC)   • Dyspnea   • Atrial fibrillation with RVR (CMS/HCC)   • Atrial fibrillation with rapid ventricular response (CMS/HCC)   • Dermatitis associated with moisture     Past Medical History:   Diagnosis Date   • A-fib (CMS/HCC)    • CHF (congestive heart failure) (CMS/HCC)    • Chronic atrial  fibrillation (CMS/[facility]) 11/8/2019   • Chronic diastolic CHF (congestive heart failure) (CMS/[facility]) 11/8/2019   • CKD (chronic kidney disease) stage 3, GFR 30-59 ml/min (CMS/[facility]) 11/8/2019   • Coronary artery disease involving native coronary artery of native heart without angina pectoris 11/8/2019   • DDD (degenerative disc disease), lumbosacral 11/8/2019   • Essential hypertension 11/8/2019   • Gambling disorder, persistent 11/8/2019   • GERD (gastroesophageal reflux disease) 11/8/2019   • History of CVA (cerebrovascular accident) 11/8/2019   • Hyperlipidemia    • Hypertension    • Medically noncompliant 11/8/2019   • Peripheral polyneuropathy 11/8/2019   • Psoriasis 11/8/2019   • Stroke (CMS/[facility])      Past Surgical History:   Procedure Laterality Date   • CARDIAC CATHETERIZATION     • CARDIAC CATHETERIZATION N/A 12/10/2019    Procedure: Left Heart Cath and coronary angiogram;  Surgeon: Edy Thomas MD;  Location: [facility] CATH INVASIVE LOCATION;  Service: Cardiovascular          OT ASSESSMENT FLOWSHEET (last 12 hours)      Occupational Therapy Evaluation     Row Name 09/02/20 1538                   OT Evaluation Time/Intention    Document Type  evaluation  -ES        Mode of Treatment  occupational therapy  -ES        Patient Effort  fair  -ES           General Information    Patient Profile Reviewed?  yes  -ES        Equipment Currently Used at Home  cane, straight;grab bar lost his cane but prefers one & refuses to use RW  -ES        Pertinent History of Current Functional Problem  Pt is 63 yo male admitted for SOA, A-fib with RVR, HTN, fatigue, cough.  Covid 19 testing (-).  Pt provides questionable baseline, stating he lives with brother and has 1-3 steps to enter. Pt states he is independent, drives, and works, but is unable to state where. Appears very lethargic this date.  -ES        Existing Precautions/Restrictions  fall;oxygen therapy device and L/min  -ES        Limitations/Impairments  safety/cognitive   -ES        Barriers to Rehab  cognitive status  -ES           Relationship/Environment    Primary Source of Support/Comfort  sibling(s)  -ES        Lives With  sibling(s)  -ES        Name(s) of Who Lives With Patient  States he lives with brother  -ES        Primary Roles/Responsibilities  -- States he works  -ES        Concerns About Impact on Relationships  questionable history provided  -ES           Resource/Environmental Concerns    Current Living Arrangements  home/apartment/condo  -ES        Resource/Environmental Concerns  none  -ES           Home Main Entrance    Number of Stairs, Main Entrance  one  -ES           Cognitive Assessment/Intervention- PT/OT    Orientation Status (Cognition)  oriented to;person;time  -ES        Cognitive Assessment/Intervention Comment  States we are at [place]  -ES           Safety Issues, Functional Mobility    Safety Issues Affecting Function (Mobility)  awareness of need for assistance;judgment;impulsivity;steps too close to assistive device;safety precautions follow-through/compliance;safety precaution awareness;problem solving;ability to follow commands  -ES        Impairments Affecting Function (Mobility)  balance;postural/trunk control;endurance/activity tolerance;strength;shortness of breath  -ES           Bed Mobility Assessment/Treatment    Bed Mobility Assessment/Treatment  supine-sit;sit-supine  -ES        Supine-Sit [place] (Bed Mobility)  maximum assist (25% patient effort)  -ES        Assistive Device (Bed Mobility)  bed rails  -ES           Transfer Assessment/Treatment    Transfer Assessment/Treatment  --  -ES        Comment (Transfers)  not tested secondary to lethargy  -ES           Sit-Stand Transfer    Sit-Stand [place] (Transfers)  --  -ES        Assistive Device (Sit-Stand Transfers)  --  -ES           ADL Assessment/Intervention    BADL Assessment/Intervention  upper body dressing;lower body dressing  -ES           Upper Body  Dressing Assessment/Training    Upper Body Dressing [place] Level  moderate assist (50% patient effort)  -ES           Lower Body Dressing Assessment/Training    Lower Body Dressing [place] Level  dependent (less than 25% patient effort)  -ES           General ROM    GENERAL ROM COMMENTS  Difficulty to assess due to congitive stauts, but grossly WFL  -ES           MMT (Manual Muscle Testing)    General MMT Comments  Unable to assess secondary to cognitive level  -ES           Motor Assessment/Interventions    Additional Documentation  Balance (Group)  -ES           Balance    Balance  static sitting balance;static standing balance;dynamic standing balance;dynamic sitting balance  -ES           Static Sitting Balance    Level of [place] (Unsupported Sitting, Static Balance)  moderate assist, 50 to 74% patient effort  -ES        Sitting Position (Unsupported Sitting, Static Balance)  sitting on edge of bed  -ES        Time Able to Maintain Position (Unsupported Sitting, Static Balance)  1 to 2 minutes  -ES           Dynamic Sitting Balance    Level of [place], Reaches Outside Midline (Sitting, Dynamic Balance)  maximal assist, 25 to 49% patient effort  -ES        Sitting Position, Reaches Outside Midline (Sitting, Dynamic Balance)  sitting on edge of bed  -ES           Sensory Assessment/Intervention    Sensory General Assessment  no sensation deficits identified  -ES           Positioning and Restraints    Pre-Treatment Position  in bed  -ES        Post Treatment Position  bed  -ES        In Chair  notified nsg;call light within reach;exit alarm on;encouraged to call for assist  -ES           Pain Scale: Numbers Pre/Post-Treatment    Pain Scale: Numbers, Pretreatment  0/10 - no pain  -ES        Pain Scale: Numbers, Post-Treatment  0/10 - no pain  -ES           Wound 12/12/19 0200 midline gluteal MASD ([place] associated skin damage)    Wound - Properties Group Date first assessed: 12/12/19  -  Time first assessed: 0200  -BG Present on Hospital Admission: Y  -BG Orientation: midline  -BG Location: gluteal  -BG Primary Wound Type: MASD  -BG, psoriasis lesions with what appears to be moisture related damage        Wound 12/12/19 0200 thoracic spine Other (comment)    Wound - Properties Group Date first assessed: 12/12/19  -BG Time first assessed: 0200  -BG Location: thoracic spine  -BG Primary Wound Type: Other  -BG, psoriasis lesions        Wound 12/12/19 0200 upper;midline abdomen     Wound - Properties Group Date first assessed: 12/12/19  -BG Time first assessed: 0200  -BG Orientation: upper;midline  -BG Location: abdomen  -BG Primary Wound Type: --  -BG, psoriasis lesions        Wound 09/01/20 1839 Bilateral anterior groin MASD (Moisture associated skin damage)    Wound - Properties Group Date first assessed: 09/01/20  -KD Time first assessed: 1839  -KD Present on Hospital Admission: Y  -KD Side: Bilateral  -KD Orientation: anterior  -KD Location: groin  -KD Primary Wound Type: MASD  -KD       Plan of Care Review    Plan of Care Reviewed With  patient  -ES        Outcome Summary  Pt is 63 yo male admitted for SOA, A-fib with RVR, HTN, fatigue, cough.  Covid 19 testing (-).  Pt provides questionable baseline, stating he lives with brother and has 1-3 steps to enter. Pt states he is independent, drives, and works, but is unable to state where. Appears very lethargic this date. Mod A for supine>sit, and then Mod-max A to maintain balance EOB. Unable to complete most MMT, and pt provides various PLOF responses. Pt markedly different from PT assessment. Alerted NSG. Unsure of pt's PLOF, but pt unsafe to return home at this time. Recommend IP rehab. PPE: gloves, mask, eyewear  -ES           Clinical Impression (OT)    Criteria for Skilled Therapeutic Interventions Met (OT Eval)  yes  -ES        Rehab Potential (OT Eval)  fair, will monitor progress closely  -ES        Therapy Frequency (OT Eval)  5 times/wk   -ES        Predicted Duration of Therapy Intervention (Therapy Eval)  until discharge  -ES        Anticipated Discharge Disposition (OT)  inpatient rehabilitation facility  -ES           Vital Signs    Pre Systolic BP Rehab  128  -ES        Pre Treatment Diastolic BP  70  -ES        Pre SpO2 (%)  94  -ES        O2 Delivery Pre Treatment  supplemental O2 3l  -ES        Intra SpO2 (%)  84  -ES        O2 Delivery Intra Treatment  supplemental O2  -ES        Post SpO2 (%)  91  -ES        O2 Delivery Post Treatment  supplemental O2  -ES        Pre Patient Position  Supine  -ES        Intra Patient Position  Sitting  -ES        Post Patient Position  Supine  -ES           Planned OT Interventions    Planned Therapy Interventions (OT Eval)  activity tolerance training;BADL retraining;cognitive/visual perception retraining;IADL retraining;manual therapy/joint mobilization;neuromuscular control/coordination retraining;occupation/activity based interventions;passive ROM/stretching;ROM/therapeutic exercise;strengthening exercise  -ES           OT Goals    Bathing Goal Selection (OT)  bathing, OT goal 1  -ES        Dressing Goal Selection (OT)  dressing, OT goal 1  -ES        Toileting Goal Selection (OT)  toileting, OT goal 1  -ES           Bathing Goal 1 (OT)    Activity/Assistive Device (Bathing Goal 1, OT)  bathing skills, all  -ES        [place] Level/Cues Needed (Bathing Goal 1, OT)  moderate assist (50-74% patient effort)  -ES        Time Frame (Bathing Goal 1, OT)  2 weeks  -ES           Dressing Goal 1 (OT)    Activity/Assistive Device (Dressing Goal 1, OT)  upper body dressing  -ES        [place]/Cues Needed (Dressing Goal 1, OT)  minimum assist (75% or more patient effort)  -ES        Time Frame (Dressing Goal 1, OT)  2 weeks  -ES           Toileting Goal 1 (OT)    Activity/Device (Toileting Goal 1, OT)  toileting skills, all  -ES        [place] Level/Cues Needed (Toileting Goal 1, OT)  minimum assist  (75% or more patient effort)  -ES        Time Frame (Toileting Goal 1, OT)  2 weeks  -ES           Living Environment    Home Accessibility  tub/shower is not walk in  -ES          User Key  (r) = Recorded By, (t) = Taken By, (c) = Cosigned By    Initials Name Effective Dates    Allison Beck OT 03/01/19 -     Radha Ferreira RN 03/01/19 -     Nehal Elkins RN 08/21/19 -          Occupational Therapy Education                 Title: PT OT SLP Therapies (In Progress)     Topic: Occupational Therapy (In Progress)     Point: ADL training (Not Started)     Description:   Instruct learner(s) on proper safety adaptation and remediation techniques during self care or transfers.   Instruct in proper use of assistive devices.              Learner Progress:   Not documented in this visit.          Point: Home exercise program (Not Started)     Description:   Instruct learner(s) on appropriate technique for monitoring, assisting and/or progressing therapeutic exercises/activities.              Learner Progress:   Not documented in this visit.          Point: Precautions (In Progress)     Description:   Instruct learner(s) on prescribed precautions during self-care and functional transfers.              Learning Progress Summary           Patient Acceptance, E,TB, NR,NL by ES at 9/2/2020 1554                   Point: Body mechanics (In Progress)     Description:   Instruct learner(s) on proper positioning and spine alignment during self-care, functional mobility activities and/or exercises.              Learning Progress Summary           Patient Acceptance, E,TB, NR,NL by  at 9/2/2020 1554                               User Key     Initials Effective Dates Name Provider Type Discipline     03/01/19 -  Allison Friedman OT Occupational Therapist OT                  OT Recommendation and Plan  Outcome Summary/Treatment Plan (OT)  Anticipated Discharge Disposition (OT): inpatient rehabilitation  facility  Planned Therapy Interventions (OT Eval): activity tolerance training, BADL retraining, cognitive/visual perception retraining, IADL retraining, manual therapy/joint mobilization, neuromuscular control/coordination retraining, occupation/activity based interventions, passive ROM/stretching, ROM/therapeutic exercise, strengthening exercise  Therapy Frequency (OT Eval): 5 times/wk  Plan of Care Review  Plan of Care Reviewed With: patient  Plan of Care Reviewed With: patient  Outcome Summary: Pt is 65 yo male admitted for SOA, A-fib with RVR, HTN, fatigue, cough.  Covid 19 testing (-).  Pt provides questionable baseline, stating he lives with brother and has 1-3 steps to enter. Pt states he is independent, drives, and works, but is unable to state where. Appears very lethargic this date. Mod A for supine>sit, and then Mod-max A to maintain balance EOB. Unable to complete most MMT, and pt provides various PLOF responses. Pt markedly different from PT assessment. Alerted NSG. Unsure of pt's PLOF, but pt unsafe to return home at this time. Recommend IP rehab. PPE: gloves, mask, eyewear        Time Calculation:   Time Calculation- OT     Row Name 09/02/20 1555             Time Calculation- OT    OT Start Time  1532  -ES      OT Stop Time  1555  -ES      OT Time Calculation (min)  23 min  -ES      Total Timed Code Minutes- OT  13 minute(s)  -ES      OT Non-Billable Time (min)  10 min  -ES      OT Received On  09/02/20  -ES      OT - Next Appointment  09/03/20  -ES      OT Goal Re-Cert Due Date  09/16/20  -ES        User Key  (r) = Recorded By, (t) = Taken By, (c) = Cosigned By    Initials Name Provider Type    ES Allison Friedman OT Occupational Therapist        Therapy Charges for Today     Code Description Service Date Service Provider Modifiers Qty    [id number]  OT SELF CARE/MGMT/TRAIN EA 15 MIN 9/2/2020 Allison Friedman OT GO 1    [id number]  OT EVAL HIGH COMPLEXITY 3 9/2/2020 Allison Friedman  OT GO 1               Allison Friedman OT  9/2/2020    Electronically signed by Allison Friedman OT at 09/02/20 1649

## 2020-09-03 NOTE — PLAN OF CARE
Problem: Patient Care Overview  Goal: Plan of Care Review  Outcome: Ongoing (interventions implemented as appropriate)  Flowsheets (Taken 9/3/2020 1410)  Outcome Summary: Pt demos improvement with mobility this date.  Able to complete transfers and functional mobility with CGA and mod cues for safety.  Continues to demo deficits in memory and problem solving.  Not safe to return home and will require IP rehab at discharge.  PPE: mask, shield, gloves.

## 2020-09-03 NOTE — THERAPY TREATMENT NOTE
Patient Name: Carlos Whipple  : 1955    MRN: [id number]                              Today's Date: 9/3/2020       Admit Date: 2020    Visit Dx:     ICD-10-CM ICD-9-CM   1. Atrial fibrillation with rapid ventricular response (CMS/HCC) I48.91 427.31   2. Uncontrolled hypertension I10 401.9   3. Dyspnea, unspecified type R06.00 786.09   4. CKD (chronic kidney disease) stage 3, GFR 30-59 ml/min (CMS/HCC) N18.3 585.3     Patient Active Problem List   Diagnosis   • Hypertensive emergency   • Chronic atrial fibrillation (CMS/HCC)   • Chronic diastolic CHF (congestive heart failure) (CMS/HCC)   • CKD (chronic kidney disease) stage 3, GFR 30-59 ml/min (CMS/HCC)   • Essential hypertension   • Gambling disorder, persistent   • Shortness of breath   • Coronary artery disease involving native coronary artery of native heart without angina pectoris   • Psoriasis   • History of CVA (cerebrovascular accident)   • DDD (degenerative disc disease), lumbosacral   • Peripheral polyneuropathy   • GERD (gastroesophageal reflux disease)   • Medically noncompliant   • Acute respiratory distress   • Unstable angina (CMS/HCC)   • Cervical disc disorder with radiculopathy   • Completed stroke (CMS/HCC)   • Degeneration of intervertebral disc   • Excessive anticoagulation   • Impaired left ventricular function   • Liver lesion   • Lumbar radiculopathy   • Cervical myelopathy (CMS/HCC)   • Lumbosacral radiculopathy   • Spinal stenosis of lumbar region   • Obesity   • Mild cognitive impairment   • Thoracic back pain   • Thoracic disc disease with myelopathy   • Paroxysmal atrial fibrillation (CMS/HCC)   • Uncontrolled hypertension   • Acute congestive heart failure (CMS/HCC)   • Dyspnea   • Atrial fibrillation with RVR (CMS/HCC)   • Atrial fibrillation with rapid ventricular response (CMS/HCC)   • Dermatitis associated with moisture     Past Medical History:   Diagnosis Date   • A-fib (CMS/HCC)    • CHF (congestive heart failure)  (CMS/[facility])    • Chronic atrial fibrillation (CMS/[facility] located [facility]) 11/8/2019   • Chronic diastolic CHF (congestive heart failure) (CMS/[facility] located [facility]) 11/8/2019   • CKD (chronic kidney disease) stage 3, GFR 30-59 ml/min (CMS/HCC) 11/8/2019   • Coronary artery disease involving native coronary artery of native heart without angina pectoris 11/8/2019   • DDD (degenerative disc disease), lumbosacral 11/8/2019   • Essential hypertension 11/8/2019   • Gambling disorder, persistent 11/8/2019   • GERD (gastroesophageal reflux disease) 11/8/2019   • History of CVA (cerebrovascular accident) 11/8/2019   • Hyperlipidemia    • Hypertension    • Medically noncompliant 11/8/2019   • Peripheral polyneuropathy 11/8/2019   • Psoriasis 11/8/2019   • Stroke (CMS/[facility])      Past Surgical History:   Procedure Laterality Date   • CARDIAC CATHETERIZATION     • CARDIAC CATHETERIZATION N/A 12/10/2019    Procedure: Left Heart Cath and coronary angiogram;  Surgeon: Edy Thomas MD;  Location: [facility] CATH INVASIVE LOCATION;  Service: Cardiovascular     General Information     Row Name 09/03/20 1827          PT Evaluation Time/Intention    Document Type  therapy note (daily note)  -SC     Mode of Treatment  individual therapy;physical therapy  -SC     Row Name 09/03/20 1827          General Information    Existing Precautions/Restrictions  fall  -SC     Barriers to Rehab  cognitive status  -SC     Row Name 09/03/20 1827          Cognitive Assessment/Intervention- PT/OT    Orientation Status (Cognition)  oriented to;person;place;disoriented to;situation;time  -SC     Cognitive Assessment/Intervention Comment  pt agreeable to work with PT.  pt is following direction well  -SC     Row Name 09/03/20 1827          Safety Issues, Functional Mobility    Safety Issues Affecting Function (Mobility)  insight into deficits/self awareness;impulsivity;judgment;safety precaution awareness  -SC     Impairments Affecting Function (Mobility)  balance;postural/trunk  control;endurance/activity tolerance;strength;shortness of breath  -SC       User Key  (r) = Recorded By, (t) = Taken By, (c) = Cosigned By    Initials Name Provider Type    SC Lizet Scott PTA Physical Therapy Assistant        Mobility     Row Name 09/03/20 1828          Bed Mobility Assessment/Treatment    Bed Mobility Assessment/Treatment  supine-sit  -SC     Supine-Sit [place] (Bed Mobility)  minimum assist (75% patient effort);1 person assist  -SC     Sit-Supine [place] (Bed Mobility)  not tested  -SC     Assistive Device (Bed Mobility)  bed rails  -SC     Comment (Bed Mobility)  --  -SC     Row Name 09/03/20 1828          Transfer Assessment/Treatment    Comment (Transfers)  sit to stand from eob about 4 times.  on 4th stance pt transfered to bs chair.  pt stood from chair 2 more times to get pad underneath her and then for repositioning.  -SC     Row Name 09/03/20 1828          Bed-Chair Transfer    Bed-Chair [place] (Transfers)  minimum assist (75% patient effort);1 person assist  -SC     Assistive Device (Bed-Chair Transfers)  walker, front-wheeled  -SC     Row Name 09/03/20 1828          Sit-Stand Transfer    Sit-Stand [place] (Transfers)  minimum assist (75% patient effort);1 person assist  -SC     Assistive Device (Sit-Stand Transfers)  walker, front-wheeled  -SC     Row Name 09/03/20 1828          Gait/Stairs Assessment/Training    Gait/Stairs Assessment/Training  gait/ambulation independence;gait/ambulation assistive device  -SC     [place] Level (Gait)  minimum assist (75% patient effort)  -SC     Assistive Device (Gait)  walker, front-wheeled  -SC     Distance in Feet (Gait)  25' to bathroom.    -SC     Pattern (Gait)  step-through  -SC     Deviations/Abnormal Patterns (Gait)  stride length decreased;gait speed decreased  -SC     Comment (Gait/Stairs)  assist to help pt manage roll walker.  pt walked away from walker after using toilet  -SC       User Key  (r) =  Recorded By, (t) = Taken By, (c) = Cosigned By    Initials Name Provider Type    Lizet Alexander PTA Physical Therapy Assistant        Obj/Interventions     Row Name 09/03/20 1835          Static Sitting Balance    Level of [place] (Unsupported Sitting, Static Balance)  supervision  -SC     Sitting Position (Unsupported Sitting, Static Balance)  sitting on edge of bed  -SC     Row Name 09/03/20 1835          Dynamic Sitting Balance    Level of [place], Reaches Outside Midline (Sitting, Dynamic Balance)  contact guard assist  -SC     Sitting Position, Reaches Outside Midline (Sitting, Dynamic Balance)  sitting on edge of bed  -SC     Row Name 09/03/20 1835          Static Standing Balance    Level of [place] (Supported Standing, Static Balance)  contact guard assist  -SC     Assistive Device Utilized (Supported Standing, Static Balance)  walker, rolling  -SC     Row Name 09/03/20 1835          Dynamic Standing Balance    Level of [place], Reaches Outside Midline (Standing, Dynamic Balance)  minimal assist, 75% patient effort  -SC     Assistive Device Utilized (Supported Standing, Dynamic Balance)  walker, rolling  -SC       User Key  (r) = Recorded By, (t) = Taken By, (c) = Cosigned By    Initials Name Provider Type    Lizet Alexander PTA Physical Therapy Assistant        Goals/Plan    No documentation.       Clinical Impression     Row Name 09/03/20 1835          Pain Scale: Numbers Pre/Post-Treatment    Pain Scale: Numbers, Pretreatment  0/10 - no pain  -SC     Pain Scale: Numbers, Post-Treatment  0/10 - no pain  -SC     Pre/Post Treatment Pain Comment  no reported pain  -[facility] 09/03/20 1835          Physical Therapy Clinical Impression    Criteria for Skilled Interventions Met (PT Clinical Impression)  treatment indicated  -SC     Rehab Potential (PT Clinical Summary)  good, to achieve stated therapy goals  -SC     Row Name 09/03/20 1835          Vital Signs    O2 Delivery  Pre Treatment  room air  -SC     O2 Delivery Intra Treatment  room air  -SC     O2 Delivery Post Treatment  room air  -SC     Row Name 09/03/20 1835          Positioning and Restraints    In Chair  notified nsg;sitting;call light within reach;exit alarm on;encouraged to call for assist  -SC       User Key  (r) = Recorded By, (t) = Taken By, (c) = Cosigned By    Initials Name Provider Type    Lizet Alexander PTA Physical Therapy Assistant        Outcome Measures     Row Name 09/03/20 1836          How much help from another person do you currently need...    Turning from your back to your side while in flat bed without using bedrails?  3  -SC     Moving from lying on back to sitting on the side of a flat bed without bedrails?  3  -SC     Moving to and from a bed to a chair (including a wheelchair)?  3  -SC     Standing up from a chair using your arms (e.g., wheelchair, bedside chair)?  3  -SC     Climbing 3-5 steps with a railing?  2  -SC     To walk in hospital room?  3  -SC     AM-PAC 6 Clicks Score (PT)  17  -[facility] Name 09/03/20 1836          Modified [place] Scale    Modified [place] Scale  4 - Moderately severe disability.  Unable to walk without assistance, and unable to attend to own bodily needs without assistance.  -SC     Row Name 09/03/20 1836          Functional Assessment    Outcome Measure Options  AM-PAC 6 Clicks Basic Mobility (PT);Modified [place]  -SC       User Key  (r) = Recorded By, (t) = Taken By, (c) = Cosigned By    Initials Name Provider Type    Lizet Alexander PTA Physical Therapy Assistant        Physical Therapy Education                 Title: PT OT SLP Therapies (In Progress)     Topic: Physical Therapy (Done)     Point: Mobility training (Done)     Description:   Instruct learner(s) on safety and technique for assisting patient out of bed, chair or wheelchair.  Instruct in the proper use of assistive devices, such as walker, crutches, cane or brace.              Patient  Friendly Description:   It's important to get you on your feet again, but we need to do so in a way that is safe for you. Falling has serious consequences, and your personal safety is the most important thing of all.        When it's time to get out of bed, one of us or a family member will sit next to you on the bed to give you support.     If your doctor or nurse tells you to use a walker, crutches, a cane, or a brace, be sure you use it every time you get out of bed, even if you think you don't need it.    Learning Progress Summary           Patient Acceptance, E, VU by SC at 9/3/2020 1837    Acceptance, E, NR by  at 9/2/2020 1200                   Point: Precautions (Done)     Description:   Instruct learner(s) on prescribed precautions during mobility and gait tasks              Learning Progress Summary           Patient Acceptance, E, VU by SC at 9/3/2020 1837    Acceptance, E, NR by  at 9/2/2020 1200                               User Key     Initials Effective Dates Name Provider Type Discipline     04/17/20 -  Pauline Barrett, PT Physical Therapist PT    SC 03/01/19 -  Lizet Scott PTA Physical Therapy Assistant PT              PT Recommendation and Plan     Outcome Summary/Treatment Plan (PT)  Anticipated Discharge Disposition (PT): inpatient rehabilitation facility  Plan of Care Reviewed With: patient  Progress: improving  Outcome Summary: pt pop tx well and following direction well.  pt some cognitive issued with pt trying to walk away from his walker after using the bathroom.  pt still from from his PLOF but has potential to cont to improve with physical therapy services.  Recommend IP rehab at d/c to allow pt to recover his mobility.  PPE used:  mask, safety glasses and gloves     Time Calculation:   PT Charges     Row Name 09/03/20 1841             Time Calculation    Start Time  0940  -SC      Stop Time  1010  -SC      Time Calculation (min)  30 min  -SC      PT Received On  09/03/20   -SC      PT - Next Appointment  09/04/20  -SC         Time Calculation- PT    Total Timed Code Minutes- PT  30 minute(s)  -SC         Timed Charges    72942 - Gait Training Minutes   10  -SC      42387 - PT Therapeutic Activity Minutes  20  -SC        User Key  (r) = Recorded By, (t) = Taken By, (c) = Cosigned By    Initials Name Provider Type    Lizet Alexander, ROBBY Physical Therapy Assistant        Therapy Charges for Today     Code Description Service Date Service Provider Modifiers Qty    16145085730 HC GAIT TRAINING EA 15 MIN 9/3/2020 Lizet Scott, ROBBY GP 1    42113131853 HC PT THERAPEUTIC ACT EA 15 MIN 9/3/2020 Lizet Scott, ROBBY GP 1          PT G-Codes  Outcome Measure Options: AM-PAC 6 Clicks Basic Mobility (PT), Modified [place]  AM-PAC 6 Clicks Score (PT): 17  Modified [place] Scale: 4 - Moderately severe disability.  Unable to walk without assistance, and unable to attend to own bodily needs without assistance.    Lizet Scott PTA  9/3/2020

## 2020-09-03 NOTE — PROGRESS NOTES
"RESPONSE TO [facility] INQUIRY: PATIENT HAS BASELINE CRF/CKD STG 2.       NEPHROLOGY PROGRESS NOTE------[facility]    [facility]  [phone]  Sterling Barber MD      Patient Care Team:  Marisol Larson APRN as PCP - General  Shwetha Barber MD as Consulting Physician (Nephrology)      Provider:  Sterling Barber MD  Patient Name: Carlos Whipple  :  1955    SUBJECTIVE:    F/U CKD    No complaints. No SOB, CP, dysuria.    Medication:    aspirin 81 mg Oral Daily   atorvastatin 10 mg Oral Nightly   bumetanide 1 mg Oral BID   clotrimazole-betamethasone  Topical Q12H   gabapentin 100 mg Oral Nightly   hydrALAZINE 50 mg Oral TID   ipratropium-albuterol 3 mL Nebulization 4x Daily - RT   isosorbide mononitrate 30 mg Oral BID - Nitrates   losartan 50 mg Oral Q24H   metoprolol tartrate 50 mg Oral Q12H   risperiDONE 0.25 mg Oral BID   rivaroxaban 20 mg Oral Daily With Dinner   sodium chloride 10 mL Intravenous Q12H   spironolactone 25 mg Oral Daily       niCARdipine 5-15 mg/hr Last Rate: Stopped (20 0019)       OBJECTIVE    Vital Sign Min/Max for last 24 hours  Temp  Min: 97.5 °F (36.4 °C)  Max: 98.6 °F (37 °C)   BP  Min: 136/81  Max: 197/125   Pulse  Min: 82  Max: 107   Resp  Min: 12  Max: 25   SpO2  Min: 87 %  Max: 100 %   No data recorded   Weight  Min: 113 kg (248 lb 0.3 oz)  Max: 113 kg (248 lb 0.3 oz)     Flowsheet Rows      First Filed Value   Admission Height  182.9 cm (72\") Documented at 2020 1338   Admission Weight  114 kg (252 lb) Documented at 2020 1338          No intake/output data recorded.  I/O last 3 completed shifts:  In: 1380 [P.O.:1380]  Out: 4830 [Urine:4830]    Physical Exam:  General Appearance: alert, appears stated age and cooperative  Head: normocephalic, without obvious abnormality and atraumatic  Eyes: conjunctivae and sclerae normal and no icterus  Neck: supple and no JVD  Lungs: clear to auscultation and respirations " regular  Heart: IRREG IRREG +AFUA. NO GALLOP, RUB, OR S3  Chest: Wall no abnormalities observed  Abdomen: normal bowel sounds and soft non-tender  Extremities: moves extremities well, no edema, no cyanosis and no redness  Skin: no bleeding, bruising or rash, turgor normal, color normal and no lesions noted  Neurologic: Alert, and oriented. No focal deficits    Labs:    WBC WBC   Date Value Ref Range Status   09/03/2020 10.60 3.40 - 10.80 10*3/mm3 Final   09/02/2020 9.70 3.40 - 10.80 10*3/mm3 Final   09/01/2020 11.30 (H) 3.40 - 10.80 10*3/mm3 Final      HGB Hemoglobin   Date Value Ref Range Status   09/03/2020 13.2 13.0 - 17.7 g/dL Final   09/02/2020 13.0 13.0 - 17.7 g/dL Final   09/01/2020 13.7 13.0 - 17.7 g/dL Final      HCT Hematocrit   Date Value Ref Range Status   09/03/2020 40.4 37.5 - 51.0 % Final   09/02/2020 39.9 37.5 - 51.0 % Final   09/01/2020 42.6 37.5 - 51.0 % Final      Platlets No results found for: LABPLAT   MCV MCV   Date Value Ref Range Status   09/03/2020 83.5 79.0 - 97.0 fL Final   09/02/2020 81.8 79.0 - 97.0 fL Final   09/01/2020 81.9 79.0 - 97.0 fL Final          Sodium Sodium   Date Value Ref Range Status   09/03/2020 140 136 - 145 mmol/L Final   09/02/2020 142 136 - 145 mmol/L Final   09/01/2020 140 136 - 145 mmol/L Final      Potassium Potassium   Date Value Ref Range Status   09/03/2020 3.6 3.5 - 5.2 mmol/L Final   09/02/2020 3.6 3.5 - 5.2 mmol/L Final   09/01/2020 4.0 3.5 - 5.2 mmol/L Final      Chloride Chloride   Date Value Ref Range Status   09/03/2020 105 98 - 107 mmol/L Final   09/02/2020 108 (H) 98 - 107 mmol/L Final   09/01/2020 108 (H) 98 - 107 mmol/L Final      CO2 CO2   Date Value Ref Range Status   09/03/2020 25.0 22.0 - 29.0 mmol/L Final   09/02/2020 23.0 22.0 - 29.0 mmol/L Final   09/01/2020 19.0 (L) 22.0 - 29.0 mmol/L Final      BUN BUN   Date Value Ref Range Status   09/03/2020   Final     Comment:     Testing performed by alternate method   09/02/2020   Final     Comment:      Testing performed by alternate method   09/02/2020 21 8 - 23 mg/dL Final   09/01/2020   Final     Comment:     Testing performed by alternate method   09/01/2020 23 8 - 23 mg/dL Final      Creatinine Creatinine   Date Value Ref Range Status   09/03/2020 1.18 0.76 - 1.27 mg/dL Final   09/02/2020 1.18 0.76 - 1.27 mg/dL Final   09/01/2020 1.35 (H) 0.76 - 1.27 mg/dL Final      Calcium Calcium   Date Value Ref Range Status   09/03/2020 8.7 8.6 - 10.5 mg/dL Final   09/02/2020 8.6 8.6 - 10.5 mg/dL Final   09/01/2020 9.4 8.6 - 10.5 mg/dL Final      PO4 No components found for: PO4   Albumin Albumin   Date Value Ref Range Status   09/03/2020 3.50 3.50 - 5.20 g/dL Final   09/01/2020 4.00 3.50 - 5.20 g/dL Final      Magnesium Magnesium   Date Value Ref Range Status   09/03/2020 2.1 1.6 - 2.4 mg/dL Final   09/02/2020 2.1 1.6 - 2.4 mg/dL Final      Uric Acid No components found for: URIC ACID     Imaging Results (Last 72 Hours)     Procedure Component Value Units Date/Time    CT Head Without Contrast [[id number]] Collected:  09/02/20 2014     Updated:  09/02/20 2019    Narrative:       CT HEAD WO CONTRAST-     Date of Exam: 9/2/2020 7:47 PM     Indication: Confusion/delirium, altered LOC, unexplained;  I48.91-Unspecified atrial fibrillation; I10-Essential (primary)  hypertension; R06.00-Dyspnea, unspecified; N18.3-Chronic kidney disease,  stage 3 (moderate).     Comparison: 12/11/2019     Technique:  Without contrast, contiguous axial CT images of the head  were obtained from skull base to vertex.  Coronal and sagittal  reconstructions were performed.  Automated exposure control and  iterative reconstruction methods were used.     FINDINGS  There is no intracranial hemorrhage. There is moderate decreased density  in the white matter. There is a large area of encephalomalacia in the  left occipital lobe. There are old thalamic and basal ganglia lacunar  infarcts. There is no new abnormality identified in the brain. There is  no  mass effect. No skull abnormality is noted. No sinus fluid.       Impression:       1. Stable exam, with no acute process demonstrated.  2. Stable moderate chronic white matter disease and left occipital  encephalomalacia.     Electronically Signed By-Nehal Way On:9/2/2020 8:17 PM  This report was finalized on [id number] by  Nehal Way, .    US Renal Bilateral [[id number]] Collected:  09/02/20 1127     Updated:  09/02/20 1132    Narrative:       DATE OF EXAM:  9/2/2020 11:06 AM     PROCEDURE:  US RENAL BILATERAL-     INDICATIONS:  CRF/CKD; I48.91-Unspecified atrial fibrillation; I10-Essential (primary)  hypertension; R06.00-Dyspnea, unspecified; N18.3-Chronic kidney disease,  stage 3 (moderate)     COMPARISON:  Renal ultrasound 04/10/2018, CT abdomen pelvis without contrast  10/09/2018.     TECHNIQUE:   Grayscale and color Doppler ultrasound evaluation of the kidneys and  urinary bladder was performed.     FINDINGS:  The right kidney measures 11.3 x 4.0 x 5.0 cm. The left kidney is more  difficult to visualize due to overlying bowel gas, but measures  approximately 11.0 x 4.6 x 4.6 cm. Renal cortical echogenicity appears  within normal limits. No renal mass or hydronephrosis is seen.     Urinary bladder is not well-distended. Estimated bladder volume is 25  mL.       Impression:       Unremarkable sonographic appearance of the kidneys.     Electronically Signed By-Teri Persaud On:9/2/2020 11:30 AM  This report was finalized on [id number] by  Teri Persaud, .    XR Chest 1 View [[id number]] Collected:  09/01/20 1531     Updated:  09/01/20 1537    Narrative:       XR CHEST 1 VW-     Date of Exam: 9/1/2020 3:10 PM     Indication: soa.     Comparison Exams: 08/21/2020     Technique: Single AP chest radiograph     FINDINGS:  A left basilar consolidation appears unchanged. There may be an  associated small left pleural effusion. The heart is enlarged. The  pulmonary vasculature appears normal. The osseous  structures appear  intact.       Impression:       1.Left basilar consolidation appears unchanged, which could reflect  atelectasis or pneumonia.  2.Cardiomegaly with possible small left pleural effusion.     Electronically Signed By-DR. Salty Joseph MD On:9/1/2020 3:35 PM  This report was finalized on [id number] by DR. Salty Joseph MD.          Results for orders placed during the hospital encounter of 09/01/20   XR Chest 1 View    Narrative XR CHEST 1 VW-     Date of Exam: 9/1/2020 3:10 PM     Indication: soa.     Comparison Exams: 08/21/2020     Technique: Single AP chest radiograph     FINDINGS:  A left basilar consolidation appears unchanged. There may be an  associated small left pleural effusion. The heart is enlarged. The  pulmonary vasculature appears normal. The osseous structures appear  intact.       Impression 1.Left basilar consolidation appears unchanged, which could reflect  atelectasis or pneumonia.  2.Cardiomegaly with possible small left pleural effusion.     Electronically Signed By-DR. Salty Joseph MD On:9/1/2020 3:35 PM  This report was finalized on [id number] by DR. Salty Joseph MD.       Results for orders placed during the hospital encounter of 12/06/19   Duplex Carotid Ultrasound CAR    Narrative · Proximal right internal carotid artery is normal.  · Mid right internal carotid artery is normal.  · Proximal left internal carotid artery is normal.  · Mid left internal carotid artery is normal.            ASSESSMENT / PLAN      Atrial fibrillation with rapid ventricular response (CMS/HCC)    Chronic atrial fibrillation (CMS/HCC)    Chronic diastolic CHF (congestive heart failure) (CMS/HCC)    CKD (chronic kidney disease) stage 3, GFR 30-59 ml/min (CMS/HCC)    Gambling disorder, persistent    Coronary artery disease involving native coronary artery of native heart without angina pectoris    Psoriasis    History of CVA (cerebrovascular accident)    DDD (degenerative disc  disease), lumbosacral    Peripheral polyneuropathy    GERD (gastroesophageal reflux disease)    Medically noncompliant    Mild cognitive impairment    Uncontrolled hypertension    Dyspnea    Dermatitis associated with moisture    1. CRF/CKD STG 2------Nonoliguric. Appears to have CRF/CKD STG 2 with current serum creatinine at baseline. Unknown if patient has baseline proteinuria or hematuria. In the long run we may have to accept a higher baseline serum creatinine in order to keep euvolemic. Follow with cautious diuresis. CRF/CKD is likely secondary to HTN NS.   Dose meds for CrCl 60 cc/min     2. ACIDOSIS------Resolved     3. CHRONIC DIASTOLIC CHF/ELEVATED BNP------Cautious diuresis. Changed to po Bumex with Aldactone and in nice negative balance     4. HTN WITH CKD------BP up. Increase meds. No ACE/ARB/DRI for now     5. OA/DJD------No NSAIDs.      6. ATRIAL FIBRILLATION WITH RVR------per Cardiology. Dig level okay     7. HYPERLIPIDEMIA------Statin. CK, TSH okay    8. HYPERURICEMIA------Allopurinol         Sterling Barber MD  [facility]  [phone]  09/03/20  07:53

## 2020-09-03 NOTE — PROGRESS NOTES
Continued Stay Note  [facility]     Patient Name: Carlos Whipple  MRN: [id number]  Today's Date: 9/3/2020    Admit Date: 9/1/2020    Discharge Plan     Row Name 09/03/20 1427       Plan    Plan  [facility] referral pending. Will require pre-cert. No PASRR needed for [facility].     Provided Post Acute Provider List?  Yes    Post Acute Provider List  Nursing Home    Delivered To  Patient    Method of Delivery  In person    Patient/Family in Agreement with Plan  yes    Plan Comments  SW met w/ pt at bedside wearing appropriate PPE (mask & goggles) to discuss inpt rehab placement. Pt was working with OT during SW visit, but was able to discuss placement w/ SW. SW provided inpt rehab list and pt requested placement at [facility]. Referral made to [facility] via Epic and text message.      Teri Allison, KRISW, LSW  PRN   Phone: [phone]

## 2020-09-03 NOTE — THERAPY TREATMENT NOTE
Acute Care - Occupational Therapy Treatment Note  [facility]     Patient Name: Carlos Whipple  : 1955  MRN: [id number]  Today's Date: 9/3/2020             Admit Date: 2020       ICD-10-CM ICD-9-CM   1. Atrial fibrillation with rapid ventricular response (CMS/HCC) I48.91 427.31   2. Uncontrolled hypertension I10 401.9   3. Dyspnea, unspecified type R06.00 786.09   4. CKD (chronic kidney disease) stage 3, GFR 30-59 ml/min (CMS/HCC) N18.3 585.3     Patient Active Problem List   Diagnosis   • Hypertensive emergency   • Chronic atrial fibrillation (CMS/HCC)   • Chronic diastolic CHF (congestive heart failure) (CMS/HCC)   • CKD (chronic kidney disease) stage 3, GFR 30-59 ml/min (CMS/HCC)   • Essential hypertension   • Gambling disorder, persistent   • Shortness of breath   • Coronary artery disease involving native coronary artery of native heart without angina pectoris   • Psoriasis   • History of CVA (cerebrovascular accident)   • DDD (degenerative disc disease), lumbosacral   • Peripheral polyneuropathy   • GERD (gastroesophageal reflux disease)   • Medically noncompliant   • Acute respiratory distress   • Unstable angina (CMS/HCC)   • Cervical disc disorder with radiculopathy   • Completed stroke (CMS/HCC)   • Degeneration of intervertebral disc   • Excessive anticoagulation   • Impaired left ventricular function   • Liver lesion   • Lumbar radiculopathy   • Cervical myelopathy (CMS/HCC)   • Lumbosacral radiculopathy   • Spinal stenosis of lumbar region   • Obesity   • Mild cognitive impairment   • Thoracic back pain   • Thoracic disc disease with myelopathy   • Paroxysmal atrial fibrillation (CMS/HCC)   • Uncontrolled hypertension   • Acute congestive heart failure (CMS/HCC)   • Dyspnea   • Atrial fibrillation with RVR (CMS/HCC)   • Atrial fibrillation with rapid ventricular response (CMS/HCC)   • Dermatitis associated with moisture     Past Medical History:   Diagnosis Date   • A-fib (CMS/HCC)    • CHF  (congestive heart failure) (CMS/HCC)    • Chronic atrial fibrillation (CMS/[facility]) 11/8/2019   • Chronic diastolic CHF (congestive heart failure) (CMS/[facility]) 11/8/2019   • CKD (chronic kidney disease) stage 3, GFR 30-59 ml/min (CMS/HCC) 11/8/2019   • Coronary artery disease involving native coronary artery of native heart without angina pectoris 11/8/2019   • DDD (degenerative disc disease), lumbosacral 11/8/2019   • Essential hypertension 11/8/2019   • Gambling disorder, persistent 11/8/2019   • GERD (gastroesophageal reflux disease) 11/8/2019   • History of CVA (cerebrovascular accident) 11/8/2019   • Hyperlipidemia    • Hypertension    • Medically noncompliant 11/8/2019   • Peripheral polyneuropathy 11/8/2019   • Psoriasis 11/8/2019   • Stroke (CMS/HCC)      Past Surgical History:   Procedure Laterality Date   • CARDIAC CATHETERIZATION     • CARDIAC CATHETERIZATION N/A 12/10/2019    Procedure: Left Heart Cath and coronary angiogram;  Surgeon: Edy Thomas MD;  Location: [facility] CATH INVASIVE LOCATION;  Service: Cardiovascular       Therapy Treatment    Rehabilitation Treatment Summary     Row Name 09/03/20 1410             Treatment Time/Intention    Discipline  occupational therapist  -SR      Document Type  therapy note (daily note)  -SR      Subjective Information  no complaints  -SR      Comment  Pt pleasant and agreeable to therapy this date.    -SR      Recorded by [SR] Zee Shepherd OT 09/03/20 1512      Row Name 09/03/20 1410             Cognitive Assessment/Intervention- PT/OT    Orientation Status (Cognition)  oriented to;person;place;disoriented to;situation;time  -SR      Recorded by [SR] Zee Shepherd OT 09/03/20 1512      Row Name 09/03/20 1410             Functional Mobility    Functional Mobility- Ind. Level  contact guard assist  -SR      Functional Mobility- Device  rolling walker  -SR      Recorded by [SR] Zee Shepherd OT 09/03/20 1512      Row Name 09/03/20 1410              Transfer Assessment/Treatment    Transfer Assessment/Treatment  sit-stand transfer  -SR      Recorded by [SR] Zee Shepherd, OT 09/03/20 1512      Row Name 09/03/20 1410             Sit-Stand Transfer    Sit-Stand [place] (Transfers)  contact guard  -SR      Assistive Device (Sit-Stand Transfers)  walker, front-wheeled  -SR      Recorded by [SR] Zee Shepherd, OT 09/03/20 1512      Row Name 09/03/20 1410             ADL Assessment/Intervention    BADL Assessment/Intervention  feeding  -SR      Recorded by [SR] Zee Shepherd, OT 09/03/20 1512      Row Name 09/03/20 1410             Self-Feeding Assessment/Training    [place] Level (Feeding)  feeding skills;independent  -SR      Recorded by [SR] Zee Shepherd, OT 09/03/20 1512      Row Name 09/03/20 1410             Static Sitting Balance    Level of [place] (Unsupported Sitting, Static Balance)  supervision  -SR      Recorded by [SR] Zee Shepherd, OT 09/03/20 1512      Row Name 09/03/20 1410             Static Standing Balance    Level of [place] (Supported Standing, Static Balance)  contact guard assist  -SR      Time Able to Maintain Position (Supported Standing, Static Balance)  4 to 5 minutes  -SR      Assistive Device Utilized (Supported Standing, Static Balance)  walker, rolling  -SR      Recorded by [SR] Zee Shepherd, OT 09/03/20 1512      Row Name 09/03/20 1410             Dynamic Standing Balance    Level of [place], Reaches Outside Midline (Standing, Dynamic Balance)  contact guard assist  -SR      Time Able to Maintain Position, Reaches Outside Midline (Standing, Dynamic Balance)  2 to 3 minutes  -SR      Assistive Device Utilized (Supported Standing, Dynamic Balance)  walker, rolling  -SR      Recorded by [SR] Zee Shepherd, OT 09/03/20 1512      Row Name 09/03/20 1410             Positioning and Restraints    Pre-Treatment Position  sitting in  chair/recliner  -SR      Post Treatment Position  chair  -SR      In Chair  call light within reach;encouraged to call for assist;exit alarm on  -SR      Recorded by [SR] Zee Shepherd OT 09/03/20 1512      Row Name                Wound 12/12/19 0200 midline gluteal MASD (Moisutre associated skin damage)    Wound - Properties Group Date first assessed: 12/12/19 [BG] Time first assessed: 0200 [BG] Present on Hospital Admission: Y [BG] Orientation: midline [BG] Location: gluteal [BG] Primary Wound Type: MASD [BG], psoriasis lesions with what appears to be moisture related damage  Recorded by:  [BG] Radha Conklin RN 12/12/19 0642    Row Name                Wound 12/12/19 0200 thoracic spine Other (comment)    Wound - Properties Group Date first assessed: 12/12/19 [BG] Time first assessed: 0200 [BG] Location: thoracic spine [BG] Primary Wound Type: Other [BG], psoriasis lesions  Recorded by:  [BG] Radha Conklin RN 12/12/19 0643    Row Name                Wound 12/12/19 0200 upper;midline abdomen     Wound - Properties Group Date first assessed: 12/12/19 [BG] Time first assessed: 0200 [BG] Orientation: upper;midline [BG] Location: abdomen [BG] Primary Wound Type: -- [BG], psoriasis lesions  Recorded by:  [BG] Radha Conklin RN 12/12/19 0644    Row Name                Wound 09/01/20 1839 Bilateral anterior groin MASD (Moisture associated skin damage)    Wound - Properties Group Date first assessed: 09/01/20 [KD] Time first assessed: 1839 [KD] Present on Hospital Admission: Y [KD] Side: Bilateral [KD] Orientation: anterior [KD] Location: groin [KD] Primary Wound Type: MASD [KD] Recorded by:  [KD] Nehal Menon RN 09/01/20 1840    Row Name 09/03/20 1410             Plan of Care Review    Plan of Care Reviewed With  patient  -SR      Outcome Summary  Pt demos improvement with mobility this date.  Able to complete transfers and functional mobility with CGA and mod cues for safety.  Continues to  demo deficits in memory and problem solving.  NOt safe to return home and will require IP rehab at discharge.  PPE: mask, shield, gloves.    -SR      Recorded by [SR] Zee Shepherd OT 09/03/20 1512      Row Name 09/03/20 1410             Outcome Summary/Treatment Plan (OT)    Daily Summary of Progress (OT)  progress toward functional goals is good  -SR      Anticipated Discharge Disposition (OT)  inpatient rehabilitation facility  -SR      Recorded by [SR] Zee Shepherd OT 09/03/20 1512        User Key  (r) = Recorded By, (t) = Taken By, (c) = Cosigned By    Initials Name Effective Dates Discipline    SR Zee Shepherd OT 03/01/19 -  OT    Radha Ferreira RN 03/01/19 -  Nurse    KD Nehal Menon RN 08/21/19 -  Nurse        Wound 12/12/19 0200 midline gluteal MASD (Moisutre associated skin damage) (Active)   Dressing Appearance open to air 9/2/2020  8:30 PM   Closure None 9/2/2020  4:00 PM   Base blanchable;red;maroon/purple 9/2/2020  8:30 PM   Drainage Amount none 9/2/2020  8:30 PM   Care, Wound cleansed with;sterile normal saline;other (see comments) 9/2/2020  8:30 PM       Wound 12/12/19 0200 thoracic spine Other (comment) (Active)   Base maroon/purple;other (see comments) 9/2/2020  8:30 PM   Care, Wound cleansed with;sterile half normal saline 9/2/2020  8:30 PM       Wound 12/12/19 0200 upper;midline abdomen  (Active)   Dressing Appearance open to air 9/2/2020  8:30 PM   Closure None 9/2/2020  4:00 PM   Base maroon/purple 9/2/2020  8:30 PM       Wound 09/01/20 1839 Bilateral anterior groin MASD (Moisture associated skin damage) (Active)   Dressing Appearance open to air 9/3/2020  3:35 AM   Base red;blanchable 9/2/2020  8:30 PM   Care, Wound cleansed with;sterile normal saline;other (see comments) 9/2/2020  8:30 PM       Occupational Therapy Education                 Title: PT OT SLP Therapies (In Progress)     Topic: Occupational Therapy (In Progress)     Point: ADL training  (Not Started)     Description:   Instruct learner(s) on proper safety adaptation and remediation techniques during self care or transfers.   Instruct in proper use of assistive devices.              Learner Progress:   Not documented in this visit.          Point: Home exercise program (Not Started)     Description:   Instruct learner(s) on appropriate technique for monitoring, assisting and/or progressing therapeutic exercises/activities.              Learner Progress:   Not documented in this visit.          Point: Precautions (In Progress)     Description:   Instruct learner(s) on prescribed precautions during self-care and functional transfers.              Learning Progress Summary           Patient Acceptance, E,TB, NR,NL by ES at 9/2/2020 1554                   Point: Body mechanics (In Progress)     Description:   Instruct learner(s) on proper positioning and spine alignment during self-care, functional mobility activities and/or exercises.              Learning Progress Summary           Patient Acceptance, E,TB, NR by SR at 9/3/2020 1513    Acceptance, E,TB, NR,NL by  at 9/2/2020 1554                               User Key     Initials Effective Dates Name Provider Type Discipline     03/01/19 -  Zee Shepherd, OT Occupational Therapist OT     03/01/19 -  Allison Friedman OT Occupational Therapist OT                OT Recommendation and Plan  Outcome Summary/Treatment Plan (OT)  Daily Summary of Progress (OT): progress toward functional goals is good  Anticipated Discharge Disposition (OT): inpatient rehabilitation facility  Daily Summary of Progress (OT): progress toward functional goals is good  Plan of Care Review  Plan of Care Reviewed With: patient  Plan of Care Reviewed With: patient  Outcome Summary: Pt demos improvement with mobility this date.  Able to complete transfers and functional mobility with CGA and mod cues for safety.  Continues to demo deficits in memory and problem  solving.  NOt safe to return home and will require IP rehab at discharge.  PPE: mask, shield, gloves.         Time Calculation:   Time Calculation- OT     Row Name 09/03/20 1513             Time Calculation- OT    OT Start Time  1410  -SR      OT Stop Time  1427  -SR      OT Time Calculation (min)  17 min  -SR      Total Timed Code Minutes- OT  17 minute(s)  -SR      OT Received On  09/03/20  -SR      OT - Next Appointment  09/04/20  -SR        User Key  (r) = Recorded By, (t) = Taken By, (c) = Cosigned By    Initials Name Provider Type    SR Zee Shepherd, OT Occupational Therapist        Therapy Charges for Today     Code Description Service Date Service Provider Modifiers Qty    [id number]  OT THERAPEUTIC ACT EA 15 MIN 9/3/2020 Zee Shepherd OT GO 1               Zee Shepherd OT  9/3/2020

## 2020-09-03 NOTE — PROGRESS NOTES
Referring Provider: Dr. Stauffer    Reason for follow-up: Atrial fibrillation     Patient Care Team:  Marisol Larson APRN as PCP - General  Shwetha Barber MD as Consulting Physician (Nephrology)  Edy Thomas MD as Consulting Physician (Cardiology)    Subjective .  No chest pain but has shortness of breath and palpitations    Objective  Sitting in chair comfortably     Review of Systems   Constitution: Negative for fever and malaise/fatigue.   HENT: Negative for ear pain and nosebleeds.    Eyes: Negative for blurred vision and double vision.   Cardiovascular: Positive for palpitations. Negative for chest pain and dyspnea on exertion.   Respiratory: Positive for shortness of breath. Negative for cough.    Skin: Negative for rash.   Musculoskeletal: Negative for joint pain.   Gastrointestinal: Negative for abdominal pain, nausea and vomiting.   Neurological: Negative for focal weakness and headaches.   Psychiatric/Behavioral: Negative for depression. The patient is not nervous/anxious.    All other systems reviewed and are negative.      Ketorolac tromethamine    Scheduled Meds:    allopurinol 100 mg Oral Daily   aspirin 81 mg Oral Daily   atorvastatin 10 mg Oral Nightly   bumetanide 1 mg Oral BID   clotrimazole-betamethasone  Topical Q12H   gabapentin 100 mg Oral Nightly   hydrALAZINE 75 mg Oral TID   ipratropium-albuterol 3 mL Nebulization 4x Daily - RT   isosorbide mononitrate 30 mg Oral BID - Nitrates   losartan 50 mg Oral Q24H   metoprolol tartrate 50 mg Oral Q12H   risperiDONE 0.25 mg Oral BID   rivaroxaban 20 mg Oral Daily With Dinner   sodium chloride 10 mL Intravenous Q12H   spironolactone 25 mg Oral Daily     Continuous Infusions:    dilTIAZem 5-15 mg/hr Last Rate: 10 mg/hr (09/03/20 1009)   niCARdipine 5-15 mg/hr Last Rate: Stopped (09/02/20 0019)     PRN Meds:.•  acetaminophen  •  albuterol  •  hydrALAZINE  •  magnesium hydroxide  •  sodium chloride  •  sodium chloride        VITAL  "SIGNS  Vitals:    09/03/20 0643 09/03/20 0645 09/03/20 0650 09/03/20 1027   BP: (!) 197/125 (!) 170/116 (!) 171/109 156/93   BP Location:  Right arm  Right arm   Patient Position:  Lying  Lying   Pulse: 92 84 94    Resp:  15  14   Temp:  97.7 °F (36.5 °C)  98.6 °F (37 °C)   TempSrc:  Oral  Oral   SpO2: 94% 96%     Weight:  113 kg (248 lb 0.3 oz)     Height:  182.9 cm (72.01\")         Flowsheet Rows      First Filed Value   Admission Height  182.9 cm (72\") Documented at 09/01/2020 1338   Admission Weight  114 kg (252 lb) Documented at 09/01/2020 1338           TELEMETRY: Atrial fibrillation with rapid ventricle response    Physical Exam:  Physical Exam   Constitutional: He appears well-developed and well-nourished.   HENT:   Head: Normocephalic and atraumatic.   Eyes: Pupils are equal, round, and reactive to light. Conjunctivae and EOM are normal. No scleral icterus.   Neck: Normal range of motion. Neck supple. No JVD present. Carotid bruit is not present.   Cardiovascular: Normal rate, regular rhythm, S1 normal, S2 normal, normal heart sounds and intact distal pulses. PMI is not displaced.   Pulmonary/Chest: Effort normal and breath sounds normal. He has no wheezes. He has no rales.   Abdominal: Soft. Bowel sounds are normal.   Musculoskeletal: Normal range of motion.   Neurological: He is alert. He has normal strength.   No focal deficits   Skin: Skin is warm and dry. No rash noted.   Psychiatric: He has a normal mood and affect.        Results Review:   I reviewed the patient's new clinical results.  Lab Results (last 24 hours)     Procedure Component Value Units Date/Time    BUN [[id number]]  (Abnormal) Collected:  09/03/20 0352    Specimen:  Blood Updated:  09/03/20 0753     BUN 24 mg/dL     Blood Culture - Blood, Hand, Left [[id number]]  (Abnormal) Collected:  09/01/20 1431    Specimen:  Blood from Hand, Left Updated:  09/03/20 0617     Blood Culture Staphylococcus, coagulase negative     Comment: Probable " contaminant requires clinical correlation, susceptibility not performed unless requested by physician.          Isolated from Anaerobic Bottle     Gram Stain Anaerobic Bottle Gram positive cocci in clusters    Phosphorus [[id number]]  (Normal) Collected:  09/03/20 0352    Specimen:  Blood Updated:  09/03/20 0439     Phosphorus 4.0 mg/dL     Magnesium [[id number]]  (Normal) Collected:  09/03/20 0352    Specimen:  Blood Updated:  09/03/20 0436     Magnesium 2.1 mg/dL     Comprehensive Metabolic Panel [[id number]]  (Abnormal) Collected:  09/03/20 0352    Specimen:  Blood Updated:  09/03/20 0436     Glucose 103 mg/dL      BUN --     Comment: Testing performed by alternate method        Creatinine 1.18 mg/dL      Sodium 140 mmol/L      Potassium 3.6 mmol/L      Chloride 105 mmol/L      CO2 25.0 mmol/L      Calcium 8.7 mg/dL      Total Protein 6.7 g/dL      Albumin 3.50 g/dL      ALT (SGPT) 16 U/L      AST (SGOT) 18 U/L      Alkaline Phosphatase 64 U/L      Total Bilirubin 0.7 mg/dL      eGFR Non African Amer 62 mL/min/1.73      Globulin 3.2 gm/dL      A/G Ratio 1.1 g/dL      BUN/Creatinine Ratio --     Comment: Testing not performed        Anion Gap 10.0 mmol/L     Narrative:       GFR Normal >60  Chronic Kidney Disease <60  Kidney Failure <15      Calcium, Ionized [[id number]]  (Normal) Collected:  09/03/20 0352    Specimen:  Blood Updated:  09/03/20 0419     Ionized Calcium 1.21 mmol/L     CBC (No Diff) [[id number]]  (Abnormal) Collected:  09/03/20 0352    Specimen:  Blood Updated:  09/03/20 0416     WBC 10.60 10*3/mm3      RBC 4.84 10*6/mm3      Hemoglobin 13.2 g/dL      Hematocrit 40.4 %      MCV 83.5 fL      MCH 27.2 pg      MCHC 32.6 g/dL      RDW 18.5 %      RDW-SD 52.9 fl      MPV 8.6 fL      Platelets 175 10*3/mm3     Urinalysis, Microscopic Only - Urine, Clean Catch [[id number]]  (Abnormal) Collected:  09/02/20 0928    Specimen:  Urine, Clean Catch Updated:  09/02/20 1730     RBC, UA 0-2 /HPF      WBC, UA 0-2 /HPF   "    Bacteria, UA None Seen /HPF      Squamous Epithelial Cells, UA 0-2 /HPF      Hyaline Casts, UA 3-6 /LPF      Methodology Automated Microscopy    Urinalysis With Culture If Indicated - Urine, Clean Catch [[id number]]  (Abnormal) Collected:  09/02/20 0928    Specimen:  Urine, Clean Catch Updated:  09/02/20 1721     Color, UA Dark Yellow     Comment: Result checked         Appearance, UA Turbid     Comment: Result checked         pH, UA 5.5     Specific Gravity, UA 1.020     Glucose, UA Negative     Ketones, UA Negative     Bilirubin, UA Negative     Blood, UA Negative     Protein,  mg/dL (2+)     Leuk Esterase, UA Negative     Nitrite, UA Negative     Urobilinogen, UA 0.2 E.U./dL    Blood Culture - Blood, Hand, Right [[id number]] Collected:  09/01/20 1431    Specimen:  Blood from Hand, Right Updated:  09/02/20 1500     Blood Culture No growth at 24 hours    Blood Culture ID, PCR - Blood, Hand, Left [[id number]]  (Abnormal) Collected:  09/01/20 1431    Specimen:  Blood from Hand, Left Updated:  09/02/20 1317     BCID, PCR Staphylococcus spp, not aureus. Identification by BCID PCR.     BOTTLE TYPE Anaerobic Bottle    Procalcitonin [[id number]]  (Normal) Collected:  09/02/20 0252    Specimen:  Blood Updated:  09/02/20 1130     Procalcitonin 0.08 ng/mL     Narrative:       As a Marker for Sepsis (Non-Neonates):   1. <0.5 ng/mL represents a low risk of severe sepsis and/or septic shock.  1. >2 ng/mL represents a high risk of severe sepsis and/or septic shock.    As a Marker for Lower Respiratory Tract Infections that require antibiotic therapy:  PCT on Admission     Antibiotic Therapy             6-12 Hrs later  > 0.5                Strongly Recommended            >0.25 - <0.5         Recommended  0.1 - 0.25           Discouraged                   Remeasure/reassess PCT  <0.1                 Strongly Discouraged          Remeasure/reassess PCT      As 28 day mortality risk marker: \"Change in Procalcitonin Result\" (> " 80 % or <=80 %) if Day 0 (or Day 1) and Day 4 values are available. Refer to http://[email or web address]-pct-[email or web address]/   Change in PCT <=80 %   A decrease of PCT levels below or equal to 80 % defines a positive change in PCT test result representing a higher risk for 28-day all-cause mortality of patients diagnosed with severe sepsis or septic shock.  Change in PCT > 80 %   A decrease of PCT levels of more than 80 % defines a negative change in PCT result representing a lower risk for 28-day all-cause mortality of patients diagnosed with severe sepsis or septic shock.                Results may be falsely decreased if patient taking Biotin.     Troponin [[id number]]  (Normal) Collected:  09/02/20 1033    Specimen:  Blood Updated:  09/02/20 1128     Troponin T <0.010 ng/mL     Narrative:       Troponin T Reference Range:  <= 0.03 ng/mL-   Negative for AMI  >0.03 ng/mL-     Abnormal for myocardial necrosis.  Clinicians would have to utilize clinical acumen, EKG, Troponin and serial changes to determine if it is an Acute Myocardial Infarction or myocardial injury due to an underlying chronic condition.       Results may be falsely decreased if patient taking Biotin.      T4, Free [[id number]]  (Normal) Collected:  09/02/20 1033    Specimen:  Blood Updated:  09/02/20 1128     Free T4 1.23 ng/dL     Narrative:       Results may be falsely increased if patient taking Biotin.      Eosinophil Smear - Urine, Urine, Clean Catch [[id number]]  (Normal) Collected:  09/02/20 0928    Specimen:  Urine, Clean Catch Updated:  09/02/20 1123     Eosinophil Smear 0 % EOS/100 Cells     PTH, Intact [[id number]]  (Normal) Collected:  09/02/20 1033    Specimen:  Blood Updated:  09/02/20 1119     PTH, Intact 44.3 pg/mL     Narrative:       Results may be falsely decreased if patient taking Biotin.            Imaging Results (Last 24 Hours)     Procedure Component Value Units Date/Time    CT Head Without Contrast [[id number]] Collected:  09/02/20 2014      Updated:  09/02/20 2019    Narrative:       CT HEAD WO CONTRAST-     Date of Exam: 9/2/2020 7:47 PM     Indication: Confusion/delirium, altered LOC, unexplained;  I48.91-Unspecified atrial fibrillation; I10-Essential (primary)  hypertension; R06.00-Dyspnea, unspecified; N18.3-Chronic kidney disease,  stage 3 (moderate).     Comparison: 12/11/2019     Technique:  Without contrast, contiguous axial CT images of the head  were obtained from skull base to vertex.  Coronal and sagittal  reconstructions were performed.  Automated exposure control and  iterative reconstruction methods were used.     FINDINGS  There is no intracranial hemorrhage. There is moderate decreased density  in the white matter. There is a large area of encephalomalacia in the  left occipital lobe. There are old thalamic and basal ganglia lacunar  infarcts. There is no new abnormality identified in the brain. There is  no mass effect. No skull abnormality is noted. No sinus fluid.       Impression:       1. Stable exam, with no acute process demonstrated.  2. Stable moderate chronic white matter disease and left occipital  encephalomalacia.     Electronically Signed By-Nehal Way On:9/2/2020 8:17 PM  This report was finalized on [id number] by  Nehal Way, .    US Renal Bilateral [[id number]] Collected:  09/02/20 1127     Updated:  09/02/20 1132    Narrative:       DATE OF EXAM:  9/2/2020 11:06 AM     PROCEDURE:  US RENAL BILATERAL-     INDICATIONS:  CRF/CKD; I48.91-Unspecified atrial fibrillation; I10-Essential (primary)  hypertension; R06.00-Dyspnea, unspecified; N18.3-Chronic kidney disease,  stage 3 (moderate)     COMPARISON:  Renal ultrasound 04/10/2018, CT abdomen pelvis without contrast  10/09/2018.     TECHNIQUE:   Grayscale and color Doppler ultrasound evaluation of the kidneys and  urinary bladder was performed.     FINDINGS:  The right kidney measures 11.3 x 4.0 x 5.0 cm. The left kidney is more  difficult to visualize due to  overlying bowel gas, but measures  approximately 11.0 x 4.6 x 4.6 cm. Renal cortical echogenicity appears  within normal limits. No renal mass or hydronephrosis is seen.     Urinary bladder is not well-distended. Estimated bladder volume is 25  mL.       Impression:       Unremarkable sonographic appearance of the kidneys.     Electronically Signed By-Teri Persaud On:9/2/2020 11:30 AM  This report was finalized on [id number] by  Teri Persaud, .          EKG      I personally viewed and interpreted the patient's EKG/Telemetry data:    ECHOCARDIOGRAM:    STRESS MYOVIEW:    CARDIAC CATHETERIZATION:    OTHER:         Assessment/Plan     Principal Problem:    Atrial fibrillation with rapid ventricular response (CMS/HCC)  Active Problems:    Chronic atrial fibrillation (CMS/HCC)    Chronic diastolic CHF (congestive heart failure) (CMS/HCC)    CKD (chronic kidney disease) stage 3, GFR 30-59 ml/min (CMS/HCC)    Gambling disorder, persistent    Coronary artery disease involving native coronary artery of native heart without angina pectoris    Psoriasis    History of CVA (cerebrovascular accident)    DDD (degenerative disc disease), lumbosacral    Peripheral polyneuropathy    GERD (gastroesophageal reflux disease)    Medically noncompliant    Mild cognitive impairment    Uncontrolled hypertension    Dyspnea    Dermatitis associated with moisture       Patient presented with atrial fibrillation and rapid ventricle response and his heart rate is still very high and hence will place him on IV Cardizem now.  Patient is also on beta-blockers and anticoagulation with Eliquis  Patient also is growing coagulase-negative in clusters in the blood and will have further work-up done  Patient's blood pressure is high and is on multiple medications  Patient has chronic renal insufficiency and is followed by nephrologist  Patient had history of CVA and discussed with him about the importance of medicines including  anticoagulation      I discussed the patients findings and my recommendations with patient and nurse    Edy Thomas MD  09/03/20  10:53

## 2020-09-03 NOTE — PROGRESS NOTES
LOS: 0 days   Patient Care Team:  Marisol Larson APRN as PCP - General  Shwetha Barber MD as Consulting Physician (Nephrology)    Chief Complaint:   Still in atrial fibrillation, denies any chest pain today.    Subjective :   Appears chronically ill, markedly older than stated age    Interval History:     Patient Complaints:  None this morning  Patient Denies:   No chest pain, no palpitations, no shortness of breath. No edema or cough.  History taken from: patient    Review of Systems:    All systems were reviewed and negative except for:   See  intervall history  Objective   Pt resting in bed, arouses easily  BP remains quite elevated  Vital Signs  Temp:  [97.5 °F (36.4 °C)-98.6 °F (37 °C)] 98.4 °F (36.9 °C)  Heart Rate:  [] 95  Resp:  [12-25] 14  BP: (136-172)/() 149/92    Physical Exam:     General Appearance:    Alert, cooperative, in no acute distress   Head:    Normocephalic, without obvious abnormality, atraumatic   Eyes:            Conjunctivae and sclerae normal, no   icterus   Throat:   No oral lesions, no thrush, oral mucosa moist   Neck:   No adenopathy, supple, trachea midline   Lungs:     Clear to auscultation,respirations regular, even and                  Unlabored, distant that adequate adventitial flow    Heart:     Distant, irregularly irregular and tachycardic outpatient speaking to me.   Chest Wall:    No abnormalities observed   Abdomen:     Normal bowel sounds, no masses, no organomegaly, soft        non-tender, non-distended, no guarding, no rebound                tenderness   Rectal:     Deferred   Extremities:   Moves all extremities , no edema, no cyanosis, no             redness   Pulses:   Pulses palpable and equal bilaterally   Skin:   No bleeding, bruising or rash   Lymph nodes:   No palpable adenopathy   Neurologic:   Cranial nerves 2 - 12 grossly intact, sensation intact   Radiology:  Ct Head Without Contrast    Result Date: 9/2/2020  1. Stable exam,  with no acute process demonstrated. 2. Stable moderate chronic white matter disease and left occipital encephalomalacia.  Electronically Signed By-Nehal Way On:9/2/2020 8:17 PM This report was finalized on [id number] by  Nehal Way, .    Xr Chest 1 View    Result Date: 9/1/2020  1.Left basilar consolidation appears unchanged, which could reflect atelectasis or pneumonia. 2.Cardiomegaly with possible small left pleural effusion.  Electronically Signed By-DR. Salty Joseph MD On:9/1/2020 3:35 PM This report was finalized on [id number] by DR. Salty Joseph MD.    Us Renal Bilateral    Result Date: 9/2/2020  Unremarkable sonographic appearance of the kidneys.  Electronically Signed By-Teri Persaud On:9/2/2020 11:30 AM This report was finalized on [id number] by  Teri Persaud, .         Results Review:     I reviewed the patient's new clinical results.  I reviewed the patient's new imaging results and agree with the interpretation.    Medication Review:   Scheduled Meds:    aspirin 81 mg Oral Daily   atorvastatin 10 mg Oral Nightly   bumetanide 1 mg Oral BID   clotrimazole-betamethasone  Topical Q12H   gabapentin 100 mg Oral Nightly   hydrALAZINE 50 mg Oral TID   ipratropium-albuterol 3 mL Nebulization 4x Daily - RT   isosorbide mononitrate 30 mg Oral BID - Nitrates   metoprolol tartrate 50 mg Oral Q12H   risperiDONE 0.25 mg Oral BID   rivaroxaban 20 mg Oral Daily With Dinner   sodium chloride 10 mL Intravenous Q12H   spironolactone 25 mg Oral Daily     Continuous Infusions:    niCARdipine 5-15 mg/hr Last Rate: Stopped (09/02/20 0019)     PRN Meds:.•  acetaminophen  •  albuterol  •  hydrALAZINE  •  magnesium hydroxide  •  sodium chloride  •  sodium chloride    Labs:  Lab Results (last 24 hours)     Procedure Component Value Units Date/Time    Phosphorus [[id number]]  (Normal) Collected:  09/03/20 0352    Specimen:  Blood Updated:  09/03/20 0439     Phosphorus 4.0 mg/dL     Magnesium [[id number]]   (Normal) Collected:  09/03/20 0352    Specimen:  Blood Updated:  09/03/20 0436     Magnesium 2.1 mg/dL     Comprehensive Metabolic Panel [[id number]]  (Abnormal) Collected:  09/03/20 0352    Specimen:  Blood Updated:  09/03/20 0436     Glucose 103 mg/dL      BUN --     Comment: Testing performed by alternate method        Creatinine 1.18 mg/dL      Sodium 140 mmol/L      Potassium 3.6 mmol/L      Chloride 105 mmol/L      CO2 25.0 mmol/L      Calcium 8.7 mg/dL      Total Protein 6.7 g/dL      Albumin 3.50 g/dL      ALT (SGPT) 16 U/L      AST (SGOT) 18 U/L      Alkaline Phosphatase 64 U/L      Total Bilirubin 0.7 mg/dL      eGFR Non African Amer 62 mL/min/1.73      Globulin 3.2 gm/dL      A/G Ratio 1.1 g/dL      BUN/Creatinine Ratio --     Comment: Testing not performed        Anion Gap 10.0 mmol/L     Narrative:       GFR Normal >60  Chronic Kidney Disease <60  Kidney Failure <15      BUN [[id number]] Collected:  09/03/20 0352    Specimen:  Blood Updated:  09/03/20 0436    Calcium, Ionized [[id number]]  (Normal) Collected:  09/03/20 0352    Specimen:  Blood Updated:  09/03/20 0419     Ionized Calcium 1.21 mmol/L     CBC (No Diff) [[id number]]  (Abnormal) Collected:  09/03/20 0352    Specimen:  Blood Updated:  09/03/20 0416     WBC 10.60 10*3/mm3      RBC 4.84 10*6/mm3      Hemoglobin 13.2 g/dL      Hematocrit 40.4 %      MCV 83.5 fL      MCH 27.2 pg      MCHC 32.6 g/dL      RDW 18.5 %      RDW-SD 52.9 fl      MPV 8.6 fL      Platelets 175 10*3/mm3     Urinalysis, Microscopic Only - Urine, Clean Catch [[id number]]  (Abnormal) Collected:  09/02/20 0928    Specimen:  Urine, Clean Catch Updated:  09/02/20 1730     RBC, UA 0-2 /HPF      WBC, UA 0-2 /HPF      Bacteria, UA None Seen /HPF      Squamous Epithelial Cells, UA 0-2 /HPF      Hyaline Casts, UA 3-6 /LPF      Methodology Automated Microscopy    Urinalysis With Culture If Indicated - Urine, Clean Catch [[id number]]  (Abnormal) Collected:  09/02/20 0928    Specimen:  Urine,  "Clean Catch Updated:  09/02/20 1721     Color, UA Dark Yellow     Comment: Result checked         Appearance, UA Turbid     Comment: Result checked         pH, UA 5.5     Specific Gravity, UA 1.020     Glucose, UA Negative     Ketones, UA Negative     Bilirubin, UA Negative     Blood, UA Negative     Protein,  mg/dL (2+)     Leuk Esterase, UA Negative     Nitrite, UA Negative     Urobilinogen, UA 0.2 E.U./dL    Blood Culture - Blood, Hand, Right [[id number]] Collected:  09/01/20 1431    Specimen:  Blood from Hand, Right Updated:  09/02/20 1500     Blood Culture No growth at 24 hours    Blood Culture - Blood, Hand, Left [[id number]]  (Abnormal) Collected:  09/01/20 1431    Specimen:  Blood from Hand, Left Updated:  09/02/20 1317     Blood Culture Abnormal Stain     Gram Stain Anaerobic Bottle Gram positive cocci in clusters    Blood Culture ID, PCR - Blood, Hand, Left [[id number]]  (Abnormal) Collected:  09/01/20 1431    Specimen:  Blood from Hand, Left Updated:  09/02/20 1317     BCID, PCR Staphylococcus spp, not aureus. Identification by BCID PCR.     BOTTLE TYPE Anaerobic Bottle    Procalcitonin [[id number]]  (Normal) Collected:  09/02/20 0252    Specimen:  Blood Updated:  09/02/20 1130     Procalcitonin 0.08 ng/mL     Narrative:       As a Marker for Sepsis (Non-Neonates):   1. <0.5 ng/mL represents a low risk of severe sepsis and/or septic shock.  1. >2 ng/mL represents a high risk of severe sepsis and/or septic shock.    As a Marker for Lower Respiratory Tract Infections that require antibiotic therapy:  PCT on Admission     Antibiotic Therapy             6-12 Hrs later  > 0.5                Strongly Recommended            >0.25 - <0.5         Recommended  0.1 - 0.25           Discouraged                   Remeasure/reassess PCT  <0.1                 Strongly Discouraged          Remeasure/reassess PCT      As 28 day mortality risk marker: \"Change in Procalcitonin Result\" (> 80 % or <=80 %) if Day 0 (or Day " 1) and Day 4 values are available. Refer to http://www.[facility]-pct-calculator.com/   Change in PCT <=80 %   A decrease of PCT levels below or equal to 80 % defines a positive change in PCT test result representing a higher risk for 28-day all-cause mortality of patients diagnosed with severe sepsis or septic shock.  Change in PCT > 80 %   A decrease of PCT levels of more than 80 % defines a negative change in PCT result representing a lower risk for 28-day all-cause mortality of patients diagnosed with severe sepsis or septic shock.                Results may be falsely decreased if patient taking Biotin.     Troponin [[id number]]  (Normal) Collected:  09/02/20 1033    Specimen:  Blood Updated:  09/02/20 1128     Troponin T <0.010 ng/mL     Narrative:       Troponin T Reference Range:  <= 0.03 ng/mL-   Negative for AMI  >0.03 ng/mL-     Abnormal for myocardial necrosis.  Clinicians would have to utilize clinical acumen, EKG, Troponin and serial changes to determine if it is an Acute Myocardial Infarction or myocardial injury due to an underlying chronic condition.       Results may be falsely decreased if patient taking Biotin.      T4, Free [[id number]]  (Normal) Collected:  09/02/20 1033    Specimen:  Blood Updated:  09/02/20 1128     Free T4 1.23 ng/dL     Narrative:       Results may be falsely increased if patient taking Biotin.      Eosinophil Smear - Urine, Urine, Clean Catch [[id number]]  (Normal) Collected:  09/02/20 0928    Specimen:  Urine, Clean Catch Updated:  09/02/20 1123     Eosinophil Smear 0 % EOS/100 Cells     PTH, Intact [[id number]]  (Normal) Collected:  09/02/20 1033    Specimen:  Blood Updated:  09/02/20 1119     PTH, Intact 44.3 pg/mL     Narrative:       Results may be falsely decreased if patient taking Biotin.      Sodium, Urine, Random - Urine, Clean Catch [[id number]] Collected:  09/02/20 0928    Specimen:  Urine, Clean Catch Updated:  09/02/20 1026     Sodium, Urine 68 mmol/L     Narrative:        Reference intervals for random urine have not been established.  Clinical usage is dependent upon physician's interpretation in combination with other laboratory tests.       TSH [[id number]]  (Normal) Collected:  09/02/20 0252    Specimen:  Blood Updated:  09/02/20 0855     TSH 2.760 uIU/mL     CK [[id number]]  (Normal) Collected:  09/02/20 0252    Specimen:  Blood Updated:  09/02/20 0848     Creatine Kinase 33 U/L     Digoxin Level [[id number]]  (Abnormal) Collected:  09/02/20 0252    Specimen:  Blood Updated:  09/02/20 0848     Digoxin 0.30 ng/mL     Uric Acid [[id number]]  (Abnormal) Collected:  09/02/20 0252    Specimen:  Blood Updated:  09/02/20 0848     Uric Acid 7.8 mg/dL     BUN [[id number]]  (Normal) Collected:  09/02/20 0252    Specimen:  Blood Updated:  09/02/20 0817     BUN 21 mg/dL            Assessment/Plan       Atrial fibrillation with rapid ventricular response (CMS/HCC)    Chronic atrial fibrillation (CMS/HCC)    Chronic diastolic CHF (congestive heart failure) (CMS/HCC)    CKD (chronic kidney disease) stage 3, GFR 30-59 ml/min (CMS/HCC)    Gambling disorder, persistent    Coronary artery disease involving native coronary artery of native heart without angina pectoris    Psoriasis    History of CVA (cerebrovascular accident)    DDD (degenerative disc disease), lumbosacral    Peripheral polyneuropathy    GERD (gastroesophageal reflux disease)    Medically noncompliant    Mild cognitive impairment    Uncontrolled hypertension    Dyspnea    Dermatitis associated with moisture      Cardiology input greatly appreciated,  Nephrology input greatly appreciated as well, thank you. The patient still needs better blood pressure control on Hydralazine,  losartan added today. Nebulized treatments already ordered for possible atelectasis in conjunction with pneumonic insult. The patient does appear hemodynamically stable with the exception of markedly elevated blood pressures. Continue rest of current therapy.    Monitor electrolytes replaced p.r.n.. Labs already ordered for the morning.      Daylin Burris, CLARISSA  09/03/20  05:58

## 2020-09-03 NOTE — NURSING NOTE
Patient is refusing bath per CNA.  RN educated patient on importance of daily bathing.  Patient still continued to refuse.

## 2020-09-03 NOTE — PROGRESS NOTES
"Heart Failure Program  Nurse Navigator  Discharge Planning: Follow-up Note    Patient Name:Carlos Whipple  :1955  Current Admission Date: 2020       Last 3 Weights:  Wt Readings from Last 3 Encounters:   20 113 kg (248 lb 0.3 oz)   20 117 kg (258 lb 6.1 oz)   20 110 kg (241 lb 10 oz)       Intake and Output totals: I/O last 3 completed shifts:  In: 1380 [P.O.:1380]  Out: 4830 [Urine:4830]  I/O this shift:  In: 500 [P.O.:500]  Out: 700 [Urine:700]          Patient Assessment:   Pt sitting on side of bed, no SOA with conversation. Resp even and unlabored. Minimal edema in lower extremities, abd soft and rounded. Pt advises, \"feeling better today.\" Pt just receive bath at this time, had refused earlier, more cooperative now.        Patient Education:   Pt with difficult time recalling education from yesterday. Repeat medication importance and building strength. Pt agreeable to rehab at discharge, cooperative.      Review HF Education provided to patient:  yes-Symptoms worsening  yes-Prescribed medications  yes-HF self-care  yes-Follow-up Appointments       Acceptance of learning: cooperative, requires repetition of inforamtion frequently     Heart Failure education interactive teaching session time: 15 minutes      Identified needs/barriers:   Mobility, medication non-adherence, pending VS stabilization, PT/OT recommend IP rehab post DC,     Intervention follow-up:       "

## 2020-09-03 NOTE — PLAN OF CARE
Patient was started on a cardizem gtt this morning d/t elevated HR. HR has been better controlled on the drip. Blood pressure better controlled as well. Patient got up to chair with PT today.     Problem: Skin Injury Risk (Adult)  Goal: Identify Related Risk Factors and Signs and Symptoms  Outcome: Ongoing (interventions implemented as appropriate)  Goal: Skin Health and Integrity  Outcome: Ongoing (interventions implemented as appropriate)     Problem: Fall Risk (Adult)  Goal: Identify Related Risk Factors and Signs and Symptoms  Outcome: Ongoing (interventions implemented as appropriate)  Goal: Absence of Fall  Outcome: Ongoing (interventions implemented as appropriate)     Problem: Arrhythmia/Dysrhythmia (Symptomatic) (Adult)  Goal: Signs and Symptoms of Listed Potential Problems Will be Absent, Minimized or Managed (Arrhythmia/Dysrhythmia)  Outcome: Ongoing (interventions implemented as appropriate)     Problem: Hypertensive Disease/Crisis (Arterial) (Adult)  Goal: Signs and Symptoms of Listed Potential Problems Will be Absent, Minimized or Managed (Hypertensive Disease/Crisis)  Outcome: Ongoing (interventions implemented as appropriate)     Problem: Patient Care Overview  Goal: Plan of Care Review  Outcome: Ongoing (interventions implemented as appropriate)  Flowsheets (Taken 9/3/2020 1323)  Progress: no change  Plan of Care Reviewed With: patient  Goal: Individualization and Mutuality  Outcome: Ongoing (interventions implemented as appropriate)  Goal: Discharge Needs Assessment  Outcome: Ongoing (interventions implemented as appropriate)  Goal: Interprofessional Rounds/Family Conf  Outcome: Ongoing (interventions implemented as appropriate)     Problem: Pain, Chronic (Adult)  Goal: Identify Related Risk Factors and Signs and Symptoms  Outcome: Ongoing (interventions implemented as appropriate)  Goal: Acceptable Pain/Comfort Level and Functional Ability  Outcome: Ongoing (interventions implemented as  appropriate)

## 2020-09-03 NOTE — PLAN OF CARE
Problem: Patient Care Overview  Goal: Plan of Care Review  Outcome: Ongoing (interventions implemented as appropriate)  Flowsheets (Taken 9/3/2020 0841)  Progress: improving  Plan of Care Reviewed With: patient  Outcome Summary: pt pop tx well and following direction well.  pt some cognitive issued with pt trying to walk away from his walker after using the bathroom.  pt still from from his PLOF but has potential to cont to improve with physical therapy services.  Recommend IP rehab at d/c to allow pt to recover his mobility.  PPE used:  mask, safety glasses and gloves

## 2020-09-03 NOTE — PLAN OF CARE
Problem: Patient Care Overview  Goal: Plan of Care Review  Outcome: Ongoing (interventions implemented as appropriate)  Flowsheets (Taken 9/3/2020 0112)  Progress: no change  Plan of Care Reviewed With: patient  Outcome Summary: Patient has been intermittently disorientated to time and situation, but can easily be reoriented.  No complaints of pain per patient.  Patient has had several episodes of hypertension that respond with PRN IV hydralazine.  Patient's heart rate continues to be mildly elevated.  All needs met, RN will continue to monitor.

## 2020-09-04 LAB
ALBUMIN SERPL-MCNC: 3.7 G/DL (ref 3.5–5.2)
ALBUMIN/GLOB SERPL: 1.1 G/DL
ALP SERPL-CCNC: 62 U/L (ref 39–117)
ALT SERPL W P-5'-P-CCNC: 14 U/L (ref 1–41)
ANION GAP SERPL CALCULATED.3IONS-SCNC: 12 MMOL/L (ref 5–15)
AST SERPL-CCNC: 16 U/L (ref 1–40)
BASOPHILS # BLD AUTO: 0 10*3/MM3 (ref 0–0.2)
BASOPHILS NFR BLD AUTO: 0.3 % (ref 0–1.5)
BILIRUB SERPL-MCNC: 0.8 MG/DL (ref 0–1.2)
BUN SERPL-MCNC: 22 MG/DL (ref 8–23)
BUN SERPL-MCNC: ABNORMAL MG/DL
BUN/CREAT SERPL: ABNORMAL
CALCIUM SPEC-SCNC: 9.4 MG/DL (ref 8.6–10.5)
CHLORIDE SERPL-SCNC: 102 MMOL/L (ref 98–107)
CO2 SERPL-SCNC: 26 MMOL/L (ref 22–29)
CREAT SERPL-MCNC: 1.17 MG/DL (ref 0.76–1.27)
DEPRECATED RDW RBC AUTO: 55.1 FL (ref 37–54)
EOSINOPHIL # BLD AUTO: 0.2 10*3/MM3 (ref 0–0.4)
EOSINOPHIL NFR BLD AUTO: 2 % (ref 0.3–6.2)
ERYTHROCYTE [DISTWIDTH] IN BLOOD BY AUTOMATED COUNT: 19.1 % (ref 12.3–15.4)
GFR SERPL CREATININE-BSD FRML MDRD: 63 ML/MIN/1.73
GLOBULIN UR ELPH-MCNC: 3.4 GM/DL
GLUCOSE SERPL-MCNC: 106 MG/DL (ref 65–99)
HCT VFR BLD AUTO: 44.4 % (ref 37.5–51)
HGB BLD-MCNC: 14.4 G/DL (ref 13–17.7)
LYMPHOCYTES # BLD AUTO: 1.4 10*3/MM3 (ref 0.7–3.1)
LYMPHOCYTES NFR BLD AUTO: 13 % (ref 19.6–45.3)
MAGNESIUM SERPL-MCNC: 2.2 MG/DL (ref 1.6–2.4)
MCH RBC QN AUTO: 27 PG (ref 26.6–33)
MCHC RBC AUTO-ENTMCNC: 32.5 G/DL (ref 31.5–35.7)
MCV RBC AUTO: 83.2 FL (ref 79–97)
MONOCYTES # BLD AUTO: 1.1 10*3/MM3 (ref 0.1–0.9)
MONOCYTES NFR BLD AUTO: 10 % (ref 5–12)
NEUTROPHILS NFR BLD AUTO: 74.7 % (ref 42.7–76)
NEUTROPHILS NFR BLD AUTO: 8.1 10*3/MM3 (ref 1.7–7)
NRBC BLD AUTO-RTO: 0 /100 WBC (ref 0–0.2)
PHOSPHATE SERPL-MCNC: 4.1 MG/DL (ref 2.5–4.5)
PLATELET # BLD AUTO: 192 10*3/MM3 (ref 140–450)
PMV BLD AUTO: 8.7 FL (ref 6–12)
POTASSIUM SERPL-SCNC: 3.7 MMOL/L (ref 3.5–5.2)
PROT SERPL-MCNC: 7.1 G/DL (ref 6–8.5)
RBC # BLD AUTO: 5.33 10*6/MM3 (ref 4.14–5.8)
SODIUM SERPL-SCNC: 140 MMOL/L (ref 136–145)
WBC # BLD AUTO: 10.8 10*3/MM3 (ref 3.4–10.8)

## 2020-09-04 PROCEDURE — 94799 UNLISTED PULMONARY SVC/PX: CPT

## 2020-09-04 PROCEDURE — 97530 THERAPEUTIC ACTIVITIES: CPT

## 2020-09-04 PROCEDURE — 97535 SELF CARE MNGMENT TRAINING: CPT

## 2020-09-04 PROCEDURE — 80053 COMPREHEN METABOLIC PANEL: CPT | Performed by: NURSE PRACTITIONER

## 2020-09-04 PROCEDURE — 97116 GAIT TRAINING THERAPY: CPT

## 2020-09-04 PROCEDURE — 99233 SBSQ HOSP IP/OBS HIGH 50: CPT | Performed by: INTERNAL MEDICINE

## 2020-09-04 PROCEDURE — 84100 ASSAY OF PHOSPHORUS: CPT | Performed by: INTERNAL MEDICINE

## 2020-09-04 PROCEDURE — 85025 COMPLETE CBC W/AUTO DIFF WBC: CPT | Performed by: NURSE PRACTITIONER

## 2020-09-04 PROCEDURE — 94760 N-INVAS EAR/PLS OXIMETRY 1: CPT

## 2020-09-04 PROCEDURE — 83735 ASSAY OF MAGNESIUM: CPT | Performed by: NURSE PRACTITIONER

## 2020-09-04 RX ORDER — DILTIAZEM HYDROCHLORIDE 180 MG/1
180 CAPSULE, COATED, EXTENDED RELEASE ORAL
Status: DISCONTINUED | OUTPATIENT
Start: 2020-09-04 | End: 2020-09-12 | Stop reason: HOSPADM

## 2020-09-04 RX ORDER — HYDRALAZINE HYDROCHLORIDE 25 MG/1
100 TABLET, FILM COATED ORAL 3 TIMES DAILY
Status: DISCONTINUED | OUTPATIENT
Start: 2020-09-04 | End: 2020-09-12 | Stop reason: HOSPADM

## 2020-09-04 RX ADMIN — DILTIAZEM HYDROCHLORIDE 180 MG: 180 CAPSULE, COATED, EXTENDED RELEASE ORAL at 11:35

## 2020-09-04 RX ADMIN — BUMETANIDE 1 MG: 1 TABLET ORAL at 08:16

## 2020-09-04 RX ADMIN — IPRATROPIUM BROMIDE AND ALBUTEROL SULFATE 3 ML: 2.5; .5 SOLUTION RESPIRATORY (INHALATION) at 16:00

## 2020-09-04 RX ADMIN — Medication 10 ML: at 08:17

## 2020-09-04 RX ADMIN — SPIRONOLACTONE 25 MG: 25 TABLET, FILM COATED ORAL at 08:16

## 2020-09-04 RX ADMIN — LOSARTAN POTASSIUM 50 MG: 50 TABLET, FILM COATED ORAL at 08:15

## 2020-09-04 RX ADMIN — RISPERIDONE 0.25 MG: 0.25 TABLET ORAL at 20:58

## 2020-09-04 RX ADMIN — RISPERIDONE 0.25 MG: 0.25 TABLET ORAL at 08:15

## 2020-09-04 RX ADMIN — CLOTRIMAZOLE AND BETAMETHASONE DIPROPIONATE: 10; .64 CREAM TOPICAL at 20:57

## 2020-09-04 RX ADMIN — HYDRALAZINE HYDROCHLORIDE 100 MG: 25 TABLET, FILM COATED ORAL at 20:58

## 2020-09-04 RX ADMIN — RIVAROXABAN 20 MG: 20 TABLET, FILM COATED ORAL at 17:28

## 2020-09-04 RX ADMIN — IPRATROPIUM BROMIDE AND ALBUTEROL SULFATE 3 ML: 2.5; .5 SOLUTION RESPIRATORY (INHALATION) at 06:50

## 2020-09-04 RX ADMIN — HYDRALAZINE HYDROCHLORIDE 100 MG: 25 TABLET, FILM COATED ORAL at 17:28

## 2020-09-04 RX ADMIN — ISOSORBIDE MONONITRATE 30 MG: 20 TABLET ORAL at 17:28

## 2020-09-04 RX ADMIN — ALLOPURINOL 100 MG: 100 TABLET ORAL at 08:15

## 2020-09-04 RX ADMIN — ATORVASTATIN CALCIUM 10 MG: 10 TABLET, FILM COATED ORAL at 20:58

## 2020-09-04 RX ADMIN — HYDRALAZINE HYDROCHLORIDE 100 MG: 25 TABLET, FILM COATED ORAL at 08:16

## 2020-09-04 RX ADMIN — IPRATROPIUM BROMIDE AND ALBUTEROL SULFATE 3 ML: 2.5; .5 SOLUTION RESPIRATORY (INHALATION) at 18:38

## 2020-09-04 RX ADMIN — Medication 10 ML: at 20:58

## 2020-09-04 RX ADMIN — GABAPENTIN 100 MG: 100 CAPSULE ORAL at 20:58

## 2020-09-04 RX ADMIN — CLOTRIMAZOLE AND BETAMETHASONE DIPROPIONATE: 10; .64 CREAM TOPICAL at 11:35

## 2020-09-04 RX ADMIN — METOPROLOL TARTRATE 50 MG: 50 TABLET, FILM COATED ORAL at 08:15

## 2020-09-04 RX ADMIN — ISOSORBIDE MONONITRATE 30 MG: 20 TABLET ORAL at 08:16

## 2020-09-04 RX ADMIN — ASPIRIN 81 MG: 81 TABLET, COATED ORAL at 08:16

## 2020-09-04 RX ADMIN — METOPROLOL TARTRATE 50 MG: 50 TABLET, FILM COATED ORAL at 20:58

## 2020-09-04 RX ADMIN — BUMETANIDE 1 MG: 1 TABLET ORAL at 17:28

## 2020-09-04 NOTE — PROGRESS NOTES
LOS: 1 day   Patient Care Team:  Marisol Larson APRN as PCP - General  Shwetha Barber MD as Consulting Physician (Nephrology)  Edy Thomas MD as Consulting Physician (Cardiology)    Chief Complaint:   Still in atrial fibrillation and on Cardizem drip, denies any chest pain today.    Subjective :   Appears chronically ill, markedly older than stated age    Interval History:     Patient Complaints:  None this morning  Patient Denies:   No chest pain, no palpitations, no shortness of breath. No edema or cough.  Says he is tolerating physical therapy but would like to do more activity.  History taken from: patient    Review of Systems:    All systems were reviewed and negative except for:   See  intervall history  Objective   Pt resting in bed, arouses easily  BP remains elevated  Vital Signs  Temp:  [97.2 °F (36.2 °C)-98.6 °F (37 °C)] 98.4 °F (36.9 °C)  Heart Rate:  [] 105  Resp:  [14-24] 15  BP: (123-194)/() 130/100    Physical Exam:     General Appearance:    Alert, cooperative, in no acute distress   Head:    Normocephalic, without obvious abnormality, atraumatic   Eyes:            Conjunctivae and sclerae normal, no   icterus   Throat:   No oral lesions, no thrush, oral mucosa moist   Neck:   No adenopathy, supple, trachea midline   Lungs:     Clear to auscultation,respirations regular, even and                  Unlabored, distant that adequate adventitial flow    Heart:     Distant, irregularly irregular and tachycardic outpatient speaking to me.   Chest Wall:    No abnormalities observed   Abdomen:     Normal bowel sounds, no masses, no organomegaly, soft        non-tender, non-distended, no guarding, no rebound                tenderness   Rectal:     Deferred   Extremities:   Moves all extremities , no edema, no cyanosis, no             redness   Pulses:   Pulses palpable and equal bilaterally   Skin:   No bleeding, bruising or rash   Lymph nodes:   No palpable adenopathy      Neurologic:   Cranial nerves 2 - 12 grossly intact, sensation intact   Radiology:  Ct Head Without Contrast    Result Date: 9/2/2020  1. Stable exam, with no acute process demonstrated. 2. Stable moderate chronic white matter disease and left occipital encephalomalacia.  Electronically Signed By-Nehal Way On:9/2/2020 8:17 PM This report was finalized on [id number] by  Nehal Way, .    Xr Chest 1 View    Result Date: 9/1/2020  1.Left basilar consolidation appears unchanged, which could reflect atelectasis or pneumonia. 2.Cardiomegaly with possible small left pleural effusion.  Electronically Signed By-DR. Salty Joseph MD On:9/1/2020 3:35 PM This report was finalized on [id number] by DR. Salty Joseph MD.    Us Renal Bilateral    Result Date: 9/2/2020  Unremarkable sonographic appearance of the kidneys.  Electronically Signed By-Teri Persaud On:9/2/2020 11:30 AM This report was finalized on [id number] by  Teri Persaud, .         Results Review:     I reviewed the patient's new clinical results.  I reviewed the patient's new imaging results and agree with the interpretation.    Medication Review:   Scheduled Meds:    allopurinol 100 mg Oral Daily   aspirin 81 mg Oral Daily   atorvastatin 10 mg Oral Nightly   bumetanide 1 mg Oral BID   clotrimazole-betamethasone  Topical Q12H   gabapentin 100 mg Oral Nightly   hydrALAZINE 100 mg Oral TID   ipratropium-albuterol 3 mL Nebulization 4x Daily - RT   isosorbide mononitrate 30 mg Oral BID - Nitrates   losartan 50 mg Oral Q24H   metoprolol tartrate 50 mg Oral Q12H   risperiDONE 0.25 mg Oral BID   rivaroxaban 20 mg Oral Daily With Dinner   sodium chloride 10 mL Intravenous Q12H   spironolactone 25 mg Oral Daily     Continuous Infusions:    dilTIAZem 5-15 mg/hr Last Rate: 5 mg/hr (09/03/20 1951)   niCARdipine 5-15 mg/hr Last Rate: Stopped (09/02/20 0019)     PRN Meds:.•  acetaminophen  •  albuterol  •  hydrALAZINE  •  magnesium hydroxide  •  sodium  chloride  •  sodium chloride    Labs:  Lab Results (last 24 hours)     Procedure Component Value Units Date/Time    BUN [[id number]]  (Normal) Collected:  09/04/20 0302    Specimen:  Blood Updated:  09/04/20 0721     BUN 22 mg/dL     Magnesium [[id number]]  (Normal) Collected:  09/04/20 0302    Specimen:  Blood Updated:  09/04/20 0428     Magnesium 2.2 mg/dL     Phosphorus [[id number]]  (Normal) Collected:  09/04/20 0302    Specimen:  Blood Updated:  09/04/20 0428     Phosphorus 4.1 mg/dL     Comprehensive Metabolic Panel [[id number]]  (Abnormal) Collected:  09/04/20 0302    Specimen:  Blood Updated:  09/04/20 0428     Glucose 106 mg/dL      BUN --     Comment: Testing performed by alternate method        Creatinine 1.17 mg/dL      Sodium 140 mmol/L      Potassium 3.7 mmol/L      Chloride 102 mmol/L      CO2 26.0 mmol/L      Calcium 9.4 mg/dL      Total Protein 7.1 g/dL      Albumin 3.70 g/dL      ALT (SGPT) 14 U/L      AST (SGOT) 16 U/L      Alkaline Phosphatase 62 U/L      Total Bilirubin 0.8 mg/dL      eGFR Non African Amer 63 mL/min/1.73      Globulin 3.4 gm/dL      A/G Ratio 1.1 g/dL      BUN/Creatinine Ratio --     Comment: Testing not performed        Anion Gap 12.0 mmol/L     Narrative:       GFR Normal >60  Chronic Kidney Disease <60  Kidney Failure <15      CBC & Differential [[id number]] Collected:  09/04/20 0302    Specimen:  Blood Updated:  09/04/20 0410    Narrative:       The following orders were created for panel order CBC & Differential.  Procedure                               Abnormality         Status                     ---------                               -----------         ------                     CBC Auto Differential[[id number]]        Abnormal            Final result                 Please view results for these tests on the individual orders.    CBC Auto Differential [[id number]]  (Abnormal) Collected:  09/04/20 0302    Specimen:  Blood Updated:  09/04/20 0410     WBC 10.80 10*3/mm3       RBC 5.33 10*6/mm3      Hemoglobin 14.4 g/dL      Hematocrit 44.4 %      MCV 83.2 fL      MCH 27.0 pg      MCHC 32.5 g/dL      RDW 19.1 %      RDW-SD 55.1 fl      MPV 8.7 fL      Platelets 192 10*3/mm3      Neutrophil % 74.7 %      Lymphocyte % 13.0 %      Monocyte % 10.0 %      Eosinophil % 2.0 %      Basophil % 0.3 %      Neutrophils, Absolute 8.10 10*3/mm3      Lymphocytes, Absolute 1.40 10*3/mm3      Monocytes, Absolute 1.10 10*3/mm3      Eosinophils, Absolute 0.20 10*3/mm3      Basophils, Absolute 0.00 10*3/mm3      nRBC 0.0 /100 WBC     Blood Culture - Blood, Hand, Right [[id number]] Collected:  09/01/20 1431    Specimen:  Blood from Hand, Right Updated:  09/03/20 1500     Blood Culture No growth at 2 days           Assessment/Plan       Atrial fibrillation with rapid ventricular response (CMS/HCC)    Chronic atrial fibrillation (CMS/[facility])    Chronic diastolic CHF (congestive heart failure) (CMS/[facility])    CKD (chronic kidney disease) stage 3, GFR 30-59 ml/min (CMS/HCC)    Gambling disorder, persistent    Coronary artery disease involving native coronary artery of native heart without angina pectoris    Psoriasis    History of CVA (cerebrovascular accident)    DDD (degenerative disc disease), lumbosacral    Peripheral polyneuropathy    GERD (gastroesophageal reflux disease)    Medically noncompliant    Mild cognitive impairment    Uncontrolled hypertension    Dyspnea    Dermatitis associated with moisture  Single blood culture positive for staph coag negative most likely contaminant.    Cardiology input greatly appreciated,  Nephrology input greatly appreciated as well, thank you. The patient still needs better blood pressure control on Hydralazine,  losartan continued.  Nebulized treatments already ordered for possible atelectasis in conjunction with pneumonic insult. The patient does appear hemodynamically stable with the exception of markedly elevated blood pressures. Continue rest of current therapy.   Monitor  electrolytes replaced p.r.n.. Labs already ordered for the morning.    Of note, lactic acid, pro calcitonin markers for sepsis/pneumonic insult are negative at this juncture.  Suspect the single blood culture with coag negative staph is actually skin contaminant.  Patient does not appear to be septic.      Paty Polk DO  09/04/20  08:43

## 2020-09-04 NOTE — PROGRESS NOTES
Continued Stay Note   Yose[facility]     Patient Name: Carlos Whipple  MRN: [id number]  Today's Date: 9/4/2020    Admit Date: 9/1/2020    Discharge Plan     Row Name 09/04/20 0952       Plan    Plan  [facility] can accept, pending pre-cert. Pre-cert will be started today 9/4. No PASRR needed for [facility].    Plan Comments  [facility] and can accept the pt, pending pre-cert approval. [facility] liaison to start pre-cert 9/4.        Zee Baptiste [facility], LSW    Office: [phone]  Cell: [phone]  Fax: [phone]  E-mail: [email or web address]

## 2020-09-04 NOTE — PROGRESS NOTES
"NEPHROLOGY PROGRESS NOTE------[facility]    [facility]  [phone]  Sterling Barber MD      Patient Care Team:  Marisol Larson APRN as PCP - General  Shwetha Barber MD as Consulting Physician (Nephrology)  Edy Thomas MD as Consulting Physician (Cardiology)      Provider:  Sterling Barber MD  Patient Name: Carlos Whipple  :  1955    SUBJECTIVE:    F/U CKD    No complaints. No SOB, CP, dysuria. Anxious to go home. No palpitations.    Medication:    allopurinol 100 mg Oral Daily   aspirin 81 mg Oral Daily   atorvastatin 10 mg Oral Nightly   bumetanide 1 mg Oral BID   clotrimazole-betamethasone  Topical Q12H   gabapentin 100 mg Oral Nightly   hydrALAZINE 75 mg Oral TID   ipratropium-albuterol 3 mL Nebulization 4x Daily - RT   isosorbide mononitrate 30 mg Oral BID - Nitrates   losartan 50 mg Oral Q24H   metoprolol tartrate 50 mg Oral Q12H   risperiDONE 0.25 mg Oral BID   rivaroxaban 20 mg Oral Daily With Dinner   sodium chloride 10 mL Intravenous Q12H   spironolactone 25 mg Oral Daily       dilTIAZem 5-15 mg/hr Last Rate: 5 mg/hr (20)   niCARdipine 5-15 mg/hr Last Rate: Stopped (20)       OBJECTIVE    Vital Sign Min/Max for last 24 hours  Temp  Min: 97.2 °F (36.2 °C)  Max: 98.6 °F (37 °C)   BP  Min: 123/79  Max: 194/107   Pulse  Min: 69  Max: 112   Resp  Min: 14  Max: 24   SpO2  Min: 90 %  Max: 100 %   No data recorded   No data recorded     Flowsheet Rows      First Filed Value   Admission Height  182.9 cm (72\") Documented at 2020 1338   Admission Weight  114 kg (252 lb) Documented at 2020 1338          No intake/output data recorded.  I/O last 3 completed shifts:  In:  [P.O.:]  Out:  [Urine:5060]    Physical Exam:  General Appearance: alert, appears stated age and cooperative  Head: normocephalic, without obvious abnormality and atraumatic  Eyes: conjunctivae and sclerae normal and no " icterus  Neck: supple and no JVD  Lungs: clear to auscultation and respirations regular  Heart: IRREG IRREG +AFUA. NO GALLOP, RUB, OR S3  Chest: Wall no abnormalities observed  Abdomen: normal bowel sounds and soft non-tender  Extremities: moves extremities well, no edema, no cyanosis and no redness  Skin: no bleeding, bruising or rash, turgor normal, color normal and no lesions noted  Neurologic: Alert, and oriented. No focal deficits    Labs:    WBC WBC   Date Value Ref Range Status   09/04/2020 10.80 3.40 - 10.80 10*3/mm3 Final   09/03/2020 10.60 3.40 - 10.80 10*3/mm3 Final   09/02/2020 9.70 3.40 - 10.80 10*3/mm3 Final   09/01/2020 11.30 (H) 3.40 - 10.80 10*3/mm3 Final      HGB Hemoglobin   Date Value Ref Range Status   09/04/2020 14.4 13.0 - 17.7 g/dL Final   09/03/2020 13.2 13.0 - 17.7 g/dL Final   09/02/2020 13.0 13.0 - 17.7 g/dL Final   09/01/2020 13.7 13.0 - 17.7 g/dL Final      HCT Hematocrit   Date Value Ref Range Status   09/04/2020 44.4 37.5 - 51.0 % Final   09/03/2020 40.4 37.5 - 51.0 % Final   09/02/2020 39.9 37.5 - 51.0 % Final   09/01/2020 42.6 37.5 - 51.0 % Final      Platlets No results found for: LABPLAT   MCV MCV   Date Value Ref Range Status   09/04/2020 83.2 79.0 - 97.0 fL Final   09/03/2020 83.5 79.0 - 97.0 fL Final   09/02/2020 81.8 79.0 - 97.0 fL Final   09/01/2020 81.9 79.0 - 97.0 fL Final          Sodium Sodium   Date Value Ref Range Status   09/04/2020 140 136 - 145 mmol/L Final   09/03/2020 140 136 - 145 mmol/L Final   09/02/2020 142 136 - 145 mmol/L Final   09/01/2020 140 136 - 145 mmol/L Final      Potassium Potassium   Date Value Ref Range Status   09/04/2020 3.7 3.5 - 5.2 mmol/L Final   09/03/2020 3.6 3.5 - 5.2 mmol/L Final   09/02/2020 3.6 3.5 - 5.2 mmol/L Final   09/01/2020 4.0 3.5 - 5.2 mmol/L Final      Chloride Chloride   Date Value Ref Range Status   09/04/2020 102 98 - 107 mmol/L Final   09/03/2020 105 98 - 107 mmol/L Final   09/02/2020 108 (H) 98 - 107 mmol/L Final    09/01/2020 108 (H) 98 - 107 mmol/L Final      CO2 CO2   Date Value Ref Range Status   09/04/2020 26.0 22.0 - 29.0 mmol/L Final   09/03/2020 25.0 22.0 - 29.0 mmol/L Final   09/02/2020 23.0 22.0 - 29.0 mmol/L Final   09/01/2020 19.0 (L) 22.0 - 29.0 mmol/L Final      BUN BUN   Date Value Ref Range Status   09/04/2020   Final     Comment:     Testing performed by alternate method   09/04/2020 22 8 - 23 mg/dL Final   09/03/2020   Final     Comment:     Testing performed by alternate method   09/03/2020 24 (H) 8 - 23 mg/dL Final   09/02/2020   Final     Comment:     Testing performed by alternate method   09/02/2020 21 8 - 23 mg/dL Final   09/01/2020   Final     Comment:     Testing performed by alternate method   09/01/2020 23 8 - 23 mg/dL Final      Creatinine Creatinine   Date Value Ref Range Status   09/04/2020 1.17 0.76 - 1.27 mg/dL Final   09/03/2020 1.18 0.76 - 1.27 mg/dL Final   09/02/2020 1.18 0.76 - 1.27 mg/dL Final   09/01/2020 1.35 (H) 0.76 - 1.27 mg/dL Final      Calcium Calcium   Date Value Ref Range Status   09/04/2020 9.4 8.6 - 10.5 mg/dL Final   09/03/2020 8.7 8.6 - 10.5 mg/dL Final   09/02/2020 8.6 8.6 - 10.5 mg/dL Final   09/01/2020 9.4 8.6 - 10.5 mg/dL Final      PO4 No components found for: PO4   Albumin Albumin   Date Value Ref Range Status   09/04/2020 3.70 3.50 - 5.20 g/dL Final   09/03/2020 3.50 3.50 - 5.20 g/dL Final   09/01/2020 4.00 3.50 - 5.20 g/dL Final      Magnesium Magnesium   Date Value Ref Range Status   09/04/2020 2.2 1.6 - 2.4 mg/dL Final   09/03/2020 2.1 1.6 - 2.4 mg/dL Final   09/02/2020 2.1 1.6 - 2.4 mg/dL Final      Uric Acid No components found for: URIC ACID     Imaging Results (Last 72 Hours)     Procedure Component Value Units Date/Time    CT Head Without Contrast [[id number]] Collected:  09/02/20 2014     Updated:  09/02/20 2019    Narrative:       CT HEAD WO CONTRAST-     Date of Exam: 9/2/2020 7:47 PM     Indication: Confusion/delirium, altered LOC,  unexplained;  I48.91-Unspecified atrial fibrillation; I10-Essential (primary)  hypertension; R06.00-Dyspnea, unspecified; N18.3-Chronic kidney disease,  stage 3 (moderate).     Comparison: 12/11/2019     Technique:  Without contrast, contiguous axial CT images of the head  were obtained from skull base to vertex.  Coronal and sagittal  reconstructions were performed.  Automated exposure control and  iterative reconstruction methods were used.     FINDINGS  There is no intracranial hemorrhage. There is moderate decreased density  in the white matter. There is a large area of encephalomalacia in the  left occipital lobe. There are old thalamic and basal ganglia lacunar  infarcts. There is no new abnormality identified in the brain. There is  no mass effect. No skull abnormality is noted. No sinus fluid.       Impression:       1. Stable exam, with no acute process demonstrated.  2. Stable moderate chronic white matter disease and left occipital  encephalomalacia.     Electronically Signed By-Nehal Way On:9/2/2020 8:17 PM  This report was finalized on [id number] by  Nehal Way, .    US Renal Bilateral [[id number]] Collected:  09/02/20 1127     Updated:  09/02/20 1132    Narrative:       DATE OF EXAM:  9/2/2020 11:06 AM     PROCEDURE:  US RENAL BILATERAL-     INDICATIONS:  CRF/CKD; I48.91-Unspecified atrial fibrillation; I10-Essential (primary)  hypertension; R06.00-Dyspnea, unspecified; N18.3-Chronic kidney disease,  stage 3 (moderate)     COMPARISON:  Renal ultrasound 04/10/2018, CT abdomen pelvis without contrast  10/09/2018.     TECHNIQUE:   Grayscale and color Doppler ultrasound evaluation of the kidneys and  urinary bladder was performed.     FINDINGS:  The right kidney measures 11.3 x 4.0 x 5.0 cm. The left kidney is more  difficult to visualize due to overlying bowel gas, but measures  approximately 11.0 x 4.6 x 4.6 cm. Renal cortical echogenicity appears  within normal limits. No renal mass or  hydronephrosis is seen.     Urinary bladder is not well-distended. Estimated bladder volume is 25  mL.       Impression:       Unremarkable sonographic appearance of the kidneys.     Electronically Signed By-Teri Persaud On:9/2/2020 11:30 AM  This report was finalized on [id number] by  Teri Persaud, .    XR Chest 1 View [[id number]] Collected:  09/01/20 1531     Updated:  09/01/20 1537    Narrative:       XR CHEST 1 VW-     Date of Exam: 9/1/2020 3:10 PM     Indication: soa.     Comparison Exams: 08/21/2020     Technique: Single AP chest radiograph     FINDINGS:  A left basilar consolidation appears unchanged. There may be an  associated small left pleural effusion. The heart is enlarged. The  pulmonary vasculature appears normal. The osseous structures appear  intact.       Impression:       1.Left basilar consolidation appears unchanged, which could reflect  atelectasis or pneumonia.  2.Cardiomegaly with possible small left pleural effusion.     Electronically Signed By-DR. Salty Joseph MD On:9/1/2020 3:35 PM  This report was finalized on [id number] by DR. Salty Joseph MD.          Results for orders placed during the hospital encounter of 09/01/20   XR Chest 1 View    Narrative XR CHEST 1 VW-     Date of Exam: 9/1/2020 3:10 PM     Indication: soa.     Comparison Exams: 08/21/2020     Technique: Single AP chest radiograph     FINDINGS:  A left basilar consolidation appears unchanged. There may be an  associated small left pleural effusion. The heart is enlarged. The  pulmonary vasculature appears normal. The osseous structures appear  intact.       Impression 1.Left basilar consolidation appears unchanged, which could reflect  atelectasis or pneumonia.  2.Cardiomegaly with possible small left pleural effusion.     Electronically Signed By-DR. Salty Joseph MD On:9/1/2020 3:35 PM  This report was finalized on [id number] by DR. Salty Joseph MD.       Results for orders placed during the  hospital encounter of 12/06/19   Duplex Carotid Ultrasound CAR    Narrative · Proximal right internal carotid artery is normal.  · Mid right internal carotid artery is normal.  · Proximal left internal carotid artery is normal.  · Mid left internal carotid artery is normal.            ASSESSMENT / PLAN      Atrial fibrillation with rapid ventricular response (CMS/HCC)    Chronic atrial fibrillation (CMS/HCC)    Chronic diastolic CHF (congestive heart failure) (CMS/HCC)    CKD (chronic kidney disease) stage 3, GFR 30-59 ml/min (CMS/HCC)    Gambling disorder, persistent    Coronary artery disease involving native coronary artery of native heart without angina pectoris    Psoriasis    History of CVA (cerebrovascular accident)    DDD (degenerative disc disease), lumbosacral    Peripheral polyneuropathy    GERD (gastroesophageal reflux disease)    Medically noncompliant    Mild cognitive impairment    Uncontrolled hypertension    Dyspnea    Dermatitis associated with moisture    1. CRF/CKD STG 2------Nonoliguric. Appears to have CRF/CKD STG 2 with current serum creatinine at baseline. Unknown if patient has baseline proteinuria or hematuria. In the long run we may have to accept a higher baseline serum creatinine in order to keep euvolemic. Follow with cautious diuresis. CRF/CKD is likely secondary to HTN NS.   Dose meds for CrCl 60 cc/min     2. ACIDOSIS------Resolved     3. CHRONIC DIASTOLIC CHF/ELEVATED BNP------Cautious diuresis. Changed to po Bumex with Aldactone and in nice negative balance     4. HTN WITH CKD------BP up. Increase meds. No ACE/ARB/DRI for now     5. OA/DJD------No NSAIDs.      6. ATRIAL FIBRILLATION WITH RVR------per Cardiology. Dig level okay     7. HYPERLIPIDEMIA------Statin. CK, TSH okay    8. HYPERURICEMIA------Allopurinol     Okay from RENAL standpoint to d/c      Sterling Barber MD  [facility]  [phone]  09/04/20  07:45

## 2020-09-04 NOTE — PROGRESS NOTES
Referring Provider: Dr. Stauffer    Reason for follow-up: Atrial fibrillation     Patient Care Team:  Marisol Larson APRN as PCP - General  Shwetha Barber MD as Consulting Physician (Nephrology)  Edy Thomas MD as Consulting Physician (Cardiology)    Subjective .  No chest pain but has shortness of breath     Objective  Sitting in chair comfortably     Review of Systems   Constitution: Negative for fever and malaise/fatigue.   HENT: Negative for ear pain and nosebleeds.    Eyes: Negative for blurred vision and double vision.   Cardiovascular: Negative for chest pain, dyspnea on exertion and palpitations.   Respiratory: Positive for shortness of breath. Negative for cough.    Skin: Negative for rash.   Musculoskeletal: Negative for joint pain.   Gastrointestinal: Negative for abdominal pain, nausea and vomiting.   Neurological: Negative for focal weakness and headaches.   Psychiatric/Behavioral: Negative for depression. The patient is not nervous/anxious.    All other systems reviewed and are negative.      Ketorolac tromethamine    Scheduled Meds:    allopurinol 100 mg Oral Daily   aspirin 81 mg Oral Daily   atorvastatin 10 mg Oral Nightly   bumetanide 1 mg Oral BID   clotrimazole-betamethasone  Topical Q12H   gabapentin 100 mg Oral Nightly   hydrALAZINE 100 mg Oral TID   ipratropium-albuterol 3 mL Nebulization 4x Daily - RT   isosorbide mononitrate 30 mg Oral BID - Nitrates   losartan 50 mg Oral Q24H   metoprolol tartrate 50 mg Oral Q12H   risperiDONE 0.25 mg Oral BID   rivaroxaban 20 mg Oral Daily With Dinner   sodium chloride 10 mL Intravenous Q12H   spironolactone 25 mg Oral Daily     Continuous Infusions:    dilTIAZem 5-15 mg/hr Last Rate: 5 mg/hr (09/03/20 1951)   niCARdipine 5-15 mg/hr Last Rate: Stopped (09/02/20 0019)     PRN Meds:.•  acetaminophen  •  albuterol  •  hydrALAZINE  •  magnesium hydroxide  •  sodium chloride  •  sodium chloride        VITAL SIGNS  Vitals:    09/04/20 0802 09/04/20  "0906 09/04/20 1002 09/04/20 1033   BP: 130/100 151/86 130/89 133/84   BP Location:       Patient Position:       Pulse: 105 104 79 75   Resp: 15 18 16 22   Temp:    97.8 °F (36.6 °C)   TempSrc:    Oral   SpO2:   92% 94%   Weight:       Height:           Flowsheet Rows      First Filed Value   Admission Height  182.9 cm (72\") Documented at 09/01/2020 1338   Admission Weight  114 kg (252 lb) Documented at 09/01/2020 1338           TELEMETRY: Atrial fibrillation with controlled ventricle response    Physical Exam:  Physical Exam   Constitutional: He appears well-developed and well-nourished.   HENT:   Head: Normocephalic and atraumatic.   Eyes: Pupils are equal, round, and reactive to light. Conjunctivae and EOM are normal. No scleral icterus.   Neck: Normal range of motion. Neck supple. No JVD present. Carotid bruit is not present.   Cardiovascular: Normal rate, regular rhythm, S1 normal, S2 normal, normal heart sounds and intact distal pulses. PMI is not displaced.   Pulmonary/Chest: Effort normal and breath sounds normal. He has no wheezes. He has no rales.   Abdominal: Soft. Bowel sounds are normal.   Musculoskeletal: Normal range of motion.   Neurological: He is alert. He has normal strength.   No focal deficits   Skin: Skin is warm and dry. No rash noted.   Psychiatric: He has a normal mood and affect.        Results Review:   I reviewed the patient's new clinical results.  Lab Results (last 24 hours)     Procedure Component Value Units Date/Time    BUN [[id number]]  (Normal) Collected:  09/04/20 0302    Specimen:  Blood Updated:  09/04/20 0721     BUN 22 mg/dL     Magnesium [[id number]]  (Normal) Collected:  09/04/20 0302    Specimen:  Blood Updated:  09/04/20 0428     Magnesium 2.2 mg/dL     Phosphorus [[id number]]  (Normal) Collected:  09/04/20 0302    Specimen:  Blood Updated:  09/04/20 0428     Phosphorus 4.1 mg/dL     Comprehensive Metabolic Panel [[id number]]  (Abnormal) Collected:  09/04/20 0302    Specimen:  " Blood Updated:  09/04/20 0428     Glucose 106 mg/dL      BUN --     Comment: Testing performed by alternate method        Creatinine 1.17 mg/dL      Sodium 140 mmol/L      Potassium 3.7 mmol/L      Chloride 102 mmol/L      CO2 26.0 mmol/L      Calcium 9.4 mg/dL      Total Protein 7.1 g/dL      Albumin 3.70 g/dL      ALT (SGPT) 14 U/L      AST (SGOT) 16 U/L      Alkaline Phosphatase 62 U/L      Total Bilirubin 0.8 mg/dL      eGFR Non African Amer 63 mL/min/1.73      Globulin 3.4 gm/dL      A/G Ratio 1.1 g/dL      BUN/Creatinine Ratio --     Comment: Testing not performed        Anion Gap 12.0 mmol/L     Narrative:       GFR Normal >60  Chronic Kidney Disease <60  Kidney Failure <15      CBC & Differential [[id number]] Collected:  09/04/20 0302    Specimen:  Blood Updated:  09/04/20 0410    Narrative:       The following orders were created for panel order CBC & Differential.  Procedure                               Abnormality         Status                     ---------                               -----------         ------                     CBC Auto Differential[[id number]]        Abnormal            Final result                 Please view results for these tests on the individual orders.    CBC Auto Differential [[id number]]  (Abnormal) Collected:  09/04/20 0302    Specimen:  Blood Updated:  09/04/20 0410     WBC 10.80 10*3/mm3      RBC 5.33 10*6/mm3      Hemoglobin 14.4 g/dL      Hematocrit 44.4 %      MCV 83.2 fL      MCH 27.0 pg      MCHC 32.5 g/dL      RDW 19.1 %      RDW-SD 55.1 fl      MPV 8.7 fL      Platelets 192 10*3/mm3      Neutrophil % 74.7 %      Lymphocyte % 13.0 %      Monocyte % 10.0 %      Eosinophil % 2.0 %      Basophil % 0.3 %      Neutrophils, Absolute 8.10 10*3/mm3      Lymphocytes, Absolute 1.40 10*3/mm3      Monocytes, Absolute 1.10 10*3/mm3      Eosinophils, Absolute 0.20 10*3/mm3      Basophils, Absolute 0.00 10*3/mm3      nRBC 0.0 /100 WBC     Blood Culture - Blood, Hand, Right  [[id number]] Collected:  09/01/20 1431    Specimen:  Blood from Hand, Right Updated:  09/03/20 1500     Blood Culture No growth at 2 days          Imaging Results (Last 24 Hours)     ** No results found for the last 24 hours. **          EKG      I personally viewed and interpreted the patient's EKG/Telemetry data:    ECHOCARDIOGRAM:    STRESS MYOVIEW:    CARDIAC CATHETERIZATION:    OTHER:         Assessment/Plan     Principal Problem:    Atrial fibrillation with rapid ventricular response (CMS/HCC)  Active Problems:    Chronic atrial fibrillation (CMS/HCC)    Chronic diastolic CHF (congestive heart failure) (CMS/HCC)    CKD (chronic kidney disease) stage 3, GFR 30-59 ml/min (CMS/HCC)    Gambling disorder, persistent    Coronary artery disease involving native coronary artery of native heart without angina pectoris    Psoriasis    History of CVA (cerebrovascular accident)    DDD (degenerative disc disease), lumbosacral    Peripheral polyneuropathy    GERD (gastroesophageal reflux disease)    Medically noncompliant    Mild cognitive impairment    Uncontrolled hypertension    Dyspnea    Dermatitis associated with moisture       Patient presented with atrial fibrillation and rapid ventricle response and his heart rate is still very high and hence will place him on IV Cardizem now.  Patient's heart rate is well controlled hence will switch to Cardizem p.o.  Patient is also on beta-blockers and anticoagulation with Eliquis  Patient also is growing coagulase-negative in clusters in the blood and will have further work-up done  Patient's blood pressure is high and is on multiple medications  Patient has chronic renal insufficiency and is followed by nephrologist  Patient had history of CVA and discussed with him about the importance of medicines including anticoagulation      I discussed the patients findings and my recommendations with patient and nurse    Edy Thomas MD  09/04/20  10:53

## 2020-09-04 NOTE — PROGRESS NOTES
Heart Failure Program  Nurse Navigator  Discharge Planning: Follow-up Note    Patient Name:Carlos Wihpple  :1955  Current Admission Date: 2020       Last 3 Weights:  Wt Readings from Last 3 Encounters:   20 113 kg (248 lb 0.3 oz)   20 117 kg (258 lb 6.1 oz)   20 110 kg (241 lb 10 oz)       Intake and Output totals: I/O last 3 completed shifts:  In:  [P.O.:194]  Out: 5060 [Urine:5060]  I/O this shift:  In: 240 [P.O.:240]  Out: -           Patient Assessment:   Pt sitting on side of bed, no SOA with conversation or with walking with OT prior to visit.  Pt returned to room in chair due to being tired with activity.  No pedal edema, abd round soft. Pt advises he is feeling better, no SOA.      Patient Education:   discuss self-care,, medication importance, gaining strength, daily weights, low salt diet, keeping follow-up appointments. Pt able to recall some information. Discuss need for medication plan and assistance in setting up regimen if he goes home after rehab     Review HF Education provided to patient:  yes-Symptoms worsening  yes-Prescribed medications  yes-HF self-care  yes-Follow-up Appointments       Acceptance of learning: acceptable, pt forgetful and needs redirection.     Heart Failure education interactive teaching session time: 20 minutes      Identified needs/barriers:   Medication non-adherence, mobility    Intervention follow-up:  Pending [facility] pre-JAMES silvestre

## 2020-09-04 NOTE — PLAN OF CARE
Patient was started on Cardizem drip last night. Dr. Thomas stopped the drip this am switched to PO Cardizem. Patient is high falls but continues to get up w/ out assistance from staff. Patient is A/O but unsteady on feet. B/P runs high at times. Treated B/P w/ metoprolol, B/P returned to [facility]. Patient denies pain. Psoriasis on the abdomen is being treated with Lotrisone BID. Patient also has blanchable redness in the groin and gluteal areas, also being treated w/ Lotrisone. Appetite is adequate and vitals are stable at this time.  D/C plan is michelle GARCIA pending. Will continue to monitor pt for any changes in status.     Problem: Fall Risk (Adult)  Goal: Absence of Fall  Flowsheets (Taken 9/4/2020 1431)  Absence of Fall: making progress toward outcome     Problem: Patient Care Overview  Goal: Plan of Care Review  Flowsheets (Taken 9/4/2020 1431)  Plan of Care Reviewed With: patient  Goal: Interprofessional Rounds/Family Conf  Flowsheets (Taken 9/4/2020 1431)  Participants: nursing; pharmacy; physical therapy; physician     Problem: Pain, Chronic (Adult)  Goal: Acceptable Pain/Comfort Level and Functional Ability  Flowsheets (Taken 9/4/2020 1431)  Acceptable Pain/Comfort Level and Functional Ability: making progress toward outcome

## 2020-09-04 NOTE — THERAPY TREATMENT NOTE
Patient Name: Carlos Whipple  : 1955    MRN: [id number]                              Today's Date: 2020       Admit Date: 2020    Visit Dx:     ICD-10-CM ICD-9-CM   1. Atrial fibrillation with rapid ventricular response (CMS/HCC) I48.91 427.31   2. Uncontrolled hypertension I10 401.9   3. Dyspnea, unspecified type R06.00 786.09   4. CKD (chronic kidney disease) stage 3, GFR 30-59 ml/min (CMS/HCC) N18.3 585.3     Patient Active Problem List   Diagnosis   • Hypertensive emergency   • Chronic atrial fibrillation (CMS/HCC)   • Chronic diastolic CHF (congestive heart failure) (CMS/HCC)   • CKD (chronic kidney disease) stage 3, GFR 30-59 ml/min (CMS/HCC)   • Essential hypertension   • Gambling disorder, persistent   • Shortness of breath   • Coronary artery disease involving native coronary artery of native heart without angina pectoris   • Psoriasis   • History of CVA (cerebrovascular accident)   • DDD (degenerative disc disease), lumbosacral   • Peripheral polyneuropathy   • GERD (gastroesophageal reflux disease)   • Medically noncompliant   • Acute respiratory distress   • Unstable angina (CMS/HCC)   • Cervical disc disorder with radiculopathy   • Completed stroke (CMS/HCC)   • Degeneration of intervertebral disc   • Excessive anticoagulation   • Impaired left ventricular function   • Liver lesion   • Lumbar radiculopathy   • Cervical myelopathy (CMS/HCC)   • Lumbosacral radiculopathy   • Spinal stenosis of lumbar region   • Obesity   • Mild cognitive impairment   • Thoracic back pain   • Thoracic disc disease with myelopathy   • Paroxysmal atrial fibrillation (CMS/HCC)   • Uncontrolled hypertension   • Acute congestive heart failure (CMS/HCC)   • Dyspnea   • Atrial fibrillation with RVR (CMS/HCC)   • Atrial fibrillation with rapid ventricular response (CMS/HCC)   • Dermatitis associated with moisture     Past Medical History:   Diagnosis Date   • A-fib (CMS/HCC)    • CHF (congestive heart failure)  (CMS/[facility])    • Chronic atrial fibrillation (CMS/[facility]) 11/8/2019   • Chronic diastolic CHF (congestive heart failure) (CMS/[facility]) 11/8/2019   • CKD (chronic kidney disease) stage 3, GFR 30-59 ml/min (CMS/HCC) 11/8/2019   • Coronary artery disease involving native coronary artery of native heart without angina pectoris 11/8/2019   • DDD (degenerative disc disease), lumbosacral 11/8/2019   • Essential hypertension 11/8/2019   • Gambling disorder, persistent 11/8/2019   • GERD (gastroesophageal reflux disease) 11/8/2019   • History of CVA (cerebrovascular accident) 11/8/2019   • Hyperlipidemia    • Hypertension    • Medically noncompliant 11/8/2019   • Peripheral polyneuropathy 11/8/2019   • Psoriasis 11/8/2019   • Stroke (CMS/HCC)      Past Surgical History:   Procedure Laterality Date   • CARDIAC CATHETERIZATION     • CARDIAC CATHETERIZATION N/A 12/10/2019    Procedure: Left Heart Cath and coronary angiogram;  Surgeon: Edy Thomas MD;  Location: [facility] CATH INVASIVE LOC[facility];  Service: Cardiovascular     General Information     Row Name 09/04/20 1422          PT Evaluation Time/Intention    Document Type  therapy note (daily note)  -SC     Mode of Treatment  individual therapy;physical therapy  -SC     Row Name 09/04/20 1422          General Information    Existing Precautions/Restrictions  fall  -SC     Row Name 09/04/20 1422          Cognitive Assessment/Intervention- PT/OT    Orientation Status (Cognition)  oriented x 3  -SC     Cognitive Assessment/Intervention Comment  pt very agreeable  -SC       User Key  (r) = Recorded By, (t) = Taken By, (c) = Cosigned By    Initials Name Provider Type    SC iLzet Scott PTA Physical Therapy Assistant        Mobility     Row Name 09/04/20 1431          Bed Mobility Assessment/Treatment    Bed Mobility Assessment/Treatment  supine-sit  -SC     Supine-Sit [place] (Bed Mobility)  minimum assist (75% patient effort)  -SC     Assistive Device (Bed Mobility)  bed rails   -SC     Row Name 09/04/20 1431          Transfer Assessment/Treatment    Comment (Transfers)  cues to push up from seated surface  -SC     Row Name 09/04/20 1431          Bed-Chair Transfer    Bed-Chair [place] (Transfers)  contact guard  -SC     Assistive Device (Bed-Chair Transfers)  walker, front-wheeled  -SC     Row Name 09/04/20 1431          Sit-Stand Transfer    Sit-Stand [place] (Transfers)  contact guard  -SC     Assistive Device (Sit-Stand Transfers)  walker, front-wheeled  -SC     Row Name 09/04/20 1431          Gait/Stairs Assessment/Training    Gait/Stairs Assessment/Training  gait/ambulation independence;gait/ambulation assistive device  -SC     [place] Level (Gait)  minimum assist (75% patient effort)  -SC     Assistive Device (Gait)  walker, front-wheeled  -SC     Distance in Feet (Gait)  15' to bathroom.  then another 45' before fatigue and needing to sit to rest  -SC     Pattern (Gait)  step-through  -SC     Deviations/Abnormal Patterns (Gait)  stride length decreased;ricky decreased  -SC     Bilateral Gait Deviations  forward flexed posture  -SC     Comment (Gait/Stairs)  poor foot clearance,  pt has difficulty with balance challenges like turns and backing up to seated surface  -SC       User Key  (r) = Recorded By, (t) = Taken By, (c) = Cosigned By    Initials Name Provider Type    SC Lizet Scott PTA Physical Therapy Assistant        Obj/Interventions     [facility] Name 09/04/20 1435          Static Sitting Balance    Level of [place] (Unsupported Sitting, Static Balance)  supervision  -SC     Sitting Position (Unsupported Sitting, Static Balance)  sitting on edge of bed  -[facility] Name 09/04/20 1435          Dynamic Sitting Balance    Level of [place], Reaches Outside Midline (Sitting, Dynamic Balance)  contact guard assist  -SC     Sitting Position, Reaches Outside Midline (Sitting, Dynamic Balance)  sitting on edge of bed  -[facility] Name 09/04/20 1435           Static Standing Balance    Level of [place] (Supported Standing, Static Balance)  contact guard assist  -SC     Assistive Device Utilized (Supported Standing, Static Balance)  walker, rolling  -[facility] Name 09/04/20 1435          Dynamic Standing Balance    Level of [place], Reaches Outside Midline (Standing, Dynamic Balance)  minimal assist, 75% patient effort  -SC     Assistive Device Utilized (Supported Standing, Dynamic Balance)  walker, rolling  -SC       User Key  (r) = Recorded By, (t) = Taken By, (c) = Cosigned By    Initials Name Provider Type    Lizet Alexander PTA Physical Therapy Assistant        Goals/Plan     Row Name 09/04/20 1439          Bed Mobility Goal 1 (PT)    Progress/Outcomes (Bed Mobility Goal 1, PT)  goal ongoing  -SC     Row Name 09/04/20 1439          Transfer Goal 1 (PT)    Progress/Outcome (Transfer Goal 1, PT)  goal ongoing  -SC     Row Name 09/04/20 1439          Gait Training Goal 1 (PT)    Progress/Outcome (Gait Training Goal 1, PT)  goal ongoing  -SC       User Key  (r) = Recorded By, (t) = Taken By, (c) = Cosigned By    Initials Name Provider Type    Lizet Alexander PTA Physical Therapy Assistant        Clinical Impression     Row Name 09/04/20 1439          Pain Scale: Numbers Pre/Post-Treatment    Pain Scale: Numbers, Pretreatment  0/10 - no pain  -SC     Pain Scale: Numbers, Post-Treatment  0/10 - no pain  -SC     Pre/Post Treatment Pain Comment  no pain reported  -SC     Row Name 09/04/20 1439          Physical Therapy Clinical Impression    Criteria for Skilled Interventions Met (PT Clinical Impression)  treatment indicated  -SC     Rehab Potential (PT Clinical Summary)  good, to achieve stated therapy goals  -SC     Row Name 09/04/20 1439          Vital Signs    O2 Delivery Pre Treatment  room air  -SC     O2 Delivery Intra Treatment  room air  -SC     O2 Delivery Post Treatment  room air  -[facility] 09/04/20 1439          Positioning and  Restraints    Pre-Treatment Position  in bed  -SC     Post Treatment Position  chair  -SC     In Chair  notified nsg;sitting;call light within reach;exit alarm on  -SC       User Key  (r) = Recorded By, (t) = Taken By, (c) = Cosigned By    Initials Name Provider Type    Lizet Alexander PTA Physical Therapy Assistant        Outcome Measures     Row Name 09/04/20 1440          How much help from another person do you currently need...    Turning from your back to your side while in flat bed without using bedrails?  3  -SC     Moving from lying on back to sitting on the side of a flat bed without bedrails?  3  -SC     Moving to and from a bed to a chair (including a wheelchair)?  3  -SC     Standing up from a chair using your arms (e.g., wheelchair, bedside chair)?  3  -SC     Climbing 3-5 steps with a railing?  2  -SC     To walk in hospital room?  3  -SC     AM-PAC 6 Clicks Score (PT)  17  -[facility] Name 09/04/20 1440          Modified [place] Scale    Modified [place] Scale  4 - Moderately severe disability.  Unable to walk without assistance, and unable to attend to own bodily needs without assistance.  -SC     Row Name 09/04/20 1440          Functional Assessment    Outcome Measure Options  AM-PAC 6 Clicks Basic Mobility (PT)  -SC       User Key  (r) = Recorded By, (t) = Taken By, (c) = Cosigned By    Initials Name Provider Type    Lizet Alexander PTA Physical Therapy Assistant        Physical Therapy Education                 Title: PT OT SLP Therapies (In Progress)     Topic: Physical Therapy (Done)     Point: Mobility training (Done)     Description:   Instruct learner(s) on safety and technique for assisting patient out of bed, chair or wheelchair.  Instruct in the proper use of assistive devices, such as walker, crutches, cane or brace.              Patient Friendly Description:   It's important to get you on your feet again, but we need to do so in a way that is safe for you. Falling has serious  consequences, and your personal safety is the most important thing of all.        When it's time to get out of bed, one of us or a family member will sit next to you on the bed to give you support.     If your doctor or nurse tells you to use a walker, crutches, a cane, or a brace, be sure you use it every time you get out of bed, even if you think you don't need it.    Learning Progress Summary           Patient Acceptance, E, VU by SC at 9/4/2020 1440    Acceptance, E, VU by SC at 9/3/2020 1837    Acceptance, E, NR by  at 9/2/2020 1200                   Point: Precautions (Done)     Description:   Instruct learner(s) on prescribed precautions during mobility and gait tasks              Learning Progress Summary           Patient Acceptance, E, VU by SC at 9/4/2020 1440    Acceptance, E, VU by SC at 9/3/2020 1837    Acceptance, E, NR by  at 9/2/2020 1200                               User Key     Initials Effective Dates Name Provider Type Discipline     04/17/20 -  Pauline Barrett, PT Physical Therapist PT    SC 03/01/19 -  Lizet Scott PTA Physical Therapy Assistant PT              PT Recommendation and Plan     Outcome Summary/Treatment Plan (PT)  Anticipated Discharge Disposition (PT): inpatient rehabilitation facility  Plan of Care Reviewed With: patient  Progress: improving  Outcome Summary: pt making slow but consistance progress with his mobility.  pt needs cga for transfering out of the bed.  pt's gait still unsteady amb with roll walker.  still with weakness and poor foot clearance during gait cycle.  pt is a fall risk.  pt is motivated to cont to work on his mobility.  Recommend IP rehab at d/c to allow pt to recover his mobility to allow for a safe return home.  PPE used:  mask, safety glasses and gloves     Time Calculation:   PT Charges     Row Name 09/04/20 8098             Time Calculation    Start Time  0835  -SC      Stop Time  0914  -SC      Time Calculation (min)  39 min  -SC       PT Received On  09/04/20  -SC      PT - Next Appointment  09/06/20  -SC         Time Calculation- PT    Total Timed Code Minutes- PT  39 minute(s)  -SC         Timed Charges    06318 - Gait Training Minutes   12  -SC      51823 - PT Therapeutic Activity Minutes  15  -SC      88215 - PT Self Care/Mgmt Minutes  12  -SC        User Key  (r) = Recorded By, (t) = Taken By, (c) = Cosigned By    Initials Name Provider Type    SC Lizet Scott, PTA Physical Therapy Assistant        Therapy Charges for Today     Code Description Service Date Service Provider Modifiers Qty    40823970468 HC GAIT TRAINING EA 15 MIN 9/3/2020 Lizet Scott, PTA GP 1    37586877308 HC PT THERAPEUTIC ACT EA 15 MIN 9/3/2020 Lizet Scott, PTA GP 1    51180816029 HC GAIT TRAINING EA 15 MIN 9/4/2020 Lizet Scott, PTA GP 1    12567321312 HC PT THERAPEUTIC ACT EA 15 MIN 9/4/2020 Lizet Scott, PTA GP 1    92062441548 HC PT SELF CARE/MGMT/TRAIN EA 15 MIN 9/4/2020 Lizet Scott, PTA GP 1          PT G-Codes  Outcome Measure Options: AM-PAC 6 Clicks Basic Mobility (PT)  AM-PAC 6 Clicks Score (PT): 17  Modified [place] Scale: 4 - Moderately severe disability.  Unable to walk without assistance, and unable to attend to own bodily needs without assistance.    Lizet Scott PTA  9/4/2020

## 2020-09-04 NOTE — PLAN OF CARE
Problem: Patient Care Overview  Goal: Plan of Care Review  Outcome: Ongoing (interventions implemented as appropriate)  Flowsheets (Taken 9/4/2020 1444)  Progress: improving  Plan of Care Reviewed With: patient  Outcome Summary: pt making slow but consistance progress with his mobility.  pt needs cga for transfering out of the bed.  pt's gait still unsteady amb with roll walker.  still with weakness and poor foot clearance during gait cycle.  pt is a fall risk.  pt is motivated to cont to work on his mobility.  Recommend IP rehab at d/c to allow pt to recover his mobility to allow for a safe return home.  PPE used:  mask, safety glasses and gloves

## 2020-09-04 NOTE — PLAN OF CARE
Problem: Patient Care Overview  Goal: Plan of Care Review  Outcome: Ongoing (interventions implemented as appropriate)  Flowsheets (Taken 9/4/2020 3852)  Progress: no change  Plan of Care Reviewed With: patient  Outcome Summary: Patient remains on cardizem gtt during shift with a controlled HR.  Patient is intermittently disorientated, but can be easily reorientated.  He is very forgetful.  All vitals are stable.  Patient not requiring any 02 to maintain sats.  All needs met, RN will continue to monitor.

## 2020-09-05 LAB
ALBUMIN SERPL-MCNC: 3.9 G/DL (ref 3.5–5.2)
ALBUMIN/GLOB SERPL: 1.1 G/DL
ALP SERPL-CCNC: 65 U/L (ref 39–117)
ALT SERPL W P-5'-P-CCNC: 18 U/L (ref 1–41)
ANION GAP SERPL CALCULATED.3IONS-SCNC: 14 MMOL/L (ref 5–15)
AST SERPL-CCNC: 15 U/L (ref 1–40)
BILIRUB SERPL-MCNC: 0.6 MG/DL (ref 0–1.2)
BUN SERPL-MCNC: 26 MG/DL (ref 8–23)
BUN SERPL-MCNC: ABNORMAL MG/DL
BUN/CREAT SERPL: ABNORMAL
CA-I SERPL ISE-MCNC: 1.24 MMOL/L (ref 1.2–1.3)
CALCIUM SPEC-SCNC: 9.6 MG/DL (ref 8.6–10.5)
CHLORIDE SERPL-SCNC: 98 MMOL/L (ref 98–107)
CO2 SERPL-SCNC: 26 MMOL/L (ref 22–29)
CREAT SERPL-MCNC: 1.36 MG/DL (ref 0.76–1.27)
DEPRECATED RDW RBC AUTO: 55.6 FL (ref 37–54)
ERYTHROCYTE [DISTWIDTH] IN BLOOD BY AUTOMATED COUNT: 19.4 % (ref 12.3–15.4)
GFR SERPL CREATININE-BSD FRML MDRD: 53 ML/MIN/1.73
GLOBULIN UR ELPH-MCNC: 3.5 GM/DL
GLUCOSE SERPL-MCNC: 114 MG/DL (ref 65–99)
HCT VFR BLD AUTO: 44.9 % (ref 37.5–51)
HGB BLD-MCNC: 14.8 G/DL (ref 13–17.7)
MAGNESIUM SERPL-MCNC: 2.5 MG/DL (ref 1.6–2.4)
MCH RBC QN AUTO: 27.3 PG (ref 26.6–33)
MCHC RBC AUTO-ENTMCNC: 32.9 G/DL (ref 31.5–35.7)
MCV RBC AUTO: 83 FL (ref 79–97)
PHOSPHATE SERPL-MCNC: 4.3 MG/DL (ref 2.5–4.5)
PLATELET # BLD AUTO: 223 10*3/MM3 (ref 140–450)
PMV BLD AUTO: 9 FL (ref 6–12)
POTASSIUM SERPL-SCNC: 4.1 MMOL/L (ref 3.5–5.2)
PROT SERPL-MCNC: 7.4 G/DL (ref 6–8.5)
RBC # BLD AUTO: 5.41 10*6/MM3 (ref 4.14–5.8)
SODIUM SERPL-SCNC: 138 MMOL/L (ref 136–145)
WBC # BLD AUTO: 11.7 10*3/MM3 (ref 3.4–10.8)

## 2020-09-05 PROCEDURE — 85027 COMPLETE CBC AUTOMATED: CPT | Performed by: INTERNAL MEDICINE

## 2020-09-05 PROCEDURE — 84100 ASSAY OF PHOSPHORUS: CPT | Performed by: INTERNAL MEDICINE

## 2020-09-05 PROCEDURE — 94799 UNLISTED PULMONARY SVC/PX: CPT

## 2020-09-05 PROCEDURE — 83735 ASSAY OF MAGNESIUM: CPT | Performed by: NURSE PRACTITIONER

## 2020-09-05 PROCEDURE — 80053 COMPREHEN METABOLIC PANEL: CPT | Performed by: INTERNAL MEDICINE

## 2020-09-05 PROCEDURE — 82330 ASSAY OF CALCIUM: CPT | Performed by: INTERNAL MEDICINE

## 2020-09-05 PROCEDURE — 99232 SBSQ HOSP IP/OBS MODERATE 35: CPT | Performed by: INTERNAL MEDICINE

## 2020-09-05 RX ORDER — BUMETANIDE 1 MG/1
1 TABLET ORAL DAILY
Status: DISCONTINUED | OUTPATIENT
Start: 2020-09-05 | End: 2020-09-12 | Stop reason: HOSPADM

## 2020-09-05 RX ORDER — SPIRONOLACTONE 25 MG/1
12.5 TABLET ORAL DAILY
Status: DISCONTINUED | OUTPATIENT
Start: 2020-09-05 | End: 2020-09-12 | Stop reason: HOSPADM

## 2020-09-05 RX ORDER — RISPERIDONE 0.25 MG/1
0.25 TABLET ORAL DAILY
Status: DISCONTINUED | OUTPATIENT
Start: 2020-09-06 | End: 2020-09-09

## 2020-09-05 RX ADMIN — IPRATROPIUM BROMIDE AND ALBUTEROL SULFATE 3 ML: 2.5; .5 SOLUTION RESPIRATORY (INHALATION) at 06:37

## 2020-09-05 RX ADMIN — LOSARTAN POTASSIUM 50 MG: 50 TABLET, FILM COATED ORAL at 09:08

## 2020-09-05 RX ADMIN — ALLOPURINOL 100 MG: 100 TABLET ORAL at 09:08

## 2020-09-05 RX ADMIN — CLOTRIMAZOLE AND BETAMETHASONE DIPROPIONATE 1 APPLICATION: 10; .64 CREAM TOPICAL at 21:25

## 2020-09-05 RX ADMIN — HYDRALAZINE HYDROCHLORIDE 100 MG: 25 TABLET, FILM COATED ORAL at 09:08

## 2020-09-05 RX ADMIN — CLOTRIMAZOLE AND BETAMETHASONE DIPROPIONATE: 10; .64 CREAM TOPICAL at 09:10

## 2020-09-05 RX ADMIN — ISOSORBIDE MONONITRATE 30 MG: 20 TABLET ORAL at 18:01

## 2020-09-05 RX ADMIN — SPIRONOLACTONE 12.5 MG: 25 TABLET, FILM COATED ORAL at 09:08

## 2020-09-05 RX ADMIN — Medication 10 ML: at 21:25

## 2020-09-05 RX ADMIN — DILTIAZEM HYDROCHLORIDE 180 MG: 180 CAPSULE, COATED, EXTENDED RELEASE ORAL at 09:08

## 2020-09-05 RX ADMIN — HYDRALAZINE HYDROCHLORIDE 100 MG: 25 TABLET, FILM COATED ORAL at 16:06

## 2020-09-05 RX ADMIN — METOPROLOL TARTRATE 50 MG: 50 TABLET, FILM COATED ORAL at 21:24

## 2020-09-05 RX ADMIN — ATORVASTATIN CALCIUM 10 MG: 10 TABLET, FILM COATED ORAL at 21:25

## 2020-09-05 RX ADMIN — BUMETANIDE 1 MG: 1 TABLET ORAL at 09:08

## 2020-09-05 RX ADMIN — GABAPENTIN 100 MG: 100 CAPSULE ORAL at 21:25

## 2020-09-05 RX ADMIN — RISPERIDONE 0.25 MG: 0.25 TABLET ORAL at 09:08

## 2020-09-05 RX ADMIN — Medication 10 ML: at 09:08

## 2020-09-05 RX ADMIN — RIVAROXABAN 20 MG: 20 TABLET, FILM COATED ORAL at 18:01

## 2020-09-05 RX ADMIN — METOPROLOL TARTRATE 50 MG: 50 TABLET, FILM COATED ORAL at 09:08

## 2020-09-05 RX ADMIN — ASPIRIN 81 MG: 81 TABLET, COATED ORAL at 09:08

## 2020-09-05 RX ADMIN — ISOSORBIDE MONONITRATE 30 MG: 20 TABLET ORAL at 09:08

## 2020-09-05 RX ADMIN — HYDRALAZINE HYDROCHLORIDE 100 MG: 25 TABLET, FILM COATED ORAL at 21:24

## 2020-09-05 NOTE — PLAN OF CARE
Problem: Patient Care Overview  Goal: Plan of Care Review  Outcome: Ongoing (interventions implemented as appropriate)  Flowsheets (Taken 9/5/2020 0215)  Progress: no change  Plan of Care Reviewed With: patient  Note:   Patient's vitals have been stable during shift.  He still exhibits some intermittent confusion.  All needs met.  Plans for patient to d/c to [facility] started on 9/4/20 per case management note.  RN will continue to monitor.

## 2020-09-05 NOTE — PROGRESS NOTES
LOS: 2 days   Patient Care Team:  Marisol Larson APRN as PCP - General  Shwetha Barber MD as Consulting Physician (Nephrology)  Edy Thomas MD as Consulting Physician (Cardiology)    Chief Complaint:   Still in atrial fibrillation and on Cardizem drip, denies any chest pain today.    Subjective :   Appears chronically ill, markedly older than stated age    Interval History:     Patient Complaints:  None this morning, very drowsy  Patient Denies:   No chest pain, no palpitations, no shortness of breath. No edema or cough.   History taken from: patient    Review of Systems:    All systems were reviewed and negative except for:   See  interval history  Objective   Pt resting in chair  BP improved  Vital Signs  Temp:  [97.8 °F (36.6 °C)-99 °F (37.2 °C)] 98.3 °F (36.8 °C)  Heart Rate:  [76-98] 83  Resp:  [12-25] 16  BP: (110-163)/() 110/62    Physical Exam:     General Appearance:    Drowsy, in no acute distress   Head:    Normocephalic, without obvious abnormality, atraumatic   Eyes:               Throat:      Neck:   No adenopathy, supple, trachea midline   Lungs:     Clear to auscultation,respirations regular, even and                  Unlabored, distant but adequate adventitial flow    Heart:     Distant, irregularly irregular and tachycardic outpatient speaking to me.   Chest Wall:    No abnormalities observed   Abdomen:     Normal bowel sounds, no masses, no organomegaly, soft        non-tender, non-distended, no guarding, no rebound                tenderness   Rectal:     Deferred   Extremities:   Moves all extremities , no edema, no cyanosis, no             redness   Pulses:   Pulses palpable and equal bilaterally   Skin:   No bleeding, bruising or rash   Lymph nodes:      Neurologic:      Radiology:  Ct Head Without Contrast    Result Date: 9/2/2020  1. Stable exam, with no acute process demonstrated. 2. Stable moderate chronic white matter disease and left occipital encephalomalacia.   Electronically Signed By-Nehal Way On:9/2/2020 8:17 PM This report was finalized on [id number] by  Nehal Way, .    Xr Chest 1 View    Result Date: 9/1/2020  1.Left basilar consolidation appears unchanged, which could reflect atelectasis or pneumonia. 2.Cardiomegaly with possible small left pleural effusion.  Electronically Signed By-DR. Salty Joseph MD On:9/1/2020 3:35 PM This report was finalized on [id number] by DR. Salty Joseph MD.    Us Renal Bilateral    Result Date: 9/2/2020  Unremarkable sonographic appearance of the kidneys.  Electronically Signed By-Teri Persaud On:9/2/2020 11:30 AM This report was finalized on [id number] by  Teri Persaud, .         Results Review:     I reviewed the patient's new clinical results.  I reviewed the patient's new imaging results and agree with the interpretation.    Medication Review:   Scheduled Meds:    allopurinol 100 mg Oral Daily   aspirin 81 mg Oral Daily   atorvastatin 10 mg Oral Nightly   bumetanide 1 mg Oral Daily   clotrimazole-betamethasone  Topical Q12H   dilTIAZem  mg Oral Q24H   gabapentin 100 mg Oral Nightly   hydrALAZINE 100 mg Oral TID   ipratropium-albuterol 3 mL Nebulization 4x Daily - RT   isosorbide mononitrate 30 mg Oral BID - Nitrates   losartan 50 mg Oral Q24H   metoprolol tartrate 50 mg Oral Q12H   risperiDONE 0.25 mg Oral BID   rivaroxaban 20 mg Oral Daily With Dinner   sodium chloride 10 mL Intravenous Q12H   spironolactone 12.5 mg Oral Daily     Continuous Infusions:    niCARdipine 5-15 mg/hr Last Rate: Stopped (09/02/20 0019)     PRN Meds:.•  acetaminophen  •  albuterol  •  hydrALAZINE  •  magnesium hydroxide  •  sodium chloride  •  sodium chloride    Labs:  Lab Results (last 24 hours)     Procedure Component Value Units Date/Time    BUN [[id number]]  (Abnormal) Collected:  09/05/20 0228    Specimen:  Blood Updated:  09/05/20 0715     BUN 26 mg/dL     Calcium, Ionized [[id number]]  (Normal) Collected:  09/05/20 0228     Specimen:  Blood Updated:  09/05/20 0443     Ionized Calcium 1.24 mmol/L     Comprehensive Metabolic Panel [[id number]]  (Abnormal) Collected:  09/05/20 0228    Specimen:  Blood Updated:  09/05/20 0442     Glucose 114 mg/dL      BUN --     Comment: Testing performed by alternate method        Creatinine 1.36 mg/dL      Sodium 138 mmol/L      Potassium 4.1 mmol/L      Comment: Slight hemolysis detected by analyzer. Results may be affected.        Chloride 98 mmol/L      CO2 26.0 mmol/L      Calcium 9.6 mg/dL      Total Protein 7.4 g/dL      Albumin 3.90 g/dL      ALT (SGPT) 18 U/L      AST (SGOT) 15 U/L      Alkaline Phosphatase 65 U/L      Total Bilirubin 0.6 mg/dL      eGFR Non African Amer 53 mL/min/1.73      Globulin 3.5 gm/dL      A/G Ratio 1.1 g/dL      BUN/Creatinine Ratio --     Comment: Testing not performed        Anion Gap 14.0 mmol/L     Narrative:       GFR Normal >60  Chronic Kidney Disease <60  Kidney Failure <15      Phosphorus [[id number]]  (Normal) Collected:  09/05/20 0228    Specimen:  Blood Updated:  09/05/20 0442     Phosphorus 4.3 mg/dL     Magnesium [[id number]]  (Abnormal) Collected:  09/05/20 0228    Specimen:  Blood Updated:  09/05/20 0442     Magnesium 2.5 mg/dL     CBC (No Diff) [[id number]]  (Abnormal) Collected:  09/05/20 0228    Specimen:  Blood Updated:  09/05/20 0405     WBC 11.70 10*3/mm3      RBC 5.41 10*6/mm3      Hemoglobin 14.8 g/dL      Hematocrit 44.9 %      MCV 83.0 fL      MCH 27.3 pg      MCHC 32.9 g/dL      RDW 19.4 %      RDW-SD 55.6 fl      MPV 9.0 fL      Platelets 223 10*3/mm3     Blood Culture - Blood, Hand, Right [[id number]] Collected:  09/01/20 1431    Specimen:  Blood from Hand, Right Updated:  09/04/20 1500     Blood Culture No growth at 3 days           Assessment/Plan       Atrial fibrillation with rapid ventricular response (CMS/HCC)    Chronic atrial fibrillation (CMS/HCC)    Chronic diastolic CHF (congestive heart failure) (CMS/HCC)    CKD (chronic kidney  disease) stage 3, GFR 30-59 ml/min (CMS/[facility])    Gambling disorder, persistent    Coronary artery disease involving native coronary artery of native heart without angina pectoris    Psoriasis    History of CVA (cerebrovascular accident)    DDD (degenerative disc disease), lumbosacral    Peripheral polyneuropathy    GERD (gastroesophageal reflux disease)    Medically noncompliant    Mild cognitive impairment    Uncontrolled hypertension    Dyspnea    Dermatitis associated with moisture  Single blood culture positive for staph coag negative most likely contaminant.    Cardiology input greatly appreciated,  Nephrology input greatly appreciated as well, thank you. Still on cardene gtt, BP improved.  Continue rest of current therapy.   Monitor electrolytes replaced p.r.n.. Labs already ordered for the morning.    Of note, lactic acid, pro calcitonin markers for sepsis/pneumonic insult are negative at this juncture.  Suspect the single blood culture with coag negative staph is actually skin contaminant.  Patient does not appear to be septic.      Marisol Larson, CLARISSA  09/05/20  10:56

## 2020-09-05 NOTE — PROGRESS NOTES
"NEPHROLOGY PROGRESS NOTE------[facility]    [facility]  [phone]  Sterling Barber MD      Patient Care Team:  Marisol Larson APRN as PCP - General  Shwetha Barber MD as Consulting Physician (Nephrology)  Edy Thomas MD as Consulting Physician (Cardiology)      Provider:  Sterling Barber MD  Patient Name: Carlos Whipple  :  1955    SUBJECTIVE:    F/U CKD    No SOB, CP, palpitations, cramping. No dysuria    Medication:    allopurinol 100 mg Oral Daily   aspirin 81 mg Oral Daily   atorvastatin 10 mg Oral Nightly   bumetanide 1 mg Oral BID   clotrimazole-betamethasone  Topical Q12H   dilTIAZem  mg Oral Q24H   gabapentin 100 mg Oral Nightly   hydrALAZINE 100 mg Oral TID   ipratropium-albuterol 3 mL Nebulization 4x Daily - RT   isosorbide mononitrate 30 mg Oral BID - Nitrates   losartan 50 mg Oral Q24H   metoprolol tartrate 50 mg Oral Q12H   risperiDONE 0.25 mg Oral BID   rivaroxaban 20 mg Oral Daily With Dinner   sodium chloride 10 mL Intravenous Q12H   spironolactone 25 mg Oral Daily       niCARdipine 5-15 mg/hr Last Rate: Stopped (20 0019)       OBJECTIVE    Vital Sign Min/Max for last 24 hours  Temp  Min: 97.8 °F (36.6 °C)  Max: 99 °F (37.2 °C)   BP  Min: 126/70  Max: 163/106   Pulse  Min: 75  Max: 104   Resp  Min: 12  Max: 25   SpO2  Min: 91 %  Max: 96 %   No data recorded   Weight  Min: 110 kg (242 lb 15.2 oz)  Max: 110 kg (242 lb 15.2 oz)     Flowsheet Rows      First Filed Value   Admission Height  182.9 cm (72\") Documented at 2020 1338   Admission Weight  114 kg (252 lb) Documented at 2020 1338          No intake/output data recorded.  I/O last 3 completed shifts:  In: 1560 [P.O.:1560]  Out: 2800 [Urine:2800]    Physical Exam:  General Appearance: alert, appears stated age and cooperative  Head: normocephalic, without obvious abnormality and atraumatic  Eyes: conjunctivae and sclerae normal and no " icterus  Neck: supple and no JVD  Lungs: clear to auscultation and respirations regular  Heart: IRREG IRREG +AFUA. NO GALLOP, RUB, OR S3 +AFUA  Chest: Wall no abnormalities observed  Abdomen: normal bowel sounds and soft non-tender  Extremities: moves extremities well, no edema, no cyanosis and no redness  Skin: no bleeding, bruising or rash, turgor normal, color normal and no lesions noted  Neurologic: Alert, and oriented. No focal deficits    Labs:    WBC WBC   Date Value Ref Range Status   09/05/2020 11.70 (H) 3.40 - 10.80 10*3/mm3 Final   09/04/2020 10.80 3.40 - 10.80 10*3/mm3 Final   09/03/2020 10.60 3.40 - 10.80 10*3/mm3 Final      HGB Hemoglobin   Date Value Ref Range Status   09/05/2020 14.8 13.0 - 17.7 g/dL Final   09/04/2020 14.4 13.0 - 17.7 g/dL Final   09/03/2020 13.2 13.0 - 17.7 g/dL Final      HCT Hematocrit   Date Value Ref Range Status   09/05/2020 44.9 37.5 - 51.0 % Final   09/04/2020 44.4 37.5 - 51.0 % Final   09/03/2020 40.4 37.5 - 51.0 % Final      Platlets No results found for: LABPLAT   MCV MCV   Date Value Ref Range Status   09/05/2020 83.0 79.0 - 97.0 fL Final   09/04/2020 83.2 79.0 - 97.0 fL Final   09/03/2020 83.5 79.0 - 97.0 fL Final          Sodium Sodium   Date Value Ref Range Status   09/05/2020 138 136 - 145 mmol/L Final   09/04/2020 140 136 - 145 mmol/L Final   09/03/2020 140 136 - 145 mmol/L Final      Potassium Potassium   Date Value Ref Range Status   09/05/2020 4.1 3.5 - 5.2 mmol/L Final     Comment:     Slight hemolysis detected by analyzer. Results may be affected.   09/04/2020 3.7 3.5 - 5.2 mmol/L Final   09/03/2020 3.6 3.5 - 5.2 mmol/L Final      Chloride Chloride   Date Value Ref Range Status   09/05/2020 98 98 - 107 mmol/L Final   09/04/2020 102 98 - 107 mmol/L Final   09/03/2020 105 98 - 107 mmol/L Final      CO2 CO2   Date Value Ref Range Status   09/05/2020 26.0 22.0 - 29.0 mmol/L Final   09/04/2020 26.0 22.0 - 29.0 mmol/L Final   09/03/2020 25.0 22.0 - 29.0 mmol/L Final       BUN BUN   Date Value Ref Range Status   09/05/2020   Final     Comment:     Testing performed by alternate method   09/05/2020 26 (H) 8 - 23 mg/dL Final   09/04/2020   Final     Comment:     Testing performed by alternate method   09/04/2020 22 8 - 23 mg/dL Final   09/03/2020   Final     Comment:     Testing performed by alternate method   09/03/2020 24 (H) 8 - 23 mg/dL Final      Creatinine Creatinine   Date Value Ref Range Status   09/05/2020 1.36 (H) 0.76 - 1.27 mg/dL Final   09/04/2020 1.17 0.76 - 1.27 mg/dL Final   09/03/2020 1.18 0.76 - 1.27 mg/dL Final      Calcium Calcium   Date Value Ref Range Status   09/05/2020 9.6 8.6 - 10.5 mg/dL Final   09/04/2020 9.4 8.6 - 10.5 mg/dL Final   09/03/2020 8.7 8.6 - 10.5 mg/dL Final      PO4 No components found for: PO4   Albumin Albumin   Date Value Ref Range Status   09/05/2020 3.90 3.50 - 5.20 g/dL Final   09/04/2020 3.70 3.50 - 5.20 g/dL Final   09/03/2020 3.50 3.50 - 5.20 g/dL Final      Magnesium Magnesium   Date Value Ref Range Status   09/05/2020 2.5 (H) 1.6 - 2.4 mg/dL Final   09/04/2020 2.2 1.6 - 2.4 mg/dL Final   09/03/2020 2.1 1.6 - 2.4 mg/dL Final      Uric Acid No components found for: URIC ACID     Imaging Results (Last 72 Hours)     Procedure Component Value Units Date/Time    CT Head Without Contrast [[id number]] Collected:  09/02/20 2014     Updated:  09/02/20 2019    Narrative:       CT HEAD WO CONTRAST-     Date of Exam: 9/2/2020 7:47 PM     Indication: Confusion/delirium, altered LOC, unexplained;  I48.91-Unspecified atrial fibrillation; I10-Essential (primary)  hypertension; R06.00-Dyspnea, unspecified; N18.3-Chronic kidney disease,  stage 3 (moderate).     Comparison: 12/11/2019     Technique:  Without contrast, contiguous axial CT images of the head  were obtained from skull base to vertex.  Coronal and sagittal  reconstructions were performed.  Automated exposure control and  iterative reconstruction methods were used.     FINDINGS  There is no  intracranial hemorrhage. There is moderate decreased density  in the white matter. There is a large area of encephalomalacia in the  left occipital lobe. There are old thalamic and basal ganglia lacunar  infarcts. There is no new abnormality identified in the brain. There is  no mass effect. No skull abnormality is noted. No sinus fluid.       Impression:       1. Stable exam, with no acute process demonstrated.  2. Stable moderate chronic white matter disease and left occipital  encephalomalacia.     Electronically Signed By-Nehal Way On:9/2/2020 8:17 PM  This report was finalized on [id number] by  Nehal Way, .    US Renal Bilateral [[id number]] Collected:  09/02/20 1127     Updated:  09/02/20 1132    Narrative:       DATE OF EXAM:  9/2/2020 11:06 AM     PROCEDURE:  US RENAL BILATERAL-     INDICATIONS:  CRF/CKD; I48.91-Unspecified atrial fibrillation; I10-Essential (primary)  hypertension; R06.00-Dyspnea, unspecified; N18.3-Chronic kidney disease,  stage 3 (moderate)     COMPARISON:  Renal ultrasound 04/10/2018, CT abdomen pelvis without contrast  10/09/2018.     TECHNIQUE:   Grayscale and color Doppler ultrasound evaluation of the kidneys and  urinary bladder was performed.     FINDINGS:  The right kidney measures 11.3 x 4.0 x 5.0 cm. The left kidney is more  difficult to visualize due to overlying bowel gas, but measures  approximately 11.0 x 4.6 x 4.6 cm. Renal cortical echogenicity appears  within normal limits. No renal mass or hydronephrosis is seen.     Urinary bladder is not well-distended. Estimated bladder volume is 25  mL.       Impression:       Unremarkable sonographic appearance of the kidneys.     Electronically Signed By-Teri Persaud On:9/2/2020 11:30 AM  This report was finalized on [id number] by  Teri Persaud, .          Results for orders placed during the hospital encounter of 09/01/20   XR Chest 1 View    Narrative XR CHEST 1 VW-     Date of Exam: 9/1/2020 3:10 PM     Indication:  soa.     Comparison Exams: 08/21/2020     Technique: Single AP chest radiograph     FINDINGS:  A left basilar consolidation appears unchanged. There may be an  associated small left pleural effusion. The heart is enlarged. The  pulmonary vasculature appears normal. The osseous structures appear  intact.       Impression 1.Left basilar consolidation appears unchanged, which could reflect  atelectasis or pneumonia.  2.Cardiomegaly with possible small left pleural effusion.     Electronically Signed By-DR. Salty Joseph MD On:9/1/2020 3:35 PM  This report was finalized on [id number] by DR. Salty Joseph MD.       Results for orders placed during the hospital encounter of 12/06/19   Duplex Carotid Ultrasound CAR    Narrative · Proximal right internal carotid artery is normal.  · Mid right internal carotid artery is normal.  · Proximal left internal carotid artery is normal.  · Mid left internal carotid artery is normal.            ASSESSMENT / PLAN      Atrial fibrillation with rapid ventricular response (CMS/HCC)    Chronic atrial fibrillation (CMS/HCC)    Chronic diastolic CHF (congestive heart failure) (CMS/HCC)    CKD (chronic kidney disease) stage 3, GFR 30-59 ml/min (CMS/HCC)    Gambling disorder, persistent    Coronary artery disease involving native coronary artery of native heart without angina pectoris    Psoriasis    History of CVA (cerebrovascular accident)    DDD (degenerative disc disease), lumbosacral    Peripheral polyneuropathy    GERD (gastroesophageal reflux disease)    Medically noncompliant    Mild cognitive impairment    Uncontrolled hypertension    Dyspnea    Dermatitis associated with moisture    1. CRF/CKD STG 2------Nonoliguric. BUN/Cr up. Back down diuretics today. Appears to have CRF/CKD STG 2 with current serum creatinine at baseline. Unknown if patient has baseline proteinuria or hematuria. In the long run we may have to accept a higher baseline serum creatinine in order to keep  euvolemic. Follow with cautious diuresis. CRF/CKD is likely secondary to HTN NS.   Dose meds for CrCl 60 cc/min     2. ACIDOSIS------Resolved     3. CHRONIC DIASTOLIC CHF/ELEVATED BNP------Cautious diuresis. Back down diuretics a little today and follow     4. HTN WITH CKD------BP okay. No ACE/ARB/DRI for now     5. OA/DJD------No NSAIDs.      6. ATRIAL FIBRILLATION WITH RVR------per Cardiology. Dig level okay     7. HYPERLIPIDEMIA------Statin. CK, TSH okay    8. HYPERURICEMIA------Allopurinol     Okay from RENAL standpoint to d/c      Sterling Barber MD  [facility]  [phone]  09/05/20  08:31

## 2020-09-05 NOTE — PLAN OF CARE
Patient remains in afib with controlled rate. He does have short term memory deficits. Vitals stable today, no changes. Awaiting precert for rehab. Continue to monitor.

## 2020-09-05 NOTE — PROGRESS NOTES
Cardiology Progress Note      Admiting Physician:  Dre Stauffer MD   LOS: 2 days       Reason For Followup:  Atrial fibrillation with rapid ventricular response  Heart failure with preserved ejection fraction  Coronary artery disease    Subjective:    Interval History:  Seen and examined.  Chart and labs reviewed.  Patient denies any chest pain pressure heaviness or tightness.  He denies any shortness of breath.  He denies any PND orthopnea.  He denies any syncope or near syncope.  He does report some difficult ambulation.  He is asking for a walker or a cane.    Review of Systems:  A complete review of systems was undertaken with the pertinent cardiovascular findings listed in history of present illness and all other systems being negative.     Assessment & Plan    Impressions:  Atrial fibrillation with rapid ventricular response  Heart failure with preserved ejection fraction  Chronic kidney disease  Coronary artery disease  History of CVA  Abnormal blood culture-likely skin contaminant  Mild baseline cognitive impairment  Gambling disorder    Recommendations:  Supportive cardiovascular care at the present time.  Monitor rhythm and rate.      Objective:    Medication Review:   Scheduled Meds:  allopurinol 100 mg Oral Daily   aspirin 81 mg Oral Daily   atorvastatin 10 mg Oral Nightly   bumetanide 1 mg Oral Daily   clotrimazole-betamethasone  Topical Q12H   dilTIAZem  mg Oral Q24H   gabapentin 100 mg Oral Nightly   hydrALAZINE 100 mg Oral TID   ipratropium-albuterol 3 mL Nebulization 4x Daily - RT   isosorbide mononitrate 30 mg Oral BID - Nitrates   losartan 50 mg Oral Q24H   metoprolol tartrate 50 mg Oral Q12H   risperiDONE 0.25 mg Oral BID   rivaroxaban 20 mg Oral Daily With Dinner   sodium chloride 10 mL Intravenous Q12H   spironolactone 12.5 mg Oral Daily     Continuous Infusions:  niCARdipine 5-15 mg/hr Last Rate: Stopped (09/02/20 0019)     PRN Meds:.•  acetaminophen  •  albuterol  •  hydrALAZINE  •   magnesium hydroxide  •  sodium chloride  •  sodium chloride    Patient Active Problem List   Diagnosis   • Hypertensive emergency   • Chronic atrial fibrillation (CMS/HCC)   • Chronic diastolic CHF (congestive heart failure) (CMS/[facility])   • CKD (chronic kidney disease) stage 3, GFR 30-59 ml/min (CMS/[facility])   • Essential hypertension   • Gambling disorder, persistent   • Shortness of breath   • Coronary artery disease involving native coronary artery of native heart without angina pectoris   • Psoriasis   • History of CVA (cerebrovascular accident)   • DDD (degenerative disc disease), lumbosacral   • Peripheral polyneuropathy   • GERD (gastroesophageal reflux disease)   • Medically noncompliant   • Acute respiratory distress   • Unstable angina (CMS/[facility])   • Cervical disc disorder with radiculopathy   • Completed stroke (CMS/[facility])   • Degeneration of intervertebral disc   • Excessive anticoagulation   • Impaired left ventricular function   • Liver lesion   • Lumbar radiculopathy   • Cervical myelopathy (CMS/[facility])   • Lumbosacral radiculopathy   • Spinal stenosis of lumbar region   • Obesity   • Mild cognitive impairment   • Thoracic back pain   • Thoracic disc disease with myelopathy   • Paroxysmal atrial fibrillation (CMS/[facility])   • Uncontrolled hypertension   • Acute congestive heart failure (CMS/[facility])   • Dyspnea   • Atrial fibrillation with RVR (CMS/[facility])   • Atrial fibrillation with rapid ventricular response (CMS/[facility])   • Dermatitis associated with moisture         Physical Exam:    General: Alert, cooperative, no distress, appears stated age  Head:  Normocephalic, atraumatic, mucous membranes moist  Eyes:  Conjunctiva/corneas clear, EOM's intact     Neck:  Supple,  no bruit  Lungs: Coarse and diminished bilaterally.  Chest wall: No tenderness  Heart::  Regular rate and rhythm, S1 and S2 normal, 1/6 holosystolic murmur.  No rub or gallop  Abdomen: Soft, non-tender, nondistended bowel sounds active  Extremities: No  "cyanosis, clubbing, or edema  Pulses: 2+ and symmetric all extremities  Skin:  No rashes or lesions  Neuro/psych: A&O x3. CN II through XII are grossly intact with appropriate affect    Vital Signs:  Vitals:    09/05/20 0328 09/05/20 0614 09/05/20 0637 09/05/20 0641   BP: 126/70 156/78     BP Location:  Right arm     Patient Position:  Lying     Pulse: 79 79 84    Resp:  13 14 14   Temp:  97.8 °F (36.6 °C)     TempSrc:  Oral     SpO2:  94% 96%    Weight:  110 kg (242 lb 15.2 oz)     Height:  182.9 cm (72.01\")       Wt Readings from Last 1 Encounters:   09/05/20 110 kg (242 lb 15.2 oz)       Intake/Output Summary (Last 24 hours) at 9/5/2020 1031  Last data filed at 9/5/2020 0614  Gross per 24 hour   Intake 1080 ml   Output 1750 ml   Net -670 ml         Results Review:     CBC    Results from last 7 days   Lab Units 09/05/20  0228 09/04/20  0302 09/03/20  0352 09/02/20  0252 09/01/20  1431   WBC 10*3/mm3 11.70* 10.80 10.60 9.70 11.30*   HEMOGLOBIN g/dL 14.8 14.4 13.2 13.0 13.7   PLATELETS 10*3/mm3 223 192 175 164 195     Cr Clearance Estimated Creatinine Clearance: 71.2 mL/min (A) (by C-G formula based on SCr of 1.36 mg/dL (H)).  Coag     HbA1C   Lab Results   Component Value Date    HGBA1C 5.5 12/12/2019    HGBA1C 5.1 11/09/2019    HGBA1C 5.6 10/10/2018     Blood Glucose No results found for: POCGLU  Infection Results from last 7 days   Lab Units 09/02/20  0252 09/01/20  1431   BLOODCX   --  No growth at 3 days  Staphylococcus, coagulase negative*   BCIDPCR   --  Staphylococcus spp, not aureus. Identification by BCID PCR.*   PROCALCITONIN ng/mL 0.08  --      CMP Results from last 7 days   Lab Units 09/05/20  0228 09/04/20  0302 09/03/20  0352 09/02/20  0252 09/01/20  1431   SODIUM mmol/L 138 140 140 142 140   POTASSIUM mmol/L 4.1 3.7 3.6 3.6 4.0   CHLORIDE mmol/L 98 102 105 108* 108*   CO2 mmol/L 26.0 26.0 25.0 23.0 19.0*   BUN  26* 22 24* 21 23   CREATININE mg/dL 1.36* 1.17 1.18 1.18 1.35*   GLUCOSE mg/dL 114* 106* " 103* 128* 110*   ALBUMIN g/dL 3.90 3.70 3.50  --  4.00   BILIRUBIN mg/dL 0.6 0.8 0.7  --  1.0   ALK PHOS U/L 65 62 64  --  71   AST (SGOT) U/L 15 16 18  --  17   ALT (SGPT) U/L 18 14 16  --  20     ABG      UA  Results from last 7 days   Lab Units 09/02/20  0928   NITRITE UA  Negative   WBC UA /HPF 0-2*   BACTERIA UA /HPF None Seen   SQUAM EPITHEL UA /HPF 0-2     BHARATI  No results found for: POCMETH, POCAMPHET, POCBARBITUR, POCBENZO, POCCOCAINE, POCOPIATES, POCOXYCODO, POCPHENCYC, POCPROPOXY, POCTHC, POCTRICYC  Lysis Labs Results from last 7 days   Lab Units 09/05/20  0228 09/04/20  0302 09/03/20  0352 09/02/20  0252 09/01/20  1431   HEMOGLOBIN g/dL 14.8 14.4 13.2 13.0 13.7   PLATELETS 10*3/mm3 223 192 175 164 195   CREATININE mg/dL 1.36* 1.17 1.18 1.18 1.35*     Radiology(recent) No radiology results for the last day      Results from last 7 days   Lab Units 09/02/20  1033 09/02/20  0252   CK TOTAL U/L  --  33   TROPONIN T ng/mL <0.010 <0.010       Imaging Results (Last 24 Hours)     ** No results found for the last 24 hours. **          Cardiac Studies:  Echo-   Results for orders placed during the hospital encounter of 11/07/19   Adult Transthoracic Echo Complete W/ Cont if Necessary Per Protocol    Narrative · The left ventricular cavity is severely dilated.  · Left ventricular systolic function is moderately decreased.  · Left atrial cavity size is moderately dilated.  · Moderate mitral valve regurgitation is present  · Moderate tricuspid valve regurgitation is present.  · The following left ventricular wall segments are hypokinetic: mid   anterior, apical anterior, basal anterolateral, mid anterolateral, apical   lateral, basal inferolateral, mid inferolateral, apical inferior, mid   inferior, apical septal, basal inferoseptal, mid inferoseptal, apex   hypokinetic, mid anteroseptal, basal anterior, basal inferior and basal   inferoseptal.  · LV ejection fraction is about 30 to 35%  · No pericardial effusion  noted        Stress Myoview-  Cath-        Michael Castañeda,   09/05/20  10:31

## 2020-09-06 LAB
ANION GAP SERPL CALCULATED.3IONS-SCNC: 13 MMOL/L (ref 5–15)
BACTERIA SPEC AEROBE CULT: NORMAL
BASOPHILS # BLD AUTO: 0.1 10*3/MM3 (ref 0–0.2)
BASOPHILS NFR BLD AUTO: 0.6 % (ref 0–1.5)
BUN SERPL-MCNC: 29 MG/DL (ref 8–23)
BUN SERPL-MCNC: ABNORMAL MG/DL
BUN/CREAT SERPL: ABNORMAL
CALCIUM SPEC-SCNC: 9.1 MG/DL (ref 8.6–10.5)
CHLORIDE SERPL-SCNC: 102 MMOL/L (ref 98–107)
CO2 SERPL-SCNC: 23 MMOL/L (ref 22–29)
CREAT SERPL-MCNC: 1.21 MG/DL (ref 0.76–1.27)
DEPRECATED RDW RBC AUTO: 55.6 FL (ref 37–54)
EOSINOPHIL # BLD AUTO: 0.2 10*3/MM3 (ref 0–0.4)
EOSINOPHIL NFR BLD AUTO: 2.7 % (ref 0.3–6.2)
ERYTHROCYTE [DISTWIDTH] IN BLOOD BY AUTOMATED COUNT: 19.5 % (ref 12.3–15.4)
GFR SERPL CREATININE-BSD FRML MDRD: 61 ML/MIN/1.73
GLUCOSE SERPL-MCNC: 105 MG/DL (ref 65–99)
HCT VFR BLD AUTO: 43.7 % (ref 37.5–51)
HGB BLD-MCNC: 14.6 G/DL (ref 13–17.7)
LYMPHOCYTES # BLD AUTO: 1.3 10*3/MM3 (ref 0.7–3.1)
LYMPHOCYTES NFR BLD AUTO: 14.3 % (ref 19.6–45.3)
MAGNESIUM SERPL-MCNC: 2.5 MG/DL (ref 1.6–2.4)
MCH RBC QN AUTO: 27.4 PG (ref 26.6–33)
MCHC RBC AUTO-ENTMCNC: 33.5 G/DL (ref 31.5–35.7)
MCV RBC AUTO: 81.7 FL (ref 79–97)
MONOCYTES # BLD AUTO: 1.3 10*3/MM3 (ref 0.1–0.9)
MONOCYTES NFR BLD AUTO: 14.3 % (ref 5–12)
NEUTROPHILS NFR BLD AUTO: 6 10*3/MM3 (ref 1.7–7)
NEUTROPHILS NFR BLD AUTO: 68.1 % (ref 42.7–76)
NRBC BLD AUTO-RTO: 0.1 /100 WBC (ref 0–0.2)
PLATELET # BLD AUTO: 182 10*3/MM3 (ref 140–450)
PMV BLD AUTO: 8.8 FL (ref 6–12)
POTASSIUM SERPL-SCNC: 4.2 MMOL/L (ref 3.5–5.2)
RBC # BLD AUTO: 5.34 10*6/MM3 (ref 4.14–5.8)
SODIUM SERPL-SCNC: 138 MMOL/L (ref 136–145)
WBC # BLD AUTO: 8.9 10*3/MM3 (ref 3.4–10.8)

## 2020-09-06 PROCEDURE — 80048 BASIC METABOLIC PNL TOTAL CA: CPT | Performed by: NURSE PRACTITIONER

## 2020-09-06 PROCEDURE — 99232 SBSQ HOSP IP/OBS MODERATE 35: CPT | Performed by: INTERNAL MEDICINE

## 2020-09-06 PROCEDURE — 97116 GAIT TRAINING THERAPY: CPT

## 2020-09-06 PROCEDURE — 83735 ASSAY OF MAGNESIUM: CPT | Performed by: NURSE PRACTITIONER

## 2020-09-06 PROCEDURE — 85025 COMPLETE CBC W/AUTO DIFF WBC: CPT | Performed by: NURSE PRACTITIONER

## 2020-09-06 PROCEDURE — 94799 UNLISTED PULMONARY SVC/PX: CPT

## 2020-09-06 PROCEDURE — 97110 THERAPEUTIC EXERCISES: CPT

## 2020-09-06 RX ADMIN — RIVAROXABAN 20 MG: 20 TABLET, FILM COATED ORAL at 17:12

## 2020-09-06 RX ADMIN — RISPERIDONE 0.25 MG: 0.25 TABLET ORAL at 09:12

## 2020-09-06 RX ADMIN — Medication 10 ML: at 20:33

## 2020-09-06 RX ADMIN — BUMETANIDE 1 MG: 1 TABLET ORAL at 09:12

## 2020-09-06 RX ADMIN — HYDRALAZINE HYDROCHLORIDE 100 MG: 25 TABLET, FILM COATED ORAL at 16:51

## 2020-09-06 RX ADMIN — METOPROLOL TARTRATE 50 MG: 50 TABLET, FILM COATED ORAL at 09:11

## 2020-09-06 RX ADMIN — GABAPENTIN 100 MG: 100 CAPSULE ORAL at 20:33

## 2020-09-06 RX ADMIN — IPRATROPIUM BROMIDE AND ALBUTEROL SULFATE 3 ML: 2.5; .5 SOLUTION RESPIRATORY (INHALATION) at 18:37

## 2020-09-06 RX ADMIN — CLOTRIMAZOLE AND BETAMETHASONE DIPROPIONATE: 10; .64 CREAM TOPICAL at 09:14

## 2020-09-06 RX ADMIN — METOPROLOL TARTRATE 50 MG: 50 TABLET, FILM COATED ORAL at 20:33

## 2020-09-06 RX ADMIN — SPIRONOLACTONE 12.5 MG: 25 TABLET, FILM COATED ORAL at 09:11

## 2020-09-06 RX ADMIN — Medication 10 ML: at 09:12

## 2020-09-06 RX ADMIN — HYDRALAZINE HYDROCHLORIDE 100 MG: 25 TABLET, FILM COATED ORAL at 20:32

## 2020-09-06 RX ADMIN — ISOSORBIDE MONONITRATE 30 MG: 20 TABLET ORAL at 17:12

## 2020-09-06 RX ADMIN — DILTIAZEM HYDROCHLORIDE 180 MG: 180 CAPSULE, COATED, EXTENDED RELEASE ORAL at 09:12

## 2020-09-06 RX ADMIN — ATORVASTATIN CALCIUM 10 MG: 10 TABLET, FILM COATED ORAL at 20:33

## 2020-09-06 RX ADMIN — ISOSORBIDE MONONITRATE 30 MG: 20 TABLET ORAL at 09:10

## 2020-09-06 RX ADMIN — HYDRALAZINE HYDROCHLORIDE 100 MG: 25 TABLET, FILM COATED ORAL at 09:11

## 2020-09-06 RX ADMIN — CLOTRIMAZOLE AND BETAMETHASONE DIPROPIONATE: 10; .64 CREAM TOPICAL at 20:33

## 2020-09-06 RX ADMIN — ALLOPURINOL 100 MG: 100 TABLET ORAL at 09:12

## 2020-09-06 RX ADMIN — LOSARTAN POTASSIUM 50 MG: 50 TABLET, FILM COATED ORAL at 09:12

## 2020-09-06 RX ADMIN — ASPIRIN 81 MG: 81 TABLET, COATED ORAL at 09:11

## 2020-09-06 NOTE — PLAN OF CARE
Problem: Patient Care Overview  Goal: Plan of Care Review  Outcome: Ongoing (interventions implemented as appropriate)  Flowsheets (Taken 9/6/2020 1600)  Progress: improving  Plan of Care Reviewed With: patient  Outcome Summary: pt more alert today and presents with better balance when changing directions. Amb using rwx 80'x2 with seated rest. Would benefit from rehab to improve overall mobility and endurance. Gloves,mask/goggles worn for tx

## 2020-09-06 NOTE — THERAPY TREATMENT NOTE
Patient Name: Carlos Whipple  : 1955    MRN: [id number]                              Today's Date: 2020       Admit Date: 2020    Visit Dx:     ICD-10-CM ICD-9-CM   1. Atrial fibrillation with rapid ventricular response (CMS/HCC) I48.91 427.31   2. Uncontrolled hypertension I10 401.9   3. Dyspnea, unspecified type R06.00 786.09   4. CKD (chronic kidney disease) stage 3, GFR 30-59 ml/min (CMS/HCC) N18.3 585.3     Patient Active Problem List   Diagnosis   • Hypertensive emergency   • Chronic atrial fibrillation (CMS/HCC)   • Chronic diastolic CHF (congestive heart failure) (CMS/HCC)   • CKD (chronic kidney disease) stage 3, GFR 30-59 ml/min (CMS/HCC)   • Essential hypertension   • Gambling disorder, persistent   • Shortness of breath   • Coronary artery disease involving native coronary artery of native heart without angina pectoris   • Psoriasis   • History of CVA (cerebrovascular accident)   • DDD (degenerative disc disease), lumbosacral   • Peripheral polyneuropathy   • GERD (gastroesophageal reflux disease)   • Medically noncompliant   • Acute respiratory distress   • Unstable angina (CMS/HCC)   • Cervical disc disorder with radiculopathy   • Completed stroke (CMS/HCC)   • Degeneration of intervertebral disc   • Excessive anticoagulation   • Impaired left ventricular function   • Liver lesion   • Lumbar radiculopathy   • Cervical myelopathy (CMS/HCC)   • Lumbosacral radiculopathy   • Spinal stenosis of lumbar region   • Obesity   • Mild cognitive impairment   • Thoracic back pain   • Thoracic disc disease with myelopathy   • Paroxysmal atrial fibrillation (CMS/HCC)   • Uncontrolled hypertension   • Acute congestive heart failure (CMS/HCC)   • Dyspnea   • Atrial fibrillation with RVR (CMS/HCC)   • Atrial fibrillation with rapid ventricular response (CMS/HCC)   • Dermatitis associated with moisture     Past Medical History:   Diagnosis Date   • A-fib (CMS/HCC)    • CHF (congestive heart failure)  (CMS/HCC)    • Chronic atrial fibrillation (CMS/[facility]) 11/8/2019   • Chronic diastolic CHF (congestive heart failure) (CMS/HCC) 11/8/2019   • CKD (chronic kidney disease) stage 3, GFR 30-59 ml/min (CMS/HCC) 11/8/2019   • Coronary artery disease involving native coronary artery of native heart without angina pectoris 11/8/2019   • DDD (degenerative disc disease), lumbosacral 11/8/2019   • Essential hypertension 11/8/2019   • Gambling disorder, persistent 11/8/2019   • GERD (gastroesophageal reflux disease) 11/8/2019   • History of CVA (cerebrovascular accident) 11/8/2019   • Hyperlipidemia    • Hypertension    • Medically noncompliant 11/8/2019   • Peripheral polyneuropathy 11/8/2019   • Psoriasis 11/8/2019   • Stroke (CMS/HCC)      Past Surgical History:   Procedure Laterality Date   • CARDIAC CATHETERIZATION     • CARDIAC CATHETERIZATION N/A 12/10/2019    Procedure: Left Heart Cath and coronary angiogram;  Surgeon: Edy Thomas MD;  Location: [facility] CATH INVASIVE LOCATION;  Service: Cardiovascular     General Information     Row Name 09/06/20 1555          PT Evaluation Time/Intention    Document Type  therapy note (daily note)  -     Mode of Treatment  physical therapy  -     Row Name 09/06/20 1552          General Information    Patient Profile Reviewed?  yes  -     Existing Precautions/Restrictions  fall  -     Row Name 09/06/20 1551          Safety Issues, Functional Mobility    Impairments Affecting Function (Mobility)  balance;strength;endurance/activity tolerance  -       User Key  (r) = Recorded By, (t) = Taken By, (c) = Cosigned By    Initials Name Provider Type     Marilee Casey PTA Physical Therapy Assistant        Mobility     Row Name 09/06/20 1559          Bed Mobility Assessment/Treatment    Supine-Sit [place] (Bed Mobility)  conditional independence  -     Assistive Device (Bed Mobility)  bed rails  -     Row Name 09/06/20 1553          Sit-Stand Transfer    Sit-Stand  [place] (Transfers)  verbal cues;contact guard  -     Assistive Device (Sit-Stand Transfers)  walker, front-wheeled  -     Row Name 09/06/20 1558          Gait/Stairs Assessment/Training    [place] Level (Gait)  verbal cues;contact guard  -     Assistive Device (Gait)  walker, front-wheeled  -     Distance in Feet (Gait)  80'x2 with seated rest  -     Comment (Gait/Stairs)  lat sway, slowed ricky, sl guarded, did better with turns and backing up to sit.  -       User Key  (r) = Recorded By, (t) = Taken By, (c) = Cosigned By    Initials Name Provider Type     Marilee Casey PTA Physical Therapy Assistant        Obj/Interventions     Row Name 09/06/20 1559          Therapeutic Exercise    Comment (Therapeutic Exercise)  seated LE exs x 10 with vc's to incr end ranges  -[facility] Name 09/06/20 1559          Static Sitting Balance    Level of [place] (Unsupported Sitting, Static Balance)  conditional independence  -     Sitting Position (Unsupported Sitting, Static Balance)  sitting on edge of bed  -     Row Name 09/06/20 1559          Static Standing Balance    Level of [place] (Supported Standing, Static Balance)  contact guard assist  -     Assistive Device Utilized (Supported Standing, Static Balance)  walker, rolling  -       User Key  (r) = Recorded By, (t) = Taken By, (c) = Cosigned By    Initials Name Provider Type     Marilee Casey PTA Physical Therapy Assistant        Goals/Plan     [facility] Name 09/06/20 1602          Bed Mobility Goal 1 (PT)    Progress/Outcomes (Bed Mobility Goal 1, PT)  goal met  -     Row Name 09/06/20 1602          Transfer Goal 1 (PT)    Progress/Outcome (Transfer Goal 1, PT)  goal ongoing;good progress toward goal  -[facility] Name 09/06/20 1602          Gait Training Goal 1 (PT)    Progress/Outcome (Gait Training Goal 1, PT)  goal ongoing;continuing progress toward goal  -[facility] Name 09/06/20 1602          Patient Education Goal  (PT)    Progress/Outcome (Patient Education Goal, PT)  goal ongoing  -       User Key  (r) = Recorded By, (t) = Taken By, (c) = Cosigned By    Initials Name Provider Type    Marilee Somers PTA Physical Therapy Assistant        Clinical Impression     Row Name 09/06/20 1600          Pain Scale: Numbers Pre/Post-Treatment    Pain Scale: Numbers, Pretreatment  0/10 - no pain  -[facility] Name 09/06/20 1600          Plan of Care Review    Plan of Care Reviewed With  patient  -     Progress  improving  -     Outcome Summary  pt more alert today and presents with better balance when changing directions. Amb using rwx 80'x2 with seated rest. Would benefit from rehab to improve overall mobility and endurance. Gloves,mask/goggles worn for tx  -     Row Name 09/06/20 1600          Physical Therapy Clinical Impression    Criteria for Skilled Interventions Met (PT Clinical Impression)  treatment indicated  -     Rehab Potential (PT Clinical Summary)  good, to achieve stated therapy goals  -[facility] Name 09/06/20 1600          Vital Signs    Pretreatment Heart Rate (beats/min)  59  -     Intratreatment Heart Rate (beats/min)  90  -[facility] 09/06/20 1600          Positioning and Restraints    Pre-Treatment Position  in bed  -     Post Treatment Position  chair  -     In Chair  sitting;exit alarm on;call light within reach  -       User Key  (r) = Recorded By, (t) = Taken By, (c) = Cosigned By    Initials Name Provider Type    Marilee Somers PTA Physical Therapy Assistant        Outcome Measures     Row Name 09/06/20 1603          How much help from another person do you currently need...    Turning from your back to your side while in flat bed without using bedrails?  4  -MC     Moving from lying on back to sitting on the side of a flat bed without bedrails?  4  -MC     Moving to and from a bed to a chair (including a wheelchair)?  3  -MC     Standing up from a chair using your arms (e.g.,  wheelchair, bedside chair)?  3  -MC     Climbing 3-5 steps with a railing?  2  -MC     To walk in hospital room?  3  -MC     AM-PAC 6 Clicks Score (PT)  19  -       User Key  (r) = Recorded By, (t) = Taken By, (c) = Cosigned By    Initials Name Provider Type     Marilee Casey, ROBBY Physical Therapy Assistant        Physical Therapy Education                 Title: PT OT SLP Therapies (Done)     Topic: Physical Therapy (Done)     Point: Mobility training (Done)     Description:   Instruct learner(s) on safety and technique for assisting patient out of bed, chair or wheelchair.  Instruct in the proper use of assistive devices, such as walker, crutches, cane or brace.              Patient Friendly Description:   It's important to get you on your feet again, but we need to do so in a way that is safe for you. Falling has serious consequences, and your personal safety is the most important thing of all.        When it's time to get out of bed, one of us or a family member will sit next to you on the bed to give you support.     If your doctor or nurse tells you to use a walker, crutches, a cane, or a brace, be sure you use it every time you get out of bed, even if you think you don't need it.    Learning Progress Summary           Patient Acceptance, E,TB, VU,NR by  at 9/6/2020 1603    Acceptance, E, VU by SC at 9/4/2020 1440    Acceptance, E, VU by SC at 9/3/2020 1837    Acceptance, E, NR by  at 9/2/2020 1200                   Point: Precautions (Done)     Description:   Instruct learner(s) on prescribed precautions during mobility and gait tasks              Learning Progress Summary           Patient Acceptance, E,TB, VU,NR by  at 9/6/2020 1603    Acceptance, E, VU by SC at 9/4/2020 1440    Acceptance, E, VU by SC at 9/3/2020 1837    Acceptance, E, NR by  at 9/2/2020 1200                               User Key     Initials Effective Dates Name Provider Type [facility] 04/17/20 -  Pauline Barrett  N, PT Physical Therapist PT    [facility] 03/01/19 -  Lizet Scott PTA Physical Therapy Assistant PT     03/01/19 -  Marilee Casey PTA Physical Therapy Assistant PT              PT Recommendation and Plan  Planned Therapy Interventions (PT Eval): balance training, gait training, strengthening, patient/family education, postural re-education, transfer training  Plan of Care Reviewed With: patient  Progress: improving  Outcome Summary: pt more alert today and presents with better balance when changing directions. Amb using rwx 80'x2 with seated rest. Would benefit from rehab to improve overall mobility and endurance. Gloves,mask/goggles worn for tx     Time Calculation:   PT Charges     Row Name 09/06/20 1604             Time Calculation    Start Time  1320  -      Stop Time  1345  -      Time Calculation (min)  25 min  -      PT Received On  09/06/20  -      PT - Next Appointment  09/08/20  -         Time Calculation- PT    Total Timed Code Minutes- PT  25 minute(s)  -        User Key  (r) = Recorded By, (t) = Taken By, (c) = Cosigned By    Initials Name Provider Type     Marilee Casey PTA Physical Therapy Assistant        Therapy Charges for Today     Code Description Service Date Service Provider Modifiers Qty    17965591537 HC GAIT TRAINING EA 15 MIN 9/6/2020 Marilee Casey PTA GP 1    23813428538 HC PT THER PROC EA 15 MIN 9/6/2020 Marilee Casey PTA GP 1          PT G-Codes  Outcome Measure Options: AM-PAC 6 Clicks Basic Mobility (PT)  AM-PAC 6 Clicks Score (PT): 19  Modified [place] Scale: 4 - Moderately severe disability.  Unable to walk without assistance, and unable to attend to own bodily needs without assistance.    Marilee Casey PTA  9/6/2020

## 2020-09-06 NOTE — PLAN OF CARE
Problem: Patient Care Overview  Goal: Plan of Care Review  9/6/2020 0340 by Carole Vogt LPN  Outcome: Ongoing (interventions implemented as appropriate)  9/6/2020 0336 by Carole Vogt LPN  Flowsheets (Taken 9/6/2020 0336)  Plan of Care Reviewed With: patient  Outcome Summary: Patient sleepy this shift and slept through the night without complaint.  Patient slightly confused at times.  Will conitnue to monitor.

## 2020-09-06 NOTE — PROGRESS NOTES
LOS: 3 days   Patient Care Team:  Marisol Larson APRN as PCP - General  Shwetha Barber MD as Consulting Physician (Nephrology)  Edy Thomas MD as Consulting Physician (Cardiology)    Chief Complaint:   Still in atrial fibrillation,rate controlled. Denies any chest pain or SOA today.    Subjective :   Appears chronically ill, markedly older than stated age    Interval History:     Patient Complaints:  None this morning  Patient Denies:   No chest pain, no palpitations, no shortness of breath. No edema or cough.   History taken from: patient    Review of Systems:    All systems were reviewed and negative except for:   See  interval history  Objective   Pt eating lunch, sitting on side of bed  BP improved  Vital Signs  Temp:  [97.3 °F (36.3 °C)-98.6 °F (37 °C)] 97.7 °F (36.5 °C)  Heart Rate:  [66-95] 85  Resp:  [13-19] 13  BP: (119-145)/(71-93) 133/82    Physical Exam:     General Appearance:    Alert and oriented, in no acute distress   Head:    Normocephalic, without obvious abnormality, atraumatic   Eyes:               Throat:      Neck:   No adenopathy, supple, trachea midline   Lungs:     Clear to auscultation,respirations regular, even and                  Unlabored, distant but adequate adventitial flow    Heart:     Distant, irregularly irregular.   Chest Wall:    No abnormalities observed   Abdomen:     Normal bowel sounds, no masses, no organomegaly, soft        non-tender, non-distended, no guarding, no rebound                tenderness   Rectal:     Deferred   Extremities:   Moves all extremities , no edema, no cyanosis, no             redness   Pulses:   Pulses palpable and equal bilaterally   Skin:   No bleeding, bruising or rash   Lymph nodes:      Neurologic:      Radiology:  Ct Head Without Contrast    Result Date: 9/2/2020  1. Stable exam, with no acute process demonstrated. 2. Stable moderate chronic white matter disease and left occipital encephalomalacia.  Electronically Signed  By-Nehal Way On:9/2/2020 8:17 PM This report was finalized on [id number] by  Nehal Way, .    Xr Chest 1 View    Result Date: 9/1/2020  1.Left basilar consolidation appears unchanged, which could reflect atelectasis or pneumonia. 2.Cardiomegaly with possible small left pleural effusion.  Electronically Signed By-DR. Salty Joseph MD On:9/1/2020 3:35 PM This report was finalized on [id number] by DR. Salty Joseph MD.    Us Renal Bilateral    Result Date: 9/2/2020  Unremarkable sonographic appearance of the kidneys.  Electronically Signed By-Teri Persaud On:9/2/2020 11:30 AM This report was finalized on [id number] by  Teri Persaud, .         Results Review:     I reviewed the patient's new clinical results.  I reviewed the patient's new imaging results and agree with the interpretation.    Medication Review:   Scheduled Meds:    allopurinol 100 mg Oral Daily   aspirin 81 mg Oral Daily   atorvastatin 10 mg Oral Nightly   bumetanide 1 mg Oral Daily   clotrimazole-betamethasone  Topical Q12H   dilTIAZem  mg Oral Q24H   gabapentin 100 mg Oral Nightly   hydrALAZINE 100 mg Oral TID   ipratropium-albuterol 3 mL Nebulization 4x Daily - RT   isosorbide mononitrate 30 mg Oral BID - Nitrates   losartan 50 mg Oral Q24H   metoprolol tartrate 50 mg Oral Q12H   risperiDONE 0.25 mg Oral Daily   rivaroxaban 20 mg Oral Daily With Dinner   sodium chloride 10 mL Intravenous Q12H   spironolactone 12.5 mg Oral Daily     Continuous Infusions:    niCARdipine 5-15 mg/hr Last Rate: Stopped (09/02/20 0019)     PRN Meds:.•  acetaminophen  •  albuterol  •  hydrALAZINE  •  magnesium hydroxide  •  sodium chloride  •  sodium chloride    Labs:  Lab Results (last 24 hours)     Procedure Component Value Units Date/Time    BUN [[id number]]  (Abnormal) Collected:  09/06/20 0442    Specimen:  Blood Updated:  09/06/20 0815     BUN 29 mg/dL     Magnesium [[id number]]  (Abnormal) Collected:  09/06/20 0442    Specimen:  Blood  Updated:  09/06/20 0546     Magnesium 2.5 mg/dL     Basic Metabolic Panel [[id number]]  (Abnormal) Collected:  09/06/20 0442    Specimen:  Blood Updated:  09/06/20 0546     Glucose 105 mg/dL      BUN --     Comment: Testing performed by alternate method        Creatinine 1.21 mg/dL      Sodium 138 mmol/L      Potassium 4.2 mmol/L      Chloride 102 mmol/L      CO2 23.0 mmol/L      Calcium 9.1 mg/dL      eGFR Non African Amer 61 mL/min/1.73      BUN/Creatinine Ratio --     Comment: Testing not performed        Anion Gap 13.0 mmol/L     Narrative:       GFR Normal >60  Chronic Kidney Disease <60  Kidney Failure <15      CBC & Differential [[id number]] Collected:  09/06/20 0442    Specimen:  Blood Updated:  09/06/20 0526    Narrative:       The following orders were created for panel order CBC & Differential.  Procedure                               Abnormality         Status                     ---------                               -----------         ------                     CBC Auto Differential[[id number]]        Abnormal            Final result                 Please view results for these tests on the individual orders.    CBC Auto Differential [[id number]]  (Abnormal) Collected:  09/06/20 0442    Specimen:  Blood Updated:  09/06/20 0526     WBC 8.90 10*3/mm3      RBC 5.34 10*6/mm3      Hemoglobin 14.6 g/dL      Hematocrit 43.7 %      MCV 81.7 fL      MCH 27.4 pg      MCHC 33.5 g/dL      RDW 19.5 %      RDW-SD 55.6 fl      MPV 8.8 fL      Platelets 182 10*3/mm3      Neutrophil % 68.1 %      Lymphocyte % 14.3 %      Monocyte % 14.3 %      Eosinophil % 2.7 %      Basophil % 0.6 %      Neutrophils, Absolute 6.00 10*3/mm3      Lymphocytes, Absolute 1.30 10*3/mm3      Monocytes, Absolute 1.30 10*3/mm3      Eosinophils, Absolute 0.20 10*3/mm3      Basophils, Absolute 0.10 10*3/mm3      nRBC 0.1 /100 WBC     Blood Culture - Blood, Hand, Right [[id number]] Collected:  09/01/20 1431    Specimen:  Blood from Hand, Right  Updated:  09/05/20 1500     Blood Culture No growth at 4 days           Assessment/Plan       Atrial fibrillation with rapid ventricular response (CMS/HCC)    Chronic atrial fibrillation (CMS/[facility])    Chronic diastolic CHF (congestive heart failure) (CMS/[facility])    CKD (chronic kidney disease) stage 3, GFR 30-59 ml/min (CMS/[facility])    Gambling disorder, persistent    Coronary artery disease involving native coronary artery of native heart without angina pectoris    Psoriasis    History of CVA (cerebrovascular accident)    DDD (degenerative disc disease), lumbosacral    Peripheral polyneuropathy    GERD (gastroesophageal reflux disease)    Medically noncompliant    Mild cognitive impairment    Uncontrolled hypertension    Dyspnea    Dermatitis associated with moisture  Single blood culture positive for staph coag negative most likely contaminant.    Cardiology input greatly appreciated,  Nephrology input greatly appreciated as well, thank you. HR and BP improved.  Continue current therapy.   Monitor electrolytes replace p.r.n.. Labs already ordered for the morning.    Of note, lactic acid, pro calcitonin markers for sepsis/pneumonic insult are negative at this juncture.  Suspect the single blood culture with coag negative staph is actually skin contaminant.  Patient does not appear to be septic.      CLARISSA Meraz  09/06/20  12:43

## 2020-09-06 NOTE — PROGRESS NOTES
Cardiology Progress Note      Admiting Physician:  Dre Stauffer MD   LOS: 3 days       Reason For Followup:  Atrial fibrillation with rapid ventricular response  Heart failure with preserved ejection fraction  Coronary artery disease    Subjective:    Interval History:  Seen and examined.  Chart and labs reviewed.  Patient denies any chest pain pressure heaviness or tightness.  He denies any shortness of breath.  He denies any PND orthopnea.  He reports no new complaints.  Nursing reports waiting on placement.    Review of Systems:  A complete review of systems was undertaken with the pertinent cardiovascular findings listed in history of present illness and all other systems being negative.     Assessment & Plan    Impressions:  Atrial fibrillation with rapid ventricular response  Heart failure with preserved ejection fraction  Chronic kidney disease  Coronary artery disease  History of CVA  Abnormal blood culture-likely skin contaminant  Mild baseline cognitive impairment  Gambling disorder    Recommendations:  Continuation of his current cardiovascular care at the present time.  Cardiac status appropriate for discharge to next destination of care when okay with other services.  We will see as needed.  Please call if needed.      Objective:    Medication Review:   Scheduled Meds:    allopurinol 100 mg Oral Daily   aspirin 81 mg Oral Daily   atorvastatin 10 mg Oral Nightly   bumetanide 1 mg Oral Daily   clotrimazole-betamethasone  Topical Q12H   dilTIAZem  mg Oral Q24H   gabapentin 100 mg Oral Nightly   hydrALAZINE 100 mg Oral TID   ipratropium-albuterol 3 mL Nebulization 4x Daily - RT   isosorbide mononitrate 30 mg Oral BID - Nitrates   losartan 50 mg Oral Q24H   metoprolol tartrate 50 mg Oral Q12H   risperiDONE 0.25 mg Oral Daily   rivaroxaban 20 mg Oral Daily With Dinner   sodium chloride 10 mL Intravenous Q12H   spironolactone 12.5 mg Oral Daily     Continuous Infusions:    niCARdipine 5-15 mg/hr Last  Rate: Stopped (09/02/20 0019)     PRN Meds:.•  acetaminophen  •  albuterol  •  hydrALAZINE  •  magnesium hydroxide  •  sodium chloride  •  sodium chloride    Patient Active Problem List   Diagnosis   • Hypertensive emergency   • Chronic atrial fibrillation (CMS/HCC)   • Chronic diastolic CHF (congestive heart failure) (CMS/HCC)   • CKD (chronic kidney disease) stage 3, GFR 30-59 ml/min (CMS/HCC)   • Essential hypertension   • Gambling disorder, persistent   • Shortness of breath   • Coronary artery disease involving native coronary artery of native heart without angina pectoris   • Psoriasis   • History of CVA (cerebrovascular accident)   • DDD (degenerative disc disease), lumbosacral   • Peripheral polyneuropathy   • GERD (gastroesophageal reflux disease)   • Medically noncompliant   • Acute respiratory distress   • Unstable angina (CMS/[facility])   • Cervical disc disorder with radiculopathy   • Completed stroke (CMS/[facility])   • Degeneration of intervertebral disc   • Excessive anticoagulation   • Impaired left ventricular function   • Liver lesion   • Lumbar radiculopathy   • Cervical myelopathy (CMS/[facility])   • Lumbosacral radiculopathy   • Spinal stenosis of lumbar region   • Obesity   • Mild cognitive impairment   • Thoracic back pain   • Thoracic disc disease with myelopathy   • Paroxysmal atrial fibrillation (CMS/HCC)   • Uncontrolled hypertension   • Acute congestive heart failure (CMS/[facility])   • Dyspnea   • Atrial fibrillation with RVR (CMS/HCC)   • Atrial fibrillation with rapid ventricular response (CMS/[facility])   • Dermatitis associated with moisture         Physical Exam:    General: Alert, cooperative, no distress, appears stated age  Head:  Normocephalic, atraumatic, mucous membranes moist  Eyes:  Conjunctiva/corneas clear, EOM's intact     Neck:  Supple,  no bruit  Lungs: Coarse and diminished bilaterally.  Chest wall: No tenderness  Heart::  Regular rate and rhythm, S1 and S2 normal, 1/6 holosystolic murmur.  No rub  or gallop  Abdomen: Soft, non-tender, nondistended bowel sounds active  Extremities: No cyanosis, clubbing, or edema  Pulses: 2+ and symmetric all extremities  Skin:  No rashes or lesions  Neuro/psych: A&O x3. CN II through XII are grossly intact with appropriate affect    Vital Signs:  Vitals:    09/05/20 1924 09/05/20 2227 09/06/20 0256 09/06/20 0620   BP:  145/92 131/71 135/93   BP Location:  Right arm Right arm Right arm   Patient Position:  Lying Lying Lying   Pulse: 79 77 66 73   Resp:  18 18 18   Temp:  98 °F (36.7 °C) 98.3 °F (36.8 °C) 98.2 °F (36.8 °C)   TempSrc:  Oral Oral Oral   SpO2:  95% 97% 94%   Weight:    109 kg (240 lb 8.4 oz)   Height:         Wt Readings from Last 1 Encounters:   09/06/20 109 kg (240 lb 8.4 oz)       Intake/Output Summary (Last 24 hours) at 9/6/2020 0957  Last data filed at 9/6/2020 0820  Gross per 24 hour   Intake 2668 ml   Output 550 ml   Net 2118 ml         Results Review:     CBC    Results from last 7 days   Lab Units 09/06/20  0442 09/05/20  0228 09/04/20  0302 09/03/20  0352 09/02/20  0252 09/01/20  1431   WBC 10*3/mm3 8.90 11.70* 10.80 10.60 9.70 11.30*   HEMOGLOBIN g/dL 14.6 14.8 14.4 13.2 13.0 13.7   PLATELETS 10*3/mm3 182 223 192 175 164 195     Cr Clearance Estimated Creatinine Clearance: 79.7 mL/min (by C-G formula based on SCr of 1.21 mg/dL).  Coag     HbA1C   Lab Results   Component Value Date    HGBA1C 5.5 12/12/2019    HGBA1C 5.1 11/09/2019    HGBA1C 5.6 10/10/2018     Blood Glucose No results found for: POCGLU  Infection   Results from last 7 days   Lab Units 09/02/20  0252 09/01/20  1431   BLOODCX   --  No growth at 4 days  Staphylococcus, coagulase negative*   BCIDPCR   --  Staphylococcus spp, not aureus. Identification by BCID PCR.*   PROCALCITONIN ng/mL 0.08  --      CMP   Results from last 7 days   Lab Units 09/06/20  0442 09/05/20  0228 09/04/20  0302 09/03/20  0352 09/02/20  0252 09/01/20  1431   SODIUM mmol/L 138 138 140 140 142 140   POTASSIUM mmol/L  4.2 4.1 3.7 3.6 3.6 4.0   CHLORIDE mmol/L 102 98 102 105 108* 108*   CO2 mmol/L 23.0 26.0 26.0 25.0 23.0 19.0*   BUN  29* 26* 22 24* 21 23   CREATININE mg/dL 1.21 1.36* 1.17 1.18 1.18 1.35*   GLUCOSE mg/dL 105* 114* 106* 103* 128* 110*   ALBUMIN g/dL  --  3.90 3.70 3.50  --  4.00   BILIRUBIN mg/dL  --  0.6 0.8 0.7  --  1.0   ALK PHOS U/L  --  65 62 64  --  71   AST (SGOT) U/L  --  15 16 18  --  17   ALT (SGPT) U/L  --  18 14 16  --  20     ABG      UA    Results from last 7 days   Lab Units 09/02/20  0928   NITRITE UA  Negative   WBC UA /HPF 0-2*   BACTERIA UA /HPF None Seen   SQUAM EPITHEL UA /HPF 0-2     BHARATI  No results found for: POCMETH, POCAMPHET, POCBARBITUR, POCBENZO, POCCOCAINE, POCOPIATES, POCOXYCODO, POCPHENCYC, POCPROPOXY, POCTHC, POCTRICYC  Lysis Labs   Results from last 7 days   Lab Units 09/06/20  0442 09/05/20  0228 09/04/20  0302 09/03/20  0352 09/02/20  0252 09/01/20  1431   HEMOGLOBIN g/dL 14.6 14.8 14.4 13.2 13.0 13.7   PLATELETS 10*3/mm3 182 223 192 175 164 195   CREATININE mg/dL 1.21 1.36* 1.17 1.18 1.18 1.35*     Radiology(recent) No radiology results for the last day      Results from last 7 days   Lab Units 09/02/20  1033 09/02/20  0252   CK TOTAL U/L  --  33   TROPONIN T ng/mL <0.010 <0.010       Imaging Results (Last 24 Hours)     ** No results found for the last 24 hours. **          Cardiac Studies:  Echo-   Results for orders placed during the hospital encounter of 11/07/19   Adult Transthoracic Echo Complete W/ Cont if Necessary Per Protocol    Narrative · The left ventricular cavity is severely dilated.  · Left ventricular systolic function is moderately decreased.  · Left atrial cavity size is moderately dilated.  · Moderate mitral valve regurgitation is present  · Moderate tricuspid valve regurgitation is present.  · The following left ventricular wall segments are hypokinetic: mid   anterior, apical anterior, basal anterolateral, mid anterolateral, apical   lateral, basal  inferolateral, mid inferolateral, apical inferior, mid   inferior, apical septal, basal inferoseptal, mid inferoseptal, apex   hypokinetic, mid anteroseptal, basal anterior, basal inferior and basal   inferoseptal.  · LV ejection fraction is about 30 to 35%  · No pericardial effusion noted        Stress Myoview-  Cath-        Michael Castañeda DO  09/06/20  09:57

## 2020-09-07 LAB
ALBUMIN SERPL-MCNC: 3.8 G/DL (ref 3.5–5.2)
ALBUMIN/GLOB SERPL: 1.1 G/DL
ALP SERPL-CCNC: 69 U/L (ref 39–117)
ALT SERPL W P-5'-P-CCNC: 26 U/L (ref 1–41)
ANION GAP SERPL CALCULATED.3IONS-SCNC: 10 MMOL/L (ref 5–15)
AST SERPL-CCNC: 30 U/L (ref 1–40)
BILIRUB SERPL-MCNC: 0.6 MG/DL (ref 0–1.2)
BUN SERPL-MCNC: 29 MG/DL (ref 8–23)
BUN SERPL-MCNC: ABNORMAL MG/DL
BUN/CREAT SERPL: ABNORMAL
CA-I SERPL ISE-MCNC: 1.25 MMOL/L (ref 1.2–1.3)
CALCIUM SPEC-SCNC: 9.3 MG/DL (ref 8.6–10.5)
CHLORIDE SERPL-SCNC: 101 MMOL/L (ref 98–107)
CO2 SERPL-SCNC: 27 MMOL/L (ref 22–29)
CREAT SERPL-MCNC: 1.24 MG/DL (ref 0.76–1.27)
DEPRECATED RDW RBC AUTO: 56.4 FL (ref 37–54)
ERYTHROCYTE [DISTWIDTH] IN BLOOD BY AUTOMATED COUNT: 19.5 % (ref 12.3–15.4)
GFR SERPL CREATININE-BSD FRML MDRD: 59 ML/MIN/1.73
GLOBULIN UR ELPH-MCNC: 3.4 GM/DL
GLUCOSE SERPL-MCNC: 117 MG/DL (ref 65–99)
HCT VFR BLD AUTO: 44 % (ref 37.5–51)
HGB BLD-MCNC: 14.4 G/DL (ref 13–17.7)
MAGNESIUM SERPL-MCNC: 2.7 MG/DL (ref 1.6–2.4)
MCH RBC QN AUTO: 27.2 PG (ref 26.6–33)
MCHC RBC AUTO-ENTMCNC: 32.7 G/DL (ref 31.5–35.7)
MCV RBC AUTO: 83.3 FL (ref 79–97)
PHOSPHATE SERPL-MCNC: 4.4 MG/DL (ref 2.5–4.5)
PLATELET # BLD AUTO: 221 10*3/MM3 (ref 140–450)
PMV BLD AUTO: 8.6 FL (ref 6–12)
POTASSIUM SERPL-SCNC: 4.7 MMOL/L (ref 3.5–5.2)
PROT SERPL-MCNC: 7.2 G/DL (ref 6–8.5)
RBC # BLD AUTO: 5.28 10*6/MM3 (ref 4.14–5.8)
SODIUM SERPL-SCNC: 138 MMOL/L (ref 136–145)
WBC # BLD AUTO: 9.4 10*3/MM3 (ref 3.4–10.8)

## 2020-09-07 PROCEDURE — 82330 ASSAY OF CALCIUM: CPT | Performed by: INTERNAL MEDICINE

## 2020-09-07 PROCEDURE — 85027 COMPLETE CBC AUTOMATED: CPT | Performed by: INTERNAL MEDICINE

## 2020-09-07 PROCEDURE — 25010000002 LORAZEPAM PER 2 MG: Performed by: FAMILY MEDICINE

## 2020-09-07 PROCEDURE — 94799 UNLISTED PULMONARY SVC/PX: CPT

## 2020-09-07 PROCEDURE — 83735 ASSAY OF MAGNESIUM: CPT | Performed by: INTERNAL MEDICINE

## 2020-09-07 PROCEDURE — 84100 ASSAY OF PHOSPHORUS: CPT | Performed by: INTERNAL MEDICINE

## 2020-09-07 PROCEDURE — 80053 COMPREHEN METABOLIC PANEL: CPT | Performed by: INTERNAL MEDICINE

## 2020-09-07 RX ORDER — LORAZEPAM 2 MG/ML
0.5 INJECTION INTRAMUSCULAR EVERY 4 HOURS PRN
Status: DISCONTINUED | OUTPATIENT
Start: 2020-09-07 | End: 2020-09-12 | Stop reason: HOSPADM

## 2020-09-07 RX ADMIN — BUMETANIDE 1 MG: 1 TABLET ORAL at 08:55

## 2020-09-07 RX ADMIN — RISPERIDONE 0.25 MG: 0.25 TABLET ORAL at 08:55

## 2020-09-07 RX ADMIN — CLOTRIMAZOLE AND BETAMETHASONE DIPROPIONATE: 10; .64 CREAM TOPICAL at 21:20

## 2020-09-07 RX ADMIN — Medication 10 ML: at 08:56

## 2020-09-07 RX ADMIN — ISOSORBIDE MONONITRATE 30 MG: 20 TABLET ORAL at 18:09

## 2020-09-07 RX ADMIN — METOPROLOL TARTRATE 50 MG: 50 TABLET, FILM COATED ORAL at 08:55

## 2020-09-07 RX ADMIN — IPRATROPIUM BROMIDE AND ALBUTEROL SULFATE 3 ML: 2.5; .5 SOLUTION RESPIRATORY (INHALATION) at 15:10

## 2020-09-07 RX ADMIN — METOPROLOL TARTRATE 50 MG: 50 TABLET, FILM COATED ORAL at 21:15

## 2020-09-07 RX ADMIN — HYDRALAZINE HYDROCHLORIDE 100 MG: 25 TABLET, FILM COATED ORAL at 17:29

## 2020-09-07 RX ADMIN — IPRATROPIUM BROMIDE AND ALBUTEROL SULFATE 3 ML: 2.5; .5 SOLUTION RESPIRATORY (INHALATION) at 07:02

## 2020-09-07 RX ADMIN — ATORVASTATIN CALCIUM 10 MG: 10 TABLET, FILM COATED ORAL at 21:15

## 2020-09-07 RX ADMIN — ALLOPURINOL 100 MG: 100 TABLET ORAL at 08:54

## 2020-09-07 RX ADMIN — SPIRONOLACTONE 12.5 MG: 25 TABLET, FILM COATED ORAL at 08:54

## 2020-09-07 RX ADMIN — DILTIAZEM HYDROCHLORIDE 180 MG: 180 CAPSULE, COATED, EXTENDED RELEASE ORAL at 08:55

## 2020-09-07 RX ADMIN — ISOSORBIDE MONONITRATE 30 MG: 20 TABLET ORAL at 08:55

## 2020-09-07 RX ADMIN — IPRATROPIUM BROMIDE AND ALBUTEROL SULFATE 3 ML: 2.5; .5 SOLUTION RESPIRATORY (INHALATION) at 10:54

## 2020-09-07 RX ADMIN — RIVAROXABAN 20 MG: 20 TABLET, FILM COATED ORAL at 17:29

## 2020-09-07 RX ADMIN — LOSARTAN POTASSIUM 50 MG: 50 TABLET, FILM COATED ORAL at 08:55

## 2020-09-07 RX ADMIN — HYDRALAZINE HYDROCHLORIDE 100 MG: 25 TABLET, FILM COATED ORAL at 21:16

## 2020-09-07 RX ADMIN — HYDRALAZINE HYDROCHLORIDE 100 MG: 25 TABLET, FILM COATED ORAL at 08:55

## 2020-09-07 RX ADMIN — CLOTRIMAZOLE AND BETAMETHASONE DIPROPIONATE: 10; .64 CREAM TOPICAL at 08:56

## 2020-09-07 RX ADMIN — ASPIRIN 81 MG: 81 TABLET, COATED ORAL at 08:57

## 2020-09-07 RX ADMIN — IPRATROPIUM BROMIDE AND ALBUTEROL SULFATE 3 ML: 2.5; .5 SOLUTION RESPIRATORY (INHALATION) at 19:35

## 2020-09-07 RX ADMIN — GABAPENTIN 100 MG: 100 CAPSULE ORAL at 21:15

## 2020-09-07 NOTE — PROGRESS NOTES
"NEPHROLOGY PROGRESS NOTE------[facility]    [facility]  [phone]  Sterling Barber MD      Patient Care Team:  Marisol Larson APRN as PCP - General  Shwetha Barber MD as Consulting Physician (Nephrology)  Edy Thomas MD as Consulting Physician (Cardiology)      Provider:  Sterling Barber MD  Patient Name: Carlos Whipple  :  1955    SUBJECTIVE:    F/U CRF/CKD    Wants to get up out of bed and walk halls. No SOB, CP, palpitations, cramping.    Medication:    allopurinol 100 mg Oral Daily   aspirin 81 mg Oral Daily   atorvastatin 10 mg Oral Nightly   bumetanide 1 mg Oral Daily   clotrimazole-betamethasone  Topical Q12H   dilTIAZem  mg Oral Q24H   gabapentin 100 mg Oral Nightly   hydrALAZINE 100 mg Oral TID   ipratropium-albuterol 3 mL Nebulization 4x Daily - RT   isosorbide mononitrate 30 mg Oral BID - Nitrates   losartan 50 mg Oral Q24H   metoprolol tartrate 50 mg Oral Q12H   risperiDONE 0.25 mg Oral Daily   rivaroxaban 20 mg Oral Daily With Dinner   sodium chloride 10 mL Intravenous Q12H   spironolactone 12.5 mg Oral Daily       niCARdipine 5-15 mg/hr Last Rate: Stopped (20 0019)       OBJECTIVE    Vital Sign Min/Max for last 24 hours  Temp  Min: 97.7 °F (36.5 °C)  Max: 98.3 °F (36.8 °C)   BP  Min: 119/83  Max: 153/72   Pulse  Min: 68  Max: 89   Resp  Min: 13  Max: 23   SpO2  Min: 94 %  Max: 100 %   No data recorded   Weight  Min: 111 kg (244 lb 4.3 oz)  Max: 111 kg (244 lb 4.3 oz)     Flowsheet Rows      First Filed Value   Admission Height  182.9 cm (72\") Documented at 2020 1338   Admission Weight  114 kg (252 lb) Documented at 2020 1338          No intake/output data recorded.  I/O last 3 completed shifts:  In: 1240 [P.O.:1240]  Out: 1910 [Urine:1910]    Physical Exam:  General Appearance: alert, appears stated age and cooperative  Head: normocephalic, without obvious abnormality and atraumatic  Eyes: " conjunctivae and sclerae normal and no icterus  Neck: supple and no JVD  Lungs: clear to auscultation and respirations regular  Heart: IRREG IRREG +AFUA. NO GALLOP, RUB, OR S3 +AFUA  Chest: Wall no abnormalities observed  Abdomen: normal bowel sounds and soft non-tender  Extremities: moves extremities well, no edema, no cyanosis and no redness  Skin: no bleeding, bruising or rash, turgor normal, color normal and no lesions noted  Neurologic: Alert, and oriented. No focal deficits    Labs:    WBC WBC   Date Value Ref Range Status   09/07/2020 9.40 3.40 - 10.80 10*3/mm3 Final   09/06/2020 8.90 3.40 - 10.80 10*3/mm3 Final   09/05/2020 11.70 (H) 3.40 - 10.80 10*3/mm3 Final      HGB Hemoglobin   Date Value Ref Range Status   09/07/2020 14.4 13.0 - 17.7 g/dL Final   09/06/2020 14.6 13.0 - 17.7 g/dL Final   09/05/2020 14.8 13.0 - 17.7 g/dL Final      HCT Hematocrit   Date Value Ref Range Status   09/07/2020 44.0 37.5 - 51.0 % Final   09/06/2020 43.7 37.5 - 51.0 % Final   09/05/2020 44.9 37.5 - 51.0 % Final      Platlets No results found for: LABPLAT   MCV MCV   Date Value Ref Range Status   09/07/2020 83.3 79.0 - 97.0 fL Final   09/06/2020 81.7 79.0 - 97.0 fL Final   09/05/2020 83.0 79.0 - 97.0 fL Final          Sodium Sodium   Date Value Ref Range Status   09/07/2020 138 136 - 145 mmol/L Final   09/06/2020 138 136 - 145 mmol/L Final   09/05/2020 138 136 - 145 mmol/L Final      Potassium Potassium   Date Value Ref Range Status   09/07/2020 4.7 3.5 - 5.2 mmol/L Final   09/06/2020 4.2 3.5 - 5.2 mmol/L Final   09/05/2020 4.1 3.5 - 5.2 mmol/L Final     Comment:     Slight hemolysis detected by analyzer. Results may be affected.      Chloride Chloride   Date Value Ref Range Status   09/07/2020 101 98 - 107 mmol/L Final   09/06/2020 102 98 - 107 mmol/L Final   09/05/2020 98 98 - 107 mmol/L Final      CO2 CO2   Date Value Ref Range Status   09/07/2020 27.0 22.0 - 29.0 mmol/L Final   09/06/2020 23.0 22.0 - 29.0 mmol/L Final    09/05/2020 26.0 22.0 - 29.0 mmol/L Final      BUN BUN   Date Value Ref Range Status   09/07/2020   Final     Comment:     Testing performed by alternate method   09/07/2020 29 (H) 8 - 23 mg/dL Final   09/06/2020   Final     Comment:     Testing performed by alternate method   09/06/2020 29 (H) 8 - 23 mg/dL Final   09/05/2020   Final     Comment:     Testing performed by alternate method   09/05/2020 26 (H) 8 - 23 mg/dL Final      Creatinine Creatinine   Date Value Ref Range Status   09/07/2020 1.24 0.76 - 1.27 mg/dL Final   09/06/2020 1.21 0.76 - 1.27 mg/dL Final   09/05/2020 1.36 (H) 0.76 - 1.27 mg/dL Final      Calcium Calcium   Date Value Ref Range Status   09/07/2020 9.3 8.6 - 10.5 mg/dL Final   09/06/2020 9.1 8.6 - 10.5 mg/dL Final   09/05/2020 9.6 8.6 - 10.5 mg/dL Final      PO4 No components found for: PO4   Albumin Albumin   Date Value Ref Range Status   09/07/2020 3.80 3.50 - 5.20 g/dL Final   09/05/2020 3.90 3.50 - 5.20 g/dL Final      Magnesium Magnesium   Date Value Ref Range Status   09/07/2020 2.7 (H) 1.6 - 2.4 mg/dL Final   09/06/2020 2.5 (H) 1.6 - 2.4 mg/dL Final   09/05/2020 2.5 (H) 1.6 - 2.4 mg/dL Final      Uric Acid No components found for: URIC ACID     Imaging Results (Last 72 Hours)     ** No results found for the last 72 hours. **          Results for orders placed during the hospital encounter of 09/01/20   XR Chest 1 View    Narrative XR CHEST 1 VW-     Date of Exam: 9/1/2020 3:10 PM     Indication: soa.     Comparison Exams: 08/21/2020     Technique: Single AP chest radiograph     FINDINGS:  A left basilar consolidation appears unchanged. There may be an  associated small left pleural effusion. The heart is enlarged. The  pulmonary vasculature appears normal. The osseous structures appear  intact.       Impression 1.Left basilar consolidation appears unchanged, which could reflect  atelectasis or pneumonia.  2.Cardiomegaly with possible small left pleural effusion.     Electronically  Signed By-DR. Salty Joseph MD On:9/1/2020 3:35 PM  This report was finalized on [id number] by DR. Salty Joseph MD.       Results for orders placed during the hospital encounter of 12/06/19   Duplex Carotid Ultrasound CAR    Narrative · Proximal right internal carotid artery is normal.  · Mid right internal carotid artery is normal.  · Proximal left internal carotid artery is normal.  · Mid left internal carotid artery is normal.            ASSESSMENT / PLAN      Atrial fibrillation with rapid ventricular response (CMS/HCC)    Chronic atrial fibrillation (CMS/HCC)    Chronic diastolic CHF (congestive heart failure) (CMS/HCC)    CKD (chronic kidney disease) stage 3, GFR 30-59 ml/min (CMS/HCC)    Gambling disorder, persistent    Coronary artery disease involving native coronary artery of native heart without angina pectoris    Psoriasis    History of CVA (cerebrovascular accident)    DDD (degenerative disc disease), lumbosacral    Peripheral polyneuropathy    GERD (gastroesophageal reflux disease)    Medically noncompliant    Mild cognitive impairment    Uncontrolled hypertension    Dyspnea    Dermatitis associated with moisture    1. CRF/CKD STG 2------Nonoliguric. BUN/Cr down with backing down diuretics. Appears to have CRF/CKD STG 2 with current serum creatinine at baseline. Unknown if patient has baseline proteinuria or hematuria. In the long run we may have to accept a higher baseline serum creatinine in order to keep euvolemic. Follow with cautious diuresis. CRF/CKD is likely secondary to HTN NS.   Dose meds for CrCl 60 cc/min     2. ACIDOSIS------Resolved     3. CHRONIC DIASTOLIC CHF/ELEVATED BNP------Cautious diuresis. Stable on current diuretic regimen     4. HTN WITH CKD------BP okay. No ACE/ARB/DRI for now     5. OA/DJD------No NSAIDs.      6. ATRIAL FIBRILLATION WITH RVR------per Cardiology. Dig level okay     7. HYPERLIPIDEMIA------Statin. CK, TSH okay    8. HYPERURICEMIA------Allopurinol      9. DECONDITIONING------PT    Okay from RENAL standpoint to d/c      Sterling Barber MD  [facility]  [phone]  09/07/20  08:17

## 2020-09-07 NOTE — PLAN OF CARE
Problem: Skin Injury Risk (Adult)  Goal: Identify Related Risk Factors and Signs and Symptoms  Outcome: Ongoing (interventions implemented as appropriate)  Goal: Skin Health and Integrity  Outcome: Ongoing (interventions implemented as appropriate)     Problem: Fall Risk (Adult)  Goal: Identify Related Risk Factors and Signs and Symptoms  Outcome: Ongoing (interventions implemented as appropriate)  Goal: Absence of Fall  Outcome: Ongoing (interventions implemented as appropriate)     Problem: Arrhythmia/Dysrhythmia (Symptomatic) (Adult)  Goal: Signs and Symptoms of Listed Potential Problems Will be Absent, Minimized or Managed (Arrhythmia/Dysrhythmia)  Outcome: Ongoing (interventions implemented as appropriate)     Problem: Hypertensive Disease/Crisis (Arterial) (Adult)  Goal: Signs and Symptoms of Listed Potential Problems Will be Absent, Minimized or Managed (Hypertensive Disease/Crisis)  Outcome: Ongoing (interventions implemented as appropriate)     Problem: Patient Care Overview  Goal: Plan of Care Review  Outcome: Ongoing (interventions implemented as appropriate)  Goal: Individualization and Mutuality  Outcome: Ongoing (interventions implemented as appropriate)  Goal: Discharge Needs Assessment  Outcome: Ongoing (interventions implemented as appropriate)  Goal: Interprofessional Rounds/Family Conf  Outcome: Ongoing (interventions implemented as appropriate)     Problem: Pain, Chronic (Adult)  Goal: Identify Related Risk Factors and Signs and Symptoms  Outcome: Ongoing (interventions implemented as appropriate)  Goal: Acceptable Pain/Comfort Level and Functional Ability  Outcome: Ongoing (interventions implemented as appropriate)

## 2020-09-07 NOTE — PLAN OF CARE
Pt has been alert and v/s stable, pt has been agitated and frustrated due to being woken up and his IV issues. Pt pulled Iv partially out and it was caught on his blanket so when trying to remove IV and place a new one pt got even more upset and didn't want me to touch him and made it clear that he would not be stuck again. Call was placed to MD and it was stated that it is ok to leave the IV out. Pt in bed free of falls with call light in reach, will continue to monitor.    Problem: Skin Injury Risk (Adult)  Goal: Identify Related Risk Factors and Signs and Symptoms  Outcome: Ongoing (interventions implemented as appropriate)  Goal: Skin Health and Integrity  Outcome: Ongoing (interventions implemented as appropriate)     Problem: Fall Risk (Adult)  Goal: Identify Related Risk Factors and Signs and Symptoms  Outcome: Ongoing (interventions implemented as appropriate)  Goal: Absence of Fall  Outcome: Ongoing (interventions implemented as appropriate)     Problem: Arrhythmia/Dysrhythmia (Symptomatic) (Adult)  Goal: Signs and Symptoms of Listed Potential Problems Will be Absent, Minimized or Managed (Arrhythmia/Dysrhythmia)  Outcome: Ongoing (interventions implemented as appropriate)     Problem: Hypertensive Disease/Crisis (Arterial) (Adult)  Goal: Signs and Symptoms of Listed Potential Problems Will be Absent, Minimized or Managed (Hypertensive Disease/Crisis)  Outcome: Ongoing (interventions implemented as appropriate)     Problem: Patient Care Overview  Goal: Plan of Care Review  Outcome: Ongoing (interventions implemented as appropriate)  Goal: Individualization and Mutuality  Outcome: Ongoing (interventions implemented as appropriate)  Goal: Discharge Needs Assessment  Outcome: Ongoing (interventions implemented as appropriate)  Goal: Interprofessional Rounds/Family Conf  Outcome: Ongoing (interventions implemented as appropriate)     Problem: Pain, Chronic (Adult)  Goal: Identify Related Risk Factors and Signs  and Symptoms  Outcome: Ongoing (interventions implemented as appropriate)  Goal: Acceptable Pain/Comfort Level and Functional Ability  Outcome: Ongoing (interventions implemented as appropriate)

## 2020-09-07 NOTE — NURSING NOTE
Placed call to MD regarding pt IV going bad and pt refused to have another placed. Pt now receiving all meds po in anticipation of d/c soon, MD states that it is ok to not have IV access and they will re-evaluate tomorrow.

## 2020-09-07 NOTE — PROGRESS NOTES
LOS: 4 days   Patient Care Team:  Marisol Larson APRN as PCP - General  Shwetha Barber MD as Consulting Physician (Nephrology)  Edy Thomas MD as Consulting Physician (Cardiology)    Chief Complaint:   Still in atrial fibrillation,rate controlled. Denies any chest pain or SOA today.    Subjective :   Appears chronically ill, markedly older than stated age    Interval History:     Patient Complaints:  None this morning  Patient Denies:   No chest pain, no palpitations, no shortness of breath. No edema or cough.   History taken from: patient    Review of Systems:    All systems were reviewed and negative except for:   See  interval history  Objective     BP improved    Vital Signs  Temp:  [97.5 °F (36.4 °C)-98.3 °F (36.8 °C)] 97.5 °F (36.4 °C)  Heart Rate:  [65-89] 65  Resp:  [14-23] 16  BP: (119-153)/(60-92) 119/73    Physical Exam:     General Appearance:    Alert and oriented, in no acute distress   Head:    Normocephalic, without obvious abnormality, atraumatic   Eyes:               Throat:      Neck:   No adenopathy, supple, trachea midline   Lungs:     Clear to auscultation,respirations regular, even and                  Unlabored, distant but adequate adventitial flow    Heart:     Distant, irregularly irregular.   Chest Wall:    No abnormalities observed   Abdomen:     Normal bowel sounds, no masses, no organomegaly, soft        non-tender, non-distended, no guarding, no rebound                tenderness   Rectal:     Deferred   Extremities:   Moves all extremities , no edema, no cyanosis, no             redness   Pulses:   Pulses palpable and equal bilaterally   Skin:   No bleeding, bruising or rash   Lymph nodes:      Neurologic:      Radiology:  Ct Head Without Contrast    Result Date: 9/2/2020  1. Stable exam, with no acute process demonstrated. 2. Stable moderate chronic white matter disease and left occipital encephalomalacia.  Electronically Signed By-Nehal Way On:9/2/2020 8:17  PM This report was finalized on [id number] by  Nehal Way, .    Xr Chest 1 View    Result Date: 9/1/2020  1.Left basilar consolidation appears unchanged, which could reflect atelectasis or pneumonia. 2.Cardiomegaly with possible small left pleural effusion.  Electronically Signed By-DR. Salty Joseph MD On:9/1/2020 3:35 PM This report was finalized on [id number] by DR. Salty Joseph MD.    Us Renal Bilateral    Result Date: 9/2/2020  Unremarkable sonographic appearance of the kidneys.  Electronically Signed By-Teri Persaud On:9/2/2020 11:30 AM This report was finalized on [id number] by  Teri Persaud, .         Results Review:     I reviewed the patient's new clinical results.  I reviewed the patient's new imaging results and agree with the interpretation.    Medication Review:   Scheduled Meds:    allopurinol 100 mg Oral Daily   aspirin 81 mg Oral Daily   atorvastatin 10 mg Oral Nightly   bumetanide 1 mg Oral Daily   clotrimazole-betamethasone  Topical Q12H   dilTIAZem  mg Oral Q24H   gabapentin 100 mg Oral Nightly   hydrALAZINE 100 mg Oral TID   ipratropium-albuterol 3 mL Nebulization 4x Daily - RT   isosorbide mononitrate 30 mg Oral BID - Nitrates   losartan 50 mg Oral Q24H   metoprolol tartrate 50 mg Oral Q12H   risperiDONE 0.25 mg Oral Daily   rivaroxaban 20 mg Oral Daily With Dinner   sodium chloride 10 mL Intravenous Q12H   spironolactone 12.5 mg Oral Daily     Continuous Infusions:    niCARdipine 5-15 mg/hr Last Rate: Stopped (09/02/20 0019)     PRN Meds:.•  acetaminophen  •  albuterol  •  hydrALAZINE  •  magnesium hydroxide  •  sodium chloride  •  sodium chloride    Labs:  Lab Results (last 24 hours)     Procedure Component Value Units Date/Time    BUN [[id number]]  (Abnormal) Collected:  09/07/20 0312    Specimen:  Blood Updated:  09/07/20 0758     BUN 29 mg/dL     Comprehensive Metabolic Panel [[id number]]  (Abnormal) Collected:  09/07/20 0312    Specimen:  Blood Updated:   09/07/20 0412     Glucose 117 mg/dL      BUN --     Comment: Testing performed by alternate method        Creatinine 1.24 mg/dL      Sodium 138 mmol/L      Potassium 4.7 mmol/L      Chloride 101 mmol/L      CO2 27.0 mmol/L      Calcium 9.3 mg/dL      Total Protein 7.2 g/dL      Albumin 3.80 g/dL      ALT (SGPT) 26 U/L      AST (SGOT) 30 U/L      Alkaline Phosphatase 69 U/L      Total Bilirubin 0.6 mg/dL      eGFR Non African Amer 59 mL/min/1.73      Globulin 3.4 gm/dL      A/G Ratio 1.1 g/dL      BUN/Creatinine Ratio --     Comment: Testing not performed        Anion Gap 10.0 mmol/L     Narrative:       GFR Normal >60  Chronic Kidney Disease <60  Kidney Failure <15      Magnesium [[id number]]  (Abnormal) Collected:  09/07/20 0312    Specimen:  Blood Updated:  09/07/20 0412     Magnesium 2.7 mg/dL     Phosphorus [[id number]]  (Normal) Collected:  09/07/20 0312    Specimen:  Blood Updated:  09/07/20 0412     Phosphorus 4.4 mg/dL     Calcium, Ionized [[id number]]  (Normal) Collected:  09/07/20 0312    Specimen:  Blood Updated:  09/07/20 0410     Ionized Calcium 1.25 mmol/L     CBC (No Diff) [[id number]]  (Abnormal) Collected:  09/07/20 0312    Specimen:  Blood Updated:  09/07/20 0353     WBC 9.40 10*3/mm3      RBC 5.28 10*6/mm3      Hemoglobin 14.4 g/dL      Hematocrit 44.0 %      MCV 83.3 fL      MCH 27.2 pg      MCHC 32.7 g/dL      RDW 19.5 %      RDW-SD 56.4 fl      MPV 8.6 fL      Platelets 221 10*3/mm3     Blood Culture - Blood, Hand, Right [[id number]] Collected:  09/01/20 1431    Specimen:  Blood from Hand, Right Updated:  09/06/20 1501     Blood Culture No growth at 5 days           Assessment/Plan       Atrial fibrillation with rapid ventricular response (CMS/HCC)    Chronic atrial fibrillation (CMS/HCC)    Chronic diastolic CHF (congestive heart failure) (CMS/HCC)    CKD (chronic kidney disease) stage 3, GFR 30-59 ml/min (CMS/HCC)    Gambling disorder, persistent    Coronary artery disease involving native  coronary artery of native heart without angina pectoris    Psoriasis    History of CVA (cerebrovascular accident)    DDD (degenerative disc disease), lumbosacral    Peripheral polyneuropathy    GERD (gastroesophageal reflux disease)    Medically noncompliant    Mild cognitive impairment    Uncontrolled hypertension    Dyspnea    Dermatitis associated with moisture  Single blood culture positive for staph coag negative most likely contaminant.    Cardiology input greatly appreciated,  Nephrology input greatly appreciated as well, thank you. HR and BP improved.  Continue current therapy.   Monitor electrolytes and replace p.r.n.. Labs ordered for the morning. Will DC to [facility] for rehab when precert/bed ready.    Of note, lactic acid, pro calcitonin markers for sepsis/pneumonic insult are negative at this juncture.  Suspect the single blood culture with coag negative staph is actually skin contaminant.  Patient does not appear to be septic.      Marisol Larson, CLARISSA  09/07/20  13:25

## 2020-09-08 LAB
ALBUMIN SERPL-MCNC: 3.7 G/DL (ref 3.5–5.2)
ALBUMIN/GLOB SERPL: 1 G/DL
ALP SERPL-CCNC: 70 U/L (ref 39–117)
ALT SERPL W P-5'-P-CCNC: 35 U/L (ref 1–41)
ANION GAP SERPL CALCULATED.3IONS-SCNC: 10 MMOL/L (ref 5–15)
AST SERPL-CCNC: 31 U/L (ref 1–40)
BASOPHILS # BLD AUTO: 0.1 10*3/MM3 (ref 0–0.2)
BASOPHILS NFR BLD AUTO: 0.7 % (ref 0–1.5)
BILIRUB SERPL-MCNC: 0.7 MG/DL (ref 0–1.2)
BUN SERPL-MCNC: 29 MG/DL (ref 8–23)
BUN SERPL-MCNC: NORMAL MG/DL
BUN/CREAT SERPL: NORMAL
CALCIUM SPEC-SCNC: 9.2 MG/DL (ref 8.6–10.5)
CHLORIDE SERPL-SCNC: 106 MMOL/L (ref 98–107)
CO2 SERPL-SCNC: 23 MMOL/L (ref 22–29)
CREAT SERPL-MCNC: 1.12 MG/DL (ref 0.76–1.27)
DEPRECATED RDW RBC AUTO: 57.8 FL (ref 37–54)
EOSINOPHIL # BLD AUTO: 0.2 10*3/MM3 (ref 0–0.4)
EOSINOPHIL NFR BLD AUTO: 1.9 % (ref 0.3–6.2)
ERYTHROCYTE [DISTWIDTH] IN BLOOD BY AUTOMATED COUNT: 19.6 % (ref 12.3–15.4)
GFR SERPL CREATININE-BSD FRML MDRD: 66 ML/MIN/1.73
GLOBULIN UR ELPH-MCNC: 3.7 GM/DL
GLUCOSE SERPL-MCNC: 97 MG/DL (ref 65–99)
HCT VFR BLD AUTO: 45.4 % (ref 37.5–51)
HGB BLD-MCNC: 14.5 G/DL (ref 13–17.7)
LYMPHOCYTES # BLD AUTO: 1.4 10*3/MM3 (ref 0.7–3.1)
LYMPHOCYTES NFR BLD AUTO: 15.4 % (ref 19.6–45.3)
MAGNESIUM SERPL-MCNC: 2.5 MG/DL (ref 1.6–2.4)
MCH RBC QN AUTO: 27 PG (ref 26.6–33)
MCHC RBC AUTO-ENTMCNC: 31.9 G/DL (ref 31.5–35.7)
MCV RBC AUTO: 84.6 FL (ref 79–97)
MONOCYTES # BLD AUTO: 1 10*3/MM3 (ref 0.1–0.9)
MONOCYTES NFR BLD AUTO: 11.8 % (ref 5–12)
NEUTROPHILS NFR BLD AUTO: 6.2 10*3/MM3 (ref 1.7–7)
NEUTROPHILS NFR BLD AUTO: 70.2 % (ref 42.7–76)
NRBC BLD AUTO-RTO: 0.1 /100 WBC (ref 0–0.2)
PLATELET # BLD AUTO: 213 10*3/MM3 (ref 140–450)
PMV BLD AUTO: 8.4 FL (ref 6–12)
POTASSIUM SERPL-SCNC: 4.5 MMOL/L (ref 3.5–5.2)
PROT SERPL-MCNC: 7.4 G/DL (ref 6–8.5)
RBC # BLD AUTO: 5.37 10*6/MM3 (ref 4.14–5.8)
SODIUM SERPL-SCNC: 139 MMOL/L (ref 136–145)
WBC # BLD AUTO: 8.8 10*3/MM3 (ref 3.4–10.8)

## 2020-09-08 PROCEDURE — 80053 COMPREHEN METABOLIC PANEL: CPT | Performed by: NURSE PRACTITIONER

## 2020-09-08 PROCEDURE — 97116 GAIT TRAINING THERAPY: CPT

## 2020-09-08 PROCEDURE — 97535 SELF CARE MNGMENT TRAINING: CPT

## 2020-09-08 PROCEDURE — 99232 SBSQ HOSP IP/OBS MODERATE 35: CPT | Performed by: INTERNAL MEDICINE

## 2020-09-08 PROCEDURE — 97110 THERAPEUTIC EXERCISES: CPT

## 2020-09-08 PROCEDURE — 97530 THERAPEUTIC ACTIVITIES: CPT

## 2020-09-08 PROCEDURE — 94799 UNLISTED PULMONARY SVC/PX: CPT

## 2020-09-08 PROCEDURE — 85025 COMPLETE CBC W/AUTO DIFF WBC: CPT | Performed by: NURSE PRACTITIONER

## 2020-09-08 PROCEDURE — 83735 ASSAY OF MAGNESIUM: CPT | Performed by: NURSE PRACTITIONER

## 2020-09-08 RX ADMIN — ALLOPURINOL 100 MG: 100 TABLET ORAL at 08:37

## 2020-09-08 RX ADMIN — ISOSORBIDE MONONITRATE 30 MG: 20 TABLET ORAL at 08:35

## 2020-09-08 RX ADMIN — RISPERIDONE 0.25 MG: 0.25 TABLET ORAL at 08:38

## 2020-09-08 RX ADMIN — IPRATROPIUM BROMIDE AND ALBUTEROL SULFATE 3 ML: 2.5; .5 SOLUTION RESPIRATORY (INHALATION) at 05:08

## 2020-09-08 RX ADMIN — SPIRONOLACTONE 12.5 MG: 25 TABLET, FILM COATED ORAL at 08:36

## 2020-09-08 RX ADMIN — METOPROLOL TARTRATE 50 MG: 50 TABLET, FILM COATED ORAL at 08:38

## 2020-09-08 RX ADMIN — LOSARTAN POTASSIUM 50 MG: 50 TABLET, FILM COATED ORAL at 08:37

## 2020-09-08 RX ADMIN — RIVAROXABAN 20 MG: 20 TABLET, FILM COATED ORAL at 19:21

## 2020-09-08 RX ADMIN — HYDRALAZINE HYDROCHLORIDE 100 MG: 25 TABLET, FILM COATED ORAL at 21:58

## 2020-09-08 RX ADMIN — DILTIAZEM HYDROCHLORIDE 180 MG: 180 CAPSULE, COATED, EXTENDED RELEASE ORAL at 08:38

## 2020-09-08 RX ADMIN — METOPROLOL TARTRATE 50 MG: 50 TABLET, FILM COATED ORAL at 21:58

## 2020-09-08 RX ADMIN — BUMETANIDE 1 MG: 1 TABLET ORAL at 08:38

## 2020-09-08 RX ADMIN — HYDRALAZINE HYDROCHLORIDE 100 MG: 25 TABLET, FILM COATED ORAL at 17:59

## 2020-09-08 RX ADMIN — ASPIRIN 81 MG: 81 TABLET, COATED ORAL at 11:01

## 2020-09-08 RX ADMIN — HYDRALAZINE HYDROCHLORIDE 100 MG: 25 TABLET, FILM COATED ORAL at 08:34

## 2020-09-08 RX ADMIN — Medication 10 ML: at 21:58

## 2020-09-08 RX ADMIN — CLOTRIMAZOLE AND BETAMETHASONE DIPROPIONATE: 10; .64 CREAM TOPICAL at 08:40

## 2020-09-08 RX ADMIN — ISOSORBIDE MONONITRATE 30 MG: 20 TABLET ORAL at 19:21

## 2020-09-08 RX ADMIN — ATORVASTATIN CALCIUM 10 MG: 10 TABLET, FILM COATED ORAL at 21:58

## 2020-09-08 RX ADMIN — IPRATROPIUM BROMIDE AND ALBUTEROL SULFATE 3 ML: 2.5; .5 SOLUTION RESPIRATORY (INHALATION) at 18:58

## 2020-09-08 RX ADMIN — CLOTRIMAZOLE AND BETAMETHASONE DIPROPIONATE: 10; .64 CREAM TOPICAL at 21:58

## 2020-09-08 RX ADMIN — GABAPENTIN 100 MG: 100 CAPSULE ORAL at 21:58

## 2020-09-08 NOTE — THERAPY TREATMENT NOTE
Acute Care - Occupational Therapy Treatment Note  KAE Yoseph     Patient Name: Carlos Whipple  : 1955  MRN: [id number]  Today's Date: 2020             Admit Date: 2020       ICD-10-CM ICD-9-CM   1. Atrial fibrillation with rapid ventricular response (CMS/HCC) I48.91 427.31   2. Uncontrolled hypertension I10 401.9   3. Dyspnea, unspecified type R06.00 786.09   4. CKD (chronic kidney disease) stage 3, GFR 30-59 ml/min (CMS/HCC) N18.3 585.3     Patient Active Problem List   Diagnosis   • Hypertensive emergency   • Chronic atrial fibrillation (CMS/HCC)   • Chronic diastolic CHF (congestive heart failure) (CMS/HCC)   • CKD (chronic kidney disease) stage 3, GFR 30-59 ml/min (CMS/HCC)   • Essential hypertension   • Gambling disorder, persistent   • Shortness of breath   • Coronary artery disease involving native coronary artery of native heart without angina pectoris   • Psoriasis   • History of CVA (cerebrovascular accident)   • DDD (degenerative disc disease), lumbosacral   • Peripheral polyneuropathy   • GERD (gastroesophageal reflux disease)   • Medically noncompliant   • Acute respiratory distress   • Unstable angina (CMS/HCC)   • Cervical disc disorder with radiculopathy   • Completed stroke (CMS/HCC)   • Degeneration of intervertebral disc   • Excessive anticoagulation   • Impaired left ventricular function   • Liver lesion   • Lumbar radiculopathy   • Cervical myelopathy (CMS/HCC)   • Lumbosacral radiculopathy   • Spinal stenosis of lumbar region   • Obesity   • Mild cognitive impairment   • Thoracic back pain   • Thoracic disc disease with myelopathy   • Paroxysmal atrial fibrillation (CMS/HCC)   • Uncontrolled hypertension   • Acute congestive heart failure (CMS/HCC)   • Dyspnea   • Atrial fibrillation with RVR (CMS/HCC)   • Atrial fibrillation with rapid ventricular response (CMS/HCC)   • Dermatitis associated with moisture     Past Medical History:   Diagnosis Date   • A-fib (CMS/HCC)    • CHF  (congestive heart failure) (CMS/[facility])    • Chronic atrial fibrillation (CMS/[facility]) 11/8/2019   • Chronic diastolic CHF (congestive heart failure) (CMS/[facility]) 11/8/2019   • CKD (chronic kidney disease) stage 3, GFR 30-59 ml/min (CMS/[facility]) 11/8/2019   • Coronary artery disease involving native coronary artery of native heart without angina pectoris 11/8/2019   • DDD (degenerative disc disease), lumbosacral 11/8/2019   • Essential hypertension 11/8/2019   • Gambling disorder, persistent 11/8/2019   • GERD (gastroesophageal reflux disease) 11/8/2019   • History of CVA (cerebrovascular accident) 11/8/2019   • Hyperlipidemia    • Hypertension    • Medically noncompliant 11/8/2019   • Peripheral polyneuropathy 11/8/2019   • Psoriasis 11/8/2019   • Stroke (CMS/[facility])      Past Surgical History:   Procedure Laterality Date   • CARDIAC CATHETERIZATION     • CARDIAC CATHETERIZATION N/A 12/10/2019    Procedure: Left Heart Cath and coronary angiogram;  Surgeon: Edy Thomas MD;  Location: [facility] CATH INVASIVE LOCATION;  Service: Cardiovascular       Therapy Treatment    Rehabilitation Treatment Summary     Row Name 09/08/20 1050             Treatment Time/Intention    Discipline  occupational therapist  -NA      Document Type  therapy note (daily note)  -NA      Mode of Treatment  occupational therapy  -NA      Recorded by [NA] Gertrude Bhatt OT 09/08/20 1135      Row Name 09/08/20 1050             Vital Signs    Pre Systolic BP Rehab  117  -NA      Pre Treatment Diastolic BP  72  -NA      Pretreatment Heart Rate (beats/min)  82  -NA      Intratreatment Heart Rate (beats/min)  78  -NA      Pretreatment Resp Rate (breaths/min)  13  -NA      Recorded by [NA] Gertrude Bhatt OT 09/08/20 1135      Row Name 09/08/20 1050             Cognitive Assessment/Intervention    Additional Documentation  Cognitive Assessment/Intervention (Group)  -NA      Recorded by [NA] Gertrude Bhatt OT 09/08/20 1135      Row Name 09/08/20 1050              Cognitive Assessment/Intervention- PT/OT    Orientation Status (Cognition)  oriented x 3  -NA      Cognitive Function (Cognitive)  safety deficit;executive function deficit  -NA      Safety Deficit (Cognitive)  impulsivity;awareness of need for assistance;judgment;problem solving  -NA      Cognitive Interventions (Cognitive)  occupation/activity based interventions  -NA      Personal Safety Interventions  gait belt  -NA      Recorded by [NA] Gertrude Bhatt OT 09/08/20 1135      Row Name 09/08/20 1050             Safety Issues, Functional Mobility    Safety Issues Affecting Function (Mobility)  impulsivity;insight into deficits/self awareness;judgment;safety precautions follow-through/compliance;safety precaution awareness  -NA      Impairments Affecting Function (Mobility)  balance;strength;endurance/activity tolerance  -NA      Recorded by [NA] Gertrude Bhatt OT 09/08/20 1135      Row Name 09/08/20 1050             Bed Mobility Assessment/Treatment    Bed Mobility Assessment/Treatment  supine-sit  -NA      Supine-Sit [place] (Bed Mobility)  supervision;verbal cues  -NA      Recorded by [NA] Gertrude Bhatt OT 09/08/20 1135      Row Name 09/08/20 1050             Functional Mobility    Functional Mobility- Ind. Level  contact guard assist  -NA      Functional Mobility- Device  rolling walker  -NA      Functional Mobility- Safety Issues  sequencing ability decreased  -NA      Recorded by [NA] Gertrude Bhatt OT 09/08/20 1135      Row Name 09/08/20 1050             Transfer Assessment/Treatment    Transfer Assessment/Treatment  sit-stand transfer;stand-sit transfer  -NA      Recorded by [NA] Gertrude Bhatt OT 09/08/20 1135      Row Name 09/08/20 1050             Sit-Stand Transfer    Sit-Stand [place] (Transfers)  minimum assist (75% patient effort);verbal cues;nonverbal cues (demo/gesture)  -NA      Assistive Device (Sit-Stand Transfers)  walker, front-wheeled  -NA      Recorded by  [NA] Gertrude Bhatt, OT 09/08/20 1135      Row Name 09/08/20 1050             Stand-Sit Transfer    Stand-Sit [place] (Transfers)  contact guard;verbal cues;nonverbal cues (demo/gesture)  -NA      Assistive Device (Stand-Sit Transfers)  walker, front-wheeled  -NA      Recorded by [NA] Gertrude Bhatt, OT 09/08/20 1135      Row Name 09/08/20 1050             ADL Assessment/Intervention    BADL Assessment/Intervention  upper body dressing;lower body dressing  -NA      Recorded by [NA] Gertrude Bhatt, OT 09/08/20 1135      Row Name 09/08/20 1050             Upper Body Dressing Assessment/Training    Upper Body Dressing [place] Level  doff;don;front opening garment;minimum assist (75% patient effort)  -NA      Upper Body Dressing Position  edge of bed sitting  -NA      Recorded by [NA] Gertrude Bhatt, OT 09/08/20 1135      Row Name 09/08/20 1050             Lower Body Dressing Assessment/Training    Lower Body Dressing [place] Level  doff;don;dependent (less than 25% patient effort)  -NA      Lower Body Dressing Position  edge of bed sitting  -NA      Recorded by [NA] Gertrude Bhatt, OT 09/08/20 1135      Row Name 09/08/20 1050             Motor Skills Assessment/Interventions    Additional Documentation  Therapeutic Exercise (Group);Therapeutic Exercise Interventions (Group);Functional Endurance Training (Group)  -NA      Recorded by [NA] Gertrude Bhatt, OT 09/08/20 1135      Row Name 09/08/20 1050             Therapeutic Exercise    Upper Extremity Range of Motion (Therapeutic Exercise)  shoulder flexion/extension, bilateral;shoulder abduction/adduction, bilateral;shoulder horizontal abduction/adduction, bilateral;shoulder internal/external rotation, bilateral;elbow flexion/extension, bilateral;forearm supination/pronation, bilateral;wrist flexion/extension, bilateral  -NA      Hand (Therapeutic Exercise)  finger flexion/extension, bilateral;thumb finger opposition, bilateral;hand ,  bilateral  -NA      Exercise Type (Therapeutic Exercise)  AROM (active range of motion)  -NA      Position (Therapeutic Exercise)  seated  -NA      Sets/Reps (Therapeutic Exercise)  1/20  -NA      Expected Outcome (Therapeutic Exercise)  facilitate normal movement patterns;improve functional stability;improve functional tolerance, self-care activity;improve motor control;improve neuromuscular control  -NA      Recorded by [NA] Gertrude Bhatt, OT 09/08/20 1135      Row Name 09/08/20 1050             Functional Endurance Training    Comment, Functional Endurance  fair + during ADLs and BUE ther ex  -NA      Recorded by [NA] Gertrude Bhatt OT 09/08/20 1135      Row Name 09/08/20 1050             Positioning and Restraints    Pre-Treatment Position  in bed  -NA      Post Treatment Position  chair  -NA      In Chair  notified nsg;sitting;call light within reach;encouraged to call for assist;exit alarm on  -NA      Recorded by [NA] Gertrude Bhatt, OT 09/08/20 1135      Row Name 09/08/20 1050             Pain Assessment    Additional Documentation  Pain Scale: Numbers Pre/Post-Treatment (Group)  -NA      Recorded by [NA] Gertrude Bahtt OT 09/08/20 1135      Row Name 09/08/20 1050             Pain Scale: Numbers Pre/Post-Treatment    Pain Scale: Numbers, Pretreatment  0/10 - no pain  -NA      Pain Scale: Numbers, Post-Treatment  0/10 - no pain  -NA      Recorded by [NA] Gertrude Bhatt, OT 09/08/20 1135      Row Name                Wound 12/12/19 0200 midline gluteal MASD (Moisutre associated skin damage)    Wound - Properties Group Date first assessed: 12/12/19 [BG] Time first assessed: 0200 [BG] Present on Hospital Admission: Y [BG] Orientation: midline [BG] Location: gluteal [BG] Primary Wound Type: MASD [BG], psoriasis lesions with what appears to be moisture related damage  Recorded by:  [BG] Radha Conklin RN 12/12/19 0642    Row Name                Wound 12/12/19 0200 thoracic spine Other  (comment)    Wound - Properties Group Date first assessed: 12/12/19 [BG] Time first assessed: 0200 [BG] Location: thoracic spine [BG] Primary Wound Type: Other [BG], psoriasis lesions  Recorded by:  [] Radha Conklin RN 12/12/19 0643    Row Name                Wound 12/12/19 0200 upper;midline abdomen     Wound - Properties Group Date first assessed: 12/12/19 [BG] Time first assessed: 0200 [BG] Orientation: upper;midline [BG] Location: abdomen [BG] Primary Wound Type: -- [BG], psoriasis lesions  Recorded by:  [] Radha Conklin RN 12/12/19 0644    Row Name                Wound 09/01/20 1839 Bilateral anterior groin MASD (Moisture associated skin damage)    Wound - Properties Group Date first assessed: 09/01/20 [KD] Time first assessed: 1839 [KD] Present on Hospital Admission: Y [KD] Side: Bilateral [KD] Orientation: anterior [KD] Location: groin [KD] Primary Wound Type: MASD [KD] Recorded by:  [KD] Nehal Menon RN 09/01/20 1840    Row Name 09/08/20 1050             Coping    Observed Emotional State  accepting;calm;cooperative  -NA      Verbalized Emotional State  acceptance  -NA      Recorded by [NA] Gertrude Bhatt OT 09/08/20 1135      Row Name 09/08/20 1050             Plan of Care Review    Plan of Care Reviewed With  patient  -NA      Progress  improving  -NA      Recorded by [NA] Gertrude Bhatt OT 09/08/20 1135      Row Name 09/08/20 1050             Outcome Summary/Treatment Plan (OT)    Daily Summary of Progress (OT)  progress toward functional goals as expected;progress toward functional goals is good  -NA      Anticipated Discharge Disposition (OT)  inpatient rehabilitation facility  -NA      Recorded by [NA] Gertrude Bhatt OT 09/08/20 1135        User Key  (r) = Recorded By, (t) = Taken By, (c) = Cosigned By    Initials Name Effective Dates Discipline    NA Gertrude Bhatt OT 07/20/20 -  OT    Radha Ferreira RN 03/01/19 -  Nurse    Nehal Elkins RN 08/21/19 -   Nurse        Wound 12/12/19 0200 midline gluteal MASD (Moisutre associated skin damage) (Active)   Dressing Appearance open to air 9/8/2020 11:18 AM   Closure None 9/8/2020 11:18 AM   Base red 9/8/2020 11:18 AM   Drainage Amount none 9/8/2020 11:18 AM   Care, Wound cleansed with;barrier applied 9/8/2020 11:18 AM       Wound 12/12/19 0200 thoracic spine Other (comment) (Active)   Base maroon/purple 9/8/2020 11:18 AM   Care, Wound cleansed with 9/8/2020  8:20 AM       Wound 12/12/19 0200 upper;midline abdomen  (Active)   Dressing Appearance open to air 9/8/2020 11:18 AM   Closure None 9/8/2020 11:18 AM   Base maroon/purple 9/8/2020 11:18 AM   Care, Wound cleansed with 9/8/2020  8:20 AM   Periwound Care, Wound topical treatment applied 9/8/2020  8:20 AM       Wound 09/01/20 1839 Bilateral anterior groin MASD (Moisture associated skin damage) (Active)   Dressing Appearance open to air 9/8/2020 11:18 AM   Base red 9/8/2020 11:18 AM   Drainage Amount none 9/8/2020 11:18 AM   Care, Wound cleansed with;barrier applied 9/8/2020 11:18 AM     Rehab Goal Summary     Row Name 09/08/20 1050             Occupational Therapy Goals    Bathing Goal Selection (OT)  bathing, OT goal 1  -NA      Dressing Goal Selection (OT)  dressing, OT goal 1  -NA      Toileting Goal Selection (OT)  toileting, OT goal 1  -NA         Bathing Goal 1 (OT)    Progress/Outcomes (Bathing Goal 1, OT)  continuing progress toward goal;good progress toward goal  -NA         Dressing Goal 1 (OT)    Progress/Outcome (Dressing Goal 1, OT)  continuing progress toward goal;good progress toward goal  -NA         Toileting Goal 1 (OT)    Progress/Outcome (Toileting Goal 1, OT)  good progress toward goal;continuing progress toward goal  -NA        User Key  (r) = Recorded By, (t) = Taken By, (c) = Cosigned By    Initials Name Provider Type Discipline    Gertrude Gambino OT Occupational Therapist OT        Occupational Therapy Education                 Title: PT OT  SLP Therapies (Done)     Topic: Occupational Therapy (Done)     Point: ADL training (Done)     Description:   Instruct learner(s) on proper safety adaptation and remediation techniques during self care or transfers.   Instruct in proper use of assistive devices.              Learning Progress Summary           Patient Acceptance, E,TB,D, VU,DU,NR by NA at 9/8/2020 1136    Comment:  role and benefits of OT, BUE ther ex in all planes, transfers to chair with RW, calling for assistance when getting up    Acceptance, E,TB, VU,NR by  at 9/6/2020 1603                   Point: Home exercise program (Done)     Description:   Instruct learner(s) on appropriate technique for monitoring, assisting and/or progressing therapeutic exercises/activities.              Learning Progress Summary           Patient Acceptance, E,TB,D, VU,DU,NR by NA at 9/8/2020 1136    Comment:  role and benefits of OT, BUE ther ex in all planes, transfers to chair with RW, calling for assistance when getting up    Acceptance, E,TB, VU,NR by  at 9/6/2020 1603                   Point: Precautions (Done)     Description:   Instruct learner(s) on prescribed precautions during self-care and functional transfers.              Learning Progress Summary           Patient Acceptance, E,TB,D, VU,DU,NR by NA at 9/8/2020 1136    Comment:  role and benefits of OT, BUE ther ex in all planes, transfers to chair with RW, calling for assistance when getting up    Acceptance, E,TB, VU,NR by  at 9/6/2020 1603    Acceptance, E,TB, NR,NL by  at 9/2/2020 1554                   Point: Body mechanics (Done)     Description:   Instruct learner(s) on proper positioning and spine alignment during self-care, functional mobility activities and/or exercises.              Learning Progress Summary           Patient Acceptance, E,TB,D, VU,DU,NR by NA at 9/8/2020 1136    Comment:  role and benefits of OT, BUE ther ex in all planes, transfers to chair with RW, calling for  assistance when getting up    Acceptance, E,TB, VU,NR by  at 9/6/2020 1603    Acceptance, E,TB, NR by  at 9/3/2020 1513    Acceptance, E,TB, NR,NL by  at 9/2/2020 1554                               User Key     Initials Effective Dates Name Provider Type Discipline     07/20/20 -  Gertrude Bhatt, OT Occupational Therapist OT     03/01/19 -  Zee Shepherd, OT Occupational Therapist OT     03/01/19 -  Allison Friedman, OT Occupational Therapist OT     03/01/19 -  Marilee Casey, ROBBY Physical Therapy Assistant PT                OT Recommendation and Plan  Outcome Summary/Treatment Plan (OT)  Daily Summary of Progress (OT): progress toward functional goals as expected, progress toward functional goals is good  Anticipated Discharge Disposition (OT): inpatient rehabilitation facility  Daily Summary of Progress (OT): progress toward functional goals as expected, progress toward functional goals is good  Plan of Care Review  Plan of Care Reviewed With: patient  Plan of Care Reviewed With: patient  Outcome Summary: Pt tolerated OT tx well this date and shows good progress toward OT goals. pt A&Ox3 with some confusion during session and req reptition of cues for safety. pt req CGA with RW for func mob and transfers in room. pt completed BUE seated ther ex to increase endurance and strength for ADLs. pt will cont to benefit from skilled OT to address goals and return to PLOF. REC IP rehab at [place]. PPE worn: eye goggles, mask, gloves.  Outcome Measures     Row Name 09/08/20 1136             How much help from another is currently needed...    Putting on and taking off regular lower body clothing?  1  -NA      Bathing (including washing, rinsing, and drying)  2  -NA      Toileting (which includes using toilet bed pan or urinal)  2  -NA      Putting on and taking off regular upper body clothing  3  -NA      Taking care of personal grooming (such as brushing teeth)  3  -NA      Eating meals  4  -NA       AM-PAC 6 Clicks Score (OT)  15  -NA         Modified [place] Scale    Modified [place] Scale  4 - Moderately severe disability.  Unable to walk without assistance, and unable to attend to own bodily needs without assistance.  -NA         Functional Assessment    Outcome Measure Options  AM-PAC 6 Clicks Daily Activity (OT);Modified [place]  -NA        User Key  (r) = Recorded By, (t) = Taken By, (c) = Cosigned By    Initials Name Provider Type    Gertrude Gambino OT Occupational Therapist           Time Calculation:   Time Calculation- OT     Row Name 09/08/20 1050             Time Calculation- OT    OT Start Time  1050  -NA      OT Stop Time  1113  -NA      OT Time Calculation (min)  23 min  -NA      Total Timed Code Minutes- OT  23 minute(s)  -NA      OT Received On  09/08/20  -NA      OT - Next Appointment  09/09/20  -NA      OT Goal Re-Cert Due Date  09/16/20  -NA        User Key  (r) = Recorded By, (t) = Taken By, (c) = Cosigned By    Initials Name Provider Type    Gertrude Gambino OT Occupational Therapist        Therapy Charges for Today     Code Description Service Date Service Provider Modifiers Qty    [id number]  OT SELF CARE/MGMT/TRAIN EA 15 MIN 9/8/2020 Gertrude Bhatt OT GO 1    [id number]  OT THER PROC EA 15 MIN 9/8/2020 Gertrude Bhatt OT GO 1               Gertrude Bhatt OT  9/8/2020

## 2020-09-08 NOTE — THERAPY TREATMENT NOTE
Patient Name: Carlos Whipple  : 1955    MRN: [id number]                              Today's Date: 2020       Admit Date: 2020    Visit Dx:     ICD-10-CM ICD-9-CM   1. Atrial fibrillation with rapid ventricular response (CMS/HCC) I48.91 427.31   2. Uncontrolled hypertension I10 401.9   3. Dyspnea, unspecified type R06.00 786.09   4. CKD (chronic kidney disease) stage 3, GFR 30-59 ml/min (CMS/HCC) N18.3 585.3     Patient Active Problem List   Diagnosis   • Hypertensive emergency   • Chronic atrial fibrillation (CMS/HCC)   • Chronic diastolic CHF (congestive heart failure) (CMS/HCC)   • CKD (chronic kidney disease) stage 3, GFR 30-59 ml/min (CMS/HCC)   • Essential hypertension   • Gambling disorder, persistent   • Shortness of breath   • Coronary artery disease involving native coronary artery of native heart without angina pectoris   • Psoriasis   • History of CVA (cerebrovascular accident)   • DDD (degenerative disc disease), lumbosacral   • Peripheral polyneuropathy   • GERD (gastroesophageal reflux disease)   • Medically noncompliant   • Acute respiratory distress   • Unstable angina (CMS/HCC)   • Cervical disc disorder with radiculopathy   • Completed stroke (CMS/HCC)   • Degeneration of intervertebral disc   • Excessive anticoagulation   • Impaired left ventricular function   • Liver lesion   • Lumbar radiculopathy   • Cervical myelopathy (CMS/HCC)   • Lumbosacral radiculopathy   • Spinal stenosis of lumbar region   • Obesity   • Mild cognitive impairment   • Thoracic back pain   • Thoracic disc disease with myelopathy   • Paroxysmal atrial fibrillation (CMS/HCC)   • Uncontrolled hypertension   • Acute congestive heart failure (CMS/HCC)   • Dyspnea   • Atrial fibrillation with RVR (CMS/HCC)   • Atrial fibrillation with rapid ventricular response (CMS/HCC)   • Dermatitis associated with moisture     Past Medical History:   Diagnosis Date   • A-fib (CMS/HCC)    • CHF (congestive heart failure)  (CMS/[facility])    • Chronic atrial fibrillation (CMS/[facility]) 11/8/2019   • Chronic diastolic CHF (congestive heart failure) (CMS/[facility]) 11/8/2019   • CKD (chronic kidney disease) stage 3, GFR 30-59 ml/min (CMS/[facility]) 11/8/2019   • Coronary artery disease involving native coronary artery of native heart without angina pectoris 11/8/2019   • DDD (degenerative disc disease), lumbosacral 11/8/2019   • Essential hypertension 11/8/2019   • Gambling disorder, persistent 11/8/2019   • GERD (gastroesophageal reflux disease) 11/8/2019   • History of CVA (cerebrovascular accident) 11/8/2019   • Hyperlipidemia    • Hypertension    • Medically noncompliant 11/8/2019   • Peripheral polyneuropathy 11/8/2019   • Psoriasis 11/8/2019   • Stroke (CMS/[facility])      Past Surgical History:   Procedure Laterality Date   • CARDIAC CATHETERIZATION     • CARDIAC CATHETERIZATION N/A 12/10/2019    Procedure: Left Heart Cath and coronary angiogram;  Surgeon: Edy Thomas MD;  Location: [facility] CATH INVASIVE LOCATION;  Service: Cardiovascular     General Information     Row Name 09/08/20 1112          PT Evaluation Time/Intention    Mode of Treatment  physical therapy  -AO     Row Name 09/08/20 1112          General Information    Patient Profile Reviewed?  yes  -AO     Existing Precautions/Restrictions  fall  -AO     Row Name 09/08/20 1112          Cognitive Assessment/Intervention- PT/OT    Orientation Status (Cognition)  oriented x 3  -AO     Personal Safety Interventions  gait belt  -AO     Row Name 09/08/20 1112          Safety Issues, Functional Mobility    Impairments Affecting Function (Mobility)  balance;strength;endurance/activity tolerance  -AO       User Key  (r) = Recorded By, (t) = Taken By, (c) = Cosigned By    Initials Name Provider Type    AO Aura Pulido, PT Physical Therapist        Mobility     Row Name 09/08/20 1112          Bed Mobility Assessment/Treatment    Comment (Bed Mobility)  DNT: Pt sitting in recliner at start and end of  session  -AO     Row Name 09/08/20 1112          Bed-Chair Transfer    Bed-Chair [place] (Transfers)  contact guard  -AO     Assistive Device (Bed-Chair Transfers)  walker, front-wheeled  -AO     Row Name 09/08/20 1112          Sit-Stand Transfer    Sit-Stand [place] (Transfers)  contact guard  -AO     Assistive Device (Sit-Stand Transfers)  walker, front-wheeled  -AO     Row Name 09/08/20 1112          Gait/Stairs Assessment/Training    Gait/Stairs Assessment/Training  gait/ambulation independence;gait/ambulation assistive device  -AO     [place] Level (Gait)  verbal cues;contact guard  -AO     Assistive Device (Gait)  walker, front-wheeled  -AO     Distance in Feet (Gait)  120 ft, seated rest break, 20 ft x 2  -AO     Pattern (Gait)  step-through  -AO     Deviations/Abnormal Patterns (Gait)  stride length decreased;ricky decreased;gait speed decreased  -AO     Bilateral Gait Deviations  forward flexed posture;heel strike decreased  -AO     Comment (Gait/Stairs)  Decreased B foot clearance requiring VC'ing for increased hip/knee flexion during swing phase of gait  -AO       User Key  (r) = Recorded By, (t) = Taken By, (c) = Cosigned By    Initials Name Provider Type    AO Aura Pulido, PT Physical Therapist        Obj/Interventions     Row Name 09/08/20 1112          Therapeutic Exercise    Comment (Therapeutic Exercise)  Pt completed 3 bouts of the 30 Second Sit to/from Stand test for 2 reps each bout, requiring CGA for safety and with use of RW for stability  -AO     Row Name 09/08/20 1112          Static Sitting Balance    Level of [place] (Unsupported Sitting, Static Balance)  conditional independence  -AO     Sitting Position (Unsupported Sitting, Static Balance)  sitting in chair  -AO     Time Able to Maintain Position (Unsupported Sitting, Static Balance)  more than 5 minutes  -AO     Row Name 09/08/20 1112          Dynamic Sitting Balance    Level of [place], Reaches Outside  Midline (Sitting, Dynamic Balance)  contact guard assist  -AO     Sitting Position, Reaches Outside Midline (Sitting, Dynamic Balance)  sitting in chair  -AO     Row Name 09/08/20 1112          Static Standing Balance    Level of [place] (Supported Standing, Static Balance)  contact guard assist  -AO     Time Able to Maintain Position (Supported Standing, Static Balance)  2 to 3 minutes  -AO     Assistive Device Utilized (Supported Standing, Static Balance)  walker, rolling  -AO     Row Name 09/08/20 1112          Dynamic Standing Balance    Level of [place], Reaches Outside Midline (Standing, Dynamic Balance)  contact guard assist  -AO     Time Able to Maintain Position, Reaches Outside Midline (Standing, Dynamic Balance)  1 to 2 minutes  -AO     Assistive Device Utilized (Supported Standing, Dynamic Balance)  walker, rolling  -AO     Row Name 09/08/20 1112          Sensory Assessment/Intervention    Sensory General Assessment  no sensation deficits identified  -AO       User Key  (r) = Recorded By, (t) = Taken By, (c) = Cosigned By    Initials Name Provider Type    Aura Alexander, PT Physical Therapist        Goals/Plan     Row Name 09/08/20 1112          Gait Training Goal 1 (PT)    Activity/Assistive Device (Gait Training Goal 1, PT)  gait (walking locomotion);assistive device use  -AO     [place] Level (Gait Training Goal 1, PT)  conditional independence  -AO     Distance (Gait Goal 1, PT)  125 ft  -AO     Time Frame (Gait Training Goal 1, PT)  2 weeks  -AO     Progress/Outcome (Gait Training Goal 1, PT)  good progress toward goal  -AO       User Key  (r) = Recorded By, (t) = Taken By, (c) = Cosigned By    Initials Name Provider Type    Aura Alexander, PT Physical Therapist        Clinical Impression     Row Name 09/08/20 1112          Pain Assessment    Additional Documentation  Pain Scale: Numbers Pre/Post-Treatment (Group)  -AO     Row Name 09/08/20 1112          Pain Scale: Numbers  Pre/Post-Treatment    Pain Scale: Numbers, Pretreatment  0/10 - no pain  -AO     Pain Scale: Numbers, Post-Treatment  0/10 - no pain  -AO     Row Name 09/08/20 1112          Plan of Care Review    Plan of Care Reviewed With  patient  -AO     Progress  improving  -AO     Outcome Summary  Pt demonstrated improved activity tolerance as evidenced by increased gait distance of 120ft with RW requiring CGA and VC'ing for improved gait mechanics with one brief episode of L knee instability (able to self-correct with no LOB). Pt completed 30 Second Sit to/from Stand test for 2 reps indicating increased risk for falls as well as decreased endurance. Recommending IP rehab (plans to d/c to rehab today) and continue seeing 5x/week at [facility]. PPE: gloves, mask, safety glasses  -AO     Row Name 09/08/20 1112          Physical Therapy Clinical Impression    Criteria for Skilled Interventions Met (PT Clinical Impression)  treatment indicated  -AO     Rehab Potential (PT Clinical Summary)  good, to achieve stated therapy goals  -AO     Row Name 09/08/20 1112          Vital Signs    Recovery Time  Vitals stable and WNL on RA throughout session  -AO     Row Name 09/08/20 1112          Positioning and Restraints    Pre-Treatment Position  sitting in chair/recliner  -AO     Post Treatment Position  chair  -AO     In Chair  notified nsg;sitting;call light within reach;encouraged to call for assist;exit alarm on  -AO       User Key  (r) = Recorded By, (t) = Taken By, (c) = Cosigned By    Initials Name Provider Type    Aura Alexander, PT Physical Therapist        Outcome Measures    No documentation.       Physical Therapy Education                 Title: PT OT SLP Therapies (Done)     Topic: Physical Therapy (Done)     Point: Mobility training (Done)     Description:   Instruct learner(s) on safety and technique for assisting patient out of bed, chair or wheelchair.  Instruct in the proper use of assistive devices, such as walker,  crutches, cane or brace.              Patient Friendly Description:   It's important to get you on your feet again, but we need to do so in a way that is safe for you. Falling has serious consequences, and your personal safety is the most important thing of all.        When it's time to get out of bed, one of us or a family member will sit next to you on the bed to give you support.     If your doctor or nurse tells you to use a walker, crutches, a cane, or a brace, be sure you use it every time you get out of bed, even if you think you don't need it.    Learning Progress Summary           Patient Acceptance, E,TB, VU by  at 9/8/2020 1143    Acceptance, E,TB, VU,NR by  at 9/6/2020 1603    Acceptance, E, VU by SC at 9/4/2020 1440    Acceptance, E, VU by SC at 9/3/2020 1837    Acceptance, E, NR by  at 9/2/2020 1200                   Point: Precautions (Done)     Description:   Instruct learner(s) on prescribed precautions during mobility and gait tasks              Learning Progress Summary           Patient Acceptance, E,TB, VU by AO at 9/8/2020 1143    Acceptance, E,TB, VU,NR by  at 9/6/2020 1603    Acceptance, E, VU by SC at 9/4/2020 1440    Acceptance, E, VU by SC at 9/3/2020 1837    Acceptance, E, NR by  at 9/2/2020 1200                               User Key     Initials Effective Dates Name Provider Type Discipline     04/17/20 -  Pauline Barrett, PT Physical Therapist PT     03/01/19 -  Aura Pulido, PT Physical Therapist PT    SC 03/01/19 -  Lizet Scott PTA Physical Therapy Assistant PT     03/01/19 -  Marilee Casey PTA Physical Therapy Assistant PT              PT Recommendation and Plan     Outcome Summary/Treatment Plan (PT)  Anticipated Discharge Disposition (PT): inpatient rehabilitation facility  Plan of Care Reviewed With: patient  Progress: improving  Outcome Summary: Pt demonstrated improved activity tolerance as evidenced by increased gait distance of 120ft with RW  requiring CGA and VC'ing for improved gait mechanics with one brief episode of L knee instability (able to self-correct with no LOB). Pt completed 30 Second Sit to/from Stand test for 2 reps indicating increased risk for falls as well as decreased endurance. Recommending IP rehab (plans to d/c to rehab today) and continue seeing 5x/week at [facility]. PPE: gloves, mask, safety glasses     Time Calculation:   PT Charges     Row Name 09/08/20 1144             Time Calculation    Start Time  1112  -AO      Stop Time  1144  -AO      Time Calculation (min)  32 min  -AO      PT Received On  09/08/20  -AO      PT - Next Appointment  09/09/20  -AO         Time Calculation- PT    Total Timed Code Minutes- PT  32 minute(s)  -AO        User Key  (r) = Recorded By, (t) = Taken By, (c) = Cosigned By    Initials Name Provider Type    Aura Alexander, PT Physical Therapist        Therapy Charges for Today     Code Description Service Date Service Provider Modifiers Qty    73243145428 HC GAIT TRAINING EA 15 MIN 9/8/2020 Aura Pulido, PT GP 1    70652509957 HC PT THERAPEUTIC ACT EA 15 MIN 9/8/2020 Aura Pulido, PT GP 1          PT G-Codes  Outcome Measure Options: AM-PAC 6 Clicks Daily Activity (OT), Modified [place]  AM-PAC 6 Clicks Score (PT): 19  AM-PAC 6 Clicks Score (OT): 15  Modified [place] Scale: 4 - Moderately severe disability.  Unable to walk without assistance, and unable to attend to own bodily needs without assistance.    Aura Pulido PT  9/8/2020

## 2020-09-08 NOTE — PROGRESS NOTES
Continued Stay Note   Yoseph     Patient Name: Carlos Whipple  MRN: [id number]  Today's Date: 9/8/2020    Admit Date: 9/1/2020    Discharge Plan     Row Name 09/08/20 1502       Plan    Plan  [facility] acute at d/c pending pre-cert. Pre-cert started 9/4. Still pending as of 9/8. No PASRR needed for [facility].     Plan Comments  SW sent message to [facility] liaison to request update regarding pre-cert. Pt's pre-cert approval is delayed due to the holiday weekend. Pre-cert is still pending at this time.         Teri Allison, KRISW, LSW  PRN   Phone: [phone]

## 2020-09-08 NOTE — PROGRESS NOTES
Cardiology Progress Note      Admiting Physician:  Dre Stauffer MD   LOS: 5 days       Reason For Followup:  Atrial fibrillation with rapid ventricular response  Heart failure with preserved ejection fraction  Coronary artery disease    Subjective:    Interval History:  Seen and examined.  Chart and labs reviewed.  Patient denies any chest pain pressure heaviness or tightness.  He denies any shortness of breath.  He denies any PND orthopnea.  He reports no new complaints.  Nursing reports waiting on placement.    Review of Systems:  A complete review of systems was undertaken with the pertinent cardiovascular findings listed in history of present illness and all other systems being negative.     Assessment & Plan    Impressions:  Atrial fibrillation with rapid ventricular response  Heart failure with preserved ejection fraction  Chronic kidney disease  Coronary artery disease  History of CVA  Abnormal blood culture-likely skin contaminant  Mild baseline cognitive impairment  Gambling disorder    Recommendations:  Continuation of his current cardiovascular care at the present time.  Cardiac status appropriate for discharge to next destination of care when okay with other services.  Patient's heart rate has been stable with oral medicines including Cardizem beta-blockers  Patient is also on Eliquis for anticoagulation  Continue current medical therapy and follow him      Objective:    Medication Review:   Scheduled Meds:    allopurinol 100 mg Oral Daily   aspirin 81 mg Oral Daily   atorvastatin 10 mg Oral Nightly   bumetanide 1 mg Oral Daily   clotrimazole-betamethasone  Topical Q12H   dilTIAZem  mg Oral Q24H   gabapentin 100 mg Oral Nightly   hydrALAZINE 100 mg Oral TID   ipratropium-albuterol 3 mL Nebulization 4x Daily - RT   isosorbide mononitrate 30 mg Oral BID - Nitrates   losartan 50 mg Oral Q24H   metoprolol tartrate 50 mg Oral Q12H   risperiDONE 0.25 mg Oral Daily   rivaroxaban 20 mg Oral Daily With  Dinner   sodium chloride 10 mL Intravenous Q12H   spironolactone 12.5 mg Oral Daily     Continuous Infusions:     PRN Meds:.•  acetaminophen  •  albuterol  •  hydrALAZINE  •  LORazepam  •  magnesium hydroxide  •  sodium chloride  •  sodium chloride    Patient Active Problem List   Diagnosis   • Hypertensive emergency   • Chronic atrial fibrillation (CMS/HCC)   • Chronic diastolic CHF (congestive heart failure) (CMS/[facility])   • CKD (chronic kidney disease) stage 3, GFR 30-59 ml/min (CMS/[facility])   • Essential hypertension   • Gambling disorder, persistent   • Shortness of breath   • Coronary artery disease involving native coronary artery of native heart without angina pectoris   • Psoriasis   • History of CVA (cerebrovascular accident)   • DDD (degenerative disc disease), lumbosacral   • Peripheral polyneuropathy   • GERD (gastroesophageal reflux disease)   • Medically noncompliant   • Acute respiratory distress   • Unstable angina (CMS/[facility])   • Cervical disc disorder with radiculopathy   • Completed stroke (CMS/[facility])   • Degeneration of intervertebral disc   • Excessive anticoagulation   • Impaired left ventricular function   • Liver lesion   • Lumbar radiculopathy   • Cervical myelopathy (CMS/[facility])   • Lumbosacral radiculopathy   • Spinal stenosis of lumbar region   • Obesity   • Mild cognitive impairment   • Thoracic back pain   • Thoracic disc disease with myelopathy   • Paroxysmal atrial fibrillation (CMS/[facility])   • Uncontrolled hypertension   • Acute congestive heart failure (CMS/[facility])   • Dyspnea   • Atrial fibrillation with RVR (CMS/[facility])   • Atrial fibrillation with rapid ventricular response (CMS/[facility])   • Dermatitis associated with moisture         Physical Exam:    General: Alert, cooperative, no distress, appears stated age  Head:  Normocephalic, atraumatic, mucous membranes moist  Eyes:  Conjunctiva/corneas clear, EOM's intact     Neck:  Supple,  no bruit  Lungs: Coarse and diminished bilaterally.  Chest wall: No  tenderness  Heart::  Regular rate and rhythm, S1 and S2 normal, 1/6 holosystolic murmur.  No rub or gallop  Abdomen: Soft, non-tender, nondistended bowel sounds active  Extremities: No cyanosis, clubbing, or edema  Pulses: 2+ and symmetric all extremities  Skin:  No rashes or lesions  Neuro/psych: A&O x3. CN II through XII are grossly intact with appropriate affect    Vital Signs:  Vitals:    09/08/20 0834 09/08/20 1019 09/08/20 1121 09/08/20 1415   BP: 139/79 117/72  105/68   BP Location:  Right arm  Left arm   Patient Position:  Lying  Sitting   Pulse:   76 73   Resp:  13  16   Temp:  97.6 °F (36.4 °C)  97.8 °F (36.6 °C)   TempSrc:  Oral  Oral   SpO2:       Weight:       Height:         Wt Readings from Last 1 Encounters:   09/08/20 113 kg (248 lb 14.4 oz)       Intake/Output Summary (Last 24 hours) at 9/8/2020 1420  Last data filed at 9/8/2020 1121  Gross per 24 hour   Intake 800 ml   Output 920 ml   Net -120 ml         Results Review:     CBC    Results from last 7 days   Lab Units 09/08/20  0649 09/07/20  0312 09/06/20  0442 09/05/20  0228 09/04/20  0302 09/03/20  0352 09/02/20  0252   WBC 10*3/mm3 8.80 9.40 8.90 11.70* 10.80 10.60 9.70   HEMOGLOBIN g/dL 14.5 14.4 14.6 14.8 14.4 13.2 13.0   PLATELETS 10*3/mm3 213 221 182 223 192 175 164     Cr Clearance Estimated Creatinine Clearance: 87.7 mL/min (by C-G formula based on SCr of 1.12 mg/dL).  Coag     HbA1C   Lab Results   Component Value Date    HGBA1C 5.5 12/12/2019    HGBA1C 5.1 11/09/2019    HGBA1C 5.6 10/10/2018     Blood Glucose No results found for: POCGLU  Infection   Results from last 7 days   Lab Units 09/02/20  0252 09/01/20  1431   BLOODCX   --  No growth at 5 days  Staphylococcus, coagulase negative*   BCIDPCR   --  Staphylococcus spp, not aureus. Identification by BCID PCR.*   PROCALCITONIN ng/mL 0.08  --      CMP   Results from last 7 days   Lab Units 09/08/20  0649 09/07/20  0312 09/06/20  0442 09/05/20  0228 09/04/20  0302 09/03/20  0352  09/02/20  0252 09/01/20  1431   SODIUM mmol/L 139 138 138 138 140 140 142 140   POTASSIUM mmol/L 4.5 4.7 4.2 4.1 3.7 3.6 3.6 4.0   CHLORIDE mmol/L 106 101 102 98 102 105 108* 108*   CO2 mmol/L 23.0 27.0 23.0 26.0 26.0 25.0 23.0 19.0*   BUN   --  29* 29* 26* 22 24* 21 23   CREATININE mg/dL 1.12 1.24 1.21 1.36* 1.17 1.18 1.18 1.35*   GLUCOSE mg/dL 97 117* 105* 114* 106* 103* 128* 110*   ALBUMIN g/dL 3.70 3.80  --  3.90 3.70 3.50  --  4.00   BILIRUBIN mg/dL 0.7 0.6  --  0.6 0.8 0.7  --  1.0   ALK PHOS U/L 70 69  --  65 62 64  --  71   AST (SGOT) U/L 31 30  --  15 16 18  --  17   ALT (SGPT) U/L 35 26  --  18 14 16  --  20     ABG      UA    Results from last 7 days   Lab Units 09/02/20  0928   NITRITE UA  Negative   WBC UA /HPF 0-2*   BACTERIA UA /HPF None Seen   SQUAM EPITHEL UA /HPF 0-2     BHARATI  No results found for: POCMETH, POCAMPHET, POCBARBITUR, POCBENZO, POCCOCAINE, POCOPIATES, POCOXYCODO, POCPHENCYC, POCPROPOXY, POCTHC, POCTRICYC  Lysis Labs   Results from last 7 days   Lab Units 09/08/20  0649 09/07/20  0312 09/06/20  0442 09/05/20  0228 09/04/20  0302 09/03/20  0352 09/02/20  0252   HEMOGLOBIN g/dL 14.5 14.4 14.6 14.8 14.4 13.2 13.0   PLATELETS 10*3/mm3 213 221 182 223 192 175 164   CREATININE mg/dL 1.12 1.24 1.21 1.36* 1.17 1.18 1.18     Radiology(recent) No radiology results for the last day      Results from last 7 days   Lab Units 09/02/20  1033 09/02/20  0252   CK TOTAL U/L  --  33   TROPONIN T ng/mL <0.010 <0.010       Imaging Results (Last 24 Hours)     ** No results found for the last 24 hours. **          Cardiac Studies:  Echo-   Results for orders placed during the hospital encounter of 11/07/19   Adult Transthoracic Echo Complete W/ Cont if Necessary Per Protocol    Narrative · The left ventricular cavity is severely dilated.  · Left ventricular systolic function is moderately decreased.  · Left atrial cavity size is moderately dilated.  · Moderate mitral valve regurgitation is present  · Moderate  tricuspid valve regurgitation is present.  · The following left ventricular wall segments are hypokinetic: mid   anterior, apical anterior, basal anterolateral, mid anterolateral, apical   lateral, basal inferolateral, mid inferolateral, apical inferior, mid   inferior, apical septal, basal inferoseptal, mid inferoseptal, apex   hypokinetic, mid anteroseptal, basal anterior, basal inferior and basal   inferoseptal.  · LV ejection fraction is about 30 to 35%  · No pericardial effusion noted        Stress Myoview-  Cath-        Edy Thomas MD  09/08/20  14:20

## 2020-09-08 NOTE — PLAN OF CARE
Problem: Patient Care Overview  Goal: Plan of Care Review  Outcome: Ongoing (interventions implemented as appropriate)  Flowsheets (Taken 9/8/2020 1133)  Outcome Summary: Pt tolerated OT tx well this date and shows good progress toward OT goals. pt A&Ox3 with some confusion during session and req reptition of cues for safety. pt req CGA with RW for func mob and transfers in room. pt completed BUE seated ther ex to increase endurance and strength for ADLs. pt will cont to benefit from skilled OT to address goals and return to PLOF. REC IP rehab at [place]. PPE worn: eye goggles, mask, gloves.  Note:   Gertrude Bhatt, OTANT, OTR/L

## 2020-09-08 NOTE — PROGRESS NOTES
LOS: 5 days   Patient Care Team:  Marisol Larson APRN as PCP - General  Shwetha Barber MD as Consulting Physician (Nephrology)  Edy Thomas MD as Consulting Physician (Cardiology)    Chief Complaint:   Still in atrial fibrillation,rate controlled. Denies any chest pain or SOA today.    Subjective :   Appears chronically ill, markedly older than stated age    Interval History:     Patient Complaints:  None this morning  Patient Denies:   No chest pain, no palpitations, no shortness of breath. No edema or cough.   History taken from: patient    Review of Systems:    All systems were reviewed and negative except for:   See  interval history  Objective     BP improved    Vital Signs  Temp:  [97.5 °F (36.4 °C)-98.7 °F (37.1 °C)] 97.6 °F (36.4 °C)  Heart Rate:  [72-94] 94  Resp:  [11-18] 13  BP: (117-166)/(67-92) 117/72    Physical Exam:     General Appearance:    Alert and oriented, in no acute distress   Head:    Normocephalic, without obvious abnormality, atraumatic   Eyes:               Throat:      Neck:   No adenopathy, supple, trachea midline   Lungs:     Clear to auscultation,respirations regular, even and                  Unlabored, distant but adequate adventitial flow    Heart:     Distant, irregularly irregular.   Chest Wall:    No abnormalities observed   Abdomen:     Normal bowel sounds, no masses, no organomegaly, soft        non-tender, non-distended, no guarding, no rebound                tenderness   Rectal:     Deferred   Extremities:   Moves all extremities , no edema, no cyanosis, no             redness   Pulses:   Pulses palpable and equal bilaterally   Skin:   No bleeding, bruising or rash   Lymph nodes:      Neurologic:      Radiology:  Ct Head Without Contrast    Result Date: 9/2/2020  1. Stable exam, with no acute process demonstrated. 2. Stable moderate chronic white matter disease and left occipital encephalomalacia.  Electronically Signed By-Nehal Way On:9/2/2020 8:17  PM This report was finalized on [id number] by  Nehal Way, .    Xr Chest 1 View    Result Date: 9/1/2020  1.Left basilar consolidation appears unchanged, which could reflect atelectasis or pneumonia. 2.Cardiomegaly with possible small left pleural effusion.  Electronically Signed By-DR. Salty Joseph MD On:9/1/2020 3:35 PM This report was finalized on [id number] by DR. Salty Joseph MD.    Us Renal Bilateral    Result Date: 9/2/2020  Unremarkable sonographic appearance of the kidneys.  Electronically Signed By-Teri Persaud On:9/2/2020 11:30 AM This report was finalized on [id number] by  Teri Persaud, .         Results Review:     I reviewed the patient's new clinical results.  I reviewed the patient's new imaging results and agree with the interpretation.    Medication Review:   Scheduled Meds:    allopurinol 100 mg Oral Daily   aspirin 81 mg Oral Daily   atorvastatin 10 mg Oral Nightly   bumetanide 1 mg Oral Daily   clotrimazole-betamethasone  Topical Q12H   dilTIAZem  mg Oral Q24H   gabapentin 100 mg Oral Nightly   hydrALAZINE 100 mg Oral TID   ipratropium-albuterol 3 mL Nebulization 4x Daily - RT   isosorbide mononitrate 30 mg Oral BID - Nitrates   losartan 50 mg Oral Q24H   metoprolol tartrate 50 mg Oral Q12H   risperiDONE 0.25 mg Oral Daily   rivaroxaban 20 mg Oral Daily With Dinner   sodium chloride 10 mL Intravenous Q12H   spironolactone 12.5 mg Oral Daily     Continuous Infusions:     PRN Meds:.•  acetaminophen  •  albuterol  •  hydrALAZINE  •  LORazepam  •  magnesium hydroxide  •  sodium chloride  •  sodium chloride    Labs:  Lab Results (last 24 hours)     Procedure Component Value Units Date/Time    Comprehensive Metabolic Panel [[id number]] Collected:  09/08/20 0649    Specimen:  Blood Updated:  09/08/20 0749     Glucose 97 mg/dL      BUN --     Comment: Testing performed by alternate method        Creatinine 1.12 mg/dL      Sodium 139 mmol/L      Potassium 4.5 mmol/L       Comment: Slight hemolysis detected by analyzer. Results may be affected.        Chloride 106 mmol/L      CO2 23.0 mmol/L      Calcium 9.2 mg/dL      Total Protein 7.4 g/dL      Albumin 3.70 g/dL      ALT (SGPT) 35 U/L      AST (SGOT) 31 U/L      Alkaline Phosphatase 70 U/L      Total Bilirubin 0.7 mg/dL      eGFR Non African Amer 66 mL/min/1.73      Globulin 3.7 gm/dL      A/G Ratio 1.0 g/dL      BUN/Creatinine Ratio --     Comment: Testing not performed        Anion Gap 10.0 mmol/L     Narrative:       GFR Normal >60  Chronic Kidney Disease <60  Kidney Failure <15      Magnesium [[id number]]  (Abnormal) Collected:  09/08/20 0649    Specimen:  Blood Updated:  09/08/20 0749     Magnesium 2.5 mg/dL     BUN [[id number]] Collected:  09/08/20 0649    Specimen:  Blood Updated:  09/08/20 0749    CBC & Differential [[id number]] Collected:  09/08/20 0649    Specimen:  Blood Updated:  09/08/20 0724    Narrative:       The following orders were created for panel order CBC & Differential.  Procedure                               Abnormality         Status                     ---------                               -----------         ------                     CBC Auto Differential[[id number]]        Abnormal            Final result                 Please view results for these tests on the individual orders.    CBC Auto Differential [[id number]]  (Abnormal) Collected:  09/08/20 0649    Specimen:  Blood Updated:  09/08/20 0724     WBC 8.80 10*3/mm3      RBC 5.37 10*6/mm3      Hemoglobin 14.5 g/dL      Hematocrit 45.4 %      MCV 84.6 fL      MCH 27.0 pg      MCHC 31.9 g/dL      RDW 19.6 %      RDW-SD 57.8 fl      MPV 8.4 fL      Platelets 213 10*3/mm3      Neutrophil % 70.2 %      Lymphocyte % 15.4 %      Monocyte % 11.8 %      Eosinophil % 1.9 %      Basophil % 0.7 %      Neutrophils, Absolute 6.20 10*3/mm3      Lymphocytes, Absolute 1.40 10*3/mm3      Monocytes, Absolute 1.00 10*3/mm3      Eosinophils, Absolute 0.20 10*3/mm3       Basophils, Absolute 0.10 10*3/mm3      nRBC 0.1 /100 WBC            Assessment/Plan       Atrial fibrillation with rapid ventricular response (CMS/[facility])    Chronic atrial fibrillation (CMS/[facility])    Chronic diastolic CHF (congestive heart failure) (CMS/[facility])    CKD (chronic kidney disease) stage 3, GFR 30-59 ml/min (CMS/[facility])    Gambling disorder, persistent    Coronary artery disease involving native coronary artery of native heart without angina pectoris    Psoriasis    History of CVA (cerebrovascular accident)    DDD (degenerative disc disease), lumbosacral    Peripheral polyneuropathy    GERD (gastroesophageal reflux disease)    Medically noncompliant    Mild cognitive impairment    Uncontrolled hypertension    Dyspnea    Dermatitis associated with moisture  Single blood culture positive for staph coag negative most likely contaminant.    HR and BP improved.  Continue current therapy.   Monitor electrolytes and replace p.r.n..  Will DC to [facility] for rehab when precert/bed ready as cardiology and nephrology have signed off.    Of note, lactic acid, pro calcitonin markers for sepsis/pneumonic insult are negative at this juncture.  Suspect the single blood culture with coag negative staph is actually skin contaminant.  Patient does not appear to be septic.      Marisol Larson, CLARISSA  09/08/20  12:32

## 2020-09-08 NOTE — PLAN OF CARE
Problem: Patient Care Overview  Goal: Plan of Care Review  Outcome: Ongoing (interventions implemented as appropriate)       Pt demonstrated improved activity tolerance as evidenced by increased gait distance of 120ft with RW requiring CGA and VC'ing for improved gait mechanics with one brief episode of L knee instability (able to self-correct with no LOB). Pt completed 30 Second Sit to/from Stand test for 2 reps indicating increased risk for falls as well as decreased endurance. Recommending IP rehab (plans to d/c to rehab today) and continue seeing 5x/week at [facility]. PPE: gloves, mask, safety glasses

## 2020-09-08 NOTE — PLAN OF CARE
Pt still showing signs of intermittent confusion. Fixated on transportation at D/C. Heart rate and BP much improved, though difficult to get pt to leave monitoring leads on. Martha mcdaniel while RN was in the room, signed off on discharge with follow up in 2-3 weeks.

## 2020-09-08 NOTE — PLAN OF CARE
Problem: Skin Injury Risk (Adult)  Goal: Identify Related Risk Factors and Signs and Symptoms  9/8/2020 1924 by Amanda Crabtree RN  Outcome: Ongoing (interventions implemented as appropriate)  9/8/2020 1144 by Amanda Crabtree RN  Outcome: Ongoing (interventions implemented as appropriate)  Goal: Skin Health and Integrity  9/8/2020 1924 by Amanda Crabtree RN  Outcome: Ongoing (interventions implemented as appropriate)  9/8/2020 1144 by Amanda Crabtree RN  Outcome: Ongoing (interventions implemented as appropriate)     Problem: Fall Risk (Adult)  Goal: Identify Related Risk Factors and Signs and Symptoms  9/8/2020 1924 by Amanda Crabtree RN  Outcome: Ongoing (interventions implemented as appropriate)  9/8/2020 1144 by Amanda Crabtree RN  Outcome: Ongoing (interventions implemented as appropriate)  Goal: Absence of Fall  9/8/2020 1924 by Amanda Crabtree RN  Outcome: Ongoing (interventions implemented as appropriate)  9/8/2020 1144 by Amanda Crabtree RN  Outcome: Ongoing (interventions implemented as appropriate)     Problem: Arrhythmia/Dysrhythmia (Symptomatic) (Adult)  Goal: Signs and Symptoms of Listed Potential Problems Will be Absent, Minimized or Managed (Arrhythmia/Dysrhythmia)  9/8/2020 1924 by Amanda Crabtree RN  Outcome: Ongoing (interventions implemented as appropriate)  9/8/2020 1144 by Amanda Crabtree RN  Outcome: Ongoing (interventions implemented as appropriate)     Problem: Hypertensive Disease/Crisis (Arterial) (Adult)  Goal: Signs and Symptoms of Listed Potential Problems Will be Absent, Minimized or Managed (Hypertensive Disease/Crisis)  9/8/2020 1924 by Amanda Crabtree RN  Outcome: Ongoing (interventions implemented as appropriate)  9/8/2020 1144 by Amanda Crabtree RN  Outcome: Ongoing (interventions implemented as appropriate)     Problem: Patient Care Overview  Goal: Plan of Care Review  9/8/2020 1924 by Amanda Crabtree RN  Outcome: Ongoing (interventions implemented as  appropriate)  9/8/2020 1144 by Amanda Crabtree RN  Outcome: Ongoing (interventions implemented as appropriate)  Goal: Individualization and Mutuality  9/8/2020 1924 by Amanda Crabtree RN  Outcome: Ongoing (interventions implemented as appropriate)  9/8/2020 1144 by Amanda Crabtree RN  Outcome: Ongoing (interventions implemented as appropriate)  Goal: Discharge Needs Assessment  9/8/2020 1924 by Amanda Crabtree RN  Outcome: Ongoing (interventions implemented as appropriate)  9/8/2020 1144 by Amanda Crabtree RN  Outcome: Ongoing (interventions implemented as appropriate)  Goal: Interprofessional Rounds/Family Conf  9/8/2020 1924 by Amanda Crabtree RN  Outcome: Ongoing (interventions implemented as appropriate)  9/8/2020 1144 by Amanda Crabtree RN  Outcome: Ongoing (interventions implemented as appropriate)     Problem: Pain, Chronic (Adult)  Goal: Identify Related Risk Factors and Signs and Symptoms  9/8/2020 1924 by Amanda Crabtree RN  Outcome: Ongoing (interventions implemented as appropriate)  9/8/2020 1144 by Amanda Crabtree RN  Outcome: Ongoing (interventions implemented as appropriate)  Goal: Acceptable Pain/Comfort Level and Functional Ability  9/8/2020 1924 by Amanda Crabtree RN  Outcome: Ongoing (interventions implemented as appropriate)  9/8/2020 1144 by Amanda Crabtree RN  Outcome: Ongoing (interventions implemented as appropriate)

## 2020-09-08 NOTE — PLAN OF CARE
Problem: Skin Injury Risk (Adult)  Goal: Identify Related Risk Factors and Signs and Symptoms  Outcome: Ongoing (interventions implemented as appropriate)  Flowsheets (Taken 9/1/2020 8462 by Nehal Menon RN)  Related Risk Factors (Skin Injury Risk): body weight extremes;moisture;mobility impaired  Note:   Per off going nurse(9/7) patient experienced intermittent confusion, anxiety and refused care at times. During this shift patient has remained calm, and cooperative. Intermittent confusion noted at 0000 hour, however patient remained pleasant and able to follow commands without difficulty. VS WNL, patient remained afebrile. Denies pain when asked. Will continue to monitor.

## 2020-09-08 NOTE — PROGRESS NOTES
"NEPHROLOGY PROGRESS NOTE------[facility]    [facility]  [phone]  Sterling Barber MD      Patient Care Team:  Marisol Larson APRN as PCP - General  Shwetha Barber MD as Consulting Physician (Nephrology)  Edy Thomas MD as Consulting Physician (Cardiology)      Provider:  Sterling Barber MD  Patient Name: Carlos Whipple  :  1955    SUBJECTIVE:    F/U CRF/CKD    Feeling good. No SOB, CP, dysuria, palpitations.    Medication:    allopurinol 100 mg Oral Daily   aspirin 81 mg Oral Daily   atorvastatin 10 mg Oral Nightly   bumetanide 1 mg Oral Daily   clotrimazole-betamethasone  Topical Q12H   dilTIAZem  mg Oral Q24H   gabapentin 100 mg Oral Nightly   hydrALAZINE 100 mg Oral TID   ipratropium-albuterol 3 mL Nebulization 4x Daily - RT   isosorbide mononitrate 30 mg Oral BID - Nitrates   losartan 50 mg Oral Q24H   metoprolol tartrate 50 mg Oral Q12H   risperiDONE 0.25 mg Oral Daily   rivaroxaban 20 mg Oral Daily With Dinner   sodium chloride 10 mL Intravenous Q12H   spironolactone 12.5 mg Oral Daily       niCARdipine 5-15 mg/hr Last Rate: Stopped (20 0019)       OBJECTIVE    Vital Sign Min/Max for last 24 hours  Temp  Min: 97.5 °F (36.4 °C)  Max: 98.7 °F (37.1 °C)   BP  Min: 119/92  Max: 166/86   Pulse  Min: 65  Max: 94   Resp  Min: 11  Max: 18   SpO2  Min: 96 %  Max: 99 %   No data recorded   Weight  Min: 113 kg (248 lb 14.4 oz)  Max: 113 kg (248 lb 14.4 oz)     Flowsheet Rows      First Filed Value   Admission Height  182.9 cm (72\") Documented at 2020 1338   Admission Weight  114 kg (252 lb) Documented at 2020 1338          No intake/output data recorded.  I/O last 3 completed shifts:  In: 1800 [P.O.:1800]  Out:  [Urine:]    Physical Exam:  General Appearance: alert, appears stated age and cooperative  Head: normocephalic, without obvious abnormality and atraumatic  Eyes: conjunctivae and sclerae normal and no " icterus  Neck: supple and no JVD  Lungs: clear to auscultation and respirations regular  Heart: IRREG IRREG +AFUA. NO GALLOP, RUB, OR S3 +AFUA  Chest: Wall no abnormalities observed  Abdomen: normal bowel sounds and soft non-tender  Extremities: moves extremities well, no edema, no cyanosis and no redness  Skin: no bleeding, bruising or rash, turgor normal, color normal and no lesions noted  Neurologic: Alert, and oriented. No focal deficits    Labs:    WBC WBC   Date Value Ref Range Status   09/07/2020 9.40 3.40 - 10.80 10*3/mm3 Final   09/06/2020 8.90 3.40 - 10.80 10*3/mm3 Final      HGB Hemoglobin   Date Value Ref Range Status   09/07/2020 14.4 13.0 - 17.7 g/dL Final   09/06/2020 14.6 13.0 - 17.7 g/dL Final      HCT Hematocrit   Date Value Ref Range Status   09/07/2020 44.0 37.5 - 51.0 % Final   09/06/2020 43.7 37.5 - 51.0 % Final      Platlets No results found for: LABPLAT   MCV MCV   Date Value Ref Range Status   09/07/2020 83.3 79.0 - 97.0 fL Final   09/06/2020 81.7 79.0 - 97.0 fL Final          Sodium Sodium   Date Value Ref Range Status   09/07/2020 138 136 - 145 mmol/L Final   09/06/2020 138 136 - 145 mmol/L Final      Potassium Potassium   Date Value Ref Range Status   09/07/2020 4.7 3.5 - 5.2 mmol/L Final   09/06/2020 4.2 3.5 - 5.2 mmol/L Final      Chloride Chloride   Date Value Ref Range Status   09/07/2020 101 98 - 107 mmol/L Final   09/06/2020 102 98 - 107 mmol/L Final      CO2 CO2   Date Value Ref Range Status   09/07/2020 27.0 22.0 - 29.0 mmol/L Final   09/06/2020 23.0 22.0 - 29.0 mmol/L Final      BUN BUN   Date Value Ref Range Status   09/07/2020   Final     Comment:     Testing performed by alternate method   09/07/2020 29 (H) 8 - 23 mg/dL Final   09/06/2020   Final     Comment:     Testing performed by alternate method   09/06/2020 29 (H) 8 - 23 mg/dL Final      Creatinine Creatinine   Date Value Ref Range Status   09/07/2020 1.24 0.76 - 1.27 mg/dL Final   09/06/2020 1.21 0.76 - 1.27 mg/dL Final       Calcium Calcium   Date Value Ref Range Status   09/07/2020 9.3 8.6 - 10.5 mg/dL Final   09/06/2020 9.1 8.6 - 10.5 mg/dL Final      PO4 No components found for: PO4   Albumin Albumin   Date Value Ref Range Status   09/07/2020 3.80 3.50 - 5.20 g/dL Final      Magnesium Magnesium   Date Value Ref Range Status   09/07/2020 2.7 (H) 1.6 - 2.4 mg/dL Final   09/06/2020 2.5 (H) 1.6 - 2.4 mg/dL Final      Uric Acid No components found for: URIC ACID     Imaging Results (Last 72 Hours)     ** No results found for the last 72 hours. **          Results for orders placed during the hospital encounter of 09/01/20   XR Chest 1 View    Narrative XR CHEST 1 VW-     Date of Exam: 9/1/2020 3:10 PM     Indication: soa.     Comparison Exams: 08/21/2020     Technique: Single AP chest radiograph     FINDINGS:  A left basilar consolidation appears unchanged. There may be an  associated small left pleural effusion. The heart is enlarged. The  pulmonary vasculature appears normal. The osseous structures appear  intact.       Impression 1.Left basilar consolidation appears unchanged, which could reflect  atelectasis or pneumonia.  2.Cardiomegaly with possible small left pleural effusion.     Electronically Signed By-DR. Salty Joseph MD On:9/1/2020 3:35 PM  This report was finalized on [id number] by DR. Salty Joseph MD.       Results for orders placed during the hospital encounter of 12/06/19   Duplex Carotid Ultrasound CAR    Narrative · Proximal right internal carotid artery is normal.  · Mid right internal carotid artery is normal.  · Proximal left internal carotid artery is normal.  · Mid left internal carotid artery is normal.            ASSESSMENT / PLAN      Atrial fibrillation with rapid ventricular response (CMS/HCC)    Chronic atrial fibrillation (CMS/HCC)    Chronic diastolic CHF (congestive heart failure) (CMS/HCC)    CKD (chronic kidney disease) stage 3, GFR 30-59 ml/min (CMS/HCC)    Gambling disorder,  persistent    Coronary artery disease involving native coronary artery of native heart without angina pectoris    Psoriasis    History of CVA (cerebrovascular accident)    DDD (degenerative disc disease), lumbosacral    Peripheral polyneuropathy    GERD (gastroesophageal reflux disease)    Medically noncompliant    Mild cognitive impairment    Uncontrolled hypertension    Dyspnea    Dermatitis associated with moisture    1. CRF/CKD STG 2------Nonoliguric. BUN/Cr down to baseline. Appears to have CRF/CKD STG 2 with current serum creatinine at baseline. Unknown if patient has baseline proteinuria or hematuria. In the long run we may have to accept a higher baseline serum creatinine in order to keep euvolemic. Follow with cautious diuresis. CRF/CKD is likely secondary to HTN NS.   Dose meds for CrCl 60 cc/min     2. ACIDOSIS------Resolved     3. CHRONIC DIASTOLIC CHF/ELEVATED BNP------Cautious diuresis. Stable on current diuretic regimen     4. HTN WITH CKD------BP okay. No ACE/ARB/DRI for now     5. OA/DJD------No NSAIDs.      6. ATRIAL FIBRILLATION WITH RVR------per Cardiology. Dig level okay     7. HYPERLIPIDEMIA------Statin. CK, TSH okay    8. HYPERURICEMIA------Allopurinol     9. DECONDITIONING------PT    Okay from RENAL standpoint to d/c today and for patient to follow up in office in 2 weeks       Sterling Barber MD  [facility]  [phone]  09/08/20  07:12

## 2020-09-09 LAB
ANION GAP SERPL CALCULATED.3IONS-SCNC: 11 MMOL/L (ref 5–15)
BUN SERPL-MCNC: 35 MG/DL (ref 8–23)
BUN SERPL-MCNC: ABNORMAL MG/DL
BUN/CREAT SERPL: ABNORMAL
CALCIUM SPEC-SCNC: 9 MG/DL (ref 8.6–10.5)
CHLORIDE SERPL-SCNC: 104 MMOL/L (ref 98–107)
CO2 SERPL-SCNC: 20 MMOL/L (ref 22–29)
CREAT SERPL-MCNC: 1.21 MG/DL (ref 0.76–1.27)
DEPRECATED RDW RBC AUTO: 56 FL (ref 37–54)
ERYTHROCYTE [DISTWIDTH] IN BLOOD BY AUTOMATED COUNT: 19.2 % (ref 12.3–15.4)
GFR SERPL CREATININE-BSD FRML MDRD: 61 ML/MIN/1.73
GLUCOSE SERPL-MCNC: 110 MG/DL (ref 65–99)
HCT VFR BLD AUTO: 43.2 % (ref 37.5–51)
HGB BLD-MCNC: 14 G/DL (ref 13–17.7)
MAGNESIUM SERPL-MCNC: 2.4 MG/DL (ref 1.6–2.4)
MCH RBC QN AUTO: 27 PG (ref 26.6–33)
MCHC RBC AUTO-ENTMCNC: 32.5 G/DL (ref 31.5–35.7)
MCV RBC AUTO: 82.9 FL (ref 79–97)
PHOSPHATE SERPL-MCNC: 4.1 MG/DL (ref 2.5–4.5)
PLATELET # BLD AUTO: 211 10*3/MM3 (ref 140–450)
PMV BLD AUTO: 8.3 FL (ref 6–12)
POTASSIUM SERPL-SCNC: 4.2 MMOL/L (ref 3.5–5.2)
RBC # BLD AUTO: 5.21 10*6/MM3 (ref 4.14–5.8)
SODIUM SERPL-SCNC: 135 MMOL/L (ref 136–145)
WBC # BLD AUTO: 10.3 10*3/MM3 (ref 3.4–10.8)

## 2020-09-09 PROCEDURE — 80048 BASIC METABOLIC PNL TOTAL CA: CPT | Performed by: INTERNAL MEDICINE

## 2020-09-09 PROCEDURE — 97530 THERAPEUTIC ACTIVITIES: CPT

## 2020-09-09 PROCEDURE — 99232 SBSQ HOSP IP/OBS MODERATE 35: CPT | Performed by: INTERNAL MEDICINE

## 2020-09-09 PROCEDURE — 25010000002 LORAZEPAM PER 2 MG: Performed by: FAMILY MEDICINE

## 2020-09-09 PROCEDURE — 97110 THERAPEUTIC EXERCISES: CPT

## 2020-09-09 PROCEDURE — 94760 N-INVAS EAR/PLS OXIMETRY 1: CPT

## 2020-09-09 PROCEDURE — 97116 GAIT TRAINING THERAPY: CPT

## 2020-09-09 PROCEDURE — 83735 ASSAY OF MAGNESIUM: CPT | Performed by: INTERNAL MEDICINE

## 2020-09-09 PROCEDURE — 94799 UNLISTED PULMONARY SVC/PX: CPT

## 2020-09-09 PROCEDURE — 85027 COMPLETE CBC AUTOMATED: CPT | Performed by: INTERNAL MEDICINE

## 2020-09-09 PROCEDURE — 84100 ASSAY OF PHOSPHORUS: CPT | Performed by: INTERNAL MEDICINE

## 2020-09-09 RX ORDER — RISPERIDONE 0.25 MG/1
0.5 TABLET ORAL EVERY 12 HOURS SCHEDULED
Status: DISCONTINUED | OUTPATIENT
Start: 2020-09-09 | End: 2020-09-12 | Stop reason: HOSPADM

## 2020-09-09 RX ORDER — SODIUM BICARBONATE 650 MG/1
650 TABLET ORAL 3 TIMES DAILY
Status: DISCONTINUED | OUTPATIENT
Start: 2020-09-09 | End: 2020-09-12 | Stop reason: HOSPADM

## 2020-09-09 RX ADMIN — DILTIAZEM HYDROCHLORIDE 180 MG: 180 CAPSULE, COATED, EXTENDED RELEASE ORAL at 09:14

## 2020-09-09 RX ADMIN — ASPIRIN 81 MG: 81 TABLET, COATED ORAL at 09:14

## 2020-09-09 RX ADMIN — CLOTRIMAZOLE AND BETAMETHASONE DIPROPIONATE: 10; .64 CREAM TOPICAL at 21:00

## 2020-09-09 RX ADMIN — HYDRALAZINE HYDROCHLORIDE 100 MG: 25 TABLET, FILM COATED ORAL at 21:16

## 2020-09-09 RX ADMIN — ATORVASTATIN CALCIUM 10 MG: 10 TABLET, FILM COATED ORAL at 21:16

## 2020-09-09 RX ADMIN — METOPROLOL TARTRATE 50 MG: 50 TABLET, FILM COATED ORAL at 09:13

## 2020-09-09 RX ADMIN — IPRATROPIUM BROMIDE AND ALBUTEROL SULFATE 3 ML: 2.5; .5 SOLUTION RESPIRATORY (INHALATION) at 15:54

## 2020-09-09 RX ADMIN — HYDRALAZINE HYDROCHLORIDE 100 MG: 25 TABLET, FILM COATED ORAL at 16:03

## 2020-09-09 RX ADMIN — RIVAROXABAN 20 MG: 20 TABLET, FILM COATED ORAL at 17:19

## 2020-09-09 RX ADMIN — CLOTRIMAZOLE AND BETAMETHASONE DIPROPIONATE: 10; .64 CREAM TOPICAL at 09:15

## 2020-09-09 RX ADMIN — ISOSORBIDE MONONITRATE 30 MG: 20 TABLET ORAL at 17:19

## 2020-09-09 RX ADMIN — BUMETANIDE 1 MG: 1 TABLET ORAL at 09:14

## 2020-09-09 RX ADMIN — ALLOPURINOL 100 MG: 100 TABLET ORAL at 09:13

## 2020-09-09 RX ADMIN — LOSARTAN POTASSIUM 50 MG: 50 TABLET, FILM COATED ORAL at 09:13

## 2020-09-09 RX ADMIN — RISPERIDONE 0.5 MG: 0.25 TABLET ORAL at 09:14

## 2020-09-09 RX ADMIN — SODIUM BICARBONATE 650 MG: 650 TABLET ORAL at 21:15

## 2020-09-09 RX ADMIN — SPIRONOLACTONE 12.5 MG: 25 TABLET, FILM COATED ORAL at 09:21

## 2020-09-09 RX ADMIN — Medication 10 ML: at 09:12

## 2020-09-09 RX ADMIN — SODIUM BICARBONATE 650 MG: 650 TABLET ORAL at 16:03

## 2020-09-09 RX ADMIN — ISOSORBIDE MONONITRATE 30 MG: 20 TABLET ORAL at 09:12

## 2020-09-09 RX ADMIN — LORAZEPAM 0.5 MG: 2 INJECTION INTRAMUSCULAR; INTRAVENOUS at 21:15

## 2020-09-09 RX ADMIN — RISPERIDONE 0.5 MG: 0.25 TABLET ORAL at 21:15

## 2020-09-09 RX ADMIN — HYDRALAZINE HYDROCHLORIDE 100 MG: 25 TABLET, FILM COATED ORAL at 09:13

## 2020-09-09 RX ADMIN — METOPROLOL TARTRATE 50 MG: 50 TABLET, FILM COATED ORAL at 21:16

## 2020-09-09 RX ADMIN — GABAPENTIN 100 MG: 100 CAPSULE ORAL at 21:16

## 2020-09-09 RX ADMIN — Medication 10 ML: at 21:15

## 2020-09-09 NOTE — PLAN OF CARE
Problem: Patient Care Overview  Goal: Plan of Care Review  Outcome: Ongoing (interventions implemented as appropriate)  Flowsheets (Taken 9/9/2020 3099)  Outcome Summary: Pt demonstrated good activity tolerance this date, ambulated 80 feet with CGA using BUE support on RWx with min VC required for foot clearance and distance to RWx. Pt demonstrated good ability to navigate RWx in room with limited space. Pt was instructed in seated ther ex following transfer to bedside chair. Requires VC for technique and attention to task. Pt notes that sensation was deminished in RLE while performing exercises but attributes to previous CVA. Pt will benefit from IP rehab following d/c. PPE worn includes gloves and mask with shield.

## 2020-09-09 NOTE — PROGRESS NOTES
Continued Stay Note  KAE Hameed     Patient Name: Carlos Whipple  MRN: [id number]  Today's Date: 9/9/2020    Admit Date: 9/1/2020    Discharge Plan     Row Name 09/09/20 1518       Plan    Plan  DC Plan: Pt intends to return home with no services in place.     Plan Comments  Natalia denies subacute rehab. MD completes peer to peer which was also denied by [facility]. SW spoke to pt regarding his denial and advised denial by [facility] which was citing pt does not need rehab. Pt walking 80' with PT on 9/9. SW spoke to pt and pt declines offer of HH and [facility] Referral. Pt states he is capable of mangaing his own medications and getting to and from his doctors appointments. Pt admantly resfuses a discussion of LTC and assisted living. MD notified that pt will hav eto return home not unless pt wants to consult psych to determine pt level of decision making capabilities.         Discharge Codes    No documentation.       Expected Discharge Date and Time     Expected Discharge Date Expected Discharge Time    Sep 7, 2020         SHAKIR Osorio    Phone # [phone]  Cell #[phone]  Fax#[phone]  [email or web address]      SHAKIR Osorio

## 2020-09-09 NOTE — PROGRESS NOTES
Cardiology Progress Note      Admiting Physician:  Dre Stauffer MD   LOS: 6 days       Reason For Followup:  Atrial fibrillation with rapid ventricular response  Heart failure with preserved ejection fraction  Coronary artery disease    Subjective:    Interval History:  Seen and examined.  Chart and labs reviewed.  Patient denies any chest pain pressure heaviness or tightness.  He denies any shortness of breath.  He denies any PND orthopnea.  He reports no new complaints.  Nursing reports waiting on placement.    Review of Systems:  A complete review of systems was undertaken with the pertinent cardiovascular findings listed in history of present illness and all other systems being negative.     Assessment & Plan    Impressions:  Atrial fibrillation with rapid ventricular response  Heart failure with preserved ejection fraction  Chronic kidney disease  Coronary artery disease  History of CVA  Abnormal blood culture-likely skin contaminant  Mild baseline cognitive impairment  Gambling disorder    Recommendations:  Continuation of his current cardiovascular care at the present time.  Cardiac status appropriate for discharge to next destination of care when okay with other services.  Patient's heart rate has been stable with oral medicines including Cardizem beta-blockers  Patient is also on Eliquis for anticoagulation  Continue current medical therapy and follow him      Objective:    Medication Review:   Scheduled Meds:    allopurinol 100 mg Oral Daily   aspirin 81 mg Oral Daily   atorvastatin 10 mg Oral Nightly   bumetanide 1 mg Oral Daily   clotrimazole-betamethasone  Topical Q12H   dilTIAZem  mg Oral Q24H   gabapentin 100 mg Oral Nightly   hydrALAZINE 100 mg Oral TID   ipratropium-albuterol 3 mL Nebulization 4x Daily - RT   isosorbide mononitrate 30 mg Oral BID - Nitrates   losartan 50 mg Oral Q24H   metoprolol tartrate 50 mg Oral Q12H   risperiDONE 0.5 mg Oral Q12H   rivaroxaban 20 mg Oral Daily With  Dinner   sodium bicarbonate 650 mg Oral TID   sodium chloride 10 mL Intravenous Q12H   spironolactone 12.5 mg Oral Daily     Continuous Infusions:     PRN Meds:.•  acetaminophen  •  albuterol  •  hydrALAZINE  •  LORazepam  •  magnesium hydroxide  •  sodium chloride  •  sodium chloride    Patient Active Problem List   Diagnosis   • Hypertensive emergency   • Chronic atrial fibrillation (CMS/HCC)   • Chronic diastolic CHF (congestive heart failure) (CMS/[facility])   • CKD (chronic kidney disease) stage 3, GFR 30-59 ml/min (CMS/[facility])   • Essential hypertension   • Gambling disorder, persistent   • Shortness of breath   • Coronary artery disease involving native coronary artery of native heart without angina pectoris   • Psoriasis   • History of CVA (cerebrovascular accident)   • DDD (degenerative disc disease), lumbosacral   • Peripheral polyneuropathy   • GERD (gastroesophageal reflux disease)   • Medically noncompliant   • Acute respiratory distress   • Unstable angina (CMS/[facility])   • Cervical disc disorder with radiculopathy   • Completed stroke (CMS/[facility])   • Degeneration of intervertebral disc   • Excessive anticoagulation   • Impaired left ventricular function   • Liver lesion   • Lumbar radiculopathy   • Cervical myelopathy (CMS/[facility])   • Lumbosacral radiculopathy   • Spinal stenosis of lumbar region   • Obesity   • Mild cognitive impairment   • Thoracic back pain   • Thoracic disc disease with myelopathy   • Paroxysmal atrial fibrillation (CMS/[facility])   • Uncontrolled hypertension   • Acute congestive heart failure (CMS/[facility])   • Dyspnea   • Atrial fibrillation with RVR (CMS/[facility])   • Atrial fibrillation with rapid ventricular response (CMS/[facility])   • Dermatitis associated with moisture         Physical Exam:    General: Alert, cooperative, no distress, appears stated age  Head:  Normocephalic, atraumatic, mucous membranes moist  Eyes:  Conjunctiva/corneas clear, EOM's intact     Neck:  Supple,  no bruit  Lungs: Coarse and  diminished bilaterally.  Chest wall: No tenderness  Heart::  Regular rate and rhythm, S1 and S2 normal, 1/6 holosystolic murmur.  No rub or gallop  Abdomen: Soft, non-tender, nondistended bowel sounds active  Extremities: No cyanosis, clubbing, or edema  Pulses: 2+ and symmetric all extremities  Skin:  No rashes or lesions  Neuro/psych: A&O x3. CN II through XII are grossly intact with appropriate affect    Vital Signs:  Vitals:    09/08/20 2329 09/09/20 0355 09/09/20 0912 09/09/20 1138   BP: 132/75 126/68 150/98 104/71   BP Location: Right arm Right arm     Patient Position: Lying Lying     Pulse: 71 75 81 74   Resp: 16 17  16   Temp: 97.8 °F (36.6 °C) 98.6 °F (37 °C)  98.6 °F (37 °C)   TempSrc: Oral Oral  Oral   SpO2: 95% 95%  96%   Weight:  91.6 kg (201 lb 15.1 oz)     Height:         Wt Readings from Last 1 Encounters:   09/09/20 91.6 kg (201 lb 15.1 oz)       Intake/Output Summary (Last 24 hours) at 9/9/2020 1535  Last data filed at 9/9/2020 0817  Gross per 24 hour   Intake 720 ml   Output 1470 ml   Net -750 ml         Results Review:     CBC    Results from last 7 days   Lab Units 09/09/20  0226 09/08/20  0649 09/07/20  0312 09/06/20  0442 09/05/20  0228 09/04/20  0302 09/03/20  0352   WBC 10*3/mm3 10.30 8.80 9.40 8.90 11.70* 10.80 10.60   HEMOGLOBIN g/dL 14.0 14.5 14.4 14.6 14.8 14.4 13.2   PLATELETS 10*3/mm3 211 213 221 182 223 192 175     Cr Clearance Estimated Creatinine Clearance: 81 mL/min (by C-G formula based on SCr of 1.21 mg/dL).  Coag     HbA1C   Lab Results   Component Value Date    HGBA1C 5.5 12/12/2019    HGBA1C 5.1 11/09/2019    HGBA1C 5.6 10/10/2018     Blood Glucose No results found for: POCGLU  Infection       CMP   Results from last 7 days   Lab Units 09/09/20  0226 09/08/20  0649 09/07/20  0312 09/06/20  0442 09/05/20  0228 09/04/20  0302 09/03/20  0352   SODIUM mmol/L 135* 139 138 138 138 140 140   POTASSIUM mmol/L 4.2 4.5 4.7 4.2 4.1 3.7 3.6   CHLORIDE mmol/L 104 106 101 102 98 102 105    CO2 mmol/L 20.0* 23.0 27.0 23.0 26.0 26.0 25.0   BUN  35* 29* 29* 29* 26* 22 24*   CREATININE mg/dL 1.21 1.12 1.24 1.21 1.36* 1.17 1.18   GLUCOSE mg/dL 110* 97 117* 105* 114* 106* 103*   ALBUMIN g/dL  --  3.70 3.80  --  3.90 3.70 3.50   BILIRUBIN mg/dL  --  0.7 0.6  --  0.6 0.8 0.7   ALK PHOS U/L  --  70 69  --  65 62 64   AST (SGOT) U/L  --  31 30  --  15 16 18   ALT (SGPT) U/L  --  35 26  --  18 14 16     ABG      UA        BHARATI  No results found for: POCMETH, POCAMPHET, POCBARBITUR, POCBENZO, POCCOCAINE, POCOPIATES, POCOXYCODO, POCPHENCYC, POCPROPOXY, POCTHC, POCTRICYC  Lysis Labs   Results from last 7 days   Lab Units 09/09/20  0226 09/08/20  0649 09/07/20  0312 09/06/20  0442 09/05/20  0228 09/04/20  0302 09/03/20  0352   HEMOGLOBIN g/dL 14.0 14.5 14.4 14.6 14.8 14.4 13.2   PLATELETS 10*3/mm3 211 213 221 182 223 192 175   CREATININE mg/dL 1.21 1.12 1.24 1.21 1.36* 1.17 1.18     Radiology(recent) No radiology results for the last day            Imaging Results (Last 24 Hours)     ** No results found for the last 24 hours. **          Cardiac Studies:  Echo-   Results for orders placed during the hospital encounter of 11/07/19   Adult Transthoracic Echo Complete W/ Cont if Necessary Per Protocol    Narrative · The left ventricular cavity is severely dilated.  · Left ventricular systolic function is moderately decreased.  · Left atrial cavity size is moderately dilated.  · Moderate mitral valve regurgitation is present  · Moderate tricuspid valve regurgitation is present.  · The following left ventricular wall segments are hypokinetic: mid   anterior, apical anterior, basal anterolateral, mid anterolateral, apical   lateral, basal inferolateral, mid inferolateral, apical inferior, mid   inferior, apical septal, basal inferoseptal, mid inferoseptal, apex   hypokinetic, mid anteroseptal, basal anterior, basal inferior and basal   inferoseptal.  · LV ejection fraction is about 30 to 35%  · No pericardial effusion  noted        Stress Myoview-  Cath-        Edy Thomas MD  09/09/20  15:35

## 2020-09-09 NOTE — PROGRESS NOTES
LOS: 6 days   Patient Care Team:  Marisol Larson APRN as PCP - General  Shwetha Barber MD as Consulting Physician (Nephrology)  Edy Thomas MD as Consulting Physician (Cardiology)    Chief Complaint:   Still in atrial fibrillation,rate controlled. Denies any chest pain or SOA today.  Resting comfortably when room entered    Subjective :   Appears chronically ill, markedly older than stated age    Interval History:     Patient Complaints:  None this morning  Patient Denies:   No chest pain, no palpitations, no shortness of breath. No edema or cough.   History taken from: patient    I had a call from the RN yesterday afternoon the patient was having problems with restlessness and complying with orders.  Lorazepam IV as needed was entered.  Patient more cooperative this morning    Review of Systems:    All systems were reviewed and negative except for:   See  interval history  Objective     BP improved    Vital Signs  Temp:  [97.6 °F (36.4 °C)-98.6 °F (37 °C)] 98.6 °F (37 °C)  Heart Rate:  [71-78] 75  Resp:  [12-17] 17  BP: (105-132)/(54-87) 126/68    Physical Exam:     General Appearance:    Alert and oriented, in no acute distress   Head:    Normocephalic, without obvious abnormality, atraumatic   Eyes:          Anicteric, EOMI, PERRLA   Throat:      Neck:   No adenopathy, supple, trachea midline   Lungs:     Clear to auscultation,respirations regular, even and                  Unlabored, distant but adequate adventitial flow    Heart:     Distant, irregularly irregular.   Chest Wall:    No abnormalities observed   Abdomen:     Normal bowel sounds, no masses, no organomegaly, soft        non-tender, non-distended, no guarding, no rebound                tenderness   Rectal:     Deferred   Extremities:   Moves all extremities , no edema, no cyanosis, no             redness   Pulses:   Pulses palpable and equal bilaterally   Skin:   No bleeding, bruising or rash   Lymph nodes:      Neurologic:         Radiology:  Ct Head Without Contrast    Result Date: 9/2/2020  1. Stable exam, with no acute process demonstrated. 2. Stable moderate chronic white matter disease and left occipital encephalomalacia.  Electronically Signed By-Nehal Way On:9/2/2020 8:17 PM This report was finalized on [id number] by  Nehal Way, .    Xr Chest 1 View    Result Date: 9/1/2020  1.Left basilar consolidation appears unchanged, which could reflect atelectasis or pneumonia. 2.Cardiomegaly with possible small left pleural effusion.  Electronically Signed By-DR. Salty Joseph MD On:9/1/2020 3:35 PM This report was finalized on [id number] by DR. Salty Joseph MD.    Us Renal Bilateral    Result Date: 9/2/2020  Unremarkable sonographic appearance of the kidneys.  Electronically Signed By-Teri Persaud On:9/2/2020 11:30 AM This report was finalized on [id number] by  Teri Persaud, .         Results Review:     I reviewed the patient's new clinical results.  I reviewed the patient's new imaging results and agree with the interpretation.    Medication Review:   Scheduled Meds:    allopurinol 100 mg Oral Daily   aspirin 81 mg Oral Daily   atorvastatin 10 mg Oral Nightly   bumetanide 1 mg Oral Daily   clotrimazole-betamethasone  Topical Q12H   dilTIAZem  mg Oral Q24H   gabapentin 100 mg Oral Nightly   hydrALAZINE 100 mg Oral TID   ipratropium-albuterol 3 mL Nebulization 4x Daily - RT   isosorbide mononitrate 30 mg Oral BID - Nitrates   losartan 50 mg Oral Q24H   metoprolol tartrate 50 mg Oral Q12H   risperiDONE 0.5 mg Oral Q12H   rivaroxaban 20 mg Oral Daily With Dinner   sodium chloride 10 mL Intravenous Q12H   spironolactone 12.5 mg Oral Daily     Continuous Infusions:     PRN Meds:.•  acetaminophen  •  albuterol  •  hydrALAZINE  •  LORazepam  •  magnesium hydroxide  •  sodium chloride  •  sodium chloride    Labs:  Lab Results (last 24 hours)     Procedure Component Value Units Date/Time    BUN [[id number]]  (Abnormal)  Collected:  09/09/20 0226    Specimen:  Blood Updated:  09/09/20 0751     BUN 35 mg/dL     Basic Metabolic Panel [[id number]]  (Abnormal) Collected:  09/09/20 0226    Specimen:  Blood Updated:  09/09/20 0317     Glucose 110 mg/dL      BUN --     Comment: Testing performed by alternate method        Creatinine 1.21 mg/dL      Sodium 135 mmol/L      Potassium 4.2 mmol/L      Chloride 104 mmol/L      CO2 20.0 mmol/L      Calcium 9.0 mg/dL      eGFR Non African Amer 61 mL/min/1.73      BUN/Creatinine Ratio --     Comment: Testing not performed        Anion Gap 11.0 mmol/L     Narrative:       GFR Normal >60  Chronic Kidney Disease <60  Kidney Failure <15      Phosphorus [[id number]]  (Normal) Collected:  09/09/20 0226    Specimen:  Blood Updated:  09/09/20 0317     Phosphorus 4.1 mg/dL     Magnesium [[id number]]  (Normal) Collected:  09/09/20 0226    Specimen:  Blood Updated:  09/09/20 0317     Magnesium 2.4 mg/dL     CBC (No Diff) [[id number]]  (Abnormal) Collected:  09/09/20 0226    Specimen:  Blood Updated:  09/09/20 0250     WBC 10.30 10*3/mm3      RBC 5.21 10*6/mm3      Hemoglobin 14.0 g/dL      Hematocrit 43.2 %      MCV 82.9 fL      MCH 27.0 pg      MCHC 32.5 g/dL      RDW 19.2 %      RDW-SD 56.0 fl      MPV 8.3 fL      Platelets 211 10*3/mm3     BUN [[id number]]  (Abnormal) Collected:  09/08/20 0649    Specimen:  Blood Updated:  09/08/20 1604     BUN 29 mg/dL            Assessment/Plan       Atrial fibrillation with rapid ventricular response (CMS/HCC)    Chronic atrial fibrillation (CMS/HCC)    Chronic diastolic CHF (congestive heart failure) (CMS/HCC)    CKD (chronic kidney disease) stage 3, GFR 30-59 ml/min (CMS/HCC)    Gambling disorder, persistent    Coronary artery disease involving native coronary artery of native heart without angina pectoris    Psoriasis    History of CVA (cerebrovascular accident)    DDD (degenerative disc disease), lumbosacral    Peripheral polyneuropathy    GERD (gastroesophageal reflux  disease)    Medically noncompliant    Mild cognitive impairment    Uncontrolled hypertension    Dyspnea    Dermatitis associated with moisture  Single blood culture positive for staph coag negative most likely contaminant.    HR and BP improved.  Continue current therapy.   Monitor electrolytes and replace p.r.n..  Will DC to [facility] for rehab when precert/bed ready as cardiology and nephrology have signed off.    Of note, lactic acid, pro calcitonin markers for sepsis/pneumonic insult are negative at this juncture.  Suspect the single blood culture with coag negative staph is actually skin contaminant.  Patient does not appear to be septic.      Paty Polk,   09/09/20  08:40

## 2020-09-09 NOTE — THERAPY TREATMENT NOTE
Patient Name: Carlos Whipple  : 1955    MRN: [id number]                              Today's Date: 2020       Admit Date: 2020    Visit Dx:     ICD-10-CM ICD-9-CM   1. Atrial fibrillation with rapid ventricular response (CMS/HCC) I48.91 427.31   2. Uncontrolled hypertension I10 401.9   3. Dyspnea, unspecified type R06.00 786.09   4. CKD (chronic kidney disease) stage 3, GFR 30-59 ml/min (CMS/HCC) N18.3 585.3     Patient Active Problem List   Diagnosis   • Hypertensive emergency   • Chronic atrial fibrillation (CMS/HCC)   • Chronic diastolic CHF (congestive heart failure) (CMS/HCC)   • CKD (chronic kidney disease) stage 3, GFR 30-59 ml/min (CMS/HCC)   • Essential hypertension   • Gambling disorder, persistent   • Shortness of breath   • Coronary artery disease involving native coronary artery of native heart without angina pectoris   • Psoriasis   • History of CVA (cerebrovascular accident)   • DDD (degenerative disc disease), lumbosacral   • Peripheral polyneuropathy   • GERD (gastroesophageal reflux disease)   • Medically noncompliant   • Acute respiratory distress   • Unstable angina (CMS/HCC)   • Cervical disc disorder with radiculopathy   • Completed stroke (CMS/HCC)   • Degeneration of intervertebral disc   • Excessive anticoagulation   • Impaired left ventricular function   • Liver lesion   • Lumbar radiculopathy   • Cervical myelopathy (CMS/HCC)   • Lumbosacral radiculopathy   • Spinal stenosis of lumbar region   • Obesity   • Mild cognitive impairment   • Thoracic back pain   • Thoracic disc disease with myelopathy   • Paroxysmal atrial fibrillation (CMS/HCC)   • Uncontrolled hypertension   • Acute congestive heart failure (CMS/HCC)   • Dyspnea   • Atrial fibrillation with RVR (CMS/HCC)   • Atrial fibrillation with rapid ventricular response (CMS/HCC)   • Dermatitis associated with moisture     Past Medical History:   Diagnosis Date   • A-fib (CMS/HCC)    • CHF (congestive heart failure)  (CMS/[facility])    • Chronic atrial fibrillation (CMS/[facility]) 11/8/2019   • Chronic diastolic CHF (congestive heart failure) (CMS/[facility]) 11/8/2019   • CKD (chronic kidney disease) stage 3, GFR 30-59 ml/min (CMS/[facility]) 11/8/2019   • Coronary artery disease involving native coronary artery of native heart without angina pectoris 11/8/2019   • DDD (degenerative disc disease), lumbosacral 11/8/2019   • Essential hypertension 11/8/2019   • Gambling disorder, persistent 11/8/2019   • GERD (gastroesophageal reflux disease) 11/8/2019   • History of CVA (cerebrovascular accident) 11/8/2019   • Hyperlipidemia    • Hypertension    • Medically noncompliant 11/8/2019   • Peripheral polyneuropathy 11/8/2019   • Psoriasis 11/8/2019   • Stroke (CMS/[facility])      Past Surgical History:   Procedure Laterality Date   • CARDIAC CATHETERIZATION     • CARDIAC CATHETERIZATION N/A 12/10/2019    Procedure: Left Heart Cath and coronary angiogram;  Surgeon: Edy Thomas MD;  Location: [facility] CATH INVASIVE LOC[facility];  Service: Cardiovascular     General Information     Row Name 09/09/20 1306          PT Evaluation Time/Intention    Document Type  therapy note (daily note)  -EL     Mode of Treatment  physical therapy  -     Row Name 09/09/20 1306          General Information    Patient Profile Reviewed?  yes  -EL       User Key  (r) = Recorded By, (t) = Taken By, (c) = Cosigned By    Initials Name Provider Type    Dung Santiago, PT Physical Therapist        Mobility     Row Name 09/09/20 1306          Bed Mobility Assessment/Treatment    Bed Mobility Assessment/Treatment  supine-sit  -EL     Supine-Sit [place] (Bed Mobility)  supervision;verbal cues  -EL     Assistive Device (Bed Mobility)  bed rails  -EL     Row Name 09/09/20 1306          Bed-Chair Transfer    Bed-Chair [place] (Transfers)  supervision  -EL     Assistive Device (Bed-Chair Transfers)  walker, front-wheeled  -EL     Row Name 09/09/20 1306          Sit-Stand Transfer    Sit-Stand  [place] (Transfers)  contact guard  -EL     Assistive Device (Sit-Stand Transfers)  walker, front-wheeled  -EL     Row Name 09/09/20 1306          Gait/Stairs Assessment/Training    Gait/Stairs Assessment/Training  gait/ambulation independence;gait/ambulation assistive device  -EL     [place] Level (Gait)  verbal cues;contact guard  -EL     Assistive Device (Gait)  walker, front-wheeled  -EL     Distance in Feet (Gait)  80 feet   -EL     Pattern (Gait)  step-through  -EL     Deviations/Abnormal Patterns (Gait)  stride length decreased;ricky decreased;gait speed decreased  -EL     Bilateral Gait Deviations  forward flexed posture;heel strike decreased  -EL     Comment (Gait/Stairs)  Pt ambulated 80 feet this date, would not consider a functional decline just limited by destination  -EL       User Key  (r) = Recorded By, (t) = Taken By, (c) = Cosigned By    Initials Name Provider Type    Dung Santiago PT Physical Therapist        Obj/Interventions     Row Name 09/09/20 1308          Therapeutic Exercise    Lower Extremity Range of Motion (Therapeutic Exercise)  -- Pt instructed in seated ther ex: Seated marches, LAQ, and ankle pumps. Requires increased time and VC for technique.  -EL     Sets/Reps (Therapeutic Exercise)  1x20  -EL       User Key  (r) = Recorded By, (t) = Taken By, (c) = Cosigned By    Initials Name Provider Type    Dung Santiago PT Physical Therapist        Goals/Plan    No documentation.       Clinical Impression     Row Name 09/09/20 1309          Pain Scale: Numbers Pre/Post-Treatment    Pain Scale: Numbers, Pretreatment  0/10 - no pain  -EL     Pain Scale: Numbers, Post-Treatment  0/10 - no pain  -EL     Row Name 09/09/20 1309          Plan of Care Review    Plan of Care Reviewed With  patient  -EL     Outcome Summary  Pt demonstrated good activity tolerance this date, ambulated 80 feet with CGA using BUE support on RWx with min VC required for foot clearance and distance to RWx. Pt  demonstrated good ability to navigate RWx in room with limited space. Pt was instructed in seated ther ex following transfer to bedside chair. Requires VC for technique and attention to task. Pt notes that sensation was deminished in RLE while performing exercises but attributes to previous CVA. Pt will benefit from IP rehab following d/c. PPE worn includes gloves and mask with shield.  -EL     Row Name 09/09/20 1309          Physical Therapy Clinical Impression    Criteria for Skilled Interventions Met (PT Clinical Impression)  treatment indicated  -EL     Rehab Potential (PT Clinical Summary)  good, to achieve stated therapy goals  -EL     Row Name 09/09/20 1309          Vital Signs    O2 Delivery Pre Treatment  room air  -EL     O2 Delivery Intra Treatment  room air  -EL     O2 Delivery Post Treatment  room air  -EL     Pre Patient Position  Supine  -EL     Intra Patient Position  Standing  -EL     Post Patient Position  Sitting  -EL     Row Name 09/09/20 1309          Positioning and Restraints    Pre-Treatment Position  in bed  -EL     Post Treatment Position  chair  -EL     In Chair  notified nsg;sitting;call light within reach;exit alarm on;encouraged to call for assist  -EL       User Key  (r) = Recorded By, (t) = Taken By, (c) = Cosigned By    Initials Name Provider Type    Dung Santiago, PT Physical Therapist        Outcome Measures     Row Name 09/09/20 1314          How much help from another person do you currently need...    Turning from your back to your side while in flat bed without using bedrails?  4  -EL     Moving from lying on back to sitting on the side of a flat bed without bedrails?  4  -EL     Moving to and from a bed to a chair (including a wheelchair)?  3  -EL     Standing up from a chair using your arms (e.g., wheelchair, bedside chair)?  3  -EL     Climbing 3-5 steps with a railing?  2  -EL     To walk in hospital room?  3  -EL     AM-PAC 6 Clicks Score (PT)  19  -EL     Row Name  09/09/20 1314          Modified [place] Scale    Modified [place] Scale  4 - Moderately severe disability.  Unable to walk without assistance, and unable to attend to own bodily needs without assistance.  -HINA     Row Name 09/09/20 1314          Functional Assessment    Outcome Measure Options  AM-PAC 6 Clicks Basic Mobility (PT)  -EL       User Key  (r) = Recorded By, (t) = Taken By, (c) = Cosigned By    Initials Name Provider Type    Dung Santiago PT Physical Therapist        Physical Therapy Education                 Title: PT OT SLP Therapies (Done)     Topic: Physical Therapy (Done)     Point: Mobility training (Done)     Description:   Instruct learner(s) on safety and technique for assisting patient out of bed, chair or wheelchair.  Instruct in the proper use of assistive devices, such as walker, crutches, cane or brace.              Patient Friendly Description:   It's important to get you on your feet again, but we need to do so in a way that is safe for you. Falling has serious consequences, and your personal safety is the most important thing of all.        When it's time to get out of bed, one of us or a family member will sit next to you on the bed to give you support.     If your doctor or nurse tells you to use a walker, crutches, a cane, or a brace, be sure you use it every time you get out of bed, even if you think you don't need it.    Learning Progress Summary           Patient Acceptance, E,TB, VU by EL at 9/9/2020 1315    Acceptance, E,TB, VU by AO at 9/8/2020 1143    Acceptance, E,TB, VU,NR by  at 9/6/2020 1603    Acceptance, E, VU by SC at 9/4/2020 1440    Acceptance, E, VU by SC at 9/3/2020 1837    Acceptance, E, NR by  at 9/2/2020 1200                   Point: Precautions (Done)     Description:   Instruct learner(s) on prescribed precautions during mobility and gait tasks              Learning Progress Summary           Patient Acceptance, E,TB, VU by EL at 9/9/2020 1315    Acceptance,  E,TB, VU by AO at 9/8/2020 1143    Acceptance, E,TB, VU,NR by  at 9/6/2020 1603    Acceptance, E, VU by SC at 9/4/2020 1440    Acceptance, E, VU by SC at 9/3/2020 1837    Acceptance, E, NR by  at 9/2/2020 1200                               User Key     Initials Effective Dates Name Provider Type Discipline     04/17/20 -  Pauline Barrett, PT Physical Therapist PT    AO 03/01/19 -  Aura Pulido, PT Physical Therapist PT    SC 03/01/19 -  Lizet Scott, PTA Physical Therapy Assistant PT     03/01/19 -  Marilee Casey, ROBBY Physical Therapy Assistant PT     06/23/20 -  Dung Win PT Physical Therapist PT              PT Recommendation and Plan     Plan of Care Reviewed With: patient  Outcome Summary: Pt demonstrated good activity tolerance this date, ambulated 80 feet with CGA using BUE support on RWx with min VC required for foot clearance and distance to RWx. Pt demonstrated good ability to navigate RWx in room with limited space. Pt was instructed in seated ther ex following transfer to bedside chair. Requires VC for technique and attention to task. Pt notes that sensation was deminished in RLE while performing exercises but attributes to previous CVA. Pt will benefit from IP rehab following d/c. PPE worn includes gloves and mask with shield.     Time Calculation:   PT Charges     Row Name 09/09/20 1315             Time Calculation    Start Time  0947  -EL      Stop Time  1013  -EL      Time Calculation (min)  26 min  -EL      PT Received On  09/09/20  -EL      PT - Next Appointment  09/10/20  -EL         Time Calculation- PT    Total Timed Code Minutes- PT  26 minute(s)  -EL        User Key  (r) = Recorded By, (t) = Taken By, (c) = Cosigned By    Initials Name Provider Type     Dung Win, PT Physical Therapist        Therapy Charges for Today     Code Description Service Date Service Provider Modifiers Qty    [id number]  GAIT TRAINING EA 15 MIN 9/9/2020 Dung Win, PT GP 1     [id number]  PT THER PROC EA 15 MIN 9/9/2020 Dung Win, PT GP 1          PT G-Codes  Outcome Measure Options: AM-PAC 6 Clicks Basic Mobility (PT)  AM-PAC 6 Clicks Score (PT): 19  AM-PAC 6 Clicks Score (OT): 15  Modified [place] Scale: 4 - Moderately severe disability.  Unable to walk without assistance, and unable to attend to own bodily needs without assistance.    Dung Win, PT  9/9/2020

## 2020-09-09 NOTE — PROGRESS NOTES
Continued Stay Note  KAE Hameed     Patient Name: Carlos Whipple  MRN: [id number]  Today's Date: 9/9/2020    Admit Date: 9/1/2020    Discharge Plan      Patient has been accepted to [facility]. Precert pending. Started 9/4 and still pending 9/8 due to holiday per [facility].    Expected Discharge Date and Time     Expected Discharge Date Expected Discharge Time    Sep 7, 2020         Mary Pro RN, CM  Office Phone [phone]  Cell [phone]

## 2020-09-09 NOTE — PLAN OF CARE
Problem: Patient Care Overview  Goal: Plan of Care Review  Outcome: Ongoing (interventions implemented as appropriate)  Flowsheets (Taken 9/9/2020 0412)  Outcome Summary: Pt improving with mobility, though had mild LOB with dynamic standing task this date.  Demos deficits in short term memory.  Pt is not safe to return home and will require IP rehab at discharge.  PPE: mask, shield, gloves.

## 2020-09-09 NOTE — PLAN OF CARE
Problem: Patient Care Overview  Goal: Plan of Care Review  Outcome: Ongoing (interventions implemented as appropriate)  Flowsheets  Taken 9/9/2020 1309 by Dung Win PT  Plan of Care Reviewed With: patient  Taken 9/9/2020 1336 by Apolinar Salas RN  Outcome Summary: Up to chair today with therapy. Remains forgetful with periods of confusion. Pleasant & cooperative with care. Remains risk for falls & skin injury. Psoriasis noted in mult. places. No problems with B/P today & has had no c/o pain. Awaiting precert for rehab

## 2020-09-09 NOTE — PLAN OF CARE
Problem: Patient Care Overview  Goal: Plan of Care Review  Outcome: Ongoing (interventions implemented as appropriate)  Flowsheets (Taken 9/8/2020 2224)  Progress: no change  Plan of Care Reviewed With: patient  Outcome Summary: Patient's vitals have been stable during shift up to this point.  Downgrade orders given by Dr. Stauffer to patient to transfer to [facility].  No complaints per patient.  All needs met, RN will continue to monitor.

## 2020-09-09 NOTE — THERAPY TREATMENT NOTE
Acute Care - Occupational Therapy Treatment Note  KAE Yoseph     Patient Name: Carlos Whipple  : 1955  MRN: [id number]  Today's Date: 2020             Admit Date: 2020       ICD-10-CM ICD-9-CM   1. Atrial fibrillation with rapid ventricular response (CMS/HCC) I48.91 427.31   2. Uncontrolled hypertension I10 401.9   3. Dyspnea, unspecified type R06.00 786.09   4. CKD (chronic kidney disease) stage 3, GFR 30-59 ml/min (CMS/HCC) N18.3 585.3     Patient Active Problem List   Diagnosis   • Hypertensive emergency   • Chronic atrial fibrillation (CMS/HCC)   • Chronic diastolic CHF (congestive heart failure) (CMS/HCC)   • CKD (chronic kidney disease) stage 3, GFR 30-59 ml/min (CMS/HCC)   • Essential hypertension   • Gambling disorder, persistent   • Shortness of breath   • Coronary artery disease involving native coronary artery of native heart without angina pectoris   • Psoriasis   • History of CVA (cerebrovascular accident)   • DDD (degenerative disc disease), lumbosacral   • Peripheral polyneuropathy   • GERD (gastroesophageal reflux disease)   • Medically noncompliant   • Acute respiratory distress   • Unstable angina (CMS/HCC)   • Cervical disc disorder with radiculopathy   • Completed stroke (CMS/HCC)   • Degeneration of intervertebral disc   • Excessive anticoagulation   • Impaired left ventricular function   • Liver lesion   • Lumbar radiculopathy   • Cervical myelopathy (CMS/HCC)   • Lumbosacral radiculopathy   • Spinal stenosis of lumbar region   • Obesity   • Mild cognitive impairment   • Thoracic back pain   • Thoracic disc disease with myelopathy   • Paroxysmal atrial fibrillation (CMS/HCC)   • Uncontrolled hypertension   • Acute congestive heart failure (CMS/HCC)   • Dyspnea   • Atrial fibrillation with RVR (CMS/HCC)   • Atrial fibrillation with rapid ventricular response (CMS/HCC)   • Dermatitis associated with moisture     Past Medical History:   Diagnosis Date   • A-fib (CMS/HCC)    • CHF  (congestive heart failure) (CMS/[facility])    • Chronic atrial fibrillation (CMS/[facility]) 11/8/2019   • Chronic diastolic CHF (congestive heart failure) (CMS/[facility]) 11/8/2019   • CKD (chronic kidney disease) stage 3, GFR 30-59 ml/min (CMS/HCC) 11/8/2019   • Coronary artery disease involving native coronary artery of native heart without angina pectoris 11/8/2019   • DDD (degenerative disc disease), lumbosacral 11/8/2019   • Essential hypertension 11/8/2019   • Gambling disorder, persistent 11/8/2019   • GERD (gastroesophageal reflux disease) 11/8/2019   • History of CVA (cerebrovascular accident) 11/8/2019   • Hyperlipidemia    • Hypertension    • Medically noncompliant 11/8/2019   • Peripheral polyneuropathy 11/8/2019   • Psoriasis 11/8/2019   • Stroke (CMS/HCC)      Past Surgical History:   Procedure Laterality Date   • CARDIAC CATHETERIZATION     • CARDIAC CATHETERIZATION N/A 12/10/2019    Procedure: Left Heart Cath and coronary angiogram;  Surgeon: Edy Thomas MD;  Location: [facility] CATH INVASIVE LOCATION;  Service: Cardiovascular       Therapy Treatment    Rehabilitation Treatment Summary     Row Name 09/09/20 1542             Treatment Time/Intention    Discipline  occupational therapist  -SR      Document Type  therapy note (daily note)  -SR      Subjective Information  no complaints  -SR      Recorded by [SR] Zee Shepherd OT 09/09/20 1607      Row Name 09/09/20 1542             Cognitive Assessment Intervention- SLP    Cognition, Comment  Pt demos significant defcits in short term memory.  He repeatedly asks what therapist is assessing and if he will be staying here at the hospital.    -SR      Recorded by [SR] Zee Shepherd OT 09/09/20 1607      Row Name 09/09/20 1542             Functional Mobility    Functional Mobility- Ind. Level  contact guard assist  -SR      Functional Mobility- Device  rolling walker  -SR      Recorded by [SR] Zee Shepherd OT 09/09/20 1607      Row Name  09/09/20 1542             Sit-Stand Transfer    Sit-Stand [place] (Transfers)  contact guard  -SR      Assistive Device (Sit-Stand Transfers)  walker, front-wheeled  -SR      Recorded by [SR] Zee Shepherd, OT 09/09/20 1607      Row Name 09/09/20 1542             ADL Assessment/Intervention    BADL Assessment/Intervention  grooming  -SR      Recorded by [SR] Zee Shepherd, OT 09/09/20 1607      Row Name 09/09/20 1542             Grooming Assessment/Training    [place] Level (Grooming)  wash face, hands;oral care regimen;contact guard assist  -SR      Grooming Position  sink side  -SR      Recorded by [SR] Zee Shepherd, OT 09/09/20 1607      Row Name 09/09/20 1542             Static Sitting Balance    Level of [place] (Unsupported Sitting, Static Balance)  supervision  -SR      Recorded by [SR] Zee Shepherd, OT 09/09/20 1607      Row Name 09/09/20 1542             Static Standing Balance    Level of [place] (Supported Standing, Static Balance)  contact guard assist  -SR      Assistive Device Utilized (Supported Standing, Static Balance)  walker, rolling  -SR      Recorded by [SR] Zee Shepherd, OT 09/09/20 1607      Row Name 09/09/20 1542             Dynamic Standing Balance    Level of [place], Reaches Outside Midline (Standing, Dynamic Balance)  contact guard assist  -SR      Assistive Device Utilized (Supported Standing, Dynamic Balance)  walker, rolling  -SR      Recorded by [SR] Zee Shepherd, OT 09/09/20 1607      Row Name 09/09/20 1542             Pain Scale: Numbers Pre/Post-Treatment    Pain Scale: Numbers, Pretreatment  0/10 - no pain  -SR      Recorded by [SR] Zee Shepherd, OT 09/09/20 1607      Row Name                Wound 12/12/19 0200 midline gluteal MASD (Moisutre associated skin damage)    Wound - Properties Group Date first assessed: 12/12/19 [BG] Time first assessed: 0200 [BG] Present on Hospital Admission: Y  [BG] Orientation: midline [BG] Location: gluteal [BG] Primary Wound Type: MASD [BG], psoriasis lesions with what appears to be moisture related damage  Recorded by:  [BG] Radha Conklin RN 12/12/19 0642    Row Name                Wound 12/12/19 0200 thoracic spine Other (comment)    Wound - Properties Group Date first assessed: 12/12/19 [BG] Time first assessed: 0200 [BG] Location: thoracic spine [BG] Primary Wound Type: Other [BG], psoriasis lesions  Recorded by:  [BG] Radha Conklin RN 12/12/19 0643    Row Name                Wound 12/12/19 0200 upper;midline abdomen     Wound - Properties Group Date first assessed: 12/12/19 [BG] Time first assessed: 0200 [BG] Orientation: upper;midline [BG] Location: abdomen [BG] Primary Wound Type: -- [BG], psoriasis lesions  Recorded by:  [BG] Radha Conklin RN 12/12/19 0644    Row Name                Wound 09/01/20 1839 Bilateral anterior groin MASD (Moisture associated skin damage)    Wound - Properties Group Date first assessed: 09/01/20 [KD] Time first assessed: 1839 [KD] Present on Hospital Admission: Y [KD] Side: Bilateral [KD] Orientation: anterior [KD] Location: groin [KD] Primary Wound Type: MASD [KD] Recorded by:  [KD] Nehal Menon RN 09/01/20 1840    Row Name 09/09/20 1542             Plan of Care Review    Outcome Summary  Pt improving with mobility, though had mild LOB with dynamic standing task this date.  Demos deficits in short term memory.  Pt is not safe to return home and will require IP rehab at discharge.  PPE: mask, shield, gloves.    -SR      Recorded by [SR] Zee Shepherd OT 09/09/20 1607      Row Name 09/09/20 1542             Outcome Summary/Treatment Plan (OT)    Daily Summary of Progress (OT)  progress toward functional goals is good  -SR      Anticipated Discharge Disposition (OT)  inpatient rehabilitation facility  -SR      Recorded by [SR] Zee Shepherd, OT 09/09/20 1607        User Key  (r) = Recorded By,  (t) = Taken By, (c) = Cosigned By    Initials Name Effective Dates Discipline    SR Joao Zee P, OT 03/01/19 -  OT    Radha Ferreira RN 03/01/19 -  Nurse    KD Nehal Menon, RN 08/21/19 -  Nurse        Wound 12/12/19 0200 midline gluteal MASD (Moisutre associated skin damage) (Active)   Dressing Appearance open to air 9/8/2020 11:40 PM   Closure None 9/8/2020  4:10 PM   Base red 9/8/2020 11:40 PM   Drainage Amount none 9/8/2020 11:40 PM       Wound 12/12/19 0200 thoracic spine Other (comment) (Active)   Base maroon/purple 9/8/2020 11:40 PM       Wound 12/12/19 0200 upper;midline abdomen  (Active)   Dressing Appearance open to air 9/8/2020 11:40 PM   Closure None 9/8/2020  4:10 PM   Base maroon/purple 9/8/2020 11:40 PM       Wound 09/01/20 1839 Bilateral anterior groin MASD (Moisture associated skin damage) (Active)   Dressing Appearance open to air 9/9/2020  7:20 AM   Base red 9/9/2020  7:20 AM   Drainage Amount none 9/9/2020  7:20 AM   Care, Wound other (see comments) 9/9/2020  7:20 AM       Occupational Therapy Education                 Title: PT OT SLP Therapies (In Progress)     Topic: Occupational Therapy (In Progress)     Point: ADL training (In Progress)     Description:   Instruct learner(s) on proper safety adaptation and remediation techniques during self care or transfers.   Instruct in proper use of assistive devices.              Learning Progress Summary           Patient Acceptance, E,TB, NR by SR at 9/9/2020 1608    Acceptance, E,TB,D, VU,DU,NR by NA at 9/8/2020 1136    Comment:  role and benefits of OT, BUE ther ex in all planes, transfers to chair with RW, calling for assistance when getting up    Acceptance, E,TB, VU,NR by  at 9/6/2020 1603                   Point: Home exercise program (Done)     Description:   Instruct learner(s) on appropriate technique for monitoring, assisting and/or progressing therapeutic exercises/activities.              Learning Progress Summary            Patient Acceptance, E,TB,D, VU,DU,NR by NA at 9/8/2020 1136    Comment:  role and benefits of OT, BUE ther ex in all planes, transfers to chair with RW, calling for assistance when getting up    Acceptance, E,TB, VU,NR by  at 9/6/2020 1603                   Point: Precautions (Done)     Description:   Instruct learner(s) on prescribed precautions during self-care and functional transfers.              Learning Progress Summary           Patient Acceptance, E,TB,D, VU,DU,NR by NA at 9/8/2020 1136    Comment:  role and benefits of OT, BUE ther ex in all planes, transfers to chair with RW, calling for assistance when getting up    Acceptance, E,TB, VU,NR by  at 9/6/2020 1603    Acceptance, E,TB, NR,NL by  at 9/2/2020 1554                   Point: Body mechanics (In Progress)     Description:   Instruct learner(s) on proper positioning and spine alignment during self-care, functional mobility activities and/or exercises.              Learning Progress Summary           Patient Acceptance, E,TB, NR by  at 9/9/2020 1608    Acceptance, E,TB,D, VU,DU,NR by  at 9/8/2020 1136    Comment:  role and benefits of OT, BUE ther ex in all planes, transfers to chair with RW, calling for assistance when getting up    Acceptance, E,TB, VU,NR by  at 9/6/2020 1603    Acceptance, E,TB, NR by  at 9/3/2020 1513    Acceptance, E,TB, NR,NL by  at 9/2/2020 1554                               User Key     Initials Effective Dates Name Provider Type Discipline     07/20/20 -  Gertrude Bhatt OT Occupational Therapist OT     03/01/19 -  Zee Shepherd OT Occupational Therapist OT     03/01/19 -  Allison Friedman OT Occupational Therapist OT     03/01/19 -  Marilee Casey PTA Physical Therapy Assistant PT                OT Recommendation and Plan  Outcome Summary/Treatment Plan (OT)  Daily Summary of Progress (OT): progress toward functional goals is good  Anticipated Discharge Disposition (OT):  inpatient rehabilitation facility  Daily Summary of Progress (OT): progress toward functional goals is good  Plan of Care Review  Plan of Care Reviewed With: patient  Plan of Care Reviewed With: patient  Outcome Summary: Pt improving with mobility, though had mild LOB with dynamic standing task this date.  Demos deficits in short term memory.  Pt is not safe to return home and will require IP rehab at discharge.  PPE: mask, shield, gloves.    Outcome Measures     Row Name 09/08/20 1136             How much help from another is currently needed...    Putting on and taking off regular lower body clothing?  1  -NA      Bathing (including washing, rinsing, and drying)  2  -NA      Toileting (which includes using toilet bed pan or urinal)  2  -NA      Putting on and taking off regular upper body clothing  3  -NA      Taking care of personal grooming (such as brushing teeth)  3  -NA      Eating meals  4  -NA      AM-PAC 6 Clicks Score (OT)  15  -NA         Modified [place] Scale    Modified [place] Scale  4 - Moderately severe disability.  Unable to walk without assistance, and unable to attend to own bodily needs without assistance.  -NA         Functional Assessment    Outcome Measure Options  AM-PAC 6 Clicks Daily Activity (OT);Modified [place]  -NA        User Key  (r) = Recorded By, (t) = Taken By, (c) = Cosigned By    Initials Name Provider Type    Gertrude Gambino OT Occupational Therapist           Time Calculation:   Time Calculation- OT     Row Name 09/09/20 1608             Time Calculation- OT    OT Start Time  1542  -SR      OT Stop Time  1555  -SR      OT Time Calculation (min)  13 min  -SR      Total Timed Code Minutes- OT  13 minute(s)  -SR      OT Received On  09/09/20  -SR      OT - Next Appointment  09/10/20  -SR        User Key  (r) = Recorded By, (t) = Taken By, (c) = Cosigned By    Initials Name Provider Type    SR Zee Shepherd, OT Occupational Therapist        Therapy Charges for Today      Code Description Service Date Service Provider Modifiers Qty    [id number]  OT THERAPEUTIC ACT EA 15 MIN 9/9/2020 Zee Shepherd, OT GO 1               Zee Shepherd, OT  9/9/2020

## 2020-09-09 NOTE — PROGRESS NOTES
"NEPHROLOGY PROGRESS NOTE------[facility]    [facility]  [phone]  Jad Wilder MD      Patient Care Team:  Marisol Larson APRN as PCP - General  Shwetha Barber MD as Consulting Physician (Nephrology)  Edy Thomas MD as Consulting Physician (Cardiology)      Provider:  Jad Wilder MD  Patient Name: Carlos Whipple  :  1955    SUBJECTIVE:    F/U CRF/CKD    Feeling good. No SOB, CP, dysuria, palpitations.    Medication:    allopurinol 100 mg Oral Daily   aspirin 81 mg Oral Daily   atorvastatin 10 mg Oral Nightly   bumetanide 1 mg Oral Daily   clotrimazole-betamethasone  Topical Q12H   dilTIAZem  mg Oral Q24H   gabapentin 100 mg Oral Nightly   hydrALAZINE 100 mg Oral TID   ipratropium-albuterol 3 mL Nebulization 4x Daily - RT   isosorbide mononitrate 30 mg Oral BID - Nitrates   losartan 50 mg Oral Q24H   metoprolol tartrate 50 mg Oral Q12H   risperiDONE 0.5 mg Oral Q12H   rivaroxaban 20 mg Oral Daily With Dinner   sodium chloride 10 mL Intravenous Q12H   spironolactone 12.5 mg Oral Daily          OBJECTIVE    Vital Sign Min/Max for last 24 hours  Temp  Min: 97.8 °F (36.6 °C)  Max: 98.6 °F (37 °C)   BP  Min: 104/71  Max: 150/98   Pulse  Min: 71  Max: 81   Resp  Min: 12  Max: 17   SpO2  Min: 95 %  Max: 97 %   No data recorded   Weight  Min: 91.6 kg (201 lb 15.1 oz)  Max: 91.6 kg (201 lb 15.1 oz)     Flowsheet Rows      First Filed Value   Admission Height  182.9 cm (72\") Documented at 2020 1338   Admission Weight  114 kg (252 lb) Documented at 2020 1338          I/O this shift:  In: -   Out: 300 [Urine:300]  I/O last 3 completed shifts:  In: 1320 [P.O.:1320]  Out: 1590 [Urine:1590]    Physical Exam:  General Appearance: alert, appears stated age and cooperative  Head: normocephalic, without obvious abnormality and atraumatic  Eyes: conjunctivae and sclerae normal and no icterus  Neck: supple and no JVD  Lungs: clear to auscultation " and respirations regular  Heart: IRREG IRREG +AFUA.   Chest: Wall no abnormalities observed  Abdomen: normal bowel sounds and soft non-tender  Extremities: moves extremities well, no edema, no cyanosis and no redness  Skin: no bleeding, bruising or rash, turgor normal, color normal and no lesions noted  Neurologic: Alert, and oriented. No focal deficits    Labs:    WBC WBC   Date Value Ref Range Status   09/09/2020 10.30 3.40 - 10.80 10*3/mm3 Final   09/08/2020 8.80 3.40 - 10.80 10*3/mm3 Final   09/07/2020 9.40 3.40 - 10.80 10*3/mm3 Final      HGB Hemoglobin   Date Value Ref Range Status   09/09/2020 14.0 13.0 - 17.7 g/dL Final   09/08/2020 14.5 13.0 - 17.7 g/dL Final   09/07/2020 14.4 13.0 - 17.7 g/dL Final      HCT Hematocrit   Date Value Ref Range Status   09/09/2020 43.2 37.5 - 51.0 % Final   09/08/2020 45.4 37.5 - 51.0 % Final   09/07/2020 44.0 37.5 - 51.0 % Final      Platlets No results found for: LABPLAT   MCV MCV   Date Value Ref Range Status   09/09/2020 82.9 79.0 - 97.0 fL Final   09/08/2020 84.6 79.0 - 97.0 fL Final   09/07/2020 83.3 79.0 - 97.0 fL Final          Sodium Sodium   Date Value Ref Range Status   09/09/2020 135 (L) 136 - 145 mmol/L Final   09/08/2020 139 136 - 145 mmol/L Final   09/07/2020 138 136 - 145 mmol/L Final      Potassium Potassium   Date Value Ref Range Status   09/09/2020 4.2 3.5 - 5.2 mmol/L Final   09/08/2020 4.5 3.5 - 5.2 mmol/L Final     Comment:     Slight hemolysis detected by analyzer. Results may be affected.   09/07/2020 4.7 3.5 - 5.2 mmol/L Final      Chloride Chloride   Date Value Ref Range Status   09/09/2020 104 98 - 107 mmol/L Final   09/08/2020 106 98 - 107 mmol/L Final   09/07/2020 101 98 - 107 mmol/L Final      CO2 CO2   Date Value Ref Range Status   09/09/2020 20.0 (L) 22.0 - 29.0 mmol/L Final   09/08/2020 23.0 22.0 - 29.0 mmol/L Final   09/07/2020 27.0 22.0 - 29.0 mmol/L Final      BUN BUN   Date Value Ref Range Status   09/09/2020   Final     Comment:     Testing  performed by alternate method   09/09/2020 35 (H) 8 - 23 mg/dL Final   09/08/2020   Final     Comment:     Testing performed by alternate method   09/08/2020 29 (H) 8 - 23 mg/dL Final   09/07/2020   Final     Comment:     Testing performed by alternate method   09/07/2020 29 (H) 8 - 23 mg/dL Final      Creatinine Creatinine   Date Value Ref Range Status   09/09/2020 1.21 0.76 - 1.27 mg/dL Final   09/08/2020 1.12 0.76 - 1.27 mg/dL Final   09/07/2020 1.24 0.76 - 1.27 mg/dL Final      Calcium Calcium   Date Value Ref Range Status   09/09/2020 9.0 8.6 - 10.5 mg/dL Final   09/08/2020 9.2 8.6 - 10.5 mg/dL Final   09/07/2020 9.3 8.6 - 10.5 mg/dL Final      PO4 No components found for: PO4   Albumin Albumin   Date Value Ref Range Status   09/08/2020 3.70 3.50 - 5.20 g/dL Final   09/07/2020 3.80 3.50 - 5.20 g/dL Final      Magnesium Magnesium   Date Value Ref Range Status   09/09/2020 2.4 1.6 - 2.4 mg/dL Final   09/08/2020 2.5 (H) 1.6 - 2.4 mg/dL Final   09/07/2020 2.7 (H) 1.6 - 2.4 mg/dL Final      Uric Acid No components found for: URIC ACID     Imaging Results (Last 72 Hours)     ** No results found for the last 72 hours. **          Results for orders placed during the hospital encounter of 09/01/20   XR Chest 1 View    Narrative XR CHEST 1 VW-     Date of Exam: 9/1/2020 3:10 PM     Indication: soa.     Comparison Exams: 08/21/2020     Technique: Single AP chest radiograph     FINDINGS:  A left basilar consolidation appears unchanged. There may be an  associated small left pleural effusion. The heart is enlarged. The  pulmonary vasculature appears normal. The osseous structures appear  intact.       Impression 1.Left basilar consolidation appears unchanged, which could reflect  atelectasis or pneumonia.  2.Cardiomegaly with possible small left pleural effusion.     Electronically Signed By-DR. Salty Joseph MD On:9/1/2020 3:35 PM  This report was finalized on [id number] by DR. Salty Joseph MD.       Results  for orders placed during the hospital encounter of 12/06/19   Duplex Carotid Ultrasound CAR    Narrative · Proximal right internal carotid artery is normal.  · Mid right internal carotid artery is normal.  · Proximal left internal carotid artery is normal.  · Mid left internal carotid artery is normal.            ASSESSMENT / PLAN      Atrial fibrillation with rapid ventricular response (CMS/HCC)    Chronic atrial fibrillation (CMS/HCC)    Chronic diastolic CHF (congestive heart failure) (CMS/HCC)    CKD (chronic kidney disease) stage 3, GFR 30-59 ml/min (CMS/HCC)    Gambling disorder, persistent    Coronary artery disease involving native coronary artery of native heart without angina pectoris    Psoriasis    History of CVA (cerebrovascular accident)    DDD (degenerative disc disease), lumbosacral    Peripheral polyneuropathy    GERD (gastroesophageal reflux disease)    Medically noncompliant    Mild cognitive impairment    Uncontrolled hypertension    Dyspnea    Dermatitis associated with moisture    1. CRF/CKD STG 2------ Appears to have CRF/CKD STG 2 with current serum creatinine at baseline. Unknown if patient has baseline proteinuria or hematuria. In the long run we may have to accept a higher baseline serum creatinine in order to keep euvolemic. Follow with cautious diuresis. CRF/CKD is likely secondary to HTN NS.   Dose meds for CrCl 60 cc/min     2. ACIDOSIS------PO sodium bicarb     3. CHRONIC DIASTOLIC CHF/ELEVATED BNP------Cautious diuresis. Stable on current diuretic regimen     4. HTN WITH CKD------BP okay. No ACE/ARB/DRI for now     5. OA/DJD------No NSAIDs.      6. ATRIAL FIBRILLATION WITH RVR------per Cardiology. Dig level okay     7. HYPERLIPIDEMIA------Statin. CK, TSH okay    8. HYPERURICEMIA------Allopurinol     9. DECONDITIONING------PT    Cr stable  Continue diuretics  Add sodium bicarb PO.  Awaits rehab  Okay from RENAL standpoint to d/c today and for patient to follow up in office in 2 weeks        Jad Wilder MD  [facility]  [phone]  09/09/20  11:56

## 2020-09-10 LAB
ALBUMIN SERPL-MCNC: 3.7 G/DL (ref 3.5–5.2)
ANION GAP SERPL CALCULATED.3IONS-SCNC: 11 MMOL/L (ref 5–15)
BASOPHILS # BLD AUTO: 0.1 10*3/MM3 (ref 0–0.2)
BASOPHILS NFR BLD AUTO: 1 % (ref 0–1.5)
BUN SERPL-MCNC: 41 MG/DL (ref 8–23)
BUN SERPL-MCNC: ABNORMAL MG/DL
BUN/CREAT SERPL: ABNORMAL
CALCIUM SPEC-SCNC: 9.4 MG/DL (ref 8.6–10.5)
CHLORIDE SERPL-SCNC: 103 MMOL/L (ref 98–107)
CO2 SERPL-SCNC: 22 MMOL/L (ref 22–29)
CREAT SERPL-MCNC: 1.2 MG/DL (ref 0.76–1.27)
DEPRECATED RDW RBC AUTO: 55.6 FL (ref 37–54)
EOSINOPHIL # BLD AUTO: 0.2 10*3/MM3 (ref 0–0.4)
EOSINOPHIL NFR BLD AUTO: 1.7 % (ref 0.3–6.2)
ERYTHROCYTE [DISTWIDTH] IN BLOOD BY AUTOMATED COUNT: 18.9 % (ref 12.3–15.4)
GFR SERPL CREATININE-BSD FRML MDRD: 61 ML/MIN/1.73
GLUCOSE SERPL-MCNC: 103 MG/DL (ref 65–99)
HCT VFR BLD AUTO: 43.5 % (ref 37.5–51)
HGB BLD-MCNC: 14.1 G/DL (ref 13–17.7)
LYMPHOCYTES # BLD AUTO: 1.6 10*3/MM3 (ref 0.7–3.1)
LYMPHOCYTES NFR BLD AUTO: 18.7 % (ref 19.6–45.3)
MAGNESIUM SERPL-MCNC: 2.4 MG/DL (ref 1.6–2.4)
MCH RBC QN AUTO: 27.1 PG (ref 26.6–33)
MCHC RBC AUTO-ENTMCNC: 32.4 G/DL (ref 31.5–35.7)
MCV RBC AUTO: 83.7 FL (ref 79–97)
MONOCYTES # BLD AUTO: 0.9 10*3/MM3 (ref 0.1–0.9)
MONOCYTES NFR BLD AUTO: 10.1 % (ref 5–12)
NEUTROPHILS NFR BLD AUTO: 6 10*3/MM3 (ref 1.7–7)
NEUTROPHILS NFR BLD AUTO: 68.5 % (ref 42.7–76)
NRBC BLD AUTO-RTO: 0 /100 WBC (ref 0–0.2)
PHOSPHATE SERPL-MCNC: 4.3 MG/DL (ref 2.5–4.5)
PLATELET # BLD AUTO: 213 10*3/MM3 (ref 140–450)
PMV BLD AUTO: 8.6 FL (ref 6–12)
POTASSIUM SERPL-SCNC: 4.3 MMOL/L (ref 3.5–5.2)
RBC # BLD AUTO: 5.2 10*6/MM3 (ref 4.14–5.8)
SODIUM SERPL-SCNC: 136 MMOL/L (ref 136–145)
WBC # BLD AUTO: 8.7 10*3/MM3 (ref 3.4–10.8)

## 2020-09-10 PROCEDURE — 94799 UNLISTED PULMONARY SVC/PX: CPT

## 2020-09-10 PROCEDURE — 99221 1ST HOSP IP/OBS SF/LOW 40: CPT | Performed by: PHYSICIAN ASSISTANT

## 2020-09-10 PROCEDURE — 25010000002 LORAZEPAM PER 2 MG: Performed by: FAMILY MEDICINE

## 2020-09-10 PROCEDURE — 80069 RENAL FUNCTION PANEL: CPT | Performed by: INTERNAL MEDICINE

## 2020-09-10 PROCEDURE — 83735 ASSAY OF MAGNESIUM: CPT | Performed by: INTERNAL MEDICINE

## 2020-09-10 PROCEDURE — 85025 COMPLETE CBC W/AUTO DIFF WBC: CPT | Performed by: INTERNAL MEDICINE

## 2020-09-10 PROCEDURE — 99232 SBSQ HOSP IP/OBS MODERATE 35: CPT | Performed by: INTERNAL MEDICINE

## 2020-09-10 RX ORDER — AZELASTINE 1 MG/ML
2 SPRAY, METERED NASAL 2 TIMES DAILY PRN
Status: DISCONTINUED | OUTPATIENT
Start: 2020-09-10 | End: 2020-09-12 | Stop reason: HOSPADM

## 2020-09-10 RX ADMIN — ISOSORBIDE MONONITRATE 30 MG: 20 TABLET ORAL at 17:33

## 2020-09-10 RX ADMIN — ALLOPURINOL 100 MG: 100 TABLET ORAL at 08:48

## 2020-09-10 RX ADMIN — DILTIAZEM HYDROCHLORIDE 180 MG: 180 CAPSULE, COATED, EXTENDED RELEASE ORAL at 08:47

## 2020-09-10 RX ADMIN — ISOSORBIDE MONONITRATE 30 MG: 20 TABLET ORAL at 08:44

## 2020-09-10 RX ADMIN — ATORVASTATIN CALCIUM 10 MG: 10 TABLET, FILM COATED ORAL at 22:06

## 2020-09-10 RX ADMIN — SODIUM BICARBONATE 650 MG: 650 TABLET ORAL at 22:06

## 2020-09-10 RX ADMIN — METOPROLOL TARTRATE 50 MG: 50 TABLET, FILM COATED ORAL at 08:47

## 2020-09-10 RX ADMIN — GABAPENTIN 100 MG: 100 CAPSULE ORAL at 22:06

## 2020-09-10 RX ADMIN — Medication 10 ML: at 08:45

## 2020-09-10 RX ADMIN — CLOTRIMAZOLE AND BETAMETHASONE DIPROPIONATE: 10; .64 CREAM TOPICAL at 08:49

## 2020-09-10 RX ADMIN — LORAZEPAM 0.5 MG: 2 INJECTION INTRAMUSCULAR; INTRAVENOUS at 22:06

## 2020-09-10 RX ADMIN — HYDRALAZINE HYDROCHLORIDE 100 MG: 25 TABLET, FILM COATED ORAL at 17:34

## 2020-09-10 RX ADMIN — CLOTRIMAZOLE AND BETAMETHASONE DIPROPIONATE: 10; .64 CREAM TOPICAL at 22:58

## 2020-09-10 RX ADMIN — BUMETANIDE 1 MG: 1 TABLET ORAL at 08:46

## 2020-09-10 RX ADMIN — SODIUM BICARBONATE 650 MG: 650 TABLET ORAL at 08:47

## 2020-09-10 RX ADMIN — SPIRONOLACTONE 12.5 MG: 25 TABLET, FILM COATED ORAL at 08:48

## 2020-09-10 RX ADMIN — RISPERIDONE 0.5 MG: 0.25 TABLET ORAL at 08:47

## 2020-09-10 RX ADMIN — HYDRALAZINE HYDROCHLORIDE 100 MG: 25 TABLET, FILM COATED ORAL at 22:05

## 2020-09-10 RX ADMIN — ASPIRIN 81 MG: 81 TABLET, COATED ORAL at 08:47

## 2020-09-10 RX ADMIN — LOSARTAN POTASSIUM 50 MG: 50 TABLET, FILM COATED ORAL at 08:48

## 2020-09-10 RX ADMIN — METOPROLOL TARTRATE 50 MG: 50 TABLET, FILM COATED ORAL at 22:05

## 2020-09-10 RX ADMIN — RISPERIDONE 0.5 MG: 0.25 TABLET ORAL at 22:06

## 2020-09-10 RX ADMIN — RIVAROXABAN 20 MG: 20 TABLET, FILM COATED ORAL at 17:33

## 2020-09-10 RX ADMIN — IPRATROPIUM BROMIDE AND ALBUTEROL SULFATE 3 ML: 2.5; .5 SOLUTION RESPIRATORY (INHALATION) at 11:17

## 2020-09-10 RX ADMIN — SODIUM BICARBONATE 650 MG: 650 TABLET ORAL at 17:33

## 2020-09-10 RX ADMIN — HYDRALAZINE HYDROCHLORIDE 100 MG: 25 TABLET, FILM COATED ORAL at 08:47

## 2020-09-10 RX ADMIN — Medication 10 ML: at 22:07

## 2020-09-10 NOTE — PROGRESS NOTES
"NEPHROLOGY PROGRESS NOTE------[facility]    [facility]  [phone]  Jad Wilder MD      Patient Care Team:  Marisol Larson APRN as PCP - General  Shwetha Barber MD as Consulting Physician (Nephrology)  Edy Thomas MD as Consulting Physician (Cardiology)      Provider:  Jad Wilder MD  Patient Name: Carlos Whipple  :  1955    SUBJECTIVE:    F/U CRF/CKD    Feeling good. No SOB, CP, dysuria, palpitations.  Stuffy nose    Medication:    allopurinol 100 mg Oral Daily   aspirin 81 mg Oral Daily   atorvastatin 10 mg Oral Nightly   bumetanide 1 mg Oral Daily   clotrimazole-betamethasone  Topical Q12H   dilTIAZem  mg Oral Q24H   gabapentin 100 mg Oral Nightly   hydrALAZINE 100 mg Oral TID   ipratropium-albuterol 3 mL Nebulization 4x Daily - RT   isosorbide mononitrate 30 mg Oral BID - Nitrates   losartan 50 mg Oral Q24H   metoprolol tartrate 50 mg Oral Q12H   risperiDONE 0.5 mg Oral Q12H   rivaroxaban 20 mg Oral Daily With Dinner   sodium bicarbonate 650 mg Oral TID   sodium chloride 10 mL Intravenous Q12H   spironolactone 12.5 mg Oral Daily          OBJECTIVE    Vital Sign Min/Max for last 24 hours  Temp  Min: 97.8 °F (36.6 °C)  Max: 98.6 °F (37 °C)   BP  Min: 104/71  Max: 152/75   Pulse  Min: 68  Max: 87   Resp  Min: 16  Max: 19   SpO2  Min: 94 %  Max: 96 %   No data recorded   Weight  Min: 91.8 kg (202 lb 6.1 oz)  Max: 91.8 kg (202 lb 6.1 oz)     Flowsheet Rows      First Filed Value   Admission Height  182.9 cm (72\") Documented at 2020 1338   Admission Weight  114 kg (252 lb) Documented at 2020 1338          No intake/output data recorded.  I/O last 3 completed shifts:  In: 600 [P.O.:600]  Out: 1650 [Urine:1650]    Physical Exam:  General Appearance: alert, appears stated age and cooperative  Head: normocephalic, without obvious abnormality and atraumatic  Eyes: conjunctivae and sclerae normal and no icterus  Neck: supple and no " JVD  Lungs: clear to auscultation and respirations regular  Heart: IRREG IRREG +AFUA.   Chest: Wall no abnormalities observed  Abdomen: normal bowel sounds and soft non-tender  Extremities: moves extremities well, no edema, no cyanosis and no redness  Skin: no bleeding, bruising or rash, turgor normal, color normal and no lesions noted  Neurologic: Alert, and oriented. No focal deficits    Labs:    WBC WBC   Date Value Ref Range Status   09/10/2020 8.70 3.40 - 10.80 10*3/mm3 Final   09/09/2020 10.30 3.40 - 10.80 10*3/mm3 Final   09/08/2020 8.80 3.40 - 10.80 10*3/mm3 Final      HGB Hemoglobin   Date Value Ref Range Status   09/10/2020 14.1 13.0 - 17.7 g/dL Final   09/09/2020 14.0 13.0 - 17.7 g/dL Final   09/08/2020 14.5 13.0 - 17.7 g/dL Final      HCT Hematocrit   Date Value Ref Range Status   09/10/2020 43.5 37.5 - 51.0 % Final   09/09/2020 43.2 37.5 - 51.0 % Final   09/08/2020 45.4 37.5 - 51.0 % Final      Platlets No results found for: LABPLAT   MCV MCV   Date Value Ref Range Status   09/10/2020 83.7 79.0 - 97.0 fL Final   09/09/2020 82.9 79.0 - 97.0 fL Final   09/08/2020 84.6 79.0 - 97.0 fL Final          Sodium Sodium   Date Value Ref Range Status   09/10/2020 136 136 - 145 mmol/L Final   09/09/2020 135 (L) 136 - 145 mmol/L Final   09/08/2020 139 136 - 145 mmol/L Final      Potassium Potassium   Date Value Ref Range Status   09/10/2020 4.3 3.5 - 5.2 mmol/L Final   09/09/2020 4.2 3.5 - 5.2 mmol/L Final   09/08/2020 4.5 3.5 - 5.2 mmol/L Final     Comment:     Slight hemolysis detected by analyzer. Results may be affected.      Chloride Chloride   Date Value Ref Range Status   09/10/2020 103 98 - 107 mmol/L Final   09/09/2020 104 98 - 107 mmol/L Final   09/08/2020 106 98 - 107 mmol/L Final      CO2 CO2   Date Value Ref Range Status   09/10/2020 22.0 22.0 - 29.0 mmol/L Final   09/09/2020 20.0 (L) 22.0 - 29.0 mmol/L Final   09/08/2020 23.0 22.0 - 29.0 mmol/L Final      BUN BUN   Date Value Ref Range Status    09/10/2020   Final     Comment:     Testing performed by alternate method   09/10/2020 41 (H) 8 - 23 mg/dL Final   09/09/2020   Final     Comment:     Testing performed by alternate method   09/09/2020 35 (H) 8 - 23 mg/dL Final   09/08/2020   Final     Comment:     Testing performed by alternate method   09/08/2020 29 (H) 8 - 23 mg/dL Final      Creatinine Creatinine   Date Value Ref Range Status   09/10/2020 1.20 0.76 - 1.27 mg/dL Final   09/09/2020 1.21 0.76 - 1.27 mg/dL Final   09/08/2020 1.12 0.76 - 1.27 mg/dL Final      Calcium Calcium   Date Value Ref Range Status   09/10/2020 9.4 8.6 - 10.5 mg/dL Final   09/09/2020 9.0 8.6 - 10.5 mg/dL Final   09/08/2020 9.2 8.6 - 10.5 mg/dL Final      PO4 No components found for: PO4   Albumin Albumin   Date Value Ref Range Status   09/10/2020 3.70 3.50 - 5.20 g/dL Final   09/08/2020 3.70 3.50 - 5.20 g/dL Final      Magnesium Magnesium   Date Value Ref Range Status   09/10/2020 2.4 1.6 - 2.4 mg/dL Final   09/09/2020 2.4 1.6 - 2.4 mg/dL Final   09/08/2020 2.5 (H) 1.6 - 2.4 mg/dL Final      Uric Acid No components found for: URIC ACID     Imaging Results (Last 72 Hours)     ** No results found for the last 72 hours. **          Results for orders placed during the hospital encounter of 09/01/20   XR Chest 1 View    Narrative XR CHEST 1 VW-     Date of Exam: 9/1/2020 3:10 PM     Indication: soa.     Comparison Exams: 08/21/2020     Technique: Single AP chest radiograph     FINDINGS:  A left basilar consolidation appears unchanged. There may be an  associated small left pleural effusion. The heart is enlarged. The  pulmonary vasculature appears normal. The osseous structures appear  intact.       Impression 1.Left basilar consolidation appears unchanged, which could reflect  atelectasis or pneumonia.  2.Cardiomegaly with possible small left pleural effusion.     Electronically Signed By-DR. Salty Joseph MD On:9/1/2020 3:35 PM  This report was finalized on [id number] by  DR. Salty Joseph MD.       Results for orders placed during the hospital encounter of 12/06/19   Duplex Carotid Ultrasound CAR    Narrative · Proximal right internal carotid artery is normal.  · Mid right internal carotid artery is normal.  · Proximal left internal carotid artery is normal.  · Mid left internal carotid artery is normal.            ASSESSMENT / PLAN      Atrial fibrillation with rapid ventricular response (CMS/HCC)    Chronic atrial fibrillation (CMS/HCC)    Chronic diastolic CHF (congestive heart failure) (CMS/HCC)    CKD (chronic kidney disease) stage 3, GFR 30-59 ml/min (CMS/HCC)    Gambling disorder, persistent    Coronary artery disease involving native coronary artery of native heart without angina pectoris    Psoriasis    History of CVA (cerebrovascular accident)    DDD (degenerative disc disease), lumbosacral    Peripheral polyneuropathy    GERD (gastroesophageal reflux disease)    Medically noncompliant    Mild cognitive impairment    Uncontrolled hypertension    Dyspnea    Dermatitis associated with moisture    1. CRF/CKD STG 2------ Appears to have CRF/CKD STG 2 with current serum creatinine at baseline. Unknown if patient has baseline proteinuria or hematuria. In the long run we may have to accept a higher baseline serum creatinine in order to keep euvolemic. Follow with cautious diuresis. CRF/CKD is likely secondary to HTN NS.   Dose meds for CrCl 60 cc/min     2. ACIDOSIS------PO sodium bicarb     3. CHRONIC DIASTOLIC CHF/ELEVATED BNP------Cautious diuresis. Stable on current diuretic regimen     4. HTN WITH CKD------BP okay. No ACE/ARB/DRI for now     5. OA/DJD------No NSAIDs.      6. ATRIAL FIBRILLATION WITH RVR------per Cardiology. Dig level okay     7. HYPERLIPIDEMIA------Statin. CK, TSH okay    8. HYPERURICEMIA------Allopurinol     9. DECONDITIONING------PT    Cr stable  Continue diuretics  Continue sodium bicarb PO.  Add flonase  Awaits rehab  Okay from RENAL standpoint to d/c  today and for patient to follow up in office in 2 weeks       Jad Wilder MD  [facility]  [phone]  09/10/20  09:28

## 2020-09-10 NOTE — PROGRESS NOTES
Cardiology Progress Note      Admiting Physician:  Dre Stauffer MD   LOS: 7 days       Reason For Followup:  Atrial fibrillation with rapid ventricular response  Heart failure with preserved ejection fraction  Coronary artery disease    Subjective:    Interval History:  Seen and examined.  Chart and labs reviewed.  Patient denies any chest pain pressure heaviness or tightness.  He denies any shortness of breath.  He denies any PND orthopnea.  He reports no new complaints.  Nursing reports waiting on placement.    Review of Systems:  A complete review of systems was undertaken with the pertinent cardiovascular findings listed in history of present illness and all other systems being negative.     Assessment & Plan    Impressions:  Atrial fibrillation with rapid ventricular response  Heart failure with preserved ejection fraction  Chronic kidney disease  Coronary artery disease  History of CVA  Abnormal blood culture-likely skin contaminant  Mild baseline cognitive impairment  Gambling disorder    Recommendations:  Continuation of his current cardiovascular care at the present time.  Cardiac status appropriate for discharge to next destination of care when okay with other services.  Patient's heart rate has been stable with oral medicines including Cardizem beta-blockers  Patient is also on Eliquis for anticoagulation  Continue current medical therapy and follow him  Patient is awaiting placement in ECF      Objective:    Medication Review:   Scheduled Meds:    allopurinol 100 mg Oral Daily   aspirin 81 mg Oral Daily   atorvastatin 10 mg Oral Nightly   bumetanide 1 mg Oral Daily   clotrimazole-betamethasone  Topical Q12H   dilTIAZem  mg Oral Q24H   gabapentin 100 mg Oral Nightly   hydrALAZINE 100 mg Oral TID   ipratropium-albuterol 3 mL Nebulization 4x Daily - RT   isosorbide mononitrate 30 mg Oral BID - Nitrates   losartan 50 mg Oral Q24H   metoprolol tartrate 50 mg Oral Q12H   risperiDONE 0.5 mg Oral Q12H    rivaroxaban 20 mg Oral Daily With Dinner   sodium bicarbonate 650 mg Oral TID   sodium chloride 10 mL Intravenous Q12H   spironolactone 12.5 mg Oral Daily     Continuous Infusions:     PRN Meds:.•  acetaminophen  •  albuterol  •  azelastine  •  hydrALAZINE  •  LORazepam  •  magnesium hydroxide  •  sodium chloride  •  sodium chloride    Patient Active Problem List   Diagnosis   • Hypertensive emergency   • Chronic atrial fibrillation (CMS/HCC)   • Chronic diastolic CHF (congestive heart failure) (CMS/[facility])   • CKD (chronic kidney disease) stage 3, GFR 30-59 ml/min (CMS/[facility])   • Essential hypertension   • Gambling disorder, persistent   • Shortness of breath   • Coronary artery disease involving native coronary artery of native heart without angina pectoris   • Psoriasis   • History of CVA (cerebrovascular accident)   • DDD (degenerative disc disease), lumbosacral   • Peripheral polyneuropathy   • GERD (gastroesophageal reflux disease)   • Medically noncompliant   • Acute respiratory distress   • Unstable angina (CMS/[facility])   • Cervical disc disorder with radiculopathy   • Completed stroke (CMS/[facility])   • Degeneration of intervertebral disc   • Excessive anticoagulation   • Impaired left ventricular function   • Liver lesion   • Lumbar radiculopathy   • Cervical myelopathy (CMS/[facility])   • Lumbosacral radiculopathy   • Spinal stenosis of lumbar region   • Obesity   • Mild cognitive impairment   • Thoracic back pain   • Thoracic disc disease with myelopathy   • Paroxysmal atrial fibrillation (CMS/[facility])   • Uncontrolled hypertension   • Acute congestive heart failure (CMS/[facility])   • Dyspnea   • Atrial fibrillation with RVR (CMS/[facility])   • Atrial fibrillation with rapid ventricular response (CMS/[facility])   • Dermatitis associated with moisture         Physical Exam:    General: Alert, cooperative, no distress, appears stated age  Head:  Normocephalic, atraumatic, mucous membranes moist  Eyes:  Conjunctiva/corneas clear, EOM's intact      Neck:  Supple,  no bruit  Lungs: Coarse and diminished bilaterally.  Chest wall: No tenderness  Heart::  Regular rate and rhythm, S1 and S2 normal, 1/6 holosystolic murmur.  No rub or gallop  Abdomen: Soft, non-tender, nondistended bowel sounds active  Extremities: No cyanosis, clubbing, or edema  Pulses: 2+ and symmetric all extremities  Skin:  No rashes or lesions  Neuro/psych: A&O x3. CN II through XII are grossly intact with appropriate affect    Vital Signs:  Vitals:    09/10/20 0844 09/10/20 1117 09/10/20 1120 09/10/20 1200   BP: 132/77   104/70   BP Location:       Patient Position:       Pulse: 68 67 67 70   Resp:  18 18 16   Temp:    97.4 °F (36.3 °C)   TempSrc:       SpO2:  96% 96% 96%   Weight:       Height:         Wt Readings from Last 1 Encounters:   09/09/20 91.8 kg (202 lb 6.1 oz)       Intake/Output Summary (Last 24 hours) at 9/10/2020 1639  Last data filed at 9/10/2020 1430  Gross per 24 hour   Intake 460 ml   Output 1710 ml   Net -1250 ml         Results Review:     CBC    Results from last 7 days   Lab Units 09/10/20  0318 09/09/20  0226 09/08/20  0649 09/07/20  0312 09/06/20  0442 09/05/20  0228 09/04/20  0302   WBC 10*3/mm3 8.70 10.30 8.80 9.40 8.90 11.70* 10.80   HEMOGLOBIN g/dL 14.1 14.0 14.5 14.4 14.6 14.8 14.4   PLATELETS 10*3/mm3 213 211 213 221 182 223 192     Cr Clearance Estimated Creatinine Clearance: 81.8 mL/min (by C-G formula based on SCr of 1.2 mg/dL).  Coag     HbA1C   Lab Results   Component Value Date    HGBA1C 5.5 12/12/2019    HGBA1C 5.1 11/09/2019    HGBA1C 5.6 10/10/2018     Blood Glucose No results found for: POCGLU  Infection       CMP   Results from last 7 days   Lab Units 09/10/20  0318 09/09/20 0226 09/08/20  0649 09/07/20  0312 09/06/20  0442 09/05/20 0228 09/04/20  0302   SODIUM mmol/L 136 135* 139 138 138 138 140   POTASSIUM mmol/L 4.3 4.2 4.5 4.7 4.2 4.1 3.7   CHLORIDE mmol/L 103 104 106 101 102 98 102   CO2 mmol/L 22.0 20.0* 23.0 27.0 23.0 26.0 26.0   BUN  41*  35* 29* 29* 29* 26* 22   CREATININE mg/dL 1.20 1.21 1.12 1.24 1.21 1.36* 1.17   GLUCOSE mg/dL 103* 110* 97 117* 105* 114* 106*   ALBUMIN g/dL 3.70  --  3.70 3.80  --  3.90 3.70   BILIRUBIN mg/dL  --   --  0.7 0.6  --  0.6 0.8   ALK PHOS U/L  --   --  70 69  --  65 62   AST (SGOT) U/L  --   --  31 30  --  15 16   ALT (SGPT) U/L  --   --  35 26  --  18 14     ABG      UA        BHARATI  No results found for: POCMETH, POCAMPHET, POCBARBITUR, POCBENZO, POCCOCAINE, POCOPIATES, POCOXYCODO, POCPHENCYC, POCPROPOXY, POCTHC, POCTRICYC  Lysis Labs   Results from last 7 days   Lab Units 09/10/20  0318 09/09/20  0226 09/08/20  0649 09/07/20  0312 09/06/20  0442 09/05/20  0228 09/04/20  0302   HEMOGLOBIN g/dL 14.1 14.0 14.5 14.4 14.6 14.8 14.4   PLATELETS 10*3/mm3 213 211 213 221 182 223 192   CREATININE mg/dL 1.20 1.21 1.12 1.24 1.21 1.36* 1.17     Radiology(recent) No radiology results for the last day            Imaging Results (Last 24 Hours)     ** No results found for the last 24 hours. **          Cardiac Studies:  Echo-   Results for orders placed during the hospital encounter of 11/07/19   Adult Transthoracic Echo Complete W/ Cont if Necessary Per Protocol    Narrative · The left ventricular cavity is severely dilated.  · Left ventricular systolic function is moderately decreased.  · Left atrial cavity size is moderately dilated.  · Moderate mitral valve regurgitation is present  · Moderate tricuspid valve regurgitation is present.  · The following left ventricular wall segments are hypokinetic: mid   anterior, apical anterior, basal anterolateral, mid anterolateral, apical   lateral, basal inferolateral, mid inferolateral, apical inferior, mid   inferior, apical septal, basal inferoseptal, mid inferoseptal, apex   hypokinetic, mid anteroseptal, basal anterior, basal inferior and basal   inferoseptal.  · LV ejection fraction is about 30 to 35%  · No pericardial effusion noted        Stress Myoview-  Cath-        Edy  MD Martha  09/10/20  16:39

## 2020-09-10 NOTE — PROGRESS NOTES
Continued Stay Note  KAE Hameed     Patient Name: Carlos Whipple  MRN: [id number]  Today's Date: 9/10/2020    Admit Date: 9/1/2020    Discharge Plan     Row Name 09/10/20 1354       Plan    Plan Comments  Psych states that pt is now capable of making his own decisions and is no agreeable to Home health.         Discharge Codes    No documentation.       Expected Discharge Date and Time     Expected Discharge Date Expected Discharge Time    Sep 7, 2020           SHAKIR Osorio    Phone # [phone]  Cell #[phone]  Fax#[phone]  [email or web address]    SHAKIR Osorio

## 2020-09-10 NOTE — PLAN OF CARE
Problem: Patient Care Overview  Goal: Plan of Care Review  Outcome: Ongoing (interventions implemented as appropriate)  Flowsheets (Taken 9/10/2020 6634)  Progress: no change  Plan of Care Reviewed With: patient  Outcome Summary: Patient continues to improve with mobility. Patient still seems to have moments of confusion, such as urinating in a cup or on on the floor in the hallway.  Will continue to monitor.

## 2020-09-10 NOTE — PLAN OF CARE
Problem: Patient Care Overview  Goal: Plan of Care Review  Outcome: Ongoing (interventions implemented as appropriate)  Flowsheets  Taken 9/10/2020 1318  Progress: no change  Outcome Summary: remains risk for skin injury & falls. Denies pain today. Fairly oriented, but does have forgetfulness & periods of confusion. no cardiac problems noted or extreme HTN.  Taken 9/10/2020 0720  Plan of Care Reviewed With: patient

## 2020-09-10 NOTE — CONSULTS
"  Referring Provider: Dr. Stauffer  Reason for Consultation: Decision Making Capacity      Chief Complaint: \"I was having trouble catching my breath.\"    Subjective .     History of present illness:  The patient is a 64 y.o.white male with PMH significant for chronic atrial fib, CHF, CKD, HTN, HLD who was admitted secondary to shortness of breath.  Psych was consulted by Dr. Stauffer to evaluate decision making capacity. Patient is alert and oriented, cooperative and pleasant.  He did not know the exact day but knew the month and year, season, hospital, floor.  He understood most of his medical conditions and the dosing of his medications, although he could not remember the names of his medicines.  He is not , no children, lives alone but say he has family that lives next door.  He denies any history of depression or anxiety.  He denies feeling hopeless or helpless.  No reported AVH.     Past Psych Hx: unremarkable    Review of Systems   Constitutional: Negative.    Respiratory: Negative.  Negative for cough, chest tightness and shortness of breath.    Cardiovascular: Positive for leg swelling. Negative for chest pain and palpitations.   Gastrointestinal: Negative.    Musculoskeletal: Positive for arthralgias and gait problem.   Psychiatric/Behavioral: Negative for agitation, behavioral problems, confusion, decreased concentration, dysphoric mood, hallucinations, self-injury, sleep disturbance and suicidal ideas. The patient is not nervous/anxious and is not hyperactive.         History      Past Medical History:   Diagnosis Date   • A-fib (CMS/[facility])    • CHF (congestive heart failure) (CMS/[facility])    • Chronic atrial fibrillation (CMS/[facility]) 11/8/2019   • Chronic diastolic CHF (congestive heart failure) (CMS/[facility]) 11/8/2019   • CKD (chronic kidney disease) stage 3, GFR 30-59 ml/min (CMS/[facility]) 11/8/2019   • Coronary artery disease involving native coronary artery of native heart without angina pectoris 11/8/2019   • DDD " (degenerative disc disease), lumbosacral 2019   • Essential hypertension 2019   • Gambling disorder, persistent 2019   • GERD (gastroesophageal reflux disease) 2019   • History of CVA (cerebrovascular accident) 2019   • Hyperlipidemia    • Hypertension    • Medically noncompliant 2019   • Peripheral polyneuropathy 2019   • Psoriasis 2019   • Stroke (CMS/HCC)           Family History   Problem Relation Age of Onset   • Heart disease Mother         Social History     Tobacco Use   • Smoking status: Never Smoker   • Smokeless tobacco: Never Used   Substance Use Topics   • Alcohol use: No     Frequency: Never   • Drug use: No          Medications Prior to Admission   Medication Sig Dispense Refill Last Dose   • aspirin 81 MG EC tablet Take 81 mg by mouth Daily.      • carvedilol (COREG) 25 MG tablet Take 25 mg by mouth 2 (Two) Times a Day With Meals.   Unknown at Unknown time   • [] dilTIAZem CD (CARDIZEM CD) 180 MG 24 hr capsule Take 1 capsule by mouth Daily for 30 days. 30 capsule 0    • gabapentin (NEURONTIN) 100 MG capsule Take 100 mg by mouth Every Night.   Unknown at Unknown time   • [] hydrALAZINE (APRESOLINE) 25 MG tablet Take 2 tablets by mouth 3 (Three) Times a Day for 30 days. 180 tablet 5    • isosorbide mononitrate (ISMO,MONOKET) 10 MG tablet Take 3 tablets by mouth 2 (Two) Times a Day. 60 tablet 0    • losartan (Cozaar) 50 MG tablet Take 0.5 tablets by mouth Daily for 30 days. 30 tablet 3    • pravastatin (PRAVACHOL) 40 MG tablet Take 40 mg by mouth Every Night.   Unknown at Unknown time   • [] risperiDONE (risperDAL) 0.5 MG tablet Take 0.5 tablets by mouth 2 (Two) Times a Day for 30 days. 30 tablet 0    • [] rivaroxaban (Xarelto) 20 MG tablet Take 1 tablet by mouth Daily With Dinner for 30 days. 30 tablet 5    • spironolactone (ALDACTONE) 25 MG tablet Take 25 mg by mouth Daily.   Unknown at Unknown time       Scheduled Meds:  allopurinol  "100 mg Oral Daily   aspirin 81 mg Oral Daily   atorvastatin 10 mg Oral Nightly   bumetanide 1 mg Oral Daily   clotrimazole-betamethasone  Topical Q12H   dilTIAZem  mg Oral Q24H   gabapentin 100 mg Oral Nightly   hydrALAZINE 100 mg Oral TID   ipratropium-albuterol 3 mL Nebulization 4x Daily - RT   isosorbide mononitrate 30 mg Oral BID - Nitrates   losartan 50 mg Oral Q24H   metoprolol tartrate 50 mg Oral Q12H   risperiDONE 0.5 mg Oral Q12H   rivaroxaban 20 mg Oral Daily With Dinner   sodium bicarbonate 650 mg Oral TID   sodium chloride 10 mL Intravenous Q12H   spironolactone 12.5 mg Oral Daily     Continuous Infusions:   PRN Meds:.•  acetaminophen  •  albuterol  •  azelastine  •  hydrALAZINE  •  LORazepam  •  magnesium hydroxide  •  sodium chloride  •  sodium chloride     Allergies:  Ketorolac tromethamine      Objective     Vital Signs   /70   Pulse 70   Temp 97.4 °F (36.3 °C)   Resp 16   Ht 182.9 cm (72.01\")   Wt 91.8 kg (202 lb 6.1 oz)   SpO2 96%   BMI 27.44 kg/m²     Physical Exam:     General Appearance:    NAD   Head:    Normocephalic, without obvious abnormality, atraumatic   Eyes:            Lids and lashes normal, conjunctivae and sclerae normal, no   icterus, no pallor, corneas clear, PERRLA   Skin:   No bleeding, bruising or rash          Neurologic:   Cranial nerves 2 - 12 grossly intact, sensation intact, DTR       present and equal bilaterally     MMSE: scored 24/30 (mild cognitive impairment), scored fairly well on orientation, registration, attention and calculation.  He was able to follow commands, repetition, reading and identifying objects. He has some difficulty with recall, writing and copying    Mental Status Exam:   Hygiene:   fair  Cooperation:  Cooperative  Eye Contact:  Good  Psychomotor Behavior:  Slow  Affect:  Appropriate  Mood: normal  Hopelessness: Denies  Speech:  Normal  Thought Process:  Goal directed  Thought Content:  Normal  Suicidal:  None  Homicidal:  " None  Hallucinations:  None  Delusion:  None  Memory:  Few deficits  Orientation:  Person, Place, Time and Situation  Reliability:  good  Insight:  Fair  Judgement:  Fair and Poor  Impulse Control:  Good  Physical/Medical Issues:  Yes     Medications and allergies were reviewed by this provider.     Lab Results   Component Value Date    GLUCOSE 103 (H) 09/10/2020    CALCIUM 9.4 09/10/2020     09/10/2020    K 4.3 09/10/2020    CO2 22.0 09/10/2020     09/10/2020    BUN  09/10/2020      Comment:      Testing performed by alternate method    BUN 41 (H) 09/10/2020    CREATININE 1.20 09/10/2020    EGFRIFNONA 61 09/10/2020    BCR  09/10/2020      Comment:      Testing not performed    ANIONGAP 11.0 09/10/2020       Last Urine Toxicity     LAST URINE TOXICITY RESULTS Latest Ref Rng & Units 10/11/2018    CREATININE UR mg/dl 70.9          No results found for: PHENYTOIN, PHENOBARB, VALPROATE, CBMZ    Lab Results   Component Value Date     09/10/2020    BUN  09/10/2020      Comment:      Testing performed by alternate method    BUN 41 (H) 09/10/2020    CREATININE 1.20 09/10/2020    TSH 2.760 09/02/2020    WBC 8.70 09/10/2020       Brief Urine Lab Results  (Last result in the past 365 days)      Color   Clarity   Blood   Leuk Est   Nitrite   Protein   CREAT   Urine HCG        09/02/20 0928 Dark Yellow  Comment:  Result checked  Turbid  Comment:  Result checked  Negative Negative Negative 100 mg/dL (2+)               ECG/EMG Results (last 72 hours)     ** No results found for the last 72 hours. **            Assessment/Plan       Atrial fibrillation with rapid ventricular response (CMS/HCC)    Chronic atrial fibrillation (CMS/HCC)    Chronic diastolic CHF (congestive heart failure) (CMS/[facility])    CKD (chronic kidney disease) stage 3, GFR 30-59 ml/min (CMS/HCC)    Gambling disorder, persistent    Coronary artery disease involving native coronary artery of native heart without angina pectoris    Psoriasis     History of CVA (cerebrovascular accident)    DDD (degenerative disc disease), lumbosacral    Peripheral polyneuropathy    GERD (gastroesophageal reflux disease)    Medically noncompliant    Mild cognitive impairment    Uncontrolled hypertension    Dyspnea    Dermatitis associated with moisture       LABS: Reviewed    Assessment:   Mild cognitive impairment    Treatment Plan:   Patient is alert and oriented, cooperative.  He has mild cognitive impairment but still capable of decision making, although his judgement can be less than optimal.  He understands the concerns voice about him going home, advised he would benefit from assisted living.   Patient is agreeable to home health, declines assisted living.  Social work reports that he was denied for rehab.  Will follow PRN    Treatment Plan discussed with: Patient and Social work      I discussed the patients findings and my recommendations with patient and Social work    I have reviewed and approved the behavioral health treatment plans and problem list. Yes  Thank you for the consult   Referring MD has access to the consult report and progress notes in EMR     Patient was assessed wearing appropriate PPE.     Nazia Rogers PA-C  09/10/20  13:18

## 2020-09-10 NOTE — PROGRESS NOTES
Continued Stay Note   [facility]     Patient Name: Carlos Whipple  MRN: [id number]  Today's Date: 9/10/2020    Admit Date: 9/1/2020    Discharge Plan     Row Name 09/10/20 7967       Plan    Plan  DC Plan: Return home with [facility], pending acceptance.    Plan Comments  Pt declined npt rehab.    Row Name 09/10/20 3722       Plan    Plan Comments  Psych states that pt is now capable of making his own decisions and is no agreeable to Home health.                Expected Discharge Date and Time     Expected Discharge Date Expected Discharge Time    Sep 7, 2020             Florentin Lynn RN

## 2020-09-10 NOTE — DISCHARGE PLACEMENT REQUEST
"Carlos Whipple (64 y.o. Male)     Date of Birth Social Security Number Address Home Phone MRN    [id number]  [id number]  [place] [facility] [phone] [id number]    Judaism Marital Status          Sikhism Single       Admission Date Admission Type Admitting Provider Attending Provider Department, Room/Bed    9/1/20 Emergency Dre Stauffer MD Heimer, Brian T, MD [facility] 2B MEDICAL INPATIENT, 222/1    Discharge Date Discharge Disposition Discharge Destination                       Attending Provider:  Dre Stauffer MD    Allergies:  Ketorolac Tromethamine    Isolation:  None   Infection:  None   Code Status:  CPR    Ht:  182.9 cm (72.01\")   Wt:  91.8 kg (202 lb 6.1 oz)    Admission Cmt:  None   Principal Problem:  Atrial fibrillation with rapid ventricular response (CMS/HCC) [I48.91]                 Active Insurance as of 9/1/2020     Primary Coverage     Payor Plan Insurance Group Employer/Plan Group    HUMANA MEDICARE REPLACEMENT HUMANA MEDICARE REPLACEMENT [id number]     Payor Plan Address Payor Plan Phone Number Payor Plan Fax Number Effective Dates    [address] [address] [phone]  4/1/2020 - None Entered    [facility] [id number]       Subscriber Name Subscriber Birth Date Member ID       CARLOS WHIPPLE 1955 [id number]                 Emergency Contacts      (Rel.) Home Phone Work Phone Mobile Phone    KACEY DOW (Friend) [phone] -- [phone]               History & Physical      Dre Stauffer MD at 09/01/20 1940                Patient Care Team:  Marisol Larson APRN as PCP - General    Chief complaint shortness of breath    Subjective     Patient presented to the emergency room today with shortness of breath worsening over the past few days.  He was recently discharged from the hospital and has been staying with his brother.  He is still not getting his medications as prescribed and his family cannot take care of him at home.  I " received a phone call from a  working with [employer] who has been out to see him and reports that he is incontinent of stool and definitely not able to care for himself. His own home is not suitable to live in either and according to the , the county is involved due to hoarding/cleanliness.  She spoke with his family members about getting guardianship due to his cognitive impairment, but reportedly no one will take guardianship.  Patient will likely need ECF placement.  Patient is aware that he is not making good decisions and that he is not caring for himself.  He also mentions that for the past several days he feels like his heart has felt weak whenever he stands and tries to walk.  He has to stand for several minutes before he has enough energy and oxygen to move.    Shortness of Breath   Associated symptoms include leg swelling. Pertinent negatives include no chest pain, fever, sputum production or wheezing.       Review of Systems   Constitutional: Positive for fatigue. Negative for chills and fever.   Respiratory: Positive for shortness of breath. Negative for cough, sputum production and wheezing.    Cardiovascular: Positive for palpitations and leg swelling. Negative for chest pain.   Genitourinary: Negative for difficulty urinating and dysuria.   Neurological: Negative for dizziness.        Past Medical History:   Diagnosis Date   • A-fib (CMS/[facility])    • CHF (congestive heart failure) (CMS/[facility])    • Chronic atrial fibrillation (CMS/[facility]) 11/8/2019   • Chronic diastolic CHF (congestive heart failure) (CMS/[facility]) 11/8/2019   • CKD (chronic kidney disease) stage 3, GFR 30-59 ml/min (CMS/[facility]) 11/8/2019   • Coronary artery disease involving native coronary artery of native heart without angina pectoris 11/8/2019   • DDD (degenerative disc disease), lumbosacral 11/8/2019   • Essential hypertension 11/8/2019   • Gambling disorder, persistent 11/8/2019   • GERD (gastroesophageal reflux disease)  11/8/2019   • History of CVA (cerebrovascular accident) 11/8/2019   • Hyperlipidemia    • Hypertension    • Medically noncompliant 11/8/2019   • Peripheral polyneuropathy 11/8/2019   • Psoriasis 11/8/2019   • Stroke (CMS/HCC)      Past Surgical History:   Procedure Laterality Date   • CARDIAC CATHETERIZATION     • CARDIAC CATHETERIZATION N/A 12/10/2019    Procedure: Left Heart Cath and coronary angiogram;  Surgeon: Edy Thomas MD;  Location: [facility] CATH INVASIVE LOC[facility];  Service: Cardiovascular     Family History   Problem Relation Age of Onset   • Heart disease Mother      Social History     Tobacco Use   • Smoking status: Never Smoker   • Smokeless tobacco: Never Used   Substance Use Topics   • Alcohol use: No     Frequency: Never   • Drug use: No     Medications Prior to Admission   Medication Sig Dispense Refill Last Dose   • aspirin 81 MG EC tablet Take 81 mg by mouth Daily.      • carvedilol (COREG) 25 MG tablet Take 25 mg by mouth 2 (Two) Times a Day With Meals.   Unknown at Unknown time   • dilTIAZem CD (CARDIZEM CD) 180 MG 24 hr capsule Take 1 capsule by mouth Daily for 30 days. 30 capsule 0    • gabapentin (NEURONTIN) 100 MG capsule Take 100 mg by mouth Every Night.   Unknown at Unknown time   • hydrALAZINE (APRESOLINE) 25 MG tablet Take 2 tablets by mouth 3 (Three) Times a Day for 30 days. 180 tablet 5    • isosorbide mononitrate (ISMO,MONOKET) 10 MG tablet Take 3 tablets by mouth 2 (Two) Times a Day. 60 tablet 0    • losartan (Cozaar) 50 MG tablet Take 0.5 tablets by mouth Daily for 30 days. 30 tablet 3    • pravastatin (PRAVACHOL) 40 MG tablet Take 40 mg by mouth Every Night.   Unknown at Unknown time   • risperiDONE (risperDAL) 0.5 MG tablet Take 0.5 tablets by mouth 2 (Two) Times a Day for 30 days. 30 tablet 0    • rivaroxaban (Xarelto) 20 MG tablet Take 1 tablet by mouth Daily With Dinner for 30 days. 30 tablet 5    • spironolactone (ALDACTONE) 25 MG tablet Take 25 mg by mouth Daily.   Unknown  at Unknown time     Allergies:  Ketorolac tromethamine    Objective      Vital Signs  Temp:  [97.6 °F (36.4 °C)-98.7 °F (37.1 °C)] 98.7 °F (37.1 °C)  Heart Rate:  [] 100  Resp:  [18-33] 33  BP: (137-220)/() 192/99    Physical Exam   Constitutional: He is oriented to person, place, and time. He appears well-developed and well-nourished.  Non-toxic appearance. He has a sickly appearance.   Cardiovascular: An irregularly irregular rhythm present. Tachycardia present.   Pulmonary/Chest: Effort normal. He has rales.   Abdominal: Soft. Bowel sounds are normal.   Musculoskeletal: He exhibits edema.   Neurological: He is alert and oriented to person, place, and time.   Skin: Skin is warm and dry.   Psychiatric: He has a normal mood and affect. Thought content normal. His speech is tangential. Cognition and memory are impaired.       Results Review:  Lab Results (last 24 hours)     Procedure Component Value Units Date/Time    Troponin [[id number]]  (Normal) Collected:  09/01/20 1431    Specimen:  Blood Updated:  09/01/20 1548     Troponin T <0.010 ng/mL     Narrative:       Troponin T Reference Range:  <= 0.03 ng/mL-   Negative for AMI  >0.03 ng/mL-     Abnormal for myocardial necrosis.  Clinicians would have to utilize clinical acumen, EKG, Troponin and serial changes to determine if it is an Acute Myocardial Infarction or myocardial injury due to an underlying chronic condition.       Results may be falsely decreased if patient taking Biotin.      [place] Draw [[id number]] Collected:  09/01/20 1431    Specimen:  Blood Updated:  09/01/20 1546    Narrative:       The following orders were created for panel order [place] Draw.  Procedure                               Abnormality         Status                     ---------                               -----------         ------                     Light Blue Top[[id number]]                                   Final result               Green Top (Gel)[[id number]]                                                              Lavender Top[[id number]]                                     Final result               Gold Top - SST[[id number]]                                   Final result                 Please view results for these tests on the individual orders.    Light Blue Top [[id number]] Collected:  09/01/20 1431    Specimen:  Blood Updated:  09/01/20 1546     Extra Tube hold for add-on     Comment: Auto resulted       Lavender Top [[id number]] Collected:  09/01/20 1431    Specimen:  Blood Updated:  09/01/20 1546     Extra Tube hold for add-on     Comment: Auto resulted       Gold Top - SST [[id number]] Collected:  09/01/20 1431    Specimen:  Blood Updated:  09/01/20 1546     Extra Tube Hold for add-ons.     Comment: Auto resulted.       BUN [[id number]]  (Normal) Collected:  09/01/20 1431    Specimen:  Blood Updated:  09/01/20 1534     BUN 23 mg/dL     Comprehensive Metabolic Panel [[id number]]  (Abnormal) Collected:  09/01/20 1431    Specimen:  Blood Updated:  09/01/20 1522     Glucose 110 mg/dL      BUN --     Comment: Testing performed by alternate method        Creatinine 1.35 mg/dL      Sodium 140 mmol/L      Potassium 4.0 mmol/L      Chloride 108 mmol/L      CO2 19.0 mmol/L      Calcium 9.4 mg/dL      Total Protein 7.3 g/dL      Albumin 4.00 g/dL      ALT (SGPT) 20 U/L      AST (SGOT) 17 U/L      Alkaline Phosphatase 71 U/L      Total Bilirubin 1.0 mg/dL      eGFR Non African Amer 53 mL/min/1.73      Globulin 3.3 gm/dL      A/G Ratio 1.2 g/dL      BUN/Creatinine Ratio --     Comment: Testing not performed        Anion Gap 13.0 mmol/L     Narrative:       GFR Normal >60  Chronic Kidney Disease <60  Kidney Failure <15      CBC & Differential [[id number]] Collected:  09/01/20 1431    Specimen:  Blood Updated:  09/01/20 1456    Narrative:       The following orders were created for panel order CBC & Differential.  Procedure                               Abnormality         Status                      ---------                               -----------         ------                     CBC Auto Differential[[id number]]        Abnormal            Final result                 Please view results for these tests on the individual orders.    CBC Auto Differential [[id number]]  (Abnormal) Collected:  09/01/20 1431    Specimen:  Blood Updated:  09/01/20 1456     WBC 11.30 10*3/mm3      RBC 5.20 10*6/mm3      Hemoglobin 13.7 g/dL      Hematocrit 42.6 %      MCV 81.9 fL      MCH 26.4 pg      MCHC 32.3 g/dL      RDW 17.9 %      RDW-SD 50.3 fl      MPV 9.1 fL      Platelets 195 10*3/mm3      Neutrophil % 71.6 %      Lymphocyte % 18.7 %      Monocyte % 8.1 %      Eosinophil % 0.5 %      Basophil % 1.1 %      Neutrophils, Absolute 8.10 10*3/mm3      Lymphocytes, Absolute 2.10 10*3/mm3      Monocytes, Absolute 0.90 10*3/mm3      Eosinophils, Absolute 0.10 10*3/mm3      Basophils, Absolute 0.10 10*3/mm3      nRBC 0.1 /100 WBC     Blood Culture - Blood, Hand, Right [[id number]] Collected:  09/01/20 1431    Specimen:  Blood from Hand, Right Updated:  09/01/20 1452    Blood Culture - Blood, Hand, Left [[id number]] Collected:  09/01/20 1431    Specimen:  Blood from Hand, Left Updated:  09/01/20 1452    COVID-19,[facility] IN-HOUSE,Nasal Swab No Transport Media 3-4 HR TAT - Swab, Nasal Cavity [[id number]]  (Normal) Collected:  09/01/20 1414    Specimen:  Swab from Nasal Cavity Updated:  09/01/20 1452     COVID19 Not Detected    Narrative:       Fact sheet for providers: https://www.fda.gov/media/435913/download     Fact sheet for patients: https://www.fda.gov/media/740153/download    POC Lactate [[id number]]  (Normal) Collected:  09/01/20 1451    Specimen:  Blood Updated:  09/01/20 1452     Lactate 1.5 mmol/L      Comment: Serial Number: [id number]:  [id number]            Imaging Results (Last 24 Hours)     Procedure Component Value Units Date/Time    XR Chest 1 View [[id number]] Collected:  09/01/20 0471     Updated:   09/01/20 1537    Narrative:       XR CHEST 1 VW-     Date of Exam: 9/1/2020 3:10 PM     Indication: soa.     Comparison Exams: 08/21/2020     Technique: Single AP chest radiograph     FINDINGS:  A left basilar consolidation appears unchanged. There may be an  associated small left pleural effusion. The heart is enlarged. The  pulmonary vasculature appears normal. The osseous structures appear  intact.       Impression:       1.Left basilar consolidation appears unchanged, which could reflect  atelectasis or pneumonia.  2.Cardiomegaly with possible small left pleural effusion.     Electronically Signed By-DR. Salty Joseph MD On:9/1/2020 3:35 PM  This report was finalized on [id number] by DR. Salty Joseph MD.           I reviewed the patient's new clinical results.      Assessment/Plan       Atrial fibrillation with rapid ventricular response (CMS/HCC)    Chronic atrial fibrillation (CMS/HCC)    Chronic diastolic CHF (congestive heart failure) (CMS/HCC)    CKD (chronic kidney disease) stage 3, GFR 30-59 ml/min (CMS/HCC)    Gambling disorder, persistent    Coronary artery disease involving native coronary artery of native heart without angina pectoris    Psoriasis    History of CVA (cerebrovascular accident)    DDD (degenerative disc disease), lumbosacral    Peripheral polyneuropathy    GERD (gastroesophageal reflux disease)    Medically noncompliant    Mild cognitive impairment    Uncontrolled hypertension    Dyspnea      Assessment:    Condition: In serious condition.       Plan:   (Consult cardiology and nephrology.  Check serial troponin and AM labs. He is currently on a Cardene drip.  Consult  and case management as he is not safe to go home.  He really needs ECF placement.).       I discussed the patients findings and my recommendations with patient and primary care team    CLARISSA Meraz  09/01/20  19:40        Electronically signed by Dre Stauffer MD at 09/02/20 0771                Current Facility-Administered Medications   Medication Dose Route Frequency Provider Last Rate Last Dose   • acetaminophen (TYLENOL) tablet 650 mg  650 mg Oral Q6H PRN Paty Polk DO       • albuterol (PROVENTIL) nebulizer solution 0.083% 2.5 mg/3mL  2.5 mg Nebulization Q6H PRN Paty Polk DO       • allopurinol (ZYLOPRIM) tablet 100 mg  100 mg Oral Daily Shwetha Barber MD   100 mg at 09/10/20 0848   • aspirin EC tablet 81 mg  81 mg Oral Daily Marisol Larson APRN   81 mg at 09/10/20 0847   • atorvastatin (LIPITOR) tablet 10 mg  10 mg Oral Nightly Marisol Larson APRN   10 mg at 09/09/20 2116   • azelastine (ASTELIN) nasal spray 2 spray  2 spray Each Nare BID PRN Jad Wilder MD       • bumetanide (BUMEX) tablet 1 mg  1 mg Oral Daily Shwetha Barber MD   1 mg at 09/10/20 0846   • clotrimazole-betamethasone (LOTRISONE) 1-0.05 % cream   Topical Q12H Sarah Koehler APRN       • dilTIAZem CD (CARDIZEM CD) 24 hr capsule 180 mg  180 mg Oral Q24H Edy Thomas MD   180 mg at 09/10/20 0847   • gabapentin (NEURONTIN) capsule 100 mg  100 mg Oral Nightly Marisol Larson APRN   100 mg at 09/09/20 2116   • hydrALAZINE (APRESOLINE) injection 10 mg  10 mg Intravenous Q4H PRN Marisol Larson APRN   10 mg at 09/03/20 0650   • hydrALAZINE (APRESOLINE) tablet 100 mg  100 mg Oral TID Shwetha Barber MD   100 mg at 09/10/20 0847   • ipratropium-albuterol (DUO-NEB) nebulizer solution 3 mL  3 mL Nebulization 4x Daily - RT Paty Polk DO   3 mL at 09/10/20 1117   • isosorbide mononitrate (ISMO,MONOKET) tablet 30 mg  30 mg Oral BID - Nitrates Marisol Larson APRN   30 mg at 09/10/20 0844   • LORazepam (ATIVAN) injection 0.5 mg  0.5 mg Intravenous Q4H PRN Paty Polk DO   0.5 mg at 09/09/20 2115   • losartan (COZAAR) tablet 50 mg  50 mg Oral Q24H Daylin Burris APRN   50 mg at 09/10/20 0848   • magnesium hydroxide (MILK OF MAGNESIA) suspension 2400 mg/10mL 10 mL  10 mL Oral  Nightly PRN Paty Polk DO       • metoprolol tartrate (LOPRESSOR) tablet 50 mg  50 mg Oral Q12H Kurt Mccullough MD   50 mg at 09/10/20 0847   • risperiDONE (risperDAL) tablet 0.5 mg  0.5 mg Oral Q12H Paty Polk DO   0.5 mg at 09/10/20 0847   • rivaroxaban (XARELTO) tablet 20 mg  20 mg Oral Daily With Dinner Paty Polk DO   20 mg at 09/09/20 1719   • sodium bicarbonate tablet 650 mg  650 mg Oral TID Jad Wilder MD   650 mg at 09/10/20 0847   • sodium chloride 0.9 % flush 10 mL  10 mL Intravenous PRN Marisol Larson APRN       • sodium chloride 0.9 % flush 10 mL  10 mL Intravenous Q12H Marisol Larson APRN   10 mL at 09/10/20 0845   • sodium chloride 0.9 % flush 10 mL  10 mL Intravenous PRN Marisol Larson APRN       • spironolactone (ALDACTONE) tablet 12.5 mg  12.5 mg Oral Daily Shwetha Barber MD   12.5 mg at 09/10/20 0848     Referral Orders (last 24 hours) (24h ago, onward)     Start     Ordered    09/10/20 0000  Ambulatory Referral to Home Health     Question Answer Comment   Face to Face Visit Date: 9/10/2020    Follow-up provider for Plan of Care? I will be treating the patient on an ongoing basis.  Please send me the Plan of Care for signature.    Follow-up provider: ALESSANDRO DIALLO    Reason/Clinical Findings Afib    Describe mobility limitations that make leaving home difficult: Weakness    Nursing/Therapeutic Services Requested Skilled Nursing HHC to eval and treat   Skilled nursing orders: Other HHC to eval and treat   Frequency: 1 Week 1        09/10/20 0516

## 2020-09-11 LAB
ALBUMIN SERPL-MCNC: 3.8 G/DL (ref 3.5–5.2)
ANION GAP SERPL CALCULATED.3IONS-SCNC: 12 MMOL/L (ref 5–15)
BASOPHILS # BLD AUTO: 0 10*3/MM3 (ref 0–0.2)
BASOPHILS NFR BLD AUTO: 0.5 % (ref 0–1.5)
BUN SERPL-MCNC: 39 MG/DL (ref 8–23)
BUN SERPL-MCNC: ABNORMAL MG/DL
BUN/CREAT SERPL: ABNORMAL
CALCIUM SPEC-SCNC: 9.2 MG/DL (ref 8.6–10.5)
CHLORIDE SERPL-SCNC: 103 MMOL/L (ref 98–107)
CO2 SERPL-SCNC: 24 MMOL/L (ref 22–29)
CREAT SERPL-MCNC: 1.25 MG/DL (ref 0.76–1.27)
DEPRECATED RDW RBC AUTO: 56.9 FL (ref 37–54)
EOSINOPHIL # BLD AUTO: 0.2 10*3/MM3 (ref 0–0.4)
EOSINOPHIL NFR BLD AUTO: 2.3 % (ref 0.3–6.2)
ERYTHROCYTE [DISTWIDTH] IN BLOOD BY AUTOMATED COUNT: 19.1 % (ref 12.3–15.4)
GFR SERPL CREATININE-BSD FRML MDRD: 58 ML/MIN/1.73
GLUCOSE SERPL-MCNC: 114 MG/DL (ref 65–99)
HCT VFR BLD AUTO: 45 % (ref 37.5–51)
HGB BLD-MCNC: 14.3 G/DL (ref 13–17.7)
LYMPHOCYTES # BLD AUTO: 1.9 10*3/MM3 (ref 0.7–3.1)
LYMPHOCYTES NFR BLD AUTO: 22.7 % (ref 19.6–45.3)
MCH RBC QN AUTO: 26.7 PG (ref 26.6–33)
MCHC RBC AUTO-ENTMCNC: 31.8 G/DL (ref 31.5–35.7)
MCV RBC AUTO: 84.2 FL (ref 79–97)
MONOCYTES # BLD AUTO: 0.9 10*3/MM3 (ref 0.1–0.9)
MONOCYTES NFR BLD AUTO: 10.1 % (ref 5–12)
NEUTROPHILS NFR BLD AUTO: 5.4 10*3/MM3 (ref 1.7–7)
NEUTROPHILS NFR BLD AUTO: 64.4 % (ref 42.7–76)
NRBC BLD AUTO-RTO: 0.1 /100 WBC (ref 0–0.2)
PHOSPHATE SERPL-MCNC: 4.1 MG/DL (ref 2.5–4.5)
PLATELET # BLD AUTO: 210 10*3/MM3 (ref 140–450)
PMV BLD AUTO: 8.7 FL (ref 6–12)
POTASSIUM SERPL-SCNC: 4.3 MMOL/L (ref 3.5–5.2)
RBC # BLD AUTO: 5.34 10*6/MM3 (ref 4.14–5.8)
SODIUM SERPL-SCNC: 139 MMOL/L (ref 136–145)
WBC # BLD AUTO: 8.4 10*3/MM3 (ref 3.4–10.8)

## 2020-09-11 PROCEDURE — 97110 THERAPEUTIC EXERCISES: CPT

## 2020-09-11 PROCEDURE — 80069 RENAL FUNCTION PANEL: CPT | Performed by: INTERNAL MEDICINE

## 2020-09-11 PROCEDURE — 94799 UNLISTED PULMONARY SVC/PX: CPT

## 2020-09-11 PROCEDURE — 97535 SELF CARE MNGMENT TRAINING: CPT

## 2020-09-11 PROCEDURE — 99232 SBSQ HOSP IP/OBS MODERATE 35: CPT | Performed by: INTERNAL MEDICINE

## 2020-09-11 PROCEDURE — 85025 COMPLETE CBC W/AUTO DIFF WBC: CPT | Performed by: INTERNAL MEDICINE

## 2020-09-11 PROCEDURE — 97116 GAIT TRAINING THERAPY: CPT

## 2020-09-11 RX ADMIN — SODIUM BICARBONATE 650 MG: 650 TABLET ORAL at 20:54

## 2020-09-11 RX ADMIN — ATORVASTATIN CALCIUM 10 MG: 10 TABLET, FILM COATED ORAL at 20:54

## 2020-09-11 RX ADMIN — HYDRALAZINE HYDROCHLORIDE 100 MG: 25 TABLET, FILM COATED ORAL at 20:54

## 2020-09-11 RX ADMIN — SODIUM BICARBONATE 650 MG: 650 TABLET ORAL at 08:32

## 2020-09-11 RX ADMIN — Medication 10 ML: at 20:55

## 2020-09-11 RX ADMIN — SPIRONOLACTONE 12.5 MG: 25 TABLET, FILM COATED ORAL at 08:32

## 2020-09-11 RX ADMIN — METOPROLOL TARTRATE 50 MG: 50 TABLET, FILM COATED ORAL at 08:32

## 2020-09-11 RX ADMIN — HYDRALAZINE HYDROCHLORIDE 100 MG: 25 TABLET, FILM COATED ORAL at 08:31

## 2020-09-11 RX ADMIN — ASPIRIN 81 MG: 81 TABLET, COATED ORAL at 08:32

## 2020-09-11 RX ADMIN — Medication 10 ML: at 08:31

## 2020-09-11 RX ADMIN — RISPERIDONE 0.5 MG: 0.25 TABLET ORAL at 08:32

## 2020-09-11 RX ADMIN — GABAPENTIN 100 MG: 100 CAPSULE ORAL at 20:54

## 2020-09-11 RX ADMIN — BUMETANIDE 1 MG: 1 TABLET ORAL at 08:32

## 2020-09-11 RX ADMIN — ALLOPURINOL 100 MG: 100 TABLET ORAL at 08:32

## 2020-09-11 RX ADMIN — RISPERIDONE 0.5 MG: 0.25 TABLET ORAL at 20:54

## 2020-09-11 RX ADMIN — METOPROLOL TARTRATE 50 MG: 50 TABLET, FILM COATED ORAL at 20:55

## 2020-09-11 RX ADMIN — DILTIAZEM HYDROCHLORIDE 180 MG: 180 CAPSULE, COATED, EXTENDED RELEASE ORAL at 08:32

## 2020-09-11 RX ADMIN — RIVAROXABAN 20 MG: 20 TABLET, FILM COATED ORAL at 17:42

## 2020-09-11 RX ADMIN — CLOTRIMAZOLE AND BETAMETHASONE DIPROPIONATE: 10; .64 CREAM TOPICAL at 08:33

## 2020-09-11 RX ADMIN — ISOSORBIDE MONONITRATE 30 MG: 20 TABLET ORAL at 08:31

## 2020-09-11 RX ADMIN — HYDRALAZINE HYDROCHLORIDE 100 MG: 25 TABLET, FILM COATED ORAL at 17:42

## 2020-09-11 RX ADMIN — CLOTRIMAZOLE AND BETAMETHASONE DIPROPIONATE: 10; .64 CREAM TOPICAL at 20:54

## 2020-09-11 RX ADMIN — SODIUM BICARBONATE 650 MG: 650 TABLET ORAL at 17:42

## 2020-09-11 RX ADMIN — IPRATROPIUM BROMIDE AND ALBUTEROL SULFATE 3 ML: 2.5; .5 SOLUTION RESPIRATORY (INHALATION) at 18:49

## 2020-09-11 RX ADMIN — ISOSORBIDE MONONITRATE 30 MG: 20 TABLET ORAL at 17:42

## 2020-09-11 RX ADMIN — LOSARTAN POTASSIUM 50 MG: 50 TABLET, FILM COATED ORAL at 08:32

## 2020-09-11 NOTE — PLAN OF CARE
Problem: Patient Care Overview  Goal: Plan of Care Review  Flowsheets (Taken 9/11/2020 1525)  Plan of Care Reviewed With: patient  Outcome Summary: Pt tolerated OT tx well this date and shows good progress toward goals. per case mgmt note, pt with an active APS report as of 9/11/2020/. pt completed BUE ther ex in all planes to increase strength and endurance with ADLs. pt shows good - endurance during ther ex and ADLs.

## 2020-09-11 NOTE — THERAPY TREATMENT NOTE
Patient Name: Carlos Whipple  : 1955    MRN: [id number]                              Today's Date: 2020       Admit Date: 2020    Visit Dx:     ICD-10-CM ICD-9-CM   1. Atrial fibrillation with rapid ventricular response (CMS/HCC) I48.91 427.31   2. Uncontrolled hypertension I10 401.9   3. Dyspnea, unspecified type R06.00 786.09   4. CKD (chronic kidney disease) stage 3, GFR 30-59 ml/min (CMS/HCC) N18.3 585.3     Patient Active Problem List   Diagnosis   • Hypertensive emergency   • Chronic atrial fibrillation (CMS/HCC)   • Chronic diastolic CHF (congestive heart failure) (CMS/HCC)   • CKD (chronic kidney disease) stage 3, GFR 30-59 ml/min (CMS/HCC)   • Essential hypertension   • Gambling disorder, persistent   • Shortness of breath   • Coronary artery disease involving native coronary artery of native heart without angina pectoris   • Psoriasis   • History of CVA (cerebrovascular accident)   • DDD (degenerative disc disease), lumbosacral   • Peripheral polyneuropathy   • GERD (gastroesophageal reflux disease)   • Medically noncompliant   • Acute respiratory distress   • Unstable angina (CMS/HCC)   • Cervical disc disorder with radiculopathy   • Completed stroke (CMS/HCC)   • Degeneration of intervertebral disc   • Excessive anticoagulation   • Impaired left ventricular function   • Liver lesion   • Lumbar radiculopathy   • Cervical myelopathy (CMS/HCC)   • Lumbosacral radiculopathy   • Spinal stenosis of lumbar region   • Obesity   • Mild cognitive impairment   • Thoracic back pain   • Thoracic disc disease with myelopathy   • Paroxysmal atrial fibrillation (CMS/HCC)   • Uncontrolled hypertension   • Acute congestive heart failure (CMS/HCC)   • Dyspnea   • Atrial fibrillation with RVR (CMS/HCC)   • Atrial fibrillation with rapid ventricular response (CMS/HCC)   • Dermatitis associated with moisture     Past Medical History:   Diagnosis Date   • A-fib (CMS/HCC)    • CHF (congestive heart failure)  (CMS/[facility])    • Chronic atrial fibrillation (CMS/[facility]) 11/8/2019   • Chronic diastolic CHF (congestive heart failure) (CMS/[facility]) 11/8/2019   • CKD (chronic kidney disease) stage 3, GFR 30-59 ml/min (CMS/[facility]) 11/8/2019   • Coronary artery disease involving native coronary artery of native heart without angina pectoris 11/8/2019   • DDD (degenerative disc disease), lumbosacral 11/8/2019   • Essential hypertension 11/8/2019   • Gambling disorder, persistent 11/8/2019   • GERD (gastroesophageal reflux disease) 11/8/2019   • History of CVA (cerebrovascular accident) 11/8/2019   • Hyperlipidemia    • Hypertension    • Medically noncompliant 11/8/2019   • Peripheral polyneuropathy 11/8/2019   • Psoriasis 11/8/2019   • Stroke (CMS/[facility])      Past Surgical History:   Procedure Laterality Date   • CARDIAC CATHETERIZATION     • CARDIAC CATHETERIZATION N/A 12/10/2019    Procedure: Left Heart Cath and coronary angiogram;  Surgeon: Edy Thomas MD;  Location: [facility] CATH INVASIVE LOCATION;  Service: Cardiovascular     General Information     Row Name 09/11/20 1616          PT Evaluation Time/Intention    Document Type  therapy note (daily note)  -EL     Mode of Treatment  physical therapy  -     Row Name 09/11/20 1616          General Information    Patient Profile Reviewed?  yes  -EL     Existing Precautions/Restrictions  fall  -EL       User Key  (r) = Recorded By, (t) = Taken By, (c) = Cosigned By    Initials Name Provider Type    EL Dung Win, PT Physical Therapist        Mobility     Row Name 09/11/20 1616          Bed Mobility Assessment/Treatment    Bed Mobility Assessment/Treatment  supine-sit  -EL     Supine-Sit [place] (Bed Mobility)  supervision;verbal cues  -EL     Row Name 09/11/20 1616          Bed-Chair Transfer    Bed-Chair [place] (Transfers)  supervision  -EL     Assistive Device (Bed-Chair Transfers)  walker, front-wheeled  -EL     Row Name 09/11/20 1616          Sit-Stand Transfer    Sit-Stand  [place] (Transfers)  contact guard  -EL     Assistive Device (Sit-Stand Transfers)  walker, front-wheeled  -EL     Row Name 09/11/20 1616          Gait/Stairs Assessment/Training    Gait/Stairs Assessment/Training  gait/ambulation independence;gait/ambulation assistive device  -EL     [place] Level (Gait)  verbal cues;contact guard  -EL     Assistive Device (Gait)  walker, front-wheeled  -EL     Distance in Feet (Gait)  2x50  -EL     Pattern (Gait)  step-through  -EL     Deviations/Abnormal Patterns (Gait)  stride length decreased;ricky decreased;gait speed decreased  -EL     Bilateral Gait Deviations  forward flexed posture;heel strike decreased  -EL     Comment (Gait/Stairs)  2x50 feet requires continuous VC for AD usuage.  -EL       User Key  (r) = Recorded By, (t) = Taken By, (c) = Cosigned By    Initials Name Provider Type    Dung Santiago PT Physical Therapist        Obj/Interventions     Row Name 09/11/20 1617          Therapeutic Exercise    Lower Extremity (Therapeutic Exercise)  -- Pt instructed in seated LE therex: LAQ, seated marches, ankle pumps, STS  -EL     Sets/Reps (Therapeutic Exercise)  1x20 2x5 for STS  -EL       User Key  (r) = Recorded By, (t) = Taken By, (c) = Cosigned By    Initials Name Provider Type    Dung Santiago PT Physical Therapist        Goals/Plan    No documentation.       Clinical Impression     Row Name 09/11/20 1618          Plan of Care Review    Plan of Care Reviewed With  patient  -EL     Outcome Summary  Pt tolerated PT treatment well this date, ambulated 2x50 feet this date with min c/o SOA. Pt continues to require VC to not lift RWx and for distance to AD. Pt was instructed in seated BLE exercises and demonstrated good tolerance, require VC for tolerance. Pt demonstrates improvements with ability to come to standing safely. Pt would benefit form IP rehab following d/c. PPE worn includes gloves and mask with shield.   -EL     Row Name 09/11/20 1618           Physical Therapy Clinical Impression    Criteria for Skilled Interventions Met (PT Clinical Impression)  treatment indicated  -EL     Rehab Potential (PT Clinical Summary)  good, to achieve stated therapy goals  -EL     Row Name 09/11/20 1618          Vital Signs    O2 Delivery Pre Treatment  room air  -EL     O2 Delivery Intra Treatment  room air  -EL     O2 Delivery Post Treatment  room air  -EL     Pre Patient Position  Supine  -EL     Intra Patient Position  Standing  -EL     Post Patient Position  Sitting  -EL     Row Name 09/11/20 1618          Positioning and Restraints    Pre-Treatment Position  in bed  -EL     Post Treatment Position  chair  -EL     In Chair  notified nsg;sitting;call light within reach;encouraged to call for assist;exit alarm on  -EL       User Key  (r) = Recorded By, (t) = Taken By, (c) = Cosigned By    Initials Name Provider Type    Dung Santiago, PT Physical Therapist        Outcome Measures     Row Name 09/11/20 1621          How much help from another person do you currently need...    Turning from your back to your side while in flat bed without using bedrails?  4  -EL     Moving from lying on back to sitting on the side of a flat bed without bedrails?  4  -EL     Moving to and from a bed to a chair (including a wheelchair)?  3  -EL     Standing up from a chair using your arms (e.g., wheelchair, bedside chair)?  3  -EL     Climbing 3-5 steps with a railing?  2  -EL     To walk in hospital room?  3  -EL     AM-PAC 6 Clicks Score (PT)  19  -EL     Row Name 09/11/20 1621          Modified [place] Scale    Modified [place] Scale  4 - Moderately severe disability.  Unable to walk without assistance, and unable to attend to own bodily needs without assistance.  -EL     Row Name 09/11/20 1621          Functional Assessment    Outcome Measure Options  AM-PAC 6 Clicks Basic Mobility (PT)  -EL       User Key  (r) = Recorded By, (t) = Taken By, (c) = Cosigned By    Initials Name Provider Type     Dung Santiago, PT Physical Therapist        Physical Therapy Education                 Title: PT OT SLP Therapies (Done)     Topic: Physical Therapy (Done)     Point: Mobility training (Done)     Description:   Instruct learner(s) on safety and technique for assisting patient out of bed, chair or wheelchair.  Instruct in the proper use of assistive devices, such as walker, crutches, cane or brace.              Patient Friendly Description:   It's important to get you on your feet again, but we need to do so in a way that is safe for you. Falling has serious consequences, and your personal safety is the most important thing of all.        When it's time to get out of bed, one of us or a family member will sit next to you on the bed to give you support.     If your doctor or nurse tells you to use a walker, crutches, a cane, or a brace, be sure you use it every time you get out of bed, even if you think you don't need it.    Learning Progress Summary           Patient Acceptance, E,TB, VU by  at 9/11/2020 1621    Acceptance, E,TB, VU by  at 9/9/2020 1315    Acceptance, E,TB, VU by AO at 9/8/2020 1143    Acceptance, E,TB, VU,NR by  at 9/6/2020 1603    Acceptance, E, VU by SC at 9/4/2020 1440    Acceptance, E, VU by SC at 9/3/2020 1837    Acceptance, E, NR by  at 9/2/2020 1200                   Point: Precautions (Done)     Description:   Instruct learner(s) on prescribed precautions during mobility and gait tasks              Learning Progress Summary           Patient Acceptance, E,TB, VU by  at 9/11/2020 1621    Acceptance, E,TB, VU by  at 9/9/2020 1315    Acceptance, E,TB, VU by AO at 9/8/2020 1143    Acceptance, E,TB, VU,NR by  at 9/6/2020 1603    Acceptance, E, VU by SC at 9/4/2020 1440    Acceptance, E, VU by SC at 9/3/2020 1837    Acceptance, E, NR by  at 9/2/2020 1200                               User Key     Initials Effective Dates Name Provider Type Discipline     04/17/20 -  Arnold  Pauline FOSTER, PT Physical Therapist PT    AO 03/01/19 -  Aura Pulido, PT Physical Therapist PT    SC 03/01/19 -  Lizet Scott, PTA Physical Therapy Assistant PT    MC 03/01/19 -  Marilee Casey, PTA Physical Therapy Assistant PT    EL 06/23/20 -  Dung Win PT Physical Therapist PT              PT Recommendation and Plan     Outcome Summary/Treatment Plan (PT)  Anticipated Discharge Disposition (PT): inpatient rehabilitation facility  Plan of Care Reviewed With: patient  Outcome Summary: Pt tolerated PT treatment well this date, ambulated 2x50 feet this date with min c/o SOA. Pt continues to require VC to not lift RWx and for distance to AD. Pt was instructed in seated BLE exercises and demonstrated good tolerance, require VC for tolerance. Pt demonstrates improvements with ability to come to standing safely. Pt would benefit form IP rehab following d/c. PPE worn includes gloves and mask with shield.      Time Calculation:   PT Charges     Row Name 09/11/20 1621             Time Calculation    Start Time  1100  -EL      Stop Time  1125  -EL      Time Calculation (min)  25 min  -EL      PT Received On  09/11/20  -EL      PT - Next Appointment  09/13/20  -EL         Time Calculation- PT    Total Timed Code Minutes- PT  25 minute(s)  -EL        User Key  (r) = Recorded By, (t) = Taken By, (c) = Cosigned By    Initials Name Provider Type    Dung Santiago, PT Physical Therapist        Therapy Charges for Today     Code Description Service Date Service Provider Modifiers Qty    [id number] HC GAIT TRAINING EA 15 MIN 9/11/2020 Dung Win, PT GP 1    [id number]  PT THER PROC EA 15 MIN 9/11/2020 Dung Win, PT GP 1          PT G-Codes  Outcome Measure Options: AM-PAC 6 Clicks Basic Mobility (PT)  AM-PAC 6 Clicks Score (PT): 19  AM-PAC 6 Clicks Score (OT): 16  Modified [place] Scale: 4 - Moderately severe disability.  Unable to walk without assistance, and unable to attend to own bodily needs without  assistance.    Dung Win, PT  9/11/2020

## 2020-09-11 NOTE — PLAN OF CARE
Problem: Patient Care Overview  Goal: Plan of Care Review  Outcome: Ongoing (interventions implemented as appropriate)  Flowsheets (Taken 9/11/2020 9458)  Progress: no change  Plan of Care Reviewed With: patient  Outcome Summary: remains risk for falls & skin injury. Periods of forgetfulness/confusion. No  cardiac or hypertensive issues noted.  No c/o pain

## 2020-09-11 NOTE — PROGRESS NOTES
Cardiology Progress Note      Admiting Physician:  Dre Stauffer MD   LOS: 8 days       Reason For Followup:  Atrial fibrillation with rapid ventricular response  Heart failure with preserved ejection fraction  Coronary artery disease    Subjective:    Interval History:  Seen and examined.  Chart and labs reviewed.  Patient denies any chest pain pressure heaviness or tightness.  He denies any shortness of breath.  He denies any PND orthopnea.  He reports no new complaints.  Nursing reports waiting on placement.    Review of Systems:  A complete review of systems was undertaken with the pertinent cardiovascular findings listed in history of present illness and all other systems being negative.     Assessment & Plan    Impressions:  Atrial fibrillation with rapid ventricular response  Heart failure with preserved ejection fraction  Chronic kidney disease  Coronary artery disease  History of CVA  Abnormal blood culture-likely skin contaminant  Mild baseline cognitive impairment  Gambling disorder    Recommendations:  Continuation of his current cardiovascular care at the present time.  Cardiac status appropriate for discharge to next destination of care when okay with other services.  Patient's heart rate has been stable with oral medicines including Cardizem beta-blockers  Patient is also on Eliquis for anticoagulation  Continue current medical therapy and follow him  Patient is awaiting placement in ECF      Objective:    Medication Review:   Scheduled Meds:    allopurinol 100 mg Oral Daily   aspirin 81 mg Oral Daily   atorvastatin 10 mg Oral Nightly   bumetanide 1 mg Oral Daily   clotrimazole-betamethasone  Topical Q12H   dilTIAZem  mg Oral Q24H   gabapentin 100 mg Oral Nightly   hydrALAZINE 100 mg Oral TID   ipratropium-albuterol 3 mL Nebulization 4x Daily - RT   isosorbide mononitrate 30 mg Oral BID - Nitrates   losartan 50 mg Oral Q24H   metoprolol tartrate 50 mg Oral Q12H   risperiDONE 0.5 mg Oral Q12H    rivaroxaban 20 mg Oral Daily With Dinner   sodium bicarbonate 650 mg Oral TID   sodium chloride 10 mL Intravenous Q12H   spironolactone 12.5 mg Oral Daily     Continuous Infusions:     PRN Meds:.•  acetaminophen  •  albuterol  •  azelastine  •  hydrALAZINE  •  LORazepam  •  magnesium hydroxide  •  sodium chloride  •  sodium chloride    Patient Active Problem List   Diagnosis   • Hypertensive emergency   • Chronic atrial fibrillation (CMS/HCC)   • Chronic diastolic CHF (congestive heart failure) (CMS/[facility])   • CKD (chronic kidney disease) stage 3, GFR 30-59 ml/min (CMS/[facility])   • Essential hypertension   • Gambling disorder, persistent   • Shortness of breath   • Coronary artery disease involving native coronary artery of native heart without angina pectoris   • Psoriasis   • History of CVA (cerebrovascular accident)   • DDD (degenerative disc disease), lumbosacral   • Peripheral polyneuropathy   • GERD (gastroesophageal reflux disease)   • Medically noncompliant   • Acute respiratory distress   • Unstable angina (CMS/[facility])   • Cervical disc disorder with radiculopathy   • Completed stroke (CMS/[facility])   • Degeneration of intervertebral disc   • Excessive anticoagulation   • Impaired left ventricular function   • Liver lesion   • Lumbar radiculopathy   • Cervical myelopathy (CMS/[facility])   • Lumbosacral radiculopathy   • Spinal stenosis of lumbar region   • Obesity   • Mild cognitive impairment   • Thoracic back pain   • Thoracic disc disease with myelopathy   • Paroxysmal atrial fibrillation (CMS/[facility])   • Uncontrolled hypertension   • Acute congestive heart failure (CMS/[facility])   • Dyspnea   • Atrial fibrillation with RVR (CMS/[facility])   • Atrial fibrillation with rapid ventricular response (CMS/[facility])   • Dermatitis associated with moisture         Physical Exam:    General: Alert, cooperative, no distress, appears stated age  Head:  Normocephalic, atraumatic, mucous membranes moist  Eyes:  Conjunctiva/corneas clear, EOM's intact      Neck:  Supple,  no bruit  Lungs: Coarse and diminished bilaterally.  Chest wall: No tenderness  Heart::  Regular rate and rhythm, S1 and S2 normal, 1/6 holosystolic murmur.  No rub or gallop  Abdomen: Soft, non-tender, nondistended bowel sounds active  Extremities: No cyanosis, clubbing, or edema  Pulses: 2+ and symmetric all extremities  Skin:  No rashes or lesions  Neuro/psych: A&O x3. CN II through XII are grossly intact with appropriate affect    Vital Signs:  Vitals:    09/11/20 0341 09/11/20 0831 09/11/20 1123 09/11/20 1300   BP: 122/74 149/84 109/74    BP Location: Right arm  Left arm    Patient Position: Lying  Sitting    Pulse: 68 72 78    Resp: 17  16    Temp: 97.8 °F (36.6 °C)  97.6 °F (36.4 °C)    TempSrc: Oral  Oral    SpO2: 97%  96%    Weight:    109 kg (239 lb 6.7 oz)   Height:         Wt Readings from Last 1 Encounters:   09/11/20 109 kg (239 lb 6.7 oz)       Intake/Output Summary (Last 24 hours) at 9/11/2020 1647  Last data filed at 9/11/2020 1300  Gross per 24 hour   Intake 720 ml   Output 1100 ml   Net -380 ml         Results Review:     CBC    Results from last 7 days   Lab Units 09/11/20  0357 09/10/20  0318 09/09/20  0226 09/08/20  0649 09/07/20  0312 09/06/20  0442 09/05/20  0228   WBC 10*3/mm3 8.40 8.70 10.30 8.80 9.40 8.90 11.70*   HEMOGLOBIN g/dL 14.3 14.1 14.0 14.5 14.4 14.6 14.8   PLATELETS 10*3/mm3 210 213 211 213 221 182 223     Cr Clearance Estimated Creatinine Clearance: 77.2 mL/min (by C-G formula based on SCr of 1.25 mg/dL).  Coag     HbA1C   Lab Results   Component Value Date    HGBA1C 5.5 12/12/2019    HGBA1C 5.1 11/09/2019    HGBA1C 5.6 10/10/2018     Blood Glucose No results found for: POCGLU  Infection       CMP   Results from last 7 days   Lab Units 09/11/20  0357 09/10/20  0318 09/09/20  0226 09/08/20  0649 09/07/20  0312 09/06/20  0442 09/05/20  0228   SODIUM mmol/L 139 136 135* 139 138 138 138   POTASSIUM mmol/L 4.3 4.3 4.2 4.5 4.7 4.2 4.1   CHLORIDE mmol/L 103 103 104  106 101 102 98   CO2 mmol/L 24.0 22.0 20.0* 23.0 27.0 23.0 26.0   BUN  39* 41* 35* 29* 29* 29* 26*   CREATININE mg/dL 1.25 1.20 1.21 1.12 1.24 1.21 1.36*   GLUCOSE mg/dL 114* 103* 110* 97 117* 105* 114*   ALBUMIN g/dL 3.80 3.70  --  3.70 3.80  --  3.90   BILIRUBIN mg/dL  --   --   --  0.7 0.6  --  0.6   ALK PHOS U/L  --   --   --  70 69  --  65   AST (SGOT) U/L  --   --   --  31 30  --  15   ALT (SGPT) U/L  --   --   --  35 26  --  18     ABG      UA        BHARATI  No results found for: POCMETH, POCAMPHET, POCBARBITUR, POCBENZO, POCCOCAINE, POCOPIATES, POCOXYCODO, POCPHENCYC, POCPROPOXY, POCTHC, POCTRICYC  Lysis Labs   Results from last 7 days   Lab Units 09/11/20  0357 09/10/20  0318 09/09/20  0226 09/08/20  0649 09/07/20  0312 09/06/20  0442 09/05/20  0228   HEMOGLOBIN g/dL 14.3 14.1 14.0 14.5 14.4 14.6 14.8   PLATELETS 10*3/mm3 210 213 211 213 221 182 223   CREATININE mg/dL 1.25 1.20 1.21 1.12 1.24 1.21 1.36*     Radiology(recent) No radiology results for the last day            Imaging Results (Last 24 Hours)     ** No results found for the last 24 hours. **          Cardiac Studies:  Echo-   Results for orders placed during the hospital encounter of 11/07/19   Adult Transthoracic Echo Complete W/ Cont if Necessary Per Protocol    Narrative · The left ventricular cavity is severely dilated.  · Left ventricular systolic function is moderately decreased.  · Left atrial cavity size is moderately dilated.  · Moderate mitral valve regurgitation is present  · Moderate tricuspid valve regurgitation is present.  · The following left ventricular wall segments are hypokinetic: mid   anterior, apical anterior, basal anterolateral, mid anterolateral, apical   lateral, basal inferolateral, mid inferolateral, apical inferior, mid   inferior, apical septal, basal inferoseptal, mid inferoseptal, apex   hypokinetic, mid anteroseptal, basal anterior, basal inferior and basal   inferoseptal.  · LV ejection fraction is about 30 to  35%  · No pericardial effusion noted        Stress Myoview-  Cath-        Edy Thomsa MD  09/11/20  16:47

## 2020-09-11 NOTE — PLAN OF CARE
Problem: Patient Care Overview  Goal: Plan of Care Review  Outcome: Ongoing (interventions implemented as appropriate)  Flowsheets  Taken 9/11/2020 0549 by Carole Vogt LPN  Plan of Care Reviewed With: patient  Taken 9/10/2020 1318 by Apolinar Salas RN  Outcome Summary: remains risk for skin injury & falls. Denies pain today. Fairly oriented, but does have forgetfulness & periods of confusion. no cardiac problems noted or extreme HTN.

## 2020-09-11 NOTE — PROGRESS NOTES
Continued Stay Note  KAE Hameed     Patient Name: Carlos Whipple  MRN: [id number]  Today's Date: 9/11/2020    Admit Date: 9/1/2020    Discharge Plan     Row Name 09/11/20 1406       Plan    Plan Comments  ABHILASH spoke to [facility] services who is current with pt. Pt has limited medicaid which does not cover long term care or assisted living. Only covers case management and meals on wheels. SW update [facility] on pt case. [facility] has made 2 APS report.  has also made APS report on pt behalf as of 9/11.  [facility] pt now lives with brother but pt is refusing to take his meds even when living with brother. [facility] will follow.         Discharge Codes    No documentation.       Expected Discharge Date and Time     Expected Discharge Date Expected Discharge Time    Sep 7, 2020           SHAKIR Osorio    Phone # [phone]  Cell #[phone]  Fax#[phone]  [email or web address]      SHAKIR Osorio

## 2020-09-11 NOTE — PROGRESS NOTES
LOS: 8 days   Patient Care Team:  Marisol Larson APRN as PCP - General  Shwetha Barber MD as Consulting Physician (Nephrology)  Edy Thomas MD as Consulting Physician (Cardiology)    Chief Complaint:   Still in atrial fibrillation,rate controlled. Denies any chest pain or SOA today. Not accepted at inpatient [facility]. Apparently he, the patient has been released by psychiatry and is able to make his own decisions. Awaiting approval from home health care to visit the patient.    Subjective :   Appears chronically ill, markedly older than stated age    Interval History:     Patient Complaints:  None this morning  Patient Denies:   No chest pain, no palpitations, no shortness of breath. No edema or cough.   History taken from: patient    Review of Systems:    All systems were reviewed and negative except for:   See  interval history  Objective     BP improved    Vital Signs  Temp:  [97.4 °F (36.3 °C)-97.8 °F (36.6 °C)] 97.8 °F (36.6 °C)  Heart Rate:  [67-76] 72  Resp:  [16-18] 17  BP: (104-149)/(70-84) 149/84    Physical Exam:     General Appearance:    Alert and oriented, in no acute distress   Head:    Normocephalic, without obvious abnormality, atraumatic   Eyes:             PERRLA, EOMI   Throat:    Clear, no ADENOPATHY, no dysphagia dysarthria   Neck:   No adenopathy, supple, trachea midline   Lungs:     Clear to auscultation,respirations regular, even and                  Unlabored, distant but adequate adventitial flow    Heart:     Distant, irregularly irregular.   Chest Wall:    No abnormalities observed   Abdomen:     Normal bowel sounds, no masses, no organomegaly, soft        non-tender, non-distended, no guarding, no rebound                tenderness   Rectal:     Deferred   Extremities:   Moves all extremities , no edema, no cyanosis, no             redness   Pulses:   Pulses palpable and equal bilaterally   Skin:   No bleeding, bruising or rash   Lymph nodes:      Neurologic:       Radiology:  No radiology results for the last 7 days       Results Review:     I reviewed the patient's new clinical results.  I reviewed the patient's new imaging results and agree with the interpretation.    Medication Review:   Scheduled Meds:    allopurinol 100 mg Oral Daily   aspirin 81 mg Oral Daily   atorvastatin 10 mg Oral Nightly   bumetanide 1 mg Oral Daily   clotrimazole-betamethasone  Topical Q12H   dilTIAZem  mg Oral Q24H   gabapentin 100 mg Oral Nightly   hydrALAZINE 100 mg Oral TID   ipratropium-albuterol 3 mL Nebulization 4x Daily - RT   isosorbide mononitrate 30 mg Oral BID - Nitrates   losartan 50 mg Oral Q24H   metoprolol tartrate 50 mg Oral Q12H   risperiDONE 0.5 mg Oral Q12H   rivaroxaban 20 mg Oral Daily With Dinner   sodium bicarbonate 650 mg Oral TID   sodium chloride 10 mL Intravenous Q12H   spironolactone 12.5 mg Oral Daily     Continuous Infusions:     PRN Meds:.•  acetaminophen  •  albuterol  •  azelastine  •  hydrALAZINE  •  LORazepam  •  magnesium hydroxide  •  sodium chloride  •  sodium chloride    Labs:  Lab Results (last 24 hours)     Procedure Component Value Units Date/Time    BUN [[id number]]  (Abnormal) Collected:  09/11/20 0357    Specimen:  Blood Updated:  09/11/20 0734     BUN 39 mg/dL     Renal Function Panel [[id number]]  (Abnormal) Collected:  09/11/20 0357    Specimen:  Blood Updated:  09/11/20 0540     Glucose 114 mg/dL      BUN --     Comment: Testing performed by alternate method        Creatinine 1.25 mg/dL      Sodium 139 mmol/L      Potassium 4.3 mmol/L      Chloride 103 mmol/L      CO2 24.0 mmol/L      Calcium 9.2 mg/dL      Albumin 3.80 g/dL      Phosphorus 4.1 mg/dL      Anion Gap 12.0 mmol/L      BUN/Creatinine Ratio --     Comment: Testing not performed        eGFR Non African Amer 58 mL/min/1.73     Narrative:       GFR Normal >60  Chronic Kidney Disease <60  Kidney Failure <15      CBC & Differential [[id number]] Collected:  09/11/20 0357    Specimen:   Blood Updated:  09/11/20 0516    Narrative:       The following orders were created for panel order CBC & Differential.  Procedure                               Abnormality         Status                     ---------                               -----------         ------                     CBC Auto Differential[[id number]]        Abnormal            Final result                 Please view results for these tests on the individual orders.    CBC Auto Differential [[id number]]  (Abnormal) Collected:  09/11/20 0357    Specimen:  Blood Updated:  09/11/20 0516     WBC 8.40 10*3/mm3      RBC 5.34 10*6/mm3      Hemoglobin 14.3 g/dL      Hematocrit 45.0 %      MCV 84.2 fL      MCH 26.7 pg      MCHC 31.8 g/dL      RDW 19.1 %      RDW-SD 56.9 fl      MPV 8.7 fL      Platelets 210 10*3/mm3      Neutrophil % 64.4 %      Lymphocyte % 22.7 %      Monocyte % 10.1 %      Eosinophil % 2.3 %      Basophil % 0.5 %      Neutrophils, Absolute 5.40 10*3/mm3      Lymphocytes, Absolute 1.90 10*3/mm3      Monocytes, Absolute 0.90 10*3/mm3      Eosinophils, Absolute 0.20 10*3/mm3      Basophils, Absolute 0.00 10*3/mm3      nRBC 0.1 /100 WBC            Assessment/Plan       Atrial fibrillation with rapid ventricular response (CMS/[facility])    Chronic atrial fibrillation (CMS/[facility])    Chronic diastolic CHF (congestive heart failure) (CMS/[facility])    CKD (chronic kidney disease) stage 3, GFR 30-59 ml/min (CMS/[facility])    Gambling disorder, persistent    Coronary artery disease involving native coronary artery of native heart without angina pectoris    Psoriasis    History of CVA (cerebrovascular accident)    DDD (degenerative disc disease), lumbosacral    Peripheral polyneuropathy    GERD (gastroesophageal reflux disease)    Medically noncompliant    Mild cognitive impairment    Uncontrolled hypertension    Dyspnea    Dermatitis associated with moisture  Single blood culture positive for staph coag negative most likely contaminant.     patient stable for  discharge, await decision from family on which home health care will see him. His condition at discharge would be guarded given his history of noncompliance of medications and multiple recent admissions to the hospital and Re admissions. Has significant burden of disease as well.     Psychiatry, wound care, nephrology, cardiology input greatly appreciated, thank you.    Paty Polk,   09/11/20  08:32

## 2020-09-11 NOTE — PLAN OF CARE
Problem: Patient Care Overview  Goal: Plan of Care Review  Outcome: Ongoing (interventions implemented as appropriate)  Flowsheets (Taken 9/11/2020 9555)  Outcome Summary: Pt tolerated PT treatment well this date, ambulated 2x50 feet this date with min c/o SOA. Pt continues to require VC to not lift RWx and for distance to AD. Pt was instructed in seated BLE exercises and demonstrated good tolerance, require VC for tolerance. Pt demonstrates improvements with ability to come to standing safely. Pt would benefit form IP rehab following d/c. PPE worn includes gloves and mask with shield.

## 2020-09-11 NOTE — PROGRESS NOTES
"NEPHROLOGY PROGRESS NOTE------[facility]    [facility]  [phone]  Jad Wilder MD      Patient Care Team:  Mairsol Larson APRN as PCP - General  Shwetha Barber MD as Consulting Physician (Nephrology)  Edy Thomas MD as Consulting Physician (Cardiology)      Provider:  Jad Wilder MD  Patient Name: Carlos Whipple  :  1955    SUBJECTIVE:    F/U CRF/CKD    Feeling good. No SOB, CP, dysuria, palpitations.      Medication:    allopurinol 100 mg Oral Daily   aspirin 81 mg Oral Daily   atorvastatin 10 mg Oral Nightly   bumetanide 1 mg Oral Daily   clotrimazole-betamethasone  Topical Q12H   dilTIAZem  mg Oral Q24H   gabapentin 100 mg Oral Nightly   hydrALAZINE 100 mg Oral TID   ipratropium-albuterol 3 mL Nebulization 4x Daily - RT   isosorbide mononitrate 30 mg Oral BID - Nitrates   losartan 50 mg Oral Q24H   metoprolol tartrate 50 mg Oral Q12H   risperiDONE 0.5 mg Oral Q12H   rivaroxaban 20 mg Oral Daily With Dinner   sodium bicarbonate 650 mg Oral TID   sodium chloride 10 mL Intravenous Q12H   spironolactone 12.5 mg Oral Daily          OBJECTIVE    Vital Sign Min/Max for last 24 hours  Temp  Min: 97.4 °F (36.3 °C)  Max: 97.8 °F (36.6 °C)   BP  Min: 104/70  Max: 149/84   Pulse  Min: 68  Max: 76   Resp  Min: 16  Max: 17   SpO2  Min: 93 %  Max: 97 %   No data recorded   No data recorded     Flowsheet Rows      First Filed Value   Admission Height  182.9 cm (72\") Documented at 2020 1338   Admission Weight  114 kg (252 lb) Documented at 2020 1338          I/O this shift:  In: 240 [P.O.:240]  Out: -   I/O last 3 completed shifts:  In: 460 [P.O.:460]  Out: 2610 [Urine:2610]    Physical Exam:  General Appearance: alert, appears stated age and cooperative  Head: normocephalic, without obvious abnormality and atraumatic  Eyes: conjunctivae and sclerae normal and no icterus  Neck: supple and no JVD  Lungs: clear to auscultation and " respirations regular  Heart: IRREG IRREG +AFUA.   Chest: Wall no abnormalities observed  Abdomen: normal bowel sounds and soft non-tender  Extremities: moves extremities well, no edema, no cyanosis and no redness  Skin: no bleeding, bruising or rash, turgor normal, color normal and no lesions noted  Neurologic: Alert, and oriented. No focal deficits    Labs:    WBC WBC   Date Value Ref Range Status   09/11/2020 8.40 3.40 - 10.80 10*3/mm3 Final   09/10/2020 8.70 3.40 - 10.80 10*3/mm3 Final   09/09/2020 10.30 3.40 - 10.80 10*3/mm3 Final      HGB Hemoglobin   Date Value Ref Range Status   09/11/2020 14.3 13.0 - 17.7 g/dL Final   09/10/2020 14.1 13.0 - 17.7 g/dL Final   09/09/2020 14.0 13.0 - 17.7 g/dL Final      HCT Hematocrit   Date Value Ref Range Status   09/11/2020 45.0 37.5 - 51.0 % Final   09/10/2020 43.5 37.5 - 51.0 % Final   09/09/2020 43.2 37.5 - 51.0 % Final      Platlets No results found for: LABPLAT   MCV MCV   Date Value Ref Range Status   09/11/2020 84.2 79.0 - 97.0 fL Final   09/10/2020 83.7 79.0 - 97.0 fL Final   09/09/2020 82.9 79.0 - 97.0 fL Final          Sodium Sodium   Date Value Ref Range Status   09/11/2020 139 136 - 145 mmol/L Final   09/10/2020 136 136 - 145 mmol/L Final   09/09/2020 135 (L) 136 - 145 mmol/L Final      Potassium Potassium   Date Value Ref Range Status   09/11/2020 4.3 3.5 - 5.2 mmol/L Final   09/10/2020 4.3 3.5 - 5.2 mmol/L Final   09/09/2020 4.2 3.5 - 5.2 mmol/L Final      Chloride Chloride   Date Value Ref Range Status   09/11/2020 103 98 - 107 mmol/L Final   09/10/2020 103 98 - 107 mmol/L Final   09/09/2020 104 98 - 107 mmol/L Final      CO2 CO2   Date Value Ref Range Status   09/11/2020 24.0 22.0 - 29.0 mmol/L Final   09/10/2020 22.0 22.0 - 29.0 mmol/L Final   09/09/2020 20.0 (L) 22.0 - 29.0 mmol/L Final      BUN BUN   Date Value Ref Range Status   09/11/2020   Final     Comment:     Testing performed by alternate method   09/11/2020 39 (H) 8 - 23 mg/dL Final   09/10/2020    Final     Comment:     Testing performed by alternate method   09/10/2020 41 (H) 8 - 23 mg/dL Final   09/09/2020   Final     Comment:     Testing performed by alternate method   09/09/2020 35 (H) 8 - 23 mg/dL Final      Creatinine Creatinine   Date Value Ref Range Status   09/11/2020 1.25 0.76 - 1.27 mg/dL Final   09/10/2020 1.20 0.76 - 1.27 mg/dL Final   09/09/2020 1.21 0.76 - 1.27 mg/dL Final      Calcium Calcium   Date Value Ref Range Status   09/11/2020 9.2 8.6 - 10.5 mg/dL Final   09/10/2020 9.4 8.6 - 10.5 mg/dL Final   09/09/2020 9.0 8.6 - 10.5 mg/dL Final      PO4 No components found for: PO4   Albumin Albumin   Date Value Ref Range Status   09/11/2020 3.80 3.50 - 5.20 g/dL Final   09/10/2020 3.70 3.50 - 5.20 g/dL Final      Magnesium Magnesium   Date Value Ref Range Status   09/10/2020 2.4 1.6 - 2.4 mg/dL Final   09/09/2020 2.4 1.6 - 2.4 mg/dL Final      Uric Acid No components found for: URIC ACID     Imaging Results (Last 72 Hours)     ** No results found for the last 72 hours. **          Results for orders placed during the hospital encounter of 09/01/20   XR Chest 1 View    Narrative XR CHEST 1 VW-     Date of Exam: 9/1/2020 3:10 PM     Indication: soa.     Comparison Exams: 08/21/2020     Technique: Single AP chest radiograph     FINDINGS:  A left basilar consolidation appears unchanged. There may be an  associated small left pleural effusion. The heart is enlarged. The  pulmonary vasculature appears normal. The osseous structures appear  intact.       Impression 1.Left basilar consolidation appears unchanged, which could reflect  atelectasis or pneumonia.  2.Cardiomegaly with possible small left pleural effusion.     Electronically Signed By-DR. Salty Joseph MD On:9/1/2020 3:35 PM  This report was finalized on [id number] by DR. Salty Joseph MD.       Results for orders placed during the hospital encounter of 12/06/19   Duplex Carotid Ultrasound CAR    Narrative · Proximal right internal  carotid artery is normal.  · Mid right internal carotid artery is normal.  · Proximal left internal carotid artery is normal.  · Mid left internal carotid artery is normal.            ASSESSMENT / PLAN      Atrial fibrillation with rapid ventricular response (CMS/HCC)    Chronic atrial fibrillation (CMS/HCC)    Chronic diastolic CHF (congestive heart failure) (CMS/HCC)    CKD (chronic kidney disease) stage 3, GFR 30-59 ml/min (CMS/[facility])    Gambling disorder, persistent    Coronary artery disease involving native coronary artery of native heart without angina pectoris    Psoriasis    History of CVA (cerebrovascular accident)    DDD (degenerative disc disease), lumbosacral    Peripheral polyneuropathy    GERD (gastroesophageal reflux disease)    Medically noncompliant    Mild cognitive impairment    Uncontrolled hypertension    Dyspnea    Dermatitis associated with moisture    1. CRF/CKD STG 2------ Appears to have CRF/CKD STG 2 with current serum creatinine at baseline. Unknown if patient has baseline proteinuria or hematuria. In the long run we may have to accept a higher baseline serum creatinine in order to keep euvolemic. Follow with cautious diuresis. CRF/CKD is likely secondary to HTN NS.   Dose meds for CrCl 60 cc/min     2. ACIDOSIS------PO sodium bicarb     3. CHRONIC DIASTOLIC CHF/ELEVATED BNP------Cautious diuresis. Stable on current diuretic regimen     4. HTN WITH CKD------BP okay. No ACE/ARB/DRI for now     5. OA/DJD------No NSAIDs.      6. ATRIAL FIBRILLATION WITH RVR------per Cardiology. Dig level okay     7. HYPERLIPIDEMIA------Statin. CK, TSH okay    8. HYPERURICEMIA------Allopurinol     9. DECONDITIONING------PT    Cr stable  Continue diuretics  Continue sodium bicarb PO.  Awaits rehab  Okay from RENAL standpoint to d/c today and for patient to follow up in office in 2 weeks       Jad Wilder MD  [facility]  [phone]  09/11/20  11:23

## 2020-09-11 NOTE — PROGRESS NOTES
"Nutrition Services    Patient Name:  Carlos Whipple  YOB: 1955  MRN: [id number]  Admit Date:  9/1/2020    Progress note:  Patient eating very well, 100% average.    PO diet: Healthy Heart    Weight: currently 239lb, no significant weight change is noted     Bowel: last documented BM on 9/6, per nursing, pt is forgetful and is able to ambulate to the bathroom independently. Pt is unsure of last BM but denies feelings of constipation. Feels \"refreshed and rejuvenated\" per nursing. Monitor.    Skin: no pressure injuries    Labs reviewed.    RD to follow up per protocol.      Electronically signed by:  Clemencia Murray RD  09/11/20 13:41   "

## 2020-09-11 NOTE — THERAPY TREATMENT NOTE
Acute Care - Occupational Therapy Treatment Note  KAE Yoseph     Patient Name: Carlos Whipple  : 1955  MRN: [id number]  Today's Date: 2020             Admit Date: 2020       ICD-10-CM ICD-9-CM   1. Atrial fibrillation with rapid ventricular response (CMS/HCC) I48.91 427.31   2. Uncontrolled hypertension I10 401.9   3. Dyspnea, unspecified type R06.00 786.09   4. CKD (chronic kidney disease) stage 3, GFR 30-59 ml/min (CMS/HCC) N18.3 585.3     Patient Active Problem List   Diagnosis   • Hypertensive emergency   • Chronic atrial fibrillation (CMS/HCC)   • Chronic diastolic CHF (congestive heart failure) (CMS/HCC)   • CKD (chronic kidney disease) stage 3, GFR 30-59 ml/min (CMS/HCC)   • Essential hypertension   • Gambling disorder, persistent   • Shortness of breath   • Coronary artery disease involving native coronary artery of native heart without angina pectoris   • Psoriasis   • History of CVA (cerebrovascular accident)   • DDD (degenerative disc disease), lumbosacral   • Peripheral polyneuropathy   • GERD (gastroesophageal reflux disease)   • Medically noncompliant   • Acute respiratory distress   • Unstable angina (CMS/HCC)   • Cervical disc disorder with radiculopathy   • Completed stroke (CMS/HCC)   • Degeneration of intervertebral disc   • Excessive anticoagulation   • Impaired left ventricular function   • Liver lesion   • Lumbar radiculopathy   • Cervical myelopathy (CMS/HCC)   • Lumbosacral radiculopathy   • Spinal stenosis of lumbar region   • Obesity   • Mild cognitive impairment   • Thoracic back pain   • Thoracic disc disease with myelopathy   • Paroxysmal atrial fibrillation (CMS/HCC)   • Uncontrolled hypertension   • Acute congestive heart failure (CMS/HCC)   • Dyspnea   • Atrial fibrillation with RVR (CMS/HCC)   • Atrial fibrillation with rapid ventricular response (CMS/HCC)   • Dermatitis associated with moisture     Past Medical History:   Diagnosis Date   • A-fib (CMS/HCC)    • CHF  (congestive heart failure) (CMS/[facility])    • Chronic atrial fibrillation (CMS/[facility]) 11/8/2019   • Chronic diastolic CHF (congestive heart failure) (CMS/[facility]) 11/8/2019   • CKD (chronic kidney disease) stage 3, GFR 30-59 ml/min (CMS/[facility]) 11/8/2019   • Coronary artery disease involving native coronary artery of native heart without angina pectoris 11/8/2019   • DDD (degenerative disc disease), lumbosacral 11/8/2019   • Essential hypertension 11/8/2019   • Gambling disorder, persistent 11/8/2019   • GERD (gastroesophageal reflux disease) 11/8/2019   • History of CVA (cerebrovascular accident) 11/8/2019   • Hyperlipidemia    • Hypertension    • Medically noncompliant 11/8/2019   • Peripheral polyneuropathy 11/8/2019   • Psoriasis 11/8/2019   • Stroke (CMS/[facility])      Past Surgical History:   Procedure Laterality Date   • CARDIAC CATHETERIZATION     • CARDIAC CATHETERIZATION N/A 12/10/2019    Procedure: Left Heart Cath and coronary angiogram;  Surgeon: Edy Thomas MD;  Location: [facility] CATH INVASIVE LOCATION;  Service: Cardiovascular       Therapy Treatment    Rehabilitation Treatment Summary     Row Name 09/11/20 1414             Treatment Time/Intention    Discipline  occupational therapist  -NA      Document Type  therapy note (daily note)  -NA      Subjective Information  no complaints  -NA      Therapy Frequency (OT Eval)  5 times/wk  -NA      Patient Effort  good  -NA      Comment  pt confused and with child like demenor.  -NA      Existing Precautions/Restrictions  fall  -NA      Recorded by [NA] Gertrude Bhatt, OT 09/11/20 1516      Row Name 09/11/20 1414             Cognitive Assessment/Intervention    Additional Documentation  Cognitive Assessment/Intervention (Group)  -NA      Recorded by [NA] Gertrude Bhatt, OT 09/11/20 1516      Row Name 09/11/20 1414             Cognitive Assessment/Intervention- PT/OT    Affect/Mental Status (Cognitive)  confused  -NA      Orientation Status (Cognition)  oriented x 3   -NA      Follows Commands (Cognition)  follows one step commands;75-90% accuracy;delayed response/completion;increased processing time needed  -NA      Cognitive Function (Cognitive)  safety deficit;executive function deficit  -NA      Safety Deficit (Cognitive)  mild deficit;judgment;problem solving  -NA      Cognitive Interventions (Cognitive)  occupation/activity based interventions;environmental modifications  -NA      Recorded by [NA] Gertrude Bhatt, OT 09/11/20 1516      Row Name 09/11/20 1414             Upper Body Dressing Assessment/Training    Upper Body Dressing [place] Level  doff;don;front opening garment;minimum assist (75% patient effort)  -NA      Recorded by [NA] Gertrude Bhatt, OT 09/11/20 1516      Row Name 09/11/20 1414             Lower Body Dressing Assessment/Training    Lower Body Dressing [place] Level  doff;don;dependent (less than 25% patient effort)  -NA      Recorded by [NA] Gertrude Bhatt, OT 09/11/20 1516      Row Name 09/11/20 1414             Grooming Assessment/Training    [place] Level (Grooming)  wash face, hands;oral care regimen;contact guard assist  -NA      Recorded by [NA] Gertrude Bhatt, OT 09/11/20 1516      Row Name 09/11/20 1414             Self-Feeding Assessment/Training    [place] Level (Feeding)  feeding skills;independent  -NA      Recorded by [NA] Gertrude Bhatt, OT 09/11/20 1516      Row Name 09/11/20 1414             Motor Skills Assessment/Interventions    Additional Documentation  Therapeutic Exercise (Group)  -NA      Recorded by [NA] Gertrude Bhatt OT 09/11/20 1516      Row Name 09/11/20 1414             Therapeutic Exercise    Upper Extremity Range of Motion (Therapeutic Exercise)  wrist flexion/extension, bilateral;forearm supination/pronation, bilateral;elbow flexion/extension, bilateral;shoulder internal/external rotation, bilateral;shoulder horizontal abduction/adduction, bilateral;shoulder abduction/adduction,  bilateral;shoulder flexion/extension, bilateral  -NA      Hand (Therapeutic Exercise)  hand , bilateral;thumb finger opposition, bilateral;finger flexion/extension, bilateral  -NA      Exercise Type (Therapeutic Exercise)  AROM (active range of motion);isometric contraction, static;isotonic contraction, concentric;isotonic contraction, eccentric  -NA      Position (Therapeutic Exercise)  seated  -NA      Sets/Reps (Therapeutic Exercise)  1/20  -NA      Expected Outcome (Therapeutic Exercise)  facilitate normal movement patterns;improve functional stability;increase passive range of motion;increase active range of motion;improve postural control;improve performance, fine motor coordination skills;improve performance, BADLs;improve functional tolerance, self-care activity  -NA      Comment (Therapeutic Exercise)  with a rest break in between each exercise  -NA      Recorded by [NA] Gertrude Bhatt, OT 09/11/20 1516      Row Name 09/11/20 1414             Functional Endurance Training    Comment, Functional Endurance  good -  -NA      Recorded by [NA] Gertrude Bhatt OT 09/11/20 1516      Row Name 09/11/20 1414             Positioning and Restraints    Pre-Treatment Position  sitting in chair/recliner  -NA      Post Treatment Position  chair  -NA      In Chair  notified nsg;sitting;call light within reach;encouraged to call for assist;exit alarm on  -NA      Recorded by [NA] Gertrude Bhatt OT 09/11/20 1516      Row Name 09/11/20 1414             Pain Assessment    Additional Documentation  Pain Scale: Numbers Pre/Post-Treatment (Group)  -NA      Recorded by [NA] Gertrude Bhatt OT 09/11/20 1516      Row Name 09/11/20 1414             Pain Scale: Numbers Pre/Post-Treatment    Pain Scale: Numbers, Pretreatment  0/10 - no pain  -NA      Pain Scale: Numbers, Post-Treatment  0/10 - no pain  -NA      Recorded by [NA] Gertrude Bhatt OT 09/11/20 1516      Row Name 09/11/20 1414             Sensory  Assessment/Intervention    Sensory General Assessment  no sensation deficits identified  -NA      Recorded by [NA] eGrtrude Bhatt, OT 09/11/20 1516      Row Name                Wound 12/12/19 0200 midline gluteal MASD (Moisutre associated skin damage)    Wound - Properties Group Date first assessed: 12/12/19 [BG] Time first assessed: 0200 [BG] Present on Hospital Admission: Y [BG] Orientation: midline [BG] Location: gluteal [BG] Primary Wound Type: MASD [BG], psoriasis lesions with what appears to be moisture related damage  Recorded by:  [BG] Radha Conklin RN 12/12/19 0642    Row Name                Wound 12/12/19 0200 thoracic spine Other (comment)    Wound - Properties Group Date first assessed: 12/12/19 [BG] Time first assessed: 0200 [BG] Location: thoracic spine [BG] Primary Wound Type: Other [BG], psoriasis lesions  Recorded by:  [BG] Radha Conklin RN 12/12/19 0643    Row Name                Wound 12/12/19 0200 upper;midline abdomen     Wound - Properties Group Date first assessed: 12/12/19 [BG] Time first assessed: 0200 [BG] Orientation: upper;midline [BG] Location: abdomen [BG] Primary Wound Type: -- [BG], psoriasis lesions  Recorded by:  [BG] Radha Conklin RN 12/12/19 0644    Row Name                Wound 09/01/20 1839 Bilateral anterior groin MASD (Moisture associated skin damage)    Wound - Properties Group Date first assessed: 09/01/20 [KD] Time first assessed: 1839 [KD] Present on Hospital Admission: Y [KD] Side: Bilateral [KD] Orientation: anterior [KD] Location: groin [KD] Primary Wound Type: MASD [KD] Recorded by:  [KD] Nehal Menon RN 09/01/20 1840    Row Name 09/11/20 1414             Coping    Observed Emotional State  accepting;calm;cooperative;pleasant  -NA      Verbalized Emotional State  acceptance  -NA      Recorded by [NA] Gertrude Bhatt, OT 09/11/20 1516      Row Name 09/11/20 1414             Plan of Care Review    Plan of Care Reviewed With  patient  -NA       Progress  improving  -NA      Recorded by [NA] Gertrude Bhtat, OT 09/11/20 1516      Row Name 09/11/20 1414             Outcome Summary/Treatment Plan (OT)    Daily Summary of Progress (OT)  progress toward functional goals as expected;progress toward functional goals is good  -NA      Anticipated Discharge Disposition (OT)  inpatient rehabilitation facility  -NA      Recorded by [NA] Gertrude Bhatt, OT 09/11/20 1516        User Key  (r) = Recorded By, (t) = Taken By, (c) = Cosigned By    Initials Name Effective Dates Discipline    NA Gertrude Bhatt, OT 07/20/20 -  OT    BG Radha Conklin RN 03/01/19 -  Nurse    KD Nehal Menon RN 08/21/19 -  Nurse        Wound 12/12/19 0200 midline gluteal MASD (Moisutre associated skin damage) (Active)   Dressing Appearance open to air 9/11/2020  7:26 AM   Closure None 9/11/2020  7:26 AM   Base pink 9/11/2020  7:26 AM   Drainage Amount none 9/11/2020  7:26 AM       Wound 12/12/19 0200 thoracic spine Other (comment) (Active)   Base maroon/purple 9/10/2020  7:34 PM       Wound 12/12/19 0200 upper;midline abdomen  (Active)   Dressing Appearance open to air 9/10/2020  7:34 PM   Closure None 9/10/2020  7:34 PM   Base maroon/purple 9/10/2020  7:34 PM       Wound 09/01/20 1839 Bilateral anterior groin MASD (Moisture associated skin damage) (Active)   Dressing Appearance open to air 9/11/2020  7:26 AM   Base pink 9/11/2020  7:26 AM   Drainage Amount none 9/11/2020  7:26 AM     Rehab Goal Summary     Row Name 09/11/20 1516             Occupational Therapy Goals    Bathing Goal Selection (OT)  bathing, OT goal 1  -NA      Dressing Goal Selection (OT)  dressing, OT goal 1  -NA      Toileting Goal Selection (OT)  toileting, OT goal 1  -NA         Bathing Goal 1 (OT)    Progress/Outcomes (Bathing Goal 1, OT)  continuing progress toward goal;good progress toward goal  -NA         Dressing Goal 1 (OT)    Progress/Outcome (Dressing Goal 1, OT)  good progress toward  goal;continuing progress toward goal  -NA         Toileting Goal 1 (OT)    Progress/Outcome (Toileting Goal 1, OT)  good progress toward goal;continuing progress toward goal  -NA        User Key  (r) = Recorded By, (t) = Taken By, (c) = Cosigned By    Initials Name Provider Type Discipline    Gertrude Gambino OT Occupational Therapist OT        Occupational Therapy Education                 Title: PT OT SLP Therapies (Done)     Topic: Occupational Therapy (Done)     Point: ADL training (Done)     Description:   Instruct learner(s) on proper safety adaptation and remediation techniques during self care or transfers.   Instruct in proper use of assistive devices.              Learning Progress Summary           Patient Acceptance, E,TB,D, VU,DU,NR by NA at 9/11/2020 1518    Comment:  role and benefits of OT, safety with ADLs, BUE ther ex in all planes, PLB tech during ther ex, getting up to chair for > 30 min 3x/day    Acceptance, E,TB, NR by  at 9/9/2020 1608    Acceptance, E,TB,D, VU,DU,NR by NA at 9/8/2020 1136    Comment:  role and benefits of OT, BUE ther ex in all planes, transfers to chair with RW, calling for assistance when getting up    Acceptance, E,TB, VU,NR by  at 9/6/2020 1603                   Point: Home exercise program (Done)     Description:   Instruct learner(s) on appropriate technique for monitoring, assisting and/or progressing therapeutic exercises/activities.              Learning Progress Summary           Patient Acceptance, E,TB,D, VU,DU,NR by NA at 9/11/2020 1518    Comment:  role and benefits of OT, safety with ADLs, BUE ther ex in all planes, PLB tech during ther ex, getting up to chair for > 30 min 3x/day    Acceptance, E,TB,D, VU,DU,NR by NA at 9/8/2020 1136    Comment:  role and benefits of OT, BUE ther ex in all planes, transfers to chair with RW, calling for assistance when getting up    Acceptance, E,TB, VU,NR by  at 9/6/2020 1603                   Point: Precautions  (Done)     Description:   Instruct learner(s) on prescribed precautions during self-care and functional transfers.              Learning Progress Summary           Patient Acceptance, E,TB,D, VU,DU,NR by NA at 9/11/2020 1518    Comment:  role and benefits of OT, safety with ADLs, BUE ther ex in all planes, PLB tech during ther ex, getting up to chair for > 30 min 3x/day    Acceptance, E,TB,D, VU,DU,NR by NA at 9/8/2020 1136    Comment:  role and benefits of OT, BUE ther ex in all planes, transfers to chair with RW, calling for assistance when getting up    Acceptance, E,TB, VU,NR by  at 9/6/2020 1603    Acceptance, E,TB, NR,NL by  at 9/2/2020 1554                   Point: Body mechanics (Done)     Description:   Instruct learner(s) on proper positioning and spine alignment during self-care, functional mobility activities and/or exercises.              Learning Progress Summary           Patient Acceptance, E,TB,D, VU,DU,NR by NA at 9/11/2020 1518    Comment:  role and benefits of OT, safety with ADLs, BUE ther ex in all planes, PLB tech during ther ex, getting up to chair for > 30 min 3x/day    Acceptance, E,TB, NR by  at 9/9/2020 1608    Acceptance, E,TB,D, VU,DU,NR by NA at 9/8/2020 1136    Comment:  role and benefits of OT, BUE ther ex in all planes, transfers to chair with RW, calling for assistance when getting up    Acceptance, E,TB, VU,NR by  at 9/6/2020 1603    Acceptance, E,TB, NR by  at 9/3/2020 1513    Acceptance, E,TB, NR,NL by  at 9/2/2020 1554                               User Key     Initials Effective Dates Name Provider Type Discipline     07/20/20 -  Gertrude Bhatt, OT Occupational Therapist OT     03/01/19 -  Zee Shepherd, OT Occupational Therapist OT     03/01/19 -  Allison Friedman, OT Occupational Therapist OT     03/01/19 -  Marilee Casey, PTA Physical Therapy Assistant PT                OT Recommendation and Plan  Outcome Summary/Treatment Plan  (OT)  Daily Summary of Progress (OT): progress toward functional goals as expected, progress toward functional goals is good  Anticipated Discharge Disposition (OT): inpatient rehabilitation facility  Therapy Frequency (OT Eval): 5 times/wk  Daily Summary of Progress (OT): progress toward functional goals as expected, progress toward functional goals is good  Plan of Care Review  Plan of Care Reviewed With: patient  Plan of Care Reviewed With: patient  Outcome Summary: Pt tolerated OT tx well this date and shows good progress toward goals. per case mgmt note, pt with an active APS report as of 9/11/2020/. pt completed BUE ther ex in all planes to increase strength and endurance with ADLs. pt shows good - endurance during ther ex and ADLs.  Outcome Measures     Row Name 09/11/20 1519             How much help from another is currently needed...    Putting on and taking off regular lower body clothing?  2  -NA      Bathing (including washing, rinsing, and drying)  2  -NA      Toileting (which includes using toilet bed pan or urinal)  2  -NA      Putting on and taking off regular upper body clothing  3  -NA      Taking care of personal grooming (such as brushing teeth)  3  -NA      Eating meals  4  -NA      AM-PAC 6 Clicks Score (OT)  16  -NA         Modified [place] Scale    Modified [place] Scale  4 - Moderately severe disability.  Unable to walk without assistance, and unable to attend to own bodily needs without assistance.  -NA         Functional Assessment    Outcome Measure Options  AM-PAC 6 Clicks Daily Activity (OT);Modified [place]  -NA        User Key  (r) = Recorded By, (t) = Taken By, (c) = Cosigned By    Initials Name Provider Type    Gertrude Gambino OT Occupational Therapist           Time Calculation:   Time Calculation- OT     Row Name 09/11/20 1509             Time Calculation- OT    OT Start Time  1501  -NA      OT Stop Time  1526  -NA      OT Time Calculation (min)  25 min  -NA      Total Timed  Code Minutes- OT  25 minute(s)  -NA      OT Received On  09/11/20  -NA      OT - Next Appointment  09/14/20  -NA      OT Goal Re-Cert Due Date  09/16/20  -NA        User Key  (r) = Recorded By, (t) = Taken By, (c) = Cosigned By    Initials Name Provider Type    Gertrude Gambino OT Occupational Therapist        Therapy Charges for Today     Code Description Service Date Service Provider Modifiers Qty    25472422923 HC OT SELF CARE/MGMT/TRAIN EA 15 MIN 9/11/2020 Gertrude Bhatt OT GO 1    [id number]  OT THER PROC EA 15 MIN 9/11/2020 Gertrude Bhatt OT GO 1               Gertrude Bhatt OT  9/11/2020

## 2020-09-11 NOTE — PLAN OF CARE
Problem: Patient Care Overview  Goal: Plan of Care Review  9/11/2020 0550 by Carole Vogt LPN  Outcome: Ongoing (interventions implemented as appropriate)  Flowsheets  Taken 9/11/2020 0550 by Carole Vogt LPN  Plan of Care Reviewed With: patient  Taken 9/10/2020 1318 by Apolinar Salas, RN  Outcome Summary: remains risk for skin injury & falls. Denies pain today. Fairly oriented, but does have forgetfulness & periods of confusion. no cardiac problems noted or extreme HTN.  9/11/2020 0549 by Carole Vogt LPN  Outcome: Ongoing (interventions implemented as appropriate)  Flowsheets  Taken 9/11/2020 0549 by Carole Vogt LPN  Plan of Care Reviewed With: patient  Taken 9/10/2020 1318 by Apolinar Salas, RN  Outcome Summary: remains risk for skin injury & falls. Denies pain today. Fairly oriented, but does have forgetfulness & periods of confusion. no cardiac problems noted or extreme HTN.

## 2020-09-12 VITALS
HEIGHT: 72 IN | WEIGHT: 239.42 LBS | BODY MASS INDEX: 32.43 KG/M2 | RESPIRATION RATE: 20 BRPM | DIASTOLIC BLOOD PRESSURE: 85 MMHG | OXYGEN SATURATION: 96 % | TEMPERATURE: 97.6 F | HEART RATE: 68 BPM | SYSTOLIC BLOOD PRESSURE: 138 MMHG

## 2020-09-12 PROBLEM — R06.00 DYSPNEA: Status: RESOLVED | Noted: 2020-08-21 | Resolved: 2020-09-12

## 2020-09-12 PROBLEM — I10 UNCONTROLLED HYPERTENSION: Status: RESOLVED | Noted: 2019-12-17 | Resolved: 2020-09-12

## 2020-09-12 PROBLEM — I48.91 ATRIAL FIBRILLATION WITH RAPID VENTRICULAR RESPONSE (HCC): Status: RESOLVED | Noted: 2020-09-01 | Resolved: 2020-09-12

## 2020-09-12 LAB
ALBUMIN SERPL-MCNC: 3.8 G/DL (ref 3.5–5.2)
ANION GAP SERPL CALCULATED.3IONS-SCNC: 10 MMOL/L (ref 5–15)
BASOPHILS # BLD AUTO: 0.1 10*3/MM3 (ref 0–0.2)
BASOPHILS NFR BLD AUTO: 1 % (ref 0–1.5)
BUN SERPL-MCNC: 33 MG/DL (ref 8–23)
BUN SERPL-MCNC: ABNORMAL MG/DL
BUN/CREAT SERPL: ABNORMAL
CALCIUM SPEC-SCNC: 9.5 MG/DL (ref 8.6–10.5)
CHLORIDE SERPL-SCNC: 104 MMOL/L (ref 98–107)
CO2 SERPL-SCNC: 24 MMOL/L (ref 22–29)
CREAT SERPL-MCNC: 1.11 MG/DL (ref 0.76–1.27)
DEPRECATED RDW RBC AUTO: 53.4 FL (ref 37–54)
EOSINOPHIL # BLD AUTO: 0.2 10*3/MM3 (ref 0–0.4)
EOSINOPHIL NFR BLD AUTO: 2.2 % (ref 0.3–6.2)
ERYTHROCYTE [DISTWIDTH] IN BLOOD BY AUTOMATED COUNT: 18.3 % (ref 12.3–15.4)
GFR SERPL CREATININE-BSD FRML MDRD: 67 ML/MIN/1.73
GLUCOSE SERPL-MCNC: 112 MG/DL (ref 65–99)
HCT VFR BLD AUTO: 45.4 % (ref 37.5–51)
HGB BLD-MCNC: 14.6 G/DL (ref 13–17.7)
LYMPHOCYTES # BLD AUTO: 1.3 10*3/MM3 (ref 0.7–3.1)
LYMPHOCYTES NFR BLD AUTO: 16.4 % (ref 19.6–45.3)
MCH RBC QN AUTO: 26.6 PG (ref 26.6–33)
MCHC RBC AUTO-ENTMCNC: 32.1 G/DL (ref 31.5–35.7)
MCV RBC AUTO: 83 FL (ref 79–97)
MONOCYTES # BLD AUTO: 0.8 10*3/MM3 (ref 0.1–0.9)
MONOCYTES NFR BLD AUTO: 10.4 % (ref 5–12)
NEUTROPHILS NFR BLD AUTO: 5.6 10*3/MM3 (ref 1.7–7)
NEUTROPHILS NFR BLD AUTO: 70 % (ref 42.7–76)
NRBC BLD AUTO-RTO: 0 /100 WBC (ref 0–0.2)
PHOSPHATE SERPL-MCNC: 3.4 MG/DL (ref 2.5–4.5)
PLATELET # BLD AUTO: 225 10*3/MM3 (ref 140–450)
PMV BLD AUTO: 8.5 FL (ref 6–12)
POTASSIUM SERPL-SCNC: 4.4 MMOL/L (ref 3.5–5.2)
RBC # BLD AUTO: 5.47 10*6/MM3 (ref 4.14–5.8)
SODIUM SERPL-SCNC: 138 MMOL/L (ref 136–145)
WBC # BLD AUTO: 8 10*3/MM3 (ref 3.4–10.8)

## 2020-09-12 PROCEDURE — 80069 RENAL FUNCTION PANEL: CPT | Performed by: INTERNAL MEDICINE

## 2020-09-12 PROCEDURE — 85025 COMPLETE CBC W/AUTO DIFF WBC: CPT | Performed by: INTERNAL MEDICINE

## 2020-09-12 PROCEDURE — 94799 UNLISTED PULMONARY SVC/PX: CPT

## 2020-09-12 RX ORDER — CLOTRIMAZOLE AND BETAMETHASONE DIPROPIONATE 10; .64 MG/G; MG/G
CREAM TOPICAL EVERY 12 HOURS SCHEDULED
Qty: 60 G | Refills: 1 | Status: ON HOLD | OUTPATIENT
Start: 2020-09-12 | End: 2020-10-04

## 2020-09-12 RX ORDER — ALLOPURINOL 100 MG/1
100 TABLET ORAL DAILY
Qty: 30 TABLET | Refills: 1 | Status: SHIPPED | OUTPATIENT
Start: 2020-09-13

## 2020-09-12 RX ORDER — SODIUM BICARBONATE 650 MG/1
650 TABLET ORAL 3 TIMES DAILY
Qty: 90 TABLET | Refills: 1 | Status: SHIPPED | OUTPATIENT
Start: 2020-09-12

## 2020-09-12 RX ORDER — HYDRALAZINE HYDROCHLORIDE 100 MG/1
100 TABLET, FILM COATED ORAL 3 TIMES DAILY
Qty: 90 TABLET | Refills: 1 | Status: SHIPPED | OUTPATIENT
Start: 2020-09-12 | End: 2021-01-26

## 2020-09-12 RX ORDER — SPIRONOLACTONE 25 MG/1
12.5 TABLET ORAL DAILY
Qty: 15 TABLET | Refills: 1 | Status: SHIPPED | OUTPATIENT
Start: 2020-09-13

## 2020-09-12 RX ORDER — RISPERIDONE 0.5 MG/1
0.5 TABLET ORAL EVERY 12 HOURS SCHEDULED
Qty: 60 TABLET | Refills: 1 | Status: SHIPPED | OUTPATIENT
Start: 2020-09-12 | End: 2021-01-26

## 2020-09-12 RX ORDER — BUMETANIDE 1 MG/1
1 TABLET ORAL DAILY
Qty: 30 TABLET | Refills: 1 | Status: SHIPPED | OUTPATIENT
Start: 2020-09-13 | End: 2020-10-15 | Stop reason: HOSPADM

## 2020-09-12 RX ORDER — LOSARTAN POTASSIUM 50 MG/1
50 TABLET ORAL
Qty: 30 TABLET | Refills: 1 | Status: SHIPPED | OUTPATIENT
Start: 2020-09-13 | End: 2021-01-26

## 2020-09-12 RX ADMIN — ASPIRIN 81 MG: 81 TABLET, COATED ORAL at 09:02

## 2020-09-12 RX ADMIN — RISPERIDONE 0.5 MG: 0.25 TABLET ORAL at 09:02

## 2020-09-12 RX ADMIN — BUMETANIDE 1 MG: 1 TABLET ORAL at 09:02

## 2020-09-12 RX ADMIN — CLOTRIMAZOLE AND BETAMETHASONE DIPROPIONATE: 10; .64 CREAM TOPICAL at 09:03

## 2020-09-12 RX ADMIN — SPIRONOLACTONE 12.5 MG: 25 TABLET, FILM COATED ORAL at 09:02

## 2020-09-12 RX ADMIN — ISOSORBIDE MONONITRATE 30 MG: 20 TABLET ORAL at 09:03

## 2020-09-12 RX ADMIN — METOPROLOL TARTRATE 50 MG: 50 TABLET, FILM COATED ORAL at 09:02

## 2020-09-12 RX ADMIN — IPRATROPIUM BROMIDE AND ALBUTEROL SULFATE 3 ML: 2.5; .5 SOLUTION RESPIRATORY (INHALATION) at 07:17

## 2020-09-12 RX ADMIN — HYDRALAZINE HYDROCHLORIDE 100 MG: 25 TABLET, FILM COATED ORAL at 09:02

## 2020-09-12 RX ADMIN — LOSARTAN POTASSIUM 50 MG: 50 TABLET, FILM COATED ORAL at 09:02

## 2020-09-12 RX ADMIN — ALLOPURINOL 100 MG: 100 TABLET ORAL at 09:02

## 2020-09-12 RX ADMIN — SODIUM BICARBONATE 650 MG: 650 TABLET ORAL at 09:03

## 2020-09-12 RX ADMIN — Medication 10 ML: at 09:02

## 2020-09-12 RX ADMIN — DILTIAZEM HYDROCHLORIDE 180 MG: 180 CAPSULE, COATED, EXTENDED RELEASE ORAL at 09:02

## 2020-09-12 NOTE — PROGRESS NOTES
"NEPHROLOGY PROGRESS NOTE------[facility]    [facility]  [phone]  Sterling Barber MD      Patient Care Team:  Marisol Larson APRN as PCP - General  Shwetha Barber MD as Consulting Physician (Nephrology)  Edy Thomas MD as Consulting Physician (Cardiology)      Provider:  Sterling Barber MD  Patient Name: Carlos Whipple  :  1955    SUBJECTIVE:    F/U CRF/CKD    Little weak. No f/c/n/v/d/c/CP. Minimal TESFAYE. No dysuria      Medication:  allopurinol, 100 mg, Oral, Daily  aspirin, 81 mg, Oral, Daily  atorvastatin, 10 mg, Oral, Nightly  bumetanide, 1 mg, Oral, Daily  clotrimazole-betamethasone, , Topical, Q12H  dilTIAZem CD, 180 mg, Oral, Q24H  gabapentin, 100 mg, Oral, Nightly  hydrALAZINE, 100 mg, Oral, TID  ipratropium-albuterol, 3 mL, Nebulization, 4x Daily - RT  isosorbide mononitrate, 30 mg, Oral, BID - Nitrates  losartan, 50 mg, Oral, Q24H  metoprolol tartrate, 50 mg, Oral, Q12H  risperiDONE, 0.5 mg, Oral, Q12H  rivaroxaban, 20 mg, Oral, Daily With Dinner  sodium bicarbonate, 650 mg, Oral, TID  sodium chloride, 10 mL, Intravenous, Q12H  spironolactone, 12.5 mg, Oral, Daily           OBJECTIVE    Vital Sign Min/Max for last 24 hours  Temp  Min: 97.6 °F (36.4 °C)  Max: 98.5 °F (36.9 °C)   BP  Min: 109/74  Max: 149/84   Pulse  Min: 69  Max: 88   Resp  Min: 16  Max: 20   SpO2  Min: 95 %  Max: 98 %   No data recorded   Weight  Min: 109 kg (239 lb 6.7 oz)  Max: 109 kg (239 lb 6.7 oz)     Flowsheet Rows      First Filed Value   Admission Height  182.9 cm (72\") Documented at 2020 1338   Admission Weight  114 kg (252 lb) Documented at 2020 1338          No intake/output data recorded.  I/O last 3 completed shifts:  In: 720 [P.O.:720]  Out: 1675 [Urine:1675]    Physical Exam:  General Appearance: alert, appears stated age and cooperative  Head: normocephalic, without obvious abnormality and atraumatic  Eyes: conjunctivae and sclerae " normal and no icterus  Neck: supple and no JVD  Lungs: clear to auscultation and respirations regular  Heart: IRREG IRREG +AFUA.   Chest: Wall no abnormalities observed  Abdomen: normal bowel sounds and soft non-tender  Extremities: moves extremities well, no edema, no cyanosis and no redness  Skin: no bleeding, bruising or rash, turgor normal, color normal and no lesions noted  Neurologic: Alert, and oriented. No focal deficits    Labs:    WBC WBC   Date Value Ref Range Status   09/11/2020 8.40 3.40 - 10.80 10*3/mm3 Final   09/10/2020 8.70 3.40 - 10.80 10*3/mm3 Final      HGB Hemoglobin   Date Value Ref Range Status   09/11/2020 14.3 13.0 - 17.7 g/dL Final   09/10/2020 14.1 13.0 - 17.7 g/dL Final      HCT Hematocrit   Date Value Ref Range Status   09/11/2020 45.0 37.5 - 51.0 % Final   09/10/2020 43.5 37.5 - 51.0 % Final      Platlets No results found for: LABPLAT   MCV MCV   Date Value Ref Range Status   09/11/2020 84.2 79.0 - 97.0 fL Final   09/10/2020 83.7 79.0 - 97.0 fL Final          Sodium Sodium   Date Value Ref Range Status   09/11/2020 139 136 - 145 mmol/L Final   09/10/2020 136 136 - 145 mmol/L Final      Potassium Potassium   Date Value Ref Range Status   09/11/2020 4.3 3.5 - 5.2 mmol/L Final   09/10/2020 4.3 3.5 - 5.2 mmol/L Final      Chloride Chloride   Date Value Ref Range Status   09/11/2020 103 98 - 107 mmol/L Final   09/10/2020 103 98 - 107 mmol/L Final      CO2 CO2   Date Value Ref Range Status   09/11/2020 24.0 22.0 - 29.0 mmol/L Final   09/10/2020 22.0 22.0 - 29.0 mmol/L Final      BUN BUN   Date Value Ref Range Status   09/11/2020   Final     Comment:     Testing performed by alternate method   09/11/2020 39 (H) 8 - 23 mg/dL Final   09/10/2020   Final     Comment:     Testing performed by alternate method   09/10/2020 41 (H) 8 - 23 mg/dL Final      Creatinine Creatinine   Date Value Ref Range Status   09/11/2020 1.25 0.76 - 1.27 mg/dL Final   09/10/2020 1.20 0.76 - 1.27 mg/dL Final      Calcium  Calcium   Date Value Ref Range Status   09/11/2020 9.2 8.6 - 10.5 mg/dL Final   09/10/2020 9.4 8.6 - 10.5 mg/dL Final      PO4 No components found for: PO4   Albumin Albumin   Date Value Ref Range Status   09/11/2020 3.80 3.50 - 5.20 g/dL Final   09/10/2020 3.70 3.50 - 5.20 g/dL Final      Magnesium Magnesium   Date Value Ref Range Status   09/10/2020 2.4 1.6 - 2.4 mg/dL Final      Uric Acid No components found for: URIC ACID     Imaging Results (Last 72 Hours)     ** No results found for the last 72 hours. **          Results for orders placed during the hospital encounter of 09/01/20   XR Chest 1 View    Narrative XR CHEST 1 VW-     Date of Exam: 9/1/2020 3:10 PM     Indication: soa.     Comparison Exams: 08/21/2020     Technique: Single AP chest radiograph     FINDINGS:  A left basilar consolidation appears unchanged. There may be an  associated small left pleural effusion. The heart is enlarged. The  pulmonary vasculature appears normal. The osseous structures appear  intact.       Impression 1.Left basilar consolidation appears unchanged, which could reflect  atelectasis or pneumonia.  2.Cardiomegaly with possible small left pleural effusion.     Electronically Signed By-DR. Salty Joseph MD On:9/1/2020 3:35 PM  This report was finalized on [id number] by DR. Salty Joseph MD.       Results for orders placed during the hospital encounter of 12/06/19   Duplex Carotid Ultrasound CAR    Narrative · Proximal right internal carotid artery is normal.  · Mid right internal carotid artery is normal.  · Proximal left internal carotid artery is normal.  · Mid left internal carotid artery is normal.            ASSESSMENT / PLAN      Atrial fibrillation with rapid ventricular response (CMS/HCC)    Chronic atrial fibrillation (CMS/HCC)    Chronic diastolic CHF (congestive heart failure) (CMS/HCC)    CKD (chronic kidney disease) stage 3, GFR 30-59 ml/min (CMS/HCC)    Gambling disorder, persistent    Coronary artery  disease involving native coronary artery of native heart without angina pectoris    Psoriasis    History of CVA (cerebrovascular accident)    DDD (degenerative disc disease), lumbosacral    Peripheral polyneuropathy    GERD (gastroesophageal reflux disease)    Medically noncompliant    Mild cognitive impairment    Uncontrolled hypertension    Dyspnea    Dermatitis associated with moisture    1. CRF/CKD STG 2------ Appears to have CRF/CKD STG 2 with current serum creatinine at baseline. Unknown if patient has baseline proteinuria or hematuria. In the long run we may have to accept a higher baseline serum creatinine in order to keep euvolemic. Follow with cautious diuresis. CRF/CKD is likely secondary to HTN NS.   Dose meds for CrCl 60 cc/min     2. ACIDOSIS------Stable on PO sodium bicarb     3. CHRONIC DIASTOLIC CHF/ELEVATED BNP------Cautious diuresis. Stable on current diuretic regimen     4. HTN WITH CKD------BP okay. No ACE/ARB/DRI for now     5. OA/DJD------No NSAIDs.      6. ATRIAL FIBRILLATION WITH RVR------per Cardiology. Dig level okay     7. HYPERLIPIDEMIA------Statin. CK, TSH okay    8. HYPERURICEMIA------Allopurinol     9. DECONDITIONING------PT    Okay from RENAL standpoint to d/c with close outpatient follow up    Sterling Barber MD  [facility]  [phone]  09/12/20  07:14 EDT

## 2020-09-12 NOTE — DISCHARGE SUMMARY
Date of Discharge:  9/12/2020    Discharge Diagnosis: CRF/CKD STG 2, ACIDOSIS, CHRONIC DIASTOLIC CHF, HTN WITH CKD, OA/DJD, ATRIAL FIBRILLATION WITH RVR, chronic Afib, HYPERURICEMIA, med non-compliance, confusion, DECONDITIONING,  Mild cognitive impairment, dyspnea       Presenting Problem/History of Present Illness  Active Hospital Problems    Diagnosis  POA   • Dermatitis associated with moisture [L30.8]  Unknown   • Mild cognitive impairment [G31.84]  Yes   • Chronic diastolic CHF (congestive heart failure) (CMS/[facility]) [I50.32]  Yes   • Chronic atrial fibrillation (CMS/[facility]) [I48.20]  Yes   • CKD (chronic kidney disease) stage 3, GFR 30-59 ml/min (CMS/[facility]) [N18.3]  Yes   • Gambling disorder, persistent [F63.0]  Yes   • Coronary artery disease involving native coronary artery of native heart without angina pectoris [I25.10]  Yes   • History of CVA (cerebrovascular accident) [Z86.73]  Not Applicable   • Peripheral polyneuropathy [G62.9]  Yes   • GERD (gastroesophageal reflux disease) [K21.9]  Yes   • Medically noncompliant [Z91.19]  Not Applicable   • Psoriasis [L40.9]  Yes   • DDD (degenerative disc disease), lumbosacral [M51.37]  Yes      Resolved Hospital Problems    Diagnosis Date Resolved POA   • **Atrial fibrillation with rapid ventricular response (CMS/[facility]) [I48.91] 09/12/2020 Yes   • Dyspnea [R06.00] 09/12/2020 Yes   • Uncontrolled hypertension [I10] 09/12/2020 Unknown        Hospital Cours  Patient is a 64 y.o. male presented to ER for SOA,  admitted with Afib with RVR due to non-compliance with meds at home. Also with acute on CKD and CHF. Cardiology was consulted and meds were adjusted with improvement. Nephrology was consulted and kidney function closely monitored and creatinine is close to baseline at OK. He had intermittent confusion with abnormal behaviors and psych was consulted to determine decision making capacity. He was deemed A&O and able to make his own decisions per psych eval. SW and his  [facility] nurse have contacted [facility] due to his poor living conditions and inability to properly care for himself. Apparently he lives with his brother and has other family but no one is agreeable to help/take guardianship. Pt refuses rehab and does not meet criteria for inpt SNF, and has poor insurance coverage. He wants to go back to his brothers home and brother is here at [place] and plans to take him home. Pt also refuses HHC. He will have continued services with [facility]. Given psych has deemed him competent to make his own decisions, and he has limited family willing to help,  there is little that we can do to improve his current living situation or to ensure he gets and takes his med as prescribed. We will try to have close FU with pt in our office and he will also need to see his specialists.     Procedures Performed  Renal US, head CT       Consults:   Consults     Date and Time Order Name Status Description    9/9/2020 1548 Inpatient Psychiatrist Consult Completed     9/2/2020 0859 Inpatient Cardiology Consult Completed     9/1/2020 1940 Inpatient Nephrology Consult Completed     9/1/2020 1722 Inpatient Cardiology Consult      9/1/2020 1512 Family Medicine Consult Completed     8/22/2020 0754 Inpatient Cardiology Consult Completed     8/21/2020 1400 Family Medicine Consult Completed     8/3/2020 0849 Inpatient Psychiatrist Consult Completed     8/1/2020 1335 Inpatient Pulmonology Consult Completed     7/30/2020 1854 Inpatient Cardiology Consult Completed     7/30/2020 1206 Family Medicine Consult Completed           Pertinent Test Results:     Condition on Discharge:  Fair    Vital Signs  Temp:  [97.6 °F (36.4 °C)-98.5 °F (36.9 °C)] 97.6 °F (36.4 °C)  Heart Rate:  [68-88] 68  Resp:  [20] 20  BP: (111-138)/(59-85) 138/85    Physical Exam:     General Appearance:    Alert, cooperative, in no acute distress   Head:    Normocephalic, without obvious abnormality, atraumatic   Eyes:            Lids and lashes normal,  conjunctivae and sclerae normal, no   icterus   Ears:    Ears appear intact with no abnormalities noted   Throat:   No oral lesions, no thrush, oral mucosa moist   Neck:   No adenopathy, supple, trachea midline   Back:     No kyphosis present, no scoliosis present, no skin lesions,      erythema or scars, no tenderness to percussion or                   palpation,   range of motion normal   Lungs:     Clear to auscultation,respirations regular, even and                  unlabored    Heart:    Mildy irregular rhythm and normal rate, normal S1 and S2, no   murmur, no gallop, no rub, no click   Chest Wall:    No abnormalities observed   Abdomen:     Normal bowel sounds, no masses, no organomegaly, soft        non-tender, non-distended, no guarding, no rebound                tenderness   Rectal:     Deferred   Extremities:   Moves all extremities well, mild lower leg edema, no cyanosis, no redness   Pulses:   Pulses palpable and equal bilaterally   Skin:   No bleeding, bruising or rash   Lymph nodes:   No palpable adenopathy   Neurologic:   Cranial nerves 2 - 12 grossly intact, sensation intact, A&O to all but exact date       Discharge Disposition  Home or Self Care    Discharge Medications     Discharge Medications      New Medications      Instructions Start Date   allopurinol 100 MG tablet  Commonly known as: ZYLOPRIM   100 mg, Oral, Daily   Start Date: September 13, 2020     bumetanide 1 MG tablet  Commonly known as: BUMEX   1 mg, Oral, Daily   Start Date: September 13, 2020     clotrimazole-betamethasone 1-0.05 % cream  Commonly known as: LOTRISONE   Topical, Every 12 Hours Scheduled      Metoprolol Succinate 50 MG capsule extended-release 24 hour sprinkle   50 mg, Oral, Daily      sodium bicarbonate 650 MG tablet   650 mg, Oral, 3 Times Daily         Changes to Medications      Instructions Start Date   hydrALAZINE 100 MG tablet  Commonly known as: APRESOLINE  What changed:   · medication strength  · how much to  take   100 mg, Oral, 3 Times Daily      losartan 50 MG tablet  Commonly known as: COZAAR  What changed:   · how much to take  · when to take this   50 mg, Oral, Every 24 Hours Scheduled   Start Date: September 13, 2020     risperiDONE 0.5 MG tablet  Commonly known as: risperDAL  What changed:   · how much to take  · when to take this   0.5 mg, Oral, Every 12 Hours Scheduled      spironolactone 25 MG tablet  Commonly known as: ALDACTONE  What changed: how much to take   12.5 mg, Oral, Daily   Start Date: September 13, 2020        Continue These Medications      Instructions Start Date   aspirin 81 MG EC tablet   81 mg, Oral, Daily      pravastatin 40 MG tablet  Commonly known as: PRAVACHOL   40 mg, Oral, Nightly      rivaroxaban 20 MG tablet  Commonly known as: Xarelto   20 mg, Oral, Daily With Dinner         Stop These Medications    carvedilol 25 MG tablet  Commonly known as: COREG     dilTIAZem  MG 24 hr capsule  Commonly known as: CARDIZEM CD     gabapentin 100 MG capsule  Commonly known as: NEURONTIN     isosorbide mononitrate 10 MG tablet  Commonly known as: ISMO,MONOKET            Discharge Diet:     Activity at Discharge:     Follow-up Appointments  No future appointments.  Additional Instructions for the Follow-ups that You Need to Schedule     Basic Metabolic Panel    Sep 09, 2020 (Approximate)      Ambulatory Referral to Home Health   As directed      Face to Face Visit Date: 9/10/2020    Follow-up provider for Plan of Care?: I will be treating the patient on an ongoing basis.  Please send me the Plan of Care for signature.    Follow-up provider: ALESSANDRO DIALLO [[id number]]    Reason/Clinical Findings: Afib    Describe mobility limitations that make leaving home difficult: Weakness    Nursing/Therapeutic Services Requested: Skilled Nursing ([facility] to eval and treat)    Skilled nursing orders: Other ([facility] to eval and treat)    Frequency: 1 Week 1               Test Results Pending at Discharge  Pending Labs      Order Current Status    BUN In process           Daylin Burris, APRN  09/12/20  13:17 EDT

## 2020-09-13 ENCOUNTER — READMISSION MANAGEMENT (OUTPATIENT)
Dept: CALL CENTER | Facility: HOSPITAL | Age: 65
End: 2020-09-13

## 2020-09-13 NOTE — PROGRESS NOTES
Continued Stay Note  KAE Hameed     Patient Name: Carlos Whipple  MRN: [id number]  Today's Date: 9/13/2020    Admit Date: 9/1/2020    Discharge Plan     Row Name 09/13/20 1814       Plan    Final Discharge Disposition Code  06 - home with home health care            Expected Discharge Date and Time     Expected Discharge Date Expected Discharge Time    Sep 12, 2020             Janeen Milian RN

## 2020-09-13 NOTE — OUTREACH NOTE
Prep Survey      Responses   [facility] facility patient discharged from?  [facility]   Is LACE score < 7 ?  No   Eligibility  Readm Mgmt   Discharge diagnosis  **Atrial fibrillation with rapid ventricular response    Does the patient have one of the following disease processes/diagnoses(primary or secondary)?  Other   Does the patient have Home health ordered?  Yes   Is there a DME ordered?  No   Medication alerts for this patient  Bumex    General alerts for this patient  Afib with RVR due to non-compliance with meds at home   Prep survey completed?  Yes          Mila Luna RN

## 2020-09-16 ENCOUNTER — READMISSION MANAGEMENT (OUTPATIENT)
Dept: CALL CENTER | Facility: HOSPITAL | Age: 65
End: 2020-09-16

## 2020-09-16 NOTE — OUTREACH NOTE
Medical Week 1 Survey      Responses   [facility] patient discharged from?  [facility]   COVID-19 Test Status  Not tested   Does the patient have one of the following disease processes/diagnoses(primary or secondary)?  Other   Is there a successful TCM telephone encounter documented?  No   Week 1 attempt successful?  No   Unsuccessful attempts  Attempt 1          Josh Salas RN

## 2020-09-22 ENCOUNTER — READMISSION MANAGEMENT (OUTPATIENT)
Dept: CALL CENTER | Facility: HOSPITAL | Age: 65
End: 2020-09-22

## 2020-09-22 NOTE — OUTREACH NOTE
Medical Week 1 Survey      Responses   [facility] patient discharged from?  [facility]   COVID-19 Test Status  Not tested   Does the patient have one of the following disease processes/diagnoses(primary or secondary)?  Other   Is there a successful TCM telephone encounter documented?  No   Week 1 attempt successful?  No   Unsuccessful attempts  Attempt 2          Anabella Alberts LPN

## 2020-09-23 ENCOUNTER — TELEPHONE (OUTPATIENT)
Dept: CARDIAC REHAB | Facility: HOSPITAL | Age: 65
End: 2020-09-23

## 2020-09-23 NOTE — TELEPHONE ENCOUNTER
Attempting to call to see if patient interested in cardiac rehab. Rings twice then Reached busy signal.

## 2020-09-24 ENCOUNTER — READMISSION MANAGEMENT (OUTPATIENT)
Dept: CALL CENTER | Facility: HOSPITAL | Age: 65
End: 2020-09-24

## 2020-09-24 NOTE — OUTREACH NOTE
Medical Week 1 Survey      Responses   [facility] [place] patient discharged from?  [facility]   COVID-19 Test Status  Not tested   Does the patient have one of the following disease processes/diagnoses(primary or secondary)?  Other   Is there a successful TCM telephone encounter documented?  No   Week 1 attempt successful?  No   Unsuccessful attempts  Attempt 3          Anabella Alberts LPN

## 2020-09-25 ENCOUNTER — TELEPHONE (OUTPATIENT)
Dept: CARDIAC REHAB | Facility: HOSPITAL | Age: 65
End: 2020-09-25

## 2020-09-30 ENCOUNTER — READMISSION MANAGEMENT (OUTPATIENT)
Dept: CALL CENTER | Facility: HOSPITAL | Age: 65
End: 2020-09-30

## 2020-09-30 NOTE — OUTREACH NOTE
Medical Week 2 Survey      Responses   [facility] patient discharged from?  [facility]   COVID-19 Test Status  Not tested   Does the patient have one of the following disease processes/diagnoses(primary or secondary)?  Other   Week 2 attempt successful?  No   Unsuccessful attempts  Attempt 1          Anabella Alberts LPN

## 2020-10-01 ENCOUNTER — READMISSION MANAGEMENT (OUTPATIENT)
Dept: CALL CENTER | Facility: HOSPITAL | Age: 65
End: 2020-10-01

## 2020-10-01 NOTE — OUTREACH NOTE
Medical Week 2 Survey      Responses   [facility] patient discharged from?  [facility]   Does the patient have one of the following disease processes/diagnoses(primary or secondary)?  Other   Week 2 attempt successful?  No   Unsuccessful attempts  Attempt 2          Anabella Alberts LPN

## 2020-10-04 ENCOUNTER — HOSPITAL ENCOUNTER (INPATIENT)
Facility: HOSPITAL | Age: 65
LOS: 11 days | Discharge: NURSING, REHAB OR LONG-TERM CARE | End: 2020-10-15
Attending: EMERGENCY MEDICINE | Admitting: FAMILY MEDICINE

## 2020-10-04 ENCOUNTER — READMISSION MANAGEMENT (OUTPATIENT)
Dept: CALL CENTER | Facility: HOSPITAL | Age: 65
End: 2020-10-04

## 2020-10-04 ENCOUNTER — APPOINTMENT (OUTPATIENT)
Dept: GENERAL RADIOLOGY | Facility: HOSPITAL | Age: 65
End: 2020-10-04

## 2020-10-04 DIAGNOSIS — I48.91 ATRIAL FIBRILLATION WITH RVR (HCC): ICD-10-CM

## 2020-10-04 DIAGNOSIS — R06.00 DYSPNEA, UNSPECIFIED TYPE: Primary | ICD-10-CM

## 2020-10-04 DIAGNOSIS — I50.9 ACUTE CONGESTIVE HEART FAILURE, UNSPECIFIED HEART FAILURE TYPE (HCC): ICD-10-CM

## 2020-10-04 LAB
ALBUMIN SERPL-MCNC: 3.8 G/DL (ref 3.5–5.2)
ALBUMIN/GLOB SERPL: 1.2 G/DL
ALP SERPL-CCNC: 79 U/L (ref 39–117)
ALT SERPL W P-5'-P-CCNC: 625 U/L (ref 1–41)
ANION GAP SERPL CALCULATED.3IONS-SCNC: 14 MMOL/L (ref 5–15)
ARTERIAL PATENCY WRIST A: ABNORMAL
AST SERPL-CCNC: 433 U/L (ref 1–40)
ATMOSPHERIC PRESS: ABNORMAL MM[HG]
BASE EXCESS BLDA CALC-SCNC: -3.1 MMOL/L (ref 0–3)
BASOPHILS # BLD AUTO: 0.1 10*3/MM3 (ref 0–0.2)
BASOPHILS NFR BLD AUTO: 0.4 % (ref 0–1.5)
BDY SITE: ABNORMAL
BILIRUB SERPL-MCNC: 1.2 MG/DL (ref 0–1.2)
BUN SERPL-MCNC: 33 MG/DL (ref 8–23)
BUN SERPL-MCNC: ABNORMAL MG/DL
BUN/CREAT SERPL: ABNORMAL
CALCIUM SPEC-SCNC: 9 MG/DL (ref 8.6–10.5)
CHLORIDE SERPL-SCNC: 108 MMOL/L (ref 98–107)
CHOLEST SERPL-MCNC: 108 MG/DL (ref 0–200)
CO2 BLDA-SCNC: 19.1 MMOL/L (ref 22–29)
CO2 SERPL-SCNC: 20 MMOL/L (ref 22–29)
CREAT SERPL-MCNC: 1.4 MG/DL (ref 0.76–1.27)
D-LACTATE SERPL-SCNC: 1.6 MMOL/L (ref 0.5–2)
DEPRECATED RDW RBC AUTO: 56.4 FL (ref 37–54)
EOSINOPHIL # BLD AUTO: 0 10*3/MM3 (ref 0–0.4)
EOSINOPHIL NFR BLD AUTO: 0 % (ref 0.3–6.2)
ERYTHROCYTE [DISTWIDTH] IN BLOOD BY AUTOMATED COUNT: 19.3 % (ref 12.3–15.4)
GFR SERPL CREATININE-BSD FRML MDRD: 51 ML/MIN/1.73
GLOBULIN UR ELPH-MCNC: 3.1 GM/DL
GLUCOSE BLDC GLUCOMTR-MCNC: 90 MG/DL (ref 70–105)
GLUCOSE SERPL-MCNC: 134 MG/DL (ref 65–99)
HCO3 BLDA-SCNC: 18.4 MMOL/L (ref 21–28)
HCT VFR BLD AUTO: 40.8 % (ref 37.5–51)
HDLC SERPL-MCNC: 22 MG/DL (ref 40–60)
HEMODILUTION: NO
HGB BLD-MCNC: 13.1 G/DL (ref 13–17.7)
HOLD SPECIMEN: NORMAL
INHALED O2 CONCENTRATION: 21 %
LDLC SERPL CALC-MCNC: 70 MG/DL (ref 0–100)
LDLC/HDLC SERPL: 3.19 {RATIO}
LYMPHOCYTES # BLD AUTO: 1.6 10*3/MM3 (ref 0.7–3.1)
LYMPHOCYTES NFR BLD AUTO: 13.4 % (ref 19.6–45.3)
MAGNESIUM SERPL-MCNC: 2.3 MG/DL (ref 1.6–2.4)
MCH RBC QN AUTO: 27.1 PG (ref 26.6–33)
MCHC RBC AUTO-ENTMCNC: 32.1 G/DL (ref 31.5–35.7)
MCV RBC AUTO: 84.3 FL (ref 79–97)
MODALITY: ABNORMAL
MONOCYTES # BLD AUTO: 1 10*3/MM3 (ref 0.1–0.9)
MONOCYTES NFR BLD AUTO: 8.2 % (ref 5–12)
NEUTROPHILS NFR BLD AUTO: 78 % (ref 42.7–76)
NEUTROPHILS NFR BLD AUTO: 9.2 10*3/MM3 (ref 1.7–7)
NRBC BLD AUTO-RTO: 0.8 /100 WBC (ref 0–0.2)
NT-PROBNP SERPL-MCNC: ABNORMAL PG/ML (ref 0–900)
PCO2 BLDA: 23.3 MM HG (ref 35–48)
PH BLDA: 7.51 PH UNITS (ref 7.35–7.45)
PLATELET # BLD AUTO: 162 10*3/MM3 (ref 140–450)
PMV BLD AUTO: 9.4 FL (ref 6–12)
PO2 BLDA: 70.2 MM HG (ref 83–108)
POTASSIUM SERPL-SCNC: 4.2 MMOL/L (ref 3.5–5.2)
PROT SERPL-MCNC: 6.9 G/DL (ref 6–8.5)
RBC # BLD AUTO: 4.84 10*6/MM3 (ref 4.14–5.8)
SAO2 % BLDCOA: 95.8 % (ref 94–98)
SARS-COV-2 RNA PNL SPEC NAA+PROBE: NOT DETECTED
SODIUM SERPL-SCNC: 142 MMOL/L (ref 136–145)
TRIGL SERPL-MCNC: 79 MG/DL (ref 0–150)
TROPONIN T SERPL-MCNC: 0.01 NG/ML (ref 0–0.03)
TROPONIN T SERPL-MCNC: 0.02 NG/ML (ref 0–0.03)
TSH SERPL DL<=0.05 MIU/L-ACNC: 2.97 UIU/ML (ref 0.27–4.2)
VLDLC SERPL-MCNC: 15.8 MG/DL
WBC # BLD AUTO: 11.8 10*3/MM3 (ref 3.4–10.8)
WHOLE BLOOD HOLD SPECIMEN: NORMAL
WHOLE BLOOD HOLD SPECIMEN: NORMAL

## 2020-10-04 PROCEDURE — 99284 EMERGENCY DEPT VISIT MOD MDM: CPT

## 2020-10-04 PROCEDURE — 36600 WITHDRAWAL OF ARTERIAL BLOOD: CPT

## 2020-10-04 PROCEDURE — 83735 ASSAY OF MAGNESIUM: CPT | Performed by: FAMILY MEDICINE

## 2020-10-04 PROCEDURE — 82803 BLOOD GASES ANY COMBINATION: CPT

## 2020-10-04 PROCEDURE — 93005 ELECTROCARDIOGRAM TRACING: CPT | Performed by: EMERGENCY MEDICINE

## 2020-10-04 PROCEDURE — 84484 ASSAY OF TROPONIN QUANT: CPT | Performed by: EMERGENCY MEDICINE

## 2020-10-04 PROCEDURE — 87076 CULTURE ANAEROBE IDENT EACH: CPT | Performed by: EMERGENCY MEDICINE

## 2020-10-04 PROCEDURE — 71045 X-RAY EXAM CHEST 1 VIEW: CPT

## 2020-10-04 PROCEDURE — 87147 CULTURE TYPE IMMUNOLOGIC: CPT | Performed by: EMERGENCY MEDICINE

## 2020-10-04 PROCEDURE — 25010000002 DIGOXIN PER 500 MCG: Performed by: EMERGENCY MEDICINE

## 2020-10-04 PROCEDURE — 80061 LIPID PANEL: CPT | Performed by: FAMILY MEDICINE

## 2020-10-04 PROCEDURE — 84484 ASSAY OF TROPONIN QUANT: CPT | Performed by: FAMILY MEDICINE

## 2020-10-04 PROCEDURE — 83605 ASSAY OF LACTIC ACID: CPT

## 2020-10-04 PROCEDURE — 87150 DNA/RNA AMPLIFIED PROBE: CPT | Performed by: EMERGENCY MEDICINE

## 2020-10-04 PROCEDURE — 82962 GLUCOSE BLOOD TEST: CPT

## 2020-10-04 PROCEDURE — 87040 BLOOD CULTURE FOR BACTERIA: CPT | Performed by: EMERGENCY MEDICINE

## 2020-10-04 PROCEDURE — 25010000002 LORAZEPAM PER 2 MG: Performed by: FAMILY MEDICINE

## 2020-10-04 PROCEDURE — 93005 ELECTROCARDIOGRAM TRACING: CPT

## 2020-10-04 PROCEDURE — 85025 COMPLETE CBC W/AUTO DIFF WBC: CPT | Performed by: EMERGENCY MEDICINE

## 2020-10-04 PROCEDURE — 80053 COMPREHEN METABOLIC PANEL: CPT | Performed by: EMERGENCY MEDICINE

## 2020-10-04 PROCEDURE — 84443 ASSAY THYROID STIM HORMONE: CPT | Performed by: FAMILY MEDICINE

## 2020-10-04 PROCEDURE — 87635 SARS-COV-2 COVID-19 AMP PRB: CPT | Performed by: EMERGENCY MEDICINE

## 2020-10-04 PROCEDURE — 83880 ASSAY OF NATRIURETIC PEPTIDE: CPT | Performed by: EMERGENCY MEDICINE

## 2020-10-04 PROCEDURE — 25010000002 FUROSEMIDE PER 20 MG: Performed by: FAMILY MEDICINE

## 2020-10-04 RX ORDER — RISPERIDONE 0.25 MG/1
0.5 TABLET ORAL EVERY 12 HOURS SCHEDULED
Status: DISCONTINUED | OUTPATIENT
Start: 2020-10-04 | End: 2020-10-15 | Stop reason: HOSPADM

## 2020-10-04 RX ORDER — HEPARIN SODIUM 5000 [USP'U]/ML
5000 INJECTION, SOLUTION INTRAVENOUS; SUBCUTANEOUS EVERY 8 HOURS SCHEDULED
Status: DISCONTINUED | OUTPATIENT
Start: 2020-10-04 | End: 2020-10-04

## 2020-10-04 RX ORDER — ACETAMINOPHEN 160 MG/5ML
650 SOLUTION ORAL EVERY 4 HOURS PRN
Status: DISCONTINUED | OUTPATIENT
Start: 2020-10-04 | End: 2020-10-15 | Stop reason: HOSPADM

## 2020-10-04 RX ORDER — SPIRONOLACTONE 25 MG/1
12.5 TABLET ORAL DAILY
Status: DISCONTINUED | OUTPATIENT
Start: 2020-10-05 | End: 2020-10-15 | Stop reason: HOSPADM

## 2020-10-04 RX ORDER — ONDANSETRON 4 MG/1
4 TABLET, FILM COATED ORAL EVERY 6 HOURS PRN
Status: DISCONTINUED | OUTPATIENT
Start: 2020-10-04 | End: 2020-10-15 | Stop reason: HOSPADM

## 2020-10-04 RX ORDER — CHOLECALCIFEROL (VITAMIN D3) 125 MCG
5 CAPSULE ORAL NIGHTLY PRN
Status: DISCONTINUED | OUTPATIENT
Start: 2020-10-04 | End: 2020-10-15 | Stop reason: HOSPADM

## 2020-10-04 RX ORDER — ACETAMINOPHEN 650 MG/1
650 SUPPOSITORY RECTAL EVERY 4 HOURS PRN
Status: DISCONTINUED | OUTPATIENT
Start: 2020-10-04 | End: 2020-10-15 | Stop reason: HOSPADM

## 2020-10-04 RX ORDER — ATORVASTATIN CALCIUM 10 MG/1
10 TABLET, FILM COATED ORAL NIGHTLY
Status: DISCONTINUED | OUTPATIENT
Start: 2020-10-04 | End: 2020-10-15 | Stop reason: HOSPADM

## 2020-10-04 RX ORDER — ASPIRIN 81 MG/1
81 TABLET ORAL DAILY
Status: DISCONTINUED | OUTPATIENT
Start: 2020-10-05 | End: 2020-10-15 | Stop reason: HOSPADM

## 2020-10-04 RX ORDER — SODIUM BICARBONATE 650 MG/1
650 TABLET ORAL 3 TIMES DAILY
Status: DISCONTINUED | OUTPATIENT
Start: 2020-10-04 | End: 2020-10-15 | Stop reason: HOSPADM

## 2020-10-04 RX ORDER — LORAZEPAM 2 MG/ML
1 INJECTION INTRAMUSCULAR EVERY 8 HOURS PRN
Status: DISPENSED | OUTPATIENT
Start: 2020-10-04 | End: 2020-10-11

## 2020-10-04 RX ORDER — LOSARTAN POTASSIUM 50 MG/1
50 TABLET ORAL
Status: DISCONTINUED | OUTPATIENT
Start: 2020-10-05 | End: 2020-10-15

## 2020-10-04 RX ORDER — NITROGLYCERIN 0.4 MG/1
0.4 TABLET SUBLINGUAL
Status: DISCONTINUED | OUTPATIENT
Start: 2020-10-04 | End: 2020-10-15 | Stop reason: HOSPADM

## 2020-10-04 RX ORDER — BISACODYL 10 MG
10 SUPPOSITORY, RECTAL RECTAL DAILY PRN
Status: DISCONTINUED | OUTPATIENT
Start: 2020-10-04 | End: 2020-10-15 | Stop reason: HOSPADM

## 2020-10-04 RX ORDER — SODIUM CHLORIDE 0.9 % (FLUSH) 0.9 %
10 SYRINGE (ML) INJECTION AS NEEDED
Status: DISCONTINUED | OUTPATIENT
Start: 2020-10-04 | End: 2020-10-15 | Stop reason: HOSPADM

## 2020-10-04 RX ORDER — FUROSEMIDE 10 MG/ML
40 INJECTION INTRAMUSCULAR; INTRAVENOUS ONCE
Status: COMPLETED | OUTPATIENT
Start: 2020-10-04 | End: 2020-10-04

## 2020-10-04 RX ORDER — BUMETANIDE 1 MG/1
1 TABLET ORAL DAILY
Status: DISCONTINUED | OUTPATIENT
Start: 2020-10-04 | End: 2020-10-04

## 2020-10-04 RX ORDER — ONDANSETRON 2 MG/ML
4 INJECTION INTRAMUSCULAR; INTRAVENOUS EVERY 6 HOURS PRN
Status: DISCONTINUED | OUTPATIENT
Start: 2020-10-04 | End: 2020-10-15 | Stop reason: HOSPADM

## 2020-10-04 RX ORDER — ACETAMINOPHEN 325 MG/1
650 TABLET ORAL EVERY 4 HOURS PRN
Status: DISCONTINUED | OUTPATIENT
Start: 2020-10-04 | End: 2020-10-15 | Stop reason: HOSPADM

## 2020-10-04 RX ORDER — SODIUM CHLORIDE 0.9 % (FLUSH) 0.9 %
10 SYRINGE (ML) INJECTION EVERY 12 HOURS SCHEDULED
Status: DISCONTINUED | OUTPATIENT
Start: 2020-10-04 | End: 2020-10-15 | Stop reason: HOSPADM

## 2020-10-04 RX ORDER — HYDRALAZINE HYDROCHLORIDE 25 MG/1
100 TABLET, FILM COATED ORAL 3 TIMES DAILY
Status: DISCONTINUED | OUTPATIENT
Start: 2020-10-04 | End: 2020-10-05

## 2020-10-04 RX ORDER — CALCIUM CARBONATE 200(500)MG
2 TABLET,CHEWABLE ORAL 2 TIMES DAILY PRN
Status: DISCONTINUED | OUTPATIENT
Start: 2020-10-04 | End: 2020-10-15 | Stop reason: HOSPADM

## 2020-10-04 RX ORDER — DIGOXIN 0.25 MG/ML
500 INJECTION INTRAMUSCULAR; INTRAVENOUS ONCE
Status: COMPLETED | OUTPATIENT
Start: 2020-10-04 | End: 2020-10-04

## 2020-10-04 RX ORDER — ALLOPURINOL 100 MG/1
100 TABLET ORAL DAILY
Status: DISCONTINUED | OUTPATIENT
Start: 2020-10-05 | End: 2020-10-15 | Stop reason: HOSPADM

## 2020-10-04 RX ORDER — HYDROCODONE BITARTRATE AND ACETAMINOPHEN 7.5; 325 MG/1; MG/1
1 TABLET ORAL EVERY 4 HOURS PRN
Status: DISPENSED | OUTPATIENT
Start: 2020-10-04 | End: 2020-10-11

## 2020-10-04 RX ORDER — FUROSEMIDE 10 MG/ML
40 INJECTION INTRAMUSCULAR; INTRAVENOUS EVERY 12 HOURS
Status: COMPLETED | OUTPATIENT
Start: 2020-10-05 | End: 2020-10-06

## 2020-10-04 RX ORDER — METOPROLOL SUCCINATE 50 MG/1
50 TABLET, EXTENDED RELEASE ORAL
Status: DISCONTINUED | OUTPATIENT
Start: 2020-10-05 | End: 2020-10-15 | Stop reason: HOSPADM

## 2020-10-04 RX ADMIN — DIGOXIN 500 MCG: 250 INJECTION, SOLUTION INTRAMUSCULAR; INTRAVENOUS; PARENTERAL at 15:05

## 2020-10-04 RX ADMIN — RISPERIDONE 0.5 MG: 0.25 TABLET ORAL at 22:23

## 2020-10-04 RX ADMIN — Medication 10 ML: at 22:24

## 2020-10-04 RX ADMIN — LORAZEPAM 1 MG: 2 INJECTION INTRAMUSCULAR; INTRAVENOUS at 22:25

## 2020-10-04 RX ADMIN — RIVAROXABAN 20 MG: 20 TABLET, FILM COATED ORAL at 22:24

## 2020-10-04 RX ADMIN — ATORVASTATIN CALCIUM 10 MG: 10 TABLET, FILM COATED ORAL at 22:24

## 2020-10-04 RX ADMIN — SODIUM CHLORIDE 10 MG/HR: 900 INJECTION, SOLUTION INTRAVENOUS at 14:02

## 2020-10-04 RX ADMIN — HYDRALAZINE HYDROCHLORIDE 100 MG: 25 TABLET, FILM COATED ORAL at 22:23

## 2020-10-04 RX ADMIN — SODIUM BICARBONATE 650 MG TABLET 650 MG: at 22:23

## 2020-10-04 RX ADMIN — SODIUM CHLORIDE 5 MG/HR: 900 INJECTION, SOLUTION INTRAVENOUS at 22:21

## 2020-10-04 RX ADMIN — FUROSEMIDE 40 MG: 10 INJECTION, SOLUTION INTRAMUSCULAR; INTRAVENOUS at 17:30

## 2020-10-04 RX ADMIN — SODIUM CHLORIDE 5 MG/HR: 900 INJECTION, SOLUTION INTRAVENOUS at 14:08

## 2020-10-04 NOTE — NURSING NOTE
Patient confused and agitated with staff. Pulled his IV out trying to get out of bed and pulled off heart monitor. Attempting new IV site at this time.

## 2020-10-04 NOTE — ED PROVIDER NOTES
Subjective   Patient is a 65-year-old male brought in by EMS due to shortness of breath weakness and palpitations.  Patient offers no further complaints.  Patient denies cough fever abdominal pain or vomiting.          Review of Systems  Negative for headache earache sore throat cough fever chest pain abdominal pain vomiting diarrhea dysuria gains weight loss or other complaint.  A complete review of systems was obtained and is otherwise negative  Past Medical History:   Diagnosis Date   • A-fib (CMS/[facility])    • CHF (congestive heart failure) (CMS/[facility])    • Chronic atrial fibrillation (CMS/[facility]) 11/8/2019   • Chronic diastolic CHF (congestive heart failure) (CMS/[facility]) 11/8/2019   • CKD (chronic kidney disease) stage 3, GFR 30-59 ml/min (CMS/[facility]) 11/8/2019   • Coronary artery disease involving native coronary artery of native heart without angina pectoris 11/8/2019   • DDD (degenerative disc disease), lumbosacral 11/8/2019   • Essential hypertension 11/8/2019   • Gambling disorder, persistent 11/8/2019   • GERD (gastroesophageal reflux disease) 11/8/2019   • History of CVA (cerebrovascular accident) 11/8/2019   • Hyperlipidemia    • Hypertension    • Medically noncompliant 11/8/2019   • Peripheral polyneuropathy 11/8/2019   • Psoriasis 11/8/2019   • Stroke (CMS/[facility])        Allergies   Allergen Reactions   • Ketorolac Tromethamine Anaphylaxis     unknown       Past Surgical History:   Procedure Laterality Date   • CARDIAC CATHETERIZATION     • CARDIAC CATHETERIZATION N/A 12/10/2019    Procedure: Left Heart Cath and coronary angiogram;  Surgeon: Edy Thomas MD;  Location: [facility] CATH INVASIVE LOCATION;  Service: Cardiovascular       Family History   Problem Relation Age of Onset   • Heart disease Mother        Social History     Socioeconomic History   • Marital status: Single     Spouse name: Not on file   • Number of children: Not on file   • Years of education: Not on file   • Highest education level: Not on file    Tobacco Use   • Smoking status: Never Smoker   • Smokeless tobacco: Never Used   Substance and Sexual Activity   • Alcohol use: No     Frequency: Never   • Drug use: No   • Sexual activity: Not Currently   Social History Narrative    Gambling addiction           Objective   Physical Exam  HEENT exam shows TMs to be clear.  Oropharynx comers but sclerae nonicteric.  Neck has no adenopathy JVD or bruits.  Lungs are clear.  Heart has an irregular rate rhythm is tachycardic without murmur gallop.  Chest is nontender.  Abdomen is soft nontender.  Extremity exam is no cyanosis or edema.  Procedures     My EKG interpretation shows atrial fibrillation with RVR.  There is no acute ST change.      ED Course      Results for orders placed or performed during the hospital encounter of 10/04/20   COVID-19,[facility] [employer] IN-HOUSE,Nasal Swab No Transport Media 3-4 HR TAT - Swab, Nasal Cavity    Specimen: Nasal Cavity; Swab   Result Value Ref Range    COVID19 Not Detected Not Detected - Ref. Range   Comprehensive Metabolic Panel    Specimen: Blood   Result Value Ref Range    Glucose 134 (H) 65 - 99 mg/dL    BUN      Creatinine 1.40 (H) 0.76 - 1.27 mg/dL    Sodium 142 136 - 145 mmol/L    Potassium 4.2 3.5 - 5.2 mmol/L    Chloride 108 (H) 98 - 107 mmol/L    CO2 20.0 (L) 22.0 - 29.0 mmol/L    Calcium 9.0 8.6 - 10.5 mg/dL    Total Protein 6.9 6.0 - 8.5 g/dL    Albumin 3.80 3.50 - 5.20 g/dL    ALT (SGPT) 625 (H) 1 - 41 U/L    AST (SGOT) 433 (H) 1 - 40 U/L    Alkaline Phosphatase 79 39 - 117 U/L    Total Bilirubin 1.2 0.0 - 1.2 mg/dL    eGFR Non African Amer 51 (L) >60 mL/min/1.73    Globulin 3.1 gm/dL    A/G Ratio 1.2 g/dL    BUN/Creatinine Ratio      Anion Gap 14.0 5.0 - 15.0 mmol/L   CBC Auto Differential    Specimen: Blood   Result Value Ref Range    WBC 11.80 (H) 3.40 - 10.80 10*3/mm3    RBC 4.84 4.14 - 5.80 10*6/mm3    Hemoglobin 13.1 13.0 - 17.7 g/dL    Hematocrit 40.8 37.5 - 51.0 %    MCV 84.3 79.0 - 97.0 fL    MCH 27.1 26.6 - 33.0  pg    MCHC 32.1 31.5 - 35.7 g/dL    RDW 19.3 (H) 12.3 - 15.4 %    RDW-SD 56.4 (H) 37.0 - 54.0 fl    MPV 9.4 6.0 - 12.0 fL    Platelets 162 140 - 450 10*3/mm3    Neutrophil % 78.0 (H) 42.7 - 76.0 %    Lymphocyte % 13.4 (L) 19.6 - 45.3 %    Monocyte % 8.2 5.0 - 12.0 %    Eosinophil % 0.0 (L) 0.3 - 6.2 %    Basophil % 0.4 0.0 - 1.5 %    Neutrophils, Absolute 9.20 (H) 1.70 - 7.00 10*3/mm3    Lymphocytes, Absolute 1.60 0.70 - 3.10 10*3/mm3    Monocytes, Absolute 1.00 (H) 0.10 - 0.90 10*3/mm3    Eosinophils, Absolute 0.00 0.00 - 0.40 10*3/mm3    Basophils, Absolute 0.10 0.00 - 0.20 10*3/mm3    nRBC 0.8 (H) 0.0 - 0.2 /100 WBC   Blood Gas, Arterial    Specimen: Arterial Blood   Result Value Ref Range    Site Right Brachial     Bj's Test N/A     pH, Arterial 7.506 (H) 7.350 - 7.450 pH units    pCO2, Arterial 23.3 (L) 35.0 - 48.0 mm Hg    pO2, Arterial 70.2 (L) 83.0 - 108.0 mm Hg    HCO3, Arterial 18.4 (L) 21.0 - 28.0 mmol/L    Base Excess, Arterial -3.1 (L) 0.0 - 3.0 mmol/L    O2 Saturation, Arterial 95.8 94.0 - 98.0 %    CO2 Content 19.1 (L) 22 - 29 mmol/L    Barometric Pressure for Blood Gas      Modality Room Air     FIO2 21 %    Hemodilution No    BUN    Specimen: Blood   Result Value Ref Range    BUN 33 (H) 8 - 23 mg/dL   POC Lactate    Specimen: Blood   Result Value Ref Range    Lactate 1.6 0.5 - 2.0 mmol/L   Light Blue Top   Result Value Ref Range    Extra Tube hold for add-on    Green Top (Gel)   Result Value Ref Range    Extra Tube Hold for add-ons.    Lavender Top   Result Value Ref Range    Extra Tube hold for add-on      Xr Chest 1 View    Result Date: 10/4/2020   Marked cardiomegaly and pulmonary vascular congestion similar prior studies.  No focal airspace consolidation.   Electronically Signed By-Elias Rascon On:10/4/2020 2:46 PM This report was finalized on [id number] by  Elias Rascon, .                                           MDM  Number of Diagnoses or Management Options  Diagnosis management  comments: Patient has 5 sisters with A. fib with RVR.  There is no evidence acute infectious process including COVID or pneumonia.  There is no metabolic abnormalities.  Patient was placed on Cardizem with rate control to 110.  Patient will be admitted for further rate control and cardiac evaluation.  I did speak the on-call physician.       Amount and/or Complexity of Data Reviewed  Clinical lab tests: reviewed  Tests in the radiology section of CPT®: reviewed  Tests in the medicine section of CPT®: reviewed    Risk of Complications, Morbidity, and/or Mortality  Presenting problems: high  Diagnostic procedures: high  Management options: high    Critical Care  Total time providing critical care: 30-74 minutes      Final diagnoses:   Dyspnea, unspecified type   Atrial fibrillation with RVR (CMS/[facility])            Mandeep Ohara MD  10/04/20 7908

## 2020-10-04 NOTE — PLAN OF CARE
Problem: Fall Injury Risk  Goal: Absence of Fall and Fall-Related Injury  Outcome: Ongoing, Progressing     Problem: Adult Inpatient Plan of Care  Goal: Plan of Care Review  Outcome: Ongoing, Progressing  Goal: Patient-Specific Goal (Individualized)  Outcome: Ongoing, Progressing  Goal: Absence of Hospital-Acquired Illness or Injury  Outcome: Ongoing, Progressing  Goal: Optimal Comfort and Wellbeing  Outcome: Ongoing, Progressing  Goal: Readiness for Transition of Care  Outcome: Ongoing, Progressing  Intervention: Mutually Develop Transition Plan  Recent Flowsheet Documentation  Taken 10/4/2020 1618 by Jeanine Lorenzo, RN  Transportation Anticipated: family or friend will provide  Patient/Family Anticipated Services at Transition:   Patient/Family Anticipates Transition to: home  Taken 10/4/2020 1615 by Jeanine Lorenzo, RN  Equipment Currently Used at Home: walker, standard   Goal Outcome Evaluation:

## 2020-10-05 LAB
ALBUMIN SERPL-MCNC: 3.5 G/DL (ref 3.5–5.2)
ALBUMIN/GLOB SERPL: 1.2 G/DL
ALP SERPL-CCNC: 71 U/L (ref 39–117)
ALT SERPL W P-5'-P-CCNC: 453 U/L (ref 1–41)
ANION GAP SERPL CALCULATED.3IONS-SCNC: 14 MMOL/L (ref 5–15)
AST SERPL-CCNC: 175 U/L (ref 1–40)
BACTERIA BLD CULT: ABNORMAL
BASOPHILS # BLD AUTO: 0.1 10*3/MM3 (ref 0–0.2)
BASOPHILS NFR BLD AUTO: 1.3 % (ref 0–1.5)
BILIRUB SERPL-MCNC: 1.1 MG/DL (ref 0–1.2)
BILIRUB UR QL STRIP: NEGATIVE
BOTTLE TYPE: ABNORMAL
BUN SERPL-MCNC: 32 MG/DL (ref 8–23)
BUN SERPL-MCNC: ABNORMAL MG/DL
BUN/CREAT SERPL: ABNORMAL
CALCIUM SPEC-SCNC: 8.6 MG/DL (ref 8.6–10.5)
CHLORIDE SERPL-SCNC: 109 MMOL/L (ref 98–107)
CLARITY UR: CLEAR
CO2 SERPL-SCNC: 22 MMOL/L (ref 22–29)
COLOR UR: YELLOW
CREAT SERPL-MCNC: 1.2 MG/DL (ref 0.76–1.27)
DEPRECATED RDW RBC AUTO: 56.4 FL (ref 37–54)
EOSINOPHIL # BLD AUTO: 0.1 10*3/MM3 (ref 0–0.4)
EOSINOPHIL NFR BLD AUTO: 1 % (ref 0.3–6.2)
ERYTHROCYTE [DISTWIDTH] IN BLOOD BY AUTOMATED COUNT: 19.5 % (ref 12.3–15.4)
GFR SERPL CREATININE-BSD FRML MDRD: 61 ML/MIN/1.73
GLOBULIN UR ELPH-MCNC: 2.9 GM/DL
GLUCOSE SERPL-MCNC: 92 MG/DL (ref 65–99)
GLUCOSE UR STRIP-MCNC: NEGATIVE MG/DL
HAV IGM SERPL QL IA: NORMAL
HBV CORE IGM SERPL QL IA: NORMAL
HBV SURFACE AG SERPL QL IA: NORMAL
HCT VFR BLD AUTO: 38.9 % (ref 37.5–51)
HCV AB SER DONR QL: NORMAL
HGB BLD-MCNC: 12.6 G/DL (ref 13–17.7)
HGB UR QL STRIP.AUTO: NEGATIVE
KETONES UR QL STRIP: NEGATIVE
LEUKOCYTE ESTERASE UR QL STRIP.AUTO: NEGATIVE
LYMPHOCYTES # BLD AUTO: 1.3 10*3/MM3 (ref 0.7–3.1)
LYMPHOCYTES NFR BLD AUTO: 14.1 % (ref 19.6–45.3)
MAGNESIUM SERPL-MCNC: 1.9 MG/DL (ref 1.6–2.4)
MCH RBC QN AUTO: 27 PG (ref 26.6–33)
MCHC RBC AUTO-ENTMCNC: 32.3 G/DL (ref 31.5–35.7)
MCV RBC AUTO: 83.7 FL (ref 79–97)
MONOCYTES # BLD AUTO: 0.8 10*3/MM3 (ref 0.1–0.9)
MONOCYTES NFR BLD AUTO: 8.9 % (ref 5–12)
NEUTROPHILS NFR BLD AUTO: 6.9 10*3/MM3 (ref 1.7–7)
NEUTROPHILS NFR BLD AUTO: 74.7 % (ref 42.7–76)
NITRITE UR QL STRIP: NEGATIVE
NRBC BLD AUTO-RTO: 0.3 /100 WBC (ref 0–0.2)
NT-PROBNP SERPL-MCNC: 8350 PG/ML (ref 0–900)
PH UR STRIP.AUTO: <=5 [PH] (ref 5–8)
PLATELET # BLD AUTO: 144 10*3/MM3 (ref 140–450)
PMV BLD AUTO: 8.6 FL (ref 6–12)
POTASSIUM SERPL-SCNC: 3.2 MMOL/L (ref 3.5–5.2)
PROT SERPL-MCNC: 6.4 G/DL (ref 6–8.5)
PROT UR QL STRIP: NEGATIVE
RBC # BLD AUTO: 4.65 10*6/MM3 (ref 4.14–5.8)
SODIUM SERPL-SCNC: 145 MMOL/L (ref 136–145)
SP GR UR STRIP: 1.01 (ref 1–1.03)
TROPONIN T SERPL-MCNC: <0.01 NG/ML (ref 0–0.03)
UROBILINOGEN UR QL STRIP: NORMAL
WBC # BLD AUTO: 9.3 10*3/MM3 (ref 3.4–10.8)

## 2020-10-05 PROCEDURE — 83880 ASSAY OF NATRIURETIC PEPTIDE: CPT | Performed by: NURSE PRACTITIONER

## 2020-10-05 PROCEDURE — 93010 ELECTROCARDIOGRAM REPORT: CPT | Performed by: INTERNAL MEDICINE

## 2020-10-05 PROCEDURE — 80053 COMPREHEN METABOLIC PANEL: CPT | Performed by: FAMILY MEDICINE

## 2020-10-05 PROCEDURE — 83735 ASSAY OF MAGNESIUM: CPT | Performed by: FAMILY MEDICINE

## 2020-10-05 PROCEDURE — 80074 ACUTE HEPATITIS PANEL: CPT | Performed by: NURSE PRACTITIONER

## 2020-10-05 PROCEDURE — 99222 1ST HOSP IP/OBS MODERATE 55: CPT | Performed by: INTERNAL MEDICINE

## 2020-10-05 PROCEDURE — 81003 URINALYSIS AUTO W/O SCOPE: CPT | Performed by: NURSE PRACTITIONER

## 2020-10-05 PROCEDURE — 25010000002 FUROSEMIDE PER 20 MG: Performed by: FAMILY MEDICINE

## 2020-10-05 PROCEDURE — 93005 ELECTROCARDIOGRAM TRACING: CPT | Performed by: FAMILY MEDICINE

## 2020-10-05 PROCEDURE — 84484 ASSAY OF TROPONIN QUANT: CPT | Performed by: NURSE PRACTITIONER

## 2020-10-05 PROCEDURE — 97166 OT EVAL MOD COMPLEX 45 MIN: CPT

## 2020-10-05 PROCEDURE — 97162 PT EVAL MOD COMPLEX 30 MIN: CPT

## 2020-10-05 PROCEDURE — 85025 COMPLETE CBC W/AUTO DIFF WBC: CPT | Performed by: FAMILY MEDICINE

## 2020-10-05 PROCEDURE — 84145 PROCALCITONIN (PCT): CPT | Performed by: FAMILY MEDICINE

## 2020-10-05 RX ORDER — POTASSIUM CHLORIDE 1.5 G/1.77G
40 POWDER, FOR SOLUTION ORAL AS NEEDED
Status: DISCONTINUED | OUTPATIENT
Start: 2020-10-05 | End: 2020-10-15 | Stop reason: HOSPADM

## 2020-10-05 RX ORDER — POTASSIUM CHLORIDE 7.45 MG/ML
10 INJECTION INTRAVENOUS
Status: DISCONTINUED | OUTPATIENT
Start: 2020-10-05 | End: 2020-10-15 | Stop reason: HOSPADM

## 2020-10-05 RX ORDER — POTASSIUM CHLORIDE 20 MEQ/1
40 TABLET, EXTENDED RELEASE ORAL AS NEEDED
Status: DISCONTINUED | OUTPATIENT
Start: 2020-10-05 | End: 2020-10-15 | Stop reason: HOSPADM

## 2020-10-05 RX ORDER — HYDRALAZINE HYDROCHLORIDE 25 MG/1
50 TABLET, FILM COATED ORAL 3 TIMES DAILY
Status: DISCONTINUED | OUTPATIENT
Start: 2020-10-05 | End: 2020-10-15 | Stop reason: HOSPADM

## 2020-10-05 RX ORDER — DILTIAZEM HYDROCHLORIDE 180 MG/1
180 CAPSULE, COATED, EXTENDED RELEASE ORAL
Status: DISCONTINUED | OUTPATIENT
Start: 2020-10-05 | End: 2020-10-15 | Stop reason: HOSPADM

## 2020-10-05 RX ADMIN — FUROSEMIDE 40 MG: 10 INJECTION, SOLUTION INTRAMUSCULAR; INTRAVENOUS at 18:03

## 2020-10-05 RX ADMIN — Medication 10 ML: at 20:28

## 2020-10-05 RX ADMIN — SODIUM BICARBONATE 650 MG TABLET 650 MG: at 15:36

## 2020-10-05 RX ADMIN — DILTIAZEM HYDROCHLORIDE 180 MG: 180 CAPSULE, COATED, EXTENDED RELEASE ORAL at 10:54

## 2020-10-05 RX ADMIN — SPIRONOLACTONE 12.5 MG: 25 TABLET, FILM COATED ORAL at 08:16

## 2020-10-05 RX ADMIN — POTASSIUM CHLORIDE 40 MEQ: 1500 TABLET, EXTENDED RELEASE ORAL at 15:36

## 2020-10-05 RX ADMIN — FUROSEMIDE 40 MG: 10 INJECTION, SOLUTION INTRAMUSCULAR; INTRAVENOUS at 05:58

## 2020-10-05 RX ADMIN — ATORVASTATIN CALCIUM 10 MG: 10 TABLET, FILM COATED ORAL at 20:28

## 2020-10-05 RX ADMIN — POTASSIUM CHLORIDE 40 MEQ: 1500 TABLET, EXTENDED RELEASE ORAL at 10:54

## 2020-10-05 RX ADMIN — LOSARTAN POTASSIUM 50 MG: 50 TABLET, FILM COATED ORAL at 08:19

## 2020-10-05 RX ADMIN — RIVAROXABAN 20 MG: 20 TABLET, FILM COATED ORAL at 18:03

## 2020-10-05 RX ADMIN — HYDRALAZINE HYDROCHLORIDE 50 MG: 25 TABLET, FILM COATED ORAL at 20:37

## 2020-10-05 RX ADMIN — SODIUM CHLORIDE 10 MG/HR: 900 INJECTION, SOLUTION INTRAVENOUS at 01:17

## 2020-10-05 RX ADMIN — RISPERIDONE 0.5 MG: 0.25 TABLET ORAL at 20:28

## 2020-10-05 RX ADMIN — SODIUM BICARBONATE 650 MG TABLET 650 MG: at 20:28

## 2020-10-05 RX ADMIN — Medication 10 ML: at 08:16

## 2020-10-05 RX ADMIN — HYDROCODONE BITARTRATE AND ACETAMINOPHEN 1 TABLET: 7.5; 325 TABLET ORAL at 18:03

## 2020-10-05 RX ADMIN — HYDRALAZINE HYDROCHLORIDE 100 MG: 25 TABLET, FILM COATED ORAL at 08:16

## 2020-10-05 RX ADMIN — ALLOPURINOL 100 MG: 100 TABLET ORAL at 08:17

## 2020-10-05 RX ADMIN — SODIUM BICARBONATE 650 MG TABLET 650 MG: at 08:16

## 2020-10-05 RX ADMIN — RISPERIDONE 0.5 MG: 0.25 TABLET ORAL at 08:17

## 2020-10-05 RX ADMIN — ASPIRIN 81 MG: 81 TABLET, COATED ORAL at 08:17

## 2020-10-05 RX ADMIN — METOPROLOL SUCCINATE 50 MG: 50 TABLET, EXTENDED RELEASE ORAL at 08:17

## 2020-10-05 RX ADMIN — SODIUM CHLORIDE 5 MG/HR: 900 INJECTION, SOLUTION INTRAVENOUS at 16:37

## 2020-10-05 NOTE — OUTREACH NOTE
Medical Week 3 Survey      Responses   [facility] patient discharged from?  [facility]   Does the patient have one of the following disease processes/diagnoses(primary or secondary)?  Other   Week 3 attempt successful?  No   Revoke  Readmitted          Gianna Bess RN

## 2020-10-05 NOTE — PLAN OF CARE
Problem: Adult Inpatient Plan of Care  Goal: Plan of Care Review  Recent Flowsheet Documentation  Taken 10/5/2020 1025 by Mena Menjivar OT  Progress: no change  Plan of Care Reviewed With:   patient   sibling  Outcome Summary: pt demonstrates confusion, incontinence,  weakness, decreased ROM in shld & trunk, balance & coordination deficits. Has been living w/ brother for a year as his home w/ siblings living in a detached garage has no working heat/cooling system. 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 Pt is impulsive & unsafe. Needed (D) (A) for LBD & toileting & min (A) for bed & functional mobility. OT recommends IP rehab at d/c. PPE worn: mask, gloves, safety glasses.

## 2020-10-05 NOTE — PLAN OF CARE
Problem: Adult Inpatient Plan of Care  Goal: Plan of Care Review  Flowsheets (Taken 10/5/2020 0601)  Plan of Care Reviewed With: patient  Outcome Summary: 66 yo M admit with shortness of air and heart palpitations. Pt in wrist restraints when PT enters room. Patient with generalized confusion and requires extra time and cues for orientation questions. Pt lives with brother and reportedly ambulates without use of AD though has access to cane at home. Currently requires Janell for bed mobility for supine to sit. Pt impulsive during treatment and requires cues for safety. Patient ambulates with HHA and CGA. Patient with balance deficits increasing risk of falls.Patient high falls risk and will require IP rehab at d/c for improved functional mobility. PPE: mask with shield, gloves

## 2020-10-05 NOTE — NURSING NOTE
Chart reviewed orders and photos noted patient history of psoriasis will defer for now no recommendations

## 2020-10-05 NOTE — PLAN OF CARE
Problem: Fall Injury Risk  Goal: Absence of Fall and Fall-Related Injury  Outcome: Ongoing, Progressing  Intervention: Identify and Manage Contributors to Fall Injury Risk  Recent Flowsheet Documentation  Taken 10/5/2020 0030 by Jessica Allison RN  Medication Review/Management: medications reviewed  Intervention: Promote Injury-Free Environment  Recent Flowsheet Documentation  Taken 10/5/2020 0350 by Jessica Allison RN  Safety Promotion/Fall Prevention:   safety round/check completed   fall prevention program maintained   clutter free environment maintained   assistive device/personal items within reach   activity supervised  Taken 10/5/2020 0030 by Jessica Allison RN  Safety Promotion/Fall Prevention:   safety round/check completed   nonskid shoes/slippers when out of bed   fall prevention program maintained   clutter free environment maintained   assistive device/personal items within reach  Taken 10/4/2020 2030 by Jessica Allison RN  Safety Promotion/Fall Prevention:   safety round/check completed   nonskid shoes/slippers when out of bed   fall prevention program maintained   elopement precautions   clutter free environment maintained   assistive device/personal items within reach   activity supervised     Problem: Adult Inpatient Plan of Care  Goal: Plan of Care Review  Outcome: Ongoing, Progressing  Goal: Patient-Specific Goal (Individualized)  Outcome: Ongoing, Progressing  Goal: Absence of Hospital-Acquired Illness or Injury  Outcome: Ongoing, Progressing  Intervention: Identify and Manage Fall Risk  Recent Flowsheet Documentation  Taken 10/5/2020 0350 by Jessica Allison RN  Safety Promotion/Fall Prevention:   safety round/check completed   fall prevention program maintained   clutter free environment maintained   assistive device/personal items within reach   activity supervised  Taken 10/5/2020 0030 by Jessica Allison RN  Safety Promotion/Fall Prevention:   safety round/check completed   nonskid shoes/slippers when out of bed    fall prevention program maintained   clutter free environment maintained   assistive device/personal items within reach  Taken 10/4/2020 2030 by Jessica Allison RN  Safety Promotion/Fall Prevention:   safety round/check completed   nonskid shoes/slippers when out of bed   fall prevention program maintained   elopement precautions   clutter free environment maintained   assistive device/personal items within reach   activity supervised  Intervention: Prevent Skin Injury  Recent Flowsheet Documentation  Taken 10/5/2020 0350 by Jessica Allison RN  Body Position: tilted, left  Taken 10/5/2020 0030 by Jessica Allison RN  Body Position: supine  Intervention: Prevent Infection  Recent Flowsheet Documentation  Taken 10/5/2020 0350 by Jessica Allison RN  Infection Prevention:   personal protective equipment utilized   single patient room provided   visitors restricted/screened  Taken 10/4/2020 2030 by Jessica Allison RN  Infection Prevention:   visitors restricted/screened   single patient room provided   rest/sleep promoted   personal protective equipment utilized  Goal: Optimal Comfort and Wellbeing  Outcome: Ongoing, Progressing  Intervention: Provide Person-Centered Care  Recent Flowsheet Documentation  Taken 10/4/2020 2030 by Jessica Allison RN  Trust Relationship/Rapport:   care explained   choices provided   questions answered   questions encouraged   thoughts/feelings acknowledged  Goal: Readiness for Transition of Care  Outcome: Ongoing, Progressing     Problem: Restraint, Nonbehavioral (Nonviolent)  Goal: Discontinuation Criteria Achieved  Outcome: Ongoing, Progressing  Goal: Personal Dignity and Safety Maintained  Outcome: Ongoing, Progressing  Intervention: Protect Dignity, Rights, and Personal Wellbeing  Recent Flowsheet Documentation  Taken 10/4/2020 2030 by Jessica Allison RN  Trust Relationship/Rapport:   care explained   choices provided   questions answered   questions encouraged   thoughts/feelings acknowledged  Intervention: Protect  Skin and Joint Integrity  Recent Flowsheet Documentation  Taken 10/5/2020 0350 by Jessica Allison, RN  Body Position: tilted, left  Taken 10/5/2020 0030 by Jessica Allison, RN  Body Position: supine   Goal Outcome Evaluation:

## 2020-10-05 NOTE — NURSING NOTE
Pt remains A&Ox2-3 w/ intermittent confusion, patient seems to becoming more angry the later it get's in the evening.  RN will cont to monitor.

## 2020-10-05 NOTE — CONSULTS
Referring Provider: Dr. Stauffer  Reason for Consultation: Atrial fibrillation    Patient Care Team:  Marisol Larson APRN as PCP - General  Shwetha Barber MD as Consulting Physician (Nephrology)  Edy Thomas MD as Consulting Physician (Cardiology)    Chief complaint shortness of breath and palpitations    Subjective .     History of present illness:  Carlos Whipple is a 65 y.o. male with history of coronary disease atrial fibrillation congestive heart failure chronic renal insufficiency hypertension hyperlipidemia and multiple medical problems including medical noncompliance presented to the hospital complains of shortness of breath and atrial fibrillation and palpitations.  Patient was seen in the ER was noted to have atrial fibrillation with rapid response and is admitted to the hospital.  No complains of any chest pain.  No dizziness syncope or swelling the feet.  Patient is not sure about what medications he is taking.  Patient in the past has been admitted several times with noncompliance and has been told multiple times to take his medicines especially his anticoagulation because of the risk of stroke.    Review of Systems   Constitution: Negative for fever and malaise/fatigue.   HENT: Negative for ear pain and nosebleeds.    Eyes: Negative for blurred vision and double vision.   Cardiovascular: Positive for palpitations. Negative for chest pain and dyspnea on exertion.   Respiratory: Positive for shortness of breath. Negative for cough.    Skin: Negative for rash.   Musculoskeletal: Negative for joint pain.   Gastrointestinal: Negative for abdominal pain, nausea and vomiting.   Neurological: Negative for focal weakness and headaches.   Psychiatric/Behavioral: Negative for depression. The patient is not nervous/anxious.    All other systems reviewed and are negative.      History  Past Medical History:   Diagnosis Date   • A-fib (CMS/[facility])    • Atrial fibrillation with RVR (CMS/H[facility])    • CHF  (congestive heart failure) (CMS/[facility])    • Chronic atrial fibrillation (CMS/[facility]) 11/8/2019   • Chronic diastolic CHF (congestive heart failure) (CMS/[facility]) 11/8/2019   • CKD (chronic kidney disease) stage 3, GFR 30-59 ml/min 11/8/2019   • Coronary artery disease involving native coronary artery of native heart without angina pectoris 11/8/2019   • DDD (degenerative disc disease), lumbosacral 11/8/2019   • Essential hypertension 11/8/2019   • Gambling disorder, persistent 11/8/2019   • GERD (gastroesophageal reflux disease) 11/8/2019   • History of CVA (cerebrovascular accident) 11/8/2019   • Hyperlipidemia    • Hypertension    • Medically noncompliant 11/8/2019   • Peripheral polyneuropathy 11/8/2019   • Psoriasis 11/8/2019   • Stroke (CMS/[facility])        Past Surgical History:   Procedure Laterality Date   • CARDIAC CATHETERIZATION     • CARDIAC CATHETERIZATION N/A 12/10/2019    Procedure: Left Heart Cath and coronary angiogram;  Surgeon: Edy Thomas MD;  Location: [facility] CATH INVASIVE LOCATION;  Service: Cardiovascular       Family History   Problem Relation Age of Onset   • Heart disease Mother        Social History     Tobacco Use   • Smoking status: Never Smoker   • Smokeless tobacco: Never Used   Substance Use Topics   • Alcohol use: No     Frequency: Never   • Drug use: No        Medications Prior to Admission   Medication Sig Dispense Refill Last Dose   • aspirin 81 MG EC tablet Take 81 mg by mouth Daily.      • losartan (COZAAR) 50 MG tablet Take 1 tablet by mouth Daily. 30 tablet 1    • allopurinol (ZYLOPRIM) 100 MG tablet Take 1 tablet by mouth Daily. 30 tablet 1    • bumetanide (BUMEX) 1 MG tablet Take 1 tablet by mouth Daily. 30 tablet 1    • hydrALAZINE (APRESOLINE) 100 MG tablet Take 1 tablet by mouth 3 (Three) Times a Day. 90 tablet 1    • Metoprolol Succinate 50 MG capsule extended-release 24 hour sprinkle Take 50 mg by mouth Daily. 30 capsule 1    • pravastatin (PRAVACHOL) 40 MG tablet Take 40 mg by  "mouth Every Night.      • risperiDONE (risperDAL) 0.5 MG tablet Take 1 tablet by mouth Every 12 (Twelve) Hours. 60 tablet 1    • rivaroxaban (Xarelto) 20 MG tablet Take 1 tablet by mouth Daily With Dinner for 30 days. 30 tablet 5    • sodium bicarbonate 650 MG tablet Take 1 tablet by mouth 3 (Three) Times a Day. 90 tablet 1    • spironolactone (ALDACTONE) 25 MG tablet Take 0.5 tablets by mouth Daily. 15 tablet 1          Ketorolac tromethamine    Scheduled Meds:allopurinol, 100 mg, Oral, Daily  aspirin, 81 mg, Oral, Daily  atorvastatin, 10 mg, Oral, Nightly  dilTIAZem CD, 180 mg, Oral, Q24H  furosemide, 40 mg, Intravenous, Q12H  hydrALAZINE, 50 mg, Oral, TID  losartan, 50 mg, Oral, Q24H  metoprolol succinate XL, 50 mg, Oral, Q24H  risperiDONE, 0.5 mg, Oral, Q12H  rivaroxaban, 20 mg, Oral, Daily With Dinner  sodium bicarbonate, 650 mg, Oral, TID  sodium chloride, 10 mL, Intravenous, Q12H  spironolactone, 12.5 mg, Oral, Daily      Continuous Infusions:dilTIAZem, 5-15 mg/hr, Last Rate: 5 mg/hr (10/05/20 1637)      PRN Meds:.•  acetaminophen **OR** acetaminophen **OR** acetaminophen  •  bisacodyl  •  calcium carbonate  •  HYDROcodone-acetaminophen  •  influenza vaccine  •  LORazepam  •  magnesium hydroxide  •  melatonin  •  nitroglycerin  •  ondansetron **OR** ondansetron  •  potassium chloride **OR** potassium chloride **OR** potassium chloride  •  sodium chloride  •  sodium chloride    Objective     VITAL SIGNS  Vitals:    10/05/20 1307 10/05/20 1400 10/05/20 1500 10/05/20 1600   BP: 144/78 139/83 122/87 142/90   BP Location:       Patient Position:       Pulse: 92 99 87 92   Resp:       Temp:       TempSrc:       SpO2: 93% 93%     Weight:       Height:           Flowsheet Rows      First Filed Value   Admission Height  182.9 cm (72\") Documented at 10/04/2020 1256   Admission Weight  118 kg (259 lb 11.2 oz) Documented at 10/04/2020 1256           TELEMETRY: ATRIAL fibrillation with rapid response    Physical " Exam:  Constitutional:       Appearance: Well-developed.   Eyes:      General: No scleral icterus.     Conjunctiva/sclera: Conjunctivae normal.      Pupils: Pupils are equal, round, and reactive to light.   HENT:      Head: Normocephalic and atraumatic.   Neck:      Musculoskeletal: Normal range of motion and neck supple.      Vascular: No carotid bruit or JVD.   Pulmonary:      Effort: Pulmonary effort is normal.      Breath sounds: Normal breath sounds. No wheezing. No rales.   Cardiovascular:      Normal rate. Irregularly irregular rhythm.   Pulses:     Intact distal pulses.   Abdominal:      General: Bowel sounds are normal.      Palpations: Abdomen is soft.   Musculoskeletal: Normal range of motion.   Skin:     General: Skin is warm and dry.      Findings: No rash.   Neurological:      Mental Status: Alert.      Comments: No focal deficits          Results Review:   I reviewed the patient's new clinical results.  Lab Results (last 24 hours)     Procedure Component Value Units Date/Time    Urinalysis With Culture If Indicated - Urine, Clean Catch [[id number]]  (Normal) Collected: 10/05/20 1639    Specimen: Urine, Clean Catch Updated: 10/05/20 1707     Color, UA Yellow     Appearance, UA Clear     pH, UA <=5.0     Specific Gravity, UA 1.012     Glucose, UA Negative     Ketones, UA Negative     Bilirubin, UA Negative     Blood, UA Negative     Protein, UA Negative     Leuk Esterase, UA Negative     Nitrite, UA Negative     Urobilinogen, UA 0.2 E.U./dL    Narrative:      Urine microscopic not indicated.    BNP [[id number]]  (Abnormal) Collected: 10/05/20 1412    Specimen: Blood Updated: 10/05/20 1603     proBNP 8,350.0 pg/mL     Narrative:      Among patients with dyspnea, NT-proBNP is highly sensitive for the detection of acute congestive heart failure. In addition NT-proBNP of <300 pg/ml effectively rules out acute congestive heart failure with 99% negative predictive value.    Results may be falsely decreased if  patient taking Biotin.      Troponin [[id number]]  (Normal) Collected: 10/05/20 1412    Specimen: Blood Updated: 10/05/20 1603     Troponin T <0.010 ng/mL     Narrative:      Troponin T Reference Range:  <= 0.03 ng/mL-   Negative for AMI  >0.03 ng/mL-     Abnormal for myocardial necrosis.  Clinicians would have to utilize clinical acumen, EKG, Troponin and serial changes to determine if it is an Acute Myocardial Infarction or myocardial injury due to an underlying chronic condition.       Results may be falsely decreased if patient taking Biotin.      Hepatitis Panel, Acute [[id number]]  (Normal) Collected: 10/05/20 1412    Specimen: Blood Updated: 10/05/20 1536     Hepatitis B Surface Ag Non-Reactive     Hep A IgM Non-Reactive     Hep B C IgM Non-Reactive     Hepatitis C Ab Non-Reactive    Narrative:      Results may be falsely decreased if patient taking Biotin.     Blood Culture - Blood, Arm, Left [[id number]] Collected: 10/04/20 1410    Specimen: Blood from Arm, Left Updated: 10/05/20 1415     Blood Culture No growth at 24 hours    Blood Culture - Blood, Foot, Left [[id number]] Collected: 10/04/20 1353    Specimen: Blood from Foot, Left Updated: 10/05/20 1415     Blood Culture No growth at 24 hours    BUN [[id number]]  (Abnormal) Collected: 10/05/20 0611    Specimen: Blood Updated: 10/05/20 1045     BUN 32 mg/dL     Comprehensive Metabolic Panel [[id number]]  (Abnormal) Collected: 10/05/20 0611    Specimen: Blood Updated: 10/05/20 0720     Glucose 92 mg/dL      BUN --     Comment: Testing performed by alternate method        Creatinine 1.20 mg/dL      Sodium 145 mmol/L      Potassium 3.2 mmol/L      Chloride 109 mmol/L      CO2 22.0 mmol/L      Calcium 8.6 mg/dL      Total Protein 6.4 g/dL      Albumin 3.50 g/dL      ALT (SGPT) 453 U/L      AST (SGOT) 175 U/L      Alkaline Phosphatase 71 U/L      Total Bilirubin 1.1 mg/dL      eGFR Non African Amer 61 mL/min/1.73      Globulin 2.9 gm/dL      A/G Ratio 1.2 g/dL       BUN/Creatinine Ratio --     Comment: Testing not performed        Anion Gap 14.0 mmol/L     Narrative:      GFR Normal >60  Chronic Kidney Disease <60  Kidney Failure <15      Magnesium [[id number]]  (Normal) Collected: 10/05/20 0611    Specimen: Blood Updated: 10/05/20 0720     Magnesium 1.9 mg/dL     CBC & Differential [[id number]]  (Abnormal) Collected: 10/05/20 0611    Specimen: Blood Updated: 10/05/20 0620    Narrative:      The following orders were created for panel order CBC & Differential.  Procedure                               Abnormality         Status                     ---------                               -----------         ------                     CBC Auto Differential[[id number]]        Abnormal            Final result                 Please view results for these tests on the individual orders.    CBC Auto Differential [[id number]]  (Abnormal) Collected: 10/05/20 0611    Specimen: Blood Updated: 10/05/20 0620     WBC 9.30 10*3/mm3      RBC 4.65 10*6/mm3      Hemoglobin 12.6 g/dL      Hematocrit 38.9 %      MCV 83.7 fL      MCH 27.0 pg      MCHC 32.3 g/dL      RDW 19.5 %      RDW-SD 56.4 fl      MPV 8.6 fL      Platelets 144 10*3/mm3      Neutrophil % 74.7 %      Lymphocyte % 14.1 %      Monocyte % 8.9 %      Eosinophil % 1.0 %      Basophil % 1.3 %      Neutrophils, Absolute 6.90 10*3/mm3      Lymphocytes, Absolute 1.30 10*3/mm3      Monocytes, Absolute 0.80 10*3/mm3      Eosinophils, Absolute 0.10 10*3/mm3      Basophils, Absolute 0.10 10*3/mm3      nRBC 0.3 /100 WBC     Troponin [[id number]]  (Normal) Collected: 10/04/20 2151    Specimen: Blood Updated: 10/04/20 2242     Troponin T 0.016 ng/mL     Narrative:      Troponin T Reference Range:  <= 0.03 ng/mL-   Negative for AMI  >0.03 ng/mL-     Abnormal for myocardial necrosis.  Clinicians would have to utilize clinical acumen, EKG, Troponin and serial changes to determine if it is an Acute Myocardial Infarction or myocardial injury due to an  underlying chronic condition.       Results may be falsely decreased if patient taking Biotin.      TSH [[id number]]  (Normal) Collected: 10/04/20 1355    Specimen: Blood Updated: 10/04/20 1747     TSH 2.970 uIU/mL     Lipid Panel [[id number]]  (Abnormal) Collected: 10/04/20 1355    Specimen: Blood Updated: 10/04/20 1742     Total Cholesterol 108 mg/dL      Triglycerides 79 mg/dL      HDL Cholesterol 22 mg/dL      LDL Cholesterol  70 mg/dL      VLDL Cholesterol 15.8 mg/dL      LDL/HDL Ratio 3.19    Narrative:      Cholesterol Reference Ranges  (U.[place]. Department of Health and Human Services ATP III Classifications)    Desirable          <200 mg/dL  Borderline High    200-239 mg/dL  High Risk          >240 mg/dL      Triglyceride Reference Ranges  (U.S. Department of Health and Human Services ATP III Classifications)    Normal           <150 mg/dL  Borderline High  150-199 mg/dL  High             200-499 mg/dL  Very High        >500 mg/dL    HDL Reference Ranges  (U.S. Department of Health and Human Services ATP III Classifcations)    Low     <40 mg/dl (major risk factor for CHD)  High    >60 mg/dl ('negative' risk factor for CHD)        LDL Reference Ranges  (U.S. Department of Health and Human Services ATP III Classifcations)    Optimal          <100 mg/dL  Near Optimal     100-129 mg/dL  Borderline High  130-159 mg/dL  High             160-189 mg/dL  Very High        >189 mg/dL    Magnesium [[id number]]  (Normal) Collected: 10/04/20 1355    Specimen: Blood Updated: 10/04/20 1742     Magnesium 2.3 mg/dL           Imaging Results (Last 24 Hours)     ** No results found for the last 24 hours. **          EKG      I personally viewed and interpreted the patient's EKG/Telemetry data:    ECHOCARDIOGRAM:      STRESS MYOVIEW:    CARDIAC CATHETERIZATION:    OTHER:         Assessment/Plan     Principal Problem:    Atrial fibrillation with RVR (CMS/HCC)  Active Problems:    Hypertensive emergency    Chronic atrial fibrillation  (CMS/[facility])    Chronic diastolic CHF (congestive heart failure) (CMS/[facility])    CKD (chronic kidney disease) stage 3, GFR 30-59 ml/min    Essential hypertension    Gambling disorder, persistent    Coronary artery disease involving native coronary artery of native heart without angina pectoris    Psoriasis    History of CVA (cerebrovascular accident)    GERD (gastroesophageal reflux disease)    Medically noncompliant    Mild cognitive impairment    Dyspnea  Hypokalemia    Patient presents with shortness of breath and atrial fibrillation  Patient is currently on metoprolol and was given digoxin also  Patient started on IV Cardizem drip and will start him on oral Cardizem also  Patient is on anticoagulation  Patient has medical noncompliance and is not sure of taking his medicines regularly  Patient is ruled out for MI by EKG enzymes  Patient's blood pressure is stable now  Patient has history of congestive heart failure diastolic dysfunction  Patient also had a CVA in the past  Patient has history of coronary disease with nonobstructive disease in the past and is currently on medical therapy  Patient also has chronic renal sufficiency  Patient has low potassium and is being corrected    I discussed the patients findings and my recommendations with patient and nurse    Edy Thomas MD  10/05/20  17:26 EDT

## 2020-10-05 NOTE — THERAPY EVALUATION
Patient Name: Carlos Whipple  : 1955    MRN: [id number]                              Today's Date: 10/5/2020       Admit Date: 10/4/2020    Visit Dx:     ICD-10-CM ICD-9-CM   1. Dyspnea, unspecified type  R06.00 786.09   2. Atrial fibrillation with RVR (CMS/HCC)  I48.91 427.31     Patient Active Problem List   Diagnosis   • Hypertensive emergency   • Chronic atrial fibrillation (CMS/HCC)   • Chronic diastolic CHF (congestive heart failure) (CMS/HCC)   • CKD (chronic kidney disease) stage 3, GFR 30-59 ml/min   • Essential hypertension   • Gambling disorder, persistent   • Shortness of breath   • Coronary artery disease involving native coronary artery of native heart without angina pectoris   • Psoriasis   • History of CVA (cerebrovascular accident)   • DDD (degenerative disc disease), lumbosacral   • Peripheral polyneuropathy   • GERD (gastroesophageal reflux disease)   • Medically noncompliant   • Acute respiratory distress   • Unstable angina (CMS/HCC)   • Cervical disc disorder with radiculopathy   • Completed stroke (CMS/HCC)   • Degeneration of intervertebral disc   • Excessive anticoagulation   • Impaired left ventricular function   • Liver lesion   • Lumbar radiculopathy   • Cervical myelopathy (CMS/HCC)   • Lumbosacral radiculopathy   • Spinal stenosis of lumbar region   • Obesity   • Mild cognitive impairment   • Thoracic back pain   • Thoracic disc disease with myelopathy   • Paroxysmal atrial fibrillation (CMS/HCC)   • Acute congestive heart failure (CMS/HCC)   • Atrial fibrillation with RVR (CMS/HCC)   • Dermatitis associated with moisture   • Dyspnea     Past Medical History:   Diagnosis Date   • A-fib (CMS/HCC)    • Atrial fibrillation with RVR (CMS/HCC)    • CHF (congestive heart failure) (CMS/HCC)    • Chronic atrial fibrillation (CMS/HCC) 2019   • Chronic diastolic CHF (congestive heart failure) (CMS/HCC) 2019   • CKD (chronic kidney disease) stage 3, GFR 30-59 ml/min 2019    • Coronary artery disease involving native coronary artery of native heart without angina pectoris 11/8/2019   • DDD (degenerative disc disease), lumbosacral 11/8/2019   • Essential hypertension 11/8/2019   • Gambling disorder, persistent 11/8/2019   • GERD (gastroesophageal reflux disease) 11/8/2019   • History of CVA (cerebrovascular accident) 11/8/2019   • Hyperlipidemia    • Hypertension    • Medically noncompliant 11/8/2019   • Peripheral polyneuropathy 11/8/2019   • Psoriasis 11/8/2019   • Stroke (CMS/HCC)      Past Surgical History:   Procedure Laterality Date   • CARDIAC CATHETERIZATION     • CARDIAC CATHETERIZATION N/A 12/10/2019    Procedure: Left Heart Cath and coronary angiogram;  Surgeon: Edy Thomas MD;  Location: [facility] CATH INVASIVE LOCATION;  Service: Cardiovascular     General Information     Row Name 10/05/20 1004          General Information    Prior Level of Function  independent:;ADL's;all household mobility;driving;home management pt reports he needs a cane & has difficulty reaching feet at baseline. Reports recent falls.  -     Existing Precautions/Restrictions  fall;oxygen therapy device and L/min  -     Row Name 10/05/20 1004          Living Environment    Lives With  sibling(s)  -     Row Name 10/05/20 1004          Home Main Entrance    Number of Stairs, Main Entrance  none ramp at brother's where he's been living for the past year, which brother states cannot continue due to the level of assist he needs, especialle due to his continues incontinence of bladder & bowel in the home & refusal to wear breifs.  -     Row Name 10/05/20 1004          Stairs Within Home, Primary    Number of Stairs, Within Home, Primary  none  -     Row Name 10/05/20 1004          Cognition    Orientation Status (Cognition)  disoriented to;time;situation guessed the month incorectly. Brother states some days pt asks what day of the week it is repeatedly.  -     Row Name 10/05/20 1004           Safety Issues, Functional Mobility    Safety Issues Affecting Function (Mobility)  at risk behavior observed;awareness of need for assistance;impulsivity;insight into deficits/self-awareness;judgment;problem-solving;safety precaution awareness  -     Impairments Affecting Function (Mobility)  balance;cognition;endurance/activity tolerance;coordination;range of motion (ROM)  -       User Key  (r) = Recorded By, (t) = Taken By, (c) = Cosigned By    Initials Name Provider Type     Mena Menjivar OT Occupational Therapist        Mobility/ADL's     Row Name 10/05/20 1021          Bed Mobility    Bed Mobility  supine-sit  -     Supine-Sit [place] (Bed Mobility)  minimum assist (75% patient effort)  -     Row Name 10/05/20 1021          Functional Mobility    Functional Mobility- Ind. Level  minimum assist (75% patient effort);1 person + 1 person to manage equipment  -     Functional Mobility-Distance (Feet)  30  -     Functional Mobility- Safety Issues  balance decreased during turns;step length decreased;weight-shifting ability decreased;loses balance backward  -     Functional Mobility- Comment  mild scissoring of his LLE noted.  -     Row Name 10/05/20 1021          Activities of Daily Living    BADL Assessment/Intervention  lower body dressing;toileting  -     Row Name 10/05/20 1021          Lower Body Dressing Assessment/Training    [place] Level (Lower Body Dressing)  don;socks;dependent (less than 25% patient effort)  -     Position (Lower Body Dressing)  edge of bed sitting  -     Row Name 10/05/20 1021          Toileting Assessment/Training    [place] Level (Toileting)  dependent (less than 25% patient effort) incontinent of urine in the bed. RN applying condom catheter.  -       User Key  (r) = Recorded By, (t) = Taken By, (c) = Cosigned By    Initials Name Provider Type     Mena Menjivar OT Occupational Therapist        Obj/Interventions     Row Name 10/05/20 1023           Sensory Assessment (Somatosensory)    Sensory Assessment (Somatosensory)  sensation intact denies any paresthesias  -     Row Name 10/05/20 1023          Range of Motion Comprehensive    Comment, General Range of Motion  shld flex 50% AROM however pt may have had some difficulty following commmands for MMT. PROM shld flex 75% w/ possible spasticity noted w/ cog-wheel-like jerking during resistive MMT.  -     Row Name 10/05/20 1023          Strength Comprehensive (MMT)    Comment, General Manual Muscle Testing (MMT) Assessment   4-/5; shld 5/5 in limited range  -     Row Name 10/05/20 1023          Balance    Balance Assessment  sitting static balance;sitting dynamic balance;standing static balance;standing dynamic balance  -     Static Sitting Balance  mild impairment  -     Dynamic Sitting Balance  mild impairment  -     Static Standing Balance  mild impairment  -     Dynamic Standing Balance  moderate impairment  -       User Key  (r) = Recorded By, (t) = Taken By, (c) = Cosigned By    Initials Name Provider Type     Mena Menjivar OT Occupational Therapist        Goals/Plan     Row Name 10/05/20 1029          Bed Mobility Goal 1 (OT)    Activity/Assistive Device (Bed Mobility Goal 1, OT)  bed mobility activities, all  -     [place] Level/Cues Needed (Bed Mobility Goal 1, OT)  modified independence  -     Time Frame (Bed Mobility Goal 1, OT)  2 weeks  -[facility] Name 10/05/20 1029          Dressing Goal 1 (OT)    Activity/Device (Dressing Goal 1, OT)  dressing skills, all;sock-aid;long-handled shoe horn;reacher  -     [place]/Cues Needed (Dressing Goal 1, OT)  set-up required;supervision required  -     Time Frame (Dressing Goal 1, OT)  2 weeks  -[facility] Name 10/05/20 1029          Grooming Goal 1 (OT)    Activity/Device (Grooming Goal 1, OT)  hair care;oral care;wash face, hands standing at sink  -     [place] (Grooming Goal 1, OT)  supervision required   -     Time Frame (Grooming Goal 1, OT)  1 week  -       User Key  (r) = Recorded By, (t) = Taken By, (c) = Cosigned By    Initials Name Provider Type     Mena Mnejivar, ADEBAYO Occupational Therapist        Clinical Impression     Row Name 10/05/20 1025          Plan of Care Review    Plan of Care Reviewed With  patient;sibling  -     Progress  no change  -     Outcome Summary  pt demonstrates mild confusion, incontinence,  weakness, decreased ROM in shld & trunk, balance & coordination deficits. HAs been living w/ brother for a year as his home w/ siblings living in a detached garage has no working heat/cooling system. 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 Pt is impulsive & unsafe. Needed (D) (A) for LBD & toileting & min (A) for bed & functional mobility. OT recommends IP rehab at d/c. PPE worn: mask, gloves, safety glasses.  -     Row Name 10/05/20 1025          Therapy Assessment/Plan (OT)    Rehab Potential (OT)  good, to achieve stated therapy goals  -     Criteria for Skilled Therapeutic Interventions Met (OT)  skilled treatment is necessary  -     Therapy Frequency (OT)  5 times/wk  -     Predicted Duration of Therapy Intervention (OT)  until D/C  -     Row Name 10/05/20 1025          Therapy Plan Review/Discharge Plan (OT)    Anticipated Discharge Disposition (OT)  inpatient rehabilitation facility  -     Row Name 10/05/20 1025          Vital Signs    Pre SpO2 (%)  97  -MH     O2 Delivery Pre Treatment  supplemental O2  -     Intra SpO2 (%)  90  -MH     O2 Delivery Intra Treatment  room air  -     Post SpO2 (%)  96  -MH     O2 Delivery Post Treatment  supplemental O2  -MH     Pre Patient Position  Supine  -     Intra Patient Position  Standing  -     Post Patient Position  Supine  -     Row Name 10/05/20 1025          Positioning and Restraints    Pre-Treatment Position  in bed  -MH     Post Treatment Position  bed  -MH     In Bed  notified  nsg;supine;encouraged to call for assist;exit alarm on;call light within reach  -     Restraints  reapplied:;soft limb;notified nsg:  -       User Key  (r) = Recorded By, (t) = Taken By, (c) = Cosigned By    Initials Name Provider Type    Mena Mckeon OT Occupational Therapist        Outcome Measures     Row Name 10/05/20 1030          How much help from another person do you currently need...    Turning from your back to your side while in flat bed without using bedrails?  3  -MH     Moving from lying on back to sitting on the side of a flat bed without bedrails?  3  -MH     Moving to and from a bed to a chair (including a wheelchair)?  3  -MH     Standing up from a chair using your arms (e.g., wheelchair, bedside chair)?  3  -MH     Climbing 3-5 steps with a railing?  3  -MH     To walk in hospital room?  3  -MH     AM-PAC 6 Clicks Score (PT)  18  -     Row Name 10/05/20 1030          Modified [place] Scale    Pre-Stroke Modified [place] Scale  2 - Slight disability.  Unable to carry out all previous activities but able to look after own affairs without assistance.  -     Modified [place] Scale  4 - Moderately severe disability.  Unable to walk without assistance, and unable to attend to own bodily needs without assistance.  -     Row Name 10/05/20 1030          Functional Assessment    Outcome Measure Options  AM-PAC 6 Clicks Basic Mobility (PT);Modified [place]  -       User Key  (r) = Recorded By, (t) = Taken By, (c) = Cosigned By    Initials Name Provider Type    Mena Mckeon OT Occupational Therapist        Occupational Therapy Education                 Title: PT OT SLP Therapies (Done)     Topic: Occupational Therapy (Done)     Point: ADL training (Done)     Description:   Instruct learner(s) on proper safety adaptation and remediation techniques during self care or transfers.   Instruct in proper use of assistive devices.              Learning Progress Summary           Patient  Acceptance, E, VU by  at 10/5/2020 0830                   Point: Home exercise program (Done)     Description:   Instruct learner(s) on appropriate technique for monitoring, assisting and/or progressing therapeutic exercises/activities.              Learning Progress Summary           Patient Acceptance, E, VU by  at 10/5/2020 0830                   Point: Precautions (Done)     Description:   Instruct learner(s) on prescribed precautions during self-care and functional transfers.              Learning Progress Summary           Patient Acceptance, E,TB, VU,NR by  at 10/5/2020 1031    Acceptance, E, VU by  at 10/5/2020 0830                   Point: Body mechanics (Done)     Description:   Instruct learner(s) on proper positioning and spine alignment during self-care, functional mobility activities and/or exercises.              Learning Progress Summary           Patient Acceptance, E,TB, VU,NR by  at 10/5/2020 1031    Acceptance, E, VU by  at 10/5/2020 0830                               User Key     Initials Effective Dates Name Provider Type Discipline     03/01/19 -  Mena Menjivar OT Occupational Therapist OT     03/01/19 -  Zuleika White RN Registered Nurse Nurse              OT Recommendation and Plan  Retired Outcome Summary/Treatment Plan (OT)  Anticipated Discharge Disposition (OT): inpatient rehabilitation facility  Therapy Frequency (OT): 5 times/wk  Plan of Care Review  Plan of Care Reviewed With: patient, sibling  Progress: no change  Outcome Summary: pt demonstrates mild confusion, incontinence,  weakness, decreased ROM in shld & trunk, balance & coordination deficits. HAs been living w/ brother for a year as his home w/ siblings living in a detached garage has no working heat/cooling system. 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 Pt is impulsive & unsafe. Needed (D) (A) for LBD & toileting & min (A) for bed & functional mobility. OT recommends IP rehab at d/c. PPE  worn: mask, gloves, safety glasses.  Plan of Care Reviewed With: patient, sibling  Outcome Summary: pt demonstrates mild confusion, incontinence,  weakness, decreased ROM in shld & trunk, balance & coordination deficits. HAs been living w/ brother for a year as his home w/ siblings living in a detached garage has no working heat/cooling system. 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 Pt is impulsive & unsafe. Needed (D) (A) for LBD & toileting & min (A) for bed & functional mobility. OT recommends IP rehab at d/c. PPE worn: mask, gloves, safety glasses.     Time Calculation:   Time Calculation- OT     Row Name 10/05/20 1033             Time Calculation-     OT Start Time  0820  -      OT Stop Time  0850  -      OT Time Calculation (min)  30 min  -      Total Timed Code Minutes- OT  0 minute(s)  -      OT Received On  10/05/20  -      OT - Next Appointment  10/06/20  -      OT Goal Re-Cert Due Date  10/19/20  -        User Key  (r) = Recorded By, (t) = Taken By, (c) = Cosigned By    Initials Name Provider Type     Mena Menjivar OT Occupational Therapist        Therapy Charges for Today     Code Description Service Date Service Provider Modifiers Qty    [id number]  OT EVAL MOD COMPLEXITY 4 10/5/2020 Mena Menjivar OT GO 1               Mena Menjivar OT  10/5/2020

## 2020-10-05 NOTE — THERAPY EVALUATION
Patient Name: Carlos Whipple  : 1955    MRN: [id number]                              Today's Date: 10/5/2020       Admit Date: 10/4/2020    Visit Dx:     ICD-10-CM ICD-9-CM   1. Dyspnea, unspecified type  R06.00 786.09   2. Atrial fibrillation with RVR (CMS/HCC)  I48.91 427.31     Patient Active Problem List   Diagnosis   • Hypertensive emergency   • Chronic atrial fibrillation (CMS/HCC)   • Chronic diastolic CHF (congestive heart failure) (CMS/HCC)   • CKD (chronic kidney disease) stage 3, GFR 30-59 ml/min   • Essential hypertension   • Gambling disorder, persistent   • Shortness of breath   • Coronary artery disease involving native coronary artery of native heart without angina pectoris   • Psoriasis   • History of CVA (cerebrovascular accident)   • DDD (degenerative disc disease), lumbosacral   • Peripheral polyneuropathy   • GERD (gastroesophageal reflux disease)   • Medically noncompliant   • Acute respiratory distress   • Unstable angina (CMS/HCC)   • Cervical disc disorder with radiculopathy   • Completed stroke (CMS/HCC)   • Degeneration of intervertebral disc   • Excessive anticoagulation   • Impaired left ventricular function   • Liver lesion   • Lumbar radiculopathy   • Cervical myelopathy (CMS/HCC)   • Lumbosacral radiculopathy   • Spinal stenosis of lumbar region   • Obesity   • Mild cognitive impairment   • Thoracic back pain   • Thoracic disc disease with myelopathy   • Paroxysmal atrial fibrillation (CMS/HCC)   • Acute congestive heart failure (CMS/HCC)   • Atrial fibrillation with RVR (CMS/HCC)   • Dermatitis associated with moisture   • Dyspnea     Past Medical History:   Diagnosis Date   • A-fib (CMS/HCC)    • Atrial fibrillation with RVR (CMS/HCC)    • CHF (congestive heart failure) (CMS/HCC)    • Chronic atrial fibrillation (CMS/HCC) 2019   • Chronic diastolic CHF (congestive heart failure) (CMS/HCC) 2019   • CKD (chronic kidney disease) stage 3, GFR 30-59 ml/min 2019    • Coronary artery disease involving native coronary artery of native heart without angina pectoris 11/8/2019   • DDD (degenerative disc disease), lumbosacral 11/8/2019   • Essential hypertension 11/8/2019   • Gambling disorder, persistent 11/8/2019   • GERD (gastroesophageal reflux disease) 11/8/2019   • History of CVA (cerebrovascular accident) 11/8/2019   • Hyperlipidemia    • Hypertension    • Medically noncompliant 11/8/2019   • Peripheral polyneuropathy 11/8/2019   • Psoriasis 11/8/2019   • Stroke (CMS/HCC)      Past Surgical History:   Procedure Laterality Date   • CARDIAC CATHETERIZATION     • CARDIAC CATHETERIZATION N/A 12/10/2019    Procedure: Left Heart Cath and coronary angiogram;  Surgeon: Edy Thomas MD;  Location: [facility] CATH INVASIVE LOCATION;  Service: Cardiovascular     General Information     Row Name 10/05/20 1302          Physical Therapy Time and Intention    Document Type  evaluation  -KB     Mode of Treatment  physical therapy  -KB     Row Name 10/05/20 1302          General Information    Prior Level of Function  independent:;ADL's  -KB     Existing Precautions/Restrictions  fall;oxygen therapy device and L/min  -KB     Row Name 10/05/20 1302          Living Environment    Lives With  sibling(s)  -KB     Row Name 10/05/20 1302          Home Main Entrance    Number of Stairs, Main Entrance  none  -KB     Row Name 10/05/20 1302          Cognition    Orientation Status (Cognition)  disoriented to;time;situation guessed the month incorectly. Brother states some days pt asks what day of the week it is repeatedly.  -KB     Row Name 10/05/20 1302          Safety Issues, Functional Mobility    Safety Issues Affecting Function (Mobility)  awareness of need for assistance;insight into deficits/self-awareness;judgment;problem-solving;safety precaution awareness  -KB     Impairments Affecting Function (Mobility)  balance;cognition;endurance/activity tolerance;coordination;range of motion (ROM)  -KB      Cognitive Impairments, Mobility Safety/Performance  awareness, need for assistance;insight into deficits/self-awareness;judgment;problem-solving/reasoning  -       User Key  (r) = Recorded By, (t) = Taken By, (c) = Cosigned By    Initials Name Provider Type    Sofy Garvey, PT Physical Therapist        Mobility     Row Name 10/05/20 1304          Bed Mobility    Bed Mobility  supine-sit  -KB     Supine-Sit [place] (Bed Mobility)  minimum assist (75% patient effort)  -KB     Row Name 10/05/20 1304          Sit-Stand Transfer    Sit-Stand [place] (Transfers)  contact guard  -     Row Name 10/05/20 1304          Gait/Stairs (Locomotion)    [place] Level (Gait)  contact guard  -     Assistive Device (Gait)  -- HHA  -KB     Distance in Feet (Gait)  20  -KB     Deviations/Abnormal Patterns (Gait)  ricky decreased;festinating/shuffling;gait speed decreased;stride length decreased;weight shifting decreased  -KB       User Key  (r) = Recorded By, (t) = Taken By, (c) = Cosigned By    Initials Name Provider Type    Sofy Garvey, PT Physical Therapist        Obj/Interventions     Row Name 10/05/20 1304          Range of Motion Comprehensive    General Range of Motion  bilateral lower extremity ROM WNL  -KB     Row Name 10/05/20 1304          Strength Comprehensive (MMT)    Comment, General Manual Muscle Testing (MMT) Assessment  BLE strength grossly 3+/5  -KB     Row Name 10/05/20 1304          Balance    Balance Assessment  sitting static balance;sitting dynamic balance;standing static balance;standing dynamic balance  -KB     Static Sitting Balance  mild impairment  -KB     Dynamic Sitting Balance  mild impairment  -KB     Static Standing Balance  mild impairment  -KB     Dynamic Standing Balance  moderate impairment  -KB     Row Name 10/05/20 1304          Sensory Assessment (Somatosensory)    Sensory Assessment (Somatosensory)  sensation intact  -KB       User Key  (r) = Recorded By, (t) =  Taken By, (c) = Cosigned By    Initials Name Provider Type    Sofy Garvey, PT Physical Therapist        Goals/Plan     Row Name 10/05/20 1313          Bed Mobility Goal 1 (PT)    Activity/Assistive Device (Bed Mobility Goal 1, PT)  bed mobility activities, all  -KB     [place] Level/Cues Needed (Bed Mobility Goal 1, PT)  independent  -KB     Time Frame (Bed Mobility Goal 1, PT)  short term goal (STG);5 days  -KB     Row Name 10/05/20 1313          Transfer Goal 1 (PT)    Activity/Assistive Device (Transfer Goal 1, PT)  sit-to-stand/stand-to-sit  -KB     [place] Level/Cues Needed (Transfer Goal 1, PT)  modified independence  -KB     Time Frame (Transfer Goal 1, PT)  short term goal (STG);5 days  -KB     Row Name 10/05/20 1313          Gait Training Goal 1 (PT)    Activity/Assistive Device (Gait Training Goal 1, PT)  gait (walking locomotion);assistive device use;improve balance and speed;increase endurance/gait distance;decrease fall risk  -KB     [place] Level (Gait Training Goal 1, PT)  contact guard assist  -KB     Distance (Gait Training Goal 1, PT)  200  -KB     Time Frame (Gait Training Goal 1, PT)  long term goal (LTG);2 weeks  -KB       User Key  (r) = Recorded By, (t) = Taken By, (c) = Cosigned By    Initials Name Provider Type    Sofy Garvey, PT Physical Therapist        Clinical Impression     Row Name 10/05/20 1302          Pain    Additional Documentation  Pain Scale: Numbers Pre/Post-Treatment (Group)  -KB     Row Name 10/05/20 1300          Pain Scale: Numbers Pre/Post-Treatment    Pretreatment Pain Rating  0/10 - no pain  -KB     Posttreatment Pain Rating  0/10 - no pain  -KB     Row Name 10/05/20 1309          Plan of Care Review    Plan of Care Reviewed With  patient  -KB     Outcome Summary  66 yo M admit with shortness of air and heart palpitations. Pt in wrist restraints when PT enters room. Patient with generalized confusion and requires extra time and cues for  orientation questions. Pt lives with brother and reportedly ambulates without use of AD though has access to cane at home. Currently requires Janell for bed mobility for supine to sit. Pt impulsive during treatment and requires cues for safety. Patient ambulates with HHA and CGA. Patient with balance deficits increasing risk of falls.Patient high falls risk and will require IP rehab at d/c for improved functional mobility.  -KB     Row Name 10/05/20 1305          Therapy Assessment/Plan (PT)    Rehab Potential (PT)  good, to achieve stated therapy goals  -KB     Criteria for Skilled Interventions Met (PT)  yes;meets criteria  -KB     Row Name 10/05/20 1305          Vital Signs    Pre SpO2 (%)  97  -KB     O2 Delivery Pre Treatment  supplemental O2  -KB     Intra SpO2 (%)  90  -KB     O2 Delivery Intra Treatment  supplemental O2  -KB     Post SpO2 (%)  96  -KB     O2 Delivery Post Treatment  supplemental O2  -KB     Pre Patient Position  Supine  -KB     Intra Patient Position  Standing  -KB     Post Patient Position  Supine  -KB     Row Name 10/05/20 1306          Positioning and Restraints    Pre-Treatment Position  in bed  -KB     Post Treatment Position  bed  -KB     In Bed  notified nsg;call light within reach;encouraged to call for assist;exit alarm on  -KB     Restraints  reapplied:  -KB       User Key  (r) = Recorded By, (t) = Taken By, (c) = Cosigned By    Initials Name Provider Type    Sofy Garvey, PT Physical Therapist        Outcome Measures     Row Name 10/05/20 3461          How much help from another person do you currently need...    Turning from your back to your side while in flat bed without using bedrails?  3  -KB     Moving from lying on back to sitting on the side of a flat bed without bedrails?  3  -KB     Moving to and from a bed to a chair (including a wheelchair)?  3  -KB     Standing up from a chair using your arms (e.g., wheelchair, bedside chair)?  3  -KB     Climbing 3-5 steps with a  railing?  3  -KB     To walk in hospital room?  3  -KB     AM-PAC 6 Clicks Score (PT)  18  -KB     Row Name 10/05/20 1316          Modified [place] Scale    Pre-Stroke Modified [place] Scale  2 - Slight disability.  Unable to carry out all previous activities but able to look after own affairs without assistance.  -KB     Modified [place] Scale  4 - Moderately severe disability.  Unable to walk without assistance, and unable to attend to own bodily needs without assistance.  -     Row Name 10/05/20 1316          Functional Assessment    Outcome Measure Options  AM-PAC 6 Clicks Basic Mobility (PT);Modified [place]  -       User Key  (r) = Recorded By, (t) = Taken By, (c) = Cosigned By    Initials Name Provider Type    Sofy Garvey, PT Physical Therapist        Physical Therapy Education                 Title: PT OT SLP Therapies (Done)     Topic: Physical Therapy (Done)     Point: Mobility training (Done)     Learning Progress Summary           Patient Acceptance, E,TB, VU,NR by  at 10/5/2020 1316    Acceptance, E, VU by  at 10/5/2020 0830                   Point: Home exercise program (Done)     Learning Progress Summary           Patient Acceptance, E,TB, VU,NR by  at 10/5/2020 1316                   Point: Body mechanics (Done)     Learning Progress Summary           Patient Acceptance, E,TB, VU,NR by  at 10/5/2020 1316                   Point: Precautions (Done)     Learning Progress Summary           Patient Acceptance, E,TB, VU,NR by  at 10/5/2020 1316    Acceptance, E, VU by  at 10/5/2020 0830                               User Key     Initials Effective Dates Name Provider Type Discipline     03/01/19 -  Zuleika White, RN Registered Nurse Nurse     06/15/19 -  Sofy Matt, PT Physical Therapist PT              PT Recommendation and Plan  Planned Therapy Interventions (PT): balance training, bed mobility training, gait training, home exercise program, postural re-education, patient/family  education, transfer training, strengthening  Plan of Care Reviewed With: patient  Outcome Summary: 66 yo M admit with shortness of air and heart palpitations. Pt in wrist restraints when PT enters room. Patient with generalized confusion and requires extra time and cues for orientation questions. Pt lives with brother and reportedly ambulates without use of AD though has access to cane at home. Currently requires Janell for bed mobility for supine to sit. Pt impulsive during treatment and requires cues for safety. Patient ambulates with HHA and CGA. Patient with balance deficits increasing risk of falls.Patient high falls risk and will require IP rehab at d/c for improved functional mobility.     Time Calculation:   PT Charges     Row Name 10/05/20 1317             Time Calculation    Start Time  0830  -KB      Stop Time  0855  -KB      Time Calculation (min)  25 min  -KB      PT Received On  10/05/20  -KB      PT - Next Appointment  10/06/20  -KB      PT Goal Re-Cert Due Date  10/19/20  -KB        User Key  (r) = Recorded By, (t) = Taken By, (c) = Cosigned By    Initials Name Provider Type    Sofy Garvey, PT Physical Therapist        Therapy Charges for Today     Code Description Service Date Service Provider Modifiers Qty    99690991539 HC PT EVAL MOD COMPLEXITY 3 10/5/2020 Sofy Matt, PT GP 1          PT G-Codes  Outcome Measure Options: AM-PAC 6 Clicks Basic Mobility (PT), Modified [place]  AM-PAC 6 Clicks Score (PT): 18  Modified Gage Scale: 4 - Moderately severe disability.  Unable to walk without assistance, and unable to attend to own bodily needs without assistance.    Sofy Matt PT  10/5/2020

## 2020-10-05 NOTE — PLAN OF CARE
Goal Outcome Evaluation:  Pt A&Ox2, disoriented to month, answers ques appropriately w/ intermittent confusion. Remains on cardizem gtt, v/s stable, call light in reach, RN will cont to monitor.

## 2020-10-05 NOTE — NURSING NOTE
Completed ROM exercises with patient and placed back into restraints. Patient told me that we forgot something and it was his secret weapon.   When I asked what that was, he began to kick his legs back and forth and tell me that his legs were not tied down and that he could use them as a weapon to put us in headlocks. Patient was educated  And it was explained that such behavior was not appropriate, and was dangerous to both us and himself. Will continue to monitor.

## 2020-10-05 NOTE — H&P
Patient Care Team:  Marisol Larson APRN as PCP - General  Shwetha Barber MD as Consulting Physician (Nephrology)  Edy Thomas MD as Consulting Physician (Cardiology)    Chief complaint shortness of breath    Subjective     HPI obtained from chart review as patient is currently very drowsy and intermittently confused. Patient was brought to the emergency room yesterday evening via ambulance for shortness of breath and palpitations.  He was found to be in A. fib with RVR yet again.  It is unclear how long he has been without his medications this time.  He was recently discharged from the hospital with the same problem last time.  Apparently last night he became quite agitated and aggressive and was attempting to hit and kick the nurses along with pulling his IV out.  He was put in restraints overnight.  He is currently out of wrist restraints and sleeping well.  He is still in A. fib with rate in the 130s on a Cardizem drip.      Review of Systems   Unable to perform ROS: Mental status change        Past Medical History:   Diagnosis Date   • A-fib (CMS/[facility])    • Atrial fibrillation with RVR (CMS/[facility])    • CHF (congestive heart failure) (CMS/[facility])    • Chronic atrial fibrillation (CMS/[facility]) 11/8/2019   • Chronic diastolic CHF (congestive heart failure) (CMS/[facility]) 11/8/2019   • CKD (chronic kidney disease) stage 3, GFR 30-59 ml/min 11/8/2019   • Coronary artery disease involving native coronary artery of native heart without angina pectoris 11/8/2019   • DDD (degenerative disc disease), lumbosacral 11/8/2019   • Essential hypertension 11/8/2019   • Gambling disorder, persistent 11/8/2019   • GERD (gastroesophageal reflux disease) 11/8/2019   • History of CVA (cerebrovascular accident) 11/8/2019   • Hyperlipidemia    • Hypertension    • Medically noncompliant 11/8/2019   • Peripheral polyneuropathy 11/8/2019   • Psoriasis 11/8/2019   • Stroke (CMS/[facility])      Past Surgical History:   Procedure Laterality  Date   • CARDIAC CATHETERIZATION     • CARDIAC CATHETERIZATION N/A 12/10/2019    Procedure: Left Heart Cath and coronary angiogram;  Surgeon: Edy Thomas MD;  Location: [facility] CATH INVASIVE LOCATION;  Service: Cardiovascular     Family History   Problem Relation Age of Onset   • Heart disease Mother      Social History     Tobacco Use   • Smoking status: Never Smoker   • Smokeless tobacco: Never Used   Substance Use Topics   • Alcohol use: No     Frequency: Never   • Drug use: No     Medications Prior to Admission   Medication Sig Dispense Refill Last Dose   • aspirin 81 MG EC tablet Take 81 mg by mouth Daily.      • losartan (COZAAR) 50 MG tablet Take 1 tablet by mouth Daily. 30 tablet 1    • allopurinol (ZYLOPRIM) 100 MG tablet Take 1 tablet by mouth Daily. 30 tablet 1    • bumetanide (BUMEX) 1 MG tablet Take 1 tablet by mouth Daily. 30 tablet 1    • hydrALAZINE (APRESOLINE) 100 MG tablet Take 1 tablet by mouth 3 (Three) Times a Day. 90 tablet 1    • Metoprolol Succinate 50 MG capsule extended-release 24 hour sprinkle Take 50 mg by mouth Daily. 30 capsule 1    • pravastatin (PRAVACHOL) 40 MG tablet Take 40 mg by mouth Every Night.      • risperiDONE (risperDAL) 0.5 MG tablet Take 1 tablet by mouth Every 12 (Twelve) Hours. 60 tablet 1    • rivaroxaban (Xarelto) 20 MG tablet Take 1 tablet by mouth Daily With Dinner for 30 days. 30 tablet 5    • sodium bicarbonate 650 MG tablet Take 1 tablet by mouth 3 (Three) Times a Day. 90 tablet 1    • spironolactone (ALDACTONE) 25 MG tablet Take 0.5 tablets by mouth Daily. 15 tablet 1      Allergies:  Ketorolac tromethamine    Objective      Vital Signs  Temp:  [97.6 °F (36.4 °C)-97.8 °F (36.6 °C)] 97.6 °F (36.4 °C)  Heart Rate:  [] 97  Resp:  [15-21] 16  BP: (106-212)/() 128/83    Physical Exam  Vitals signs and nursing note reviewed.   Constitutional:       General: He is sleeping.      Appearance: He is overweight.   Cardiovascular:      Rate and Rhythm:  Tachycardia present. Rhythm irregular.   Pulmonary:      Effort: Pulmonary effort is normal.      Breath sounds: Normal breath sounds.   Skin:     General: Skin is warm and dry.   Neurological:      Mental Status: He is disoriented.         Results Review:  Lab Results (last 24 hours)     Procedure Component Value Units Date/Time    BUN [[id number]]  (Abnormal) Collected: 10/05/20 0611    Specimen: Blood Updated: 10/05/20 1045     BUN 32 mg/dL     Comprehensive Metabolic Panel [[id number]]  (Abnormal) Collected: 10/05/20 0611    Specimen: Blood Updated: 10/05/20 0720     Glucose 92 mg/dL      BUN --     Comment: Testing performed by alternate method        Creatinine 1.20 mg/dL      Sodium 145 mmol/L      Potassium 3.2 mmol/L      Chloride 109 mmol/L      CO2 22.0 mmol/L      Calcium 8.6 mg/dL      Total Protein 6.4 g/dL      Albumin 3.50 g/dL      ALT (SGPT) 453 U/L      AST (SGOT) 175 U/L      Alkaline Phosphatase 71 U/L      Total Bilirubin 1.1 mg/dL      eGFR Non African Amer 61 mL/min/1.73      Globulin 2.9 gm/dL      A/G Ratio 1.2 g/dL      BUN/Creatinine Ratio --     Comment: Testing not performed        Anion Gap 14.0 mmol/L     Narrative:      GFR Normal >60  Chronic Kidney Disease <60  Kidney Failure <15      Magnesium [[id number]]  (Normal) Collected: 10/05/20 0611    Specimen: Blood Updated: 10/05/20 0720     Magnesium 1.9 mg/dL     CBC & Differential [[id number]]  (Abnormal) Collected: 10/05/20 0611    Specimen: Blood Updated: 10/05/20 0620    Narrative:      The following orders were created for panel order CBC & Differential.  Procedure                               Abnormality         Status                     ---------                               -----------         ------                     CBC Auto Differential[[id number]]        Abnormal            Final result                 Please view results for these tests on the individual orders.    CBC Auto Differential [[id number]]  (Abnormal) Collected:  10/05/20 0611    Specimen: Blood Updated: 10/05/20 0620     WBC 9.30 10*3/mm3      RBC 4.65 10*6/mm3      Hemoglobin 12.6 g/dL      Hematocrit 38.9 %      MCV 83.7 fL      MCH 27.0 pg      MCHC 32.3 g/dL      RDW 19.5 %      RDW-SD 56.4 fl      MPV 8.6 fL      Platelets 144 10*3/mm3      Neutrophil % 74.7 %      Lymphocyte % 14.1 %      Monocyte % 8.9 %      Eosinophil % 1.0 %      Basophil % 1.3 %      Neutrophils, Absolute 6.90 10*3/mm3      Lymphocytes, Absolute 1.30 10*3/mm3      Monocytes, Absolute 0.80 10*3/mm3      Eosinophils, Absolute 0.10 10*3/mm3      Basophils, Absolute 0.10 10*3/mm3      nRBC 0.3 /100 WBC     Troponin [[id number]]  (Normal) Collected: 10/04/20 2151    Specimen: Blood Updated: 10/04/20 2242     Troponin T 0.016 ng/mL     Narrative:      Troponin T Reference Range:  <= 0.03 ng/mL-   Negative for AMI  >0.03 ng/mL-     Abnormal for myocardial necrosis.  Clinicians would have to utilize clinical acumen, EKG, Troponin and serial changes to determine if it is an Acute Myocardial Infarction or myocardial injury due to an underlying chronic condition.       Results may be falsely decreased if patient taking Biotin.      TSH [[id number]]  (Normal) Collected: 10/04/20 1355    Specimen: Blood Updated: 10/04/20 1747     TSH 2.970 uIU/mL     Lipid Panel [[id number]]  (Abnormal) Collected: 10/04/20 1355    Specimen: Blood Updated: 10/04/20 1742     Total Cholesterol 108 mg/dL      Triglycerides 79 mg/dL      HDL Cholesterol 22 mg/dL      LDL Cholesterol  70 mg/dL      VLDL Cholesterol 15.8 mg/dL      LDL/HDL Ratio 3.19    Narrative:      Cholesterol Reference Ranges  (U.S. Department of Health and Human Services ATP III Classifications)    Desirable          <200 mg/dL  Borderline High    200-239 mg/dL  High Risk          >240 mg/dL      Triglyceride Reference Ranges  (U.S. Department of Health and Human Services ATP III Classifications)    Normal           <150 mg/dL  Borderline High  150-199  mg/dL  High             200-499 mg/dL  Very High        >500 mg/dL    HDL Reference Ranges  (U.S. Department of Health and Human Services ATP III Classifcations)    Low     <40 mg/dl (major risk factor for CHD)  High    >60 mg/dl ('negative' risk factor for CHD)        LDL Reference Ranges  (U.S. Department of Health and Human Services ATP III Classifcations)    Optimal          <100 mg/dL  Near Optimal     100-129 mg/dL  Borderline High  130-159 mg/dL  High             160-189 mg/dL  Very High        >189 mg/dL    Magnesium [[id number]]  (Normal) Collected: 10/04/20 1355    Specimen: Blood Updated: 10/04/20 1742     Magnesium 2.3 mg/dL     POC Glucose Once [[id number]]  (Normal) Collected: 10/04/20 1700    Specimen: Blood Updated: 10/04/20 1701     Glucose 90 mg/dL      Comment: Serial Number: [id number]:  [id number]       Troponin [[id number]]  (Normal) Collected: 10/04/20 1355    Specimen: Blood Updated: 10/04/20 1553     Troponin T 0.010 ng/mL     Narrative:      Troponin T Reference Range:  <= 0.03 ng/mL-   Negative for AMI  >0.03 ng/mL-     Abnormal for myocardial necrosis.  Clinicians would have to utilize clinical acumen, EKG, Troponin and serial changes to determine if it is an Acute Myocardial Infarction or myocardial injury due to an underlying chronic condition.       Results may be falsely decreased if patient taking Biotin.      BNP [[id number]]  (Abnormal) Collected: 10/04/20 1355    Specimen: Blood Updated: 10/04/20 1541     proBNP 21,355.0 pg/mL     Narrative:      Among patients with dyspnea, NT-proBNP is highly sensitive for the detection of acute congestive heart failure. In addition NT-proBNP of <300 pg/ml effectively rules out acute congestive heart failure with 99% negative predictive value.    Results may be falsely decreased if patient taking Biotin.      [place] Draw [[id number]] Collected: 10/04/20 1353    Specimen: Blood Updated: 10/04/20 1501    Narrative:      The following orders were  created for panel order [place] Draw.  Procedure                               Abnormality         Status                     ---------                               -----------         ------                     Light Blue Top[[id number]]                                   Final result               Green Top (Gel)[[id number]]                                  Final result               Lavender Top[[id number]]                                     Final result               Gold Top - SST[[id number]]                                   Final result                 Please view results for these tests on the individual orders.    Light Blue Top [[id number]] Collected: 10/04/20 1353    Specimen: Blood Updated: 10/04/20 1501     Extra Tube hold for add-on     Comment: Auto resulted       Green Top (Gel) [[id number]] Collected: 10/04/20 1353    Specimen: Blood Updated: 10/04/20 1501     Extra Tube Hold for add-ons.     Comment: Auto resulted.       Lavender Top [[id number]] Collected: 10/04/20 1353    Specimen: Blood Updated: 10/04/20 1501     Extra Tube hold for add-on     Comment: Auto resulted       POC Lactate [[id number]]  (Normal) Collected: 10/04/20 1412    Specimen: Blood Updated: 10/04/20 1454     Lactate 1.6 mmol/L      Comment: Serial Number: [id number]:  [id number]       COVID-19,[facility] IN-HOUSE,Nasal Swab No Transport Media 3-4 HR TAT - Swab, Nasal Cavity [[id number]]  (Normal) Collected: 10/04/20 1355    Specimen: Swab from Nasal Cavity Updated: 10/04/20 1448     COVID19 Not Detected    Narrative:      Fact sheet for providers: https://www.fda.gov/media/898991/download     Fact sheet for patients: https://www.fda.gov/media/209496/download    BUN [[id number]]  (Abnormal) Collected: 10/04/20 1353    Specimen: Blood Updated: 10/04/20 1443     BUN 33 mg/dL     Comprehensive Metabolic Panel [[id number]]  (Abnormal) Collected: 10/04/20 1353    Specimen: Blood Updated: 10/04/20 1438     Glucose 134 mg/dL      BUN --      Comment: Testing performed by alternate method        Creatinine 1.40 mg/dL      Sodium 142 mmol/L      Potassium 4.2 mmol/L      Chloride 108 mmol/L      CO2 20.0 mmol/L      Calcium 9.0 mg/dL      Total Protein 6.9 g/dL      Albumin 3.80 g/dL      ALT (SGPT) 625 U/L      AST (SGOT) 433 U/L      Alkaline Phosphatase 79 U/L      Total Bilirubin 1.2 mg/dL      eGFR Non African Amer 51 mL/min/1.73      Globulin 3.1 gm/dL      A/G Ratio 1.2 g/dL      BUN/Creatinine Ratio --     Comment: Testing not performed        Anion Gap 14.0 mmol/L     Narrative:      GFR Normal >60  Chronic Kidney Disease <60  Kidney Failure <15      Gold Top - [facility] [[id number]] Collected: 10/04/20 1353    Specimen: Blood Updated: 10/04/20 1423    CBC & Differential [[id number]]  (Abnormal) Collected: 10/04/20 1353    Specimen: Blood Updated: 10/04/20 1419    Narrative:      The following orders were created for panel order CBC & Differential.  Procedure                               Abnormality         Status                     ---------                               -----------         ------                     CBC Auto Differential[[id number]]        Abnormal            Final result                 Please view results for these tests on the individual orders.    CBC Auto Differential [[id number]]  (Abnormal) Collected: 10/04/20 1353    Specimen: Blood Updated: 10/04/20 1419     WBC 11.80 10*3/mm3      RBC 4.84 10*6/mm3      Hemoglobin 13.1 g/dL      Hematocrit 40.8 %      MCV 84.3 fL      MCH 27.1 pg      MCHC 32.1 g/dL      RDW 19.3 %      RDW-SD 56.4 fl      MPV 9.4 fL      Platelets 162 10*3/mm3      Neutrophil % 78.0 %      Lymphocyte % 13.4 %      Monocyte % 8.2 %      Eosinophil % 0.0 %      Basophil % 0.4 %      Neutrophils, Absolute 9.20 10*3/mm3      Lymphocytes, Absolute 1.60 10*3/mm3      Monocytes, Absolute 1.00 10*3/mm3      Eosinophils, Absolute 0.00 10*3/mm3      Basophils, Absolute 0.10 10*3/mm3      nRBC 0.8 /100 WBC     Blood  Culture - Blood, Arm, Left [[id number]] Collected: 10/04/20 1410    Specimen: Blood from Arm, Left Updated: 10/04/20 1414    Blood Culture - Blood, Foot, Left [[id number]] Collected: 10/04/20 1353    Specimen: Blood from Foot, Left Updated: 10/04/20 1414    Blood Gas, Arterial [[id number]]  (Abnormal) Collected: 10/04/20 1357    Specimen: Arterial Blood Updated: 10/04/20 1400     Site Right Brachial     Bj's Test N/A     pH, Arterial 7.506 pH units      pCO2, Arterial 23.3 mm Hg      pO2, Arterial 70.2 mm Hg      HCO3, Arterial 18.4 mmol/L      Base Excess, Arterial -3.1 mmol/L      Comment: Serial Number: [id number]:  [id number]        O2 Saturation, Arterial 95.8 %      CO2 Content 19.1 mmol/L      Barometric Pressure for Blood Gas --     Comment: N/A        Modality Room Air     FIO2 21 %      Hemodilution No         Imaging Results (Last 24 Hours)     Procedure Component Value Units Date/Time    XR Chest 1 View [[id number]] Collected: 10/04/20 1445     Updated: 10/04/20 1448    Narrative:      XR CHEST 1 VW-     Date of Exam: 10/4/2020 2:21 PM     Indication: Dyspnea.     Comparison: 09/01/2020     Technique: A single view of the chest was obtained.     FINDINGS:      There is marked cardiomegaly unchanged from prior study.  There is  persistent pulmonary vascular congestion.  Lungs are clear.  No definite  pleural effusion identified.             Impression:         Marked cardiomegaly and pulmonary vascular congestion similar prior  studies.  No focal airspace consolidation.        Electronically Signed By-Elias Rascon On:10/4/2020 2:46 PM  This report was finalized on [id number] by  Elias Rascon, .           I reviewed the patient's new clinical results.      Assessment/Plan       Atrial fibrillation with RVR (CMS/HCC)    Hypertensive emergency    Chronic atrial fibrillation (CMS/HCC)    Chronic diastolic CHF (congestive heart failure) (CMS/HCC)    CKD (chronic kidney disease) stage 3, GFR 30-59 ml/min     Essential hypertension    Gambling disorder, persistent    Coronary artery disease involving native coronary artery of native heart without angina pectoris    Psoriasis    History of CVA (cerebrovascular accident)    GERD (gastroesophageal reflux disease)    Medically noncompliant    Mild cognitive impairment    Dyspnea          Plan:   (Await cardiology consult. His liver enzymes are elevated and haven't been in the past. RN reports that he hasn't complained of any abdominal pain. Check additional labs and monitor closely. ).       I discussed the patients findings and my recommendations with nursing staff and primary care team    CLARISSA Meraz  10/05/20  13:06 EDT

## 2020-10-05 NOTE — NURSING NOTE
Spoke with Hay Pretty, patients brother. Told brother that day shift nurse can call him tomorrow when patient is awake, and a password can be set up.

## 2020-10-06 LAB
ALBUMIN SERPL-MCNC: 3.6 G/DL (ref 3.5–5.2)
ALBUMIN/GLOB SERPL: 0.9 G/DL
ALP SERPL-CCNC: 80 U/L (ref 39–117)
ALT SERPL W P-5'-P-CCNC: 364 U/L (ref 1–41)
ANION GAP SERPL CALCULATED.3IONS-SCNC: 14 MMOL/L (ref 5–15)
AST SERPL-CCNC: 99 U/L (ref 1–40)
BASOPHILS # BLD AUTO: 0.1 10*3/MM3 (ref 0–0.2)
BASOPHILS NFR BLD AUTO: 0.6 % (ref 0–1.5)
BILIRUB SERPL-MCNC: 1.1 MG/DL (ref 0–1.2)
BUN SERPL-MCNC: 28 MG/DL (ref 8–23)
BUN SERPL-MCNC: ABNORMAL MG/DL
BUN/CREAT SERPL: ABNORMAL
CALCIUM SPEC-SCNC: 9.3 MG/DL (ref 8.6–10.5)
CHLORIDE SERPL-SCNC: 101 MMOL/L (ref 98–107)
CO2 SERPL-SCNC: 25 MMOL/L (ref 22–29)
CREAT SERPL-MCNC: 1.19 MG/DL (ref 0.76–1.27)
DEPRECATED RDW RBC AUTO: 64.8 FL (ref 37–54)
EOSINOPHIL # BLD AUTO: 0.1 10*3/MM3 (ref 0–0.4)
EOSINOPHIL NFR BLD AUTO: 1.4 % (ref 0.3–6.2)
ERYTHROCYTE [DISTWIDTH] IN BLOOD BY AUTOMATED COUNT: 20.7 % (ref 12.3–15.4)
GFR SERPL CREATININE-BSD FRML MDRD: 62 ML/MIN/1.73
GLOBULIN UR ELPH-MCNC: 3.8 GM/DL
GLUCOSE SERPL-MCNC: 113 MG/DL (ref 65–99)
HCT VFR BLD AUTO: 43.9 % (ref 37.5–51)
HGB BLD-MCNC: 13.6 G/DL (ref 13–17.7)
LYMPHOCYTES # BLD AUTO: 1.4 10*3/MM3 (ref 0.7–3.1)
LYMPHOCYTES NFR BLD AUTO: 15 % (ref 19.6–45.3)
MAGNESIUM SERPL-MCNC: 2.1 MG/DL (ref 1.6–2.4)
MCH RBC QN AUTO: 27.9 PG (ref 26.6–33)
MCHC RBC AUTO-ENTMCNC: 30.9 G/DL (ref 31.5–35.7)
MCV RBC AUTO: 90 FL (ref 79–97)
MONOCYTES # BLD AUTO: 1 10*3/MM3 (ref 0.1–0.9)
MONOCYTES NFR BLD AUTO: 10.7 % (ref 5–12)
NEUTROPHILS NFR BLD AUTO: 6.6 10*3/MM3 (ref 1.7–7)
NEUTROPHILS NFR BLD AUTO: 72.3 % (ref 42.7–76)
NRBC BLD AUTO-RTO: 0.2 /100 WBC (ref 0–0.2)
NT-PROBNP SERPL-MCNC: 3778 PG/ML (ref 0–900)
PLATELET # BLD AUTO: 168 10*3/MM3 (ref 140–450)
PMV BLD AUTO: 8.8 FL (ref 6–12)
POTASSIUM SERPL-SCNC: 3.4 MMOL/L (ref 3.5–5.2)
PROCALCITONIN SERPL-MCNC: 0.12 NG/ML (ref 0–0.25)
PROT SERPL-MCNC: 7.4 G/DL (ref 6–8.5)
RBC # BLD AUTO: 4.88 10*6/MM3 (ref 4.14–5.8)
SODIUM SERPL-SCNC: 140 MMOL/L (ref 136–145)
WBC # BLD AUTO: 9.2 10*3/MM3 (ref 3.4–10.8)

## 2020-10-06 PROCEDURE — 85025 COMPLETE CBC W/AUTO DIFF WBC: CPT | Performed by: FAMILY MEDICINE

## 2020-10-06 PROCEDURE — 99233 SBSQ HOSP IP/OBS HIGH 50: CPT | Performed by: INTERNAL MEDICINE

## 2020-10-06 PROCEDURE — 83880 ASSAY OF NATRIURETIC PEPTIDE: CPT | Performed by: NURSE PRACTITIONER

## 2020-10-06 PROCEDURE — 87040 BLOOD CULTURE FOR BACTERIA: CPT | Performed by: FAMILY MEDICINE

## 2020-10-06 PROCEDURE — 80053 COMPREHEN METABOLIC PANEL: CPT | Performed by: NURSE PRACTITIONER

## 2020-10-06 PROCEDURE — 25010000002 FUROSEMIDE PER 20 MG: Performed by: FAMILY MEDICINE

## 2020-10-06 PROCEDURE — 83735 ASSAY OF MAGNESIUM: CPT | Performed by: FAMILY MEDICINE

## 2020-10-06 PROCEDURE — 97116 GAIT TRAINING THERAPY: CPT

## 2020-10-06 RX ORDER — LABETALOL HYDROCHLORIDE 5 MG/ML
20 INJECTION, SOLUTION INTRAVENOUS EVERY 6 HOURS PRN
Status: DISCONTINUED | OUTPATIENT
Start: 2020-10-06 | End: 2020-10-15 | Stop reason: HOSPADM

## 2020-10-06 RX ADMIN — FUROSEMIDE 40 MG: 10 INJECTION, SOLUTION INTRAMUSCULAR; INTRAVENOUS at 05:48

## 2020-10-06 RX ADMIN — LABETALOL 20 MG/4 ML (5 MG/ML) INTRAVENOUS SYRINGE 20 MG: at 06:32

## 2020-10-06 RX ADMIN — Medication 10 ML: at 08:19

## 2020-10-06 RX ADMIN — METOPROLOL SUCCINATE 50 MG: 50 TABLET, EXTENDED RELEASE ORAL at 08:18

## 2020-10-06 RX ADMIN — ALLOPURINOL 100 MG: 100 TABLET ORAL at 08:18

## 2020-10-06 RX ADMIN — SODIUM BICARBONATE 650 MG TABLET 650 MG: at 08:18

## 2020-10-06 RX ADMIN — SODIUM BICARBONATE 650 MG TABLET 650 MG: at 15:10

## 2020-10-06 RX ADMIN — HYDRALAZINE HYDROCHLORIDE 50 MG: 25 TABLET, FILM COATED ORAL at 15:10

## 2020-10-06 RX ADMIN — HYDRALAZINE HYDROCHLORIDE 50 MG: 25 TABLET, FILM COATED ORAL at 21:09

## 2020-10-06 RX ADMIN — RISPERIDONE 0.5 MG: 0.25 TABLET ORAL at 21:09

## 2020-10-06 RX ADMIN — ASPIRIN 81 MG: 81 TABLET, COATED ORAL at 08:19

## 2020-10-06 RX ADMIN — SODIUM BICARBONATE 650 MG TABLET 650 MG: at 21:09

## 2020-10-06 RX ADMIN — ATORVASTATIN CALCIUM 10 MG: 10 TABLET, FILM COATED ORAL at 21:09

## 2020-10-06 RX ADMIN — SPIRONOLACTONE 12.5 MG: 25 TABLET, FILM COATED ORAL at 08:18

## 2020-10-06 RX ADMIN — HYDRALAZINE HYDROCHLORIDE 50 MG: 25 TABLET, FILM COATED ORAL at 08:18

## 2020-10-06 RX ADMIN — RISPERIDONE 0.5 MG: 0.25 TABLET ORAL at 08:18

## 2020-10-06 RX ADMIN — LOSARTAN POTASSIUM 50 MG: 50 TABLET, FILM COATED ORAL at 08:21

## 2020-10-06 RX ADMIN — DILTIAZEM HYDROCHLORIDE 180 MG: 180 CAPSULE, COATED, EXTENDED RELEASE ORAL at 08:18

## 2020-10-06 RX ADMIN — Medication 10 ML: at 21:09

## 2020-10-06 NOTE — PLAN OF CARE
Problem: Adult Inpatient Plan of Care  Goal: Plan of Care Review  Outcome: Ongoing, Progressing  Flowsheets (Taken 10/6/2020 1604)  Plan of Care Reviewed With: patient  Outcome Summary: Pt demonstrated improved functional mobility this date, requiring no physical assistance for bed mobilty this date. Pt ambulated in room 60 feet with SPC HR elevated to 140s and pt demonstrated so SOA and labored breathing. Pt transferred to [facility] with min VC for safe transfer following ambulation. Recommendation at this time remains IP rehab due to falls risk and functioning below baseline. PPE worn includes gloves and mask with goggles.

## 2020-10-06 NOTE — PROGRESS NOTES
[facility]   Complex Case Manager   Initial Evaluation    LACE Score:  16    Patient Name:Carlos Whipple  :1955  Admitting Physician:RANDAL cerda  Current Admission Date: 10/4/2020   Previous Admission: , , 10/4  Admission frequency: 6 admissions in 6 months  PCP: RANDAL cerda    History per record:    VHF, Pulm edema, Urgent HT crisis, confusion, Afib with RVR, medical noncompliance, CVA,       Current Home Medications:  Prior to Admission medications    Medication Sig Start Date End Date Taking? Authorizing Provider   aspirin 81 MG EC tablet Take 81 mg by mouth Daily.   Yes Provider, MD Celso   losartan (COZAAR) 50 MG tablet Take 1 tablet by mouth Daily. 20  Yes Daylin Burris APRN   allopurinol (ZYLOPRIM) 100 MG tablet Take 1 tablet by mouth Daily. 20   Daylin Burris APRN   bumetanide (BUMEX) 1 MG tablet Take 1 tablet by mouth Daily. 20   Daylin Burris APRN   hydrALAZINE (APRESOLINE) 100 MG tablet Take 1 tablet by mouth 3 (Three) Times a Day. 20   Daylin Burris APRN   Metoprolol Succinate 50 MG capsule extended-release 24 hour sprinkle Take 50 mg by mouth Daily. 20   Daylin Burris APRN   pravastatin (PRAVACHOL) 40 MG tablet Take 40 mg by mouth Every Night.    Provider, MD Celso   risperiDONE (risperDAL) 0.5 MG tablet Take 1 tablet by mouth Every 12 (Twelve) Hours. 20   Daylin Burris APRN   rivaroxaban (Xarelto) 20 MG tablet Take 1 tablet by mouth Daily With Dinner for 30 days. 9/12/20 10/12/20  Daylin Burris APRN   sodium bicarbonate 650 MG tablet Take 1 tablet by mouth 3 (Three) Times a Day. 20   Daylin Burris APRN   spironolactone (ALDACTONE) 25 MG tablet Take 0.5 tablets by mouth Daily. 20   Daylin Burris APRN         Discharge plan and disposition last admit:   [facility]. Call placed to [facility] rep [place] to see if patient started home health. She states he has had several referrals but never answers when they  "call him to come to his home. She states that has occurred several times. Patient had referral to rehab but insurance denied him last admission.         Current Discharge Plan:PT is recommending inpatient rehab. Patient is agreeable. It is also noted that this patient has [facility] services. MSW Ede notified. Ede states she will look into his [facility] services and see if he may possible be a candidate for assisted living after rehab.        Social history: Patient has mild cognitive impairment. He states he lives with a \"friend\" and his brother and sister check on him. Call placed to siblings and left message.         Mental Status:  Patient has documented episodes of confusion, history of mid cognitive impairment. Spoke to patient at bedside and he is alert and oriented at this time. He is not agitated and agrees to talk with  about rehab at [place].         Education provided to patient: Education on needs for compliance with medications for heat failure and afib. Educated on disese process.       Acceptance of learning: Patient states, \" I really dont understand all that heart stuff and I forget things\" Patient does admit he needs help with remembering his medications and he wants to stay out of the hospital.       Identified needs/barriers: Patient is recommended for inpatient rehab. His needs go further in that he needs assistance daily with medicatins to be comliance and he doesn't have anyone to do that. MSW to look into his [facility] services.         Complex Case Plan: To inpatient rehab with referral to MSW for [facility] services and to see if patient can go to assisted living situation after rehab.     Patient goal: \"To stay out of the hospital and feel better. \"       Complex Case Manager interactive time: 15 minutes at bedside with googles and mask. 30 minutes in chart review of prior admissions    Juliana Blunt RN  Complex Case Manager  [facility] " Coordination  [phone]-cell  [phone]  [phone]  [email or web address]

## 2020-10-06 NOTE — PROGRESS NOTES
LOS: 2 days   Patient Care Team:  Marisol Larson APRN as PCP - General  Shwetha Barber MD as Consulting Physician (Nephrology)  Edy Thomas MD as Consulting Physician (Cardiology)    Chief Complaint: Little drowsy, says he still does not understand about how to take his medications he thinks all of them are just once a day.  Patient has a lot of problems with noncompliance with medication and has had multiple admissions for similar problems of A. fib RVR with CHF due to medication noncompliance    Subjective Easily awakened but still little drowsy this morning    Interval History: Still with problems with blood pressure control.  Got another dose of labetalol this morning.    Patient Complaints: Being tired, little bit confused at times but easily reoriented  Patient Denies: Denies any palpitations or chest pain  History taken from: patient    Review of Systems:    All systems were reviewed and negative except for: See interval history    Objective     Vital Signs  Temp:  [97.6 °F (36.4 °C)-97.9 °F (36.6 °C)] 97.6 °F (36.4 °C)  Heart Rate:  [] 90  Resp:  [15-20] 16  BP: (106-152)/(77-99) 152/90    Physical Exam:     General Appearance:    Alert, cooperative, in no acute distress, poor hygiene, appears markedly older than stated age   Head:    Normocephalic, without obvious abnormality, atraumatic   Eyes:            Conjunctivae and sclerae normal, no   icterus, no pallor, corneas clear, PERRLA   Throat:   No oral lesions, no thrush, oral mucosa moist   Neck:   No adenopathy, supple, trachea midline, no thyromegaly, no   carotid bruit, no JVD   Lungs:     Clear to auscultation,respirations regular, even and                  unlabored, distant some coarse upper airway sounds this morning    Heart:   Irregularly irregular with rates in the 90s   Chest Wall:    No abnormalities observed   Abdomen:     Normal bowel sounds, no masses, no organomegaly, soft        non-tender, non-distended, no  guarding, no rebound                tenderness, rotund   Rectal:     Deferred   Extremities:   Moves all extremities . no edema, no cyanosis, no             redness   Pulses:   Pulses palpable and equal bilaterally   Skin:   No bleeding, bruising or rash   Lymph nodes:   No palpable adenopathy   Neurologic:   Cranial nerves 2 - 12 grossly intact, sensation intact, DTR       present and equal bilaterally   Radiology:  Xr Chest 1 View    Result Date: 10/4/2020   Marked cardiomegaly and pulmonary vascular congestion similar prior studies.  No focal airspace consolidation.   Electronically Signed By-Elias Rascon On:10/4/2020 2:46 PM This report was finalized on [id number] by  Elias Rascon, .         Results Review:     I reviewed the patient's new clinical results.  I reviewed the patient's new imaging results and agree with the interpretation.    Medication Review:   Scheduled Meds:allopurinol, 100 mg, Oral, Daily  aspirin, 81 mg, Oral, Daily  atorvastatin, 10 mg, Oral, Nightly  dilTIAZem CD, 180 mg, Oral, Q24H  hydrALAZINE, 50 mg, Oral, TID  losartan, 50 mg, Oral, Q24H  metoprolol succinate XL, 50 mg, Oral, Q24H  risperiDONE, 0.5 mg, Oral, Q12H  rivaroxaban, 20 mg, Oral, Daily With Dinner  sodium bicarbonate, 650 mg, Oral, TID  sodium chloride, 10 mL, Intravenous, Q12H  spironolactone, 12.5 mg, Oral, Daily      Continuous Infusions:dilTIAZem, 5-15 mg/hr, Last Rate: Stopped (10/05/20 1842)      PRN Meds:.•  acetaminophen **OR** acetaminophen **OR** acetaminophen  •  bisacodyl  •  calcium carbonate  •  HYDROcodone-acetaminophen  •  influenza vaccine  •  labetalol  •  LORazepam  •  magnesium hydroxide  •  melatonin  •  nitroglycerin  •  ondansetron **OR** ondansetron  •  potassium chloride **OR** potassium chloride **OR** potassium chloride  •  sodium chloride  •  sodium chloride    Labs:  Lab Results (last 24 hours)     Procedure Component Value Units Date/Time    Blood Culture - Blood, Arm, Left [[id number]]   (Abnormal) Collected: 10/04/20 1410    Specimen: Blood from Arm, Left Updated: 10/06/20 0719     Blood Culture Staphylococcus, coagulase negative     Comment: Probable contaminant requires clinical correlation, susceptibility not performed unless requested by physician.          Isolated from Anaerobic Bottle     Gram Stain Anaerobic Bottle Gram positive cocci in clusters      Aerobic Bottle Gram positive bacilli    Narrative:      Blood culture does not meet the specified criteria for PCR identification.  If pregnant, immunocompromised, or clinical concern for meningitis, call lab to run BCID for Listeria monocytogenes.    Comprehensive Metabolic Panel [[id number]] Updated: 10/06/20 0703    Specimen: Blood     Magnesium [[id number]]  (Normal) Collected: 10/06/20 0324    Specimen: Blood Updated: 10/06/20 0524     Magnesium 2.1 mg/dL     BNP [[id number]]  (Abnormal) Collected: 10/06/20 0324    Specimen: Blood Updated: 10/06/20 0514     proBNP 3,778.0 pg/mL     Narrative:      Among patients with dyspnea, NT-proBNP is highly sensitive for the detection of acute congestive heart failure. In addition NT-proBNP of <300 pg/ml effectively rules out acute congestive heart failure with 99% negative predictive value.    Results may be falsely decreased if patient taking Biotin.      CBC & Differential [[id number]]  (Abnormal) Collected: 10/06/20 0324    Specimen: Blood Updated: 10/06/20 0437    Narrative:      The following orders were created for panel order CBC & Differential.  Procedure                               Abnormality         Status                     ---------                               -----------         ------                     CBC Auto Differential[[id number]]        Abnormal            Final result                 Please view results for these tests on the individual orders.    CBC Auto Differential [[id number]]  (Abnormal) Collected: 10/06/20 0324    Specimen: Blood Updated: 10/06/20 0437     WBC 9.20  10*3/mm3      RBC 4.88 10*6/mm3      Hemoglobin 13.6 g/dL      Hematocrit 43.9 %      MCV 90.0 fL      MCH 27.9 pg      MCHC 30.9 g/dL      RDW 20.7 %      RDW-SD 64.8 fl      MPV 8.8 fL      Platelets 168 10*3/mm3      Neutrophil % 72.3 %      Lymphocyte % 15.0 %      Monocyte % 10.7 %      Eosinophil % 1.4 %      Basophil % 0.6 %      Neutrophils, Absolute 6.60 10*3/mm3      Lymphocytes, Absolute 1.40 10*3/mm3      Monocytes, Absolute 1.00 10*3/mm3      Eosinophils, Absolute 0.10 10*3/mm3      Basophils, Absolute 0.10 10*3/mm3      nRBC 0.2 /100 WBC     Blood Culture ID, PCR - Blood, Arm, Left [[id number]]  (Abnormal) Collected: 10/04/20 1410    Specimen: Blood from Arm, Left Updated: 10/05/20 2126     BCID, PCR Staphylococcus spp, not aureus. Identification by BCID PCR.     BOTTLE TYPE Anaerobic Bottle    Urinalysis With Culture If Indicated - Urine, Clean Catch [[id number]]  (Normal) Collected: 10/05/20 1639    Specimen: Urine, Clean Catch Updated: 10/05/20 1707     Color, UA Yellow     Appearance, UA Clear     pH, UA <=5.0     Specific Gravity, UA 1.012     Glucose, UA Negative     Ketones, UA Negative     Bilirubin, UA Negative     Blood, UA Negative     Protein, UA Negative     Leuk Esterase, UA Negative     Nitrite, UA Negative     Urobilinogen, UA 0.2 E.U./dL    Narrative:      Urine microscopic not indicated.    BNP [[id number]]  (Abnormal) Collected: 10/05/20 1412    Specimen: Blood Updated: 10/05/20 1603     proBNP 8,350.0 pg/mL     Narrative:      Among patients with dyspnea, NT-proBNP is highly sensitive for the detection of acute congestive heart failure. In addition NT-proBNP of <300 pg/ml effectively rules out acute congestive heart failure with 99% negative predictive value.    Results may be falsely decreased if patient taking Biotin.      Troponin [[id number]]  (Normal) Collected: 10/05/20 1412    Specimen: Blood Updated: 10/05/20 1603     Troponin T <0.010 ng/mL     Narrative:      Troponin T  Reference Range:  <= 0.03 ng/mL-   Negative for AMI  >0.03 ng/mL-     Abnormal for myocardial necrosis.  Clinicians would have to utilize clinical acumen, EKG, Troponin and serial changes to determine if it is an Acute Myocardial Infarction or myocardial injury due to an underlying chronic condition.       Results may be falsely decreased if patient taking Biotin.      Hepatitis Panel, Acute [[id number]]  (Normal) Collected: 10/05/20 1412    Specimen: Blood Updated: 10/05/20 1536     Hepatitis B Surface Ag Non-Reactive     Hep A IgM Non-Reactive     Hep B C IgM Non-Reactive     Hepatitis C Ab Non-Reactive    Narrative:      Results may be falsely decreased if patient taking Biotin.     Blood Culture - Blood, Foot, Left [[id number]] Collected: 10/04/20 1353    Specimen: Blood from Foot, Left Updated: 10/05/20 1415     Blood Culture No growth at 24 hours    BUN [[id number]]  (Abnormal) Collected: 10/05/20 0611    Specimen: Blood Updated: 10/05/20 1045     BUN 32 mg/dL            Assessment/Plan       Atrial fibrillation with RVR (CMS/HCC)    Hypertensive emergency    Chronic atrial fibrillation (CMS/HCC)    Chronic diastolic CHF (congestive heart failure) (CMS/HCC)    CKD (chronic kidney disease) stage 3, GFR 30-59 ml/min    Essential hypertension    Gambling disorder, persistent    Coronary artery disease involving native coronary artery of native heart without angina pectoris    Psoriasis    History of CVA (cerebrovascular accident)    GERD (gastroesophageal reflux disease)    Medically noncompliant    Mild cognitive impairment    Dyspnea  Blood cultures with ID of Staphylococcus, coag negative    Continue current approach.  I advised patient that once again he needs to be scrupulous with taking his medications as prescribed.  At this juncture, his family refuses to help him with this in his left middle lobe mostly to his own devices.    We will repeat blood cultures and also check procalcitonin.  He does not appear to  be septic but for completeness will complete the septic work-up.  He remains afebrile.        Paty Polk,   10/06/20  08:52 EDT

## 2020-10-06 NOTE — PROGRESS NOTES
Please schedule for the follow up with MD / NP  to review sleep study results.  Positive for significant  sleep apnea.    Thanks!     Referring Provider: Dr. Stauffer    Reason for follow-up: Fibrillation     Patient Care Team:  Marisol Larson APRN as PCP - General  Shwetha Barber MD as Consulting Physician (Nephrology)  Edy Thomas MD as Consulting Physician (Cardiology)    Subjective .  No shortness of breath or palpitations today    Objective  Lying in bed comfortably     Review of Systems   Constitution: Negative for fever and malaise/fatigue.   HENT: Negative for ear pain and nosebleeds.    Eyes: Negative for blurred vision and double vision.   Cardiovascular: Negative for chest pain, dyspnea on exertion and palpitations.   Respiratory: Negative for cough and shortness of breath.    Skin: Negative for rash.   Musculoskeletal: Negative for joint pain.   Gastrointestinal: Negative for abdominal pain, nausea and vomiting.   Neurological: Negative for focal weakness and headaches.   Psychiatric/Behavioral: Negative for depression. The patient is not nervous/anxious.    All other systems reviewed and are negative.      Ketorolac tromethamine    Scheduled Meds:allopurinol, 100 mg, Oral, Daily  aspirin, 81 mg, Oral, Daily  atorvastatin, 10 mg, Oral, Nightly  dilTIAZem CD, 180 mg, Oral, Q24H  hydrALAZINE, 50 mg, Oral, TID  losartan, 50 mg, Oral, Q24H  metoprolol succinate XL, 50 mg, Oral, Q24H  risperiDONE, 0.5 mg, Oral, Q12H  rivaroxaban, 20 mg, Oral, Daily With Dinner  sodium bicarbonate, 650 mg, Oral, TID  sodium chloride, 10 mL, Intravenous, Q12H  spironolactone, 12.5 mg, Oral, Daily      Continuous Infusions:dilTIAZem, 5-15 mg/hr, Last Rate: Stopped (10/05/20 1842)      PRN Meds:.•  acetaminophen **OR** acetaminophen **OR** acetaminophen  •  bisacodyl  •  calcium carbonate  •  HYDROcodone-acetaminophen  •  influenza vaccine  •  labetalol  •  LORazepam  •  magnesium hydroxide  •  melatonin  •  nitroglycerin  •  ondansetron **OR** ondansetron  •  potassium chloride **OR** potassium chloride **OR** potassium chloride  •  sodium  "chloride  •  sodium chloride        VITAL SIGNS  Vitals:    10/06/20 1530 10/06/20 1600 10/06/20 1630 10/06/20 1700   BP:       BP Location:       Patient Position:       Pulse: 82 81 88 80   Resp:       Temp:       TempSrc:       SpO2: 96% 95% 95% 94%   Weight:       Height:           Flowsheet Rows      First Filed Value   Admission Height  182.9 cm (72\") Documented at 10/04/2020 1256   Admission Weight  118 kg (259 lb 11.2 oz) Documented at 10/04/2020 1256           TELEMETRY: Atrial fibrillation with controlled medical response    Physical Exam:  Constitutional:       Appearance: Well-developed.   Eyes:      General: No scleral icterus.     Conjunctiva/sclera: Conjunctivae normal.      Pupils: Pupils are equal, round, and reactive to light.   HENT:      Head: Normocephalic and atraumatic.   Neck:      Musculoskeletal: Normal range of motion and neck supple.      Vascular: No carotid bruit or JVD.   Pulmonary:      Effort: Pulmonary effort is normal.      Breath sounds: Normal breath sounds. No wheezing. No rales.   Cardiovascular:      Normal rate. Irregularly irregular rhythm.   Pulses:     Intact distal pulses.   Abdominal:      General: Bowel sounds are normal.      Palpations: Abdomen is soft.   Musculoskeletal: Normal range of motion.   Skin:     General: Skin is warm and dry.      Findings: No rash.   Neurological:      Mental Status: Alert.      Comments: No focal deficits          Results Review:   I reviewed the patient's new clinical results.  Lab Results (last 24 hours)     Procedure Component Value Units Date/Time    Blood Culture - Blood, Hand, Right [[id number]] Collected: 10/06/20 1608    Specimen: Blood from Hand, Right Updated: 10/06/20 1641    BUN [[id number]]  (Abnormal) Collected: 10/06/20 0926    Specimen: Blood Updated: 10/06/20 1628     BUN 28 mg/dL     Blood Culture - Blood, Foot, Left [[id number]] Collected: 10/04/20 1353    Specimen: Blood from Foot, Left Updated: 10/06/20 1415     Blood " "Culture No growth at 2 days    Comprehensive Metabolic Panel [[id number]]  (Abnormal) Collected: 10/06/20 0926    Specimen: Blood Updated: 10/06/20 1016     Glucose 113 mg/dL      BUN --     Comment: Testing performed by alternate method        Creatinine 1.19 mg/dL      Sodium 140 mmol/L      Potassium 3.4 mmol/L      Comment: Slight hemolysis detected by analyzer. Results may be affected.        Chloride 101 mmol/L      CO2 25.0 mmol/L      Calcium 9.3 mg/dL      Total Protein 7.4 g/dL      Albumin 3.60 g/dL      ALT (SGPT) 364 U/L      AST (SGOT) 99 U/L      Comment: Slight hemolysis detected by analyzer. Results may be affected.        Alkaline Phosphatase 80 U/L      Total Bilirubin 1.1 mg/dL      eGFR Non African Amer 62 mL/min/1.73      Globulin 3.8 gm/dL      A/G Ratio 0.9 g/dL      BUN/Creatinine Ratio --     Comment: Testing not performed        Anion Gap 14.0 mmol/L     Narrative:      GFR Normal >60  Chronic Kidney Disease <60  Kidney Failure <15      Blood Culture - Blood, Hand, Right [[id number]] Collected: 10/06/20 0926    Specimen: Blood from Hand, Right Updated: 10/06/20 0947    Procalcitonin [[id number]]  (Normal) Collected: 10/05/20 1412    Specimen: Blood Updated: 10/06/20 0929     Procalcitonin 0.12 ng/mL     Narrative:      As a Marker for Sepsis (Non-Neonates):   1. <0.5 ng/mL represents a low risk of severe sepsis and/or septic shock.  1. >2 ng/mL represents a high risk of severe sepsis and/or septic shock.    As a Marker for Lower Respiratory Tract Infections that require antibiotic therapy:  PCT on Admission     Antibiotic Therapy             6-12 Hrs later  > 0.5                Strongly Recommended            >0.25 - <0.5         Recommended  0.1 - 0.25           Discouraged                   Remeasure/reassess PCT  <0.1                 Strongly Discouraged          Remeasure/reassess PCT      As 28 day mortality risk marker: \"Change in Procalcitonin Result\" (> 80 % or <=80 %) if Day 0 (or " Day 1) and Day 4 values are available. Refer to http://[email or web address]-pct-calculator.com/   Change in PCT <=80 %   A decrease of PCT levels below or equal to 80 % defines a positive change in PCT test result representing a higher risk for 28-day all-cause mortality of patients diagnosed with severe sepsis or septic shock.  Change in PCT > 80 %   A decrease of PCT levels of more than 80 % defines a negative change in PCT result representing a lower risk for 28-day all-cause mortality of patients diagnosed with severe sepsis or septic shock.                Results may be falsely decreased if patient taking Biotin.     Blood Culture - Blood, Arm, Left [[id number]]  (Abnormal) Collected: 10/04/20 1410    Specimen: Blood from Arm, Left Updated: 10/06/20 0719     Blood Culture Staphylococcus, coagulase negative     Comment: Probable contaminant requires clinical correlation, susceptibility not performed unless requested by physician.          Isolated from Anaerobic Bottle     Gram Stain Anaerobic Bottle Gram positive cocci in clusters      Aerobic Bottle Gram positive bacilli    Narrative:      Blood culture does not meet the specified criteria for PCR identification.  If pregnant, immunocompromised, or clinical concern for meningitis, call lab to run BCID for Listeria monocytogenes.    Magnesium [[id number]]  (Normal) Collected: 10/06/20 0324    Specimen: Blood Updated: 10/06/20 0524     Magnesium 2.1 mg/dL     BNP [[id number]]  (Abnormal) Collected: 10/06/20 0324    Specimen: Blood Updated: 10/06/20 0514     proBNP 3,778.0 pg/mL     Narrative:      Among patients with dyspnea, NT-proBNP is highly sensitive for the detection of acute congestive heart failure. In addition NT-proBNP of <300 pg/ml effectively rules out acute congestive heart failure with 99% negative predictive value.    Results may be falsely decreased if patient taking Biotin.      CBC & Differential [[id number]]  (Abnormal) Collected: 10/06/20 0324    Specimen:  Blood Updated: 10/06/20 0437    Narrative:      The following orders were created for panel order CBC & Differential.  Procedure                               Abnormality         Status                     ---------                               -----------         ------                     CBC Auto Differential[[id number]]        Abnormal            Final result                 Please view results for these tests on the individual orders.    CBC Auto Differential [[id number]]  (Abnormal) Collected: 10/06/20 0324    Specimen: Blood Updated: 10/06/20 0437     WBC 9.20 10*3/mm3      RBC 4.88 10*6/mm3      Hemoglobin 13.6 g/dL      Hematocrit 43.9 %      MCV 90.0 fL      MCH 27.9 pg      MCHC 30.9 g/dL      RDW 20.7 %      RDW-SD 64.8 fl      MPV 8.8 fL      Platelets 168 10*3/mm3      Neutrophil % 72.3 %      Lymphocyte % 15.0 %      Monocyte % 10.7 %      Eosinophil % 1.4 %      Basophil % 0.6 %      Neutrophils, Absolute 6.60 10*3/mm3      Lymphocytes, Absolute 1.40 10*3/mm3      Monocytes, Absolute 1.00 10*3/mm3      Eosinophils, Absolute 0.10 10*3/mm3      Basophils, Absolute 0.10 10*3/mm3      nRBC 0.2 /100 WBC     Blood Culture ID, PCR - Blood, Arm, Left [[id number]]  (Abnormal) Collected: 10/04/20 1410    Specimen: Blood from Arm, Left Updated: 10/05/20 2126     BCID, PCR Staphylococcus spp, not aureus. Identification by BCID PCR.     BOTTLE TYPE Anaerobic Bottle          Imaging Results (Last 24 Hours)     ** No results found for the last 24 hours. **          EKG      I personally viewed and interpreted the patient's EKG/Telemetry data:    ECHOCARDIOGRAM:    STRESS MYOVIEW:    CARDIAC CATHETERIZATION:    OTHER:         Assessment/Plan     Principal Problem:    Atrial fibrillation with RVR (CMS/HCC)  Active Problems:    Hypertensive emergency    Chronic atrial fibrillation (CMS/HCC)    Chronic diastolic CHF (congestive heart failure) (CMS/[facility])    CKD (chronic kidney disease) stage 3, GFR 30-59 ml/min     Essential hypertension    Gambling disorder, persistent    Coronary artery disease involving native coronary artery of native heart without angina pectoris    Psoriasis    History of CVA (cerebrovascular accident)    GERD (gastroesophageal reflux disease)    Medically noncompliant    Mild cognitive impairment    Dyspnea       Patient has atrial fibrillation with rapid response and was placed on IV Cardizem and then switched to oral Cardizem and is better now  Patient is also on anticoagulation with Xarelto  Patient's blood pressure and heart rate are stable  Patient's lipid levels are followed with a primary care doctor  Patient is ruled out for MI and has history of coronary disease  Patient has medical noncompliance and discussed with him several times about being compliant    I discussed the patients findings and my recommendations with patient and nurse    Edy Thomas MD  10/06/20  17:20 EDT

## 2020-10-06 NOTE — PROGRESS NOTES
Continued Stay Note   Yoseph     Patient Name: Carlos Whipple  MRN: [id number]  Today's Date: 10/6/2020    Admit Date: 10/4/2020    Discharge Plan     Row Name 10/06/20 1408       Plan    Plan  [facility] referral pending. Pre-cert will be required. No PASRR required for [facility]. 2nd choice = [place].    Plan Comments  Per CM conversation, SW made referral to [facility] via text message and Epic.        BOOM Talavera, LSW    Office: [phone]  Cell: [phone]  Fax: [phone]  E-mail: [email or web address]

## 2020-10-06 NOTE — PLAN OF CARE
Goal Outcome Evaluation:  Plan of Care Reviewed With: patient  Progress: no change   Pt has been alert with a slightly elevated bp throughout shift, no c/o pain or discomfort. Pt has been out of restraints since before he beginning of the shift and has been doing well. Pt remains in afib with hr rate 80-90s. Pt in bed free of falls with call light in reach, will continue to monitor.

## 2020-10-06 NOTE — PROGRESS NOTES
"Heart Failure Program  Nurse Navigator  Discharge Planning    Patient Name:Carlos Whipple  :1955  Cardiologist:Martha  Current Admission Date: 10/4/2020   Previous Admission: 20  Admission frequency: 4 admissions in 6 months    Heart Failure history per record:    Symptoms on admission:Pt c/o SOA, palpitations, confusion upon arrival. Pt readmission, last discharge form [facility] on  20 with  referral. Pt unsure if he recieved visit from  nurse, unclear on details since last discharge telling multiple situations. Pt acknowledge that he has not been taking his medications, \"I forget to take them\", pt advises that no one is available to help with his medication regimen. Pt adds that when he was discharged he felt well however became weaker since and SOA.       Admission Weight:  Flowsheet Rows      First Filed Value   Admission Height  182.9 cm (72\") Documented at 10/04/2020 1256   Admission Weight  118 kg (259 lb 11.2 oz) Documented at 10/04/2020 1256            Current Home Medications:  Prior to Admission medications    Medication Sig Start Date End Date Taking? Authorizing Provider   aspirin 81 MG EC tablet Take 81 mg by mouth Daily.   Yes ProviderCelso MD   losartan (COZAAR) 50 MG tablet Take 1 tablet by mouth Daily. 20  Yes Daylin Burris APRN   allopurinol (ZYLOPRIM) 100 MG tablet Take 1 tablet by mouth Daily. 20   Daylin Burris APRN   bumetanide (BUMEX) 1 MG tablet Take 1 tablet by mouth Daily. 20   Daylin Burris APRN   hydrALAZINE (APRESOLINE) 100 MG tablet Take 1 tablet by mouth 3 (Three) Times a Day. 20   Daylin Burris APRN   Metoprolol Succinate 50 MG capsule extended-release 24 hour sprinkle Take 50 mg by mouth Daily. 20   Daylin Burris APRN   pravastatin (PRAVACHOL) 40 MG tablet Take 40 mg by mouth Every Night.    ProviderCelso MD   risperiDONE (risperDAL) 0.5 MG tablet Take 1 tablet by mouth Every 12 (Twelve) Hours. " 9/12/20   Daylin Burris APRN   rivaroxaban (Xarelto) 20 MG tablet Take 1 tablet by mouth Daily With Dinner for 30 days. 9/12/20 10/12/20  Daylin Burris APRN   sodium bicarbonate 650 MG tablet Take 1 tablet by mouth 3 (Three) Times a Day. 9/12/20   Daylin Burris APRN   spironolactone (ALDACTONE) 25 MG tablet Take 0.5 tablets by mouth Daily. 9/13/20   Daylin Burris APRN       Social history:   Lives with brother temporarily, pt advising that he wants to go back home to live by self.  Unsure how much support brother is able to assist with patient's care.  Pt advises he does not weigh himself nor follow a particular diet, he does not have a routine med regimen or help with his meds. Pt admits he often forgets to take them or get them filled.      Smoking status:none    Diagnostics Testing:  proBNP level: 71348>8350>[id number]    Echocardiogram:  Results for orders placed during the hospital encounter of 11/07/19   Adult Transthoracic Echo Complete W/ Cont if Necessary Per Protocol    Narrative · The left ventricular cavity is severely dilated.  · Left ventricular systolic function is moderately decreased.  · Left atrial cavity size is moderately dilated.  · Moderate mitral valve regurgitation is present  · Moderate tricuspid valve regurgitation is present.  · The following left ventricular wall segments are hypokinetic: mid   anterior, apical anterior, basal anterolateral, mid anterolateral, apical   lateral, basal inferolateral, mid inferolateral, apical inferior, mid   inferior, apical septal, basal inferoseptal, mid inferoseptal, apex   hypokinetic, mid anteroseptal, basal anterior, basal inferior and basal   inferoseptal.  · LV ejection fraction is about 30 to 35%  · No pericardial effusion noted            Patient Assessment:   Pt lying in bed, resp even and unlabored, no SOA with conversation.  Noted swelling 1+ RLE to mid shin, some swelling LLE as well non-pitting. Alert and orient at this  time.    Current O2: 2L NC  Home O2:      Education provided to patient:  yes- Heart Failure disease education  yes -Symptom identification/management  yes -Daily Weights  yes- Diet education  n/- Fluid restriction (if ordered)  Yes- Activity education  yes- Medication education  n/a- Smoking cessation  yes- Follow-up Appointments    Acceptance of learning: ambivalent, repeating education, unsure if patient is able to grasp concepts  Pt discuss that he is willing to go to rehab after discharge if needed     Heart Failure education interactive teaching session time: 30 minutes    Identified needs/barriers:   Medication adherence, mobility, cognition?, support with his care    Intervention:   Discuss with CM    Patient goal:

## 2020-10-06 NOTE — THERAPY TREATMENT NOTE
Patient Name: Carlos Whipple  : 1955    MRN: [id number]                              Today's Date: 10/6/2020       Admit Date: 10/4/2020    Visit Dx:     ICD-10-CM ICD-9-CM   1. Dyspnea, unspecified type  R06.00 786.09   2. Atrial fibrillation with RVR (CMS/HCC)  I48.91 427.31     Patient Active Problem List   Diagnosis   • Hypertensive emergency   • Chronic atrial fibrillation (CMS/HCC)   • Chronic diastolic CHF (congestive heart failure) (CMS/HCC)   • CKD (chronic kidney disease) stage 3, GFR 30-59 ml/min   • Essential hypertension   • Gambling disorder, persistent   • Shortness of breath   • Coronary artery disease involving native coronary artery of native heart without angina pectoris   • Psoriasis   • History of CVA (cerebrovascular accident)   • DDD (degenerative disc disease), lumbosacral   • Peripheral polyneuropathy   • GERD (gastroesophageal reflux disease)   • Medically noncompliant   • Acute respiratory distress   • Unstable angina (CMS/HCC)   • Cervical disc disorder with radiculopathy   • Completed stroke (CMS/HCC)   • Degeneration of intervertebral disc   • Excessive anticoagulation   • Impaired left ventricular function   • Liver lesion   • Lumbar radiculopathy   • Cervical myelopathy (CMS/HCC)   • Lumbosacral radiculopathy   • Spinal stenosis of lumbar region   • Obesity   • Mild cognitive impairment   • Thoracic back pain   • Thoracic disc disease with myelopathy   • Paroxysmal atrial fibrillation (CMS/HCC)   • Acute congestive heart failure (CMS/HCC)   • Atrial fibrillation with RVR (CMS/HCC)   • Dermatitis associated with moisture   • Dyspnea     Past Medical History:   Diagnosis Date   • A-fib (CMS/HCC)    • Atrial fibrillation with RVR (CMS/HCC)    • CHF (congestive heart failure) (CMS/HCC)    • Chronic atrial fibrillation (CMS/HCC) 2019   • Chronic diastolic CHF (congestive heart failure) (CMS/HCC) 2019   • CKD (chronic kidney disease) stage 3, GFR 30-59 ml/min 2019    • Coronary artery disease involving native coronary artery of native heart without angina pectoris 11/8/2019   • DDD (degenerative disc disease), lumbosacral 11/8/2019   • Essential hypertension 11/8/2019   • Gambling disorder, persistent 11/8/2019   • GERD (gastroesophageal reflux disease) 11/8/2019   • History of CVA (cerebrovascular accident) 11/8/2019   • Hyperlipidemia    • Hypertension    • Medically noncompliant 11/8/2019   • Peripheral polyneuropathy 11/8/2019   • Psoriasis 11/8/2019   • Stroke (CMS/HCC)      Past Surgical History:   Procedure Laterality Date   • CARDIAC CATHETERIZATION     • CARDIAC CATHETERIZATION N/A 12/10/2019    Procedure: Left Heart Cath and coronary angiogram;  Surgeon: Edy Thomas MD;  Location: [facility] CATH INVASIVE LOCATION;  Service: Cardiovascular     General Information     Row Name 10/06/20 1601          Physical Therapy Time and Intention    Document Type  therapy note (daily note)  -EL     Mode of Treatment  physical therapy  -EL     Row Name 10/06/20 1601          General Information    Patient Profile Reviewed  yes  -EL       User Key  (r) = Recorded By, (t) = Taken By, (c) = Cosigned By    Initials Name Provider Type    EL Dung Win, PT Physical Therapist        Mobility     Row Name 10/06/20 1601          Bed Mobility    Bed Mobility  supine-sit  -EL     Supine-Sit Harvey (Bed Mobility)  supervision  -EL     Row Name 10/06/20 1601          Sit-Stand Transfer    Sit-Stand Harvey (Transfers)  contact guard  -EL     Row Name 10/06/20 1601          Gait/Stairs (Locomotion)    Harvey Level (Gait)  contact guard  -EL     Assistive Device (Gait)  cane, straight  -EL     Distance in Feet (Gait)  60 feet  -EL     Deviations/Abnormal Patterns (Gait)  ricky decreased;gait speed decreased;stride length decreased;weight shifting decreased  -EL     Comment (Gait/Stairs)  Pt with SOA, labored breathing after ambulation  -EL       User Key  (r) = Recorded By, (t)  = Taken By, (c) = Cosigned By    Initials Name Provider Type    Dung Santiago PT Physical Therapist        Obj/Interventions    No documentation.       Goals/Plan    No documentation.       Clinical Impression     Row Name 10/06/20 1604          Pain Scale: Numbers Pre/Post-Treatment    Pretreatment Pain Rating  0/10 - no pain  -EL     Posttreatment Pain Rating  0/10 - no pain  -EL     Row Name 10/06/20 1604          Plan of Care Review    Plan of Care Reviewed With  patient  -EL     Outcome Summary  Pt demonstrated improved functional mobility this date, requiring no physical assistance for bed mobilty this date. Pt ambulated in room 60 feet with SPC HR elevated to 140s and pt demonstrated so SOA and labored breathing. Pt transferred to [facility] with min VC for safe transfer following ambulation. Recommendation at this time remains IP rehab due to falls risk and functioning below baseline. PPE worn includes gloves and mask with goggles.  -     Row Name 10/06/20 1604          Therapy Assessment/Plan (PT)    Rehab Potential (PT)  good, to achieve stated therapy goals  -EL     Criteria for Skilled Interventions Met (PT)  yes;meets criteria  -EL     Row Name 10/06/20 1604          Vital Signs    O2 Delivery Pre Treatment  room air  -EL     O2 Delivery Intra Treatment  room air  -EL     O2 Delivery Post Treatment  room air  -EL     Pre Patient Position  Supine  -EL     Intra Patient Position  Standing  -EL     Post Patient Position  Sitting  -EL     Row Name 10/06/20 1604          Positioning and Restraints    Pre-Treatment Position  in bed  -EL     Post Treatment Position  chair  -EL     In Chair  notified nsg;sitting;call light within reach;encouraged to call for assist;exit alarm on  -EL       User Key  (r) = Recorded By, (t) = Taken By, (c) = Cosigned By    Initials Name Provider Type    Dung Santiago PT Physical Therapist        Outcome Measures     Row Name 10/06/20 1607          How much help from another person  do you currently need...    Turning from your back to your side while in flat bed without using bedrails?  4  -EL     Moving from lying on back to sitting on the side of a flat bed without bedrails?  4  -EL     Moving to and from a bed to a chair (including a wheelchair)?  3  -EL     Standing up from a chair using your arms (e.g., wheelchair, bedside chair)?  3  -EL     Climbing 3-5 steps with a railing?  3  -EL     To walk in hospital room?  3  -EL     AM-PAC 6 Clicks Score (PT)  20  -EL     Row Name 10/06/20 1607          Modified [place] Scale    Pre-Stroke Modified [place] Scale  2 - Slight disability.  Unable to carry out all previous activities but able to look after own affairs without assistance.  -EL     Modified [place] Scale  4 - Moderately severe disability.  Unable to walk without assistance, and unable to attend to own bodily needs without assistance.  -       User Key  (r) = Recorded By, (t) = Taken By, (c) = Cosigned By    Initials Name Provider Type    Dung Santiago PT Physical Therapist        Physical Therapy Education                 Title: PT OT SLP Therapies (Done)     Topic: Physical Therapy (Done)     Point: Mobility training (Done)     Learning Progress Summary           Patient Acceptance, E,TB, VU by HINA at 10/6/2020 1608    Acceptance, E,TB, VU,NR by MARY at 10/5/2020 1316    Acceptance, E, VU by  at 10/5/2020 0830                   Point: Home exercise program (Done)     Learning Progress Summary           Patient Acceptance, E,TB, VU,NR by MARY at 10/5/2020 1316                   Point: Body mechanics (Done)     Learning Progress Summary           Patient Acceptance, E,TB, VU,NR by MARY at 10/5/2020 1316                   Point: Precautions (Done)     Learning Progress Summary           Patient Acceptance, E,TB, VU by HINA at 10/6/2020 1608    Acceptance, E,TB, VU,NR by MARY at 10/5/2020 1316    Acceptance, E, VU by  at 10/5/2020 0830                               User Key     Initials  Effective Dates Name Provider Type Discipline     06/23/20 -  Dung Win, PT Physical Therapist PT     03/01/19 -  Zuleika White, RN Registered Nurse Nurse     06/15/19 -  Sofy Matt PT Physical Therapist PT              PT Recommendation and Plan     Plan of Care Reviewed With: patient  Outcome Summary: Pt demonstrated improved functional mobility this date, requiring no physical assistance for bed mobilty this date. Pt ambulated in room 60 feet with SPC HR elevated to 140s and pt demonstrated so SOA and labored breathing. Pt transferred to [facility] with min VC for safe transfer following ambulation. Recommendation at this time remains IP rehab due to falls risk and functioning below baseline. PPE worn includes gloves and mask with goggles.     Time Calculation:   PT Charges     Row Name 10/06/20 1609             Time Calculation    Start Time  1130  -EL      Stop Time  1148  -EL      Time Calculation (min)  18 min  -EL      PT Received On  10/06/20  -EL      PT - Next Appointment  10/07/20  -EL         Time Calculation- PT    Total Timed Code Minutes- PT  18 minute(s)  -EL        User Key  (r) = Recorded By, (t) = Taken By, (c) = Cosigned By    Initials Name Provider Type    EL Dung Win PT Physical Therapist        Therapy Charges for Today     Code Description Service Date Service Provider Modifiers Qty    04654498503 HC GAIT TRAINING EA 15 MIN 10/6/2020 Dung Win PT GP 1          PT G-Codes  Outcome Measure Options: AM-PAC 6 Clicks Basic Mobility (PT), Modified [place]  AM-PAC 6 Clicks Score (PT): 20  Modified [place] Scale: 4 - Moderately severe disability.  Unable to walk without assistance, and unable to attend to own bodily needs without assistance.    Dung Win PT  10/6/2020

## 2020-10-06 NOTE — PROGRESS NOTES
Discharge Planning Assessment   [facility]     Patient Name: Carlos Whipple  MRN: [id number]  Today's Date: 10/6/2020    Admit Date: 10/4/2020    Discharge Needs Assessment     Row Name 10/06/20 1524       Living Environment    Lives With  alone or with brother     Current Living Arrangements  home/apartment/condo    Primary Care Provided by  self    Able to Return to Prior Arrangements  no PT and OT recommends IP Rehab - Wants [facility] as first choice and [facility] as second - SW informed and will make referral       Resource/Environmental Concerns    Resource/Environmental Concerns  none       Transition Planning    Patient/Family Anticipates Transition to  inpatient rehabilitation facility    Patient/Family Anticipated Services at Transition  rehabilitation services    Transportation Anticipated  family or friend will provide       Discharge Needs Assessment    Current Outpatient/Agency/Support Group  skilled nursing facility    Equipment Currently Used at Home  walker, standard    Concerns to be Addressed  discharge planning        Discharge Plan     Row Name 10/06/20 1527       Plan    Plan Comments  Spoke to patient in room with mask and goggles - conversation with patient had to be repeated several times-seeming not to understand at times - Confirmed PCP and Pharmacy   -Patient may need assisted living vs LTC after rehab - DC Barriers  - Pending acceptance/precert for rehab; Elevated BNP    Row Name 10/06/20 1408       Plan    Plan  [facility] referral pending. Pre-cert will be required. No PASRR required for [facility]. 2nd choice = [facility].    Plan Comments  Per CM conversation, SW made referral to [facility] via text message and Epic.        Continued Care and Services - Admitted Since 10/4/2020     Destination     Service Provider Request Status Selected Services Address Phone Fax    [facility]  Pending - [facility] Sent N/A [address] [place] [address] [phone]             Selected Continued Care - Prior Encounters Includes selections from prior encounters from 7/6/2020 to 10/6/2020    Discharged on 9/12/2020 Admission date: 9/1/2020 - Discharge disposition: Home-Health Care c    Home Medical Care     Service Provider Selected Services Address Phone Fax    [facility] [address] [place] [address] [phone]                      Demographic Summary     Row Name 10/06/20 1520       General Information    Admission Type  inpatient    Arrived From  emergency department    Required Notices Provided  Important Message from Medicare    Referral Source  admission list    Reason for Consult  discharge planning    Preferred Language  English        Functional Status     Row Name 10/06/20 1521       Functional Status, IADL    Medications  -- Patient has not been taking meds as ordered -  states has been forgetting to take    Meal Preparation  independent    Housekeeping  independent    Laundry  independent    Shopping  independent             Patient Forms     Row Name 10/06/20 1515       Patient Forms    Important Message from Medicare (IMM)  Delivered IM 10/05/2020 per Registration          Mary Pro RN, CM  Office Phone [phone]  Cell [phone]

## 2020-10-06 NOTE — DISCHARGE PLACEMENT REQUEST
"Carlos Whipple (65 y.o. Male)     Date of Birth Social Security Number Address Home Phone MRN    1955  [id number]  [address]  [place] [facility] [phone] [id number]    Yazidism Marital Status          Mu-ism Single       Admission Date Admission Type Admitting Provider Attending Provider Department, Room/Bed    10/4/20 Emergency Dre Stauffer MD Heimer, Brian T, MD [facility]UR[facility], 273/1    Discharge Date Discharge Disposition Discharge Destination                       Attending Provider: Dre Stauffer MD    Allergies: Ketorolac Tromethamine    Isolation: None   Infection: None   Code Status: CPR    Ht: 182.9 cm (72\")   Wt: 116 kg (254 lb 13.6 oz)    Admission Cmt: None   Principal Problem: Atrial fibrillation with RVR (CMS/[facility]) [I48.91]                 Active Insurance as of 10/4/2020     Primary Coverage     Payor Plan Insurance Group Employer/Plan Group    HUMANA MEDICARE REPLACEMENT HUMANA MEDICARE REPLACEMENT [id number]     Payor Plan Address Payor Plan Phone Number Payor Plan Fax Number Effective Dates    [address] [address] [phone]  4/1/2020 - None Entered    [facility] [id number]       Subscriber Name Subscriber Birth Date Member ID       CARLOS WHIPPLE 1955 [id number]                 Emergency Contacts      (Rel.) Home Phone Work Phone Mobile Phone    KACEY DOW (Friend) [phone] -- [phone]               History & Physical      Marisol Larson APRN at 10/05/20 1306     Attestation signed by Paty Polk DO at 10/05/20 1607     I have reviewed the above documentation also personally reviewed the orders with the nurse practitioner. I am in agreement with all of the above.                        Patient Care Team:  Marisol Larson APRN as PCP - Shwetha Guadarrama MD as Consulting Physician (Nephrology)  Edy Thomas MD as Consulting Physician (Cardiology)    Chief complaint shortness of " breath    Subjective     HPI obtained from chart review as patient is currently very drowsy and intermittently confused. Patient was brought to the emergency room yesterday evening via ambulance for shortness of breath and palpitations.  He was found to be in A. fib with RVR yet again.  It is unclear how long he has been without his medications this time.  He was recently discharged from the hospital with the same problem last time.  Apparently last night he became quite agitated and aggressive and was attempting to hit and kick the nurses along with pulling his IV out.  He was put in restraints overnight.  He is currently out of wrist restraints and sleeping well.  He is still in A. fib with rate in the 130s on a Cardizem drip.      Review of Systems   Unable to perform ROS: Mental status change        Past Medical History:   Diagnosis Date   • A-fib (CMS/[facility])    • Atrial fibrillation with RVR (CMS/formerly [facility])    • CHF (congestive heart failure) (CMS/formerly [facility])    • Chronic atrial fibrillation (CMS/formerly [facility]) 11/8/2019   • Chronic diastolic CHF (congestive heart failure) (CMS/formerly [facility]) 11/8/2019   • CKD (chronic kidney disease) stage 3, GFR 30-59 ml/min 11/8/2019   • Coronary artery disease involving native coronary artery of native heart without angina pectoris 11/8/2019   • DDD (degenerative disc disease), lumbosacral 11/8/2019   • Essential hypertension 11/8/2019   • Gambling disorder, persistent 11/8/2019   • GERD (gastroesophageal reflux disease) 11/8/2019   • History of CVA (cerebrovascular accident) 11/8/2019   • Hyperlipidemia    • Hypertension    • Medically noncompliant 11/8/2019   • Peripheral polyneuropathy 11/8/2019   • Psoriasis 11/8/2019   • Stroke (CMS/formerly [facility])      Past Surgical History:   Procedure Laterality Date   • CARDIAC CATHETERIZATION     • CARDIAC CATHETERIZATION N/A 12/10/2019    Procedure: Left Heart Cath and coronary angiogram;  Surgeon: Edy Thomas MD;  Location: [facility] CATH INVASIVE LOCATION;  Service:  Cardiovascular     Family History   Problem Relation Age of Onset   • Heart disease Mother      Social History     Tobacco Use   • Smoking status: Never Smoker   • Smokeless tobacco: Never Used   Substance Use Topics   • Alcohol use: No     Frequency: Never   • Drug use: No     Medications Prior to Admission   Medication Sig Dispense Refill Last Dose   • aspirin 81 MG EC tablet Take 81 mg by mouth Daily.      • losartan (COZAAR) 50 MG tablet Take 1 tablet by mouth Daily. 30 tablet 1    • allopurinol (ZYLOPRIM) 100 MG tablet Take 1 tablet by mouth Daily. 30 tablet 1    • bumetanide (BUMEX) 1 MG tablet Take 1 tablet by mouth Daily. 30 tablet 1    • hydrALAZINE (APRESOLINE) 100 MG tablet Take 1 tablet by mouth 3 (Three) Times a Day. 90 tablet 1    • Metoprolol Succinate 50 MG capsule extended-release 24 hour sprinkle Take 50 mg by mouth Daily. 30 capsule 1    • pravastatin (PRAVACHOL) 40 MG tablet Take 40 mg by mouth Every Night.      • risperiDONE (risperDAL) 0.5 MG tablet Take 1 tablet by mouth Every 12 (Twelve) Hours. 60 tablet 1    • rivaroxaban (Xarelto) 20 MG tablet Take 1 tablet by mouth Daily With Dinner for 30 days. 30 tablet 5    • sodium bicarbonate 650 MG tablet Take 1 tablet by mouth 3 (Three) Times a Day. 90 tablet 1    • spironolactone (ALDACTONE) 25 MG tablet Take 0.5 tablets by mouth Daily. 15 tablet 1      Allergies:  Ketorolac tromethamine    Objective      Vital Signs  Temp:  [97.6 °F (36.4 °C)-97.8 °F (36.6 °C)] 97.6 °F (36.4 °C)  Heart Rate:  [] 97  Resp:  [15-21] 16  BP: (106-212)/() 128/83    Physical Exam  Vitals signs and nursing note reviewed.   Constitutional:       General: He is sleeping.      Appearance: He is overweight.   Cardiovascular:      Rate and Rhythm: Tachycardia present. Rhythm irregular.   Pulmonary:      Effort: Pulmonary effort is normal.      Breath sounds: Normal breath sounds.   Skin:     General: Skin is warm and dry.   Neurological:      Mental Status: He is  disoriented.         Results Review:  Lab Results (last 24 hours)     Procedure Component Value Units Date/Time    BUN [[id number]]  (Abnormal) Collected: 10/05/20 0611    Specimen: Blood Updated: 10/05/20 1045     BUN 32 mg/dL     Comprehensive Metabolic Panel [[id number]]  (Abnormal) Collected: 10/05/20 0611    Specimen: Blood Updated: 10/05/20 0720     Glucose 92 mg/dL      BUN --     Comment: Testing performed by alternate method        Creatinine 1.20 mg/dL      Sodium 145 mmol/L      Potassium 3.2 mmol/L      Chloride 109 mmol/L      CO2 22.0 mmol/L      Calcium 8.6 mg/dL      Total Protein 6.4 g/dL      Albumin 3.50 g/dL      ALT (SGPT) 453 U/L      AST (SGOT) 175 U/L      Alkaline Phosphatase 71 U/L      Total Bilirubin 1.1 mg/dL      eGFR Non African Amer 61 mL/min/1.73      Globulin 2.9 gm/dL      A/G Ratio 1.2 g/dL      BUN/Creatinine Ratio --     Comment: Testing not performed        Anion Gap 14.0 mmol/L     Narrative:      GFR Normal >60  Chronic Kidney Disease <60  Kidney Failure <15      Magnesium [[id number]]  (Normal) Collected: 10/05/20 0611    Specimen: Blood Updated: 10/05/20 0720     Magnesium 1.9 mg/dL     CBC & Differential [[id number]]  (Abnormal) Collected: 10/05/20 0611    Specimen: Blood Updated: 10/05/20 0620    Narrative:      The following orders were created for panel order CBC & Differential.  Procedure                               Abnormality         Status                     ---------                               -----------         ------                     CBC Auto Differential[[id number]]        Abnormal            Final result                 Please view results for these tests on the individual orders.    CBC Auto Differential [[id number]]  (Abnormal) Collected: 10/05/20 0611    Specimen: Blood Updated: 10/05/20 0620     WBC 9.30 10*3/mm3      RBC 4.65 10*6/mm3      Hemoglobin 12.6 g/dL      Hematocrit 38.9 %      MCV 83.7 fL      MCH 27.0 pg      MCHC 32.3 g/dL      RDW 19.5 %       RDW-SD 56.4 fl      MPV 8.6 fL      Platelets 144 10*3/mm3      Neutrophil % 74.7 %      Lymphocyte % 14.1 %      Monocyte % 8.9 %      Eosinophil % 1.0 %      Basophil % 1.3 %      Neutrophils, Absolute 6.90 10*3/mm3      Lymphocytes, Absolute 1.30 10*3/mm3      Monocytes, Absolute 0.80 10*3/mm3      Eosinophils, Absolute 0.10 10*3/mm3      Basophils, Absolute 0.10 10*3/mm3      nRBC 0.3 /100 WBC     Troponin [[id number]]  (Normal) Collected: 10/04/20 2151    Specimen: Blood Updated: 10/04/20 2242     Troponin T 0.016 ng/mL     Narrative:      Troponin T Reference Range:  <= 0.03 ng/mL-   Negative for AMI  >0.03 ng/mL-     Abnormal for myocardial necrosis.  Clinicians would have to utilize clinical acumen, EKG, Troponin and serial changes to determine if it is an Acute Myocardial Infarction or myocardial injury due to an underlying chronic condition.       Results may be falsely decreased if patient taking Biotin.      TSH [[id number]]  (Normal) Collected: 10/04/20 1355    Specimen: Blood Updated: 10/04/20 1747     TSH 2.970 uIU/mL     Lipid Panel [[id number]]  (Abnormal) Collected: 10/04/20 1355    Specimen: Blood Updated: 10/04/20 1742     Total Cholesterol 108 mg/dL      Triglycerides 79 mg/dL      HDL Cholesterol 22 mg/dL      LDL Cholesterol  70 mg/dL      VLDL Cholesterol 15.8 mg/dL      LDL/HDL Ratio 3.19    Narrative:      Cholesterol Reference Ranges  (U.S. Department of Health and Human Services ATP III Classifications)    Desirable          <200 mg/dL  Borderline High    200-239 mg/dL  High Risk          >240 mg/dL      Triglyceride Reference Ranges  (U.S. Department of Health and Human Services ATP III Classifications)    Normal           <150 mg/dL  Borderline High  150-199 mg/dL  High             200-499 mg/dL  Very High        >500 mg/dL    HDL Reference Ranges  (U.S. Department of Health and Human Services ATP III Classifcations)    Low     <40 mg/dl (major risk factor for CHD)  High    >60 mg/dl  ('negative' risk factor for CHD)        LDL Reference Ranges  (U.S. Department of Health and Human Services ATP III Classifcations)    Optimal          <100 mg/dL  Near Optimal     100-129 mg/dL  Borderline High  130-159 mg/dL  High             160-189 mg/dL  Very High        >189 mg/dL    Magnesium [[id number]]  (Normal) Collected: 10/04/20 1355    Specimen: Blood Updated: 10/04/20 1742     Magnesium 2.3 mg/dL     POC Glucose Once [[id number]]  (Normal) Collected: 10/04/20 1700    Specimen: Blood Updated: 10/04/20 1701     Glucose 90 mg/dL      Comment: Serial Number: [id number]:  [id number]       Troponin [[id number]]  (Normal) Collected: 10/04/20 1355    Specimen: Blood Updated: 10/04/20 1553     Troponin T 0.010 ng/mL     Narrative:      Troponin T Reference Range:  <= 0.03 ng/mL-   Negative for AMI  >0.03 ng/mL-     Abnormal for myocardial necrosis.  Clinicians would have to utilize clinical acumen, EKG, Troponin and serial changes to determine if it is an Acute Myocardial Infarction or myocardial injury due to an underlying chronic condition.       Results may be falsely decreased if patient taking Biotin.      BNP [[id number]]  (Abnormal) Collected: 10/04/20 1355    Specimen: Blood Updated: 10/04/20 1541     proBNP 21,355.0 pg/mL     Narrative:      Among patients with dyspnea, NT-proBNP is highly sensitive for the detection of acute congestive heart failure. In addition NT-proBNP of <300 pg/ml effectively rules out acute congestive heart failure with 99% negative predictive value.    Results may be falsely decreased if patient taking Biotin.      [place] Draw [[id number]] Collected: 10/04/20 1353    Specimen: Blood Updated: 10/04/20 1501    Narrative:      The following orders were created for panel order [place] Draw.  Procedure                               Abnormality         Status                     ---------                               -----------         ------                     Light Blue  Top[[id number]]                                   Final result               Green Top (Gel)[[id number]]                                  Final result               Lavender Top[[id number]]                                     Final result               Gold Top - SST[[id number]]                                   Final result                 Please view results for these tests on the individual orders.    Light Blue Top [[id number]] Collected: 10/04/20 1353    Specimen: Blood Updated: 10/04/20 1501     Extra Tube hold for add-on     Comment: Auto resulted       Green Top (Gel) [[id number]] Collected: 10/04/20 1353    Specimen: Blood Updated: 10/04/20 1501     Extra Tube Hold for add-ons.     Comment: Auto resulted.       Lavender Top [[id number]] Collected: 10/04/20 1353    Specimen: Blood Updated: 10/04/20 1501     Extra Tube hold for add-on     Comment: Auto resulted       POC Lactate [[id number]]  (Normal) Collected: 10/04/20 1412    Specimen: Blood Updated: 10/04/20 1454     Lactate 1.6 mmol/L      Comment: Serial Number: [id number]:  [id number]       COVID-19,[facility] IN-HOUSE,Nasal Swab No Transport Media 3-4 HR TAT - Swab, Nasal Cavity [[id number]]  (Normal) Collected: 10/04/20 1355    Specimen: Swab from Nasal Cavity Updated: 10/04/20 1448     COVID19 Not Detected    Narrative:      Fact sheet for providers: https://www.fda.gov/media/801237/download     Fact sheet for patients: https://www.fda.gov/media/904071/download    BUN [[id number]]  (Abnormal) Collected: 10/04/20 1353    Specimen: Blood Updated: 10/04/20 1443     BUN 33 mg/dL     Comprehensive Metabolic Panel [[id number]]  (Abnormal) Collected: 10/04/20 1353    Specimen: Blood Updated: 10/04/20 1438     Glucose 134 mg/dL      BUN --     Comment: Testing performed by alternate method        Creatinine 1.40 mg/dL      Sodium 142 mmol/L      Potassium 4.2 mmol/L      Chloride 108 mmol/L      CO2 20.0 mmol/L      Calcium 9.0 mg/dL      Total Protein 6.9 g/dL       Albumin 3.80 g/dL      ALT (SGPT) 625 U/L      AST (SGOT) 433 U/L      Alkaline Phosphatase 79 U/L      Total Bilirubin 1.2 mg/dL      eGFR Non African Amer 51 mL/min/1.73      Globulin 3.1 gm/dL      A/G Ratio 1.2 g/dL      BUN/Creatinine Ratio --     Comment: Testing not performed        Anion Gap 14.0 mmol/L     Narrative:      GFR Normal >60  Chronic Kidney Disease <60  Kidney Failure <15      Gold Top - SST [[id number]] Collected: 10/04/20 1353    Specimen: Blood Updated: 10/04/20 1423    CBC & Differential [[id number]]  (Abnormal) Collected: 10/04/20 1353    Specimen: Blood Updated: 10/04/20 1419    Narrative:      The following orders were created for panel order CBC & Differential.  Procedure                               Abnormality         Status                     ---------                               -----------         ------                     CBC Auto Differential[[id number]]        Abnormal            Final result                 Please view results for these tests on the individual orders.    CBC Auto Differential [[id number]]  (Abnormal) Collected: 10/04/20 1353    Specimen: Blood Updated: 10/04/20 1419     WBC 11.80 10*3/mm3      RBC 4.84 10*6/mm3      Hemoglobin 13.1 g/dL      Hematocrit 40.8 %      MCV 84.3 fL      MCH 27.1 pg      MCHC 32.1 g/dL      RDW 19.3 %      RDW-SD 56.4 fl      MPV 9.4 fL      Platelets 162 10*3/mm3      Neutrophil % 78.0 %      Lymphocyte % 13.4 %      Monocyte % 8.2 %      Eosinophil % 0.0 %      Basophil % 0.4 %      Neutrophils, Absolute 9.20 10*3/mm3      Lymphocytes, Absolute 1.60 10*3/mm3      Monocytes, Absolute 1.00 10*3/mm3      Eosinophils, Absolute 0.00 10*3/mm3      Basophils, Absolute 0.10 10*3/mm3      nRBC 0.8 /100 WBC     Blood Culture - Blood, Arm, Left [[id number]] Collected: 10/04/20 1410    Specimen: Blood from Arm, Left Updated: 10/04/20 1414    Blood Culture - Blood, Foot, Left [[id number]] Collected: 10/04/20 1353    Specimen: Blood from Foot,  Left Updated: 10/04/20 1414    Blood Gas, Arterial [[id number]]  (Abnormal) Collected: 10/04/20 1357    Specimen: Arterial Blood Updated: 10/04/20 1400     Site Right Brachial     Bj's Test N/A     pH, Arterial 7.506 pH units      pCO2, Arterial 23.3 mm Hg      pO2, Arterial 70.2 mm Hg      HCO3, Arterial 18.4 mmol/L      Base Excess, Arterial -3.1 mmol/L      Comment: Serial Number: [id number]:  [id number]        O2 Saturation, Arterial 95.8 %      CO2 Content 19.1 mmol/L      Barometric Pressure for Blood Gas --     Comment: N/A        Modality Room Air     FIO2 21 %      Hemodilution No         Imaging Results (Last 24 Hours)     Procedure Component Value Units Date/Time    XR Chest 1 View [[id number]] Collected: 10/04/20 1445     Updated: 10/04/20 1448    Narrative:      XR CHEST 1 VW-     Date of Exam: 10/4/2020 2:21 PM     Indication: Dyspnea.     Comparison: 09/01/2020     Technique: A single view of the chest was obtained.     FINDINGS:      There is marked cardiomegaly unchanged from prior study.  There is  persistent pulmonary vascular congestion.  Lungs are clear.  No definite  pleural effusion identified.             Impression:         Marked cardiomegaly and pulmonary vascular congestion similar prior  studies.  No focal airspace consolidation.        Electronically Signed By-Elias Rascon On:10/4/2020 2:46 PM  This report was finalized on [id number] by  Elias Rascon, .           I reviewed the patient's new clinical results.      Assessment/Plan       Atrial fibrillation with RVR (CMS/[facility])    Hypertensive emergency    Chronic atrial fibrillation (CMS/[facility])    Chronic diastolic CHF (congestive heart failure) (CMS/[facility])    CKD (chronic kidney disease) stage 3, GFR 30-59 ml/min    Essential hypertension    Gambling disorder, persistent    Coronary artery disease involving native coronary artery of native heart without angina pectoris    Psoriasis    History of CVA (cerebrovascular accident)    GERD  (gastroesophageal reflux disease)    Medically noncompliant    Mild cognitive impairment    Dyspnea          Plan:   (Await cardiology consult. His liver enzymes are elevated and haven't been in the past. RN reports that he hasn't complained of any abdominal pain. Check additional labs and monitor closely. ).       I discussed the patients findings and my recommendations with nursing staff and primary care team    CLARISSA Meraz  10/05/20  13:06 EDT        Electronically signed by Paty Polk DO at 10/05/20 1607       {Outbreak/Travel/Exposure Documentation......;  Question Available Choices Patient Response   Outbreak Screen: Do you currently have a new onset of the following symptoms?        Fever/Chils, Cough, Shortness of air, Loss of taste or smell, No, Unknown  (!) Cough;Shortness of Air (10/04/20 1256)   Outbreak Screen: In the last 14 days, have you had contact with anyone who is ill, has show any of the symptoms listed above and/or has been diagnosis with the 2019 Novel Coronavirus? This includes any immediate household members but excludes any patients with whom you have been in contact within your normal work duties wearing proper PPE, if you are a healthcare worker.  Yes, No, Unknown              No (10/04/20 1256)   Outbreak Screen: Who was notified?    Free text  Daisy Prado RN (10/04/20 1256)   Travel Screen: Have you traveled in the last month? If so, to what country have you traveled? If [place] what state? Yes, No, Unknown  List of all countries  List of all States No (10/04/20 1254)  (not recorded)  (not recorded)   Infection Risk: Do you currently have the following symptoms?  (If cough is selected, the Tuberculosis Screen is performed.) Cough, Fever, Rash, No (!) Cough (10/04/20 1254)   Tuberculosis Screen: Do you have any of the following Tuberculosis Risks?  · Have you lived or spent time with anyone who had or may have TB?  · Have you lived in or visited any of the following areas for  more than one month: [place], [place], [place], [place] or [place], the [place] or [place]?  · Do you have HIV/AIDS?  · Have you lived in or worked in a nursing home, homeless shelter, correctional facility, or substance abuse treatment facility?   · No    If Yes do you have any of the following symptoms? Yes responses display to the right    If Yes, symptoms listed are:  Cough greater than or equal to 3 weeks, Loss of appetite, Unexplained weight loss, Night sweats, Bloody sputum or hemoptysis, Hoarseness, Fever, Fatigue, Chest pain, No No (10/04/20 1254)  (not recorded)   Exposure Screen: Have you been exposed to any of these contagious diseases in the last month? Measles, Chickenpox, Meningitis, Pertussis, Whooping Cough, No No (10/04/20 1254)       Current Facility-Administered Medications   Medication Dose Route Frequency Provider Last Rate Last Dose   • acetaminophen (TYLENOL) tablet 650 mg  650 mg Oral Q4H PRN Paty Polk DO        Or   • acetaminophen (TYLENOL) 160 MG/5ML solution 650 mg  650 mg Oral Q4H PRN Paty Polk DO        Or   • acetaminophen (TYLENOL) suppository 650 mg  650 mg Rectal Q4H PRN Paty Polk DO       • allopurinol (ZYLOPRIM) tablet 100 mg  100 mg Oral Daily Paty Polk DO   100 mg at 10/06/20 0818   • aspirin EC tablet 81 mg  81 mg Oral Daily Paty Polk DO   81 mg at 10/06/20 0819   • atorvastatin (LIPITOR) tablet 10 mg  10 mg Oral Nightly Paty Polk DO   10 mg at 10/05/20 2028   • bisacodyl (DULCOLAX) suppository 10 mg  10 mg Rectal Daily PRN Paty Polk DO       • calcium carbonate (TUMS) chewable tablet 500 mg (200 mg elemental)  2 tablet Oral BID PRN Paty Polk DO       • dilTIAZem (CARDIZEM) 100 mg in 100 mL NS (1 mg/mL) infusion (ADV)  5-15 mg/hr Intravenous Continuous Paty Polk DO   Stopped at 10/05/20 1842   • dilTIAZem CD (CARDIZEM CD) 24 hr capsule 180 mg  180 mg Oral Q24H Manchi, Edy, MD   180 mg at 10/06/20 0818   •  hydrALAZINE (APRESOLINE) tablet 50 mg  50 mg Oral TID Edy Thomas MD   50 mg at 10/06/20 0818   • HYDROcodone-acetaminophen (NORCO) 7.5-325 MG per tablet 1 tablet  1 tablet Oral Q4H PRN Paty Polk DO   1 tablet at 10/05/20 1803   • influenza vac split quad (FLUZONE,FLUARIX,AFLURIA,FLULAVAL) injection 0.5 mL  0.5 mL Intramuscular During Hospitalization Paty Polk DO       • labetalol (NORMODYNE,TRANDATE) injection 20 mg  20 mg Intravenous Q6H PRN Dre Stauffer MD   20 mg at 10/06/20 0632   • LORazepam (ATIVAN) injection 1 mg  1 mg Intramuscular Q8H PRN Paty Polk DO   1 mg at 10/04/20 2225   • losartan (COZAAR) tablet 50 mg  50 mg Oral Q24H Paty Polk DO   50 mg at 10/06/20 0821   • magnesium hydroxide (MILK OF MAGNESIA) suspension 2400 mg/10mL 10 mL  10 mL Oral Daily PRN Paty Polk DO       • melatonin tablet 5 mg  5 mg Oral Nightly PRN Paty Polk DO       • metoprolol succinate XL (TOPROL-XL) 24 hr tablet 50 mg  50 mg Oral Q24H Paty Polk DO   50 mg at 10/06/20 0818   • nitroglycerin (NITROSTAT) SL tablet 0.4 mg  0.4 mg Sublingual Q5 Min PRN Paty Polk DO       • ondansetron (ZOFRAN) tablet 4 mg  4 mg Oral Q6H PRN Paty Polk DO        Or   • ondansetron (ZOFRAN) injection 4 mg  4 mg Intravenous Q6H PRN Paty Polk DO       • potassium chloride (K-DUR,KLOR-CON) CR tablet 40 mEq  40 mEq Oral PRN Dre Stauffer MD   40 mEq at 10/05/20 1536    Or   • potassium chloride (KLOR-CON) packet 40 mEq  40 mEq Oral PRN Dre Stauffer MD        Or   • potassium chloride 10 mEq in 100 mL IVPB  10 mEq Intravenous Q1H PRN Dre Stauffer MD       • risperiDONE (risperDAL) tablet 0.5 mg  0.5 mg Oral Q12H Paty Polk DO   0.5 mg at 10/06/20 0818   • rivaroxaban (XARELTO) tablet 20 mg  20 mg Oral Daily With Dinner Paty Polk DO   20 mg at 10/05/20 1803   • sodium bicarbonate tablet 650 mg  650 mg Oral TID Paty Polk DO   650 mg at 10/06/20 0818   • sodium  chloride 0.9 % flush 10 mL  10 mL Intravenous PRN Paty Polk DO       • sodium chloride 0.9 % flush 10 mL  10 mL Intravenous Q12H Paty Polk DO   10 mL at 10/06/20 0819   • sodium chloride 0.9 % flush 10 mL  10 mL Intravenous PRN Paty Polk DO       • spironolactone (ALDACTONE) tablet 12.5 mg  12.5 mg Oral Daily Paty Polk DO   12.5 mg at 10/06/20 0818        Physician Progress Notes (most recent note)      Paty Polk DO at 10/06/20 0852               LOS: 2 days   Patient Care Team:  Marisol Larson APRN as PCP - General  Shwetha Barber MD as Consulting Physician (Nephrology)  Edy Thomas MD as Consulting Physician (Cardiology)    Chief Complaint: Little drowsy, says he still does not understand about how to take his medications he thinks all of them are just once a day.  Patient has a lot of problems with noncompliance with medication and has had multiple admissions for similar problems of A. fib RVR with CHF due to medication noncompliance    Subjective Easily awakened but still little drowsy this morning    Interval History: Still with problems with blood pressure control.  Got another dose of labetalol this morning.    Patient Complaints: Being tired, little bit confused at times but easily reoriented  Patient Denies: Denies any palpitations or chest pain  History taken from: patient    Review of Systems:    All systems were reviewed and negative except for: See interval history    Objective     Vital Signs  Temp:  [97.6 °F (36.4 °C)-97.9 °F (36.6 °C)] 97.6 °F (36.4 °C)  Heart Rate:  [] 90  Resp:  [15-20] 16  BP: (106-152)/(77-99) 152/90    Physical Exam:     General Appearance:    Alert, cooperative, in no acute distress, poor hygiene, appears markedly older than stated age   Head:    Normocephalic, without obvious abnormality, atraumatic   Eyes:            Conjunctivae and sclerae normal, no   icterus, no pallor, corneas clear, PERRLA   Throat:   No oral lesions,  no thrush, oral mucosa moist   Neck:   No adenopathy, supple, trachea midline, no thyromegaly, no   carotid bruit, no JVD   Lungs:     Clear to auscultation,respirations regular, even and                  unlabored, distant some coarse upper airway sounds this morning    Heart:   Irregularly irregular with rates in the 90s   Chest Wall:    No abnormalities observed   Abdomen:     Normal bowel sounds, no masses, no organomegaly, soft        non-tender, non-distended, no guarding, no rebound                tenderness, rotund   Rectal:     Deferred   Extremities:   Moves all extremities . no edema, no cyanosis, no             redness   Pulses:   Pulses palpable and equal bilaterally   Skin:   No bleeding, bruising or rash   Lymph nodes:   No palpable adenopathy   Neurologic:   Cranial nerves 2 - 12 grossly intact, sensation intact, DTR       present and equal bilaterally   Radiology:  Xr Chest 1 View    Result Date: 10/4/2020   Marked cardiomegaly and pulmonary vascular congestion similar prior studies.  No focal airspace consolidation.   Electronically Signed By-Elias Rascon On:10/4/2020 2:46 PM This report was finalized on [id number] by  Elias Rascon, .         Results Review:     I reviewed the patient's new clinical results.  I reviewed the patient's new imaging results and agree with the interpretation.    Medication Review:   Scheduled Meds:allopurinol, 100 mg, Oral, Daily  aspirin, 81 mg, Oral, Daily  atorvastatin, 10 mg, Oral, Nightly  dilTIAZem CD, 180 mg, Oral, Q24H  hydrALAZINE, 50 mg, Oral, TID  losartan, 50 mg, Oral, Q24H  metoprolol succinate XL, 50 mg, Oral, Q24H  risperiDONE, 0.5 mg, Oral, Q12H  rivaroxaban, 20 mg, Oral, Daily With Dinner  sodium bicarbonate, 650 mg, Oral, TID  sodium chloride, 10 mL, Intravenous, Q12H  spironolactone, 12.5 mg, Oral, Daily      Continuous Infusions:dilTIAZem, 5-15 mg/hr, Last Rate: Stopped (10/05/20 1842)      PRN Meds:.•  acetaminophen **OR** acetaminophen **OR**  acetaminophen  •  bisacodyl  •  calcium carbonate  •  HYDROcodone-acetaminophen  •  influenza vaccine  •  labetalol  •  LORazepam  •  magnesium hydroxide  •  melatonin  •  nitroglycerin  •  ondansetron **OR** ondansetron  •  potassium chloride **OR** potassium chloride **OR** potassium chloride  •  sodium chloride  •  sodium chloride    Labs:  Lab Results (last 24 hours)     Procedure Component Value Units Date/Time    Blood Culture - Blood, Arm, Left [[id number]]  (Abnormal) Collected: 10/04/20 1410    Specimen: Blood from Arm, Left Updated: 10/06/20 0719     Blood Culture Staphylococcus, coagulase negative     Comment: Probable contaminant requires clinical correlation, susceptibility not performed unless requested by physician.          Isolated from Anaerobic Bottle     Gram Stain Anaerobic Bottle Gram positive cocci in clusters      Aerobic Bottle Gram positive bacilli    Narrative:      Blood culture does not meet the specified criteria for PCR identification.  If pregnant, immunocompromised, or clinical concern for meningitis, call lab to run BCID for Listeria monocytogenes.    Comprehensive Metabolic Panel [[id number]] Updated: 10/06/20 0703    Specimen: Blood     Magnesium [[id number]]  (Normal) Collected: 10/06/20 0324    Specimen: Blood Updated: 10/06/20 0524     Magnesium 2.1 mg/dL     BNP [[id number]]  (Abnormal) Collected: 10/06/20 0324    Specimen: Blood Updated: 10/06/20 0514     proBNP 3,778.0 pg/mL     Narrative:      Among patients with dyspnea, NT-proBNP is highly sensitive for the detection of acute congestive heart failure. In addition NT-proBNP of <300 pg/ml effectively rules out acute congestive heart failure with 99% negative predictive value.    Results may be falsely decreased if patient taking Biotin.      CBC & Differential [[id number]]  (Abnormal) Collected: 10/06/20 0324    Specimen: Blood Updated: 10/06/20 0437    Narrative:      The following orders were created for panel order CBC &  Differential.  Procedure                               Abnormality         Status                     ---------                               -----------         ------                     CBC Auto Differential[[id number]]        Abnormal            Final result                 Please view results for these tests on the individual orders.    CBC Auto Differential [[id number]]  (Abnormal) Collected: 10/06/20 0324    Specimen: Blood Updated: 10/06/20 0437     WBC 9.20 10*3/mm3      RBC 4.88 10*6/mm3      Hemoglobin 13.6 g/dL      Hematocrit 43.9 %      MCV 90.0 fL      MCH 27.9 pg      MCHC 30.9 g/dL      RDW 20.7 %      RDW-SD 64.8 fl      MPV 8.8 fL      Platelets 168 10*3/mm3      Neutrophil % 72.3 %      Lymphocyte % 15.0 %      Monocyte % 10.7 %      Eosinophil % 1.4 %      Basophil % 0.6 %      Neutrophils, Absolute 6.60 10*3/mm3      Lymphocytes, Absolute 1.40 10*3/mm3      Monocytes, Absolute 1.00 10*3/mm3      Eosinophils, Absolute 0.10 10*3/mm3      Basophils, Absolute 0.10 10*3/mm3      nRBC 0.2 /100 WBC     Blood Culture ID, PCR - Blood, Arm, Left [[id number]]  (Abnormal) Collected: 10/04/20 1410    Specimen: Blood from Arm, Left Updated: 10/05/20 2126     BCID, PCR Staphylococcus spp, not aureus. Identification by BCID PCR.     BOTTLE TYPE Anaerobic Bottle    Urinalysis With Culture If Indicated - Urine, Clean Catch [[id number]]  (Normal) Collected: 10/05/20 1639    Specimen: Urine, Clean Catch Updated: 10/05/20 1707     Color, UA Yellow     Appearance, UA Clear     pH, UA <=5.0     Specific Gravity, UA 1.012     Glucose, UA Negative     Ketones, UA Negative     Bilirubin, UA Negative     Blood, UA Negative     Protein, UA Negative     Leuk Esterase, UA Negative     Nitrite, UA Negative     Urobilinogen, UA 0.2 E.U./dL    Narrative:      Urine microscopic not indicated.    BNP [[id number]]  (Abnormal) Collected: 10/05/20 1412    Specimen: Blood Updated: 10/05/20 1603     proBNP 8,350.0 pg/mL     Narrative:       Among patients with dyspnea, NT-proBNP is highly sensitive for the detection of acute congestive heart failure. In addition NT-proBNP of <300 pg/ml effectively rules out acute congestive heart failure with 99% negative predictive value.    Results may be falsely decreased if patient taking Biotin.      Troponin [[id number]]  (Normal) Collected: 10/05/20 1412    Specimen: Blood Updated: 10/05/20 1603     Troponin T <0.010 ng/mL     Narrative:      Troponin T Reference Range:  <= 0.03 ng/mL-   Negative for AMI  >0.03 ng/mL-     Abnormal for myocardial necrosis.  Clinicians would have to utilize clinical acumen, EKG, Troponin and serial changes to determine if it is an Acute Myocardial Infarction or myocardial injury due to an underlying chronic condition.       Results may be falsely decreased if patient taking Biotin.      Hepatitis Panel, Acute [[id number]]  (Normal) Collected: 10/05/20 1412    Specimen: Blood Updated: 10/05/20 1536     Hepatitis B Surface Ag Non-Reactive     Hep A IgM Non-Reactive     Hep B C IgM Non-Reactive     Hepatitis C Ab Non-Reactive    Narrative:      Results may be falsely decreased if patient taking Biotin.     Blood Culture - Blood, Foot, Left [[id number]] Collected: 10/04/20 1353    Specimen: Blood from Foot, Left Updated: 10/05/20 1415     Blood Culture No growth at 24 hours    BUN [[id number]]  (Abnormal) Collected: 10/05/20 0611    Specimen: Blood Updated: 10/05/20 1045     BUN 32 mg/dL            Assessment/Plan       Atrial fibrillation with RVR (CMS/HCC)    Hypertensive emergency    Chronic atrial fibrillation (CMS/HCC)    Chronic diastolic CHF (congestive heart failure) (CMS/HCC)    CKD (chronic kidney disease) stage 3, GFR 30-59 ml/min    Essential hypertension    Gambling disorder, persistent    Coronary artery disease involving native coronary artery of native heart without angina pectoris    Psoriasis    History of CVA (cerebrovascular accident)    GERD (gastroesophageal  reflux disease)    Medically noncompliant    Mild cognitive impairment    Dyspnea  Blood cultures with ID of Staphylococcus, coag negative    Continue current approach.  I advised patient that once again he needs to be scrupulous with taking his medications as prescribed.  At this juncture, his family refuses to help him with this in his left middle lobe mostly to his own devices.    We will repeat blood cultures and also check procalcitonin.  He does not appear to be septic but for completeness will complete the septic work-up.  He remains afebrile.        Paty Polk DO  10/06/20  08:52 EDT          Electronically signed by Paty Polk DO at 10/06/20 0858          Consult Notes (most recent note)      Edy Thomas MD at 10/05/20 1725      Consult Orders    1. Inpatient Cardiology Consult [[id number]] ordered by Paty Polk DO at 10/04/20 171                 Referring Provider: Dr. Stauffer  Reason for Consultation: Atrial fibrillation    Patient Care Team:  Marisol Larson APRN as PCP - Shwetha Guadarrama MD as Consulting Physician (Nephrology)  Edy Thomas MD as Consulting Physician (Cardiology)    Chief complaint shortness of breath and palpitations    Subjective .     History of present illness:  Carlos Whipple is a 65 y.o. male with history of coronary disease atrial fibrillation congestive heart failure chronic renal insufficiency hypertension hyperlipidemia and multiple medical problems including medical noncompliance presented to the hospital complains of shortness of breath and atrial fibrillation and palpitations.  Patient was seen in the ER was noted to have atrial fibrillation with rapid response and is admitted to the hospital.  No complains of any chest pain.  No dizziness syncope or swelling the feet.  Patient is not sure about what medications he is taking.  Patient in the past has been admitted several times with noncompliance and has been told multiple times to  take his medicines especially his anticoagulation because of the risk of stroke.    Review of Systems   Constitution: Negative for fever and malaise/fatigue.   HENT: Negative for ear pain and nosebleeds.    Eyes: Negative for blurred vision and double vision.   Cardiovascular: Positive for palpitations. Negative for chest pain and dyspnea on exertion.   Respiratory: Positive for shortness of breath. Negative for cough.    Skin: Negative for rash.   Musculoskeletal: Negative for joint pain.   Gastrointestinal: Negative for abdominal pain, nausea and vomiting.   Neurological: Negative for focal weakness and headaches.   Psychiatric/Behavioral: Negative for depression. The patient is not nervous/anxious.    All other systems reviewed and are negative.      History  Past Medical History:   Diagnosis Date   • A-fib (CMS/[facility])    • Atrial fibrillation with RVR (CMS/[facility])    • CHF (congestive heart failure) (CMS/[facility])    • Chronic atrial fibrillation (CMS/HCC) 11/8/2019   • Chronic diastolic CHF (congestive heart failure) (CMS/[facility]) 11/8/2019   • CKD (chronic kidney disease) stage 3, GFR 30-59 ml/min 11/8/2019   • Coronary artery disease involving native coronary artery of native heart without angina pectoris 11/8/2019   • DDD (degenerative disc disease), lumbosacral 11/8/2019   • Essential hypertension 11/8/2019   • Gambling disorder, persistent 11/8/2019   • GERD (gastroesophageal reflux disease) 11/8/2019   • History of CVA (cerebrovascular accident) 11/8/2019   • Hyperlipidemia    • Hypertension    • Medically noncompliant 11/8/2019   • Peripheral polyneuropathy 11/8/2019   • Psoriasis 11/8/2019   • Stroke (CMS/[facility])        Past Surgical History:   Procedure Laterality Date   • CARDIAC CATHETERIZATION     • CARDIAC CATHETERIZATION N/A 12/10/2019    Procedure: Left Heart Cath and coronary angiogram;  Surgeon: Edy Thomas MD;  Location: [facility] CATH INVASIVE LOCATION;  Service: Cardiovascular       Family History   Problem  Relation Age of Onset   • Heart disease Mother        Social History     Tobacco Use   • Smoking status: Never Smoker   • Smokeless tobacco: Never Used   Substance Use Topics   • Alcohol use: No     Frequency: Never   • Drug use: No        Medications Prior to Admission   Medication Sig Dispense Refill Last Dose   • aspirin 81 MG EC tablet Take 81 mg by mouth Daily.      • losartan (COZAAR) 50 MG tablet Take 1 tablet by mouth Daily. 30 tablet 1    • allopurinol (ZYLOPRIM) 100 MG tablet Take 1 tablet by mouth Daily. 30 tablet 1    • bumetanide (BUMEX) 1 MG tablet Take 1 tablet by mouth Daily. 30 tablet 1    • hydrALAZINE (APRESOLINE) 100 MG tablet Take 1 tablet by mouth 3 (Three) Times a Day. 90 tablet 1    • Metoprolol Succinate 50 MG capsule extended-release 24 hour sprinkle Take 50 mg by mouth Daily. 30 capsule 1    • pravastatin (PRAVACHOL) 40 MG tablet Take 40 mg by mouth Every Night.      • risperiDONE (risperDAL) 0.5 MG tablet Take 1 tablet by mouth Every 12 (Twelve) Hours. 60 tablet 1    • rivaroxaban (Xarelto) 20 MG tablet Take 1 tablet by mouth Daily With Dinner for 30 days. 30 tablet 5    • sodium bicarbonate 650 MG tablet Take 1 tablet by mouth 3 (Three) Times a Day. 90 tablet 1    • spironolactone (ALDACTONE) 25 MG tablet Take 0.5 tablets by mouth Daily. 15 tablet 1          Ketorolac tromethamine    Scheduled Meds:allopurinol, 100 mg, Oral, Daily  aspirin, 81 mg, Oral, Daily  atorvastatin, 10 mg, Oral, Nightly  dilTIAZem CD, 180 mg, Oral, Q24H  furosemide, 40 mg, Intravenous, Q12H  hydrALAZINE, 50 mg, Oral, TID  losartan, 50 mg, Oral, Q24H  metoprolol succinate XL, 50 mg, Oral, Q24H  risperiDONE, 0.5 mg, Oral, Q12H  rivaroxaban, 20 mg, Oral, Daily With Dinner  sodium bicarbonate, 650 mg, Oral, TID  sodium chloride, 10 mL, Intravenous, Q12H  spironolactone, 12.5 mg, Oral, Daily      Continuous Infusions:dilTIAZem, 5-15 mg/hr, Last Rate: 5 mg/hr (10/05/20 6337)      PRN Meds:.•  acetaminophen **OR**  "acetaminophen **OR** acetaminophen  •  bisacodyl  •  calcium carbonate  •  HYDROcodone-acetaminophen  •  influenza vaccine  •  LORazepam  •  magnesium hydroxide  •  melatonin  •  nitroglycerin  •  ondansetron **OR** ondansetron  •  potassium chloride **OR** potassium chloride **OR** potassium chloride  •  sodium chloride  •  sodium chloride    Objective     VITAL SIGNS  Vitals:    10/05/20 1307 10/05/20 1400 10/05/20 1500 10/05/20 1600   BP: 144/78 139/83 122/87 142/90   BP Location:       Patient Position:       Pulse: 92 99 87 92   Resp:       Temp:       TempSrc:       SpO2: 93% 93%     Weight:       Height:           Flowsheet Rows      First Filed Value   Admission Height  182.9 cm (72\") Documented at 10/04/2020 1256   Admission Weight  118 kg (259 lb 11.2 oz) Documented at 10/04/2020 1256           TELEMETRY: ATRIAL fibrillation with rapid response    Physical Exam:  Constitutional:       Appearance: Well-developed.   Eyes:      General: No scleral icterus.     Conjunctiva/sclera: Conjunctivae normal.      Pupils: Pupils are equal, round, and reactive to light.   HENT:      Head: Normocephalic and atraumatic.   Neck:      Musculoskeletal: Normal range of motion and neck supple.      Vascular: No carotid bruit or JVD.   Pulmonary:      Effort: Pulmonary effort is normal.      Breath sounds: Normal breath sounds. No wheezing. No rales.   Cardiovascular:      Normal rate. Irregularly irregular rhythm.   Pulses:     Intact distal pulses.   Abdominal:      General: Bowel sounds are normal.      Palpations: Abdomen is soft.   Musculoskeletal: Normal range of motion.   Skin:     General: Skin is warm and dry.      Findings: No rash.   Neurological:      Mental Status: Alert.      Comments: No focal deficits          Results Review:   I reviewed the patient's new clinical results.  Lab Results (last 24 hours)     Procedure Component Value Units Date/Time    Urinalysis With Culture If Indicated - Urine, Clean Catch " [[id number]]  (Normal) Collected: 10/05/20 1639    Specimen: Urine, Clean Catch Updated: 10/05/20 1707     Color, UA Yellow     Appearance, UA Clear     pH, UA <=5.0     Specific Gravity, UA 1.012     Glucose, UA Negative     Ketones, UA Negative     Bilirubin, UA Negative     Blood, UA Negative     Protein, UA Negative     Leuk Esterase, UA Negative     Nitrite, UA Negative     Urobilinogen, UA 0.2 E.U./dL    Narrative:      Urine microscopic not indicated.    BNP [[id number]]  (Abnormal) Collected: 10/05/20 1412    Specimen: Blood Updated: 10/05/20 1603     proBNP 8,350.0 pg/mL     Narrative:      Among patients with dyspnea, NT-proBNP is highly sensitive for the detection of acute congestive heart failure. In addition NT-proBNP of <300 pg/ml effectively rules out acute congestive heart failure with 99% negative predictive value.    Results may be falsely decreased if patient taking Biotin.      Troponin [[id number]]  (Normal) Collected: 10/05/20 1412    Specimen: Blood Updated: 10/05/20 1603     Troponin T <0.010 ng/mL     Narrative:      Troponin T Reference Range:  <= 0.03 ng/mL-   Negative for AMI  >0.03 ng/mL-     Abnormal for myocardial necrosis.  Clinicians would have to utilize clinical acumen, EKG, Troponin and serial changes to determine if it is an Acute Myocardial Infarction or myocardial injury due to an underlying chronic condition.       Results may be falsely decreased if patient taking Biotin.      Hepatitis Panel, Acute [[id number]]  (Normal) Collected: 10/05/20 1412    Specimen: Blood Updated: 10/05/20 1536     Hepatitis B Surface Ag Non-Reactive     Hep A IgM Non-Reactive     Hep B C IgM Non-Reactive     Hepatitis C Ab Non-Reactive    Narrative:      Results may be falsely decreased if patient taking Biotin.     Blood Culture - Blood, Arm, Left [[id number]] Collected: 10/04/20 1410    Specimen: Blood from Arm, Left Updated: 10/05/20 1415     Blood Culture No growth at 24 hours    Blood Culture -  Blood, Foot, Left [[id number]] Collected: 10/04/20 1353    Specimen: Blood from Foot, Left Updated: 10/05/20 1415     Blood Culture No growth at 24 hours    BUN [[id number]]  (Abnormal) Collected: 10/05/20 0611    Specimen: Blood Updated: 10/05/20 1045     BUN 32 mg/dL     Comprehensive Metabolic Panel [[id number]]  (Abnormal) Collected: 10/05/20 0611    Specimen: Blood Updated: 10/05/20 0720     Glucose 92 mg/dL      BUN --     Comment: Testing performed by alternate method        Creatinine 1.20 mg/dL      Sodium 145 mmol/L      Potassium 3.2 mmol/L      Chloride 109 mmol/L      CO2 22.0 mmol/L      Calcium 8.6 mg/dL      Total Protein 6.4 g/dL      Albumin 3.50 g/dL      ALT (SGPT) 453 U/L      AST (SGOT) 175 U/L      Alkaline Phosphatase 71 U/L      Total Bilirubin 1.1 mg/dL      eGFR Non African Amer 61 mL/min/1.73      Globulin 2.9 gm/dL      A/G Ratio 1.2 g/dL      BUN/Creatinine Ratio --     Comment: Testing not performed        Anion Gap 14.0 mmol/L     Narrative:      GFR Normal >60  Chronic Kidney Disease <60  Kidney Failure <15      Magnesium [[id number]]  (Normal) Collected: 10/05/20 0611    Specimen: Blood Updated: 10/05/20 0720     Magnesium 1.9 mg/dL     CBC & Differential [[id number]]  (Abnormal) Collected: 10/05/20 0611    Specimen: Blood Updated: 10/05/20 0620    Narrative:      The following orders were created for panel order CBC & Differential.  Procedure                               Abnormality         Status                     ---------                               -----------         ------                     CBC Auto Differential[[id number]]        Abnormal            Final result                 Please view results for these tests on the individual orders.    CBC Auto Differential [[id number]]  (Abnormal) Collected: 10/05/20 0611    Specimen: Blood Updated: 10/05/20 0620     WBC 9.30 10*3/mm3      RBC 4.65 10*6/mm3      Hemoglobin 12.6 g/dL      Hematocrit 38.9 %      MCV 83.7 fL      MCH  27.0 pg      MCHC 32.3 g/dL      RDW 19.5 %      RDW-SD 56.4 fl      MPV 8.6 fL      Platelets 144 10*3/mm3      Neutrophil % 74.7 %      Lymphocyte % 14.1 %      Monocyte % 8.9 %      Eosinophil % 1.0 %      Basophil % 1.3 %      Neutrophils, Absolute 6.90 10*3/mm3      Lymphocytes, Absolute 1.30 10*3/mm3      Monocytes, Absolute 0.80 10*3/mm3      Eosinophils, Absolute 0.10 10*3/mm3      Basophils, Absolute 0.10 10*3/mm3      nRBC 0.3 /100 WBC     Troponin [[id number]]  (Normal) Collected: 10/04/20 2151    Specimen: Blood Updated: 10/04/20 2242     Troponin T 0.016 ng/mL     Narrative:      Troponin T Reference Range:  <= 0.03 ng/mL-   Negative for AMI  >0.03 ng/mL-     Abnormal for myocardial necrosis.  Clinicians would have to utilize clinical acumen, EKG, Troponin and serial changes to determine if it is an Acute Myocardial Infarction or myocardial injury due to an underlying chronic condition.       Results may be falsely decreased if patient taking Biotin.      TSH [[id number]]  (Normal) Collected: 10/04/20 1355    Specimen: Blood Updated: 10/04/20 1747     TSH 2.970 uIU/mL     Lipid Panel [[id number]]  (Abnormal) Collected: 10/04/20 1355    Specimen: Blood Updated: 10/04/20 1742     Total Cholesterol 108 mg/dL      Triglycerides 79 mg/dL      HDL Cholesterol 22 mg/dL      LDL Cholesterol  70 mg/dL      VLDL Cholesterol 15.8 mg/dL      LDL/HDL Ratio 3.19    Narrative:      Cholesterol Reference Ranges  (U.S. Department of Health and Human Services ATP III Classifications)    Desirable          <200 mg/dL  Borderline High    200-239 mg/dL  High Risk          >240 mg/dL      Triglyceride Reference Ranges  (U.S. Department of Health and Human Services ATP III Classifications)    Normal           <150 mg/dL  Borderline High  150-199 mg/dL  High             200-499 mg/dL  Very High        >500 mg/dL    HDL Reference Ranges  (U.S. Department of Health and Human Services ATP III Classifcations)    Low     <40 mg/dl  (major risk factor for CHD)  High    >60 mg/dl ('negative' risk factor for CHD)        LDL Reference Ranges  (U.S. Department of Health and Human Services ATP III Classifcations)    Optimal          <100 mg/dL  Near Optimal     100-129 mg/dL  Borderline High  130-159 mg/dL  High             160-189 mg/dL  Very High        >189 mg/dL    Magnesium [[id number]]  (Normal) Collected: 10/04/20 1355    Specimen: Blood Updated: 10/04/20 2412     Magnesium 2.3 mg/dL           Imaging Results (Last 24 Hours)     ** No results found for the last 24 hours. **          EKG      I personally viewed and interpreted the patient's EKG/Telemetry data:    ECHOCARDIOGRAM:      STRESS MYOVIEW:    CARDIAC CATHETERIZATION:    OTHER:         Assessment/Plan     Principal Problem:    Atrial fibrillation with RVR (CMS/[facility])  Active Problems:    Hypertensive emergency    Chronic atrial fibrillation (CMS/[facility])    Chronic diastolic CHF (congestive heart failure) (CMS/[facility])    CKD (chronic kidney disease) stage 3, GFR 30-59 ml/min    Essential hypertension    Gambling disorder, persistent    Coronary artery disease involving native coronary artery of native heart without angina pectoris    Psoriasis    History of CVA (cerebrovascular accident)    GERD (gastroesophageal reflux disease)    Medically noncompliant    Mild cognitive impairment    Dyspnea  Hypokalemia    Patient presents with shortness of breath and atrial fibrillation  Patient is currently on metoprolol and was given digoxin also  Patient started on IV Cardizem drip and will start him on oral Cardizem also  Patient is on anticoagulation  Patient has medical noncompliance and is not sure of taking his medicines regularly  Patient is ruled out for MI by EKG enzymes  Patient's blood pressure is stable now  Patient has history of congestive heart failure diastolic dysfunction  Patient also had a CVA in the past  Patient has history of coronary disease with nonobstructive disease in the past  and is currently on medical therapy  Patient also has chronic renal sufficiency  Patient has low potassium and is being corrected    I discussed the patients findings and my recommendations with patient and nurse    Edy Thomas MD  10/05/20  17:26 EDT              Electronically signed by Edy Thomas MD at 10/05/20 7020          Physical Therapy Notes (most recent note)      Sofy Matt, PT at 10/05/20 1318  Version 1 of 1         Patient Name: Carlos Whipple  : 1955    MRN: [id number]                              Today's Date: 10/5/2020       Admit Date: 10/4/2020    Visit Dx:     ICD-10-CM ICD-9-CM   1. Dyspnea, unspecified type  R06.00 786.09   2. Atrial fibrillation with RVR (CMS/HCC)  I48.91 427.31     Patient Active Problem List   Diagnosis   • Hypertensive emergency   • Chronic atrial fibrillation (CMS/HCC)   • Chronic diastolic CHF (congestive heart failure) (CMS/HCC)   • CKD (chronic kidney disease) stage 3, GFR 30-59 ml/min   • Essential hypertension   • Gambling disorder, persistent   • Shortness of breath   • Coronary artery disease involving native coronary artery of native heart without angina pectoris   • Psoriasis   • History of CVA (cerebrovascular accident)   • DDD (degenerative disc disease), lumbosacral   • Peripheral polyneuropathy   • GERD (gastroesophageal reflux disease)   • Medically noncompliant   • Acute respiratory distress   • Unstable angina (CMS/HCC)   • Cervical disc disorder with radiculopathy   • Completed stroke (CMS/HCC)   • Degeneration of intervertebral disc   • Excessive anticoagulation   • Impaired left ventricular function   • Liver lesion   • Lumbar radiculopathy   • Cervical myelopathy (CMS/HCC)   • Lumbosacral radiculopathy   • Spinal stenosis of lumbar region   • Obesity   • Mild cognitive impairment   • Thoracic back pain   • Thoracic disc disease with myelopathy   • Paroxysmal atrial fibrillation (CMS/HCC)   • Acute congestive heart failure (CMS/HCC)    • Atrial fibrillation with RVR (CMS/[facility])   • Dermatitis associated with moisture   • Dyspnea     Past Medical History:   Diagnosis Date   • A-fib (CMS/[facility])    • Atrial fibrillation with RVR (CMS/[facility])    • CHF (congestive heart failure) (CMS/[facility])    • Chronic atrial fibrillation (CMS/HCC) 11/8/2019   • Chronic diastolic CHF (congestive heart failure) (CMS/[facility]) 11/8/2019   • CKD (chronic kidney disease) stage 3, GFR 30-59 ml/min 11/8/2019   • Coronary artery disease involving native coronary artery of native heart without angina pectoris 11/8/2019   • DDD (degenerative disc disease), lumbosacral 11/8/2019   • Essential hypertension 11/8/2019   • Gambling disorder, persistent 11/8/2019   • GERD (gastroesophageal reflux disease) 11/8/2019   • History of CVA (cerebrovascular accident) 11/8/2019   • Hyperlipidemia    • Hypertension    • Medically noncompliant 11/8/2019   • Peripheral polyneuropathy 11/8/2019   • Psoriasis 11/8/2019   • Stroke (CMS/[facility])      Past Surgical History:   Procedure Laterality Date   • CARDIAC CATHETERIZATION     • CARDIAC CATHETERIZATION N/A 12/10/2019    Procedure: Left Heart Cath and coronary angiogram;  Surgeon: Edy Thomas MD;  Location: [facility] CATH INVASIVE LOCATION;  Service: Cardiovascular     General Information     Row Name 10/05/20 1302          Physical Therapy Time and Intention    Document Type  evaluation  -KB     Mode of Treatment  physical therapy  -KB     Row Name 10/05/20 1302          General Information    Prior Level of Function  independent:;ADL's  -KB     Existing Precautions/Restrictions  fall;oxygen therapy device and L/min  -KB     Row Name 10/05/20 1302          Living Environment    Lives With  sibling(s)  -KB     Row Name 10/05/20 1302          Home Main Entrance    Number of Stairs, Main Entrance  none  -KB     Row Name 10/05/20 1302          Cognition    Orientation Status (Cognition)  disoriented to;time;situation guessed the month incorectly. Brother states  some days pt asks what day of the week it is repeatedly.  -     Row Name 10/05/20 1302          Safety Issues, Functional Mobility    Safety Issues Affecting Function (Mobility)  awareness of need for assistance;insight into deficits/self-awareness;judgment;problem-solving;safety precaution awareness  -KB     Impairments Affecting Function (Mobility)  balance;cognition;endurance/activity tolerance;coordination;range of motion (ROM)  -KB     Cognitive Impairments, Mobility Safety/Performance  awareness, need for assistance;insight into deficits/self-awareness;judgment;problem-solving/reasoning  -KB       User Key  (r) = Recorded By, (t) = Taken By, (c) = Cosigned By    Initials Name Provider Type    Sofy Garvey, PT Physical Therapist        Mobility     Row Name 10/05/20 1304          Bed Mobility    Bed Mobility  supine-sit  -KB     Supine-Sit [place] (Bed Mobility)  minimum assist (75% patient effort)  -KB     Row Name 10/05/20 1304          Sit-Stand Transfer    Sit-Stand [place] (Transfers)  contact guard  -     Row Name 10/05/20 1304          Gait/Stairs (Locomotion)    [place] Level (Gait)  contact guard  -     Assistive Device (Gait)  -- HHA  -KB     Distance in Feet (Gait)  20  -KB     Deviations/Abnormal Patterns (Gait)  ricky decreased;festinating/shuffling;gait speed decreased;stride length decreased;weight shifting decreased  -       User Key  (r) = Recorded By, (t) = Taken By, (c) = Cosigned By    Initials Name Provider Type    Sofy Garvey, PT Physical Therapist        Obj/Interventions     Row Name 10/05/20 1304          Range of Motion Comprehensive    General Range of Motion  bilateral lower extremity ROM WNL  -KB     Row Name 10/05/20 1304          Strength Comprehensive (MMT)    Comment, General Manual Muscle Testing (MMT) Assessment  BLE strength grossly 3+/5  -KB     Row Name 10/05/20 1304          Balance    Balance Assessment  sitting static balance;sitting  dynamic balance;standing static balance;standing dynamic balance  -KB     Static Sitting Balance  mild impairment  -KB     Dynamic Sitting Balance  mild impairment  -     Static Standing Balance  mild impairment  -KB     Dynamic Standing Balance  moderate impairment  -     Row Name 10/05/20 1304          Sensory Assessment (Somatosensory)    Sensory Assessment (Somatosensory)  sensation intact  -       User Key  (r) = Recorded By, (t) = Taken By, (c) = Cosigned By    Initials Name Provider Type    Sofy Garvey, PT Physical Therapist        Goals/Plan     Row Name 10/05/20 1313          Bed Mobility Goal 1 (PT)    Activity/Assistive Device (Bed Mobility Goal 1, PT)  bed mobility activities, all  -KB     [place] Level/Cues Needed (Bed Mobility Goal 1, PT)  independent  -KB     Time Frame (Bed Mobility Goal 1, PT)  short term goal (STG);5 days  -     Row Name 10/05/20 1313          Transfer Goal 1 (PT)    Activity/Assistive Device (Transfer Goal 1, PT)  sit-to-stand/stand-to-sit  -KB     [place] Level/Cues Needed (Transfer Goal 1, PT)  modified independence  -KB     Time Frame (Transfer Goal 1, PT)  short term goal (STG);5 days  -     Row Name 10/05/20 1313          Gait Training Goal 1 (PT)    Activity/Assistive Device (Gait Training Goal 1, PT)  gait (walking locomotion);assistive device use;improve balance and speed;increase endurance/gait distance;decrease fall risk  -KB     [place] Level (Gait Training Goal 1, PT)  contact guard assist  -KB     Distance (Gait Training Goal 1, PT)  200  -KB     Time Frame (Gait Training Goal 1, PT)  long term goal (LTG);2 weeks  -       User Key  (r) = Recorded By, (t) = Taken By, (c) = Cosigned By    Initials Name Provider Type    Sofy Garvey, PT Physical Therapist        Clinical Impression     Row Name 10/05/20 3539          Pain    Additional Documentation  Pain Scale: Numbers Pre/Post-Treatment (Group)  -     Row Name 10/05/20 4852           Pain Scale: Numbers Pre/Post-Treatment    Pretreatment Pain Rating  0/10 - no pain  -KB     Posttreatment Pain Rating  0/10 - no pain  -KB     Row Name 10/05/20 1301          Plan of Care Review    Plan of Care Reviewed With  patient  -KB     Outcome Summary  64 yo M admit with shortness of air and heart palpitations. Pt in wrist restraints when PT enters room. Patient with generalized confusion and requires extra time and cues for orientation questions. Pt lives with brother and reportedly ambulates without use of AD though has access to cane at home. Currently requires Janell for bed mobility for supine to sit. Pt impulsive during treatment and requires cues for safety. Patient ambulates with HHA and CGA. Patient with balance deficits increasing risk of falls.Patient high falls risk and will require IP rehab at d/c for improved functional mobility.  -KB     Row Name 10/05/20 3217          Therapy Assessment/Plan (PT)    Rehab Potential (PT)  good, to achieve stated therapy goals  -KB     Criteria for Skilled Interventions Met (PT)  yes;meets criteria  -KB     Row Name 10/05/20 7837          Vital Signs    Pre SpO2 (%)  97  -KB     O2 Delivery Pre Treatment  supplemental O2  -KB     Intra SpO2 (%)  90  -KB     O2 Delivery Intra Treatment  supplemental O2  -KB     Post SpO2 (%)  96  -KB     O2 Delivery Post Treatment  supplemental O2  -KB     Pre Patient Position  Supine  -KB     Intra Patient Position  Standing  -KB     Post Patient Position  Supine  -KB     Row Name 10/05/20 4152          Positioning and Restraints    Pre-Treatment Position  in bed  -KB     Post Treatment Position  bed  -KB     In Bed  notified nsg;call light within reach;encouraged to call for assist;exit alarm on  -KB     Restraints  reapplied:  -KB       User Key  (r) = Recorded By, (t) = Taken By, (c) = Cosigned By    Initials Name Provider Type    Sofy Garvey, PT Physical Therapist        Outcome Measures     Row Name 10/05/20 2333           How much help from another person do you currently need...    Turning from your back to your side while in flat bed without using bedrails?  3  -KB     Moving from lying on back to sitting on the side of a flat bed without bedrails?  3  -KB     Moving to and from a bed to a chair (including a wheelchair)?  3  -KB     Standing up from a chair using your arms (e.g., wheelchair, bedside chair)?  3  -KB     Climbing 3-5 steps with a railing?  3  -KB     To walk in hospital room?  3  -KB     AM-PAC 6 Clicks Score (PT)  18  -KB     Row Name 10/05/20 1316          Modified [place] Scale    Pre-Stroke Modified [place] Scale  2 - Slight disability.  Unable to carry out all previous activities but able to look after own affairs without assistance.  -KB     Modified [place] Scale  4 - Moderately severe disability.  Unable to walk without assistance, and unable to attend to own bodily needs without assistance.  -KB     Row Name 10/05/20 1316          Functional Assessment    Outcome Measure Options  AM-PAC 6 Clicks Basic Mobility (PT);Modified [place]  -KB       User Key  (r) = Recorded By, (t) = Taken By, (c) = Cosigned By    Initials Name Provider Type    Sofy Garvey, PT Physical Therapist        Physical Therapy Education                 Title: PT OT SLP Therapies (Done)     Topic: Physical Therapy (Done)     Point: Mobility training (Done)     Learning Progress Summary           Patient Acceptance, E,TB, VU,NR by MARY at 10/5/2020 1316    Acceptance, E, VU by  at 10/5/2020 0830                   Point: Home exercise program (Done)     Learning Progress Summary           Patient Acceptance, E,TB, VU,NR by MARY at 10/5/2020 1316                   Point: Body mechanics (Done)     Learning Progress Summary           Patient Acceptance, E,TB, VU,NR by MARY at 10/5/2020 1316                   Point: Precautions (Done)     Learning Progress Summary           Patient Acceptance, E,TB, VU,NR by MARY at 10/5/2020 1316    Acceptance,  E, VU by  at 10/5/2020 0830                               User Key     Initials Effective Dates Name Provider Type Discipline     03/01/19 -  Zuleika White, RN Registered Nurse Nurse     06/15/19 -  Sofy Matt, PT Physical Therapist PT              PT Recommendation and Plan  Planned Therapy Interventions (PT): balance training, bed mobility training, gait training, home exercise program, postural re-education, patient/family education, transfer training, strengthening  Plan of Care Reviewed With: patient  Outcome Summary: 64 yo M admit with shortness of air and heart palpitations. Pt in wrist restraints when PT enters room. Patient with generalized confusion and requires extra time and cues for orientation questions. Pt lives with brother and reportedly ambulates without use of AD though has access to cane at home. Currently requires Janell for bed mobility for supine to sit. Pt impulsive during treatment and requires cues for safety. Patient ambulates with HHA and CGA. Patient with balance deficits increasing risk of falls.Patient high falls risk and will require IP rehab at d/c for improved functional mobility.     Time Calculation:   PT Charges     Row Name 10/05/20 1317             Time Calculation    Start Time  0830  -KB      Stop Time  0855  -KB      Time Calculation (min)  25 min  -KB      PT Received On  10/05/20  -KB      PT - Next Appointment  10/06/20  -KB      PT Goal Re-Cert Due Date  10/19/20  -KB        User Key  (r) = Recorded By, (t) = Taken By, (c) = Cosigned By    Initials Name Provider Type     Sofy Matt, PT Physical Therapist        Therapy Charges for Today     Code Description Service Date Service Provider Modifiers Qty    [id number] HC PT EVAL MOD COMPLEXITY 3 10/5/2020 Sofy Matt, PT GP 1          PT G-Codes  Outcome Measure Options: AM-PAC 6 Clicks Basic Mobility (PT), Modified [place]  AM-PAC 6 Clicks Score (PT): 18  Modified [place] Scale: 4 - Moderately severe disability.   Unable to walk without assistance, and unable to attend to own bodily needs without assistance.    Sofy Matt, PT  10/5/2020      Electronically signed by Sofy Matt, PT at 10/05/20 1318          Occupational Therapy Notes (most recent note)      Mena Menjivar, OT at 10/05/20 1033          Patient Name: Carlos Whipple  : 1955    MRN: [id number]                              Today's Date: 10/5/2020       Admit Date: 10/4/2020    Visit Dx:     ICD-10-CM ICD-9-CM   1. Dyspnea, unspecified type  R06.00 786.09   2. Atrial fibrillation with RVR (CMS/HCC)  I48.91 427.31     Patient Active Problem List   Diagnosis   • Hypertensive emergency   • Chronic atrial fibrillation (CMS/HCC)   • Chronic diastolic CHF (congestive heart failure) (CMS/HCC)   • CKD (chronic kidney disease) stage 3, GFR 30-59 ml/min   • Essential hypertension   • Gambling disorder, persistent   • Shortness of breath   • Coronary artery disease involving native coronary artery of native heart without angina pectoris   • Psoriasis   • History of CVA (cerebrovascular accident)   • DDD (degenerative disc disease), lumbosacral   • Peripheral polyneuropathy   • GERD (gastroesophageal reflux disease)   • Medically noncompliant   • Acute respiratory distress   • Unstable angina (CMS/HCC)   • Cervical disc disorder with radiculopathy   • Completed stroke (CMS/HCC)   • Degeneration of intervertebral disc   • Excessive anticoagulation   • Impaired left ventricular function   • Liver lesion   • Lumbar radiculopathy   • Cervical myelopathy (CMS/HCC)   • Lumbosacral radiculopathy   • Spinal stenosis of lumbar region   • Obesity   • Mild cognitive impairment   • Thoracic back pain   • Thoracic disc disease with myelopathy   • Paroxysmal atrial fibrillation (CMS/HCC)   • Acute congestive heart failure (CMS/HCC)   • Atrial fibrillation with RVR (CMS/HCC)   • Dermatitis associated with moisture   • Dyspnea     Past Medical History:   Diagnosis Date   •  A-fib (CMS/[facility])    • Atrial fibrillation with RVR (CMS/HCC)    • CHF (congestive heart failure) (CMS/[facility])    • Chronic atrial fibrillation (CMS/[facility]) 11/8/2019   • Chronic diastolic CHF (congestive heart failure) (CMS/[facility]) 11/8/2019   • CKD (chronic kidney disease) stage 3, GFR 30-59 ml/min 11/8/2019   • Coronary artery disease involving native coronary artery of native heart without angina pectoris 11/8/2019   • DDD (degenerative disc disease), lumbosacral 11/8/2019   • Essential hypertension 11/8/2019   • Gambling disorder, persistent 11/8/2019   • GERD (gastroesophageal reflux disease) 11/8/2019   • History of CVA (cerebrovascular accident) 11/8/2019   • Hyperlipidemia    • Hypertension    • Medically noncompliant 11/8/2019   • Peripheral polyneuropathy 11/8/2019   • Psoriasis 11/8/2019   • Stroke (CMS/[facility])      Past Surgical History:   Procedure Laterality Date   • CARDIAC CATHETERIZATION     • CARDIAC CATHETERIZATION N/A 12/10/2019    Procedure: Left Heart Cath and coronary angiogram;  Surgeon: Edy Thomas MD;  Location: [facility] CATH INVASIVE LOCATION;  Service: Cardiovascular     General Information     Row Name 10/05/20 1004          General Information    Prior Level of Function  independent:;ADL's;all household mobility;driving;home management pt reports he needs a cane & has difficulty reaching feet at baseline. Reports recent falls.  -     Existing Precautions/Restrictions  fall;oxygen therapy device and L/min  -     Row Name 10/05/20 1004          Living Environment    Lives With  sibling(s)  -     Row Name 10/05/20 1004          Home Main Entrance    Number of Stairs, Main Entrance  none ramp at brother's where he's been living for the past year, which brother states cannot continue due to the level of assist he needs, especialle due to his continues incontinence of bladder & bowel in the home & refusal to wear breifs.  -     Row Name 10/05/20 1004          Stairs Within Home, Primary    Number  of Stairs, Within Home, Primary  none  -     Row Name 10/05/20 1004          Cognition    Orientation Status (Cognition)  disoriented to;time;situation guessed the month incorectly. Brother states some days pt asks what day of the week it is repeatedly.  -     Row Name 10/05/20 1004          Safety Issues, Functional Mobility    Safety Issues Affecting Function (Mobility)  at risk behavior observed;awareness of need for assistance;impulsivity;insight into deficits/self-awareness;judgment;problem-solving;safety precaution awareness  -     Impairments Affecting Function (Mobility)  balance;cognition;endurance/activity tolerance;coordination;range of motion (ROM)  -       User Key  (r) = Recorded By, (t) = Taken By, (c) = Cosigned By    Initials Name Provider Type     Mena Menjivar OT Occupational Therapist        Mobility/ADL's     Row Name 10/05/20 1021          Bed Mobility    Bed Mobility  supine-sit  -     Supine-Sit [place] (Bed Mobility)  minimum assist (75% patient effort)  -     Row Name 10/05/20 1021          Functional Mobility    Functional Mobility- Ind. Level  minimum assist (75% patient effort);1 person + 1 person to manage equipment  -     Functional Mobility-Distance (Feet)  30  -     Functional Mobility- Safety Issues  balance decreased during turns;step length decreased;weight-shifting ability decreased;loses balance backward  -     Functional Mobility- Comment  mild scissoring of his LLE noted.  -     Row Name 10/05/20 1021          Activities of Daily Living    BADL Assessment/Intervention  lower body dressing;toileting  -     Row Name 10/05/20 1021          Lower Body Dressing Assessment/Training    [place] Level (Lower Body Dressing)  don;socks;dependent (less than 25% patient effort)  -     Position (Lower Body Dressing)  edge of bed sitting  -     Row Name 10/05/20 1021          Toileting Assessment/Training    [place] Level (Toileting)  dependent  (less than 25% patient effort) incontinent of urine in the bed. RN applying condom catheter.  -       User Key  (r) = Recorded By, (t) = Taken By, (c) = Cosigned By    Initials Name Provider Type     Mena Menjivar OT Occupational Therapist        Obj/Interventions     Row Name 10/05/20 1023          Sensory Assessment (Somatosensory)    Sensory Assessment (Somatosensory)  sensation intact denies any paresthesias  -     Row Name 10/05/20 1023          Range of Motion Comprehensive    Comment, General Range of Motion  shld flex 50% AROM however pt may have had some difficulty following commmands for MMT. PROM shld flex 75% w/ possible spasticity noted w/ cog-wheel-like jerking during resistive MMT.  -     Row Name 10/05/20 1023          Strength Comprehensive (MMT)    Comment, General Manual Muscle Testing (MMT) Assessment   4-/5; shld 5/5 in limited range  -     Row Name 10/05/20 1023          Balance    Balance Assessment  sitting static balance;sitting dynamic balance;standing static balance;standing dynamic balance  -     Static Sitting Balance  mild impairment  -     Dynamic Sitting Balance  mild impairment  -     Static Standing Balance  mild impairment  -     Dynamic Standing Balance  moderate impairment  -       User Key  (r) = Recorded By, (t) = Taken By, (c) = Cosigned By    Initials Name Provider Type     Mena Menjivar OT Occupational Therapist        Goals/Plan     Row Name 10/05/20 1029          Bed Mobility Goal 1 (OT)    Activity/Assistive Device (Bed Mobility Goal 1, OT)  bed mobility activities, all  -     [place] Level/Cues Needed (Bed Mobility Goal 1, OT)  modified independence  -     Time Frame (Bed Mobility Goal 1, OT)  2 weeks  -     Row Name 10/05/20 1029          Dressing Goal 1 (OT)    Activity/Device (Dressing Goal 1, OT)  dressing skills, all;sock-aid;long-handled shoe horn;reacher  -     [place]/Cues Needed (Dressing Goal 1, OT)  set-up  required;supervision required  -     Time Frame (Dressing Goal 1, OT)  2 weeks  -     Row Name 10/05/20 1029          Grooming Goal 1 (OT)    Activity/Device (Grooming Goal 1, OT)  hair care;oral care;wash face, hands standing at sink  -     [place] (Grooming Goal 1, OT)  supervision required  -     Time Frame (Grooming Goal 1, OT)  1 week  -       User Key  (r) = Recorded By, (t) = Taken By, (c) = Cosigned By    Initials Name Provider Type     Mena Menjivar, OT Occupational Therapist        Clinical Impression     Row Name 10/05/20 1025          Plan of Care Review    Plan of Care Reviewed With  patient;sibling  -     Progress  no change  -     Outcome Summary  pt demonstrates mild confusion, incontinence,  weakness, decreased ROM in shld & trunk, balance & coordination deficits. HAs been living w/ brother for a year as his home w/ siblings living in a detached garage has no working heat/cooling system. 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 Pt is impulsive & unsafe. Needed (D) (A) for LBD & toileting & min (A) for bed & functional mobility. OT recommends IP rehab at d/c. PPE worn: mask, gloves, safety glasses.  -     Row Name 10/05/20 1025          Therapy Assessment/Plan (OT)    Rehab Potential (OT)  good, to achieve stated therapy goals  -     Criteria for Skilled Therapeutic Interventions Met (OT)  skilled treatment is necessary  -     Therapy Frequency (OT)  5 times/wk  -     Predicted Duration of Therapy Intervention (OT)  until D/C  -     Row Name 10/05/20 1025          Therapy Plan Review/Discharge Plan (OT)    Anticipated Discharge Disposition (OT)  inpatient rehabilitation facility  -     Row Name 10/05/20 1025          Vital Signs    Pre SpO2 (%)  97  -MH     O2 Delivery Pre Treatment  supplemental O2  -     Intra SpO2 (%)  90  -MH     O2 Delivery Intra Treatment  room air  -     Post SpO2 (%)  96  -MH     O2 Delivery Post Treatment  supplemental  O2  -MH     Pre Patient Position  Supine  -MH     Intra Patient Position  Standing  -MH     Post Patient Position  Supine  -MH     Row Name 10/05/20 1025          Positioning and Restraints    Pre-Treatment Position  in bed  -MH     Post Treatment Position  bed  -MH     In Bed  notified nsg;supine;encouraged to call for assist;exit alarm on;call light within reach  -MH     Restraints  reapplied:;soft limb;notified nsg:  -       User Key  (r) = Recorded By, (t) = Taken By, (c) = Cosigned By    Initials Name Provider Type    Mena Mckeon OT Occupational Therapist        Outcome Measures     Row Name 10/05/20 1030          How much help from another person do you currently need...    Turning from your back to your side while in flat bed without using bedrails?  3  -MH     Moving from lying on back to sitting on the side of a flat bed without bedrails?  3  -MH     Moving to and from a bed to a chair (including a wheelchair)?  3  -MH     Standing up from a chair using your arms (e.g., wheelchair, bedside chair)?  3  -MH     Climbing 3-5 steps with a railing?  3  -MH     To walk in hospital room?  3  -MH     AM-PAC 6 Clicks Score (PT)  18  -     Row Name 10/05/20 1030          Modified Hayden Scale    Pre-Stroke Modified [place] Scale  2 - Slight disability.  Unable to carry out all previous activities but able to look after own affairs without assistance.  -     Modified [place] Scale  4 - Moderately severe disability.  Unable to walk without assistance, and unable to attend to own bodily needs without assistance.  -     Row Name 10/05/20 1030          Functional Assessment    Outcome Measure Options  AM-PAC 6 Clicks Basic Mobility (PT);Modified Hayden  -       User Key  (r) = Recorded By, (t) = Taken By, (c) = Cosigned By    Initials Name Provider Type    Mena Mckeon OT Occupational Therapist        Occupational Therapy Education                 Title: PT OT SLP Therapies (Done)     Topic:  Occupational Therapy (Done)     Point: ADL training (Done)     Description:   Instruct learner(s) on proper safety adaptation and remediation techniques during self care or transfers.   Instruct in proper use of assistive devices.              Learning Progress Summary           Patient Acceptance, E, VU by  at 10/5/2020 0830                   Point: Home exercise program (Done)     Description:   Instruct learner(s) on appropriate technique for monitoring, assisting and/or progressing therapeutic exercises/activities.              Learning Progress Summary           Patient Acceptance, E, VU by  at 10/5/2020 0830                   Point: Precautions (Done)     Description:   Instruct learner(s) on prescribed precautions during self-care and functional transfers.              Learning Progress Summary           Patient Acceptance, E,TB, VU,NR by  at 10/5/2020 1031    Acceptance, E, VU by  at 10/5/2020 0830                   Point: Body mechanics (Done)     Description:   Instruct learner(s) on proper positioning and spine alignment during self-care, functional mobility activities and/or exercises.              Learning Progress Summary           Patient Acceptance, E,TB, VU,NR by  at 10/5/2020 1031    Acceptance, E, VU by  at 10/5/2020 0830                               User Key     Initials Effective Dates Name Provider Type Discipline     03/01/19 -  Mena Menjivar OT Occupational Therapist OT     03/01/19 -  Zuleika White RN Registered Nurse Nurse              OT Recommendation and Plan  Retired Outcome Summary/Treatment Plan (OT)  Anticipated Discharge Disposition (OT): inpatient rehabilitation facility  Therapy Frequency (OT): 5 times/wk  Plan of Care Review  Plan of Care Reviewed With: patient, sibling  Progress: no change  Outcome Summary: pt demonstrates mild confusion, incontinence,  weakness, decreased ROM in shld & trunk, balance & coordination deficits. HAs been living w/ brother for  a year as his home w/ siblings living in a detached garage has no working heat/cooling system. 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 Pt is impulsive & unsafe. Needed (D) (A) for LBD & toileting & min (A) for bed & functional mobility. OT recommends IP rehab at d/c. PPE worn: mask, gloves, safety glasses.  Plan of Care Reviewed With: patient, sibling  Outcome Summary: pt demonstrates mild confusion, incontinence,  weakness, decreased ROM in shld & trunk, balance & coordination deficits. HAs been living w/ brother for a year as his home w/ siblings living in a detached garage has no working heat/cooling system. 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 Pt is impulsive & unsafe. Needed (D) (A) for LBD & toileting & min (A) for bed & functional mobility. OT recommends IP rehab at d/c. PPE worn: mask, gloves, safety glasses.     Time Calculation:   Time Calculation- OT     Row Name 10/05/20 1033             Time Calculation-     OT Start Time  0820  -      OT Stop Time  0850  -      OT Time Calculation (min)  30 min  -      Total Timed Code Minutes- OT  0 minute(s)  -      OT Received On  10/05/20  -      OT - Next Appointment  10/06/20  -      OT Goal Re-Cert Due Date  10/19/20  -        User Key  (r) = Recorded By, (t) = Taken By, (c) = Cosigned By    Initials Name Provider Type     Mena Menjivar OT Occupational Therapist        Therapy Charges for Today     Code Description Service Date Service Provider Modifiers Qty    [id number]  OT EVAL MOD COMPLEXITY 4 10/5/2020 Mena Menjivar OT GO 1               Mena Menjivar OT  10/5/2020    Electronically signed by Mena Menjivar OT at 10/05/20 1033

## 2020-10-06 NOTE — SIGNIFICANT NOTE
"   10/06/20 1122   Readmission Indications   Is this hospitalization related to the prior hospital diagnosis? Yes   What was the reason you were admitted? 'I couldnt breathe and my heart was racing\".   Recommendation for rehospitalization   Did you speak with your physician prior to coming to the hospital No   Who recommended you return to the hospital?   (self)   Did you seek care elsewhere prior to coming to the hospital? No   Follow up appointment   Do you have a PCP? Yes  (RANDAL cerda)   Did you have an appointment with PCP/specialist after hospitalization within 7 days? Yes  (\" I dont know. I forgot\".)   Did you keep your appointment? No   If you did not keep appointment, why? Patient non-compliance   Medications   Did you have newly prescribed medications at discharge?   (\" I dont know. Im not very good at that but Im going to try to get better\")   Did you understand the reasons for your medications at discharge and how to take them?   (\" I dont really get into that\")   Are you taking all of you prescribed medications? No  (patient admits to non compliance)   If not, why? Patient non-compliance   Discharge Instructions   Did you understand your discharge instructions? No  (\" not really\".)   Did your family/caregiver hear your instructions? No   Were you told to eat a special diet? No   Did you adhere to the diet? No   Were you given a number of someone to call if you had questions or concerns? No   Index discharge location/services   Where did you go upon discharge? Home with services   Do you have supportive family or friends in the home? Yes  (\" My brother\")   What services were arranged at discharge? Home Health   Discharge Readiness   On a scale of 1-5 (5 being well prepared), how ready were you for discharge 3   Recommendation based on interview Education on diagnosis/self management;Other (comment)  (neccesity for compliance)   Spoke with patient at bedside with googles and mask for 15 minutes. He states, \" " "I just cant always remember to take my medicines and sometimes I just say to Hell with it\".   CHF educator is seeing patient and I spoke with him on recommendatins from PT for inpatient rehab. He asks for me to come back later with the  to discuss it.     Juliana Blunt RN  Complex Case Manager  [facility]  [phone]  [phone]-office  [phone]-fax  [email or web address]   "

## 2020-10-06 NOTE — PLAN OF CARE
Roya remains a fall risk with recent falls and confusion.  Skin is very much at risk, with crusted lesions to abdomen back and right leg, blanching redness from moisture to jennifer area folds and glueteal fold.  Discharge planning pending,  Heart rate is elevated in the 100s to 120s today, patient remains off of the cardizem drip.  Problem: Fall Injury Risk  Goal: Absence of Fall and Fall-Related Injury  Outcome: Ongoing, Not Progressing  Intervention: Identify and Manage Contributors to Fall Injury Risk  Recent Flowsheet Documentation  Taken 10/6/2020 1400 by Nehal Menon RN  Medication Review/Management: medications reviewed  Taken 10/6/2020 1200 by Nehal Menon RN  Medication Review/Management: medications reviewed  Taken 10/6/2020 1013 by Nehal Menon RN  Medication Review/Management: medications reviewed  Taken 10/6/2020 1000 by Nehal Menon RN  Medication Review/Management: medications reviewed  Taken 10/6/2020 0800 by Nehal Menon RN  Medication Review/Management: medications reviewed  Intervention: Promote Injury-Free Environment  Recent Flowsheet Documentation  Taken 10/6/2020 1400 by Nehal Menon RN  Safety Promotion/Fall Prevention:  • activity supervised  • assistive device/personal items within reach  • fall prevention program maintained  • nonskid shoes/slippers when out of bed  • safety round/check completed  Taken 10/6/2020 1200 by Nehal Menon RN  Safety Promotion/Fall Prevention:  • assistive device/personal items within reach  • clutter free environment maintained  • fall prevention program maintained  • nonskid shoes/slippers when out of bed  • safety round/check completed  Taken 10/6/2020 1013 by Nehal Menon RN  Safety Promotion/Fall Prevention:  • activity supervised  • assistive device/personal items within reach  • fall prevention program maintained  • nonskid shoes/slippers when out of bed  • safety round/check completed  Taken 10/6/2020 1000 by Nehal Menon RN  Safety  Promotion/Fall Prevention:  • activity supervised  • assistive device/personal items within reach  • fall prevention program maintained  • nonskid shoes/slippers when out of bed  • safety round/check completed  Taken 10/6/2020 0800 by Nehal Menon, RN  Safety Promotion/Fall Prevention:  • activity supervised  • assistive device/personal items within reach  • clutter free environment maintained  • fall prevention program maintained  • nonskid shoes/slippers when out of bed  • safety round/check completed     Problem: Adult Inpatient Plan of Care  Goal: Plan of Care Review  Outcome: Ongoing, Not Progressing  Flowsheets (Taken 10/6/2020 1458)  Plan of Care Reviewed With: patient  Outcome Summary: Roya remains a fall risk with recent falls and confusion.  Skin is very much at risk, with crusted lesions to abdomen bottom and right leg, blanching redness from moisture to jennifer area folds and glueteal fold.  Discharge planning pending,  Heart rate is elevated in the 100s to 120s today, patient remains off of the cardizem drip.  Goal: Patient-Specific Goal (Individualized)  Outcome: Ongoing, Not Progressing  Goal: Absence of Hospital-Acquired Illness or Injury  Outcome: Ongoing, Not Progressing  Intervention: Identify and Manage Fall Risk  Recent Flowsheet Documentation  Taken 10/6/2020 1400 by Nehal Menon RN  Safety Promotion/Fall Prevention:  • activity supervised  • assistive device/personal items within reach  • fall prevention program maintained  • nonskid shoes/slippers when out of bed  • safety round/check completed  Taken 10/6/2020 1200 by Nehal Menon, RN  Safety Promotion/Fall Prevention:  • assistive device/personal items within reach  • clutter free environment maintained  • fall prevention program maintained  • nonskid shoes/slippers when out of bed  • safety round/check completed  Taken 10/6/2020 1013 by Nehal Menon, RN  Safety Promotion/Fall Prevention:  • activity supervised  • assistive  device/personal items within reach  • fall prevention program maintained  • nonskid shoes/slippers when out of bed  • safety round/check completed  Taken 10/6/2020 1000 by Nehal Menon RN  Safety Promotion/Fall Prevention:  • activity supervised  • assistive device/personal items within reach  • fall prevention program maintained  • nonskid shoes/slippers when out of bed  • safety round/check completed  Taken 10/6/2020 0800 by Nehal Menon RN  Safety Promotion/Fall Prevention:  • activity supervised  • assistive device/personal items within reach  • clutter free environment maintained  • fall prevention program maintained  • nonskid shoes/slippers when out of bed  • safety round/check completed  Intervention: Prevent Skin Injury  Recent Flowsheet Documentation  Taken 10/6/2020 1013 by Nehal Menon RN  Body Position: supine  Intervention: Prevent and Manage VTE (venous thromboembolism) Risk  Recent Flowsheet Documentation  Taken 10/6/2020 1013 by Nehal Menon RN  VTE Prevention/Management: (daily baby aspirin) patient refused intervention  Intervention: Prevent Infection  Recent Flowsheet Documentation  Taken 10/6/2020 0800 by Nehal Menon RN  Infection Prevention: personal protective equipment utilized  Goal: Optimal Comfort and Wellbeing  Outcome: Ongoing, Not Progressing  Intervention: Provide Person-Centered Care  Recent Flowsheet Documentation  Taken 10/6/2020 1013 by Nehal Menon RN  Trust Relationship/Rapport: care explained  Goal: Readiness for Transition of Care  Outcome: Ongoing, Not Progressing     Problem: Skin Injury Risk Increased  Goal: Skin Health and Integrity  Outcome: Ongoing, Not Progressing  Intervention: Optimize Skin Protection  Recent Flowsheet Documentation  Taken 10/6/2020 1013 by Nehal Menon RN  Head of Bed (HOB): HOB at 60-90 degrees   Goal Outcome Evaluation:  Plan of Care Reviewed With: patient  Progress: no change  Outcome Summary: Roya remains a fall risk with recent  falls and confusion.  Skin is very much at risk, with crusted lesions to abdomen bottom and right leg, blanching redness from moisture to jennifer area folds and glueteal fold.  Discharge planning pending,  Heart rate is elevated in the 100s to 120s today, patient remains off of the cardizem drip.

## 2020-10-07 LAB
BASOPHILS # BLD AUTO: 0.1 10*3/MM3 (ref 0–0.2)
BASOPHILS NFR BLD AUTO: 1.5 % (ref 0–1.5)
BUN SERPL-MCNC: 26 MG/DL (ref 8–23)
DEPRECATED RDW RBC AUTO: 59.9 FL (ref 37–54)
EOSINOPHIL # BLD AUTO: 0.1 10*3/MM3 (ref 0–0.4)
EOSINOPHIL NFR BLD AUTO: 1.6 % (ref 0.3–6.2)
ERYTHROCYTE [DISTWIDTH] IN BLOOD BY AUTOMATED COUNT: 20.3 % (ref 12.3–15.4)
HCT VFR BLD AUTO: 42.2 % (ref 37.5–51)
HGB BLD-MCNC: 13.7 G/DL (ref 13–17.7)
LYMPHOCYTES # BLD AUTO: 0.9 10*3/MM3 (ref 0.7–3.1)
LYMPHOCYTES NFR BLD AUTO: 12.2 % (ref 19.6–45.3)
MCH RBC QN AUTO: 27.4 PG (ref 26.6–33)
MCHC RBC AUTO-ENTMCNC: 32.4 G/DL (ref 31.5–35.7)
MCV RBC AUTO: 84.6 FL (ref 79–97)
MONOCYTES # BLD AUTO: 0.7 10*3/MM3 (ref 0.1–0.9)
MONOCYTES NFR BLD AUTO: 9.6 % (ref 5–12)
NEUTROPHILS NFR BLD AUTO: 5.8 10*3/MM3 (ref 1.7–7)
NEUTROPHILS NFR BLD AUTO: 75.1 % (ref 42.7–76)
NRBC BLD AUTO-RTO: 0.1 /100 WBC (ref 0–0.2)
PLATELET # BLD AUTO: 176 10*3/MM3 (ref 140–450)
PMV BLD AUTO: 8.7 FL (ref 6–12)
RBC # BLD AUTO: 4.99 10*6/MM3 (ref 4.14–5.8)
WBC # BLD AUTO: 7.8 10*3/MM3 (ref 3.4–10.8)

## 2020-10-07 PROCEDURE — 85025 COMPLETE CBC W/AUTO DIFF WBC: CPT | Performed by: FAMILY MEDICINE

## 2020-10-07 PROCEDURE — 97110 THERAPEUTIC EXERCISES: CPT

## 2020-10-07 PROCEDURE — 97530 THERAPEUTIC ACTIVITIES: CPT

## 2020-10-07 PROCEDURE — 99232 SBSQ HOSP IP/OBS MODERATE 35: CPT | Performed by: INTERNAL MEDICINE

## 2020-10-07 PROCEDURE — 97116 GAIT TRAINING THERAPY: CPT

## 2020-10-07 PROCEDURE — 84520 ASSAY OF UREA NITROGEN: CPT | Performed by: FAMILY MEDICINE

## 2020-10-07 PROCEDURE — 97535 SELF CARE MNGMENT TRAINING: CPT

## 2020-10-07 RX ADMIN — SPIRONOLACTONE 12.5 MG: 25 TABLET, FILM COATED ORAL at 08:53

## 2020-10-07 RX ADMIN — RISPERIDONE 0.5 MG: 0.25 TABLET ORAL at 08:54

## 2020-10-07 RX ADMIN — ASPIRIN 81 MG: 81 TABLET, COATED ORAL at 08:54

## 2020-10-07 RX ADMIN — METOPROLOL SUCCINATE 50 MG: 50 TABLET, EXTENDED RELEASE ORAL at 08:53

## 2020-10-07 RX ADMIN — Medication 10 ML: at 22:05

## 2020-10-07 RX ADMIN — SODIUM BICARBONATE 650 MG TABLET 650 MG: at 08:53

## 2020-10-07 RX ADMIN — ALLOPURINOL 100 MG: 100 TABLET ORAL at 08:54

## 2020-10-07 RX ADMIN — HYDRALAZINE HYDROCHLORIDE 50 MG: 25 TABLET, FILM COATED ORAL at 08:53

## 2020-10-07 RX ADMIN — DILTIAZEM HYDROCHLORIDE 180 MG: 180 CAPSULE, COATED, EXTENDED RELEASE ORAL at 08:53

## 2020-10-07 RX ADMIN — LOSARTAN POTASSIUM 50 MG: 50 TABLET, FILM COATED ORAL at 08:53

## 2020-10-07 RX ADMIN — Medication 10 ML: at 08:54

## 2020-10-07 RX ADMIN — RIVAROXABAN 20 MG: 20 TABLET, FILM COATED ORAL at 18:07

## 2020-10-07 RX ADMIN — HYDRALAZINE HYDROCHLORIDE 50 MG: 25 TABLET, FILM COATED ORAL at 15:57

## 2020-10-07 RX ADMIN — SODIUM BICARBONATE 650 MG TABLET 650 MG: at 15:57

## 2020-10-07 NOTE — PLAN OF CARE
"  Problem: Adult Inpatient Plan of Care  Goal: Plan of Care Review  Recent Flowsheet Documentation  Taken 10/7/2020 1423 by Allison Friedman OT  Plan of Care Reviewed With: patient  Outcome Summary: Pt is 64 yo male admited with Afib with RVR and dyspnea, being followed for confusion, weakness, and poor safety awareness impairing balance. Pt up in chair upon OT arrival, loquatious and appears to control conversation to cover cognitive deficits. [facility] and is perseverative on his previous travels. Pt completes 10 sit<>stand transfers, requiring CGA and frequent rest breaks due to fatigue. Pt doffs/dons socks with set up in chair, but requires Mod A for balance/safety while standing. Pt continues to be far below stated baseline, and requires IP rehab at discharge. PPE: Gloves, mask, eyewear     "

## 2020-10-07 NOTE — PROGRESS NOTES
LOS: 3 days   Patient Care Team:  Marisol Larson APRN as PCP - General  Shwetha Barber MD as Consulting Physician (Nephrology)  Edy Thomas MD as Consulting Physician (Cardiology)    Chief Complaint: None this morning, nursing reports the patient has refused morning labs. He is also refusing   his overall till this morning.  Subjective   Alert any eating breakfast, appears comfortable    Interval History:  Denies any other problems today. Says he understands that if he continues to refuse to take his medications he places and sulfa great risk of significant illness and even death including death from stroke heart attack.    Patient Complaints: Dyspnea on exertion otherwise at rest no problems  Patient Denies: Denies any palpitations or chest pain  History taken from: patient    Review of Systems:    All systems were reviewed and negative except for: See interval history    Objective     Vital Signs  Temp:  [97.4 °F (36.3 °C)-98.6 °F (37 °C)] 98.3 °F (36.8 °C)  Heart Rate:  [] 77  Resp:  [13-19] 16  BP: ()/(78-93) 156/90    Physical Exam:     General Appearance:    Alert, cooperative, in no acute distress, poor hygiene, appears markedly older than stated age   Head:    Normocephalic, without obvious abnormality, atraumatic   Eyes:            Conjunctivae and sclerae normal, no   icterus, no pallor, corneas clear, PERRLA   Throat:   No oral lesions, no thrush, oral mucosa moist   Neck:   No adenopathy, supple, trachea midline, no thyromegaly, no   carotid bruit, no JVD   Lungs:     Clear to auscultation,respirations regular, even and                  unlabored, distant some coarse upper airway sounds this morning    Heart:   Irregularly irregular with rates in the 70s   Chest Wall:    No abnormalities observed   Abdomen:     Normal bowel sounds, no masses, no organomegaly, soft        non-tender, non-distended, no guarding, no rebound                tenderness, rotund   Rectal:      Deferred   Extremities:   Moves all extremities . no edema, no cyanosis, no             redness   Pulses:   Pulses palpable and equal bilaterally   Skin:   No bleeding, bruising or rash   Lymph nodes:   No palpable adenopathy   Neurologic:   Cranial nerves 2 - 12 grossly intact, sensation intact, DTR       present and equal bilaterally   Radiology:  Xr Chest 1 View    Result Date: 10/4/2020   Marked cardiomegaly and pulmonary vascular congestion similar prior studies.  No focal airspace consolidation.   Electronically Signed By-Elias Rascon On:10/4/2020 2:46 PM This report was finalized on [id number] by  Elias Rascon, .         Results Review:     I reviewed the patient's new clinical results.  I reviewed the patient's new imaging results and agree with the interpretation.    Medication Review:   Scheduled Meds:allopurinol, 100 mg, Oral, Daily  aspirin, 81 mg, Oral, Daily  atorvastatin, 10 mg, Oral, Nightly  dilTIAZem CD, 180 mg, Oral, Q24H  hydrALAZINE, 50 mg, Oral, TID  losartan, 50 mg, Oral, Q24H  metoprolol succinate XL, 50 mg, Oral, Q24H  risperiDONE, 0.5 mg, Oral, Q12H  rivaroxaban, 20 mg, Oral, Daily With Dinner  sodium bicarbonate, 650 mg, Oral, TID  sodium chloride, 10 mL, Intravenous, Q12H  spironolactone, 12.5 mg, Oral, Daily      Continuous Infusions:   PRN Meds:.•  acetaminophen **OR** acetaminophen **OR** acetaminophen  •  bisacodyl  •  calcium carbonate  •  HYDROcodone-acetaminophen  •  influenza vaccine  •  labetalol  •  LORazepam  •  magnesium hydroxide  •  melatonin  •  nitroglycerin  •  ondansetron **OR** ondansetron  •  potassium chloride **OR** potassium chloride **OR** potassium chloride  •  sodium chloride  •  sodium chloride    Labs:  Lab Results (last 24 hours)     Procedure Component Value Units Date/Time    Blood Culture - Blood, Arm, Left [[id number]]  (Abnormal) Collected: 10/04/20 1410    Specimen: Blood from Arm, Left Updated: 10/07/20 0731     Blood Culture Staphylococcus,  coagulase negative     Comment: Probable contaminant requires clinical correlation, susceptibility not performed unless requested by physician.          Isolated from Anaerobic Bottle     Blood Culture Corynebacterium species     Comment: Probable contaminant requires clinical correlation, susceptibility not performed unless requested by physician.            Isolated from Aerobic Bottle     Gram Stain Anaerobic Bottle Gram positive cocci in clusters      Aerobic Bottle Gram positive bacilli    Narrative:      Blood culture does not meet the specified criteria for PCR identification.  If pregnant, immunocompromised, or clinical concern for meningitis, call lab to run BCID for Listeria monocytogenes.    Blood Culture - Blood, Hand, Right [[id number]] Collected: 10/06/20 1608    Specimen: Blood from Hand, Right Updated: 10/06/20 1641    BUN [[id number]]  (Abnormal) Collected: 10/06/20 0926    Specimen: Blood Updated: 10/06/20 1628     BUN 28 mg/dL     Blood Culture - Blood, Foot, Left [[id number]] Collected: 10/04/20 1353    Specimen: Blood from Foot, Left Updated: 10/06/20 1415     Blood Culture No growth at 2 days    Comprehensive Metabolic Panel [[id number]]  (Abnormal) Collected: 10/06/20 0926    Specimen: Blood Updated: 10/06/20 1016     Glucose 113 mg/dL      BUN --     Comment: Testing performed by alternate method        Creatinine 1.19 mg/dL      Sodium 140 mmol/L      Potassium 3.4 mmol/L      Comment: Slight hemolysis detected by analyzer. Results may be affected.        Chloride 101 mmol/L      CO2 25.0 mmol/L      Calcium 9.3 mg/dL      Total Protein 7.4 g/dL      Albumin 3.60 g/dL      ALT (SGPT) 364 U/L      AST (SGOT) 99 U/L      Comment: Slight hemolysis detected by analyzer. Results may be affected.        Alkaline Phosphatase 80 U/L      Total Bilirubin 1.1 mg/dL      eGFR Non African Amer 62 mL/min/1.73      Globulin 3.8 gm/dL      A/G Ratio 0.9 g/dL      BUN/Creatinine Ratio --     Comment: Testing  not performed        Anion Gap 14.0 mmol/L     Narrative:      GFR Normal >60  Chronic Kidney Disease <60  Kidney Failure <15      Blood Culture - Blood, Hand, Right [[id number]] Collected: 10/06/20 0926    Specimen: Blood from Hand, Right Updated: 10/06/20 0947           Assessment/Plan       Atrial fibrillation with RVR (CMS/[facility])    Hypertensive emergency    Chronic atrial fibrillation (CMS/[facility])    Chronic diastolic CHF (congestive heart failure) (CMS/[facility])    CKD (chronic kidney disease) stage 3, GFR 30-59 ml/min    Essential hypertension    Gambling disorder, persistent    Coronary artery disease involving native coronary artery of native heart without angina pectoris    Psoriasis    History of CVA (cerebrovascular accident)    GERD (gastroesophageal reflux disease)    Medically noncompliant    Mild cognitive impairment    Dyspnea  Blood cultures with ID of Staphylococcus, coag negative    Continue current approach.  I advised patient that once again he needs to be scrupulous with taking his medications as prescribed.  At this juncture, his family refuses to help him with this.  The patient has very poor insight into his medical conditions.      cardiology input greatly appreciated, thank you.     Repeat blood cultures are pending. Pro calcitonin is within the normal range. Once again,he does not appear to be septic but for completeness will complete the septic work-up.  He remains afebrile.        Paty Polk DO  10/07/20  09:47 EDT

## 2020-10-07 NOTE — PLAN OF CARE
Problem: Adult Inpatient Plan of Care  Goal: Plan of Care Review  Outcome: Ongoing, Progressing  Flowsheets  Taken 10/7/2020 1452 by Amena Hassan PTA  Progress: improving  Taken 10/7/2020 1448 by Amena Hassan PTA  Progress: improving  Outcome Summary: Required min A for safe, functional mobility. Struggling to eob, c/o dizziness intially. Min A to stand and amb. 40' in room, HHA c utilized gait belt. Instabilty/weakness present, falls risk. Unable to accept challenge, wiill progress as able. Will need rehab at d/c to address deficits. PPE worn: Mask, gloves, shield.  Taken 10/7/2020 1423 by Allison Friedman OT  Plan of Care Reviewed With: patient

## 2020-10-07 NOTE — PROGRESS NOTES
"Heart Failure Program  Nurse Navigator  Discharge Planning: Follow-up Note    Patient Name:Carlos Whipple  :1955  Current Admission Date: 10/4/2020       Last 3 Weights:  Wt Readings from Last 3 Encounters:   10/07/20 115 kg (252 lb 13.9 oz)   20 109 kg (239 lb 6.7 oz)   20 117 kg (258 lb 6.1 oz)       Intake and Output totals: I/O last 3 completed shifts:  In: 673 [P.O.:673]  Out: 3835 [Urine:3835]  I/O this shift:  In: 300 [P.O.:300]  Out: -           Patient Assessment:   Pt lying in bed, resp even and unlabored, no SOA with conversation. Minimal edema lower pedal. Pt alert and orient and pleasant with visit      Patient Education:   Review medication regimen importance, discussion on ideas to assist with regimen, pt ask \"I don't even know where my medications are now\".  Pt with multiple different answers as to where he has been living and thoughts on where he will discourage too. Pt agree he is currently weak and agrees to rehab if approved.      Review HF Education provided to patient:  yes-Symptoms worsening  yes-Prescribed medications  yes-HF self-care  yes-Follow-up Appointments       Acceptance of learning: difficult listener, needs reinforcement,     Heart Failure education interactive teaching session time: 30 minutes      Identified needs/barriers:   Medication adherence, unsure of patients ability cognition and care for self, mobility    Intervention follow-up:  discussion with CM and SW, discuss with primary RN possible psych consult    "

## 2020-10-07 NOTE — THERAPY TREATMENT NOTE
Patient Name: Carlos Whipple  : 1955    MRN: [id number]                              Today's Date: 10/7/2020       Admit Date: 10/4/2020    Visit Dx:     ICD-10-CM ICD-9-CM   1. Dyspnea, unspecified type  R06.00 786.09   2. Atrial fibrillation with RVR (CMS/HCC)  I48.91 427.31     Patient Active Problem List   Diagnosis   • Hypertensive emergency   • Chronic atrial fibrillation (CMS/HCC)   • Chronic diastolic CHF (congestive heart failure) (CMS/HCC)   • CKD (chronic kidney disease) stage 3, GFR 30-59 ml/min   • Essential hypertension   • Gambling disorder, persistent   • Shortness of breath   • Coronary artery disease involving native coronary artery of native heart without angina pectoris   • Psoriasis   • History of CVA (cerebrovascular accident)   • DDD (degenerative disc disease), lumbosacral   • Peripheral polyneuropathy   • GERD (gastroesophageal reflux disease)   • Medically noncompliant   • Acute respiratory distress   • Unstable angina (CMS/HCC)   • Cervical disc disorder with radiculopathy   • Completed stroke (CMS/HCC)   • Degeneration of intervertebral disc   • Excessive anticoagulation   • Impaired left ventricular function   • Liver lesion   • Lumbar radiculopathy   • Cervical myelopathy (CMS/HCC)   • Lumbosacral radiculopathy   • Spinal stenosis of lumbar region   • Obesity   • Mild cognitive impairment   • Thoracic back pain   • Thoracic disc disease with myelopathy   • Paroxysmal atrial fibrillation (CMS/HCC)   • Acute congestive heart failure (CMS/HCC)   • Atrial fibrillation with RVR (CMS/HCC)   • Dermatitis associated with moisture   • Dyspnea     Past Medical History:   Diagnosis Date   • A-fib (CMS/HCC)    • Atrial fibrillation with RVR (CMS/HCC)    • CHF (congestive heart failure) (CMS/HCC)    • Chronic atrial fibrillation (CMS/HCC) 2019   • Chronic diastolic CHF (congestive heart failure) (CMS/HCC) 2019   • CKD (chronic kidney disease) stage 3, GFR 30-59 ml/min 2019  "  • Coronary artery disease involving native coronary artery of native heart without angina pectoris 11/8/2019   • DDD (degenerative disc disease), lumbosacral 11/8/2019   • Essential hypertension 11/8/2019   • Gambling disorder, persistent 11/8/2019   • GERD (gastroesophageal reflux disease) 11/8/2019   • History of CVA (cerebrovascular accident) 11/8/2019   • Hyperlipidemia    • Hypertension    • Medically noncompliant 11/8/2019   • Peripheral polyneuropathy 11/8/2019   • Psoriasis 11/8/2019   • Stroke (CMS/HCC)      Past Surgical History:   Procedure Laterality Date   • CARDIAC CATHETERIZATION     • CARDIAC CATHETERIZATION N/A 12/10/2019    Procedure: Left Heart Cath and coronary angiogram;  Surgeon: Edy Thomas MD;  Location: [facility] CATH INVASIVE LOCATION;  Service: Cardiovascular     General Information     Row Name 10/07/20 1417          OT Time and Intention    Document Type  therapy note (daily note)  -ES     Mode of Treatment  occupational therapy  -ES     Row Name 10/07/20 1417          General Information    Patient Profile Reviewed  yes  -ES     Row Name 10/07/20 1417          Cognition    Orientation Status (Cognition)  disoriented to;person Pt believes he is in hospital room to \"have lunch with a friend.\"  Perseverative on previous travels.  -ES     Row Name 10/07/20 1417          Safety Issues, Functional Mobility    Impairments Affecting Function (Mobility)  balance;cognition;endurance/activity tolerance;coordination;postural/trunk control  -ES     Cognitive Impairments, Mobility Safety/Performance  attention;awareness, need for assistance;impulsivity;insight into deficits/self-awareness;problem-solving/reasoning;sequencing abilities  -ES       User Key  (r) = Recorded By, (t) = Taken By, (c) = Cosigned By    Initials Name Provider Type    ES Allison Friedman OT Occupational Therapist        Mobility/ADL's     Row Name 10/07/20 1418          Bed Mobility    Comment (Bed Mobility)  Pt up in " chair upon OT arrival.  -ES     Row Name 10/07/20 1418          Transfers    Sit-Stand [place] (Transfers)  contact guard Completes 10 sit<>stand transfers, fatiguing quickly and requiring 2 minute rest break between stands.  -ES     Row Name 10/07/20 1418          Functional Mobility    Functional Mobility- Safety Issues  balance decreased during turns;sequencing ability decreased  -ES     Row Name 10/07/20 1418          Activities of Daily Living    BADL Assessment/Intervention  upper body dressing;lower body dressing  -ES     Row Name 10/07/20 1418          Lower Body Dressing Assessment/Training    [place] Level (Lower Body Dressing)  don;socks;moderate assist (50% patient effort) A for sequencing/safety. Doffs/dons socks withi increased time and cuing for PLB  -ES       User Key  (r) = Recorded By, (t) = Taken By, (c) = Cosigned By    Initials Name Provider Type    Allison Beck, OT Occupational Therapist        Obj/Interventions     Row Name 10/07/20 1421          Sensory Assessment (Somatosensory)    Sensory Assessment (Somatosensory)  sensation intact  -ES     Row Name 10/07/20 1421          Range of Motion Comprehensive    General Range of Motion  no range of motion deficits identified  -ES     Comment, General Range of Motion  BUE WFL this date. Requires visual cues to complete tasks  -ES     Row Name 10/07/20 1421          Strength Comprehensive (MMT)    Comment, General Manual Muscle Testing (MMT) Assessment  BUE 3+/5  -ES     Row Name 10/07/20 1421          Balance    Balance Assessment  sitting static balance;sitting dynamic balance;standing static balance;standing dynamic balance  -ES     Static Sitting Balance  WNL  -ES     Dynamic Sitting Balance  WNL  -ES     Static Standing Balance  mild impairment  -ES     Dynamic Standing Balance  mild impairment  -ES     Balance Interventions  standing;sitting;supported;static;dynamic;minimal challenge  -ES       User Key  (r) = Recorded By,  "(t) = Taken By, (c) = Cosigned By    Initials Name Provider Type    Allison Beck OT Occupational Therapist        Goals/Plan    No documentation.       Clinical Impression     Row Name 10/07/20 1423          Pain Scale: Numbers Pre/Post-Treatment    Pretreatment Pain Rating  0/10 - no pain  -ES     Posttreatment Pain Rating  0/10 - no pain  -ES     Row Name 10/07/20 1423          Plan of Care Review    Plan of Care Reviewed With  patient  -ES     Outcome Summary  Pt is 66 yo male admited with Afib with RVR and dyspnea, being followed for confusion, weakness, and poor safety awareness impairing balance. Pt up in chair upon OT arrival, loquatious and appears to control conversation to cover cognitive deficits. He believes he [facility] to \"meet friend for lunch\" and is perseverative on his previous travels. Pt completes 10 sit<>stand transfers, requiring CGA and frequent rest breaks due to fatigue. Pt doffs/dons socks with set up in chair, but requires Mod A for balance/safety while standing. Pt continues to be far below stated baseline, and requires IP rehab at discharge.  -     Row Name 10/07/20 1423          Therapy Assessment/Plan (OT)    Rehab Potential (OT)  good, to achieve stated therapy goals  -     Row Name 10/07/20 1423          Therapy Plan Review/Discharge Plan (OT)    Anticipated Discharge Disposition (OT)  inpatient rehabilitation facility  -     Row Name 10/07/20 1423          Vital Signs    Pre Systolic BP Rehab  158  -ES     Pre Treatment Diastolic BP  95  -ES     Pretreatment Heart Rate (beats/min)  76  -ES     Intratreatment Heart Rate (beats/min)  99  -ES     Posttreatment Heart Rate (beats/min)  82  -ES     Pre SpO2 (%)  98  -ES     O2 Delivery Pre Treatment  room air  -ES     Intra SpO2 (%)  94  -ES     O2 Delivery Intra Treatment  room air  -ES     Post SpO2 (%)  97  -ES     Pre Patient Position  Sitting  -ES     Intra Patient Position  Standing  -ES     Post Patient Position "  Sitting  -ES     Rest Breaks   3  -ES     Row Name 10/07/20 1423          Positioning and Restraints    Pre-Treatment Position  sitting in chair/recliner  -ES     Post Treatment Position  chair  -ES     In Chair  notified nsg;call light within reach;exit alarm on;encouraged to call for assist  -ES       User Key  (r) = Recorded By, (t) = Taken By, (c) = Cosigned By    Initials Name Provider Type    Allison Beck OT Occupational Therapist        Outcome Measures    No documentation.       Occupational Therapy Education                 Title: PT OT SLP Therapies (Done)     Topic: Occupational Therapy (Done)     Point: ADL training (Done)     Description:   Instruct learner(s) on proper safety adaptation and remediation techniques during self care or transfers.   Instruct in proper use of assistive devices.              Learning Progress Summary           Patient Acceptance, E,TB, VU,NR by MARY at 10/5/2020 1316    Acceptance, E, VU by  at 10/5/2020 0830                   Point: Home exercise program (Done)     Description:   Instruct learner(s) on appropriate technique for monitoring, assisting and/or progressing therapeutic exercises/activities.              Learning Progress Summary           Patient Acceptance, E,TB, VU,NR by MARY at 10/5/2020 1316    Acceptance, E, VU by  at 10/5/2020 0830                   Point: Precautions (Done)     Description:   Instruct learner(s) on prescribed precautions during self-care and functional transfers.              Learning Progress Summary           Patient Acceptance, E,TB, VU,NR by MARY at 10/5/2020 1316    Acceptance, E,TB, VU,NR by  at 10/5/2020 1031    Acceptance, E, VU by  at 10/5/2020 0830                   Point: Body mechanics (Done)     Description:   Instruct learner(s) on proper positioning and spine alignment during self-care, functional mobility activities and/or exercises.              Learning Progress Summary           Patient Acceptance, E,TB,  "VU,NR by  at 10/5/2020 1316    Acceptance, E,TB, VU,NR by  at 10/5/2020 1031    Acceptance, E, VU by  at 10/5/2020 0830                               User Key     Initials Effective Dates Name Provider Type Discipline     03/01/19 -  Mena Menjivar OT Occupational Therapist OT     03/01/19 -  Zuleika White, RN Registered Nurse Nurse     06/15/19 -  Sofy Matt PT Physical Therapist PT              OT Recommendation and Plan  Retired Outcome Summary/Treatment Plan (OT)  Anticipated Discharge Disposition (OT): inpatient rehabilitation facility  Plan of Care Review  Plan of Care Reviewed With: patient  Outcome Summary: Pt is 66 yo male admited with Afib with RVR and dyspnea, being followed for confusion, weakness, and poor safety awareness impairing balance. Pt up in chair upon OT arrival, loquatious and appears to control conversation to cover cognitive deficits. He believes he [facility] to \"meet friend for lunch\" and is perseverative on his previous travels. Pt completes 10 sit<>stand transfers, requiring CGA and frequent rest breaks due to fatigue. Pt doffs/dons socks with set up in chair, but requires Mod A for balance/safety while standing. Pt continues to be far below stated baseline, and requires IP rehab at discharge.  Plan of Care Reviewed With: patient  Outcome Summary: Pt is 66 yo male admited with Afib with RVR and dyspnea, being followed for confusion, weakness, and poor safety awareness impairing balance. Pt up in chair upon OT arrival, loquatious and appears to control conversation to cover cognitive deficits. He believes he [facility] [place] [facility] to \"meet friend for lunch\" and is perseverative on his previous travels. Pt completes 10 sit<>stand transfers, requiring CGA and frequent rest breaks due to fatigue. Pt doffs/dons socks with set up in chair, but requires Mod A for balance/safety while standing. Pt continues to be far below stated baseline, and requires IP rehab at discharge.     Time " Calculation:   Time Calculation- OT     Row Name 10/07/20 1539 10/07/20 1453          Time Calculation- OT    OT Start Time  1415  -ES  --     OT Stop Time  1440  -ES  --     OT Time Calculation (min)  25 min  -ES  --     Total Timed Code Minutes- OT  25 minute(s)  -ES  --     OT Received On  10/07/20  -ES  --     OT - Next Appointment  10/08/20  -ES  --        Timed Charges    46366 - Gait Training Minutes   --  10  -LH       User Key  (r) = Recorded By, (t) = Taken By, (c) = Cosigned By    Initials Name Provider Type    ES Allison Friedman OT Occupational Therapist     Amena Hassan PTA Physical Therapy Assistant        Therapy Charges for Today     Code Description Service Date Service Provider Modifiers Qty    16849035211 HC OT SELF CARE/MGMT/TRAIN EA 15 MIN 10/7/2020 Allison Friedman OT GO 1    [id number]  OT THERAPEUTIC ACT EA 15 MIN 10/7/2020 Allison Friedman OT GO 1               Allison Friedman OT  10/7/2020

## 2020-10-07 NOTE — DISCHARGE PLACEMENT REQUEST
"Carlos Whipple (65 y.o. Male)     Date of Birth Social Security Number Address Home Phone MRN    1955  [id number]  [place] [facility] [phone] [id number]    Jehovah's witness Marital Status          Anabaptism Single       Admission Date Admission Type Admitting Provider Attending Provider Department, Room/Bed    10/4/20 Emergency Dre Stauffer MD Heimer, Brian T, MD [facility] NEURO HEART, 273/1    Discharge Date Discharge Disposition Discharge Destination                       Attending Provider: Dre Stauffer MD    Allergies: Ketorolac Tromethamine    Isolation: None   Infection: None   Code Status: CPR    Ht: 182.9 cm (72\")   Wt: 115 kg (252 lb 13.9 oz)    Admission Cmt: None   Principal Problem: Atrial fibrillation with RVR (CMS/[facility]) [I48.91]                 Active Insurance as of 10/4/2020     Primary Coverage     Payor Plan Insurance Group Employer/Plan Group    HUMANA MEDICARE REPLACEMENT HUMANA MEDICARE REPLACEMENT [id number]     Payor Plan Address Payor Plan Phone Number Payor Plan Fax Number Effective Dates    [address] [address] [phone]  4/1/2020 - None Entered    [facility] [id number]       Subscriber Name Subscriber Birth Date Member ID       CARLOS WHIPPLE 1955 [id number]                 Emergency Contacts      (Rel.) Home Phone Work Phone Mobile Phone    KACEY DOW (Friend) [phone] -- [phone]               History & Physical      Marisol Larson APRN at 10/05/20 1306     Attestation signed by Paty Polk DO at 10/05/20 1607     I have reviewed the above documentation also personally reviewed the orders with the nurse practitioner. I am in agreement with all of the above.                        Patient Care Team:  Marisol Larson APRN as PCP - Shwetha Guadarrama MD as Consulting Physician (Nephrology)  Edy Thomas MD as Consulting Physician (Cardiology)    Chief complaint shortness of " breath    Subjective     HPI obtained from chart review as patient is currently very drowsy and intermittently confused. Patient was brought to the emergency room yesterday evening via ambulance for shortness of breath and palpitations.  He was found to be in A. fib with RVR yet again.  It is unclear how long he has been without his medications this time.  He was recently discharged from the hospital with the same problem last time.  Apparently last night he became quite agitated and aggressive and was attempting to hit and kick the nurses along with pulling his IV out.  He was put in restraints overnight.  He is currently out of wrist restraints and sleeping well.  He is still in A. fib with rate in the 130s on a Cardizem drip.      Review of Systems   Unable to perform ROS: Mental status change        Past Medical History:   Diagnosis Date   • A-fib (CMS/[facility])    • Atrial fibrillation with RVR (CMS/[facility])    • CHF (congestive heart failure) (CMS/[facility])    • Chronic atrial fibrillation (CMS/[facility]) 11/8/2019   • Chronic diastolic CHF (congestive heart failure) (CMS/[facility]) 11/8/2019   • CKD (chronic kidney disease) stage 3, GFR 30-59 ml/min 11/8/2019   • Coronary artery disease involving native coronary artery of native heart without angina pectoris 11/8/2019   • DDD (degenerative disc disease), lumbosacral 11/8/2019   • Essential hypertension 11/8/2019   • Gambling disorder, persistent 11/8/2019   • GERD (gastroesophageal reflux disease) 11/8/2019   • History of CVA (cerebrovascular accident) 11/8/2019   • Hyperlipidemia    • Hypertension    • Medically noncompliant 11/8/2019   • Peripheral polyneuropathy 11/8/2019   • Psoriasis 11/8/2019   • Stroke (CMS/[facility])      Past Surgical History:   Procedure Laterality Date   • CARDIAC CATHETERIZATION     • CARDIAC CATHETERIZATION N/A 12/10/2019    Procedure: Left Heart Cath and coronary angiogram;  Surgeon: Edy Thomas MD;  Location: [facility] CATH INVASIVE LOCATION;  Service:  Cardiovascular     Family History   Problem Relation Age of Onset   • Heart disease Mother      Social History     Tobacco Use   • Smoking status: Never Smoker   • Smokeless tobacco: Never Used   Substance Use Topics   • Alcohol use: No     Frequency: Never   • Drug use: No     Medications Prior to Admission   Medication Sig Dispense Refill Last Dose   • aspirin 81 MG EC tablet Take 81 mg by mouth Daily.      • losartan (COZAAR) 50 MG tablet Take 1 tablet by mouth Daily. 30 tablet 1    • allopurinol (ZYLOPRIM) 100 MG tablet Take 1 tablet by mouth Daily. 30 tablet 1    • bumetanide (BUMEX) 1 MG tablet Take 1 tablet by mouth Daily. 30 tablet 1    • hydrALAZINE (APRESOLINE) 100 MG tablet Take 1 tablet by mouth 3 (Three) Times a Day. 90 tablet 1    • Metoprolol Succinate 50 MG capsule extended-release 24 hour sprinkle Take 50 mg by mouth Daily. 30 capsule 1    • pravastatin (PRAVACHOL) 40 MG tablet Take 40 mg by mouth Every Night.      • risperiDONE (risperDAL) 0.5 MG tablet Take 1 tablet by mouth Every 12 (Twelve) Hours. 60 tablet 1    • rivaroxaban (Xarelto) 20 MG tablet Take 1 tablet by mouth Daily With Dinner for 30 days. 30 tablet 5    • sodium bicarbonate 650 MG tablet Take 1 tablet by mouth 3 (Three) Times a Day. 90 tablet 1    • spironolactone (ALDACTONE) 25 MG tablet Take 0.5 tablets by mouth Daily. 15 tablet 1      Allergies:  Ketorolac tromethamine    Objective      Vital Signs  Temp:  [97.6 °F (36.4 °C)-97.8 °F (36.6 °C)] 97.6 °F (36.4 °C)  Heart Rate:  [] 97  Resp:  [15-21] 16  BP: (106-212)/() 128/83    Physical Exam  Vitals signs and nursing note reviewed.   Constitutional:       General: He is sleeping.      Appearance: He is overweight.   Cardiovascular:      Rate and Rhythm: Tachycardia present. Rhythm irregular.   Pulmonary:      Effort: Pulmonary effort is normal.      Breath sounds: Normal breath sounds.   Skin:     General: Skin is warm and dry.   Neurological:      Mental Status: He is  disoriented.         Results Review:  Lab Results (last 24 hours)     Procedure Component Value Units Date/Time    BUN [[id number]]  (Abnormal) Collected: 10/05/20 0611    Specimen: Blood Updated: 10/05/20 1045     BUN 32 mg/dL     Comprehensive Metabolic Panel [[id number]]  (Abnormal) Collected: 10/05/20 0611    Specimen: Blood Updated: 10/05/20 0720     Glucose 92 mg/dL      BUN --     Comment: Testing performed by alternate method        Creatinine 1.20 mg/dL      Sodium 145 mmol/L      Potassium 3.2 mmol/L      Chloride 109 mmol/L      CO2 22.0 mmol/L      Calcium 8.6 mg/dL      Total Protein 6.4 g/dL      Albumin 3.50 g/dL      ALT (SGPT) 453 U/L      AST (SGOT) 175 U/L      Alkaline Phosphatase 71 U/L      Total Bilirubin 1.1 mg/dL      eGFR Non African Amer 61 mL/min/1.73      Globulin 2.9 gm/dL      A/G Ratio 1.2 g/dL      BUN/Creatinine Ratio --     Comment: Testing not performed        Anion Gap 14.0 mmol/L     Narrative:      GFR Normal >60  Chronic Kidney Disease <60  Kidney Failure <15      Magnesium [[id number]]  (Normal) Collected: 10/05/20 0611    Specimen: Blood Updated: 10/05/20 0720     Magnesium 1.9 mg/dL     CBC & Differential [[id number]]  (Abnormal) Collected: 10/05/20 0611    Specimen: Blood Updated: 10/05/20 0620    Narrative:      The following orders were created for panel order CBC & Differential.  Procedure                               Abnormality         Status                     ---------                               -----------         ------                     CBC Auto Differential[[id number]]        Abnormal            Final result                 Please view results for these tests on the individual orders.    CBC Auto Differential [[id number]]  (Abnormal) Collected: 10/05/20 0611    Specimen: Blood Updated: 10/05/20 0620     WBC 9.30 10*3/mm3      RBC 4.65 10*6/mm3      Hemoglobin 12.6 g/dL      Hematocrit 38.9 %      MCV 83.7 fL      MCH 27.0 pg      MCHC 32.3 g/dL      RDW 19.5 %       RDW-SD 56.4 fl      MPV 8.6 fL      Platelets 144 10*3/mm3      Neutrophil % 74.7 %      Lymphocyte % 14.1 %      Monocyte % 8.9 %      Eosinophil % 1.0 %      Basophil % 1.3 %      Neutrophils, Absolute 6.90 10*3/mm3      Lymphocytes, Absolute 1.30 10*3/mm3      Monocytes, Absolute 0.80 10*3/mm3      Eosinophils, Absolute 0.10 10*3/mm3      Basophils, Absolute 0.10 10*3/mm3      nRBC 0.3 /100 WBC     Troponin [[id number]]  (Normal) Collected: 10/04/20 2151    Specimen: Blood Updated: 10/04/20 2242     Troponin T 0.016 ng/mL     Narrative:      Troponin T Reference Range:  <= 0.03 ng/mL-   Negative for AMI  >0.03 ng/mL-     Abnormal for myocardial necrosis.  Clinicians would have to utilize clinical acumen, EKG, Troponin and serial changes to determine if it is an Acute Myocardial Infarction or myocardial injury due to an underlying chronic condition.       Results may be falsely decreased if patient taking Biotin.      TSH [[id number]]  (Normal) Collected: 10/04/20 1355    Specimen: Blood Updated: 10/04/20 1747     TSH 2.970 uIU/mL     Lipid Panel [[id number]]  (Abnormal) Collected: 10/04/20 1355    Specimen: Blood Updated: 10/04/20 1742     Total Cholesterol 108 mg/dL      Triglycerides 79 mg/dL      HDL Cholesterol 22 mg/dL      LDL Cholesterol  70 mg/dL      VLDL Cholesterol 15.8 mg/dL      LDL/HDL Ratio 3.19    Narrative:      Cholesterol Reference Ranges  (U.S. Department of Health and Human Services ATP III Classifications)    Desirable          <200 mg/dL  Borderline High    200-239 mg/dL  High Risk          >240 mg/dL      Triglyceride Reference Ranges  (U.S. Department of Health and Human Services ATP III Classifications)    Normal           <150 mg/dL  Borderline High  150-199 mg/dL  High             200-499 mg/dL  Very High        >500 mg/dL    HDL Reference Ranges  (U.S. Department of Health and Human Services ATP III Classifcations)    Low     <40 mg/dl (major risk factor for CHD)  High    >60 mg/dl  ('negative' risk factor for CHD)        LDL Reference Ranges  (U.S. Department of Health and Human Services ATP III Classifcations)    Optimal          <100 mg/dL  Near Optimal     100-129 mg/dL  Borderline High  130-159 mg/dL  High             160-189 mg/dL  Very High        >189 mg/dL    Magnesium [[id number]]  (Normal) Collected: 10/04/20 1355    Specimen: Blood Updated: 10/04/20 1742     Magnesium 2.3 mg/dL     POC Glucose Once [[id number]]  (Normal) Collected: 10/04/20 1700    Specimen: Blood Updated: 10/04/20 1701     Glucose 90 mg/dL      Comment: Serial Number: [id number]:  [id number]       Troponin [[id number]]  (Normal) Collected: 10/04/20 1355    Specimen: Blood Updated: 10/04/20 1553     Troponin T 0.010 ng/mL     Narrative:      Troponin T Reference Range:  <= 0.03 ng/mL-   Negative for AMI  >0.03 ng/mL-     Abnormal for myocardial necrosis.  Clinicians would have to utilize clinical acumen, EKG, Troponin and serial changes to determine if it is an Acute Myocardial Infarction or myocardial injury due to an underlying chronic condition.       Results may be falsely decreased if patient taking Biotin.      BNP [[id number]]  (Abnormal) Collected: 10/04/20 1355    Specimen: Blood Updated: 10/04/20 1541     proBNP 21,355.0 pg/mL     Narrative:      Among patients with dyspnea, NT-proBNP is highly sensitive for the detection of acute congestive heart failure. In addition NT-proBNP of <300 pg/ml effectively rules out acute congestive heart failure with 99% negative predictive value.    Results may be falsely decreased if patient taking Biotin.      [place] Draw [[id number]] Collected: 10/04/20 1353    Specimen: Blood Updated: 10/04/20 1501    Narrative:      The following orders were created for panel order [place] Draw.  Procedure                               Abnormality         Status                     ---------                               -----------         ------                     Light Blue  Top[[id number]]                                   Final result               Green Top (Gel)[[id number]]                                  Final result               Lavender Top[[id number]]                                     Final result               Gold Top - SST[[id number]]                                   Final result                 Please view results for these tests on the individual orders.    Light Blue Top [[id number]] Collected: 10/04/20 1353    Specimen: Blood Updated: 10/04/20 1501     Extra Tube hold for add-on     Comment: Auto resulted       Green Top (Gel) [[id number]] Collected: 10/04/20 1353    Specimen: Blood Updated: 10/04/20 1501     Extra Tube Hold for add-ons.     Comment: Auto resulted.       Lavender Top [[id number]] Collected: 10/04/20 1353    Specimen: Blood Updated: 10/04/20 1501     Extra Tube hold for add-on     Comment: Auto resulted       POC Lactate [[id number]]  (Normal) Collected: 10/04/20 1412    Specimen: Blood Updated: 10/04/20 1454     Lactate 1.6 mmol/L      Comment: Serial Number: [id number]:  [id number]       COVID-19,[facility] IN-HOUSE,Nasal Swab No Transport Media 3-4 HR TAT - Swab, Nasal Cavity [[id number]]  (Normal) Collected: 10/04/20 1355    Specimen: Swab from Nasal Cavity Updated: 10/04/20 1448     COVID19 Not Detected    Narrative:      Fact sheet for providers: https://www.fda.gov/media/590210/download     Fact sheet for patients: https://www.fda.gov/media/400358/download    BUN [[id number]]  (Abnormal) Collected: 10/04/20 1353    Specimen: Blood Updated: 10/04/20 1443     BUN 33 mg/dL     Comprehensive Metabolic Panel [[id number]]  (Abnormal) Collected: 10/04/20 1353    Specimen: Blood Updated: 10/04/20 1438     Glucose 134 mg/dL      BUN --     Comment: Testing performed by alternate method        Creatinine 1.40 mg/dL      Sodium 142 mmol/L      Potassium 4.2 mmol/L      Chloride 108 mmol/L      CO2 20.0 mmol/L      Calcium 9.0 mg/dL      Total Protein 6.9 g/dL       Albumin 3.80 g/dL      ALT (SGPT) 625 U/L      AST (SGOT) 433 U/L      Alkaline Phosphatase 79 U/L      Total Bilirubin 1.2 mg/dL      eGFR Non African Amer 51 mL/min/1.73      Globulin 3.1 gm/dL      A/G Ratio 1.2 g/dL      BUN/Creatinine Ratio --     Comment: Testing not performed        Anion Gap 14.0 mmol/L     Narrative:      GFR Normal >60  Chronic Kidney Disease <60  Kidney Failure <15      Gold Top - SST [[id number]] Collected: 10/04/20 1353    Specimen: Blood Updated: 10/04/20 1423    CBC & Differential [[id number]]  (Abnormal) Collected: 10/04/20 1353    Specimen: Blood Updated: 10/04/20 1419    Narrative:      The following orders were created for panel order CBC & Differential.  Procedure                               Abnormality         Status                     ---------                               -----------         ------                     CBC Auto Differential[[id number]]        Abnormal            Final result                 Please view results for these tests on the individual orders.    CBC Auto Differential [[id number]]  (Abnormal) Collected: 10/04/20 1353    Specimen: Blood Updated: 10/04/20 1419     WBC 11.80 10*3/mm3      RBC 4.84 10*6/mm3      Hemoglobin 13.1 g/dL      Hematocrit 40.8 %      MCV 84.3 fL      MCH 27.1 pg      MCHC 32.1 g/dL      RDW 19.3 %      RDW-SD 56.4 fl      MPV 9.4 fL      Platelets 162 10*3/mm3      Neutrophil % 78.0 %      Lymphocyte % 13.4 %      Monocyte % 8.2 %      Eosinophil % 0.0 %      Basophil % 0.4 %      Neutrophils, Absolute 9.20 10*3/mm3      Lymphocytes, Absolute 1.60 10*3/mm3      Monocytes, Absolute 1.00 10*3/mm3      Eosinophils, Absolute 0.00 10*3/mm3      Basophils, Absolute 0.10 10*3/mm3      nRBC 0.8 /100 WBC     Blood Culture - Blood, Arm, Left [[id number]] Collected: 10/04/20 1410    Specimen: Blood from Arm, Left Updated: 10/04/20 1414    Blood Culture - Blood, Foot, Left [[id number]] Collected: 10/04/20 1353    Specimen: Blood from Foot,  Left Updated: 10/04/20 1414    Blood Gas, Arterial [[id number]]  (Abnormal) Collected: 10/04/20 1357    Specimen: Arterial Blood Updated: 10/04/20 1400     Site Right Brachial     Bj's Test N/A     pH, Arterial 7.506 pH units      pCO2, Arterial 23.3 mm Hg      pO2, Arterial 70.2 mm Hg      HCO3, Arterial 18.4 mmol/L      Base Excess, Arterial -3.1 mmol/L      Comment: Serial Number: [id number]:  [id number]        O2 Saturation, Arterial 95.8 %      CO2 Content 19.1 mmol/L      Barometric Pressure for Blood Gas --     Comment: N/A        Modality Room Air     FIO2 21 %      Hemodilution No         Imaging Results (Last 24 Hours)     Procedure Component Value Units Date/Time    XR Chest 1 View [[id number]] Collected: 10/04/20 1445     Updated: 10/04/20 1448    Narrative:      XR CHEST 1 VW-     Date of Exam: 10/4/2020 2:21 PM     Indication: Dyspnea.     Comparison: 09/01/2020     Technique: A single view of the chest was obtained.     FINDINGS:      There is marked cardiomegaly unchanged from prior study.  There is  persistent pulmonary vascular congestion.  Lungs are clear.  No definite  pleural effusion identified.             Impression:         Marked cardiomegaly and pulmonary vascular congestion similar prior  studies.  No focal airspace consolidation.        Electronically Signed By-Elias Rascon On:10/4/2020 2:46 PM  This report was finalized on [id number] by  Elias Rascon, .           I reviewed the patient's new clinical results.      Assessment/Plan       Atrial fibrillation with RVR (CMS/[facility])    Hypertensive emergency    Chronic atrial fibrillation (CMS/[facility])    Chronic diastolic CHF (congestive heart failure) (CMS/[facility])    CKD (chronic kidney disease) stage 3, GFR 30-59 ml/min    Essential hypertension    Gambling disorder, persistent    Coronary artery disease involving native coronary artery of native heart without angina pectoris    Psoriasis    History of CVA (cerebrovascular accident)    GERD  (gastroesophageal reflux disease)    Medically noncompliant    Mild cognitive impairment    Dyspnea          Plan:   (Await cardiology consult. His liver enzymes are elevated and haven't been in the past. RN reports that he hasn't complained of any abdominal pain. Check additional labs and monitor closely. ).       I discussed the patients findings and my recommendations with nursing staff and primary care team    CLARISSA Meraz  10/05/20  13:06 EDT        Electronically signed by Paty Polk DO at 10/05/20 1607       {Outbreak/Travel/Exposure Documentation......;  Question Available Choices Patient Response   Outbreak Screen: Do you currently have a new onset of the following symptoms?        Fever/Chils, Cough, Shortness of air, Loss of taste or smell, No, Unknown  (!) Cough;Shortness of Air (10/04/20 1256)   Outbreak Screen: In the last 14 days, have you had contact with anyone who is ill, has show any of the symptoms listed above and/or has been diagnosis with the 2019 Novel Coronavirus? This includes any immediate household members but excludes any patients with whom you have been in contact within your normal work duties wearing proper PPE, if you are a healthcare worker.  Yes, No, Unknown              No (10/04/20 1256)   Outbreak Screen: Who was notified?    Free text  Daisy Prado RN (10/04/20 1256)   Travel Screen: Have you traveled in the last month? If so, to what country have you traveled? If [place] what state? Yes, No, Unknown  List of all countries  List of all States No (10/04/20 1254)  (not recorded)  (not recorded)   Infection Risk: Do you currently have the following symptoms?  (If cough is selected, the Tuberculosis Screen is performed.) Cough, Fever, Rash, No (!) Cough (10/04/20 1254)   Tuberculosis Screen: Do you have any of the following Tuberculosis Risks?  · Have you lived or spent time with anyone who had or may have TB?  · Have you lived in or visited any of the following areas for  more than one month: [place], [place], [place], [place] or [place], the [place] or [place]?  · Do you have HIV/AIDS?  · Have you lived in or worked in a nursing home, homeless shelter, correctional facility, or substance abuse treatment facility?   · No    If Yes do you have any of the following symptoms? Yes responses display to the right    If Yes, symptoms listed are:  Cough greater than or equal to 3 weeks, Loss of appetite, Unexplained weight loss, Night sweats, Bloody sputum or hemoptysis, Hoarseness, Fever, Fatigue, Chest pain, No No (10/04/20 1254)  (not recorded)   Exposure Screen: Have you been exposed to any of these contagious diseases in the last month? Measles, Chickenpox, Meningitis, Pertussis, Whooping Cough, No No (10/04/20 1254)       Current Facility-Administered Medications   Medication Dose Route Frequency Provider Last Rate Last Dose   • acetaminophen (TYLENOL) tablet 650 mg  650 mg Oral Q4H PRN Paty Polk DO        Or   • acetaminophen (TYLENOL) 160 MG/5ML solution 650 mg  650 mg Oral Q4H PRN Paty Polk DO        Or   • acetaminophen (TYLENOL) suppository 650 mg  650 mg Rectal Q4H PRN Ptay Polk DO       • allopurinol (ZYLOPRIM) tablet 100 mg  100 mg Oral Daily Paty Polk DO   100 mg at 10/07/20 0854   • aspirin EC tablet 81 mg  81 mg Oral Daily Paty Polk DO   81 mg at 10/07/20 0854   • atorvastatin (LIPITOR) tablet 10 mg  10 mg Oral Nightly Paty Polk DO   10 mg at 10/06/20 2109   • bisacodyl (DULCOLAX) suppository 10 mg  10 mg Rectal Daily PRN Paty Polk DO       • calcium carbonate (TUMS) chewable tablet 500 mg (200 mg elemental)  2 tablet Oral BID PRN Paty Polk DO       • dilTIAZem CD (CARDIZEM CD) 24 hr capsule 180 mg  180 mg Oral Q24H Eyd Thomas MD   180 mg at 10/07/20 0853   • hydrALAZINE (APRESOLINE) tablet 50 mg  50 mg Oral TID Edy Thomas MD   50 mg at 10/07/20 0853   • HYDROcodone-acetaminophen (NORCO) 7.5-325 MG per tablet  1 tablet  1 tablet Oral Q4H PRN Paty Polk DO   1 tablet at 10/05/20 1803   • influenza vac split quad (FLUZONE,FLUARIX,AFLURIA,FLULAVAL) injection 0.5 mL  0.5 mL Intramuscular During Hospitalization Paty Polk DO       • labetalol (NORMODYNE,TRANDATE) injection 20 mg  20 mg Intravenous Q6H PRN Dre Stauffer MD   20 mg at 10/06/20 0632   • LORazepam (ATIVAN) injection 1 mg  1 mg Intramuscular Q8H PRN Paty Polk DO   1 mg at 10/04/20 2225   • losartan (COZAAR) tablet 50 mg  50 mg Oral Q24H Paty Polk DO   50 mg at 10/07/20 0853   • magnesium hydroxide (MILK OF MAGNESIA) suspension 2400 mg/10mL 10 mL  10 mL Oral Daily PRN Paty Polk DO       • melatonin tablet 5 mg  5 mg Oral Nightly PRN Paty Polk DO       • metoprolol succinate XL (TOPROL-XL) 24 hr tablet 50 mg  50 mg Oral Q24H Paty Polk DO   50 mg at 10/07/20 0853   • nitroglycerin (NITROSTAT) SL tablet 0.4 mg  0.4 mg Sublingual Q5 Min PRN Paty Polk DO       • ondansetron (ZOFRAN) tablet 4 mg  4 mg Oral Q6H PRN Paty Polk DO        Or   • ondansetron (ZOFRAN) injection 4 mg  4 mg Intravenous Q6H PRN Paty Polk DO       • potassium chloride (K-DUR,KLOR-CON) CR tablet 40 mEq  40 mEq Oral PRN Dre Stauffer MD   40 mEq at 10/05/20 1536    Or   • potassium chloride (KLOR-CON) packet 40 mEq  40 mEq Oral PRN Dre Stauffer MD        Or   • potassium chloride 10 mEq in 100 mL IVPB  10 mEq Intravenous Q1H PRN Dre Stauffer MD       • risperiDONE (risperDAL) tablet 0.5 mg  0.5 mg Oral Q12H Paty Polk DO   0.5 mg at 10/07/20 0854   • rivaroxaban (XARELTO) tablet 20 mg  20 mg Oral Daily With Dinner Paty Polk DO   20 mg at 10/05/20 1803   • sodium bicarbonate tablet 650 mg  650 mg Oral TID Paty Polk DO   650 mg at 10/07/20 0853   • sodium chloride 0.9 % flush 10 mL  10 mL Intravenous PRN Paty Polk DO       • sodium chloride 0.9 % flush 10 mL  10 mL Intravenous Q12H Paty Polk DO   10 mL  at 10/07/20 0854   • sodium chloride 0.9 % flush 10 mL  10 mL Intravenous PRN Paty Polk DO       • spironolactone (ALDACTONE) tablet 12.5 mg  12.5 mg Oral Daily Paty Polk DO   12.5 mg at 10/07/20 0853        Physician Progress Notes (most recent note)      Paty Polk DO at 10/07/20 0947               LOS: 3 days   Patient Care Team:  Marisol Larson APRN as PCP - General  Shwetha Barber MD as Consulting Physician (Nephrology)  Edy Thomas MD as Consulting Physician (Cardiology)    Chief Complaint: None this morning, nursing reports the patient has refused morning labs. He is also refusing   his overall till this morning.  Subjective   Alert any eating breakfast, appears comfortable    Interval History:  Denies any other problems today. Says he understands that if he continues to refuse to take his medications he places and sulfa great risk of significant illness and even death including death from stroke heart attack.    Patient Complaints: Dyspnea on exertion otherwise at rest no problems  Patient Denies: Denies any palpitations or chest pain  History taken from: patient    Review of Systems:    All systems were reviewed and negative except for: See interval history    Objective     Vital Signs  Temp:  [97.4 °F (36.3 °C)-98.6 °F (37 °C)] 98.3 °F (36.8 °C)  Heart Rate:  [] 77  Resp:  [13-19] 16  BP: ()/(78-93) 156/90    Physical Exam:     General Appearance:    Alert, cooperative, in no acute distress, poor hygiene, appears markedly older than stated age   Head:    Normocephalic, without obvious abnormality, atraumatic   Eyes:            Conjunctivae and sclerae normal, no   icterus, no pallor, corneas clear, PERRLA   Throat:   No oral lesions, no thrush, oral mucosa moist   Neck:   No adenopathy, supple, trachea midline, no thyromegaly, no   carotid bruit, no JVD   Lungs:     Clear to auscultation,respirations regular, even and                  unlabored, distant some  coarse upper airway sounds this morning    Heart:   Irregularly irregular with rates in the 70s   Chest Wall:    No abnormalities observed   Abdomen:     Normal bowel sounds, no masses, no organomegaly, soft        non-tender, non-distended, no guarding, no rebound                tenderness, rotund   Rectal:     Deferred   Extremities:   Moves all extremities . no edema, no cyanosis, no             redness   Pulses:   Pulses palpable and equal bilaterally   Skin:   No bleeding, bruising or rash   Lymph nodes:   No palpable adenopathy   Neurologic:   Cranial nerves 2 - 12 grossly intact, sensation intact, DTR       present and equal bilaterally   Radiology:  Xr Chest 1 View    Result Date: 10/4/2020   Marked cardiomegaly and pulmonary vascular congestion similar prior studies.  No focal airspace consolidation.   Electronically Signed By-Elias Rascon On:10/4/2020 2:46 PM This report was finalized on [id number] by  Elias Rascon, .         Results Review:     I reviewed the patient's new clinical results.  I reviewed the patient's new imaging results and agree with the interpretation.    Medication Review:   Scheduled Meds:allopurinol, 100 mg, Oral, Daily  aspirin, 81 mg, Oral, Daily  atorvastatin, 10 mg, Oral, Nightly  dilTIAZem CD, 180 mg, Oral, Q24H  hydrALAZINE, 50 mg, Oral, TID  losartan, 50 mg, Oral, Q24H  metoprolol succinate XL, 50 mg, Oral, Q24H  risperiDONE, 0.5 mg, Oral, Q12H  rivaroxaban, 20 mg, Oral, Daily With Dinner  sodium bicarbonate, 650 mg, Oral, TID  sodium chloride, 10 mL, Intravenous, Q12H  spironolactone, 12.5 mg, Oral, Daily      Continuous Infusions:   PRN Meds:.•  acetaminophen **OR** acetaminophen **OR** acetaminophen  •  bisacodyl  •  calcium carbonate  •  HYDROcodone-acetaminophen  •  influenza vaccine  •  labetalol  •  LORazepam  •  magnesium hydroxide  •  melatonin  •  nitroglycerin  •  ondansetron **OR** ondansetron  •  potassium chloride **OR** potassium chloride **OR** potassium  chloride  •  sodium chloride  •  sodium chloride    Labs:  Lab Results (last 24 hours)     Procedure Component Value Units Date/Time    Blood Culture - Blood, Arm, Left [[id number]]  (Abnormal) Collected: 10/04/20 1410    Specimen: Blood from Arm, Left Updated: 10/07/20 0731     Blood Culture Staphylococcus, coagulase negative     Comment: Probable contaminant requires clinical correlation, susceptibility not performed unless requested by physician.          Isolated from Anaerobic Bottle     Blood Culture Corynebacterium species     Comment: Probable contaminant requires clinical correlation, susceptibility not performed unless requested by physician.            Isolated from Aerobic Bottle     Gram Stain Anaerobic Bottle Gram positive cocci in clusters      Aerobic Bottle Gram positive bacilli    Narrative:      Blood culture does not meet the specified criteria for PCR identification.  If pregnant, immunocompromised, or clinical concern for meningitis, call lab to run BCID for Listeria monocytogenes.    Blood Culture - Blood, Hand, Right [[id number]] Collected: 10/06/20 1608    Specimen: Blood from Hand, Right Updated: 10/06/20 1641    BUN [[id number]]  (Abnormal) Collected: 10/06/20 0926    Specimen: Blood Updated: 10/06/20 1628     BUN 28 mg/dL     Blood Culture - Blood, Foot, Left [[id number]] Collected: 10/04/20 1353    Specimen: Blood from Foot, Left Updated: 10/06/20 1415     Blood Culture No growth at 2 days    Comprehensive Metabolic Panel [[id number]]  (Abnormal) Collected: 10/06/20 0926    Specimen: Blood Updated: 10/06/20 1016     Glucose 113 mg/dL      BUN --     Comment: Testing performed by alternate method        Creatinine 1.19 mg/dL      Sodium 140 mmol/L      Potassium 3.4 mmol/L      Comment: Slight hemolysis detected by analyzer. Results may be affected.        Chloride 101 mmol/L      CO2 25.0 mmol/L      Calcium 9.3 mg/dL      Total Protein 7.4 g/dL      Albumin 3.60 g/dL      ALT (SGPT) 364 U/L       AST (SGOT) 99 U/L      Comment: Slight hemolysis detected by analyzer. Results may be affected.        Alkaline Phosphatase 80 U/L      Total Bilirubin 1.1 mg/dL      eGFR Non African Amer 62 mL/min/1.73      Globulin 3.8 gm/dL      A/G Ratio 0.9 g/dL      BUN/Creatinine Ratio --     Comment: Testing not performed        Anion Gap 14.0 mmol/L     Narrative:      GFR Normal >60  Chronic Kidney Disease <60  Kidney Failure <15      Blood Culture - Blood, Hand, Right [[id number]] Collected: 10/06/20 0926    Specimen: Blood from Hand, Right Updated: 10/06/20 0947           Assessment/Plan       Atrial fibrillation with RVR (CMS/[facility])    Hypertensive emergency    Chronic atrial fibrillation (CMS/[facility])    Chronic diastolic CHF (congestive heart failure) (CMS/[facility])    CKD (chronic kidney disease) stage 3, GFR 30-59 ml/min    Essential hypertension    Gambling disorder, persistent    Coronary artery disease involving native coronary artery of native heart without angina pectoris    Psoriasis    History of CVA (cerebrovascular accident)    GERD (gastroesophageal reflux disease)    Medically noncompliant    Mild cognitive impairment    Dyspnea  Blood cultures with ID of Staphylococcus, coag negative    Continue current approach.  I advised patient that once again he needs to be scrupulous with taking his medications as prescribed.  At this juncture, his family refuses to help him with this.  The patient has very poor insight into his medical conditions.      cardiology input greatly appreciated, thank you.     Repeat blood cultures are pending. Pro calcitonin is within the normal range. Once again,he does not appear to be septic but for completeness will complete the septic work-up.  He remains afebrile.        Paty Polk DO  10/07/20  09:47 EDT          Electronically signed by Paty Polk DO at 10/07/20 0942          Consult Notes (most recent note)      Edy Thomas MD at 10/05/20 4361      Consult Orders    1.  Inpatient Cardiology Consult [[id number]] ordered by Paty Polk DO at 10/04/20 5569                 Referring Provider: Dr. Stauffer  Reason for Consultation: Atrial fibrillation    Patient Care Team:  Marisol Larson APRN as PCP - General  Shwetha Barber MD as Consulting Physician (Nephrology)  Edy Thomas MD as Consulting Physician (Cardiology)    Chief complaint shortness of breath and palpitations    Subjective .     History of present illness:  Carlos Whipple is a 65 y.o. male with history of coronary disease atrial fibrillation congestive heart failure chronic renal insufficiency hypertension hyperlipidemia and multiple medical problems including medical noncompliance presented to the hospital complains of shortness of breath and atrial fibrillation and palpitations.  Patient was seen in the ER was noted to have atrial fibrillation with rapid response and is admitted to the hospital.  No complains of any chest pain.  No dizziness syncope or swelling the feet.  Patient is not sure about what medications he is taking.  Patient in the past has been admitted several times with noncompliance and has been told multiple times to take his medicines especially his anticoagulation because of the risk of stroke.    Review of Systems   Constitution: Negative for fever and malaise/fatigue.   HENT: Negative for ear pain and nosebleeds.    Eyes: Negative for blurred vision and double vision.   Cardiovascular: Positive for palpitations. Negative for chest pain and dyspnea on exertion.   Respiratory: Positive for shortness of breath. Negative for cough.    Skin: Negative for rash.   Musculoskeletal: Negative for joint pain.   Gastrointestinal: Negative for abdominal pain, nausea and vomiting.   Neurological: Negative for focal weakness and headaches.   Psychiatric/Behavioral: Negative for depression. The patient is not nervous/anxious.    All other systems reviewed and are negative.      History  Past  Medical History:   Diagnosis Date   • A-fib (CMS/[facility])    • Atrial fibrillation with RVR (CMS/[facility])    • CHF (congestive heart failure) (CMS/[facility])    • Chronic atrial fibrillation (CMS/[facility]) 11/8/2019   • Chronic diastolic CHF (congestive heart failure) (CMS/[facility]) 11/8/2019   • CKD (chronic kidney disease) stage 3, GFR 30-59 ml/min 11/8/2019   • Coronary artery disease involving native coronary artery of native heart without angina pectoris 11/8/2019   • DDD (degenerative disc disease), lumbosacral 11/8/2019   • Essential hypertension 11/8/2019   • Gambling disorder, persistent 11/8/2019   • GERD (gastroesophageal reflux disease) 11/8/2019   • History of CVA (cerebrovascular accident) 11/8/2019   • Hyperlipidemia    • Hypertension    • Medically noncompliant 11/8/2019   • Peripheral polyneuropathy 11/8/2019   • Psoriasis 11/8/2019   • Stroke (CMS/[facility])        Past Surgical History:   Procedure Laterality Date   • CARDIAC CATHETERIZATION     • CARDIAC CATHETERIZATION N/A 12/10/2019    Procedure: Left Heart Cath and coronary angiogram;  Surgeon: Edy Thomas MD;  Location: [facility] CATH INVASIVE LOC[facility];  Service: Cardiovascular       Family History   Problem Relation Age of Onset   • Heart disease Mother        Social History     Tobacco Use   • Smoking status: Never Smoker   • Smokeless tobacco: Never Used   Substance Use Topics   • Alcohol use: No     Frequency: Never   • Drug use: No        Medications Prior to Admission   Medication Sig Dispense Refill Last Dose   • aspirin 81 MG EC tablet Take 81 mg by mouth Daily.      • losartan (COZAAR) 50 MG tablet Take 1 tablet by mouth Daily. 30 tablet 1    • allopurinol (ZYLOPRIM) 100 MG tablet Take 1 tablet by mouth Daily. 30 tablet 1    • bumetanide (BUMEX) 1 MG tablet Take 1 tablet by mouth Daily. 30 tablet 1    • hydrALAZINE (APRESOLINE) 100 MG tablet Take 1 tablet by mouth 3 (Three) Times a Day. 90 tablet 1    • Metoprolol Succinate 50 MG capsule extended-release 24 hour  "sprinkle Take 50 mg by mouth Daily. 30 capsule 1    • pravastatin (PRAVACHOL) 40 MG tablet Take 40 mg by mouth Every Night.      • risperiDONE (risperDAL) 0.5 MG tablet Take 1 tablet by mouth Every 12 (Twelve) Hours. 60 tablet 1    • rivaroxaban (Xarelto) 20 MG tablet Take 1 tablet by mouth Daily With Dinner for 30 days. 30 tablet 5    • sodium bicarbonate 650 MG tablet Take 1 tablet by mouth 3 (Three) Times a Day. 90 tablet 1    • spironolactone (ALDACTONE) 25 MG tablet Take 0.5 tablets by mouth Daily. 15 tablet 1          Ketorolac tromethamine    Scheduled Meds:allopurinol, 100 mg, Oral, Daily  aspirin, 81 mg, Oral, Daily  atorvastatin, 10 mg, Oral, Nightly  dilTIAZem CD, 180 mg, Oral, Q24H  furosemide, 40 mg, Intravenous, Q12H  hydrALAZINE, 50 mg, Oral, TID  losartan, 50 mg, Oral, Q24H  metoprolol succinate XL, 50 mg, Oral, Q24H  risperiDONE, 0.5 mg, Oral, Q12H  rivaroxaban, 20 mg, Oral, Daily With Dinner  sodium bicarbonate, 650 mg, Oral, TID  sodium chloride, 10 mL, Intravenous, Q12H  spironolactone, 12.5 mg, Oral, Daily      Continuous Infusions:dilTIAZem, 5-15 mg/hr, Last Rate: 5 mg/hr (10/05/20 1637)      PRN Meds:.•  acetaminophen **OR** acetaminophen **OR** acetaminophen  •  bisacodyl  •  calcium carbonate  •  HYDROcodone-acetaminophen  •  influenza vaccine  •  LORazepam  •  magnesium hydroxide  •  melatonin  •  nitroglycerin  •  ondansetron **OR** ondansetron  •  potassium chloride **OR** potassium chloride **OR** potassium chloride  •  sodium chloride  •  sodium chloride    Objective     VITAL SIGNS  Vitals:    10/05/20 1307 10/05/20 1400 10/05/20 1500 10/05/20 1600   BP: 144/78 139/83 122/87 142/90   BP Location:       Patient Position:       Pulse: 92 99 87 92   Resp:       Temp:       TempSrc:       SpO2: 93% 93%     Weight:       Height:           Flowsheet Rows      First Filed Value   Admission Height  182.9 cm (72\") Documented at 10/04/2020 1256   Admission Weight  118 kg (259 lb 11.2 oz) " Documented at 10/04/2020 1256           TELEMETRY: ATRIAL fibrillation with rapid response    Physical Exam:  Constitutional:       Appearance: Well-developed.   Eyes:      General: No scleral icterus.     Conjunctiva/sclera: Conjunctivae normal.      Pupils: Pupils are equal, round, and reactive to light.   HENT:      Head: Normocephalic and atraumatic.   Neck:      Musculoskeletal: Normal range of motion and neck supple.      Vascular: No carotid bruit or JVD.   Pulmonary:      Effort: Pulmonary effort is normal.      Breath sounds: Normal breath sounds. No wheezing. No rales.   Cardiovascular:      Normal rate. Irregularly irregular rhythm.   Pulses:     Intact distal pulses.   Abdominal:      General: Bowel sounds are normal.      Palpations: Abdomen is soft.   Musculoskeletal: Normal range of motion.   Skin:     General: Skin is warm and dry.      Findings: No rash.   Neurological:      Mental Status: Alert.      Comments: No focal deficits          Results Review:   I reviewed the patient's new clinical results.  Lab Results (last 24 hours)     Procedure Component Value Units Date/Time    Urinalysis With Culture If Indicated - Urine, Clean Catch [[id number]]  (Normal) Collected: 10/05/20 1639    Specimen: Urine, Clean Catch Updated: 10/05/20 1707     Color, UA Yellow     Appearance, UA Clear     pH, UA <=5.0     Specific Gravity, UA 1.012     Glucose, UA Negative     Ketones, UA Negative     Bilirubin, UA Negative     Blood, UA Negative     Protein, UA Negative     Leuk Esterase, UA Negative     Nitrite, UA Negative     Urobilinogen, UA 0.2 E.U./dL    Narrative:      Urine microscopic not indicated.    BNP [[id number]]  (Abnormal) Collected: 10/05/20 1412    Specimen: Blood Updated: 10/05/20 1603     proBNP 8,350.0 pg/mL     Narrative:      Among patients with dyspnea, NT-proBNP is highly sensitive for the detection of acute congestive heart failure. In addition NT-proBNP of <300 pg/ml effectively rules out  acute congestive heart failure with 99% negative predictive value.    Results may be falsely decreased if patient taking Biotin.      Troponin [[id number]]  (Normal) Collected: 10/05/20 1412    Specimen: Blood Updated: 10/05/20 1603     Troponin T <0.010 ng/mL     Narrative:      Troponin T Reference Range:  <= 0.03 ng/mL-   Negative for AMI  >0.03 ng/mL-     Abnormal for myocardial necrosis.  Clinicians would have to utilize clinical acumen, EKG, Troponin and serial changes to determine if it is an Acute Myocardial Infarction or myocardial injury due to an underlying chronic condition.       Results may be falsely decreased if patient taking Biotin.      Hepatitis Panel, Acute [[id number]]  (Normal) Collected: 10/05/20 1412    Specimen: Blood Updated: 10/05/20 1536     Hepatitis B Surface Ag Non-Reactive     Hep A IgM Non-Reactive     Hep B C IgM Non-Reactive     Hepatitis C Ab Non-Reactive    Narrative:      Results may be falsely decreased if patient taking Biotin.     Blood Culture - Blood, Arm, Left [[id number]] Collected: 10/04/20 1410    Specimen: Blood from Arm, Left Updated: 10/05/20 1415     Blood Culture No growth at 24 hours    Blood Culture - Blood, Foot, Left [[id number]] Collected: 10/04/20 1353    Specimen: Blood from Foot, Left Updated: 10/05/20 1415     Blood Culture No growth at 24 hours    BUN [[id number]]  (Abnormal) Collected: 10/05/20 0611    Specimen: Blood Updated: 10/05/20 1045     BUN 32 mg/dL     Comprehensive Metabolic Panel [[id number]]  (Abnormal) Collected: 10/05/20 0611    Specimen: Blood Updated: 10/05/20 0720     Glucose 92 mg/dL      BUN --     Comment: Testing performed by alternate method        Creatinine 1.20 mg/dL      Sodium 145 mmol/L      Potassium 3.2 mmol/L      Chloride 109 mmol/L      CO2 22.0 mmol/L      Calcium 8.6 mg/dL      Total Protein 6.4 g/dL      Albumin 3.50 g/dL      ALT (SGPT) 453 U/L      AST (SGOT) 175 U/L      Alkaline Phosphatase 71 U/L      Total Bilirubin  1.1 mg/dL      eGFR Non African Amer 61 mL/min/1.73      Globulin 2.9 gm/dL      A/G Ratio 1.2 g/dL      BUN/Creatinine Ratio --     Comment: Testing not performed        Anion Gap 14.0 mmol/L     Narrative:      GFR Normal >60  Chronic Kidney Disease <60  Kidney Failure <15      Magnesium [[id number]]  (Normal) Collected: 10/05/20 0611    Specimen: Blood Updated: 10/05/20 0720     Magnesium 1.9 mg/dL     CBC & Differential [[id number]]  (Abnormal) Collected: 10/05/20 0611    Specimen: Blood Updated: 10/05/20 0620    Narrative:      The following orders were created for panel order CBC & Differential.  Procedure                               Abnormality         Status                     ---------                               -----------         ------                     CBC Auto Differential[[id number]]        Abnormal            Final result                 Please view results for these tests on the individual orders.    CBC Auto Differential [[id number]]  (Abnormal) Collected: 10/05/20 0611    Specimen: Blood Updated: 10/05/20 0620     WBC 9.30 10*3/mm3      RBC 4.65 10*6/mm3      Hemoglobin 12.6 g/dL      Hematocrit 38.9 %      MCV 83.7 fL      MCH 27.0 pg      MCHC 32.3 g/dL      RDW 19.5 %      RDW-SD 56.4 fl      MPV 8.6 fL      Platelets 144 10*3/mm3      Neutrophil % 74.7 %      Lymphocyte % 14.1 %      Monocyte % 8.9 %      Eosinophil % 1.0 %      Basophil % 1.3 %      Neutrophils, Absolute 6.90 10*3/mm3      Lymphocytes, Absolute 1.30 10*3/mm3      Monocytes, Absolute 0.80 10*3/mm3      Eosinophils, Absolute 0.10 10*3/mm3      Basophils, Absolute 0.10 10*3/mm3      nRBC 0.3 /100 WBC     Troponin [[id number]]  (Normal) Collected: 10/04/20 2151    Specimen: Blood Updated: 10/04/20 2242     Troponin T 0.016 ng/mL     Narrative:      Troponin T Reference Range:  <= 0.03 ng/mL-   Negative for AMI  >0.03 ng/mL-     Abnormal for myocardial necrosis.  Clinicians would have to utilize clinical acumen, EKG, Troponin  and serial changes to determine if it is an Acute Myocardial Infarction or myocardial injury due to an underlying chronic condition.       Results may be falsely decreased if patient taking Biotin.      TSH [[id number]]  (Normal) Collected: 10/04/20 1355    Specimen: Blood Updated: 10/04/20 1747     TSH 2.970 uIU/mL     Lipid Panel [[id number]]  (Abnormal) Collected: 10/04/20 1355    Specimen: Blood Updated: 10/04/20 1742     Total Cholesterol 108 mg/dL      Triglycerides 79 mg/dL      HDL Cholesterol 22 mg/dL      LDL Cholesterol  70 mg/dL      VLDL Cholesterol 15.8 mg/dL      LDL/HDL Ratio 3.19    Narrative:      Cholesterol Reference Ranges  (U.S. Department of Health and Human Services ATP III Classifications)    Desirable          <200 mg/dL  Borderline High    200-239 mg/dL  High Risk          >240 mg/dL      Triglyceride Reference Ranges  (U.S. Department of Health and Human Services ATP III Classifications)    Normal           <150 mg/dL  Borderline High  150-199 mg/dL  High             200-499 mg/dL  Very High        >500 mg/dL    HDL Reference Ranges  (U.S. Department of Health and Human Services ATP III Classifcations)    Low     <40 mg/dl (major risk factor for CHD)  High    >60 mg/dl ('negative' risk factor for CHD)        LDL Reference Ranges  (U.S. Department of Health and Human Services ATP III Classifcations)    Optimal          <100 mg/dL  Near Optimal     100-129 mg/dL  Borderline High  130-159 mg/dL  High             160-189 mg/dL  Very High        >189 mg/dL    Magnesium [[id number]]  (Normal) Collected: 10/04/20 1355    Specimen: Blood Updated: 10/04/20 1742     Magnesium 2.3 mg/dL           Imaging Results (Last 24 Hours)     ** No results found for the last 24 hours. **          EKG      I personally viewed and interpreted the patient's EKG/Telemetry data:    ECHOCARDIOGRAM:      STRESS MYOVIEW:    CARDIAC CATHETERIZATION:    OTHER:         Assessment/Plan     Principal Problem:    Atrial  fibrillation with RVR (CMS/[facility])  Active Problems:    Hypertensive emergency    Chronic atrial fibrillation (CMS/[facility])    Chronic diastolic CHF (congestive heart failure) (CMS/[facility])    CKD (chronic kidney disease) stage 3, GFR 30-59 ml/min    Essential hypertension    Gambling disorder, persistent    Coronary artery disease involving native coronary artery of native heart without angina pectoris    Psoriasis    History of CVA (cerebrovascular accident)    GERD (gastroesophageal reflux disease)    Medically noncompliant    Mild cognitive impairment    Dyspnea  Hypokalemia    Patient presents with shortness of breath and atrial fibrillation  Patient is currently on metoprolol and was given digoxin also  Patient started on IV Cardizem drip and will start him on oral Cardizem also  Patient is on anticoagulation  Patient has medical noncompliance and is not sure of taking his medicines regularly  Patient is ruled out for MI by EKG enzymes  Patient's blood pressure is stable now  Patient has history of congestive heart failure diastolic dysfunction  Patient also had a CVA in the past  Patient has history of coronary disease with nonobstructive disease in the past and is currently on medical therapy  Patient also has chronic renal sufficiency  Patient has low potassium and is being corrected    I discussed the patients findings and my recommendations with patient and nurse    Edy Thomas MD  10/05/20  17:26 EDT              Electronically signed by Edy Thomas MD at 10/05/20 1729          Physical Therapy Notes (most recent note)      Dung Win, PT at 10/06/20 1610  Version 1 of 1         Patient Name: Carlos Whipple  : 1955    MRN: [id number]                              Today's Date: 10/6/2020       Admit Date: 10/4/2020    Visit Dx:     ICD-10-CM ICD-9-CM   1. Dyspnea, unspecified type  R06.00 786.09   2. Atrial fibrillation with RVR (CMS/[facility])  I48.91 427.31     Patient Active Problem List   Diagnosis   •  Hypertensive emergency   • Chronic atrial fibrillation (CMS/[facility])   • Chronic diastolic CHF (congestive heart failure) (CMS/[facility])   • CKD (chronic kidney disease) stage 3, GFR 30-59 ml/min   • Essential hypertension   • Gambling disorder, persistent   • Shortness of breath   • Coronary artery disease involving native coronary artery of native heart without angina pectoris   • Psoriasis   • History of CVA (cerebrovascular accident)   • DDD (degenerative disc disease), lumbosacral   • Peripheral polyneuropathy   • GERD (gastroesophageal reflux disease)   • Medically noncompliant   • Acute respiratory distress   • Unstable angina (CMS/[facility])   • Cervical disc disorder with radiculopathy   • Completed stroke (CMS/[facility])   • Degeneration of intervertebral disc   • Excessive anticoagulation   • Impaired left ventricular function   • Liver lesion   • Lumbar radiculopathy   • Cervical myelopathy (CMS/[facility] [facility]   • Lumbosacral radiculopathy   • Spinal stenosis of lumbar region   • Obesity   • Mild cognitive impairment   • Thoracic back pain   • Thoracic disc disease with myelopathy   • Paroxysmal atrial fibrillation (CMS/[facility])   • Acute congestive heart failure (CMS/[facility])   • Atrial fibrillation with RVR (CMS/[facility])   • Dermatitis associated with moisture   • Dyspnea     Past Medical History:   Diagnosis Date   • A-fib (CMS/[facility])    • Atrial fibrillation with RVR (CMS/[facility])    • CHF (congestive heart failure) (CMS/[facility])    • Chronic atrial fibrillation (CMS/[facility]) 11/8/2019   • Chronic diastolic CHF (congestive heart failure) (CMS/[facility]) 11/8/2019   • CKD (chronic kidney disease) stage 3, GFR 30-59 ml/min 11/8/2019   • Coronary artery disease involving native coronary artery of native heart without angina pectoris 11/8/2019   • DDD (degenerative disc disease), lumbosacral 11/8/2019   • Essential hypertension 11/8/2019   • Gambling disorder, persistent 11/8/2019   • GERD (gastroesophageal reflux disease) 11/8/2019   • History of CVA  (cerebrovascular accident) 11/8/2019   • Hyperlipidemia    • Hypertension    • Medically noncompliant 11/8/2019   • Peripheral polyneuropathy 11/8/2019   • Psoriasis 11/8/2019   • Stroke (CMS/HCC)      Past Surgical History:   Procedure Laterality Date   • CARDIAC CATHETERIZATION     • CARDIAC CATHETERIZATION N/A 12/10/2019    Procedure: Left Heart Cath and coronary angiogram;  Surgeon: Edy Thomas MD;  Location: [facility] CATH INVASIVE LOCATION;  Service: Cardiovascular     General Information     Row Name 10/06/20 1601          Physical Therapy Time and Intention    Document Type  therapy note (daily note)  -EL     Mode of Treatment  physical therapy  -EL     Row Name 10/06/20 1601          General Information    Patient Profile Reviewed  yes  -EL       User Key  (r) = Recorded By, (t) = Taken By, (c) = Cosigned By    Initials Name Provider Type    Dung Santiago, JATINDER Physical Therapist        Mobility     Row Name 10/06/20 1601          Bed Mobility    Bed Mobility  supine-sit  -EL     Supine-Sit [place] (Bed Mobility)  supervision  -EL     Row Name 10/06/20 1601          Sit-Stand Transfer    Sit-Stand [place] (Transfers)  contact guard  -EL     Row Name 10/06/20 1601          Gait/Stairs (Locomotion)    [place] Level (Gait)  contact guard  -EL     Assistive Device (Gait)  cane, straight  -EL     Distance in Feet (Gait)  60 feet  -EL     Deviations/Abnormal Patterns (Gait)  ricky decreased;gait speed decreased;stride length decreased;weight shifting decreased  -EL     Comment (Gait/Stairs)  Pt with SOA, labored breathing after ambulation  -EL       User Key  (r) = Recorded By, (t) = Taken By, (c) = Cosigned By    Initials Name Provider Type    Dung Santiago PT Physical Therapist        Obj/Interventions    No documentation.       Goals/Plan    No documentation.       Clinical Impression     Row Name 10/06/20 1604          Pain Scale: Numbers Pre/Post-Treatment    Pretreatment Pain Rating  0/10 -  no pain  -EL     Posttreatment Pain Rating  0/10 - no pain  -EL     Row Name 10/06/20 1604          Plan of Care Review    Plan of Care Reviewed With  patient  -EL     Outcome Summary  Pt demonstrated improved functional mobility this date, requiring no physical assistance for bed mobilty this date. Pt ambulated in room 60 feet with SPC HR elevated to 140s and pt demonstrated so SOA and labored breathing. Pt transferred to [facility] with min VC for safe transfer following ambulation. Recommendation at this time remains IP rehab due to falls risk and functioning below baseline. PPE worn includes gloves and mask with goggles.  -EL     Row Name 10/06/20 1604          Therapy Assessment/Plan (PT)    Rehab Potential (PT)  good, to achieve stated therapy goals  -EL     Criteria for Skilled Interventions Met (PT)  yes;meets criteria  -     Row Name 10/06/20 1604          Vital Signs    O2 Delivery Pre Treatment  room air  -EL     O2 Delivery Intra Treatment  room air  -EL     O2 Delivery Post Treatment  room air  -EL     Pre Patient Position  Supine  -EL     Intra Patient Position  Standing  -EL     Post Patient Position  Sitting  -EL     Row Name 10/06/20 1604          Positioning and Restraints    Pre-Treatment Position  in bed  -EL     Post Treatment Position  chair  -EL     In Chair  notified nsg;sitting;call light within reach;encouraged to call for assist;exit alarm on  -EL       User Key  (r) = Recorded By, (t) = Taken By, (c) = Cosigned By    Initials Name Provider Type    Dung Santiago, PT Physical Therapist        Outcome Measures     Row Name 10/06/20 1608          How much help from another person do you currently need...    Turning from your back to your side while in flat bed without using bedrails?  4  -EL     Moving from lying on back to sitting on the side of a flat bed without bedrails?  4  -EL     Moving to and from a bed to a chair (including a wheelchair)?  3  -EL     Standing up from a chair using your  arms (e.g., wheelchair, bedside chair)?  3  -EL     Climbing 3-5 steps with a railing?  3  -EL     To walk in hospital room?  3  -EL     AM-PAC 6 Clicks Score (PT)  20  -EL     Row Name 10/06/20 1607          Modified [place] Scale    Pre-Stroke Modified [place] Scale  2 - Slight disability.  Unable to carry out all previous activities but able to look after own affairs without assistance.  -EL     Modified [place] Scale  4 - Moderately severe disability.  Unable to walk without assistance, and unable to attend to own bodily needs without assistance.  -       User Key  (r) = Recorded By, (t) = Taken By, (c) = Cosigned By    Initials Name Provider Type    Dung Santiago, PT Physical Therapist        Physical Therapy Education                 Title: PT OT SLP Therapies (Done)     Topic: Physical Therapy (Done)     Point: Mobility training (Done)     Learning Progress Summary           Patient Acceptance, E,TB, VU by  at 10/6/2020 1608    Acceptance, E,TB, VU,NR by  at 10/5/2020 1316    Acceptance, E, VU by  at 10/5/2020 0830                   Point: Home exercise program (Done)     Learning Progress Summary           Patient Acceptance, E,TB, VU,NR by  at 10/5/2020 1316                   Point: Body mechanics (Done)     Learning Progress Summary           Patient Acceptance, E,TB, VU,NR by  at 10/5/2020 1316                   Point: Precautions (Done)     Learning Progress Summary           Patient Acceptance, E,TB, VU by  at 10/6/2020 1608    Acceptance, E,TB, VU,NR by  at 10/5/2020 1316    Acceptance, E, VU by  at 10/5/2020 0830                               User Key     Initials Effective Dates Name Provider Type Discipline     06/23/20 -  Dung Win, PT Physical Therapist PT     03/01/19 -  Zuleika White, RN Registered Nurse Nurse     06/15/19 -  Sofy Matt PT Physical Therapist PT              PT Recommendation and Plan     Plan of Care Reviewed With: patient  Outcome Summary: Pt  demonstrated improved functional mobility this date, requiring no physical assistance for bed mobilty this date. Pt ambulated in room 60 feet with SPC HR elevated to 140s and pt demonstrated so SOA and labored breathing. Pt transferred to [facility] with min VC for safe transfer following ambulation. Recommendation at this time remains IP rehab due to falls risk and functioning below baseline. PPE worn includes gloves and mask with goggles.     Time Calculation:   PT Charges     Row Name 10/06/20 1609             Time Calculation    Start Time  1130  -EL      Stop Time  1148  -EL      Time Calculation (min)  18 min  -EL      PT Received On  10/06/20  -EL      PT - Next Appointment  10/07/20  -EL         Time Calculation- PT    Total Timed Code Minutes- PT  18 minute(s)  -EL        User Key  (r) = Recorded By, (t) = Taken By, (c) = Cosigned By    Initials Name Provider Type    Dung Santiago PT Physical Therapist        Therapy Charges for Today     Code Description Service Date Service Provider Modifiers Qty    88453513002 HC GAIT TRAINING EA 15 MIN 10/6/2020 Dung Win, PT GP 1          PT G-Codes  Outcome Measure Options: AM-PAC 6 Clicks Basic Mobility (PT), Modified [place]  AM-PAC 6 Clicks Score (PT): 20  Modified [place] Scale: 4 - Moderately severe disability.  Unable to walk without assistance, and unable to attend to own bodily needs without assistance.    Dung Win PT  10/6/2020      Electronically signed by Dung Win PT at 10/06/20 1610          Occupational Therapy Notes (most recent note)      Mena Menjivar, OT at 10/05/20 1033          Patient Name: Carlos Whipple  : 1955    MRN: [id number]                              Today's Date: 10/5/2020       Admit Date: 10/4/2020    Visit Dx:     ICD-10-CM ICD-9-CM   1. Dyspnea, unspecified type  R06.00 786.09   2. Atrial fibrillation with RVR (CMS/HCC)  I48.91 427.31     Patient Active Problem List   Diagnosis   • Hypertensive emergency   • Chronic atrial  fibrillation (CMS/[facility])   • Chronic diastolic CHF (congestive heart failure) (CMS/[facility])   • CKD (chronic kidney disease) stage 3, GFR 30-59 ml/min   • Essential hypertension   • Gambling disorder, persistent   • Shortness of breath   • Coronary artery disease involving native coronary artery of native heart without angina pectoris   • Psoriasis   • History of CVA (cerebrovascular accident)   • DDD (degenerative disc disease), lumbosacral   • Peripheral polyneuropathy   • GERD (gastroesophageal reflux disease)   • Medically noncompliant   • Acute respiratory distress   • Unstable angina (CMS/[facility])   • Cervical disc disorder with radiculopathy   • Completed stroke (CMS/[facility])   • Degeneration of intervertebral disc   • Excessive anticoagulation   • Impaired left ventricular function   • Liver lesion   • Lumbar radiculopathy   • Cervical myelopathy (CMS/[facility])   • Lumbosacral radiculopathy   • Spinal stenosis of lumbar region   • Obesity   • Mild cognitive impairment   • Thoracic back pain   • Thoracic disc disease with myelopathy   • Paroxysmal atrial fibrillation (CMS/[facility])   • Acute congestive heart failure (CMS/[facility])   • Atrial fibrillation with RVR (CMS/[facility])   • Dermatitis associated with moisture   • Dyspnea     Past Medical History:   Diagnosis Date   • A-fib (CMS/[facility])    • Atrial fibrillation with RVR (CMS/[facility])    • CHF (congestive heart failure) (CMS/[facility])    • Chronic atrial fibrillation (CMS/[facility]) 11/8/2019   • Chronic diastolic CHF (congestive heart failure) (CMS/[facility]) 11/8/2019   • CKD (chronic kidney disease) stage 3, GFR 30-59 ml/min 11/8/2019   • Coronary artery disease involving native coronary artery of native heart without angina pectoris 11/8/2019   • DDD (degenerative disc disease), lumbosacral 11/8/2019   • Essential hypertension 11/8/2019   • Gambling disorder, persistent 11/8/2019   • GERD (gastroesophageal reflux disease) 11/8/2019   • History of CVA (cerebrovascular accident) 11/8/2019   • Hyperlipidemia     • Hypertension    • Medically noncompliant 11/8/2019   • Peripheral polyneuropathy 11/8/2019   • Psoriasis 11/8/2019   • Stroke (CMS/HCC)      Past Surgical History:   Procedure Laterality Date   • CARDIAC CATHETERIZATION     • CARDIAC CATHETERIZATION N/A 12/10/2019    Procedure: Left Heart Cath and coronary angiogram;  Surgeon: Edy Thomas MD;  Location: [facility] CATH INVASIVE LOCATION;  Service: Cardiovascular     General Information     Row Name 10/05/20 1004          General Information    Prior Level of Function  independent:;ADL's;all household mobility;driving;home management pt reports he needs a cane & has difficulty reaching feet at baseline. Reports recent falls.  -     Existing Precautions/Restrictions  fall;oxygen therapy device and L/min  -     Row Name 10/05/20 1004          Living Environment    Lives With  sibling(s)  -     Row Name 10/05/20 1004          Home Main Entrance    Number of Stairs, Main Entrance  none ramp at brother's where he's been living for the past year, which brother states cannot continue due to the level of assist he needs, especialle due to his continues incontinence of bladder & bowel in the home & refusal to wear breifs.  -     Row Name 10/05/20 1004          Stairs Within Home, Primary    Number of Stairs, Within Home, Primary  none  -     Row Name 10/05/20 1004          Cognition    Orientation Status (Cognition)  disoriented to;time;situation guessed the month incorectly. Brother states some days pt asks what day of the week it is repeatedly.  -     Row Name 10/05/20 1004          Safety Issues, Functional Mobility    Safety Issues Affecting Function (Mobility)  at risk behavior observed;awareness of need for assistance;impulsivity;insight into deficits/self-awareness;judgment;problem-solving;safety precaution awareness  -     Impairments Affecting Function (Mobility)  balance;cognition;endurance/activity tolerance;coordination;range of motion (ROM)  -        User Key  (r) = Recorded By, (t) = Taken By, (c) = Cosigned By    Initials Name Provider Type     Mena Menjivar OT Occupational Therapist        Mobility/ADL's     Row Name 10/05/20 1021          Bed Mobility    Bed Mobility  supine-sit  -     Supine-Sit [place] (Bed Mobility)  minimum assist (75% patient effort)  -     Row Name 10/05/20 1021          Functional Mobility    Functional Mobility- Ind. Level  minimum assist (75% patient effort);1 person + 1 person to manage equipment  -     Functional Mobility-Distance (Feet)  30  -     Functional Mobility- Safety Issues  balance decreased during turns;step length decreased;weight-shifting ability decreased;loses balance backward  -     Functional Mobility- Comment  mild scissoring of his LLE noted.  -     Row Name 10/05/20 1021          Activities of Daily Living    BADL Assessment/Intervention  lower body dressing;toileting  -     Row Name 10/05/20 1021          Lower Body Dressing Assessment/Training    [place] Level (Lower Body Dressing)  don;socks;dependent (less than 25% patient effort)  -     Position (Lower Body Dressing)  edge of bed sitting  -     Row Name 10/05/20 1021          Toileting Assessment/Training    [place] Level (Toileting)  dependent (less than 25% patient effort) incontinent of urine in the bed. RN applying condom catheter.  -       User Key  (r) = Recorded By, (t) = Taken By, (c) = Cosigned By    Initials Name Provider Type     Mena Menjivar OT Occupational Therapist        Obj/Interventions     Row Name 10/05/20 1023          Sensory Assessment (Somatosensory)    Sensory Assessment (Somatosensory)  sensation intact denies any paresthesias  -     Row Name 10/05/20 1023          Range of Motion Comprehensive    Comment, General Range of Motion  shld flex 50% AROM however pt may have had some difficulty following commmands for MMT. PROM shld flex 75% w/ possible spasticity noted w/ cog-wheel-like  jerking during resistive MMT.  -     Row Name 10/05/20 1023          Strength Comprehensive (MMT)    Comment, General Manual Muscle Testing (MMT) Assessment   4-/5; shld 5/5 in limited range  -     Row Name 10/05/20 1023          Balance    Balance Assessment  sitting static balance;sitting dynamic balance;standing static balance;standing dynamic balance  -     Static Sitting Balance  mild impairment  -     Dynamic Sitting Balance  mild impairment  -     Static Standing Balance  mild impairment  -     Dynamic Standing Balance  moderate impairment  -       User Key  (r) = Recorded By, (t) = Taken By, (c) = Cosigned By    Initials Name Provider Type     Mena Menjivar, OT Occupational Therapist        Goals/Plan     Row Name 10/05/20 1029          Bed Mobility Goal 1 (OT)    Activity/Assistive Device (Bed Mobility Goal 1, OT)  bed mobility activities, all  -     [place] Level/Cues Needed (Bed Mobility Goal 1, OT)  modified independence  -     Time Frame (Bed Mobility Goal 1, OT)  2 weeks  -     Row Name 10/05/20 1029          Dressing Goal 1 (OT)    Activity/Device (Dressing Goal 1, OT)  dressing skills, all;sock-aid;long-handled shoe horn;reacher  -     [place]/Cues Needed (Dressing Goal 1, OT)  set-up required;supervision required  -     Time Frame (Dressing Goal 1, OT)  2 weeks  -     Row Name 10/05/20 1029          Grooming Goal 1 (OT)    Activity/Device (Grooming Goal 1, OT)  hair care;oral care;wash face, hands standing at sink  -     [place] (Grooming Goal 1, OT)  supervision required  -     Time Frame (Grooming Goal 1, OT)  1 week  -       User Key  (r) = Recorded By, (t) = Taken By, (c) = Cosigned By    Initials Name Provider Type     Mena Menjivar, OT Occupational Therapist        Clinical Impression     Row Name 10/05/20 1025          Plan of Care Review    Plan of Care Reviewed With  patient;sibling  -     Progress  no change  -     Outcome  Summary  pt demonstrates mild confusion, incontinence,  weakness, decreased ROM in shld & trunk, balance & coordination deficits. HAs been living w/ brother for a year as his home w/ siblings living in a detached garage has no working heat/cooling system. 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 Pt is impulsive & unsafe. Needed (D) (A) for LBD & toileting & min (A) for bed & functional mobility. OT recommends IP rehab at d/c. PPE worn: mask, gloves, safety glasses.  -     Row Name 10/05/20 1025          Therapy Assessment/Plan (OT)    Rehab Potential (OT)  good, to achieve stated therapy goals  -     Criteria for Skilled Therapeutic Interventions Met (OT)  skilled treatment is necessary  -     Therapy Frequency (OT)  5 times/wk  -     Predicted Duration of Therapy Intervention (OT)  until D/C  -     Row Name 10/05/20 1025          Therapy Plan Review/Discharge Plan (OT)    Anticipated Discharge Disposition (OT)  inpatient rehabilitation facility  -     Row Name 10/05/20 1025          Vital Signs    Pre SpO2 (%)  97  -     O2 Delivery Pre Treatment  supplemental O2  -     Intra SpO2 (%)  90  -MH     O2 Delivery Intra Treatment  room air  -     Post SpO2 (%)  96  -MH     O2 Delivery Post Treatment  supplemental O2  -     Pre Patient Position  Supine  -     Intra Patient Position  Standing  -     Post Patient Position  Supine  -     Row Name 10/05/20 1025          Positioning and Restraints    Pre-Treatment Position  in bed  -     Post Treatment Position  bed  -MH     In Bed  notified nsg;supine;encouraged to call for assist;exit alarm on;call light within reach  -     Restraints  reapplied:;soft limb;notified nsg:  -       User Key  (r) = Recorded By, (t) = Taken By, (c) = Cosigned By    Initials Name Provider Type     Mena Menjivar, OT Occupational Therapist        Outcome Measures     Row Name 10/05/20 1030          How much help from another person do you  currently need...    Turning from your back to your side while in flat bed without using bedrails?  3  -MH     Moving from lying on back to sitting on the side of a flat bed without bedrails?  3  -MH     Moving to and from a bed to a chair (including a wheelchair)?  3  -MH     Standing up from a chair using your arms (e.g., wheelchair, bedside chair)?  3  -MH     Climbing 3-5 steps with a railing?  3  -MH     To walk in hospital room?  3  -     AM-PAC 6 Clicks Score (PT)  18  -     Row Name 10/05/20 1030          Modified [place] Scale    Pre-Stroke Modified [place] Scale  2 - Slight disability.  Unable to carry out all previous activities but able to look after own affairs without assistance.  -     Modified [place] Scale  4 - Moderately severe disability.  Unable to walk without assistance, and unable to attend to own bodily needs without assistance.  -     Row Name 10/05/20 1030          Functional Assessment    Outcome Measure Options  AM-PAC 6 Clicks Basic Mobility (PT);Modified [place]  -       User Key  (r) = Recorded By, (t) = Taken By, (c) = Cosigned By    Initials Name Provider Type    Mena Mckeon OT Occupational Therapist        Occupational Therapy Education                 Title: PT OT SLP Therapies (Done)     Topic: Occupational Therapy (Done)     Point: ADL training (Done)     Description:   Instruct learner(s) on proper safety adaptation and remediation techniques during self care or transfers.   Instruct in proper use of assistive devices.              Learning Progress Summary           Patient Acceptance, E, VU by  at 10/5/2020 0830                   Point: Home exercise program (Done)     Description:   Instruct learner(s) on appropriate technique for monitoring, assisting and/or progressing therapeutic exercises/activities.              Learning Progress Summary           Patient Acceptance, E, VU by  at 10/5/2020 0830                   Point: Precautions (Done)     Description:    Instruct learner(s) on prescribed precautions during self-care and functional transfers.              Learning Progress Summary           Patient Acceptance, E,TB, VU,NR by  at 10/5/2020 1031    Acceptance, E, VU by  at 10/5/2020 0830                   Point: Body mechanics (Done)     Description:   Instruct learner(s) on proper positioning and spine alignment during self-care, functional mobility activities and/or exercises.              Learning Progress Summary           Patient Acceptance, E,TB, VU,NR by  at 10/5/2020 1031    Acceptance, E, VU by  at 10/5/2020 0830                               User Key     Initials Effective Dates Name Provider Type Discipline     03/01/19 -  Mena Menjivar OT Occupational Therapist OT     03/01/19 -  Zuleika White RN Registered Nurse Nurse              OT Recommendation and Plan  Retired Outcome Summary/Treatment Plan (OT)  Anticipated Discharge Disposition (OT): inpatient rehabilitation facility  Therapy Frequency (OT): 5 times/wk  Plan of Care Review  Plan of Care Reviewed With: patient, sibling  Progress: no change  Outcome Summary: pt demonstrates mild confusion, incontinence,  weakness, decreased ROM in shld & trunk, balance & coordination deficits. HAs been living w/ brother for a year as his home w/ siblings living in a detached garage has no working heat/cooling system. 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 Pt is impulsive & unsafe. Needed (D) (A) for LBD & toileting & min (A) for bed & functional mobility. OT recommends IP rehab at d/c. PPE worn: mask, gloves, safety glasses.  Plan of Care Reviewed With: patient, sibling  Outcome Summary: pt demonstrates mild confusion, incontinence,  weakness, decreased ROM in shld & trunk, balance & coordination deficits. HAs been living w/ brother for a year as his home w/ siblings living in a detached garage has no working heat/cooling system. 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 Pt is impulsive & unsafe. Needed (D) (A) for LBD & toileting & min (A) for bed & functional mobility. OT recommends IP rehab at d/c. PPE worn: mask, gloves, safety glasses.     Time Calculation:   Time Calculation- OT     Row Name 10/05/20 1033             Time Calculation-     OT Start Time  0820  -      OT Stop Time  0850  -      OT Time Calculation (min)  30 min  -      Total Timed Code Minutes- OT  0 minute(s)  -      OT Received On  10/05/20  -      OT - Next Appointment  10/06/20  -      OT Goal Re-Cert Due Date  10/19/20  -        User Key  (r) = Recorded By, (t) = Taken By, (c) = Cosigned By    Initials Name Provider Type     Mena Menjivar OT Occupational Therapist        Therapy Charges for Today     Code Description Service Date Service Provider Modifiers Qty    62885683306 HC OT EVAL MOD COMPLEXITY 4 10/5/2020 Mena Menjivar OT GO 1               Mena Menjivar OT  10/5/2020    Electronically signed by Mena Menjivar OT at 10/05/20 1033

## 2020-10-07 NOTE — PROGRESS NOTES
Referring Provider: Dr. Stauffer    Reason for follow-up: Fibrillation     Patient Care Team:  Marisol Larson APRN as PCP - General  Shwetha Barber MD as Consulting Physician (Nephrology)  Edy Thomas MD as Consulting Physician (Cardiology)    Subjective .  No shortness of breath or palpitations today    Objective  Lying in bed comfortably     Review of Systems   Constitution: Negative for fever and malaise/fatigue.   HENT: Negative for ear pain and nosebleeds.    Eyes: Negative for blurred vision and double vision.   Cardiovascular: Negative for chest pain, dyspnea on exertion and palpitations.   Respiratory: Negative for cough and shortness of breath.    Skin: Negative for rash.   Musculoskeletal: Negative for joint pain.   Gastrointestinal: Negative for abdominal pain, nausea and vomiting.   Neurological: Negative for focal weakness and headaches.   Psychiatric/Behavioral: Negative for depression. The patient is not nervous/anxious.    All other systems reviewed and are negative.      Ketorolac tromethamine    Scheduled Meds:allopurinol, 100 mg, Oral, Daily  aspirin, 81 mg, Oral, Daily  atorvastatin, 10 mg, Oral, Nightly  dilTIAZem CD, 180 mg, Oral, Q24H  hydrALAZINE, 50 mg, Oral, TID  losartan, 50 mg, Oral, Q24H  metoprolol succinate XL, 50 mg, Oral, Q24H  risperiDONE, 0.5 mg, Oral, Q12H  rivaroxaban, 20 mg, Oral, Daily With Dinner  sodium bicarbonate, 650 mg, Oral, TID  sodium chloride, 10 mL, Intravenous, Q12H  spironolactone, 12.5 mg, Oral, Daily      Continuous Infusions:   PRN Meds:.•  acetaminophen **OR** acetaminophen **OR** acetaminophen  •  bisacodyl  •  calcium carbonate  •  HYDROcodone-acetaminophen  •  influenza vaccine  •  labetalol  •  LORazepam  •  magnesium hydroxide  •  melatonin  •  nitroglycerin  •  ondansetron **OR** ondansetron  •  potassium chloride **OR** potassium chloride **OR** potassium chloride  •  sodium chloride  •  sodium chloride        VITAL SIGNS  Vitals:     "10/06/20 2322 10/07/20 0208 10/07/20 0646 10/07/20 1033   BP: 134/93 142/91 156/90 158/95   BP Location: Right arm Left arm Right arm Right arm   Patient Position: Lying Lying Lying Lying   Pulse: 88 84 77 106   Resp: 16 18 16 10   Temp: 98.2 °F (36.8 °C) 98.4 °F (36.9 °C) 98.3 °F (36.8 °C) 98.6 °F (37 °C)   TempSrc: Oral Axillary Oral Oral   SpO2: 97% 97% 92% 95%   Weight:   115 kg (252 lb 13.9 oz)    Height:           Flowsheet Rows      First Filed Value   Admission Height  182.9 cm (72\") Documented at 10/04/2020 1256   Admission Weight  118 kg (259 lb 11.2 oz) Documented at 10/04/2020 1256           TELEMETRY: Atrial fibrillation with controlled medical response    Physical Exam:  Constitutional:       Appearance: Well-developed.   Eyes:      General: No scleral icterus.     Conjunctiva/sclera: Conjunctivae normal.      Pupils: Pupils are equal, round, and reactive to light.   HENT:      Head: Normocephalic and atraumatic.   Neck:      Musculoskeletal: Normal range of motion and neck supple.      Vascular: No carotid bruit or JVD.   Pulmonary:      Effort: Pulmonary effort is normal.      Breath sounds: Normal breath sounds. No wheezing. No rales.   Cardiovascular:      Normal rate. Irregularly irregular rhythm.   Pulses:     Intact distal pulses.   Abdominal:      General: Bowel sounds are normal.      Palpations: Abdomen is soft.   Musculoskeletal: Normal range of motion.   Skin:     General: Skin is warm and dry.      Findings: No rash.   Neurological:      Mental Status: Alert.      Comments: No focal deficits          Results Review:   I reviewed the patient's new clinical results.  Lab Results (last 24 hours)     Procedure Component Value Units Date/Time    Blood Culture - Blood, Foot, Left [[id number]] Collected: 10/04/20 1353    Specimen: Blood from Foot, Left Updated: 10/07/20 1415     Blood Culture No growth at 3 days    CBC & Differential [[id number]]  (Abnormal) Collected: 10/07/20 1024    Specimen: " Blood Updated: 10/07/20 1101    Narrative:      The following orders were created for panel order CBC & Differential.  Procedure                               Abnormality         Status                     ---------                               -----------         ------                     CBC Auto Differential[[id number]]        Abnormal            Final result                 Please view results for these tests on the individual orders.    CBC Auto Differential [[id number]]  (Abnormal) Collected: 10/07/20 1024    Specimen: Blood Updated: 10/07/20 1101     WBC 7.80 10*3/mm3      RBC 4.99 10*6/mm3      Hemoglobin 13.7 g/dL      Hematocrit 42.2 %      MCV 84.6 fL      MCH 27.4 pg      MCHC 32.4 g/dL      RDW 20.3 %      RDW-SD 59.9 fl      MPV 8.7 fL      Platelets 176 10*3/mm3      Neutrophil % 75.1 %      Lymphocyte % 12.2 %      Monocyte % 9.6 %      Eosinophil % 1.6 %      Basophil % 1.5 %      Neutrophils, Absolute 5.80 10*3/mm3      Lymphocytes, Absolute 0.90 10*3/mm3      Monocytes, Absolute 0.70 10*3/mm3      Eosinophils, Absolute 0.10 10*3/mm3      Basophils, Absolute 0.10 10*3/mm3      nRBC 0.1 /100 WBC     BUN [[id number]] Collected: 10/07/20 1024    Specimen: Blood Updated: 10/07/20 1051    Blood Culture - Blood, Hand, Right [[id number]] Collected: 10/06/20 0926    Specimen: Blood from Hand, Right Updated: 10/07/20 1000     Blood Culture No growth at 24 hours    Blood Culture - Blood, Arm, Left [[id number]]  (Abnormal) Collected: 10/04/20 1410    Specimen: Blood from Arm, Left Updated: 10/07/20 0731     Blood Culture Staphylococcus, coagulase negative     Comment: Probable contaminant requires clinical correlation, susceptibility not performed unless requested by physician.          Isolated from Anaerobic Bottle     Blood Culture Corynebacterium species     Comment: Probable contaminant requires clinical correlation, susceptibility not performed unless requested by physician.            Isolated from  Aerobic Bottle     Gram Stain Anaerobic Bottle Gram positive cocci in clusters      Aerobic Bottle Gram positive bacilli    Narrative:      Blood culture does not meet the specified criteria for PCR identification.  If pregnant, immunocompromised, or clinical concern for meningitis, call lab to run BCID for Listeria monocytogenes.    Blood Culture - Blood, Hand, Right [[id number]] Collected: 10/06/20 1608    Specimen: Blood from Hand, Right Updated: 10/06/20 1641    BUN [[id number]]  (Abnormal) Collected: 10/06/20 0926    Specimen: Blood Updated: 10/06/20 1628     BUN 28 mg/dL           Imaging Results (Last 24 Hours)     ** No results found for the last 24 hours. **          EKG      I personally viewed and interpreted the patient's EKG/Telemetry data:    ECHOCARDIOGRAM:    STRESS MYOVIEW:    CARDIAC CATHETERIZATION:    OTHER:         Assessment/Plan     Principal Problem:    Atrial fibrillation with RVR (CMS/HCC)  Active Problems:    Hypertensive emergency    Chronic atrial fibrillation (CMS/HCC)    Chronic diastolic CHF (congestive heart failure) (CMS/[facility])    CKD (chronic kidney disease) stage 3, GFR 30-59 ml/min    Essential hypertension    Gambling disorder, persistent    Coronary artery disease involving native coronary artery of native heart without angina pectoris    Psoriasis    History of CVA (cerebrovascular accident)    GERD (gastroesophageal reflux disease)    Medically noncompliant    Mild cognitive impairment    Dyspnea       Patient has atrial fibrillation with rapid response and was placed on IV Cardizem and then switched to oral Cardizem and is better now  Patient is also on anticoagulation with Xarelto  Patient's blood pressure and heart rate are stable  Patient's lipid levels are followed with a primary care doctor  Patient is ruled out for MI and has history of coronary disease  Patient has medical noncompliance and discussed with him several times about being compliant    I discussed the patients  findings and my recommendations with patient and nurse    Edy Thomas MD  10/07/20  14:22 EDT

## 2020-10-07 NOTE — NURSING NOTE
Patient was found up in the bathroom dressed in street clothes and identification arm band removed, toilet overflowing.    Charge nurse notified about the change in the patient's orientation.  He also does not know where he is at this time, and this morning he knew he was at the hospital including the address.    Charge nurse is working on getting us a sitter to keep the patient safe.

## 2020-10-07 NOTE — PLAN OF CARE
Patient remains confused and a fall risk.  He is non compliant with blood draws and medication, particularly Xarelto.      Skin:  Crusted lesions to back, bottom and abdomen, redness and excoriation in the groin folds and bottom.      Placement and medication compliance are a focuses of concern for the patient, and he has agreed to go to rehab.      Problem: Fall Injury Risk  Goal: Absence of Fall and Fall-Related Injury  10/7/2020 1120 by Nehal Menon RN  Outcome: Ongoing, Not Progressing  10/7/2020 1118 by Nehal Menon RN  Outcome: Ongoing, Progressing  10/7/2020 1118 by Nehal Menon RN  Outcome: Ongoing, Progressing  Intervention: Identify and Manage Contributors to Fall Injury Risk  Recent Flowsheet Documentation  Taken 10/7/2020 1105 by Nehal Menon RN  Medication Review/Management: medications reviewed  Taken 10/7/2020 1000 by Nehal Menon RN  Medication Review/Management: medications reviewed  Taken 10/7/2020 0800 by Nehal Menon RN  Medication Review/Management: medications reviewed  Intervention: Promote Injury-Free Environment  Recent Flowsheet Documentation  Taken 10/7/2020 1105 by Nehal Menon RN  Safety Promotion/Fall Prevention:  • activity supervised  • assistive device/personal items within reach  • clutter free environment maintained  • fall prevention program maintained  • nonskid shoes/slippers when out of bed  • safety round/check completed  Taken 10/7/2020 1000 by Nehal Menon RN  Safety Promotion/Fall Prevention:  • assistive device/personal items within reach  • clutter free environment maintained  • fall prevention program maintained  • nonskid shoes/slippers when out of bed  • safety round/check completed  Taken 10/7/2020 0800 by Nehal Menon RN  Safety Promotion/Fall Prevention:  • assistive device/personal items within reach  • clutter free environment maintained  • fall prevention program maintained  • nonskid shoes/slippers when out of bed  • safety round/check  completed     Problem: Adult Inpatient Plan of Care  Goal: Plan of Care Review  10/7/2020 1120 by Nehal Menon RN  Outcome: Ongoing, Not Progressing  Flowsheets (Taken 10/7/2020 1118)  Outcome Summary: Patient remains confused and a fall risk.  He is non comliant with blood draws and medication, particularly Xarelto.  Crusted lesions to back, bottom and abdomen, redness and excoriation in the groin folds and bottom.  Placement and medication compliance are a focuses of concern for the patient, and he has agreed to go to rehab  10/7/2020 1118 by Nehal Menon RN  Outcome: Ongoing, Progressing  Flowsheets (Taken 10/7/2020 1118)  Outcome Summary: Patient remains confused and a fall risk.  He is non comliant with blood draws and medication, particularly Xarelto.  Crusted lesions to back, bottom and abdomen, redness and excoriation in the groin folds and bottom.  Placement and medication compliance are a focuses of concern for the patient, and he has agreed to go to rehab  10/7/2020 1118 by Nehal Menon RN  Outcome: Ongoing, Progressing  Flowsheets (Taken 10/7/2020 1118)  Plan of Care Reviewed With: patient  Outcome Summary: Patient remains confused and a fall risk.  He is non comliant with blood draws and medication, particularly Xarelto.  Crusted lesions to back, bottom and abdomen, redness and excoriation in the groin folds and bottom.  Placement and medication compliance are a focuses of concern for the patient, and he has agreed to go to rehab  Goal: Patient-Specific Goal (Individualized)  10/7/2020 1120 by Nehal Menon RN  Outcome: Ongoing, Not Progressing  10/7/2020 1118 by Nehal Menon RN  Outcome: Ongoing, Progressing  10/7/2020 1118 by Nehal Menon RN  Outcome: Ongoing, Progressing  Goal: Absence of Hospital-Acquired Illness or Injury  10/7/2020 1120 by Nehal Menon RN  Outcome: Ongoing, Not Progressing  10/7/2020 1118 by Nehal Menon RN  Outcome: Ongoing, Progressing  10/7/2020 1118 by Sinan  JULIEN Calvert  Outcome: Ongoing, Progressing  Intervention: Identify and Manage Fall Risk  Recent Flowsheet Documentation  Taken 10/7/2020 1105 by Nehal Menon RN  Safety Promotion/Fall Prevention:  • activity supervised  • assistive device/personal items within reach  • clutter free environment maintained  • fall prevention program maintained  • nonskid shoes/slippers when out of bed  • safety round/check completed  Taken 10/7/2020 1000 by Nehal Menon RN  Safety Promotion/Fall Prevention:  • assistive device/personal items within reach  • clutter free environment maintained  • fall prevention program maintained  • nonskid shoes/slippers when out of bed  • safety round/check completed  Taken 10/7/2020 0800 by Nehal Menon RN  Safety Promotion/Fall Prevention:  • assistive device/personal items within reach  • clutter free environment maintained  • fall prevention program maintained  • nonskid shoes/slippers when out of bed  • safety round/check completed  Intervention: Prevent Skin Injury  Recent Flowsheet Documentation  Taken 10/7/2020 1105 by Nehal Menon RN  Body Position: position changed independently  Intervention: Prevent and Manage VTE (venous thromboembolism) Risk  Recent Flowsheet Documentation  Taken 10/7/2020 1105 by Nehal Menon RN  VTE Prevention/Management: (xarelto ordered, patient refused) other (see comments)  Intervention: Prevent Infection  Recent Flowsheet Documentation  Taken 10/7/2020 0800 by Nehal Menon RN  Infection Prevention: personal protective equipment utilized  Goal: Optimal Comfort and Wellbeing  10/7/2020 1120 by Nehal Menon RN  Outcome: Ongoing, Not Progressing  10/7/2020 1118 by Nehal Menon RN  Outcome: Ongoing, Progressing  10/7/2020 1118 by Nehal Menon RN  Outcome: Ongoing, Progressing  Intervention: Provide Person-Centered Care  Recent Flowsheet Documentation  Taken 10/7/2020 1105 by Nehal Menon RN  Trust Relationship/Rapport: care explained  Goal:  Readiness for Transition of Care  10/7/2020 1120 by Nehal Menon RN  Outcome: Ongoing, Not Progressing  10/7/2020 1118 by Nehal Menon RN  Outcome: Ongoing, Progressing  10/7/2020 1118 by Nehal Menon RN  Outcome: Ongoing, Progressing     Problem: Adult Inpatient Plan of Care  Goal: Optimal Comfort and Wellbeing  10/7/2020 1120 by Nehal Menon RN  Outcome: Ongoing, Not Progressing  10/7/2020 1118 by Nehal Menon RN  Outcome: Ongoing, Progressing  10/7/2020 1118 by Nehal Menon RN  Outcome: Ongoing, Progressing  Intervention: Provide Person-Centered Care  Recent Flowsheet Documentation  Taken 10/7/2020 1105 by Nehal Menon RN  Trust Relationship/Rapport: care explained     Problem: Skin Injury Risk Increased  Goal: Skin Health and Integrity  10/7/2020 1120 by Nehal Menon RN  Outcome: Ongoing, Not Progressing  10/7/2020 1118 by Nehal Menon RN  Outcome: Ongoing, Progressing  10/7/2020 1118 by Nehal Menon RN  Outcome: Ongoing, Progressing   Goal Outcome Evaluation:  Plan of Care Reviewed With: patient  Progress: no change  Outcome Summary: Patient remains confused and a fall risk.  He is non comliant with blood draws and medication, particularly Xarelto.  Crusted lesions to back, bottom and abdomen, redness and excoriation in the groin folds and bottom.  Placement and medication compliance are a focuses of concern for the patient, and he has agreed to go to rehab

## 2020-10-07 NOTE — PROGRESS NOTES
Continued Stay Note  KAE Hameed     Patient Name: Carlos Whipple  MRN: [id number]  Today's Date: 10/7/2020    Admit Date: 10/4/2020    Discharge Plan     Row Name 10/07/20 1144       Plan    Plan  Referral pending for [facility]. Pre-cert and PASRR required.    Plan Comments  [facility] not able [facility] due to no subacute bed. SW made referral to [facility]. SW to complete PASRR. ABHILASH also e-mailed Med [employer]ist representative to verify if pt has limited Medicaid and to check potential eligibility for full Medicaid during open enrollment as pt does not currently qualify for LTC or AL if he has limited Medicaid. ABHILASH has noted on ECF list which facilities might accept pt's insurance and will obtain 3rd and 4th choices in anticipation of [facility] being OON. ABHILASH also sent secure chat to primary RN to inquire about potential need for psych consult to determine decisional capacity of pt.        Zee Baptiste, [facility] [place], [facility]    Office: [phone]  Cell: [phone]  Fax: [phone]  E-mail: [email or web address]

## 2020-10-07 NOTE — PLAN OF CARE
Goal Outcome Evaluation:  Plan of Care Reviewed With: patient  Progress: no change  Outcome Summary: Pt remained stable throughout the night. 2L oxygen applied while sleeping d/t sats <90%. Pt has refused morning labs this morning.

## 2020-10-08 LAB
ANION GAP SERPL CALCULATED.3IONS-SCNC: 11 MMOL/L (ref 5–15)
BACTERIA SPEC AEROBE CULT: ABNORMAL
BACTERIA SPEC AEROBE CULT: ABNORMAL
BASOPHILS # BLD AUTO: 0 10*3/MM3 (ref 0–0.2)
BASOPHILS NFR BLD AUTO: 0.6 % (ref 0–1.5)
BUN SERPL-MCNC: 22 MG/DL (ref 8–23)
BUN SERPL-MCNC: NORMAL MG/DL
BUN/CREAT SERPL: NORMAL
CALCIUM SPEC-SCNC: 8.9 MG/DL (ref 8.6–10.5)
CHLORIDE SERPL-SCNC: 107 MMOL/L (ref 98–107)
CO2 SERPL-SCNC: 24 MMOL/L (ref 22–29)
CREAT SERPL-MCNC: 1.01 MG/DL (ref 0.76–1.27)
DEPRECATED RDW RBC AUTO: 59.1 FL (ref 37–54)
EOSINOPHIL # BLD AUTO: 0.2 10*3/MM3 (ref 0–0.4)
EOSINOPHIL NFR BLD AUTO: 3.2 % (ref 0.3–6.2)
ERYTHROCYTE [DISTWIDTH] IN BLOOD BY AUTOMATED COUNT: 19.9 % (ref 12.3–15.4)
GFR SERPL CREATININE-BSD FRML MDRD: 74 ML/MIN/1.73
GLUCOSE SERPL-MCNC: 91 MG/DL (ref 65–99)
GRAM STN SPEC: ABNORMAL
GRAM STN SPEC: ABNORMAL
HCT VFR BLD AUTO: 40.4 % (ref 37.5–51)
HGB BLD-MCNC: 13.2 G/DL (ref 13–17.7)
ISOLATED FROM: ABNORMAL
ISOLATED FROM: ABNORMAL
LYMPHOCYTES # BLD AUTO: 1.4 10*3/MM3 (ref 0.7–3.1)
LYMPHOCYTES NFR BLD AUTO: 19.8 % (ref 19.6–45.3)
MAGNESIUM SERPL-MCNC: 2.2 MG/DL (ref 1.6–2.4)
MCH RBC QN AUTO: 27.9 PG (ref 26.6–33)
MCHC RBC AUTO-ENTMCNC: 32.7 G/DL (ref 31.5–35.7)
MCV RBC AUTO: 85.2 FL (ref 79–97)
MONOCYTES # BLD AUTO: 0.8 10*3/MM3 (ref 0.1–0.9)
MONOCYTES NFR BLD AUTO: 11.3 % (ref 5–12)
NEUTROPHILS NFR BLD AUTO: 4.7 10*3/MM3 (ref 1.7–7)
NEUTROPHILS NFR BLD AUTO: 65.1 % (ref 42.7–76)
NRBC BLD AUTO-RTO: 0 /100 WBC (ref 0–0.2)
PLATELET # BLD AUTO: 169 10*3/MM3 (ref 140–450)
PMV BLD AUTO: 8.8 FL (ref 6–12)
POTASSIUM SERPL-SCNC: 3.8 MMOL/L (ref 3.5–5.2)
RBC # BLD AUTO: 4.75 10*6/MM3 (ref 4.14–5.8)
SODIUM SERPL-SCNC: 142 MMOL/L (ref 136–145)
WBC # BLD AUTO: 7.2 10*3/MM3 (ref 3.4–10.8)

## 2020-10-08 PROCEDURE — 85025 COMPLETE CBC W/AUTO DIFF WBC: CPT | Performed by: FAMILY MEDICINE

## 2020-10-08 PROCEDURE — 97530 THERAPEUTIC ACTIVITIES: CPT

## 2020-10-08 PROCEDURE — 80048 BASIC METABOLIC PNL TOTAL CA: CPT | Performed by: FAMILY MEDICINE

## 2020-10-08 PROCEDURE — 99222 1ST HOSP IP/OBS MODERATE 55: CPT | Performed by: PSYCHIATRY & NEUROLOGY

## 2020-10-08 PROCEDURE — 99232 SBSQ HOSP IP/OBS MODERATE 35: CPT | Performed by: INTERNAL MEDICINE

## 2020-10-08 PROCEDURE — 83735 ASSAY OF MAGNESIUM: CPT | Performed by: FAMILY MEDICINE

## 2020-10-08 PROCEDURE — 97535 SELF CARE MNGMENT TRAINING: CPT

## 2020-10-08 RX ORDER — BETAMETHASONE DIPROPIONATE 0.5 MG/G
1 CREAM TOPICAL EVERY 12 HOURS SCHEDULED
Status: DISCONTINUED | OUTPATIENT
Start: 2020-10-08 | End: 2020-10-15 | Stop reason: HOSPADM

## 2020-10-08 RX ADMIN — SODIUM BICARBONATE 650 MG TABLET 650 MG: at 16:28

## 2020-10-08 RX ADMIN — BETAMETHASONE DIPROPIONATE 1 APPLICATION: 0.5 CREAM TOPICAL at 11:28

## 2020-10-08 RX ADMIN — Medication 10 ML: at 08:51

## 2020-10-08 RX ADMIN — RISPERIDONE 0.5 MG: 0.25 TABLET ORAL at 21:10

## 2020-10-08 RX ADMIN — RIVAROXABAN 20 MG: 20 TABLET, FILM COATED ORAL at 17:35

## 2020-10-08 RX ADMIN — ALLOPURINOL 100 MG: 100 TABLET ORAL at 08:50

## 2020-10-08 RX ADMIN — SODIUM BICARBONATE 650 MG TABLET 650 MG: at 08:50

## 2020-10-08 RX ADMIN — SPIRONOLACTONE 12.5 MG: 25 TABLET, FILM COATED ORAL at 08:51

## 2020-10-08 RX ADMIN — ASPIRIN 81 MG: 81 TABLET, COATED ORAL at 08:50

## 2020-10-08 RX ADMIN — RISPERIDONE 0.5 MG: 0.25 TABLET ORAL at 08:51

## 2020-10-08 RX ADMIN — METOPROLOL SUCCINATE 50 MG: 50 TABLET, EXTENDED RELEASE ORAL at 08:51

## 2020-10-08 RX ADMIN — DILTIAZEM HYDROCHLORIDE 180 MG: 180 CAPSULE, COATED, EXTENDED RELEASE ORAL at 08:50

## 2020-10-08 RX ADMIN — HYDRALAZINE HYDROCHLORIDE 50 MG: 25 TABLET, FILM COATED ORAL at 21:10

## 2020-10-08 RX ADMIN — Medication 10 ML: at 21:09

## 2020-10-08 RX ADMIN — HYDRALAZINE HYDROCHLORIDE 50 MG: 25 TABLET, FILM COATED ORAL at 08:50

## 2020-10-08 RX ADMIN — ATORVASTATIN CALCIUM 10 MG: 10 TABLET, FILM COATED ORAL at 21:10

## 2020-10-08 RX ADMIN — SODIUM BICARBONATE 650 MG TABLET 650 MG: at 21:10

## 2020-10-08 RX ADMIN — LOSARTAN POTASSIUM 50 MG: 50 TABLET, FILM COATED ORAL at 08:51

## 2020-10-08 RX ADMIN — HYDRALAZINE HYDROCHLORIDE 50 MG: 25 TABLET, FILM COATED ORAL at 16:26

## 2020-10-08 RX ADMIN — BETAMETHASONE DIPROPIONATE 1 APPLICATION: 0.5 CREAM TOPICAL at 21:10

## 2020-10-08 NOTE — PROGRESS NOTES
LOS: 4 days   Patient Care Team:  Marisol Larson APRN as PCP - General  Shwetha Barber MD as Consulting Physician (Nephrology)  Edy Thomas MD as Consulting Physician (Cardiology)    Chief Complaint: None this morning .  Not on anything for his psoriasis either topical or otherwise.  Never saw a dermatologist.    Case management once patient have a psych evaluation prior to placement.  I thought I had ordered this yesterday verbally but I do not see it in the chart so we will have to place it today.  Await inpatient rehab bed as well.  Subjective   Alert any eating breakfast, appears comfortable    Interval History:  Denies any other problems today. Says he understands that if he continues to refuse to take his medications he places and sulfa great risk of significant illness and even death including death from stroke heart attack.    Patient Complaints: Dyspnea on exertion otherwise at rest no problems  Patient Denies: Denies any palpitations or chest pain  History taken from: patient    Review of Systems:    All systems were reviewed and negative except for: See interval history    Objective     Vital Signs  Temp:  [97.5 °F (36.4 °C)-98.6 °F (37 °C)] 98.2 °F (36.8 °C)  Heart Rate:  [] 91  Resp:  [10-20] 20  BP: (125-165)/() 165/102    Physical Exam:     General Appearance:    Alert, cooperative, in no acute distress, poor hygiene, appears markedly older than stated age   Head:    Normocephalic, without obvious abnormality, atraumatic   Eyes:            Conjunctivae and sclerae normal, no   icterus, no pallor, corneas clear, PERRLA   Throat:   No oral lesions, no thrush, oral mucosa moist   Neck:   No adenopathy, supple, trachea midline, no thyromegaly, no   carotid bruit, no JVD   Lungs:     Clear to auscultation,respirations regular, even and                  unlabored, distant some coarse upper airway sounds this morning    Heart:   Irregularly irregular with rates in the 70s      Chest Wall:    No abnormalities observed   Abdomen:     Normal bowel sounds, no masses, no organomegaly, soft        non-tender, non-distended, no guarding, no rebound                tenderness, rotund   Rectal:     Deferred   Extremities:   Moves all extremities . no edema, no cyanosis, no             redness   Pulses:   Pulses palpable and equal bilaterally   Skin:   No bleeding, bruising or rash, multiple areas of silvery scaling plaque around the umbilicus, at the nape of the neck and on elbows and knees.   Lymph nodes:   No palpable adenopathy   Neurologic:   Cranial nerves 2 - 12 grossly intact, sensation intact, DTR       present and equal bilaterally   Radiology:  Xr Chest 1 View    Result Date: 10/4/2020   Marked cardiomegaly and pulmonary vascular congestion similar prior studies.  No focal airspace consolidation.   Electronically Signed By-Elias Rascon On:10/4/2020 2:46 PM This report was finalized on [id number] by  Elias Rascon, .         Results Review:     I reviewed the patient's new clinical results.  I reviewed the patient's new imaging results and agree with the interpretation.    Medication Review:   Scheduled Meds:allopurinol, 100 mg, Oral, Daily  aspirin, 81 mg, Oral, Daily  atorvastatin, 10 mg, Oral, Nightly  dilTIAZem CD, 180 mg, Oral, Q24H  hydrALAZINE, 50 mg, Oral, TID  losartan, 50 mg, Oral, Q24H  metoprolol succinate XL, 50 mg, Oral, Q24H  risperiDONE, 0.5 mg, Oral, Q12H  rivaroxaban, 20 mg, Oral, Daily With Dinner  sodium bicarbonate, 650 mg, Oral, TID  sodium chloride, 10 mL, Intravenous, Q12H  spironolactone, 12.5 mg, Oral, Daily      Continuous Infusions:   PRN Meds:.•  acetaminophen **OR** acetaminophen **OR** acetaminophen  •  bisacodyl  •  calcium carbonate  •  HYDROcodone-acetaminophen  •  influenza vaccine  •  labetalol  •  LORazepam  •  magnesium hydroxide  •  melatonin  •  nitroglycerin  •  ondansetron **OR** ondansetron  •  potassium chloride **OR** potassium chloride  **OR** potassium chloride  •  sodium chloride  •  sodium chloride    Labs:  Lab Results (last 24 hours)     Procedure Component Value Units Date/Time    Blood Culture - Blood, Arm, Left [[id number]]  (Abnormal) Collected: 10/04/20 1410    Specimen: Blood from Arm, Left Updated: 10/08/20 0833     Blood Culture Staphylococcus, coagulase negative     Comment: Probable contaminant requires clinical correlation, susceptibility not performed unless requested by physician.          Isolated from Anaerobic Bottle     Blood Culture Corynebacterium species     Comment: Probable contaminant requires clinical correlation, susceptibility not performed unless requested by physician.            Isolated from Aerobic Bottle     Gram Stain Anaerobic Bottle Gram positive cocci in clusters      Aerobic Bottle Gram positive bacilli    Narrative:      Blood culture does not meet the specified criteria for PCR identification.  If pregnant, immunocompromised, or clinical concern for meningitis, call lab to run BCID for Listeria monocytogenes.    Magnesium [[id number]]  (Normal) Collected: 10/08/20 0244    Specimen: Blood Updated: 10/08/20 0416     Magnesium 2.2 mg/dL     CBC & Differential [[id number]]  (Abnormal) Collected: 10/08/20 0244    Specimen: Blood Updated: 10/08/20 0359    Narrative:      The following orders were created for panel order CBC & Differential.  Procedure                               Abnormality         Status                     ---------                               -----------         ------                     CBC Auto Differential[[id number]]        Abnormal            Final result                 Please view results for these tests on the individual orders.    CBC Auto Differential [[id number]]  (Abnormal) Collected: 10/08/20 0244    Specimen: Blood Updated: 10/08/20 0359     WBC 7.20 10*3/mm3      RBC 4.75 10*6/mm3      Hemoglobin 13.2 g/dL      Hematocrit 40.4 %      MCV 85.2 fL      MCH 27.9 pg      MCHC 32.7  g/dL      RDW 19.9 %      RDW-SD 59.1 fl      MPV 8.8 fL      Platelets 169 10*3/mm3      Neutrophil % 65.1 %      Lymphocyte % 19.8 %      Monocyte % 11.3 %      Eosinophil % 3.2 %      Basophil % 0.6 %      Neutrophils, Absolute 4.70 10*3/mm3      Lymphocytes, Absolute 1.40 10*3/mm3      Monocytes, Absolute 0.80 10*3/mm3      Eosinophils, Absolute 0.20 10*3/mm3      Basophils, Absolute 0.00 10*3/mm3      nRBC 0.0 /100 WBC     Blood Culture - Blood, Hand, Right [[id number]] Collected: 10/06/20 1608    Specimen: Blood from Hand, Right Updated: 10/07/20 1645     Blood Culture No growth at 24 hours    BUN [[id number]]  (Abnormal) Collected: 10/07/20 1024    Specimen: Blood Updated: 10/07/20 1632     BUN 26 mg/dL     Blood Culture - Blood, Foot, Left [[id number]] Collected: 10/04/20 1353    Specimen: Blood from Foot, Left Updated: 10/07/20 1415     Blood Culture No growth at 3 days    CBC & Differential [[id number]]  (Abnormal) Collected: 10/07/20 1024    Specimen: Blood Updated: 10/07/20 1101    Narrative:      The following orders were created for panel order CBC & Differential.  Procedure                               Abnormality         Status                     ---------                               -----------         ------                     CBC Auto Differential[[id number]]        Abnormal            Final result                 Please view results for these tests on the individual orders.    CBC Auto Differential [[id number]]  (Abnormal) Collected: 10/07/20 1024    Specimen: Blood Updated: 10/07/20 1101     WBC 7.80 10*3/mm3      RBC 4.99 10*6/mm3      Hemoglobin 13.7 g/dL      Hematocrit 42.2 %      MCV 84.6 fL      MCH 27.4 pg      MCHC 32.4 g/dL      RDW 20.3 %      RDW-SD 59.9 fl      MPV 8.7 fL      Platelets 176 10*3/mm3      Neutrophil % 75.1 %      Lymphocyte % 12.2 %      Monocyte % 9.6 %      Eosinophil % 1.6 %      Basophil % 1.5 %      Neutrophils, Absolute 5.80 10*3/mm3      Lymphocytes, Absolute  0.90 10*3/mm3      Monocytes, Absolute 0.70 10*3/mm3      Eosinophils, Absolute 0.10 10*3/mm3      Basophils, Absolute 0.10 10*3/mm3      nRBC 0.1 /100 WBC     Blood Culture - Blood, Hand, Right [[id number]] Collected: 10/06/20 0926    Specimen: Blood from Hand, Right Updated: 10/07/20 1000     Blood Culture No growth at 24 hours           Assessment/Plan       Atrial fibrillation with RVR (CMS/[facility])    Hypertensive emergency    Chronic atrial fibrillation (CMS/[facility])    Chronic diastolic CHF (congestive heart failure) (CMS/[facility])    CKD (chronic kidney disease) stage 3, GFR 30-59 ml/min    Essential hypertension    Gambling disorder, persistent    Coronary artery disease involving native coronary artery of native heart without angina pectoris    Psoriasis    History of CVA (cerebrovascular accident)    GERD (gastroesophageal reflux disease)    Medically noncompliant    Mild cognitive impairment    Dyspnea  Blood cultures with ID of Staphylococcus, coag negative  Patient had change in mental status on admission with psychosis.  Case management requesting psychiatry input.      Continue current approach.  I advised patient that once again he needs to be scrupulous with taking his medications as prescribed.  At this juncture, his family refuses to help him with this.  The patient has very poor insight into his medical conditions.      cardiology input greatly appreciated, thank you.     Repeat blood cultures are pending. Pro calcitonin is within the normal range. Once again,he does not appear to be septic but for completeness will complete the septic work-up.  He remains afebrile.  Blood cultures that are repeated are negative at 24 hours x 2.    We will once again order psychiatry evaluation prior to patient being able to be discharged.    Also start patient empirically on a topical steroid for his psoriasis.    Continue rest of current approach.        Paty Polk DO  10/08/20  08:45 EDT

## 2020-10-08 NOTE — PROGRESS NOTES
Referring Provider: Dr. Stauffer    Reason for follow-up: Fibrillation     Patient Care Team:  Marisol Larson APRN as PCP - General  Shwetha Barber MD as Consulting Physician (Nephrology)  Edy Thomas MD as Consulting Physician (Cardiology)    Subjective .  No shortness of breath or palpitations today    Objective  Lying in bed comfortably     Review of Systems   Constitution: Negative for fever and malaise/fatigue.   HENT: Negative for ear pain and nosebleeds.    Eyes: Negative for blurred vision and double vision.   Cardiovascular: Negative for chest pain, dyspnea on exertion and palpitations.   Respiratory: Negative for cough and shortness of breath.    Skin: Negative for rash.   Musculoskeletal: Negative for joint pain.   Gastrointestinal: Negative for abdominal pain, nausea and vomiting.   Neurological: Negative for focal weakness and headaches.   Psychiatric/Behavioral: Negative for depression. The patient is not nervous/anxious.    All other systems reviewed and are negative.      Ketorolac tromethamine    Scheduled Meds:allopurinol, 100 mg, Oral, Daily  aspirin, 81 mg, Oral, Daily  atorvastatin, 10 mg, Oral, Nightly  betamethasone dipropionate, 1 application, Topical, Q12H  dilTIAZem CD, 180 mg, Oral, Q24H  hydrALAZINE, 50 mg, Oral, TID  losartan, 50 mg, Oral, Q24H  metoprolol succinate XL, 50 mg, Oral, Q24H  risperiDONE, 0.5 mg, Oral, Q12H  rivaroxaban, 20 mg, Oral, Daily With Dinner  sodium bicarbonate, 650 mg, Oral, TID  sodium chloride, 10 mL, Intravenous, Q12H  spironolactone, 12.5 mg, Oral, Daily      Continuous Infusions:   PRN Meds:.•  acetaminophen **OR** acetaminophen **OR** acetaminophen  •  bisacodyl  •  calcium carbonate  •  HYDROcodone-acetaminophen  •  influenza vaccine  •  labetalol  •  LORazepam  •  magnesium hydroxide  •  melatonin  •  nitroglycerin  •  ondansetron **OR** ondansetron  •  potassium chloride **OR** potassium chloride **OR** potassium chloride  •  sodium  "chloride  •  sodium chloride        VITAL SIGNS  Vitals:    10/08/20 0540 10/08/20 0850 10/08/20 0900 10/08/20 1021   BP: (!) 165/102 (!) 170/102  140/65   BP Location: Right arm   Right arm   Patient Position: Sitting   Lying   Pulse: 91 91 90 84   Resp: 20   16   Temp: 98.2 °F (36.8 °C)   97.5 °F (36.4 °C)   TempSrc: Oral   Axillary   SpO2:   92% 95%   Weight: 108 kg (237 lb 3.4 oz)      Height:           Flowsheet Rows      First Filed Value   Admission Height  182.9 cm (72\") Documented at 10/04/2020 1256   Admission Weight  118 kg (259 lb 11.2 oz) Documented at 10/04/2020 1256           TELEMETRY: Atrial fibrillation with controlled medical response    Physical Exam:  Constitutional:       Appearance: Well-developed.   Eyes:      General: No scleral icterus.     Conjunctiva/sclera: Conjunctivae normal.      Pupils: Pupils are equal, round, and reactive to light.   HENT:      Head: Normocephalic and atraumatic.   Neck:      Musculoskeletal: Normal range of motion and neck supple.      Vascular: No carotid bruit or JVD.   Pulmonary:      Effort: Pulmonary effort is normal.      Breath sounds: Normal breath sounds. No wheezing. No rales.   Cardiovascular:      Normal rate. Irregularly irregular rhythm.   Pulses:     Intact distal pulses.   Abdominal:      General: Bowel sounds are normal.      Palpations: Abdomen is soft.   Musculoskeletal: Normal range of motion.   Skin:     General: Skin is warm and dry.      Findings: No rash.   Neurological:      Mental Status: Alert.      Comments: No focal deficits          Results Review:   I reviewed the patient's new clinical results.  Lab Results (last 24 hours)     Procedure Component Value Units Date/Time    Blood Culture - Blood, Hand, Right [[id number]] Collected: 10/06/20 0926    Specimen: Blood from Hand, Right Updated: 10/08/20 1000     Blood Culture No growth at 2 days    Basic Metabolic Panel [[id number]] Collected: 10/08/20 0244    Specimen: Blood Updated: " 10/08/20 0914     Glucose 91 mg/dL      BUN --     Comment: Testing performed by alternate method        Creatinine 1.01 mg/dL      Sodium 142 mmol/L      Potassium 3.8 mmol/L      Chloride 107 mmol/L      CO2 24.0 mmol/L      Calcium 8.9 mg/dL      eGFR Non African Amer 74 mL/min/1.73      BUN/Creatinine Ratio --     Comment: Testing not performed        Anion Gap 11.0 mmol/L     Narrative:      GFR Normal >60  Chronic Kidney Disease <60  Kidney Failure <15      BUN [[id number]] Collected: 10/08/20 0244    Specimen: Blood Updated: 10/08/20 0914    Blood Culture - Blood, Arm, Left [[id number]]  (Abnormal) Collected: 10/04/20 1410    Specimen: Blood from Arm, Left Updated: 10/08/20 0833     Blood Culture Staphylococcus, coagulase negative     Comment: Probable contaminant requires clinical correlation, susceptibility not performed unless requested by physician.          Isolated from Anaerobic Bottle     Blood Culture Corynebacterium species     Comment: Probable contaminant requires clinical correlation, susceptibility not performed unless requested by physician.            Isolated from Aerobic Bottle     Gram Stain Anaerobic Bottle Gram positive cocci in clusters      Aerobic Bottle Gram positive bacilli    Narrative:      Blood culture does not meet the specified criteria for PCR identification.  If pregnant, immunocompromised, or clinical concern for meningitis, call lab to run BCID for Listeria monocytogenes.    Magnesium [[id number]]  (Normal) Collected: 10/08/20 0244    Specimen: Blood Updated: 10/08/20 0416     Magnesium 2.2 mg/dL     CBC & Differential [[id number]]  (Abnormal) Collected: 10/08/20 0244    Specimen: Blood Updated: 10/08/20 0359    Narrative:      The following orders were created for panel order CBC & Differential.  Procedure                               Abnormality         Status                     ---------                               -----------         ------                     CBC Auto  Differential[[id number]]        Abnormal            Final result                 Please view results for these tests on the individual orders.    CBC Auto Differential [[id number]]  (Abnormal) Collected: 10/08/20 0244    Specimen: Blood Updated: 10/08/20 0359     WBC 7.20 10*3/mm3      RBC 4.75 10*6/mm3      Hemoglobin 13.2 g/dL      Hematocrit 40.4 %      MCV 85.2 fL      MCH 27.9 pg      MCHC 32.7 g/dL      RDW 19.9 %      RDW-SD 59.1 fl      MPV 8.8 fL      Platelets 169 10*3/mm3      Neutrophil % 65.1 %      Lymphocyte % 19.8 %      Monocyte % 11.3 %      Eosinophil % 3.2 %      Basophil % 0.6 %      Neutrophils, Absolute 4.70 10*3/mm3      Lymphocytes, Absolute 1.40 10*3/mm3      Monocytes, Absolute 0.80 10*3/mm3      Eosinophils, Absolute 0.20 10*3/mm3      Basophils, Absolute 0.00 10*3/mm3      nRBC 0.0 /100 WBC     Blood Culture - Blood, Hand, Right [[id number]] Collected: 10/06/20 1608    Specimen: Blood from Hand, Right Updated: 10/07/20 1645     Blood Culture No growth at 24 hours    BUN [[id number]]  (Abnormal) Collected: 10/07/20 1024    Specimen: Blood Updated: 10/07/20 1632     BUN 26 mg/dL     Blood Culture - Blood, Foot, Left [[id number]] Collected: 10/04/20 1353    Specimen: Blood from Foot, Left Updated: 10/07/20 1415     Blood Culture No growth at 3 days          Imaging Results (Last 24 Hours)     ** No results found for the last 24 hours. **          EKG      I personally viewed and interpreted the patient's EKG/Telemetry data:    ECHOCARDIOGRAM:    STRESS MYOVIEW:    CARDIAC CATHETERIZATION:    OTHER:         Assessment/Plan     Principal Problem:    Atrial fibrillation with RVR (CMS/HCC)  Active Problems:    Hypertensive emergency    Chronic atrial fibrillation (CMS/HCC)    Chronic diastolic CHF (congestive heart failure) (CMS/[facility])    CKD (chronic kidney disease) stage 3, GFR 30-59 ml/min    Essential hypertension    Gambling disorder, persistent    Coronary artery disease involving native  coronary artery of native heart without angina pectoris    Psoriasis    History of CVA (cerebrovascular accident)    GERD (gastroesophageal reflux disease)    Medically noncompliant    Mild cognitive impairment    Dyspnea       Patient has atrial fibrillation with rapid response and was placed on IV Cardizem and then switched to oral Cardizem and is better now  Patient is also on anticoagulation with Xarelto  Patient's blood pressure and heart rate are stable  Patient's lipid levels are followed with a primary care doctor  Patient is ruled out for MI and has history of coronary disease  Patient has medical noncompliance and discussed with him several times about being compliant    I discussed the patients findings and my recommendations with patient and nurse    Edy Thomas MD  10/08/20  11:36 EDT

## 2020-10-08 NOTE — DISCHARGE PLACEMENT REQUEST
"Carlos Whipple (65 y.o. Male)     Date of Birth Social Security Number Address Home Phone MRN    1955  [address]  [address]  [place] [facility] [phone] [id number]    Gnosticism Marital Status          Mosque Single       Admission Date Admission Type Admitting Provider Attending Provider Department, Room/Bed    10/4/20 Emergency Dre Stauffer MD Heimer, Brian T, MD [facility] NEURO HEART, 265/1    Discharge Date Discharge Disposition Discharge Destination                       Attending Provider: Dre Stauffer MD    Allergies: Ketorolac Tromethamine    Isolation: None   Infection: None   Code Status: CPR    Ht: 182.9 cm (72\")   Wt: 108 kg (237 lb 3.4 oz)    Admission Cmt: None   Principal Problem: Atrial fibrillation with RVR (CMS/HCC) [I48.91]                 Active Insurance as of 10/4/2020     Primary Coverage     Payor Plan Insurance Group Employer/Plan Group    HUMANA MEDICARE REPLACEMENT HUMANA MEDICARE REPLACEMENT [id number]     Payor Plan Address Payor Plan Phone Number Payor Plan Fax Number Effective Dates    [address] [address] [phone]  4/1/2020 - None Entered    [facility] [address]       Subscriber Name Subscriber Birth Date Member ID       CARLOS WHIPPLE 1955 [id number]                 Emergency Contacts      (Rel.) Home Phone Work Phone Mobile Phone    KACEY DOW (Friend) [phone] -- [phone]      Ambulatory Referral to Home Health [[id number]] (Order [id number])  Order  Date: 10/8/2020 Department: [facility] NEURO HEART Ordering/Authorizing: Dre Stauffer MD   Order History  Outpatient  Date/Time Action Taken User Additional Information   10/08/20 1518 Sign Mary Pro RN Ordering Mode: Verbal with readback   Order Details    Frequency Duration Priority Order Class   None None Routine Internal Referral   Start Date/Time    Start Date   10/08/20   Order Information    Order Date Service Start Date Start Time   "   10/08/20 Medicine 10/08/20    Reference Links    Associated Reports External References   View Encounter Current Health Referral Information   Order Questions    Question Answer Comment   Face to Face Visit Date: 10/8/2020    Follow-up provider for Plan of Care? I treated the patient in an acute care facility and will not continue treatment after discharge.    Follow-up provider: SHUKRI RUSSELL    Reason/Clinical Findings CHF,Afib, Sepsis    Describe mobility limitations that make leaving home difficult: CHF,Afib, Sepsis    Nursing/Therapeutic Services Requested Other Eval and Treat    Frequency: 1 Week 1           Verbal Order Info    Action Created on Order Mode Entered by Responsible Provider Signed by Signed on   Ordering 10/08/20 1518 Verbal with readback Mary Pro, Dre Rogers MD     Source Order Set / Preference List    Preference List   AMB [facility] REFERRALS [[id number]]   Clinical Indications     ICD-10-CM ICD-9-CM   Dyspnea, unspecified type  - Primary     R06.00 786.09   Acute congestive heart failure, unspecified heart failure type (CMS/[facility])     I50.9 428.0   Reprint Order Requisition    Ambulatory Referral to Home Health (Order #[id number]) on 10/8/20   Encounter    View Encounter          Order Provider Info        Office phone Pager E-mail   Ordering User Mary Pro, RN -- -- --   Authorizing Provider Dre Stauffer [phone] -- --   Attending Provider Dre Stauffer [phone] -- --   Entered By Mary Pro RN -- -- --   Ordering Provider Dre Stauffer [phone] -- --   Linked Charges    No charges linked to this procedure   Tracking Reports  Cosign Tracking Order Transmittal Tracking   Authorized by:  Dre Stauffer MD  (NPI: [id number])       Lab Component SmartPhrase Guide    Ambulatory Referral to Home Health (Order #[id number]) on 10/8/20           Insurance Information                HUMANA MEDICARE REPLACEMENT/HUMANA MEDICARE REPLACEMENT Phone:  [phone]    Subscriber: Carlos Whipple Subscriber#: [id number]    Group#: [id number] Precert#:              History & Physical      Marisol Larson APRN at 10/05/20 1306     Attestation signed by Paty Polk DO at 10/05/20 0214     I have reviewed the above documentation also personally reviewed the orders with the nurse practitioner. I am in agreement with all of the above.                        Patient Care Team:  Marisol Larson APRN as PCP - Shwetha Guadarrama MD as Consulting Physician (Nephrology)  Edy Thomas MD as Consulting Physician (Cardiology)    Chief complaint shortness of breath    Subjective     HPI obtained from chart review as patient is currently very drowsy and intermittently confused. Patient was brought to the emergency room yesterday evening via ambulance for shortness of breath and palpitations.  He was found to be in A. fib with RVR yet again.  It is unclear how long he has been without his medications this time.  He was recently discharged from the hospital with the same problem last time.  Apparently last night he became quite agitated and aggressive and was attempting to hit and kick the nurses along with pulling his IV out.  He was put in restraints overnight.  He is currently out of wrist restraints and sleeping well.  He is still in A. fib with rate in the 130s on a Cardizem drip.      Review of Systems   Unable to perform ROS: Mental status change        Past Medical History:   Diagnosis Date   • A-fib (CMS/[facility])    • Atrial fibrillation with RVR (CMS/[facility])    • CHF (congestive heart failure) (CMS/[facility])    • Chronic atrial fibrillation (CMS/[facility]) 11/8/2019   • Chronic diastolic CHF (congestive heart failure) (CMS/[facility]) 11/8/2019   • CKD (chronic kidney disease) stage 3, GFR 30-59 ml/min 11/8/2019   • Coronary artery disease involving native coronary artery of native heart without angina pectoris 11/8/2019   • DDD (degenerative disc disease), lumbosacral  11/8/2019   • Essential hypertension 11/8/2019   • Gambling disorder, persistent 11/8/2019   • GERD (gastroesophageal reflux disease) 11/8/2019   • History of CVA (cerebrovascular accident) 11/8/2019   • Hyperlipidemia    • Hypertension    • Medically noncompliant 11/8/2019   • Peripheral polyneuropathy 11/8/2019   • Psoriasis 11/8/2019   • Stroke (CMS/HCC)      Past Surgical History:   Procedure Laterality Date   • CARDIAC CATHETERIZATION     • CARDIAC CATHETERIZATION N/A 12/10/2019    Procedure: Left Heart Cath and coronary angiogram;  Surgeon: Edy Thomas MD;  Location: [facility] CATH INVASIVE LOCATION;  Service: Cardiovascular     Family History   Problem Relation Age of Onset   • Heart disease Mother      Social History     Tobacco Use   • Smoking status: Never Smoker   • Smokeless tobacco: Never Used   Substance Use Topics   • Alcohol use: No     Frequency: Never   • Drug use: No     Medications Prior to Admission   Medication Sig Dispense Refill Last Dose   • aspirin 81 MG EC tablet Take 81 mg by mouth Daily.      • losartan (COZAAR) 50 MG tablet Take 1 tablet by mouth Daily. 30 tablet 1    • allopurinol (ZYLOPRIM) 100 MG tablet Take 1 tablet by mouth Daily. 30 tablet 1    • bumetanide (BUMEX) 1 MG tablet Take 1 tablet by mouth Daily. 30 tablet 1    • hydrALAZINE (APRESOLINE) 100 MG tablet Take 1 tablet by mouth 3 (Three) Times a Day. 90 tablet 1    • Metoprolol Succinate 50 MG capsule extended-release 24 hour sprinkle Take 50 mg by mouth Daily. 30 capsule 1    • pravastatin (PRAVACHOL) 40 MG tablet Take 40 mg by mouth Every Night.      • risperiDONE (risperDAL) 0.5 MG tablet Take 1 tablet by mouth Every 12 (Twelve) Hours. 60 tablet 1    • rivaroxaban (Xarelto) 20 MG tablet Take 1 tablet by mouth Daily With Dinner for 30 days. 30 tablet 5    • sodium bicarbonate 650 MG tablet Take 1 tablet by mouth 3 (Three) Times a Day. 90 tablet 1    • spironolactone (ALDACTONE) 25 MG tablet Take 0.5 tablets by mouth Daily.  15 tablet 1      Allergies:  Ketorolac tromethamine    Objective      Vital Signs  Temp:  [97.6 °F (36.4 °C)-97.8 °F (36.6 °C)] 97.6 °F (36.4 °C)  Heart Rate:  [] 97  Resp:  [15-21] 16  BP: (106-212)/() 128/83    Physical Exam  Vitals signs and nursing note reviewed.   Constitutional:       General: He is sleeping.      Appearance: He is overweight.   Cardiovascular:      Rate and Rhythm: Tachycardia present. Rhythm irregular.   Pulmonary:      Effort: Pulmonary effort is normal.      Breath sounds: Normal breath sounds.   Skin:     General: Skin is warm and dry.   Neurological:      Mental Status: He is disoriented.         Results Review:  Lab Results (last 24 hours)     Procedure Component Value Units Date/Time    BUN [[id number]]  (Abnormal) Collected: 10/05/20 0611    Specimen: Blood Updated: 10/05/20 1045     BUN 32 mg/dL     Comprehensive Metabolic Panel [[id number]]  (Abnormal) Collected: 10/05/20 0611    Specimen: Blood Updated: 10/05/20 0720     Glucose 92 mg/dL      BUN --     Comment: Testing performed by alternate method        Creatinine 1.20 mg/dL      Sodium 145 mmol/L      Potassium 3.2 mmol/L      Chloride 109 mmol/L      CO2 22.0 mmol/L      Calcium 8.6 mg/dL      Total Protein 6.4 g/dL      Albumin 3.50 g/dL      ALT (SGPT) 453 U/L      AST (SGOT) 175 U/L      Alkaline Phosphatase 71 U/L      Total Bilirubin 1.1 mg/dL      eGFR Non African Amer 61 mL/min/1.73      Globulin 2.9 gm/dL      A/G Ratio 1.2 g/dL      BUN/Creatinine Ratio --     Comment: Testing not performed        Anion Gap 14.0 mmol/L     Narrative:      GFR Normal >60  Chronic Kidney Disease <60  Kidney Failure <15      Magnesium [[id number]]  (Normal) Collected: 10/05/20 0611    Specimen: Blood Updated: 10/05/20 0720     Magnesium 1.9 mg/dL     CBC & Differential [[id number]]  (Abnormal) Collected: 10/05/20 0611    Specimen: Blood Updated: 10/05/20 0620    Narrative:      The following orders were created for panel order  CBC & Differential.  Procedure                               Abnormality         Status                     ---------                               -----------         ------                     CBC Auto Differential[[id number]]        Abnormal            Final result                 Please view results for these tests on the individual orders.    CBC Auto Differential [[id number]]  (Abnormal) Collected: 10/05/20 0611    Specimen: Blood Updated: 10/05/20 0620     WBC 9.30 10*3/mm3      RBC 4.65 10*6/mm3      Hemoglobin 12.6 g/dL      Hematocrit 38.9 %      MCV 83.7 fL      MCH 27.0 pg      MCHC 32.3 g/dL      RDW 19.5 %      RDW-SD 56.4 fl      MPV 8.6 fL      Platelets 144 10*3/mm3      Neutrophil % 74.7 %      Lymphocyte % 14.1 %      Monocyte % 8.9 %      Eosinophil % 1.0 %      Basophil % 1.3 %      Neutrophils, Absolute 6.90 10*3/mm3      Lymphocytes, Absolute 1.30 10*3/mm3      Monocytes, Absolute 0.80 10*3/mm3      Eosinophils, Absolute 0.10 10*3/mm3      Basophils, Absolute 0.10 10*3/mm3      nRBC 0.3 /100 WBC     Troponin [[id number]]  (Normal) Collected: 10/04/20 2151    Specimen: Blood Updated: 10/04/20 2242     Troponin T 0.016 ng/mL     Narrative:      Troponin T Reference Range:  <= 0.03 ng/mL-   Negative for AMI  >0.03 ng/mL-     Abnormal for myocardial necrosis.  Clinicians would have to utilize clinical acumen, EKG, Troponin and serial changes to determine if it is an Acute Myocardial Infarction or myocardial injury due to an underlying chronic condition.       Results may be falsely decreased if patient taking Biotin.      TSH [[id number]]  (Normal) Collected: 10/04/20 1355    Specimen: Blood Updated: 10/04/20 1747     TSH 2.970 uIU/mL     Lipid Panel [[id number]]  (Abnormal) Collected: 10/04/20 1355    Specimen: Blood Updated: 10/04/20 1742     Total Cholesterol 108 mg/dL      Triglycerides 79 mg/dL      HDL Cholesterol 22 mg/dL      LDL Cholesterol  70 mg/dL      VLDL Cholesterol 15.8 mg/dL       LDL/HDL Ratio 3.19    Narrative:      Cholesterol Reference Ranges  (U.S. Department of Health and Human Services ATP III Classifications)    Desirable          <200 mg/dL  Borderline High    200-239 mg/dL  High Risk          >240 mg/dL      Triglyceride Reference Ranges  (U.S. Department of Health and Human Services ATP III Classifications)    Normal           <150 mg/dL  Borderline High  150-199 mg/dL  High             200-499 mg/dL  Very High        >500 mg/dL    HDL Reference Ranges  (U.S. Department of Health and Human Services ATP III Classifcations)    Low     <40 mg/dl (major risk factor for CHD)  High    >60 mg/dl ('negative' risk factor for CHD)        LDL Reference Ranges  (U.S. Department of Health and Human Services ATP III Classifcations)    Optimal          <100 mg/dL  Near Optimal     100-129 mg/dL  Borderline High  130-159 mg/dL  High             160-189 mg/dL  Very High        >189 mg/dL    Magnesium [[id number]]  (Normal) Collected: 10/04/20 1355    Specimen: Blood Updated: 10/04/20 1742     Magnesium 2.3 mg/dL     POC Glucose Once [[id number]]  (Normal) Collected: 10/04/20 1700    Specimen: Blood Updated: 10/04/20 1701     Glucose 90 mg/dL      Comment: Serial Number: [id number]:  [id number]       Troponin [[id number]]  (Normal) Collected: 10/04/20 1355    Specimen: Blood Updated: 10/04/20 1553     Troponin T 0.010 ng/mL     Narrative:      Troponin T Reference Range:  <= 0.03 ng/mL-   Negative for AMI  >0.03 ng/mL-     Abnormal for myocardial necrosis.  Clinicians would have to utilize clinical acumen, EKG, Troponin and serial changes to determine if it is an Acute Myocardial Infarction or myocardial injury due to an underlying chronic condition.       Results may be falsely decreased if patient taking Biotin.      BNP [[id number]]  (Abnormal) Collected: 10/04/20 1355    Specimen: Blood Updated: 10/04/20 1541     proBNP 21,355.0 pg/mL     Narrative:      Among patients with dyspnea, NT-proBNP  is highly sensitive for the detection of acute congestive heart failure. In addition NT-proBNP of <300 pg/ml effectively rules out acute congestive heart failure with 99% negative predictive value.    Results may be falsely decreased if patient taking Biotin.      Jackpot Draw [[id number]] Collected: 10/04/20 1353    Specimen: Blood Updated: 10/04/20 1501    Narrative:      The following orders were created for panel order Jackpot Draw.  Procedure                               Abnormality         Status                     ---------                               -----------         ------                     Light Blue Top[[id number]]                                   Final result               Green Top (Gel)[[id number]]                                  Final result               Lavender Top[[id number]]                                     Final result               Gold Top - SST[[id number]]                                   Final result                 Please view results for these tests on the individual orders.    Light Blue Top [[id number]] Collected: 10/04/20 1353    Specimen: Blood Updated: 10/04/20 1501     Extra Tube hold for add-on     Comment: Auto resulted       Green Top (Gel) [[id number]] Collected: 10/04/20 1353    Specimen: Blood Updated: 10/04/20 1501     Extra Tube Hold for add-ons.     Comment: Auto resulted.       Lavender Top [[id number]] Collected: 10/04/20 1353    Specimen: Blood Updated: 10/04/20 1501     Extra Tube hold for add-on     Comment: Auto resulted       POC Lactate [[id number]]  (Normal) Collected: 10/04/20 1412    Specimen: Blood Updated: 10/04/20 1454     Lactate 1.6 mmol/L      Comment: Serial Number: [id number]:  [id number]       COVID-19,[facility] IN-HOUSE,Nasal Swab No Transport Media 3-4 HR TAT - Swab, Nasal Cavity [[id number]]  (Normal) Collected: 10/04/20 1355    Specimen: Swab from Nasal Cavity Updated: 10/04/20 1448     COVID19 Not Detected    Narrative:      Fact sheet for  providers: https://www.fda.gov/media/893725/download     Fact sheet for patients: https://www.fda.gov/media/393564/download    BUN [[id number]]  (Abnormal) Collected: 10/04/20 1353    Specimen: Blood Updated: 10/04/20 1443     BUN 33 mg/dL     Comprehensive Metabolic Panel [[id number]]  (Abnormal) Collected: 10/04/20 1353    Specimen: Blood Updated: 10/04/20 1438     Glucose 134 mg/dL      BUN --     Comment: Testing performed by alternate method        Creatinine 1.40 mg/dL      Sodium 142 mmol/L      Potassium 4.2 mmol/L      Chloride 108 mmol/L      CO2 20.0 mmol/L      Calcium 9.0 mg/dL      Total Protein 6.9 g/dL      Albumin 3.80 g/dL      ALT (SGPT) 625 U/L      AST (SGOT) 433 U/L      Alkaline Phosphatase 79 U/L      Total Bilirubin 1.2 mg/dL      eGFR Non African Amer 51 mL/min/1.73      Globulin 3.1 gm/dL      A/G Ratio 1.2 g/dL      BUN/Creatinine Ratio --     Comment: Testing not performed        Anion Gap 14.0 mmol/L     Narrative:      GFR Normal >60  Chronic Kidney Disease <60  Kidney Failure <15      Gold Top - [facility] [[id number]] Collected: 10/04/20 1353    Specimen: Blood Updated: 10/04/20 1423    CBC & Differential [[id number]]  (Abnormal) Collected: 10/04/20 1353    Specimen: Blood Updated: 10/04/20 1419    Narrative:      The following orders were created for panel order CBC & Differential.  Procedure                               Abnormality         Status                     ---------                               -----------         ------                     CBC Auto Differential[[id number]]        Abnormal            Final result                 Please view results for these tests on the individual orders.    CBC Auto Differential [[id number]]  (Abnormal) Collected: 10/04/20 1353    Specimen: Blood Updated: 10/04/20 1419     WBC 11.80 10*3/mm3      RBC 4.84 10*6/mm3      Hemoglobin 13.1 g/dL      Hematocrit 40.8 %      MCV 84.3 fL      MCH 27.1 pg      MCHC 32.1 g/dL      RDW 19.3 %      RDW-SD 56.4  fl      MPV 9.4 fL      Platelets 162 10*3/mm3      Neutrophil % 78.0 %      Lymphocyte % 13.4 %      Monocyte % 8.2 %      Eosinophil % 0.0 %      Basophil % 0.4 %      Neutrophils, Absolute 9.20 10*3/mm3      Lymphocytes, Absolute 1.60 10*3/mm3      Monocytes, Absolute 1.00 10*3/mm3      Eosinophils, Absolute 0.00 10*3/mm3      Basophils, Absolute 0.10 10*3/mm3      nRBC 0.8 /100 WBC     Blood Culture - Blood, Arm, Left [[id number]] Collected: 10/04/20 1410    Specimen: Blood from Arm, Left Updated: 10/04/20 1414    Blood Culture - Blood, Foot, Left [[id number]] Collected: 10/04/20 1353    Specimen: Blood from Foot, Left Updated: 10/04/20 1414    Blood Gas, Arterial [[id number]]  (Abnormal) Collected: 10/04/20 1357    Specimen: Arterial Blood Updated: 10/04/20 1400     Site Right Brachial     Bj's Test N/A     pH, Arterial 7.506 pH units      pCO2, Arterial 23.3 mm Hg      pO2, Arterial 70.2 mm Hg      HCO3, Arterial 18.4 mmol/L      Base Excess, Arterial -3.1 mmol/L      Comment: Serial Number: [id number]:  [id number]        O2 Saturation, Arterial 95.8 %      CO2 Content 19.1 mmol/L      Barometric Pressure for Blood Gas --     Comment: N/A        Modality Room Air     FIO2 21 %      Hemodilution No         Imaging Results (Last 24 Hours)     Procedure Component Value Units Date/Time    XR Chest 1 View [[id number]] Collected: 10/04/20 1445     Updated: 10/04/20 1448    Narrative:      XR CHEST 1 VW-     Date of Exam: 10/4/2020 2:21 PM     Indication: Dyspnea.     Comparison: 09/01/2020     Technique: A single view of the chest was obtained.     FINDINGS:      There is marked cardiomegaly unchanged from prior study.  There is  persistent pulmonary vascular congestion.  Lungs are clear.  No definite  pleural effusion identified.             Impression:         Marked cardiomegaly and pulmonary vascular congestion similar prior  studies.  No focal airspace consolidation.        Electronically Signed By-Elias Rascon  On:10/4/2020 2:46 PM  This report was finalized on [id number] by  Elias Rascon, .           I reviewed the patient's new clinical results.      Assessment/Plan       Atrial fibrillation with RVR (CMS/[facility])    Hypertensive emergency    Chronic atrial fibrillation (CMS/[facility])    Chronic diastolic CHF (congestive heart failure) (CMS/[facility])    CKD (chronic kidney disease) stage 3, GFR 30-59 ml/min    Essential hypertension    Gambling disorder, persistent    Coronary artery disease involving native coronary artery of native heart without angina pectoris    Psoriasis    History of CVA (cerebrovascular accident)    GERD (gastroesophageal reflux disease)    Medically noncompliant    Mild cognitive impairment    Dyspnea          Plan:   (Await cardiology consult. His liver enzymes are elevated and haven't been in the past. RN reports that he hasn't complained of any abdominal pain. Check additional labs and monitor closely. ).       I discussed the patients findings and my recommendations with nursing staff and primary care team    CLARISSA Meraz  10/05/20  13:06 EDT        Electronically signed by Paty Polk DO at 10/05/20 1607          Physician Progress Notes (last 24 hours) (Notes from 10/07/20 1519 through 10/08/20 1519)      Edy Thomas MD at 10/08/20 1136          Referring Provider: Dr. Stauffer    Reason for follow-up: Fibrillation     Patient Care Team:  Marisol Larson APRN as PCP - Shwetha Guadarrama MD as Consulting Physician (Nephrology)  Edy Thomas MD as Consulting Physician (Cardiology)    Subjective .  No shortness of breath or palpitations today    Objective  Lying in bed comfortably     Review of Systems   Constitution: Negative for fever and malaise/fatigue.   HENT: Negative for ear pain and nosebleeds.    Eyes: Negative for blurred vision and double vision.   Cardiovascular: Negative for chest pain, dyspnea on exertion and palpitations.   Respiratory: Negative for cough  "and shortness of breath.    Skin: Negative for rash.   Musculoskeletal: Negative for joint pain.   Gastrointestinal: Negative for abdominal pain, nausea and vomiting.   Neurological: Negative for focal weakness and headaches.   Psychiatric/Behavioral: Negative for depression. The patient is not nervous/anxious.    All other systems reviewed and are negative.      Ketorolac tromethamine    Scheduled Meds:allopurinol, 100 mg, Oral, Daily  aspirin, 81 mg, Oral, Daily  atorvastatin, 10 mg, Oral, Nightly  betamethasone dipropionate, 1 application, Topical, Q12H  dilTIAZem CD, 180 mg, Oral, Q24H  hydrALAZINE, 50 mg, Oral, TID  losartan, 50 mg, Oral, Q24H  metoprolol succinate XL, 50 mg, Oral, Q24H  risperiDONE, 0.5 mg, Oral, Q12H  rivaroxaban, 20 mg, Oral, Daily With Dinner  sodium bicarbonate, 650 mg, Oral, TID  sodium chloride, 10 mL, Intravenous, Q12H  spironolactone, 12.5 mg, Oral, Daily      Continuous Infusions:   PRN Meds:.•  acetaminophen **OR** acetaminophen **OR** acetaminophen  •  bisacodyl  •  calcium carbonate  •  HYDROcodone-acetaminophen  •  influenza vaccine  •  labetalol  •  LORazepam  •  magnesium hydroxide  •  melatonin  •  nitroglycerin  •  ondansetron **OR** ondansetron  •  potassium chloride **OR** potassium chloride **OR** potassium chloride  •  sodium chloride  •  sodium chloride        VITAL SIGNS  Vitals:    10/08/20 0540 10/08/20 0850 10/08/20 0900 10/08/20 1021   BP: (!) 165/102 (!) 170/102  140/65   BP Location: Right arm   Right arm   Patient Position: Sitting   Lying   Pulse: 91 91 90 84   Resp: 20   16   Temp: 98.2 °F (36.8 °C)   97.5 °F (36.4 °C)   TempSrc: Oral   Axillary   SpO2:   92% 95%   Weight: 108 kg (237 lb 3.4 oz)      Height:           Flowsheet Rows      First Filed Value   Admission Height  182.9 cm (72\") Documented at 10/04/2020 1256   Admission Weight  118 kg (259 lb 11.2 oz) Documented at 10/04/2020 1256           TELEMETRY: Atrial fibrillation with controlled medical " response    Physical Exam:  Constitutional:       Appearance: Well-developed.   Eyes:      General: No scleral icterus.     Conjunctiva/sclera: Conjunctivae normal.      Pupils: Pupils are equal, round, and reactive to light.   HENT:      Head: Normocephalic and atraumatic.   Neck:      Musculoskeletal: Normal range of motion and neck supple.      Vascular: No carotid bruit or JVD.   Pulmonary:      Effort: Pulmonary effort is normal.      Breath sounds: Normal breath sounds. No wheezing. No rales.   Cardiovascular:      Normal rate. Irregularly irregular rhythm.   Pulses:     Intact distal pulses.   Abdominal:      General: Bowel sounds are normal.      Palpations: Abdomen is soft.   Musculoskeletal: Normal range of motion.   Skin:     General: Skin is warm and dry.      Findings: No rash.   Neurological:      Mental Status: Alert.      Comments: No focal deficits          Results Review:   I reviewed the patient's new clinical results.  Lab Results (last 24 hours)     Procedure Component Value Units Date/Time    Blood Culture - Blood, Hand, Right [[id number]] Collected: 10/06/20 0926    Specimen: Blood from Hand, Right Updated: 10/08/20 1000     Blood Culture No growth at 2 days    Basic Metabolic Panel [[id number]] Collected: 10/08/20 0244    Specimen: Blood Updated: 10/08/20 0914     Glucose 91 mg/dL      BUN --     Comment: Testing performed by alternate method        Creatinine 1.01 mg/dL      Sodium 142 mmol/L      Potassium 3.8 mmol/L      Chloride 107 mmol/L      CO2 24.0 mmol/L      Calcium 8.9 mg/dL      eGFR Non African Amer 74 mL/min/1.73      BUN/Creatinine Ratio --     Comment: Testing not performed        Anion Gap 11.0 mmol/L     Narrative:      GFR Normal >60  Chronic Kidney Disease <60  Kidney Failure <15      BUN [[id number]] Collected: 10/08/20 0244    Specimen: Blood Updated: 10/08/20 0914    Blood Culture - Blood, Arm, Left [[id number]]  (Abnormal) Collected: 10/04/20 1410    Specimen: Blood from  Arm, Left Updated: 10/08/20 0833     Blood Culture Staphylococcus, coagulase negative     Comment: Probable contaminant requires clinical correlation, susceptibility not performed unless requested by physician.          Isolated from Anaerobic Bottle     Blood Culture Corynebacterium species     Comment: Probable contaminant requires clinical correlation, susceptibility not performed unless requested by physician.            Isolated from Aerobic Bottle     Gram Stain Anaerobic Bottle Gram positive cocci in clusters      Aerobic Bottle Gram positive bacilli    Narrative:      Blood culture does not meet the specified criteria for PCR identification.  If pregnant, immunocompromised, or clinical concern for meningitis, call lab to run BCID for Listeria monocytogenes.    Magnesium [[id number]]  (Normal) Collected: 10/08/20 0244    Specimen: Blood Updated: 10/08/20 0416     Magnesium 2.2 mg/dL     CBC & Differential [[id number]]  (Abnormal) Collected: 10/08/20 0244    Specimen: Blood Updated: 10/08/20 0359    Narrative:      The following orders were created for panel order CBC & Differential.  Procedure                               Abnormality         Status                     ---------                               -----------         ------                     CBC Auto Differential[[id number]]        Abnormal            Final result                 Please view results for these tests on the individual orders.    CBC Auto Differential [[id number]]  (Abnormal) Collected: 10/08/20 0244    Specimen: Blood Updated: 10/08/20 0359     WBC 7.20 10*3/mm3      RBC 4.75 10*6/mm3      Hemoglobin 13.2 g/dL      Hematocrit 40.4 %      MCV 85.2 fL      MCH 27.9 pg      MCHC 32.7 g/dL      RDW 19.9 %      RDW-SD 59.1 fl      MPV 8.8 fL      Platelets 169 10*3/mm3      Neutrophil % 65.1 %      Lymphocyte % 19.8 %      Monocyte % 11.3 %      Eosinophil % 3.2 %      Basophil % 0.6 %      Neutrophils, Absolute 4.70 10*3/mm3       Lymphocytes, Absolute 1.40 10*3/mm3      Monocytes, Absolute 0.80 10*3/mm3      Eosinophils, Absolute 0.20 10*3/mm3      Basophils, Absolute 0.00 10*3/mm3      nRBC 0.0 /100 WBC     Blood Culture - Blood, Hand, Right [[id number]] Collected: 10/06/20 1608    Specimen: Blood from Hand, Right Updated: 10/07/20 1645     Blood Culture No growth at 24 hours    BUN [[id number]]  (Abnormal) Collected: 10/07/20 1024    Specimen: Blood Updated: 10/07/20 1632     BUN 26 mg/dL     Blood Culture - Blood, Foot, Left [[id number]] Collected: 10/04/20 1353    Specimen: Blood from Foot, Left Updated: 10/07/20 1415     Blood Culture No growth at 3 days          Imaging Results (Last 24 Hours)     ** No results found for the last 24 hours. **          EKG      I personally viewed and interpreted the patient's EKG/Telemetry data:    ECHOCARDIOGRAM:    STRESS MYOVIEW:    CARDIAC CATHETERIZATION:    OTHER:         Assessment/Plan     Principal Problem:    Atrial fibrillation with RVR (CMS/[facility])  Active Problems:    Hypertensive emergency    Chronic atrial fibrillation (CMS/H[facility])    Chronic diastolic CHF (congestive heart failure) (CMS/[facility])    CKD (chronic kidney disease) stage 3, GFR 30-59 ml/min    Essential hypertension    Gambling disorder, persistent    Coronary artery disease involving native coronary artery of native heart without angina pectoris    Psoriasis    History of CVA (cerebrovascular accident)    GERD (gastroesophageal reflux disease)    Medically noncompliant    Mild cognitive impairment    Dyspnea       Patient has atrial fibrillation with rapid response and was placed on IV Cardizem and then switched to oral Cardizem and is better now  Patient is also on anticoagulation with Xarelto  Patient's blood pressure and heart rate are stable  Patient's lipid levels are followed with a primary care doctor  Patient is ruled out for MI and has history of coronary disease  Patient has medical noncompliance and discussed with him several  times about being compliant    I discussed the patients findings and my recommendations with patient and nurse    Edy Thomas MD  10/08/20  11:36 EDT                Electronically signed by Edy Thomas MD at 10/08/20 1137     Paty Polk DO at 10/08/20 0845               LOS: 4 days   Patient Care Team:  Marisol Larson APRN as PCP - General  Shwetha Barber MD as Consulting Physician (Nephrology)  Edy Thomas MD as Consulting Physician (Cardiology)    Chief Complaint: None this morning .  Not on anything for his psoriasis either topical or otherwise.  Never saw a dermatologist.    Case management once patient have a psych evaluation prior to placement.  I thought I had ordered this yesterday verbally but I do not see it in the chart so we will have to place it today.  Await inpatient rehab bed as well.  Subjective   Alert any eating breakfast, appears comfortable    Interval History:  Denies any other problems today. Says he understands that if he continues to refuse to take his medications he places and sulfa great risk of significant illness and even death including death from stroke heart attack.    Patient Complaints: Dyspnea on exertion otherwise at rest no problems  Patient Denies: Denies any palpitations or chest pain  History taken from: patient    Review of Systems:    All systems were reviewed and negative except for: See interval history    Objective     Vital Signs  Temp:  [97.5 °F (36.4 °C)-98.6 °F (37 °C)] 98.2 °F (36.8 °C)  Heart Rate:  [] 91  Resp:  [10-20] 20  BP: (125-165)/() 165/102    Physical Exam:     General Appearance:    Alert, cooperative, in no acute distress, poor hygiene, appears markedly older than stated age   Head:    Normocephalic, without obvious abnormality, atraumatic   Eyes:            Conjunctivae and sclerae normal, no   icterus, no pallor, corneas clear, PERRLA   Throat:   No oral lesions, no thrush, oral mucosa moist   Neck:   No  adenopathy, supple, trachea midline, no thyromegaly, no   carotid bruit, no JVD   Lungs:     Clear to auscultation,respirations regular, even and                  unlabored, distant some coarse upper airway sounds this morning    Heart:   Irregularly irregular with rates in the 70s   Chest Wall:    No abnormalities observed   Abdomen:     Normal bowel sounds, no masses, no organomegaly, soft        non-tender, non-distended, no guarding, no rebound                tenderness, rotund   Rectal:     Deferred   Extremities:   Moves all extremities . no edema, no cyanosis, no             redness   Pulses:   Pulses palpable and equal bilaterally   Skin:   No bleeding, bruising or rash, multiple areas of silvery scaling plaque around the umbilicus, at the nape of the neck and on elbows and knees.   Lymph nodes:   No palpable adenopathy   Neurologic:   Cranial nerves 2 - 12 grossly intact, sensation intact, DTR       present and equal bilaterally   Radiology:  Xr Chest 1 View    Result Date: 10/4/2020   Marked cardiomegaly and pulmonary vascular congestion similar prior studies.  No focal airspace consolidation.   Electronically Signed By-Elias Rascon On:10/4/2020 2:46 PM This report was finalized on [id number] by  Elias Rascon, .         Results Review:     I reviewed the patient's new clinical results.  I reviewed the patient's new imaging results and agree with the interpretation.    Medication Review:   Scheduled Meds:allopurinol, 100 mg, Oral, Daily  aspirin, 81 mg, Oral, Daily  atorvastatin, 10 mg, Oral, Nightly  dilTIAZem CD, 180 mg, Oral, Q24H  hydrALAZINE, 50 mg, Oral, TID  losartan, 50 mg, Oral, Q24H  metoprolol succinate XL, 50 mg, Oral, Q24H  risperiDONE, 0.5 mg, Oral, Q12H  rivaroxaban, 20 mg, Oral, Daily With Dinner  sodium bicarbonate, 650 mg, Oral, TID  sodium chloride, 10 mL, Intravenous, Q12H  spironolactone, 12.5 mg, Oral, Daily      Continuous Infusions:   PRN Meds:.•  acetaminophen **OR**  acetaminophen **OR** acetaminophen  •  bisacodyl  •  calcium carbonate  •  HYDROcodone-acetaminophen  •  influenza vaccine  •  labetalol  •  LORazepam  •  magnesium hydroxide  •  melatonin  •  nitroglycerin  •  ondansetron **OR** ondansetron  •  potassium chloride **OR** potassium chloride **OR** potassium chloride  •  sodium chloride  •  sodium chloride    Labs:  Lab Results (last 24 hours)     Procedure Component Value Units Date/Time    Blood Culture - Blood, Arm, Left [[id number]]  (Abnormal) Collected: 10/04/20 1410    Specimen: Blood from Arm, Left Updated: 10/08/20 0833     Blood Culture Staphylococcus, coagulase negative     Comment: Probable contaminant requires clinical correlation, susceptibility not performed unless requested by physician.          Isolated from Anaerobic Bottle     Blood Culture Corynebacterium species     Comment: Probable contaminant requires clinical correlation, susceptibility not performed unless requested by physician.            Isolated from Aerobic Bottle     Gram Stain Anaerobic Bottle Gram positive cocci in clusters      Aerobic Bottle Gram positive bacilli    Narrative:      Blood culture does not meet the specified criteria for PCR identification.  If pregnant, immunocompromised, or clinical concern for meningitis, call lab to run BCID for Listeria monocytogenes.    Magnesium [[id number]]  (Normal) Collected: 10/08/20 0244    Specimen: Blood Updated: 10/08/20 0416     Magnesium 2.2 mg/dL     CBC & Differential [[id number]]  (Abnormal) Collected: 10/08/20 0244    Specimen: Blood Updated: 10/08/20 0359    Narrative:      The following orders were created for panel order CBC & Differential.  Procedure                               Abnormality         Status                     ---------                               -----------         ------                     CBC Auto Differential[[id number]]        Abnormal            Final result                 Please view results for these  tests on the individual orders.    CBC Auto Differential [[id number]]  (Abnormal) Collected: 10/08/20 0244    Specimen: Blood Updated: 10/08/20 0359     WBC 7.20 10*3/mm3      RBC 4.75 10*6/mm3      Hemoglobin 13.2 g/dL      Hematocrit 40.4 %      MCV 85.2 fL      MCH 27.9 pg      MCHC 32.7 g/dL      RDW 19.9 %      RDW-SD 59.1 fl      MPV 8.8 fL      Platelets 169 10*3/mm3      Neutrophil % 65.1 %      Lymphocyte % 19.8 %      Monocyte % 11.3 %      Eosinophil % 3.2 %      Basophil % 0.6 %      Neutrophils, Absolute 4.70 10*3/mm3      Lymphocytes, Absolute 1.40 10*3/mm3      Monocytes, Absolute 0.80 10*3/mm3      Eosinophils, Absolute 0.20 10*3/mm3      Basophils, Absolute 0.00 10*3/mm3      nRBC 0.0 /100 WBC     Blood Culture - Blood, Hand, Right [[id number]] Collected: 10/06/20 1608    Specimen: Blood from Hand, Right Updated: 10/07/20 1645     Blood Culture No growth at 24 hours    BUN [[id number]]  (Abnormal) Collected: 10/07/20 1024    Specimen: Blood Updated: 10/07/20 1632     BUN 26 mg/dL     Blood Culture - Blood, Foot, Left [[id number]] Collected: 10/04/20 1353    Specimen: Blood from Foot, Left Updated: 10/07/20 1415     Blood Culture No growth at 3 days    CBC & Differential [[id number]]  (Abnormal) Collected: 10/07/20 1024    Specimen: Blood Updated: 10/07/20 1101    Narrative:      The following orders were created for panel order CBC & Differential.  Procedure                               Abnormality         Status                     ---------                               -----------         ------                     CBC Auto Differential[[id number]]        Abnormal            Final result                 Please view results for these tests on the individual orders.    CBC Auto Differential [[id number]]  (Abnormal) Collected: 10/07/20 1024    Specimen: Blood Updated: 10/07/20 1101     WBC 7.80 10*3/mm3      RBC 4.99 10*6/mm3      Hemoglobin 13.7 g/dL      Hematocrit 42.2 %      MCV 84.6 fL      MCH 27.4  pg      MCHC 32.4 g/dL      RDW 20.3 %      RDW-SD 59.9 fl      MPV 8.7 fL      Platelets 176 10*3/mm3      Neutrophil % 75.1 %      Lymphocyte % 12.2 %      Monocyte % 9.6 %      Eosinophil % 1.6 %      Basophil % 1.5 %      Neutrophils, Absolute 5.80 10*3/mm3      Lymphocytes, Absolute 0.90 10*3/mm3      Monocytes, Absolute 0.70 10*3/mm3      Eosinophils, Absolute 0.10 10*3/mm3      Basophils, Absolute 0.10 10*3/mm3      nRBC 0.1 /100 WBC     Blood Culture - Blood, Hand, Right [[id number]] Collected: 10/06/20 0926    Specimen: Blood from Hand, Right Updated: 10/07/20 1000     Blood Culture No growth at 24 hours           Assessment/Plan       Atrial fibrillation with RVR (CMS/[facility])    Hypertensive emergency    Chronic atrial fibrillation (CMS/[facility])    Chronic diastolic CHF (congestive heart failure) (CMS/[facility])    CKD (chronic kidney disease) stage 3, GFR 30-59 ml/min    Essential hypertension    Gambling disorder, persistent    Coronary artery disease involving native coronary artery of native heart without angina pectoris    Psoriasis    History of CVA (cerebrovascular accident)    GERD (gastroesophageal reflux disease)    Medically noncompliant    Mild cognitive impairment    Dyspnea  Blood cultures with ID of Staphylococcus, coag negative  Patient had change in mental status on admission with psychosis.  Case management requesting psychiatry input.      Continue current approach.  I advised patient that once again he needs to be scrupulous with taking his medications as prescribed.  At this juncture, his family refuses to help him with this.  The patient has very poor insight into his medical conditions.      cardiology input greatly appreciated, thank you.     Repeat blood cultures are pending. Pro calcitonin is within the normal range. Once again,he does not appear to be septic but for completeness will complete the septic work-up.  He remains afebrile.  Blood cultures that are repeated are negative at 24 hours x  2.    We will once again order psychiatry evaluation prior to patient being able to be discharged.    Also start patient empirically on a topical steroid for his psoriasis.    Continue rest of current approach.        Paty Polk DO  10/08/20  08:45 EDT          Electronically signed by Paty Polk DO at 10/08/20 0868

## 2020-10-08 NOTE — PLAN OF CARE
Goal Outcome Evaluation:  Plan of Care Reviewed With: patient  Progress: declining  Outcome Summary: Pt refused nighttime medications and was very adamant about sleeping instead. Pyschiatry to see pt today.

## 2020-10-08 NOTE — PLAN OF CARE
Problem: Adult Inpatient Plan of Care  Goal: Plan of Care Review  Outcome: Ongoing, Progressing  Flowsheets (Taken 10/8/2020 1414)  Plan of Care Reviewed With: patient  Outcome Summary: Pt initially refused participation this date, but agreeable after RN spoke with him. Pt tolerated session fair and was agreeable to tx upon approach. Pt transferred supine to EOB with mod I. Pt requires max A to don socks 2/2 inability to tolerate adaptive positioning and pain with leaning forward. Pt reports using sock aide at home. Pt requires setup to don gown in prep for mobility. Pt stood with min A-CGA from EOB, then ambulated to bathroom with CGA. Pt urinated in standing with SBA. Pt then stepped to sinkside and performed jennifer bathing with SBA for safety. Pt experienced multiple episodes of Afib with RVR in standing, with HR highest read 130. Pt requires mod verbal cues for pacing technique and pursed lip breathing. Pt returned to bedside chair with CGA using straight cane. Pt left reclined in bedside chair. Pt remains limited 2/2 low endurance, impaired insight and acuity of illness. OT will see patient 5x/week while at [facility]. OT recommends d/c to  OT. PPE worn: mask, goggles and gloves.

## 2020-10-08 NOTE — THERAPY TREATMENT NOTE
Patient Name: Carlos Whipple  : 1955    MRN: [id number]                              Today's Date: 10/8/2020       Admit Date: 10/4/2020    Visit Dx:     ICD-10-CM ICD-9-CM   1. Dyspnea, unspecified type  R06.00 786.09   2. Atrial fibrillation with RVR (CMS/HCC)  I48.91 427.31     Patient Active Problem List   Diagnosis   • Hypertensive emergency   • Chronic atrial fibrillation (CMS/HCC)   • Chronic diastolic CHF (congestive heart failure) (CMS/HCC)   • CKD (chronic kidney disease) stage 3, GFR 30-59 ml/min   • Essential hypertension   • Gambling disorder, persistent   • Shortness of breath   • Coronary artery disease involving native coronary artery of native heart without angina pectoris   • Psoriasis   • History of CVA (cerebrovascular accident)   • DDD (degenerative disc disease), lumbosacral   • Peripheral polyneuropathy   • GERD (gastroesophageal reflux disease)   • Medically noncompliant   • Acute respiratory distress   • Unstable angina (CMS/HCC)   • Cervical disc disorder with radiculopathy   • Completed stroke (CMS/HCC)   • Degeneration of intervertebral disc   • Excessive anticoagulation   • Impaired left ventricular function   • Liver lesion   • Lumbar radiculopathy   • Cervical myelopathy (CMS/HCC)   • Lumbosacral radiculopathy   • Spinal stenosis of lumbar region   • Obesity   • Mild cognitive impairment   • Thoracic back pain   • Thoracic disc disease with myelopathy   • Paroxysmal atrial fibrillation (CMS/HCC)   • Acute congestive heart failure (CMS/HCC)   • Atrial fibrillation with RVR (CMS/HCC)   • Dermatitis associated with moisture   • Dyspnea     Past Medical History:   Diagnosis Date   • A-fib (CMS/HCC)    • Atrial fibrillation with RVR (CMS/HCC)    • CHF (congestive heart failure) (CMS/HCC)    • Chronic atrial fibrillation (CMS/HCC) 2019   • Chronic diastolic CHF (congestive heart failure) (CMS/HCC) 2019   • CKD (chronic kidney disease) stage 3, GFR 30-59 ml/min 2019    • Coronary artery disease involving native coronary artery of native heart without angina pectoris 11/8/2019   • DDD (degenerative disc disease), lumbosacral 11/8/2019   • Essential hypertension 11/8/2019   • Gambling disorder, persistent 11/8/2019   • GERD (gastroesophageal reflux disease) 11/8/2019   • History of CVA (cerebrovascular accident) 11/8/2019   • Hyperlipidemia    • Hypertension    • Medically noncompliant 11/8/2019   • Peripheral polyneuropathy 11/8/2019   • Psoriasis 11/8/2019   • Stroke (CMS/HCC)      Past Surgical History:   Procedure Laterality Date   • CARDIAC CATHETERIZATION     • CARDIAC CATHETERIZATION N/A 12/10/2019    Procedure: Left Heart Cath and coronary angiogram;  Surgeon: Edy Thomas MD;  Location: [facility] CATH INVASIVE LOCATION;  Service: Cardiovascular     General Information     Row Name 10/08/20 1410          OT Time and Intention    Document Type  therapy note (daily note)  -ERIC     Mode of Treatment  occupational therapy  -ERIC     Row Name 10/08/20 1410          General Information    Patient Profile Reviewed  yes  -ERIC     Existing Precautions/Restrictions  fall  -ERIC     Barriers to Rehab  previous functional deficit  -ERIC     Row Name 10/08/20 1410          Cognition    Orientation Status (Cognition)  oriented x 3  -ERIC     Row Name 10/08/20 1410          Safety Issues, Functional Mobility    Safety Issues Affecting Function (Mobility)  insight into deficits/self-awareness;safety precaution awareness;safety precautions follow-through/compliance  -ERIC     Impairments Affecting Function (Mobility)  balance;endurance/activity tolerance;pain;postural/trunk control;shortness of breath;cognition  -ERIC     Cognitive Impairments, Mobility Safety/Performance  awareness, need for assistance;insight into deficits/self-awareness;safety precaution awareness;safety precaution follow-through  -ERIC       User Key  (r) = Recorded By, (t) = Taken By, (c) = Cosigned By    Initials Name Provider Type     ERIC Jeanine Carey OT Occupational Therapist        Mobility/ADL's     Row Name 10/08/20 1411          Bed Mobility    Supine-Sit [place] (Bed Mobility)  modified independence  -     Row Name 10/08/20 1411          Transfers    Transfers  toilet transfer;bed-chair transfer  -     Bed-Chair [place] (Transfers)  set up;contact guard;1 person assist  -     Assistive Device (Bed-Chair Transfers)  cane, straight  -ERIC     Sit-Stand [place] (Transfers)  set up;contact guard;minimum assist (75% patient effort);1 person assist  -ERIC     [place] Level (Toilet Transfer)  set up;contact guard;minimum assist (75% patient effort);1 person assist  -     Assistive Device (Toilet Transfer)  cane, quad;grab bars/safety frame;commode  -     Row Name 10/08/20 1411          Sit-Stand Transfer    Assistive Device (Sit-Stand Transfers)  cane, straight  -     Row Name 10/08/20 1411          Toilet Transfer    Type (Toilet Transfer)  stand pivot/stand step  -     Row Name 10/08/20 1411          Functional Mobility    Functional Mobility- Ind. Level  contact guard assist;1 person  -     Functional Mobility- Device  straight cane  -     Functional Mobility-Distance (Feet)  8  -     Row Name 10/08/20 1411          Activities of Daily Living    BADL Assessment/Intervention  lower body dressing;toileting;bathing  -     Row Name 10/08/20 1411          Lower Body Dressing Assessment/Training    [place] Level (Lower Body Dressing)  doff;socks;maximum assist (25% patient effort);dependent (less than 25% patient effort)  -     Position (Lower Body Dressing)  edge of bed sitting  -     Row Name 10/08/20 1411          Toileting Assessment/Training    [place] Level (Toileting)  adjust/manage clothing;perform perineal hygiene;standby assist  -     Assistive Devices (Toileting)  commode;grab bar/safety frame  -     Position (Toileting)  unsupported standing  -     Row Name 10/08/20 1411           Bathing Assessment/Intervention    [place] Level (Bathing)  perineal area;standby assist  -ERIC     Position (Bathing)  unsupported standing  -ERIC       User Key  (r) = Recorded By, (t) = Taken By, (c) = Cosigned By    Initials Name Provider Type    Jeanine Christie OT Occupational Therapist        Obj/Interventions     Row Name 10/08/20 1413          Sensory Assessment (Somatosensory)    Sensory Assessment (Somatosensory)  sensation intact  -ERIC     Row Name 10/08/20 1413          Balance    Static Sitting Balance  WFL  -ERIC     Static Standing Balance  mild impairment  -ERIC     Dynamic Standing Balance  mild impairment  -ERIC       User Key  (r) = Recorded By, (t) = Taken By, (c) = Cosigned By    Initials Name Provider Type    Jeanine Christie OT Occupational Therapist        Goals/Plan     Row Name 10/08/20 1419          Dressing Goal 1 (OT)    Progress/Outcome (Dressing Goal 1, OT)  goal ongoing  -ERIC     Row Name 10/08/20 1419          Grooming Goal 1 (OT)    Progress/Outcome (Grooming Goal 1, OT)  goal ongoing  -ERIC     Row Name 10/08/20 1419          Therapy Assessment/Plan (OT)    Planned Therapy Interventions (OT)  activity tolerance training;functional balance retraining;occupation/activity based interventions;patient/caregiver education/training;transfer/mobility retraining;BADL retraining  -ERIC       User Key  (r) = Recorded By, (t) = Taken By, (c) = Cosigned By    Initials Name Provider Type    Jeanine Christie OT Occupational Therapist        Clinical Impression     Row Name 10/08/20 1414          Pain Scale: Numbers Pre/Post-Treatment    Pretreatment Pain Rating  0/10 - no pain  -ERIC     Posttreatment Pain Rating  0/10 - no pain  -ERIC     Row Name 10/08/20 1414          Plan of Care Review    Plan of Care Reviewed With  patient  -ERIC     Outcome Summary  Pt initially refused participation this date, but agreeable after RN spoke with him. Pt tolerated session fair and was  agreeable to tx upon approach. Pt transferred supine to EOB with mod I. Pt requires max A to don socks 2/2 inability to tolerate adaptive positioning and pain with leaning forward. Pt reports using sock aide at home. Pt requires setup to don gown in prep for mobility. Pt stood with min A-CGA from EOB, then ambulated to bathroom with CGA. Pt urinated in standing with SBA. Pt then stepped to sinkside and performed jennifer bathing with SBA for safety. Pt experienced multiple episodes of Afib with RVR in standing, with HR highest read 130. Pt requires mod verbal cues for pacing technique and pursed lip breathing. Pt returned to bedside chair with CGA using straight cane. Pt left reclined in bedside chair. Pt remains limited 2/2 low endurance, impaired insight and acuity of illness. OT will see patient 5x/week while at [facility]. OT recommends d/c to  OT. PPE worn: mask, goggles and gloves.  -ERIC     Row Name 10/08/20 141          Therapy Assessment/Plan (OT)    Rehab Potential (OT)  good, to achieve stated therapy goals  -ERIC     Criteria for Skilled Therapeutic Interventions Met (OT)  yes;skilled treatment is necessary  -ERIC     Therapy Frequency (OT)  5 times/wk  -ERIC     Row Name 10/08/20 1414          Therapy Plan Review/Discharge Plan (OT)    Anticipated Discharge Disposition (OT)  home with home health  -ERIC     Row Name 10/08/20 1415          Positioning and Restraints    Pre-Treatment Position  in bed  -ERIC     Post Treatment Position  chair  -ERIC     In Chair  notified nsg;reclined;call light within reach;encouraged to call for assist;exit alarm on  -ERIC       User Key  (r) = Recorded By, (t) = Taken By, (c) = Cosigned By    Initials Name Provider Type    Jeanine Christie, OT Occupational Therapist        Outcome Measures     Row Name 10/08/20 4123          How much help from another is currently needed...    Putting on and taking off regular lower body clothing?  1  -ERIC     Bathing (including washing, rinsing, and  drying)  2  -ERIC     Toileting (which includes using toilet bed pan or urinal)  3  -ERIC     Putting on and taking off regular upper body clothing  3  -ERIC     Taking care of personal grooming (such as brushing teeth)  4  -ERIC     Eating meals  4  -ERIC     AM-PAC 6 Clicks Score (OT)  17  -ERIC     Row Name 10/08/20 1419          Functional Assessment    Outcome Measure Options  AM-PAC 6 Clicks Daily Activity (OT)  -ERIC       User Key  (r) = Recorded By, (t) = Taken By, (c) = Cosigned By    Initials Name Provider Type    Jeanine Christie OT Occupational Therapist        Occupational Therapy Education                 Title: PT OT SLP Therapies (Done)     Topic: Occupational Therapy (Done)     Point: ADL training (Done)     Description:   Instruct learner(s) on proper safety adaptation and remediation techniques during self care or transfers.   Instruct in proper use of assistive devices.              Learning Progress Summary           Patient Acceptance, E,TB, VU,NR by MARY at 10/5/2020 1316    Acceptance, E, VU by  at 10/5/2020 0830                   Point: Home exercise program (Done)     Description:   Instruct learner(s) on appropriate technique for monitoring, assisting and/or progressing therapeutic exercises/activities.              Learning Progress Summary           Patient Acceptance, E,TB, VU,NR by MARY at 10/5/2020 1316    Acceptance, E, VU by  at 10/5/2020 0830                   Point: Precautions (Done)     Description:   Instruct learner(s) on prescribed precautions during self-care and functional transfers.              Learning Progress Summary           Patient Acceptance, E,TB, VU,NR by MARY at 10/5/2020 1316    Acceptance, E,TB, VU,NR by  at 10/5/2020 1031    Acceptance, E, VU by  at 10/5/2020 0830                   Point: Body mechanics (Done)     Description:   Instruct learner(s) on proper positioning and spine alignment during self-care, functional mobility activities and/or exercises.               Learning Progress Summary           Patient Acceptance, E,TB, VU,NR by  at 10/5/2020 1316    Acceptance, E,TB, VU,NR by  at 10/5/2020 1031    Acceptance, E, VU by  at 10/5/2020 0830                               User Key     Initials Effective Dates Name Provider Type Discipline     03/01/19 -  Mena Menjivar OT Occupational Therapist OT     03/01/19 -  Zuleika White RN Registered Nurse Nurse    MARY 06/15/19 -  Sofy Matt PT Physical Therapist PT              OT Recommendation and Plan  Retired Outcome Summary/Treatment Plan (OT)  Anticipated Discharge Disposition (OT): home with home health  Planned Therapy Interventions (OT): activity tolerance training, functional balance retraining, occupation/activity based interventions, patient/caregiver education/training, transfer/mobility retraining, BADL retraining  Therapy Frequency (OT): 5 times/wk  Plan of Care Review  Plan of Care Reviewed With: patient  Outcome Summary: Pt initially refused participation this date, but agreeable after RN spoke with him. Pt tolerated session fair and was agreeable to tx upon approach. Pt transferred supine to EOB with mod I. Pt requires max A to don socks 2/2 inability to tolerate adaptive positioning and pain with leaning forward. Pt reports using sock aide at home. Pt requires setup to don gown in prep for mobility. Pt stood with min A-CGA from EOB, then ambulated to bathroom with CGA. Pt urinated in standing with SBA. Pt then stepped to sinkside and performed jennifer bathing with SBA for safety. Pt experienced multiple episodes of Afib with RVR in standing, with HR highest read 130. Pt requires mod verbal cues for pacing technique and pursed lip breathing. Pt returned to bedside chair with CGA using straight cane. Pt left reclined in bedside chair. Pt remains limited 2/2 low endurance, impaired insight and acuity of illness. OT will see patient 5x/week while at [facility]. OT recommends d/c to  OT. PPE worn: mask,  goggles and gloves.  Plan of Care Reviewed With: patient  Outcome Summary: Pt initially refused participation this date, but agreeable after RN spoke with him. Pt tolerated session fair and was agreeable to tx upon approach. Pt transferred supine to EOB with mod I. Pt requires max A to don socks 2/2 inability to tolerate adaptive positioning and pain with leaning forward. Pt reports using sock aide at home. Pt requires setup to don gown in prep for mobility. Pt stood with min A-CGA from EOB, then ambulated to bathroom with CGA. Pt urinated in standing with SBA. Pt then stepped to sinkside and performed jennifer bathing with SBA for safety. Pt experienced multiple episodes of Afib with RVR in standing, with HR highest read 130. Pt requires mod verbal cues for pacing technique and pursed lip breathing. Pt returned to bedside chair with CGA using straight cane. Pt left reclined in bedside chair. Pt remains limited 2/2 low endurance, impaired insight and acuity of illness. OT will see patient 5x/week while at [facility]. OT recommends d/c to  OT. PPE worn: mask, goggles and gloves.     Time Calculation:   Time Calculation- OT     Row Name 10/08/20 1420             Time Calculation- OT    OT Start Time  1044  -ERIC      OT Stop Time  1102  -ERIC      OT Time Calculation (min)  18 min  -ERIC      Total Timed Code Minutes- OT  18 minute(s)  -ERIC      OT Received On  10/08/20  -ERIC      OT - Next Appointment  10/09/20  -ERIC        User Key  (r) = Recorded By, (t) = Taken By, (c) = Cosigned By    Initials Name Provider Type    Jeanine Christie OT Occupational Therapist        Therapy Charges for Today     Code Description Service Date Service Provider Modifiers Qty    [id number]  OT THERAPEUTIC ACT EA 15 MIN 10/8/2020 Jeanine Carey OT GO 1    [id number]  OT SELF CARE/MGMT/TRAIN EA 15 MIN 10/8/2020 Jeanine Carey OT GO 1               Jeanine Carey OT  10/8/2020

## 2020-10-08 NOTE — PROGRESS NOTES
Continued Stay Note  [facility]     Patient Name: Carlos Whipple  MRN: [id number]  Today's Date: 10/8/2020    Admit Date: 10/4/2020    Discharge Plan     Row Name 10/08/20 1406       Plan    Plan  Home with hhc    Plan Comments  SW noted psychiatry evaluation, which states that pt is competent to make decisions for himself. SW met with the pt at the bedside wearing appropriate ppe (mask and goggles) to discuss rehab choices. Pt stated that he has a physical disability that is not going to improve and would like to consider LTC/nursing facility. SW discussed this level of care not being covered by his primary payer. SW offered to ask Med Assist to see the pt to screen him for Medicaid as this has not yet occurred. Pt was agreeable. Med Assist screened him and stated that he is currently over income. SW met the pt at the bedside wearing mask and goggles. SW attempted to obtain IP Rehab choices and explained that rehab facilities may rescreen the pt for eligibility for Medicaid, which could result in transition to LTC as income threshold is higher at [facility]. Pt stated that he would rather return home and receive therapy. CM to arrange HHC. CM and RN notified of pt's refusal of IP Rehab.        Zee Baptiste, [facility], LSW    Office: [phone]  Cell: [phone]  Fax: [phone]  E-mail: [email or web address]

## 2020-10-08 NOTE — PROGRESS NOTES
Continued Stay Note  [facility]     Patient Name: Carlos Whipple  MRN: [id number]  Today's Date: 10/8/2020    Admit Date: 10/4/2020    Discharge Plan     Row Name 10/08/20 1520       Plan    Plan  Referral made to [facility]    Plan Comments  SW spoke to patient and patient is not wanting IP Rehab - Was set up with [facility] in the past and they were not able to contact patient ( they are not able to accept at this time due to staffing issues) Referral made to [facility]    Row Name 10/08/20 1406       Plan    Plan  Home with c    Plan Comments  SW noted psychiatry evaluation, which states that pt is competent to make decisions for himself. SW met with the pt at the bedside wearing appropriate ppe (mask and goggles) to discuss rehab choices. Pt stated that he has a physical disability that is not going to improve and would like to consider LTC/nursing facility. SW discussed this level of care not being covered by his primary payer. SW offered to ask Med Assist to see the pt to screen him for Medicaid as this has not yet occurred. Pt was agreeable. Med Assist screened him and stated that he is currently over income. SW met the pt at the bedside wearing mask and goggles. SW attempted to obtain IP Rehab choices and explained that rehab facilities may rescreen the pt for eligibility for Medicaid, which could result in transition to LTC as income threshold is higher at [facility]. Pt stated that he would rather return home and receive therapy. JAMES to arrange HHC. CM and RN notified of pt's refusal of IP Rehab.    Mary Pro RN, CM  Office Phone [phone]  Cell [phone]
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
## 2020-10-09 LAB
BACTERIA SPEC AEROBE CULT: NORMAL
BASOPHILS # BLD AUTO: 0 10*3/MM3 (ref 0–0.2)
BASOPHILS NFR BLD AUTO: 0.4 % (ref 0–1.5)
DEPRECATED RDW RBC AUTO: 59.9 FL (ref 37–54)
EOSINOPHIL # BLD AUTO: 0.2 10*3/MM3 (ref 0–0.4)
EOSINOPHIL NFR BLD AUTO: 2.7 % (ref 0.3–6.2)
ERYTHROCYTE [DISTWIDTH] IN BLOOD BY AUTOMATED COUNT: 20.1 % (ref 12.3–15.4)
HCT VFR BLD AUTO: 43.1 % (ref 37.5–51)
HGB BLD-MCNC: 13.9 G/DL (ref 13–17.7)
LYMPHOCYTES # BLD AUTO: 1.4 10*3/MM3 (ref 0.7–3.1)
LYMPHOCYTES NFR BLD AUTO: 16.5 % (ref 19.6–45.3)
MAGNESIUM SERPL-MCNC: 2.6 MG/DL (ref 1.6–2.4)
MCH RBC QN AUTO: 27.5 PG (ref 26.6–33)
MCHC RBC AUTO-ENTMCNC: 32.2 G/DL (ref 31.5–35.7)
MCV RBC AUTO: 85.4 FL (ref 79–97)
MONOCYTES # BLD AUTO: 0.7 10*3/MM3 (ref 0.1–0.9)
MONOCYTES NFR BLD AUTO: 8 % (ref 5–12)
NEUTROPHILS NFR BLD AUTO: 6.2 10*3/MM3 (ref 1.7–7)
NEUTROPHILS NFR BLD AUTO: 72.4 % (ref 42.7–76)
NRBC BLD AUTO-RTO: 0 /100 WBC (ref 0–0.2)
PLATELET # BLD AUTO: 176 10*3/MM3 (ref 140–450)
PMV BLD AUTO: 7.9 FL (ref 6–12)
RBC # BLD AUTO: 5.05 10*6/MM3 (ref 4.14–5.8)
WBC # BLD AUTO: 8.6 10*3/MM3 (ref 3.4–10.8)

## 2020-10-09 PROCEDURE — 83735 ASSAY OF MAGNESIUM: CPT | Performed by: FAMILY MEDICINE

## 2020-10-09 PROCEDURE — 99232 SBSQ HOSP IP/OBS MODERATE 35: CPT | Performed by: INTERNAL MEDICINE

## 2020-10-09 PROCEDURE — 97110 THERAPEUTIC EXERCISES: CPT

## 2020-10-09 PROCEDURE — 85025 COMPLETE CBC W/AUTO DIFF WBC: CPT | Performed by: FAMILY MEDICINE

## 2020-10-09 PROCEDURE — 97116 GAIT TRAINING THERAPY: CPT

## 2020-10-09 PROCEDURE — 99232 SBSQ HOSP IP/OBS MODERATE 35: CPT | Performed by: PSYCHIATRY & NEUROLOGY

## 2020-10-09 RX ORDER — DILTIAZEM HYDROCHLORIDE 180 MG/1
180 CAPSULE, COATED, EXTENDED RELEASE ORAL
Qty: 90 CAPSULE | Refills: 3 | Status: SHIPPED | OUTPATIENT
Start: 2020-10-10 | End: 2021-02-04 | Stop reason: HOSPADM

## 2020-10-09 RX ORDER — BETAMETHASONE DIPROPIONATE 0.5 MG/G
1 CREAM TOPICAL 2 TIMES DAILY
Qty: 60 G | Refills: 3 | Status: SHIPPED | OUTPATIENT
Start: 2020-10-09

## 2020-10-09 RX ADMIN — HYDRALAZINE HYDROCHLORIDE 50 MG: 25 TABLET, FILM COATED ORAL at 08:27

## 2020-10-09 RX ADMIN — SODIUM BICARBONATE 650 MG TABLET 650 MG: at 17:25

## 2020-10-09 RX ADMIN — BETAMETHASONE DIPROPIONATE 1 APPLICATION: 0.5 CREAM TOPICAL at 20:55

## 2020-10-09 RX ADMIN — HYDRALAZINE HYDROCHLORIDE 50 MG: 25 TABLET, FILM COATED ORAL at 20:55

## 2020-10-09 RX ADMIN — RISPERIDONE 0.5 MG: 0.25 TABLET ORAL at 08:27

## 2020-10-09 RX ADMIN — SPIRONOLACTONE 12.5 MG: 25 TABLET, FILM COATED ORAL at 08:28

## 2020-10-09 RX ADMIN — LOSARTAN POTASSIUM 50 MG: 50 TABLET, FILM COATED ORAL at 08:27

## 2020-10-09 RX ADMIN — DILTIAZEM HYDROCHLORIDE 180 MG: 180 CAPSULE, COATED, EXTENDED RELEASE ORAL at 08:27

## 2020-10-09 RX ADMIN — SODIUM BICARBONATE 650 MG TABLET 650 MG: at 08:27

## 2020-10-09 RX ADMIN — ALLOPURINOL 100 MG: 100 TABLET ORAL at 08:27

## 2020-10-09 RX ADMIN — Medication 10 ML: at 20:55

## 2020-10-09 RX ADMIN — ASPIRIN 81 MG: 81 TABLET, COATED ORAL at 08:27

## 2020-10-09 RX ADMIN — BETAMETHASONE DIPROPIONATE 1 APPLICATION: 0.5 CREAM TOPICAL at 08:30

## 2020-10-09 RX ADMIN — RISPERIDONE 0.5 MG: 0.25 TABLET ORAL at 20:55

## 2020-10-09 RX ADMIN — RIVAROXABAN 20 MG: 20 TABLET, FILM COATED ORAL at 17:24

## 2020-10-09 RX ADMIN — HYDRALAZINE HYDROCHLORIDE 50 MG: 25 TABLET, FILM COATED ORAL at 17:24

## 2020-10-09 RX ADMIN — SODIUM BICARBONATE 650 MG TABLET 650 MG: at 20:55

## 2020-10-09 RX ADMIN — METOPROLOL SUCCINATE 50 MG: 50 TABLET, EXTENDED RELEASE ORAL at 08:28

## 2020-10-09 RX ADMIN — ATORVASTATIN CALCIUM 10 MG: 10 TABLET, FILM COATED ORAL at 20:55

## 2020-10-09 RX ADMIN — Medication 10 ML: at 08:30

## 2020-10-09 RX ADMIN — LABETALOL 20 MG/4 ML (5 MG/ML) INTRAVENOUS SYRINGE 20 MG: at 22:47

## 2020-10-09 NOTE — PLAN OF CARE
Problem: Adult Inpatient Plan of Care  Goal: Plan of Care Review  Outcome: Ongoing, Progressing  Flowsheets (Taken 10/9/2020 1613)  Plan of Care Reviewed With: patient  Outcome Summary: Pt with some confusion this date about why he was in the hospital, but was willing to participate with therapy. Pt instructed with ther ex and demonstrated fair understanding VC for encouragement. Pt then ambulated in room with CGA/MIN A using SPC. Pt with increase in back pain relived by sitting. Recommendation remains IP rehab due to pt not safe for return home. PPE worn includes gloves and mask with goggles

## 2020-10-09 NOTE — PROGRESS NOTES
Continued Stay Note  [facility]     Patient Name: Carlos Whipple  MRN: [id number]  Today's Date: 10/9/2020    Admit Date: 10/4/2020    Discharge Plan     Row Name 10/09/20 1241       Plan    Plan  Patient has been accepted to [facility] but is now willing to go to rehab -  ABHILASH informed -Pending referral and precert      Plan Comments  CM spoke to patient today and he is willing to go to rehab now. Wants to go to a [facility] facility (pending which facility is in network with patient's insurance)   ABHILASH informed          Expected Discharge Date and Time     Expected Discharge Date Expected Discharge Time    Oct 9, 2020           Mary Pro RN, CM  Office Phone [phone]  Cell [phone]

## 2020-10-09 NOTE — PROGRESS NOTES
Continued Stay Note  [facility]     Patient Name: Carlos Whipple  MRN: [id number]  Today's Date: 10/9/2020    Admit Date: 10/4/2020    Discharge Plan     Row Name 10/09/20 1549       Plan    Plan  Referrals pending for (1) [facility] and (2) [facility]. Pre-cert needed. PASRR approved.    Plan Comments  SW completed PASRR and it is approved.    Row Name 10/09/20 3079       Plan    Plan  Referrals pending for (1) [facility] and (2) [facility]. PASRR and pre-cert needed.    Plan Comments  SW met with the pt at the bedside wearing appropriate ppe (mask and goggles). SW again explained PT recommendation of IP Rehab. SW reviewed ECF list with pt and pt requested referrals to (1) [facility] and (2) [facility]. SW made referrals via text and Epic. ABHILASH also requested with pt's consent for [facility] liaison to screen pt for Medicaid at the facility.        Zee Baptiste, [facility]ant[facility], LSW    Office: [phone]  Cell: [phone]  Fax: [phone]  E-mail: [email or web address]

## 2020-10-09 NOTE — DISCHARGE PLACEMENT REQUEST
"Carlos Whipple (65 y.o. Male)     Date of Birth Social Security Number Address Home Phone MRN    [id number]  [id number]  [address]  [place] [facility] [phone] [id number]    Buddhism Marital Status          Mormon Single       Admission Date Admission Type Admitting Provider Attending Provider Department, Room/Bed    10/4/20 Emergency Dre Stauffer MD Heimer, Brian T, MD [facility] NEURO HEART, 265/1    Discharge Date Discharge Disposition Discharge Destination         Rehab Facility or Unit (DC - External)              Attending Provider: Dre Stauffer MD    Allergies: Ketorolac Tromethamine    Isolation: None   Infection: None   Code Status: CPR    Ht: 182.9 cm (72.01\")   Wt: 120 kg (263 lb 10.7 oz)    Admission Cmt: None   Principal Problem: Atrial fibrillation with RVR (CMS/[facility]) [I48.91]                 Active Insurance as of 10/4/2020     Primary Coverage     Payor Plan Insurance Group Employer/Plan Group    HUMANA MEDICARE REPLACEMENT HUMANA MEDICARE REPLACEMENT [id number]     Payor Plan Address Payor Plan Phone Number Payor Plan Fax Number Effective Dates    [address] [address] [phone]  4/1/2020 - None Entered    [facility] [id number]       Subscriber Name Subscriber Birth Date Member ID       CARLOS WHIPPLE 1955 [id number]                 Emergency Contacts      (Rel.) Home Phone Work Phone Mobile Phone    KACEY DOW (Friend) [phone] -- [phone]               History & Physical      Marisol Larson APRN at 10/05/20 1306     Attestation signed by Paty Polk DO at 10/05/20 1607     I have reviewed the above documentation also personally reviewed the orders with the nurse practitioner. I am in agreement with all of the above.                        Patient Care Team:  Marisol Larson APRN as PCP - Shwetha Guadarrama MD as Consulting Physician (Nephrology)  Edy Thomas MD as Consulting Physician (Cardiology)    Chief " complaint shortness of breath    Subjective     HPI obtained from chart review as patient is currently very drowsy and intermittently confused. Patient was brought to the emergency room yesterday evening via ambulance for shortness of breath and palpitations.  He was found to be in A. fib with RVR yet again.  It is unclear how long he has been without his medications this time.  He was recently discharged from the hospital with the same problem last time.  Apparently last night he became quite agitated and aggressive and was attempting to hit and kick the nurses along with pulling his IV out.  He was put in restraints overnight.  He is currently out of wrist restraints and sleeping well.  He is still in A. fib with rate in the 130s on a Cardizem drip.      Review of Systems   Unable to perform ROS: Mental status change        Past Medical History:   Diagnosis Date   • A-fib (CMS/[facility])    • Atrial fibrillation with RVR (CMS/[facility])    • CHF (congestive heart failure) (CMS/[facility])    • Chronic atrial fibrillation (CMS/[facility]) 11/8/2019   • Chronic diastolic CHF (congestive heart failure) (CMS/[facility]) 11/8/2019   • CKD (chronic kidney disease) stage 3, GFR 30-59 ml/min 11/8/2019   • Coronary artery disease involving native coronary artery of native heart without angina pectoris 11/8/2019   • DDD (degenerative disc disease), lumbosacral 11/8/2019   • Essential hypertension 11/8/2019   • Gambling disorder, persistent 11/8/2019   • GERD (gastroesophageal reflux disease) 11/8/2019   • History of CVA (cerebrovascular accident) 11/8/2019   • Hyperlipidemia    • Hypertension    • Medically noncompliant 11/8/2019   • Peripheral polyneuropathy 11/8/2019   • Psoriasis 11/8/2019   • Stroke (CMS/[facility])      Past Surgical History:   Procedure Laterality Date   • CARDIAC CATHETERIZATION     • CARDIAC CATHETERIZATION N/A 12/10/2019    Procedure: Left Heart Cath and coronary angiogram;  Surgeon: Edy Thomas MD;  Location: [facility] INVASIVE  LOCATION;  Service: Cardiovascular     Family History   Problem Relation Age of Onset   • Heart disease Mother      Social History     Tobacco Use   • Smoking status: Never Smoker   • Smokeless tobacco: Never Used   Substance Use Topics   • Alcohol use: No     Frequency: Never   • Drug use: No     Medications Prior to Admission   Medication Sig Dispense Refill Last Dose   • aspirin 81 MG EC tablet Take 81 mg by mouth Daily.      • losartan (COZAAR) 50 MG tablet Take 1 tablet by mouth Daily. 30 tablet 1    • allopurinol (ZYLOPRIM) 100 MG tablet Take 1 tablet by mouth Daily. 30 tablet 1    • bumetanide (BUMEX) 1 MG tablet Take 1 tablet by mouth Daily. 30 tablet 1    • hydrALAZINE (APRESOLINE) 100 MG tablet Take 1 tablet by mouth 3 (Three) Times a Day. 90 tablet 1    • Metoprolol Succinate 50 MG capsule extended-release 24 hour sprinkle Take 50 mg by mouth Daily. 30 capsule 1    • pravastatin (PRAVACHOL) 40 MG tablet Take 40 mg by mouth Every Night.      • risperiDONE (risperDAL) 0.5 MG tablet Take 1 tablet by mouth Every 12 (Twelve) Hours. 60 tablet 1    • rivaroxaban (Xarelto) 20 MG tablet Take 1 tablet by mouth Daily With Dinner for 30 days. 30 tablet 5    • sodium bicarbonate 650 MG tablet Take 1 tablet by mouth 3 (Three) Times a Day. 90 tablet 1    • spironolactone (ALDACTONE) 25 MG tablet Take 0.5 tablets by mouth Daily. 15 tablet 1      Allergies:  Ketorolac tromethamine    Objective      Vital Signs  Temp:  [97.6 °F (36.4 °C)-97.8 °F (36.6 °C)] 97.6 °F (36.4 °C)  Heart Rate:  [] 97  Resp:  [15-21] 16  BP: (106-212)/() 128/83    Physical Exam  Vitals signs and nursing note reviewed.   Constitutional:       General: He is sleeping.      Appearance: He is overweight.   Cardiovascular:      Rate and Rhythm: Tachycardia present. Rhythm irregular.   Pulmonary:      Effort: Pulmonary effort is normal.      Breath sounds: Normal breath sounds.   Skin:     General: Skin is warm and dry.   Neurological:       Mental Status: He is disoriented.         Results Review:  Lab Results (last 24 hours)     Procedure Component Value Units Date/Time    BUN [[id number]]  (Abnormal) Collected: 10/05/20 0611    Specimen: Blood Updated: 10/05/20 1045     BUN 32 mg/dL     Comprehensive Metabolic Panel [[id number]]  (Abnormal) Collected: 10/05/20 0611    Specimen: Blood Updated: 10/05/20 0720     Glucose 92 mg/dL      BUN --     Comment: Testing performed by alternate method        Creatinine 1.20 mg/dL      Sodium 145 mmol/L      Potassium 3.2 mmol/L      Chloride 109 mmol/L      CO2 22.0 mmol/L      Calcium 8.6 mg/dL      Total Protein 6.4 g/dL      Albumin 3.50 g/dL      ALT (SGPT) 453 U/L      AST (SGOT) 175 U/L      Alkaline Phosphatase 71 U/L      Total Bilirubin 1.1 mg/dL      eGFR Non African Amer 61 mL/min/1.73      Globulin 2.9 gm/dL      A/G Ratio 1.2 g/dL      BUN/Creatinine Ratio --     Comment: Testing not performed        Anion Gap 14.0 mmol/L     Narrative:      GFR Normal >60  Chronic Kidney Disease <60  Kidney Failure <15      Magnesium [[id number]]  (Normal) Collected: 10/05/20 0611    Specimen: Blood Updated: 10/05/20 0720     Magnesium 1.9 mg/dL     CBC & Differential [[id number]]  (Abnormal) Collected: 10/05/20 0611    Specimen: Blood Updated: 10/05/20 0620    Narrative:      The following orders were created for panel order CBC & Differential.  Procedure                               Abnormality         Status                     ---------                               -----------         ------                     CBC Auto Differential[[id number]]        Abnormal            Final result                 Please view results for these tests on the individual orders.    CBC Auto Differential [[id number]]  (Abnormal) Collected: 10/05/20 0611    Specimen: Blood Updated: 10/05/20 0620     WBC 9.30 10*3/mm3      RBC 4.65 10*6/mm3      Hemoglobin 12.6 g/dL      Hematocrit 38.9 %      MCV 83.7 fL      MCH 27.0 pg      MCHC 32.3  g/dL      RDW 19.5 %      RDW-SD 56.4 fl      MPV 8.6 fL      Platelets 144 10*3/mm3      Neutrophil % 74.7 %      Lymphocyte % 14.1 %      Monocyte % 8.9 %      Eosinophil % 1.0 %      Basophil % 1.3 %      Neutrophils, Absolute 6.90 10*3/mm3      Lymphocytes, Absolute 1.30 10*3/mm3      Monocytes, Absolute 0.80 10*3/mm3      Eosinophils, Absolute 0.10 10*3/mm3      Basophils, Absolute 0.10 10*3/mm3      nRBC 0.3 /100 WBC     Troponin [[id number]]  (Normal) Collected: 10/04/20 2151    Specimen: Blood Updated: 10/04/20 2242     Troponin T 0.016 ng/mL     Narrative:      Troponin T Reference Range:  <= 0.03 ng/mL-   Negative for AMI  >0.03 ng/mL-     Abnormal for myocardial necrosis.  Clinicians would have to utilize clinical acumen, EKG, Troponin and serial changes to determine if it is an Acute Myocardial Infarction or myocardial injury due to an underlying chronic condition.       Results may be falsely decreased if patient taking Biotin.      TSH [[id number]]  (Normal) Collected: 10/04/20 1355    Specimen: Blood Updated: 10/04/20 1747     TSH 2.970 uIU/mL     Lipid Panel [[id number]]  (Abnormal) Collected: 10/04/20 1355    Specimen: Blood Updated: 10/04/20 1742     Total Cholesterol 108 mg/dL      Triglycerides 79 mg/dL      HDL Cholesterol 22 mg/dL      LDL Cholesterol  70 mg/dL      VLDL Cholesterol 15.8 mg/dL      LDL/HDL Ratio 3.19    Narrative:      Cholesterol Reference Ranges  (U.S. Department of Health and Human Services ATP III Classifications)    Desirable          <200 mg/dL  Borderline High    200-239 mg/dL  High Risk          >240 mg/dL      Triglyceride Reference Ranges  (U.S. Department of Health and Human Services ATP III Classifications)    Normal           <150 mg/dL  Borderline High  150-199 mg/dL  High             200-499 mg/dL  Very High        >500 mg/dL    HDL Reference Ranges  (U.S. Department of Health and Human Services ATP III Classifcations)    Low     <40 mg/dl (major risk factor for  CHD)  High    >60 mg/dl ('negative' risk factor for CHD)        LDL Reference Ranges  (U.S. Department of Health and Human Services ATP III Classifcations)    Optimal          <100 mg/dL  Near Optimal     100-129 mg/dL  Borderline High  130-159 mg/dL  High             160-189 mg/dL  Very High        >189 mg/dL    Magnesium [[id number]]  (Normal) Collected: 10/04/20 1355    Specimen: Blood Updated: 10/04/20 1742     Magnesium 2.3 mg/dL     POC Glucose Once [[id number]]  (Normal) Collected: 10/04/20 1700    Specimen: Blood Updated: 10/04/20 1701     Glucose 90 mg/dL      Comment: Serial Number: [id number]:  [id number]       Troponin [[id number]]  (Normal) Collected: 10/04/20 1355    Specimen: Blood Updated: 10/04/20 1553     Troponin T 0.010 ng/mL     Narrative:      Troponin T Reference Range:  <= 0.03 ng/mL-   Negative for AMI  >0.03 ng/mL-     Abnormal for myocardial necrosis.  Clinicians would have to utilize clinical acumen, EKG, Troponin and serial changes to determine if it is an Acute Myocardial Infarction or myocardial injury due to an underlying chronic condition.       Results may be falsely decreased if patient taking Biotin.      BNP [[id number]]  (Abnormal) Collected: 10/04/20 1355    Specimen: Blood Updated: 10/04/20 1541     proBNP 21,355.0 pg/mL     Narrative:      Among patients with dyspnea, NT-proBNP is highly sensitive for the detection of acute congestive heart failure. In addition NT-proBNP of <300 pg/ml effectively rules out acute congestive heart failure with 99% negative predictive value.    Results may be falsely decreased if patient taking Biotin.      [place] Draw [[id number]] Collected: 10/04/20 1353    Specimen: Blood Updated: 10/04/20 1501    Narrative:      The following orders were created for panel order [place] Draw.  Procedure                               Abnormality         Status                     ---------                               -----------         ------                      Light Blue Top[[id number]]                                   Final result               Green Top (Gel)[[id number]]                                  Final result               Lavender Top[[id number]]                                     Final result               Gold Top - SST[[id number]]                                   Final result                 Please view results for these tests on the individual orders.    Light Blue Top [[id number]] Collected: 10/04/20 1353    Specimen: Blood Updated: 10/04/20 1501     Extra Tube hold for add-on     Comment: Auto resulted       Green Top (Gel) [[id number]] Collected: 10/04/20 1353    Specimen: Blood Updated: 10/04/20 1501     Extra Tube Hold for add-ons.     Comment: Auto resulted.       Lavender Top [[id number]] Collected: 10/04/20 1353    Specimen: Blood Updated: 10/04/20 1501     Extra Tube hold for add-on     Comment: Auto resulted       POC Lactate [[id number]]  (Normal) Collected: 10/04/20 1412    Specimen: Blood Updated: 10/04/20 1454     Lactate 1.6 mmol/L      Comment: Serial Number: [id number]:  [id number]       COVID-19,[facility] IN-HOUSE,Nasal Swab No Transport Media 3-4 HR TAT - Swab, Nasal Cavity [[id number]]  (Normal) Collected: 10/04/20 1355    Specimen: Swab from Nasal Cavity Updated: 10/04/20 1448     COVID19 Not Detected    Narrative:      Fact sheet for providers: https://www.fda.gov/media/246774/download     Fact sheet for patients: https://www.fda.gov/media/336243/download    BUN [[id number]]  (Abnormal) Collected: 10/04/20 1353    Specimen: Blood Updated: 10/04/20 1443     BUN 33 mg/dL     Comprehensive Metabolic Panel [[id number]]  (Abnormal) Collected: 10/04/20 1353    Specimen: Blood Updated: 10/04/20 1438     Glucose 134 mg/dL      BUN --     Comment: Testing performed by alternate method        Creatinine 1.40 mg/dL      Sodium 142 mmol/L      Potassium 4.2 mmol/L      Chloride 108 mmol/L      CO2 20.0 mmol/L      Calcium 9.0 mg/dL      Total  Protein 6.9 g/dL      Albumin 3.80 g/dL      ALT (SGPT) 625 U/L      AST (SGOT) 433 U/L      Alkaline Phosphatase 79 U/L      Total Bilirubin 1.2 mg/dL      eGFR Non African Amer 51 mL/min/1.73      Globulin 3.1 gm/dL      A/G Ratio 1.2 g/dL      BUN/Creatinine Ratio --     Comment: Testing not performed        Anion Gap 14.0 mmol/L     Narrative:      GFR Normal >60  Chronic Kidney Disease <60  Kidney Failure <15      Gold Top - SST [[id number]] Collected: 10/04/20 1353    Specimen: Blood Updated: 10/04/20 1423    CBC & Differential [[id number]]  (Abnormal) Collected: 10/04/20 1353    Specimen: Blood Updated: 10/04/20 1419    Narrative:      The following orders were created for panel order CBC & Differential.  Procedure                               Abnormality         Status                     ---------                               -----------         ------                     CBC Auto Differential[[id number]]        Abnormal            Final result                 Please view results for these tests on the individual orders.    CBC Auto Differential [[id number]]  (Abnormal) Collected: 10/04/20 1353    Specimen: Blood Updated: 10/04/20 1419     WBC 11.80 10*3/mm3      RBC 4.84 10*6/mm3      Hemoglobin 13.1 g/dL      Hematocrit 40.8 %      MCV 84.3 fL      MCH 27.1 pg      MCHC 32.1 g/dL      RDW 19.3 %      RDW-SD 56.4 fl      MPV 9.4 fL      Platelets 162 10*3/mm3      Neutrophil % 78.0 %      Lymphocyte % 13.4 %      Monocyte % 8.2 %      Eosinophil % 0.0 %      Basophil % 0.4 %      Neutrophils, Absolute 9.20 10*3/mm3      Lymphocytes, Absolute 1.60 10*3/mm3      Monocytes, Absolute 1.00 10*3/mm3      Eosinophils, Absolute 0.00 10*3/mm3      Basophils, Absolute 0.10 10*3/mm3      nRBC 0.8 /100 WBC     Blood Culture - Blood, Arm, Left [[id number]] Collected: 10/04/20 1410    Specimen: Blood from Arm, Left Updated: 10/04/20 1414    Blood Culture - Blood, Foot, Left [[id number]] Collected: 10/04/20 1353    Specimen:  Blood from Foot, Left Updated: 10/04/20 1414    Blood Gas, Arterial [[id number]]  (Abnormal) Collected: 10/04/20 1357    Specimen: Arterial Blood Updated: 10/04/20 1400     Site Right Brachial     Bj's Test N/A     pH, Arterial 7.506 pH units      pCO2, Arterial 23.3 mm Hg      pO2, Arterial 70.2 mm Hg      HCO3, Arterial 18.4 mmol/L      Base Excess, Arterial -3.1 mmol/L      Comment: Serial Number: [id number]:  [id number]        O2 Saturation, Arterial 95.8 %      CO2 Content 19.1 mmol/L      Barometric Pressure for Blood Gas --     Comment: N/A        Modality Room Air     FIO2 21 %      Hemodilution No         Imaging Results (Last 24 Hours)     Procedure Component Value Units Date/Time    XR Chest 1 View [[id number]] Collected: 10/04/20 1445     Updated: 10/04/20 1448    Narrative:      XR CHEST 1 VW-     Date of Exam: 10/4/2020 2:21 PM     Indication: Dyspnea.     Comparison: 09/01/2020     Technique: A single view of the chest was obtained.     FINDINGS:      There is marked cardiomegaly unchanged from prior study.  There is  persistent pulmonary vascular congestion.  Lungs are clear.  No definite  pleural effusion identified.             Impression:         Marked cardiomegaly and pulmonary vascular congestion similar prior  studies.  No focal airspace consolidation.        Electronically Signed By-Elias Rascon On:10/4/2020 2:46 PM  This report was finalized on [id number] by  Elias Rascon, .           I reviewed the patient's new clinical results.      Assessment/Plan       Atrial fibrillation with RVR (CMS/[facility])    Hypertensive emergency    Chronic atrial fibrillation (CMS/[facility])    Chronic diastolic CHF (congestive heart failure) (CMS/[facility])    CKD (chronic kidney disease) stage 3, GFR 30-59 ml/min    Essential hypertension    Gambling disorder, persistent    Coronary artery disease involving native coronary artery of native heart without angina pectoris    Psoriasis    History of CVA (cerebrovascular  accident)    GERD (gastroesophageal reflux disease)    Medically noncompliant    Mild cognitive impairment    Dyspnea          Plan:   (Await cardiology consult. His liver enzymes are elevated and haven't been in the past. RN reports that he hasn't complained of any abdominal pain. Check additional labs and monitor closely. ).       I discussed the patients findings and my recommendations with nursing staff and primary care team    CLARISSA Meraz  10/05/20  13:06 EDT        Electronically signed by Paty Polk DO at 10/05/20 1607       {Outbreak/Travel/Exposure Documentation......;  Question Available Choices Patient Response   Outbreak Screen: Do you currently have a new onset of the following symptoms?        Fever/Chils, Cough, Shortness of air, Loss of taste or smell, No, Unknown  (!) Cough;Shortness of Air (10/04/20 1256)   Outbreak Screen: In the last 14 days, have you had contact with anyone who is ill, has show any of the symptoms listed above and/or has been diagnosis with the 2019 Novel Coronavirus? This includes any immediate household members but excludes any patients with whom you have been in contact within your normal work duties wearing proper PPE, if you are a healthcare worker.  Yes, No, Unknown              No (10/04/20 1256)   Outbreak Screen: Who was notified?    Free text  Daisy Prado RN (10/04/20 1256)   Travel Screen: Have you traveled in the last month? If so, to what country have you traveled? If [place] what state? Yes, No, Unknown  List of all countries  List of all States No (10/04/20 1254)  (not recorded)  (not recorded)   Infection Risk: Do you currently have the following symptoms?  (If cough is selected, the Tuberculosis Screen is performed.) Cough, Fever, Rash, No (!) Cough (10/04/20 1254)   Tuberculosis Screen: Do you have any of the following Tuberculosis Risks?  · Have you lived or spent time with anyone who had or may have TB?  · Have you lived in or visited any of the  following areas for more than one month: [place], [place], [place], [place] or [place], [place] or [place]?  · Do you have HIV/AIDS?  · Have you lived in or worked in a nursing home, homeless shelter, correctional facility, or substance abuse treatment facility?   · No    If Yes do you have any of the following symptoms? Yes responses display to the right    If Yes, symptoms listed are:  Cough greater than or equal to 3 weeks, Loss of appetite, Unexplained weight loss, Night sweats, Bloody sputum or hemoptysis, Hoarseness, Fever, Fatigue, Chest pain, No No (10/04/20 1254)  (not recorded)   Exposure Screen: Have you been exposed to any of these contagious diseases in the last month? Measles, Chickenpox, Meningitis, Pertussis, Whooping Cough, No No (10/04/20 1254)       Current Facility-Administered Medications   Medication Dose Route Frequency Provider Last Rate Last Dose   • acetaminophen (TYLENOL) tablet 650 mg  650 mg Oral Q4H PRN Paty Polk DO        Or   • acetaminophen (TYLENOL) 160 MG/5ML solution 650 mg  650 mg Oral Q4H PRN Paty Polk DO        Or   • acetaminophen (TYLENOL) suppository 650 mg  650 mg Rectal Q4H PRN Paty Polk DO       • allopurinol (ZYLOPRIM) tablet 100 mg  100 mg Oral Daily Paty Polk DO   100 mg at 10/09/20 0827   • aspirin EC tablet 81 mg  81 mg Oral Daily Paty Polk DO   81 mg at 10/09/20 0827   • atorvastatin (LIPITOR) tablet 10 mg  10 mg Oral Nightly Paty Polk DO   10 mg at 10/08/20 2110   • betamethasone dipropionate 0.05 % cream 1 application  1 application Topical Q12H Paty Polk DO   1 application at 10/09/20 0830   • bisacodyl (DULCOLAX) suppository 10 mg  10 mg Rectal Daily PRN Paty Polk DO       • calcium carbonate (TUMS) chewable tablet 500 mg (200 mg elemental)  2 tablet Oral BID PRN Paty Polk DO       • dilTIAZem CD (CARDIZEM CD) 24 hr capsule 180 mg  180 mg Oral Q24H Edy Thomas MD   180 mg at 10/09/20 0890   •  hydrALAZINE (APRESOLINE) tablet 50 mg  50 mg Oral TID Edy Thomas MD   50 mg at 10/09/20 0827   • HYDROcodone-acetaminophen (NORCO) 7.5-325 MG per tablet 1 tablet  1 tablet Oral Q4H PRN Paty Polk DO   1 tablet at 10/05/20 1803   • influenza vac split quad (FLUZONE,FLUARIX,AFLURIA,FLULAVAL) injection 0.5 mL  0.5 mL Intramuscular During Hospitalization Paty Polk DO       • labetalol (NORMODYNE,TRANDATE) injection 20 mg  20 mg Intravenous Q6H PRN Dre Stauffer MD   20 mg at 10/06/20 0632   • LORazepam (ATIVAN) injection 1 mg  1 mg Intramuscular Q8H PRN Paty Polk DO   1 mg at 10/04/20 2225   • losartan (COZAAR) tablet 50 mg  50 mg Oral Q24H Paty Polk DO   50 mg at 10/09/20 0827   • magnesium hydroxide (MILK OF MAGNESIA) suspension 2400 mg/10mL 10 mL  10 mL Oral Daily PRN Paty Polk DO       • melatonin tablet 5 mg  5 mg Oral Nightly PRN Paty Polk DO       • metoprolol succinate XL (TOPROL-XL) 24 hr tablet 50 mg  50 mg Oral Q24H Paty Polk DO   50 mg at 10/09/20 0828   • nitroglycerin (NITROSTAT) SL tablet 0.4 mg  0.4 mg Sublingual Q5 Min PRN Paty Polk DO       • ondansetron (ZOFRAN) tablet 4 mg  4 mg Oral Q6H PRN Paty Polk DO        Or   • ondansetron (ZOFRAN) injection 4 mg  4 mg Intravenous Q6H PRN Paty Polk DO       • potassium chloride (K-DUR,KLOR-CON) CR tablet 40 mEq  40 mEq Oral PRN Dre Stauffer MD   40 mEq at 10/05/20 1536    Or   • potassium chloride (KLOR-CON) packet 40 mEq  40 mEq Oral PRN Dre Stauffer MD        Or   • potassium chloride 10 mEq in 100 mL IVPB  10 mEq Intravenous Q1H PRDre Ratliff MD       • risperiDONE (risperDAL) tablet 0.5 mg  0.5 mg Oral Q12H Paty Polk DO   0.5 mg at 10/09/20 0827   • rivaroxaban (XARELTO) tablet 20 mg  20 mg Oral Daily With Dinner Paty Polk DO   20 mg at 10/08/20 1735   • sodium bicarbonate tablet 650 mg  650 mg Oral TID Paty Polk DO   650 mg at 10/09/20 0827   • sodium  chloride 0.9 % flush 10 mL  10 mL Intravenous PRN Paty Polk A, DO       • sodium chloride 0.9 % flush 10 mL  10 mL Intravenous Q12H Paty Polk DO   10 mL at 10/09/20 0830   • sodium chloride 0.9 % flush 10 mL  10 mL Intravenous PRN Paty Polk A, DO       • spironolactone (ALDACTONE) tablet 12.5 mg  12.5 mg Oral Daily Paty Polk DO   12.5 mg at 10/09/20 0828        Physician Progress Notes (most recent note)      Edy Thomas MD at 10/09/20 1227          Referring Provider: Dr. Stauffer    Reason for follow-up: Fibrillation     Patient Care Team:  Marisol Larson APRN as PCP - General  Shwetha Barber MD as Consulting Physician (Nephrology)  Edy Thomas MD as Consulting Physician (Cardiology)    Subjective .  No shortness of breath or palpitations today    Objective  Lying in bed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                   Point: Precautions (Done)     Learning Progress Summary           Patient Acceptance, E, VU,NR by  at 10/7/2020 1451    Comment: Reinforced out of bed c staff for safety.    Acceptance, E,TB, VU by EL at 10/6/2020 1608    Acceptance, E,TB, VU,NR by MARY at 10/5/2020 1316    Acceptance, E, VU by  at 10/5/2020 0830                               User Key     Initials Effective Dates Name Provider Type [facility] 03/01/19 -  Amena Hassan PTA  Physical Therapy Assistant PT     20 -  Dung Win, PT Physical Therapist PT     19 -  Zuleika White, RN Registered Nurse Nurse     06/15/19 -  Sofy Matt PT Physical Therapist PT              PT Recommendation and Plan     Progress: improving  Outcome Summary: Required min A for safe, functional mobility. Struggling to eob, c/o dizziness intially. Min A to stand and amb. 40' in room, HHA c utilized gait belt. Instabilty/weakness present, falls risk. Unable to accept challenge, wiill progress as able. Will need rehab at d/c to address deficits. PPE worn: Mask, gloves, shield.     Time Calculation:   PT Charges     Row Name 10/07/20 1453             Time Calculation    Start Time  1040  -      Stop Time  1105  -      Time Calculation (min)  25 min  -      PT Received On  10/07/20  -      PT - Next Appointment  10/08/20  -         Timed Charges    24539 - PT Therapeutic Exercise Minutes  15  -      51256 - Gait Training Minutes   10  -        User Key  (r) = Recorded By, (t) = Taken By, (c) = Cosigned By    Initials Name Provider Type     Amena Hassan PTA Physical Therapy Assistant        Therapy Charges for Today     Code Description Service Date Service Provider Modifiers Qty    24689958073 HC PT THER PROC EA 15 MIN 10/7/2020 Amena Hassan PTA GP 1    77388003605 HC GAIT TRAINING EA 15 MIN 10/7/2020 Amena Hassan PTA GP 1          PT G-Codes  Outcome Measure Options: AM-PAC 6 Clicks Basic Mobility (PT), Modified [place]  AM-PAC 6 Clicks Score (PT): 18  Modified [place] Scale: 4 - Moderately severe disability.  Unable to walk without assistance, and unable to attend to own bodily needs without assistance.    Amena Hassan PTA  10/7/2020      Electronically signed by Amena Hassan PTA at 10/07/20 1454          Occupational Therapy Notes (most recent note)      Jeanine Carey OT at 10/08/20 1421          Patient Name: Carlos Whipple  : 1955    MRN: [id number]                               Today's Date: 10/8/2020       Admit Date: 10/4/2020    Visit Dx:     ICD-10-CM ICD-9-CM   1. Dyspnea, unspecified type  R06.00 786.09   2. Atrial fibrillation with RVR (CMS/HCC)  I48.91 427.31     Patient Active Problem List   Diagnosis   • Hypertensive emergency   • Chronic atrial fibrillation (CMS/HCC)   • Chronic diastolic CHF (congestive heart failure) (CMS/HCC)   • CKD (chronic kidney disease) stage 3, GFR 30-59 ml/min   • Essential hypertension   • Gambling disorder, persistent   • Shortness of breath   • Coronary artery disease involving native coronary artery of native heart without angina pectoris   • Psoriasis   • History of CVA (cerebrovascular accident)   • DDD (degenerative disc disease), lumbosacral   • Peripheral polyneuropathy   • GERD (gastroesophageal reflux disease)   • Medically noncompliant   • Acute respiratory distress   • Unstable angina (CMS/HCC)   • Cervical disc disorder with radiculopathy   • Completed stroke (CMS/HCC)   • Degeneration of intervertebral disc   • Excessive anticoagulation   • Impaired left ventricular function   • Liver lesion   • Lumbar radiculopathy   • Cervical myelopathy (CMS/HCC)   • Lumbosacral radiculopathy   • Spinal stenosis of lumbar region   • Obesity   • Mild cognitive impairment   • Thoracic back pain   • Thoracic disc disease with myelopathy   • Paroxysmal atrial fibrillation (CMS/HCC)   • Acute congestive heart failure (CMS/HCC)   • Atrial fibrillation with RVR (CMS/HCC)   • Dermatitis associated with moisture   • Dyspnea     Past Medical History:   Diagnosis Date   • A-fib (CMS/HCC)    • Atrial fibrillation with RVR (CMS/HCC)    • CHF (congestive heart failure) (CMS/HCC)    • Chronic atrial fibrillation (CMS/HCC) 11/8/2019   • Chronic diastolic CHF (congestive heart failure) (CMS/HCC) 11/8/2019   • CKD (chronic kidney disease) stage 3, GFR 30-59 ml/min 11/8/2019   • Coronary artery disease involving native coronary artery of native heart  without angina pectoris 11/8/2019   • DDD (degenerative disc disease), lumbosacral 11/8/2019   • Essential hypertension 11/8/2019   • Gambling disorder, persistent 11/8/2019   • GERD (gastroesophageal reflux disease) 11/8/2019   • History of CVA (cerebrovascular accident) 11/8/2019   • Hyperlipidemia    • Hypertension    • Medically noncompliant 11/8/2019   • Peripheral polyneuropathy 11/8/2019   • Psoriasis 11/8/2019   • Stroke (CMS/HCC)      Past Surgical History:   Procedure Laterality Date   • CARDIAC CATHETERIZATION     • CARDIAC CATHETERIZATION N/A 12/10/2019    Procedure: Left Heart Cath and coronary angiogram;  Surgeon: Edy Thomas MD;  Location: [facility] CATH INVASIVE LOCATION;  Service: Cardiovascular     General Information     Row Name 10/08/20 1410          OT Time and Intention    Document Type  therapy note (daily note)  -ERIC     Mode of Treatment  occupational therapy  -ERIC     Row Name 10/08/20 1410          General Information    Patient Profile Reviewed  yes  -ERIC     Existing Precautions/Restrictions  fall  -ERIC     Barriers to Rehab  previous functional deficit  -ERIC     Row Name 10/08/20 1410          Cognition    Orientation Status (Cognition)  oriented x 3  -ERIC     Row Name 10/08/20 1410          Safety Issues, Functional Mobility    Safety Issues Affecting Function (Mobility)  insight into deficits/self-awareness;safety precaution awareness;safety precautions follow-through/compliance  -ERIC     Impairments Affecting Function (Mobility)  balance;endurance/activity tolerance;pain;postural/trunk control;shortness of breath;cognition  -ERIC     Cognitive Impairments, Mobility Safety/Performance  awareness, need for assistance;insight into deficits/self-awareness;safety precaution awareness;safety precaution follow-through  -ERIC       User Key  (r) = Recorded By, (t) = Taken By, (c) = Cosigned By    Initials Name Provider Type    Jeanine Christie OT Occupational Therapist        Mobility/ADL's        Row Name 10/08/20 1411          Bed Mobility    Supine-Sit [place] (Bed Mobility)  modified independence  -     Row Name 10/08/20 1411          Transfers    Transfers  toilet transfer;bed-chair transfer  -     Bed-Chair [place] (Transfers)  set up;contact guard;1 person assist  -     Assistive Device (Bed-Chair Transfers)  cane, straight  -ERIC     Sit-Stand [place] (Transfers)  set up;contact guard;minimum assist (75% patient effort);1 person assist  -ERIC     [place] Level (Toilet Transfer)  set up;contact guard;minimum assist (75% patient effort);1 person assist  -ERIC     Assistive Device (Toilet Transfer)  cane, quad;grab bars/safety frame;commode  -     Row Name 10/08/20 1411          Sit-Stand Transfer    Assistive Device (Sit-Stand Transfers)  cane, straight  -     Row Name 10/08/20 1411          Toilet Transfer    Type (Toilet Transfer)  stand pivot/stand step  -     Row Name 10/08/20 1411          Functional Mobility    Functional Mobility- Ind. Level  contact guard assist;1 person  -     Functional Mobility- Device  straight cane  -     Functional Mobility-Distance (Feet)  8  -     Row Name 10/08/20 1411          Activities of Daily Living    BADL Assessment/Intervention  lower body dressing;toileting;bathing  -     Row Name 10/08/20 1411          Lower Body Dressing Assessment/Training    [place] Level (Lower Body Dressing)  doff;socks;maximum assist (25% patient effort);dependent (less than 25% patient effort)  -     Position (Lower Body Dressing)  edge of bed sitting  -     Row Name 10/08/20 1411          Toileting Assessment/Training    [place] Level (Toileting)  adjust/manage clothing;perform perineal hygiene;standby assist  -     Assistive Devices (Toileting)  commode;grab bar/safety frame  -     Position (Toileting)  unsupported standing  -     Row Name 10/08/20 1411          Bathing Assessment/Intervention    [place] Level (Bathing)   perineal area;standby assist  -ERIC     Position (Bathing)  unsupported standing  -ERIC       User Key  (r) = Recorded By, (t) = Taken By, (c) = Cosigned By    Initials Name Provider Type    Jeanine Christie OT Occupational Therapist        Obj/Interventions     Row Name 10/08/20 1413          Sensory Assessment (Somatosensory)    Sensory Assessment (Somatosensory)  sensation intact  -ERIC     Row Name 10/08/20 1413          Balance    Static Sitting Balance  WFL  -ERIC     Static Standing Balance  mild impairment  -ERIC     Dynamic Standing Balance  mild impairment  -ERIC       User Key  (r) = Recorded By, (t) = Taken By, (c) = Cosigned By    Initials Name Provider Type    Jeanine Christie OT Occupational Therapist        Goals/Plan     Row Name 10/08/20 1419          Dressing Goal 1 (OT)    Progress/Outcome (Dressing Goal 1, OT)  goal ongoing  -ERIC     Row Name 10/08/20 1419          Grooming Goal 1 (OT)    Progress/Outcome (Grooming Goal 1, OT)  goal ongoing  -ERIC     Row Name 10/08/20 1419          Therapy Assessment/Plan (OT)    Planned Therapy Interventions (OT)  activity tolerance training;functional balance retraining;occupation/activity based interventions;patient/caregiver education/training;transfer/mobility retraining;BADL retraining  -ERIC       User Key  (r) = Recorded By, (t) = Taken By, (c) = Cosigned By    Initials Name Provider Type    Jeanine Christie OT Occupational Therapist        Clinical Impression     Row Name 10/08/20 1414          Pain Scale: Numbers Pre/Post-Treatment    Pretreatment Pain Rating  0/10 - no pain  -ERIC     Posttreatment Pain Rating  0/10 - no pain  -ERIC     Row Name 10/08/20 1414          Plan of Care Review    Plan of Care Reviewed With  patient  -ERIC     Outcome Summary  Pt initially refused participation this date, but agreeable after RN spoke with him. Pt tolerated session fair and was agreeable to tx upon approach. Pt transferred supine to EOB with mod I. Pt  requires max A to don socks 2/2 inability to tolerate adaptive positioning and pain with leaning forward. Pt reports using sock aide at home. Pt requires setup to don gown in prep for mobility. Pt stood with min A-CGA from EOB, then ambulated to bathroom with CGA. Pt urinated in standing with SBA. Pt then stepped to sinkside and performed jennifer bathing with SBA for safety. Pt experienced multiple episodes of Afib with RVR in standing, with HR highest read 130. Pt requires mod verbal cues for pacing technique and pursed lip breathing. Pt returned to bedside chair with CGA using straight cane. Pt left reclined in bedside chair. Pt remains limited 2/2 low endurance, impaired insight and acuity of illness. OT will see patient 5x/week while at [facility]. OT recommends d/c to  OT. PPE worn: mask, goggles and gloves.  -ERIC     Row Name 10/08/20 1414          Therapy Assessment/Plan (OT)    Rehab Potential (OT)  good, to achieve stated therapy goals  -ERIC     Criteria for Skilled Therapeutic Interventions Met (OT)  yes;skilled treatment is necessary  -ERIC     Therapy Frequency (OT)  5 times/wk  -ERIC     Row Name 10/08/20 1414          Therapy Plan Review/Discharge Plan (OT)    Anticipated Discharge Disposition (OT)  home with home health  -ERIC     Row Name 10/08/20 1414          Positioning and Restraints    Pre-Treatment Position  in bed  -ERIC     Post Treatment Position  chair  -ERIC     In Chair  notified nsg;reclined;call light within reach;encouraged to call for assist;exit alarm on  -ERIC       User Key  (r) = Recorded By, (t) = Taken By, (c) = Cosigned By    Initials Name Provider Type    Jeanine Christie, OT Occupational Therapist        Outcome Measures     Row Name 10/08/20 1419          How much help from another is currently needed...    Putting on and taking off regular lower body clothing?  1  -ERIC     Bathing (including washing, rinsing, and drying)  2  -ERIC     Toileting (which includes using toilet bed pan or urinal)   3  -ERIC     Putting on and taking off regular upper body clothing  3  -ERIC     Taking care of personal grooming (such as brushing teeth)  4  -ERIC     Eating meals  4  -ERIC     AM-PAC 6 Clicks Score (OT)  17  -ERIC     Row Name 10/08/20 1419          Functional Assessment    Outcome Measure Options  AM-PAC 6 Clicks Daily Activity (OT)  -ERIC       User Key  (r) = Recorded By, (t) = Taken By, (c) = Cosigned By    Initials Name Provider Type    Jeanine Christie OT Occupational Therapist        Occupational Therapy Education                 Title: PT OT SLP Therapies (Done)     Topic: Occupational Therapy (Done)     Point: ADL training (Done)     Description:   Instruct learner(s) on proper safety adaptation and remediation techniques during self care or transfers.   Instruct in proper use of assistive devices.              Learning Progress Summary           Patient Acceptance, E,TB, VU,NR by MARY at 10/5/2020 1316    Acceptance, E, VU by  at 10/5/2020 0830                   Point: Home exercise program (Done)     Description:   Instruct learner(s) on appropriate technique for monitoring, assisting and/or progressing therapeutic exercises/activities.              Learning Progress Summary           Patient Acceptance, E,TB, VU,NR by MARY at 10/5/2020 1316    Acceptance, E, VU by  at 10/5/2020 0830                   Point: Precautions (Done)     Description:   Instruct learner(s) on prescribed precautions during self-care and functional transfers.              Learning Progress Summary           Patient Acceptance, E,TB, VU,NR by MARY at 10/5/2020 1316    Acceptance, E,TB, VU,NR by  at 10/5/2020 1031    Acceptance, E, VU by  at 10/5/2020 0830                   Point: Body mechanics (Done)     Description:   Instruct learner(s) on proper positioning and spine alignment during self-care, functional mobility activities and/or exercises.              Learning Progress Summary           Patient Acceptance, E,TB, VU,NR by  MARY at 10/5/2020 1316    Acceptance, E,TB, VU,NR by  at 10/5/2020 1031    Acceptance, E, VU by  at 10/5/2020 0830                               User Key     Initials Effective Dates Name Provider Type Discipline     03/01/19 -  Mena Menjivar, OT Occupational Therapist OT     03/01/19 -  Zuleika White RN Registered Nurse Nurse    MARY 06/15/19 -  Sofy Matt, PT Physical Therapist PT              OT Recommendation and Plan  Retired Outcome Summary/Treatment Plan (OT)  Anticipated Discharge Disposition (OT): home with home health  Planned Therapy Interventions (OT): activity tolerance training, functional balance retraining, occupation/activity based interventions, patient/caregiver education/training, transfer/mobility retraining, BADL retraining  Therapy Frequency (OT): 5 times/wk  Plan of Care Review  Plan of Care Reviewed With: patient  Outcome Summary: Pt initially refused participation this date, but agreeable after RN spoke with him. Pt tolerated session fair and was agreeable to tx upon approach. Pt transferred supine to EOB with mod I. Pt requires max A to don socks 2/2 inability to tolerate adaptive positioning and pain with leaning forward. Pt reports using sock aide at home. Pt requires setup to don gown in prep for mobility. Pt stood with min A-CGA from EOB, then ambulated to bathroom with CGA. Pt urinated in standing with SBA. Pt then stepped to sinkside and performed jennifer bathing with SBA for safety. Pt experienced multiple episodes of Afib with RVR in standing, with HR highest read 130. Pt requires mod verbal cues for pacing technique and pursed lip breathing. Pt returned to bedside chair with CGA using straight cane. Pt left reclined in bedside chair. Pt remains limited 2/2 low endurance, impaired insight and acuity of illness. OT will see patient 5x/week while at [facility]. OT recommends d/c to  OT. PPE worn: mask, goggles and gloves.  Plan of Care Reviewed With: patient  Outcome Summary: Pt  initially refused participation this date, but agreeable after RN spoke with him. Pt tolerated session fair and was agreeable to tx upon approach. Pt transferred supine to EOB with mod I. Pt requires max A to don socks 2/2 inability to tolerate adaptive positioning and pain with leaning forward. Pt reports using sock aide at home. Pt requires setup to don gown in prep for mobility. Pt stood with min A-CGA from EOB, then ambulated to bathroom with CGA. Pt urinated in standing with SBA. Pt then stepped to sinkside and performed jennifer bathing with SBA for safety. Pt experienced multiple episodes of Afib with RVR in standing, with HR highest read 130. Pt requires mod verbal cues for pacing technique and pursed lip breathing. Pt returned to bedside chair with CGA using straight cane. Pt left reclined in bedside chair. Pt remains limited 2/2 low endurance, impaired insight and acuity of illness. OT will see patient 5x/week while at [facility]. OT recommends d/c to  OT. PPE worn: mask, goggles and gloves.     Time Calculation:   Time Calculation- OT     Row Name 10/08/20 1420             Time Calculation- OT    OT Start Time  1044  -ERIC      OT Stop Time  1102  -ERIC      OT Time Calculation (min)  18 min  -ERIC      Total Timed Code Minutes- OT  18 minute(s)  -ERIC      OT Received On  10/08/20  -ERIC      OT - Next Appointment  10/09/20  -ERIC        User Key  (r) = Recorded By, (t) = Taken By, (c) = Cosigned By    Initials Name Provider Type    Jeanine Christie OT Occupational Therapist        Therapy Charges for Today     Code Description Service Date Service Provider Modifiers Qty    65447338593  OT THERAPEUTIC ACT EA 15 MIN 10/8/2020 Jeanine Carey OT GO 1    [id number]  OT SELF CARE/MGMT/TRAIN EA 15 MIN 10/8/2020 Jeanine Carey OT GO 1               Jeanine Carey OT  10/8/2020    Electronically signed by Jeanine Carey OT at 10/08/20 1424
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
## 2020-10-09 NOTE — THERAPY TREATMENT NOTE
Patient Name: Carlos Whipple  : 1955    MRN: [id number]                              Today's Date: 10/9/2020       Admit Date: 10/4/2020    Visit Dx:     ICD-10-CM ICD-9-CM   1. Dyspnea, unspecified type  R06.00 786.09   2. Atrial fibrillation with RVR (CMS/HCC)  I48.91 427.31   3. Acute congestive heart failure, unspecified heart failure type (CMS/HCC)  I50.9 428.0     Patient Active Problem List   Diagnosis   • Hypertensive emergency   • Chronic atrial fibrillation (CMS/HCC)   • Chronic diastolic CHF (congestive heart failure) (CMS/HCC)   • CKD (chronic kidney disease) stage 3, GFR 30-59 ml/min   • Essential hypertension   • Gambling disorder, persistent   • Shortness of breath   • Coronary artery disease involving native coronary artery of native heart without angina pectoris   • Psoriasis   • History of CVA (cerebrovascular accident)   • DDD (degenerative disc disease), lumbosacral   • Peripheral polyneuropathy   • GERD (gastroesophageal reflux disease)   • Medically noncompliant   • Acute respiratory distress   • Unstable angina (CMS/HCC)   • Cervical disc disorder with radiculopathy   • Completed stroke (CMS/HCC)   • Degeneration of intervertebral disc   • Excessive anticoagulation   • Impaired left ventricular function   • Liver lesion   • Lumbar radiculopathy   • Cervical myelopathy (CMS/HCC)   • Lumbosacral radiculopathy   • Spinal stenosis of lumbar region   • Obesity   • Mild cognitive impairment   • Thoracic back pain   • Thoracic disc disease with myelopathy   • Paroxysmal atrial fibrillation (CMS/HCC)   • Acute congestive heart failure (CMS/HCC)   • Atrial fibrillation with RVR (CMS/HCC)   • Dermatitis associated with moisture   • Dyspnea     Past Medical History:   Diagnosis Date   • A-fib (CMS/HCC)    • Atrial fibrillation with RVR (CMS/HCC)    • CHF (congestive heart failure) (CMS/HCC)    • Chronic atrial fibrillation (CMS/HCC) 2019   • Chronic diastolic CHF (congestive heart failure)  (CMS/[facility]) 11/8/2019   • CKD (chronic kidney disease) stage 3, GFR 30-59 ml/min 11/8/2019   • Coronary artery disease involving native coronary artery of native heart without angina pectoris 11/8/2019   • DDD (degenerative disc disease), lumbosacral 11/8/2019   • Essential hypertension 11/8/2019   • Gambling disorder, persistent 11/8/2019   • GERD (gastroesophageal reflux disease) 11/8/2019   • History of CVA (cerebrovascular accident) 11/8/2019   • Hyperlipidemia    • Hypertension    • Medically noncompliant 11/8/2019   • Peripheral polyneuropathy 11/8/2019   • Psoriasis 11/8/2019   • Stroke (CMS/[facility])      Past Surgical History:   Procedure Laterality Date   • CARDIAC CATHETERIZATION     • CARDIAC CATHETERIZATION N/A 12/10/2019    Procedure: Left Heart Cath and coronary angiogram;  Surgeon: Edy Thomas MD;  Location: [facility] CATH INVASIVE LOCATION;  Service: Cardiovascular     General Information     Row Name 10/09/20 1610          Physical Therapy Time and Intention    Document Type  therapy note (daily note)  -EL     Mode of Treatment  physical therapy  -     Row Name 10/09/20 1610          General Information    Patient Profile Reviewed  yes  -EL       User Key  (r) = Recorded By, (t) = Taken By, (c) = Cosigned By    Initials Name Provider Type    Dung Santiago PT Physical Therapist        Mobility     Row Name 10/09/20 1610          Bed Mobility    Bed Mobility  bed mobility (all) activities  -EL     All Activities, [place] (Bed Mobility)  standby assist  -EL     Assistive Device (Bed Mobility)  bed rails;head of bed elevated  -     Row Name 10/09/20 1610          Bed-Chair Transfer    Bed-Chair [place] (Transfers)  set up;contact guard;1 person assist  -EL     Assistive Device (Bed-Chair Transfers)  cane, straight  -EL     Row Name 10/09/20 1610          Sit-Stand Transfer    Sit-Stand [place] (Transfers)  set up;contact guard;minimum assist (75% patient effort);1 person assist  -EL      Assistive Device (Sit-Stand Transfers)  cane, straight  -EL     Row Name 10/09/20 1610          Gait/Stairs (Locomotion)    [place] Level (Gait)  contact guard;minimum assist (75% patient effort);1 person assist  -EL     Assistive Device (Gait)  cane, straight  -EL     Distance in Feet (Gait)  50 feet, 30 feet  -EL     Deviations/Abnormal Patterns (Gait)  ricky decreased;gait speed decreased  -EL     Bilateral Gait Deviations  heel strike decreased;forward flexed posture  -EL       User Key  (r) = Recorded By, (t) = Taken By, (c) = Cosigned By    Initials Name Provider Type    Dung Santiago PT Physical Therapist        Obj/Interventions     Row Name 10/09/20 1612          Motor Skills    Therapeutic Exercise  other (see comments) Pt instructed with seated marches, LAQ, ankle pumps and sit to stands. Sit to stand 5x2 all other exercises x20 reps  -EL       User Key  (r) = Recorded By, (t) = Taken By, (c) = Cosigned By    Initials Name Provider Type    Dung Santiago PT Physical Therapist        Goals/Plan    No documentation.       Clinical Impression     Row Name 10/09/20 1613          Pain    Additional Documentation  Pain Scale: FACES Pre/Post-Treatment (Group)  -     Row Name 10/09/20 1613          Pain Scale: FACES Pre/Post-Treatment    Pain: FACES Scale, Pretreatment  0-->no hurt  -EL     Posttreatment Pain Rating  4-->hurts little more  -EL     Pain Location  back  -EL     Pre/Posttreatment Pain Comment  Pt reported back pain while ambulating  -     Row Name 10/09/20 1613          Plan of Care Review    Plan of Care Reviewed With  patient  -EL     Outcome Summary  Pt with some confusion this date about why he was in the hospital, but was willing to participate with therapy. Pt instructed with ther ex and demonstrated fair understanding VC for encouragement. Pt then ambulated in room with CGA/MIN A using SPC. Pt with increase in back pain relived by sitting. Recommendation remains IP rehab due to pt  not safe for return home. PPE worn includes gloves and mask with goggles  -     Row Name 10/09/20 1613          Therapy Assessment/Plan (PT)    Rehab Potential (PT)  good, to achieve stated therapy goals  -     Criteria for Skilled Interventions Met (PT)  yes;meets criteria  -     Row Name 10/09/20 1613          Vital Signs    O2 Delivery Pre Treatment  room air  -EL     O2 Delivery Intra Treatment  room air  -EL     O2 Delivery Post Treatment  room air  -EL     Pre Patient Position  Supine  -EL     Intra Patient Position  Standing  -EL     Post Patient Position  Sitting  -EL     Row Name 10/09/20 1613          Positioning and Restraints    Pre-Treatment Position  in bed  -EL     Post Treatment Position  chair  -EL     In Chair  notified nsg;reclined;call light within reach;encouraged to call for assist;exit alarm on  -EL       User Key  (r) = Recorded By, (t) = Taken By, (c) = Cosigned By    Initials Name Provider Type    Dung Santiago PT Physical Therapist        Outcome Measures    No documentation.       Physical Therapy Education                 Title: PT OT SLP Therapies (Done)     Topic: Physical Therapy (Done)     Point: Mobility training (Done)     Learning Progress Summary           Patient Acceptance, E,TB, VU by  at 10/9/2020 1616    Acceptance, E, VU,NR by  at 10/7/2020 1451    Comment: Reinforced out of bed c staff for safety.    Acceptance, E,TB, VU by  at 10/6/2020 1608    Acceptance, E,TB, VU,NR by  at 10/5/2020 1316    Acceptance, E, VU by  at 10/5/2020 0830                   Point: Home exercise program (Done)     Learning Progress Summary           Patient Acceptance, E, VU,NR by  at 10/7/2020 1451    Comment: Reinforced out of bed c staff for safety.    Acceptance, E,TB, VU,NR by  at 10/5/2020 1316                   Point: Body mechanics (Done)     Learning Progress Summary           Patient Acceptance, E, VU,NR by  at 10/7/2020 1451    Comment: Reinforced out of bed c  staff for safety.    Acceptance, E,TB, VU,NR by  at 10/5/2020 1316                   Point: Precautions (Done)     Learning Progress Summary           Patient Acceptance, E,TB, VU by  at 10/9/2020 1616    Acceptance, E, VU,NR by  at 10/7/2020 1451    Comment: Reinforced out of bed c staff for safety.    Acceptance, E,TB, VU by  at 10/6/2020 1608    Acceptance, E,TB, VU,NR by  at 10/5/2020 1316    Acceptance, E, VU by  at 10/5/2020 0830                               User Key     Initials Effective Dates Name Provider Type Discipline     03/01/19 -  Amena Hassan PTA Physical Therapy Assistant PT     06/23/20 -  Dung Win PT Physical Therapist PT     03/01/19 -  Zuleika White, RN Registered Nurse Nurse     06/15/19 -  Sofy Matt PT Physical Therapist PT              PT Recommendation and Plan     Plan of Care Reviewed With: patient  Outcome Summary: Pt with some confusion this date about why he was in the hospital, but was willing to participate with therapy. Pt instructed with ther ex and demonstrated fair understanding VC for encouragement. Pt then ambulated in room with CGA/MIN A using SPC. Pt with increase in back pain relived by sitting. Recommendation remains IP rehab due to pt not safe for return home. PPE worn includes gloves and mask with goggles     Time Calculation:   PT Charges     Row Name 10/09/20 1616             Time Calculation    Start Time  1449  -EL      Stop Time  1514  -      Time Calculation (min)  25 min  -      PT Received On  10/09/20  -      PT - Next Appointment  10/12/20  -         Time Calculation- PT    Total Timed Code Minutes- PT  25 minute(s)  -EL        User Key  (r) = Recorded By, (t) = Taken By, (c) = Cosigned By    Initials Name Provider Type     Dung Win, PT Physical Therapist        Therapy Charges for Today     Code Description Service Date Service Provider Modifiers Qty    25832233982 HC PT THER PROC EA 15 MIN 10/9/2020 Dung Win, PT GP 1     [id number]  GAIT TRAINING EA 15 MIN 10/9/2020 Dung Win, PT GP 1          PT G-Codes  Outcome Measure Options: AM-PAC 6 Clicks Daily Activity (OT)  AM-PAC 6 Clicks Score (PT): 18  AM-PAC 6 Clicks Score (OT): 17  Modified [place] Scale: 4 - Moderately severe disability.  Unable to walk without assistance, and unable to attend to own bodily needs without assistance.    Dung Win, PT  10/9/2020

## 2020-10-09 NOTE — PLAN OF CARE
Goal Outcome Evaluation:  Plan of Care Reviewed With: patient  Progress: no change  Outcome Summary: Patient confused during the night.  He got up and went to the bathroom before anyone could respond to his call light and pt urinated on the floor.  Pt was educated to use call light.  Vital signs stable, will continue to monitor.

## 2020-10-09 NOTE — PLAN OF CARE
Goal Outcome Evaluation:  Plan of Care Reviewed With: patient  Progress: no change   Patient remains a falls risk. Patient encouraged to turn Q2. Patient vitals stable. Case Management working on placement of patient for discharge. Will continue to monitor.       Problem: Fall Injury Risk  Goal: Absence of Fall and Fall-Related Injury  Outcome: Ongoing, Progressing  Intervention: Identify and Manage Contributors to Fall Injury Risk  Recent Flowsheet Documentation  Taken 10/9/2020 1506 by Teressa Monreal RN  Medication Review/Management:   medications reviewed   high-risk medications identified  Taken 10/9/2020 1400 by Teressa Monreal RN  Medication Review/Management:   medications reviewed   high-risk medications identified  Taken 10/9/2020 1159 by Teressa Monreal RN  Medication Review/Management:   medications reviewed   high-risk medications identified  Taken 10/9/2020 1000 by Teressa Monreal RN  Medication Review/Management:   medications reviewed   high-risk medications identified  Taken 10/9/2020 0817 by Teressa Monreal RN  Medication Review/Management:   medications reviewed   high-risk medications identified  Self-Care Promotion: independence encouraged  Intervention: Promote Injury-Free Environment  Recent Flowsheet Documentation  Taken 10/9/2020 1506 by Teressa Monreal, RN  Safety Promotion/Fall Prevention:   activity supervised   assistive device/personal items within reach   clutter free environment maintained   fall prevention program maintained   lighting adjusted   nonskid shoes/slippers when out of bed   room organization consistent   safety round/check completed  Taken 10/9/2020 1400 by Teressa Monreal, RN  Safety Promotion/Fall Prevention:   activity supervised   assistive device/personal items within reach   clutter free environment maintained   fall prevention program maintained   lighting adjusted   nonskid shoes/slippers when out of bed   room organization consistent   safety round/check  completed  Taken 10/9/2020 1159 by Teressa Monreal RN  Safety Promotion/Fall Prevention:   activity supervised   assistive device/personal items within reach   clutter free environment maintained   fall prevention program maintained   lighting adjusted   nonskid shoes/slippers when out of bed   safety round/check completed   room organization consistent  Taken 10/9/2020 1000 by Teressa Monreal RN  Safety Promotion/Fall Prevention:   activity supervised   assistive device/personal items within reach   clutter free environment maintained   fall prevention program maintained   lighting adjusted   nonskid shoes/slippers when out of bed   room organization consistent   safety round/check completed  Taken 10/9/2020 0817 by Teressa Monreal RN  Safety Promotion/Fall Prevention:   activity supervised   assistive device/personal items within reach   clutter free environment maintained   fall prevention program maintained   lighting adjusted   nonskid shoes/slippers when out of bed   room organization consistent   safety round/check completed     Problem: Adult Inpatient Plan of Care  Goal: Plan of Care Review  Outcome: Ongoing, Progressing  Flowsheets (Taken 10/9/2020 0404 by Scott Davidson RN)  Plan of Care Reviewed With: patient  Goal: Patient-Specific Goal (Individualized)  Outcome: Ongoing, Progressing  Goal: Absence of Hospital-Acquired Illness or Injury  Outcome: Ongoing, Progressing  Intervention: Identify and Manage Fall Risk  Recent Flowsheet Documentation  Taken 10/9/2020 1506 by Teressa Monreal, RN  Safety Promotion/Fall Prevention:   activity supervised   assistive device/personal items within reach   clutter free environment maintained   fall prevention program maintained   lighting adjusted   nonskid shoes/slippers when out of bed   room organization consistent   safety round/check completed  Taken 10/9/2020 1400 by Teressa Monreal, RN  Safety Promotion/Fall Prevention:   activity supervised   assistive  device/personal items within reach   clutter free environment maintained   fall prevention program maintained   lighting adjusted   nonskid shoes/slippers when out of bed   room organization consistent   safety round/check completed  Taken 10/9/2020 1159 by Teressa Monreal RN  Safety Promotion/Fall Prevention:   activity supervised   assistive device/personal items within reach   clutter free environment maintained   fall prevention program maintained   lighting adjusted   nonskid shoes/slippers when out of bed   safety round/check completed   room organization consistent  Taken 10/9/2020 1000 by Teressa Monreal RN  Safety Promotion/Fall Prevention:   activity supervised   assistive device/personal items within reach   clutter free environment maintained   fall prevention program maintained   lighting adjusted   nonskid shoes/slippers when out of bed   room organization consistent   safety round/check completed  Taken 10/9/2020 0817 by Teressa Monreal RN  Safety Promotion/Fall Prevention:   activity supervised   assistive device/personal items within reach   clutter free environment maintained   fall prevention program maintained   lighting adjusted   nonskid shoes/slippers when out of bed   room organization consistent   safety round/check completed  Intervention: Prevent Skin Injury  Recent Flowsheet Documentation  Taken 10/9/2020 0817 by Teressa Monreal RN  Body Position: position changed independently  Intervention: Prevent and Manage VTE (venous thromboembolism) Risk  Recent Flowsheet Documentation  Taken 10/9/2020 0817 by Teressa Monreal RN  VTE Prevention/Management: (xarelto) other (see comments)  Intervention: Prevent Infection  Recent Flowsheet Documentation  Taken 10/9/2020 1506 by Teressa Monreal RN  Infection Prevention: hand hygiene promoted  Taken 10/9/2020 1400 by Teressa Monreal RN  Infection Prevention: hand hygiene promoted  Taken 10/9/2020 1159 by Teressa Monreal RN  Infection Prevention:  hand hygiene promoted  Taken 10/9/2020 1000 by Teressa Monreal RN  Infection Prevention: hand hygiene promoted  Taken 10/9/2020 0817 by Teressa Monreal RN  Infection Prevention: hand hygiene promoted  Goal: Optimal Comfort and Wellbeing  Outcome: Ongoing, Progressing  Intervention: Provide Person-Centered Care  Recent Flowsheet Documentation  Taken 10/9/2020 0817 by Teressa Monreal RN  Trust Relationship/Rapport:   choices provided   care explained   emotional support provided   questions answered   questions encouraged   thoughts/feelings acknowledged  Goal: Readiness for Transition of Care  Outcome: Ongoing, Progressing     Problem: Restraint, Nonbehavioral (Nonviolent)  Goal: Discontinuation Criteria Achieved  Intervention: Implement Least-restrictive Safety Strategies  Recent Flowsheet Documentation  Taken 10/9/2020 1506 by Teressa Monreal RN  Medical Device Protection: IV pole/bag removed from visual field  Taken 10/9/2020 1400 by Teressa Monreal RN  Medical Device Protection: IV pole/bag removed from visual field  Taken 10/9/2020 1159 by Teressa Monreal RN  Medical Device Protection: IV pole/bag removed from visual field  Taken 10/9/2020 1000 by Teressa Monreal RN  Medical Device Protection: IV pole/bag removed from visual field  Taken 10/9/2020 0817 by Teressa Monreal RN  Medical Device Protection: IV pole/bag removed from visual field  Diversional Activities: television  Goal: Personal Dignity and Safety Maintained  Intervention: Protect Dignity, Rights, and Personal Wellbeing  Recent Flowsheet Documentation  Taken 10/9/2020 0817 by Teressa Monreal RN  Trust Relationship/Rapport:   choices provided   care explained   emotional support provided   questions answered   questions encouraged   thoughts/feelings acknowledged  Intervention: Protect Skin and Joint Integrity  Recent Flowsheet Documentation  Taken 10/9/2020 0817 by Teressa Monreal RN  Body Position: position changed independently      Problem: Skin Injury Risk Increased  Goal: Skin Health and Integrity  Outcome: Ongoing, Progressing  Intervention: Optimize Skin Protection  Recent Flowsheet Documentation  Taken 10/9/2020 0817 by Teressa Monreal, RN  Pressure Reduction Techniques: frequent weight shift encouraged  Head of Bed (HOB): HOB elevated  Pressure Reduction Devices:   pressure-redistributing mattress utilized   positioning supports utilized  Skin Protection:   adhesive use limited   incontinence pads utilized   tubing/devices free from skin contact   transparent dressing maintained

## 2020-10-09 NOTE — DISCHARGE SUMMARY
Date of Discharge:  10/9/2020    Discharge Diagnosis: Atrial fibrillation with RVR (CMS/[facility])    Hypertensive emergency    Chronic atrial fibrillation (CMS/HCC)    Chronic diastolic CHF (congestive heart failure) (CMS/[facility])    CKD (chronic kidney disease) stage 3, GFR 30-59 ml/min    Essential hypertension    Gambling disorder, persistent    Coronary artery disease involving native coronary artery of native heart without angina pectoris    Psoriasis    History of CVA (cerebrovascular accident)    GERD (gastroesophageal reflux disease)    Medically noncompliant    Mild cognitive impairment    Dyspnea  Blood cultures with ID of Staphylococcus, coag negative  Patient had change in mental status on admission with psychosis.     Presenting Problem/History of Present Illness  Active Hospital Problems    Diagnosis  POA   • **Atrial fibrillation with RVR (CMS/[facility]) [I48.91]  Yes   • Dyspnea [R06.00]  Yes   • Mild cognitive impairment [G31.84]  Yes   • Chronic atrial fibrillation (CMS/[facility]) [I48.20]  Yes   • Hypertensive emergency [I16.1]  Yes   • Chronic diastolic CHF (congestive heart failure) (CMS/[facility]) [I50.32]  Yes   • CKD (chronic kidney disease) stage 3, GFR 30-59 ml/min [N18.30]  Yes   • Essential hypertension [I10]  Yes   • Gambling disorder, persistent [F63.0]  Yes   • Coronary artery disease involving native coronary artery of native heart without angina pectoris [I25.10]  Yes   • Psoriasis [L40.9]  Yes   • History of CVA (cerebrovascular accident) [Z86.73]  Not Applicable   • GERD (gastroesophageal reflux disease) [K21.9]  Yes   • Medically noncompliant [Z91.19]  Not Applicable      Resolved Hospital Problems   No resolved problems to display.        Condition at discharge guarded given burden of chronic medical  disease  Hospital Course  Patient is a 65 y.o. male presented with change in mental status and came to emergency department.  He was quite weak somewhat drowsy.  During his ER work-up it was found he was in  atrial fibrillation with rapid ventricular response yet again.  Patient has a long-term problem with medication noncompliance and he has had multiple recent admissions for the same problem.  In either event he was also agitated nearly psychotic and had to be placed in restraints.  He was subsequently admitted.    During his hospital stay he was seen by his cardiologist patient was placed on a once again Cardizem drip.  He also started physical therapy and Occupational Therapy.  He was also evaluated by psychiatry.    At this juncture patient is back on all of his usual outpatient medications with minimal to no changes.  He is converted back into atrial fibrillation but the rate is well controlled.  He somewhat indecisive and this morning tells me that he would like to go to inpatient rehab although he told case management yesterday he wanted to go home.  In either event he is stable for discharge with either home with home health care or to an inpatient rehab facility.  He will stay on his current medications.  He was evaluated by psychiatry and was deemed competent to make his own decisions whether they be good better and different.  He is no longer agitated requiring a sitter or restraints over the last 48 hours.    He is stable for discharge when either bed available in inpatient facility or home health care has been arranged for him to go home.    Procedures Performed         Consults:   Consults     Date and Time Order Name Status Description    10/8/2020 0843 Inpatient Psychiatrist Consult Completed     10/7/2020 1604 Inpatient Psychiatrist Consult      10/4/2020 1719 Inpatient Cardiology Consult Completed     10/4/2020 1455 Family Medicine Consult Completed     9/9/2020 1548 Inpatient Psychiatrist Consult Completed     9/2/2020 0859 Inpatient Cardiology Consult Completed     9/1/2020 1940 Inpatient Nephrology Consult Completed     9/1/2020 1512 Family Medicine Consult Completed           Pertinent Test  Results:Xr Chest 1 View    Result Date: 10/4/2020   Marked cardiomegaly and pulmonary vascular congestion similar prior studies.  No focal airspace consolidation.   Electronically Signed By-Elias Rascon On:10/4/2020 2:46 PM This report was finalized on [id number] by  Elias Rascon, .      Imaging Results (Last 7 Days)     Procedure Component Value Units Date/Time    XR Chest 1 View [[id number]] Collected: 10/04/20 1445     Updated: 10/04/20 1448    Narrative:      XR CHEST 1 VW-     Date of Exam: 10/4/2020 2:21 PM     Indication: Dyspnea.     Comparison: 09/01/2020     Technique: A single view of the chest was obtained.     FINDINGS:      There is marked cardiomegaly unchanged from prior study.  There is  persistent pulmonary vascular congestion.  Lungs are clear.  No definite  pleural effusion identified.             Impression:         Marked cardiomegaly and pulmonary vascular congestion similar prior  studies.  No focal airspace consolidation.        Electronically Signed By-Elias Rascon On:10/4/2020 2:46 PM  This report was finalized on [id number] by  Elias Rascon, .              Condition on Discharge:  Fair    Vital Signs  Temp:  [97.5 °F (36.4 °C)-98.6 °F (37 °C)] 98.2 °F (36.8 °C)  Heart Rate:  [69-91] 91  Resp:  [13-18] 13  BP: (140-172)/() 172/112    Physical Exam:     General Appearance:    Alert, cooperative, in no acute distress, disheveled and poor hygiene overall   Head:    Normocephalic, without obvious abnormality, atraumatic   Eyes:           Conjunctivae and sclerae normal, no   icterus, no pallor, corneas clear, PERRLA   Throat:   No oral lesions, no thrush, oral mucosa moist   Neck:   No adenopathy, supple, trachea midline, no thyromegaly, no   carotid bruit, no JVD   Lungs:     Clear to auscultation,respirations regular, even and                  unlabored    Heart:    Regular rhythm and normal rate, normal S1 and S2, no            murmur, no gallop, no rub, no click   Chest  Wall:    No abnormalities observed   Abdomen:     Normal bowel sounds, no masses, no organomegaly, soft        non-tender, non-distended, no guarding, no rebound                tenderness   Rectal:     Deferred   Extremities:   Moves all extremities well, no edema, no cyanosis, no             redness   Pulses:   Pulses palpable and equal bilaterally   Skin:   No bleeding, bruising or rash, plaque psoriasis on elbows, scalp and around umbilicus   Lymph nodes:   No palpable adenopathy   Neurologic:   Cranial nerves 2 - 12 grossly intact, sensation intact, DTR       present and equal bilaterally         Discharge Disposition: Either inpatient rehab versus home with home health care depending on patient's decision.      Discharge Medications     Discharge Medications      ASK your doctor about these medications      Instructions Start Date   allopurinol 100 MG tablet  Commonly known as: ZYLOPRIM   100 mg, Oral, Daily      aspirin 81 MG EC tablet   81 mg, Oral, Daily      bumetanide 1 MG tablet  Commonly known as: BUMEX   1 mg, Oral, Daily      hydrALAZINE 100 MG tablet  Commonly known as: APRESOLINE   100 mg, Oral, 3 Times Daily      losartan 50 MG tablet  Commonly known as: COZAAR   50 mg, Oral, Every 24 Hours Scheduled      Metoprolol Succinate 50 MG capsule extended-release 24 hour sprinkle   50 mg, Oral, Daily      pravastatin 40 MG tablet  Commonly known as: PRAVACHOL   40 mg, Oral, Nightly      risperiDONE 0.5 MG tablet  Commonly known as: risperDAL   0.5 mg, Oral, Every 12 Hours Scheduled      rivaroxaban 20 MG tablet  Commonly known as: Xarelto   20 mg, Oral, Daily With Dinner      sodium bicarbonate 650 MG tablet   650 mg, Oral, 3 Times Daily      spironolactone 25 MG tablet  Commonly known as: ALDACTONE   12.5 mg, Oral, Daily             Discharge Diet: Cardiac prudent    Activity at Discharge:     Follow-up Appointments  No future appointments.  Additional Instructions for the Follow-ups that You Need to  Schedule     Ambulatory Referral to Home Health   As directed      Face to Face Visit Date: 10/8/2020    Follow-up provider for Plan of Care?: I treated the patient in an acute care facility and will not continue treatment after discharge.    Follow-up provider: SHUKRI RUSSELL [[id number]]    Reason/Clinical Findings: CHF,Afib, Sepsis    Describe mobility limitations that make leaving home difficult: CHF,Afib, Sepsis    Nursing/Therapeutic Services Requested: Other (Eval and Treat )    Frequency: 1 Week 1               Test Results Pending at Discharge  Pending Labs     Order Current Status    Blood Culture - Blood, Foot, Left Preliminary result    Blood Culture - Blood, Hand, Right Preliminary result    Blood Culture - Blood, Hand, Right Preliminary result           Paty Polk,   10/09/20  08:20 EDT

## 2020-10-09 NOTE — PROGRESS NOTES
Referring Provider: Dr. Stauffer    Reason for follow-up: Fibrillation     Patient Care Team:  Marisol Larson APRN as PCP - General  Shwetha Barber MD as Consulting Physician (Nephrology)  Edy Thomas MD as Consulting Physician (Cardiology)    Subjective .  No shortness of breath or palpitations today    Objective  Lying in bed comfortably     Review of Systems   Constitution: Negative for fever and malaise/fatigue.   HENT: Negative for ear pain and nosebleeds.    Eyes: Negative for blurred vision and double vision.   Cardiovascular: Negative for chest pain, dyspnea on exertion and palpitations.   Respiratory: Negative for cough and shortness of breath.    Skin: Negative for rash.   Musculoskeletal: Negative for joint pain.   Gastrointestinal: Negative for abdominal pain, nausea and vomiting.   Neurological: Negative for focal weakness and headaches.   Psychiatric/Behavioral: Negative for depression. The patient is not nervous/anxious.    All other systems reviewed and are negative.      Ketorolac tromethamine    Scheduled Meds:allopurinol, 100 mg, Oral, Daily  aspirin, 81 mg, Oral, Daily  atorvastatin, 10 mg, Oral, Nightly  betamethasone dipropionate, 1 application, Topical, Q12H  dilTIAZem CD, 180 mg, Oral, Q24H  hydrALAZINE, 50 mg, Oral, TID  losartan, 50 mg, Oral, Q24H  metoprolol succinate XL, 50 mg, Oral, Q24H  risperiDONE, 0.5 mg, Oral, Q12H  rivaroxaban, 20 mg, Oral, Daily With Dinner  sodium bicarbonate, 650 mg, Oral, TID  sodium chloride, 10 mL, Intravenous, Q12H  spironolactone, 12.5 mg, Oral, Daily      Continuous Infusions:   PRN Meds:.•  acetaminophen **OR** acetaminophen **OR** acetaminophen  •  bisacodyl  •  calcium carbonate  •  HYDROcodone-acetaminophen  •  influenza vaccine  •  labetalol  •  LORazepam  •  magnesium hydroxide  •  melatonin  •  nitroglycerin  •  ondansetron **OR** ondansetron  •  potassium chloride **OR** potassium chloride **OR** potassium chloride  •  sodium  "chloride  •  sodium chloride        VITAL SIGNS  Vitals:    10/08/20 2208 10/09/20 0211 10/09/20 0634 10/09/20 1005   BP: 141/75 164/94 (!) 172/112 149/95   BP Location: Right arm Right arm Right arm Right arm   Patient Position: Lying Lying Lying Lying   Pulse: 77 88 91 98   Resp: 18 18 13 18   Temp: 98.6 °F (37 °C) 97.9 °F (36.6 °C) 98.2 °F (36.8 °C) 98.9 °F (37.2 °C)   TempSrc: Oral Oral Oral Oral   SpO2: 96% 96% 97%    Weight:   120 kg (263 lb 10.7 oz)    Height:   182.9 cm (72.01\")        Flowsheet Rows      First Filed Value   Admission Height  182.9 cm (72\") Documented at 10/04/2020 1256   Admission Weight  118 kg (259 lb 11.2 oz) Documented at 10/04/2020 1256           TELEMETRY: Atrial fibrillation with controlled medical response    Physical Exam:  Constitutional:       Appearance: Well-developed.   Eyes:      General: No scleral icterus.     Conjunctiva/sclera: Conjunctivae normal.      Pupils: Pupils are equal, round, and reactive to light.   HENT:      Head: Normocephalic and atraumatic.   Neck:      Musculoskeletal: Normal range of motion and neck supple.      Vascular: No carotid bruit or JVD.   Pulmonary:      Effort: Pulmonary effort is normal.      Breath sounds: Normal breath sounds. No wheezing. No rales.   Cardiovascular:      Normal rate. Irregularly irregular rhythm.   Pulses:     Intact distal pulses.   Abdominal:      General: Bowel sounds are normal.      Palpations: Abdomen is soft.   Musculoskeletal: Normal range of motion.   Skin:     General: Skin is warm and dry.      Findings: No rash.   Neurological:      Mental Status: Alert.      Comments: No focal deficits          Results Review:   I reviewed the patient's new clinical results.  Lab Results (last 24 hours)     Procedure Component Value Units Date/Time    Blood Culture - Blood, Hand, Right [[id number]] Collected: 10/06/20 0926    Specimen: Blood from Hand, Right Updated: 10/09/20 1000     Blood Culture No growth at 3 days    " Magnesium [[id number]]  (Abnormal) Collected: 10/09/20 0454    Specimen: Blood Updated: 10/09/20 0521     Magnesium 2.6 mg/dL     CBC & Differential [[id number]]  (Abnormal) Collected: 10/09/20 0454    Specimen: Blood Updated: 10/09/20 0505    Narrative:      The following orders were created for panel order CBC & Differential.  Procedure                               Abnormality         Status                     ---------                               -----------         ------                     CBC Auto Differential[[id number]]        Abnormal            Final result                 Please view results for these tests on the individual orders.    CBC Auto Differential [[id number]]  (Abnormal) Collected: 10/09/20 0454    Specimen: Blood Updated: 10/09/20 0505     WBC 8.60 10*3/mm3      RBC 5.05 10*6/mm3      Hemoglobin 13.9 g/dL      Hematocrit 43.1 %      MCV 85.4 fL      MCH 27.5 pg      MCHC 32.2 g/dL      RDW 20.1 %      RDW-SD 59.9 fl      MPV 7.9 fL      Platelets 176 10*3/mm3      Neutrophil % 72.4 %      Lymphocyte % 16.5 %      Monocyte % 8.0 %      Eosinophil % 2.7 %      Basophil % 0.4 %      Neutrophils, Absolute 6.20 10*3/mm3      Lymphocytes, Absolute 1.40 10*3/mm3      Monocytes, Absolute 0.70 10*3/mm3      Eosinophils, Absolute 0.20 10*3/mm3      Basophils, Absolute 0.00 10*3/mm3      nRBC 0.0 /100 WBC     Blood Culture - Blood, Hand, Right [[id number]] Collected: 10/06/20 1608    Specimen: Blood from Hand, Right Updated: 10/08/20 1645     Blood Culture No growth at 2 days    BUN [[id number]]  (Normal) Collected: 10/08/20 0244    Specimen: Blood Updated: 10/08/20 1607     BUN 22 mg/dL     Blood Culture - Blood, Foot, Left [[id number]] Collected: 10/04/20 1353    Specimen: Blood from Foot, Left Updated: 10/08/20 1415     Blood Culture No growth at 4 days          Imaging Results (Last 24 Hours)     ** No results found for the last 24 hours. **          EKG      I personally viewed and interpreted the  patient's EKG/Telemetry data:    ECHOCARDIOGRAM:    STRESS MYOVIEW:    CARDIAC CATHETERIZATION:    OTHER:         Assessment/Plan     Principal Problem:    Atrial fibrillation with RVR (CMS/[facility])  Active Problems:    Hypertensive emergency    Chronic atrial fibrillation (CMS/[facility])    Chronic diastolic CHF (congestive heart failure) (CMS/[facility])    CKD (chronic kidney disease) stage 3, GFR 30-59 ml/min    Essential hypertension    Gambling disorder, persistent    Coronary artery disease involving native coronary artery of native heart without angina pectoris    Psoriasis    History of CVA (cerebrovascular accident)    GERD (gastroesophageal reflux disease)    Medically noncompliant    Mild cognitive impairment    Dyspnea       Patient has atrial fibrillation with rapid response and was placed on IV Cardizem and then switched to oral Cardizem and is better now  Patient is also on anticoagulation with Xarelto  Patient's blood pressure and heart rate are stable  Patient's lipid levels are followed with a primary care doctor  Patient is ruled out for MI and has history of coronary disease  Patient has medical noncompliance and discussed with him several times about being compliant    I discussed the patients findings and my recommendations with patient and nurse    Edy Thomas MD  10/09/20  12:27 EDT

## 2020-10-10 LAB
BASOPHILS # BLD AUTO: 0 10*3/MM3 (ref 0–0.2)
BASOPHILS NFR BLD AUTO: 0.6 % (ref 0–1.5)
DEPRECATED RDW RBC AUTO: 58.4 FL (ref 37–54)
EOSINOPHIL # BLD AUTO: 0.2 10*3/MM3 (ref 0–0.4)
EOSINOPHIL NFR BLD AUTO: 2.7 % (ref 0.3–6.2)
ERYTHROCYTE [DISTWIDTH] IN BLOOD BY AUTOMATED COUNT: 20.1 % (ref 12.3–15.4)
HCT VFR BLD AUTO: 42.8 % (ref 37.5–51)
HGB BLD-MCNC: 13.7 G/DL (ref 13–17.7)
LYMPHOCYTES # BLD AUTO: 1.3 10*3/MM3 (ref 0.7–3.1)
LYMPHOCYTES NFR BLD AUTO: 17.7 % (ref 19.6–45.3)
MAGNESIUM SERPL-MCNC: 2.4 MG/DL (ref 1.6–2.4)
MCH RBC QN AUTO: 27.4 PG (ref 26.6–33)
MCHC RBC AUTO-ENTMCNC: 31.9 G/DL (ref 31.5–35.7)
MCV RBC AUTO: 85.8 FL (ref 79–97)
MONOCYTES # BLD AUTO: 0.7 10*3/MM3 (ref 0.1–0.9)
MONOCYTES NFR BLD AUTO: 10.6 % (ref 5–12)
NEUTROPHILS NFR BLD AUTO: 4.8 10*3/MM3 (ref 1.7–7)
NEUTROPHILS NFR BLD AUTO: 68.4 % (ref 42.7–76)
NRBC BLD AUTO-RTO: 0 /100 WBC (ref 0–0.2)
PLATELET # BLD AUTO: 165 10*3/MM3 (ref 140–450)
PMV BLD AUTO: 8.4 FL (ref 6–12)
RBC # BLD AUTO: 4.98 10*6/MM3 (ref 4.14–5.8)
WBC # BLD AUTO: 7.1 10*3/MM3 (ref 3.4–10.8)

## 2020-10-10 PROCEDURE — 83735 ASSAY OF MAGNESIUM: CPT | Performed by: FAMILY MEDICINE

## 2020-10-10 PROCEDURE — 99232 SBSQ HOSP IP/OBS MODERATE 35: CPT | Performed by: INTERNAL MEDICINE

## 2020-10-10 PROCEDURE — 85025 COMPLETE CBC W/AUTO DIFF WBC: CPT | Performed by: FAMILY MEDICINE

## 2020-10-10 RX ADMIN — HYDRALAZINE HYDROCHLORIDE 50 MG: 25 TABLET, FILM COATED ORAL at 09:06

## 2020-10-10 RX ADMIN — RISPERIDONE 0.5 MG: 0.25 TABLET ORAL at 09:05

## 2020-10-10 RX ADMIN — HYDRALAZINE HYDROCHLORIDE 50 MG: 25 TABLET, FILM COATED ORAL at 17:34

## 2020-10-10 RX ADMIN — SODIUM BICARBONATE 650 MG TABLET 650 MG: at 20:57

## 2020-10-10 RX ADMIN — Medication 10 ML: at 20:57

## 2020-10-10 RX ADMIN — ALLOPURINOL 100 MG: 100 TABLET ORAL at 09:05

## 2020-10-10 RX ADMIN — DILTIAZEM HYDROCHLORIDE 180 MG: 180 CAPSULE, COATED, EXTENDED RELEASE ORAL at 09:05

## 2020-10-10 RX ADMIN — ASPIRIN 81 MG: 81 TABLET, COATED ORAL at 09:05

## 2020-10-10 RX ADMIN — METOPROLOL SUCCINATE 50 MG: 50 TABLET, EXTENDED RELEASE ORAL at 09:05

## 2020-10-10 RX ADMIN — BETAMETHASONE DIPROPIONATE 1 APPLICATION: 0.5 CREAM TOPICAL at 20:57

## 2020-10-10 RX ADMIN — SODIUM BICARBONATE 650 MG TABLET 650 MG: at 17:34

## 2020-10-10 RX ADMIN — HYDRALAZINE HYDROCHLORIDE 50 MG: 25 TABLET, FILM COATED ORAL at 20:57

## 2020-10-10 RX ADMIN — RISPERIDONE 0.5 MG: 0.25 TABLET ORAL at 20:57

## 2020-10-10 RX ADMIN — ATORVASTATIN CALCIUM 10 MG: 10 TABLET, FILM COATED ORAL at 20:57

## 2020-10-10 RX ADMIN — Medication 10 ML: at 09:05

## 2020-10-10 RX ADMIN — SODIUM BICARBONATE 650 MG TABLET 650 MG: at 09:04

## 2020-10-10 RX ADMIN — LOSARTAN POTASSIUM 50 MG: 50 TABLET, FILM COATED ORAL at 09:05

## 2020-10-10 RX ADMIN — BETAMETHASONE DIPROPIONATE 1 APPLICATION: 0.5 CREAM TOPICAL at 09:06

## 2020-10-10 RX ADMIN — SPIRONOLACTONE 12.5 MG: 25 TABLET, FILM COATED ORAL at 09:05

## 2020-10-10 RX ADMIN — RIVAROXABAN 20 MG: 20 TABLET, FILM COATED ORAL at 17:34

## 2020-10-10 NOTE — PLAN OF CARE
Goal Outcome Evaluation:  Plan of Care Reviewed With: patient  Progress: no change  Outcome Summary: Pt had some confusion about being in the hospital this evening.  BP was elevated which was resolved with labetolol.  Vital signs now stable, will continue to monitor.

## 2020-10-10 NOTE — PLAN OF CARE
Goal Outcome Evaluation:  Plan of Care Reviewed With: patient  Progress: no change   Patient transferred into room at this time. Makes needs known. Alert to self. Some confusion noted. Refused skin assessment to his buttocks and jennifer area. Became upset when asked and encouraged to allow skin assessment.

## 2020-10-10 NOTE — PROGRESS NOTES
Referring Provider: Dr. Stauffer    Reason for follow-up: Fibrillation     Patient Care Team:  Marisol Larson APRN as PCP - General  Shwetha Barber MD as Consulting Physician (Nephrology)  Edy Thomas MD as Consulting Physician (Cardiology)    Subjective .  No shortness of breath or palpitations today    Objective  Lying in bed comfortably     Review of Systems   Constitution: Negative for fever and malaise/fatigue.   HENT: Negative for ear pain and nosebleeds.    Eyes: Negative for blurred vision and double vision.   Cardiovascular: Negative for chest pain, dyspnea on exertion and palpitations.   Respiratory: Negative for cough and shortness of breath.    Skin: Negative for rash.   Musculoskeletal: Negative for joint pain.   Gastrointestinal: Negative for abdominal pain, nausea and vomiting.   Neurological: Negative for focal weakness and headaches.   Psychiatric/Behavioral: Negative for depression. The patient is not nervous/anxious.    All other systems reviewed and are negative.      Ketorolac tromethamine    Scheduled Meds:allopurinol, 100 mg, Oral, Daily  aspirin, 81 mg, Oral, Daily  atorvastatin, 10 mg, Oral, Nightly  betamethasone dipropionate, 1 application, Topical, Q12H  dilTIAZem CD, 180 mg, Oral, Q24H  hydrALAZINE, 50 mg, Oral, TID  losartan, 50 mg, Oral, Q24H  metoprolol succinate XL, 50 mg, Oral, Q24H  risperiDONE, 0.5 mg, Oral, Q12H  rivaroxaban, 20 mg, Oral, Daily With Dinner  sodium bicarbonate, 650 mg, Oral, TID  sodium chloride, 10 mL, Intravenous, Q12H  spironolactone, 12.5 mg, Oral, Daily      Continuous Infusions:   PRN Meds:.•  acetaminophen **OR** acetaminophen **OR** acetaminophen  •  bisacodyl  •  calcium carbonate  •  HYDROcodone-acetaminophen  •  influenza vaccine  •  labetalol  •  LORazepam  •  magnesium hydroxide  •  melatonin  •  nitroglycerin  •  ondansetron **OR** ondansetron  •  potassium chloride **OR** potassium chloride **OR** potassium chloride  •  sodium  "chloride  •  sodium chloride        VITAL SIGNS  Vitals:    10/09/20 2338 10/10/20 0225 10/10/20 0600 10/10/20 1011   BP: 138/87 148/75 148/89 165/95   BP Location:  Left arm Left arm Right arm   Patient Position:  Lying Lying Lying   Pulse: 83 76 77 90   Resp:  17 15 15   Temp:  98.6 °F (37 °C) 98.4 °F (36.9 °C) 97.8 °F (36.6 °C)   TempSrc:  Oral Oral Oral   SpO2:  100% 98% 96%   Weight:   120 kg (264 lb 8.8 oz)    Height:           Flowsheet Rows      First Filed Value   Admission Height  182.9 cm (72\") Documented at 10/04/2020 1256   Admission Weight  118 kg (259 lb 11.2 oz) Documented at 10/04/2020 1256           TELEMETRY: Atrial fibrillation with controlled medical response    Physical Exam:  Constitutional:       Appearance: Well-developed.   Eyes:      General: No scleral icterus.     Conjunctiva/sclera: Conjunctivae normal.      Pupils: Pupils are equal, round, and reactive to light.   HENT:      Head: Normocephalic and atraumatic.   Neck:      Musculoskeletal: Normal range of motion and neck supple.      Vascular: No carotid bruit or JVD.   Pulmonary:      Effort: Pulmonary effort is normal.      Breath sounds: Normal breath sounds. No wheezing. No rales.   Cardiovascular:      Normal rate. Irregularly irregular rhythm.   Pulses:     Intact distal pulses.   Abdominal:      General: Bowel sounds are normal.      Palpations: Abdomen is soft.   Musculoskeletal: Normal range of motion.   Skin:     General: Skin is warm and dry.      Findings: No rash.   Neurological:      Mental Status: Alert.      Comments: No focal deficits          Results Review:   I reviewed the patient's new clinical results.  Lab Results (last 24 hours)     Procedure Component Value Units Date/Time    Blood Culture - Blood, Hand, Right [[id number]] Collected: 10/06/20 0926    Specimen: Blood from Hand, Right Updated: 10/10/20 1000     Blood Culture No growth at 4 days    Magnesium [[id number]]  (Normal) Collected: 10/10/20 0450    " Specimen: Blood Updated: 10/10/20 0618     Magnesium 2.4 mg/dL     CBC & Differential [[id number]]  (Abnormal) Collected: 10/10/20 0450    Specimen: Blood Updated: 10/10/20 0608    Narrative:      The following orders were created for panel order CBC & Differential.  Procedure                               Abnormality         Status                     ---------                               -----------         ------                     CBC Auto Differential[[id number]]        Abnormal            Final result                 Please view results for these tests on the individual orders.    CBC Auto Differential [[id number]]  (Abnormal) Collected: 10/10/20 0450    Specimen: Blood Updated: 10/10/20 0608     WBC 7.10 10*3/mm3      RBC 4.98 10*6/mm3      Hemoglobin 13.7 g/dL      Hematocrit 42.8 %      MCV 85.8 fL      MCH 27.4 pg      MCHC 31.9 g/dL      RDW 20.1 %      RDW-SD 58.4 fl      MPV 8.4 fL      Platelets 165 10*3/mm3      Neutrophil % 68.4 %      Lymphocyte % 17.7 %      Monocyte % 10.6 %      Eosinophil % 2.7 %      Basophil % 0.6 %      Neutrophils, Absolute 4.80 10*3/mm3      Lymphocytes, Absolute 1.30 10*3/mm3      Monocytes, Absolute 0.70 10*3/mm3      Eosinophils, Absolute 0.20 10*3/mm3      Basophils, Absolute 0.00 10*3/mm3      nRBC 0.0 /100 WBC     Blood Culture - Blood, Hand, Right [[id number]] Collected: 10/06/20 1608    Specimen: Blood from Hand, Right Updated: 10/09/20 1645     Blood Culture No growth at 3 days    Blood Culture - Blood, Foot, Left [[id number]] Collected: 10/04/20 1353    Specimen: Blood from Foot, Left Updated: 10/09/20 1415     Blood Culture No growth at 5 days          Imaging Results (Last 24 Hours)     ** No results found for the last 24 hours. **          EKG      I personally viewed and interpreted the patient's EKG/Telemetry data:    ECHOCARDIOGRAM:    STRESS MYOVIEW:    CARDIAC CATHETERIZATION:    OTHER:         Assessment/Plan     Principal Problem:    Atrial fibrillation  with RVR (CMS/[facility])  Active Problems:    Hypertensive emergency    Chronic atrial fibrillation (CMS/[facility])    Chronic diastolic CHF (congestive heart failure) (CMS/[facility])    CKD (chronic kidney disease) stage 3, GFR 30-59 ml/min    Essential hypertension    Gambling disorder, persistent    Coronary artery disease involving native coronary artery of native heart without angina pectoris    Psoriasis    History of CVA (cerebrovascular accident)    GERD (gastroesophageal reflux disease)    Medically noncompliant    Mild cognitive impairment    Dyspnea       Patient has atrial fibrillation with rapid response and was placed on IV Cardizem and then switched to oral Cardizem and is better now  Patient is also on anticoagulation with Xarelto  Patient's blood pressure and heart rate are stable  Patient's lipid levels are followed with a primary care doctor  Patient is ruled out for MI and has history of coronary disease  Patient has medical noncompliance and discussed with him several times about being compliant  We will follow him as an outpatient    I discussed the patients findings and my recommendations with patient and nurse    Edy Thomas MD  10/10/20  11:57 EDT

## 2020-10-10 NOTE — PROGRESS NOTES
" LOS: 6 days   Patient Care Team:  Marisol Larson APRN as PCP - General  Shwetha Barber MD as Consulting Physician (Nephrology)  Edy Thomas MD as Consulting Physician (Cardiology)    Subjective     Interval History:     Patient Complaints: pt's discharge was put on hold because it was felt that he was unsafe to go home.  Waiting on inpatient rehab. Pt is \"feeling fine\"    History taken from: patient    Review of Systems   Constitutional: Positive for activity change and fatigue.   HENT: Negative.    Respiratory: Negative for cough, chest tightness, shortness of breath and wheezing.    Cardiovascular: Negative for chest pain, palpitations and leg swelling.   Gastrointestinal: Negative for abdominal pain.   Neurological: Positive for weakness.   Psychiatric/Behavioral: Positive for confusion.           Objective     Vital Signs  Temp:  [97.8 °F (36.6 °C)-98.9 °F (37.2 °C)] 97.8 °F (36.6 °C)  Heart Rate:  [74-90] 90  Resp:  [13-19] 15  BP: (138-172)/() 165/95    Physical Exam:     General Appearance:    Alert, cooperative, in no acute distress   Head:    Normocephalic, without obvious abnormality, atraumatic   Eyes:            Lids and lashes normal, conjunctivae and sclerae normal, no   icterus, no pallor, corneas clear, PERRLA   Ears:    Ears appear intact with no abnormalities noted   Throat:   No oral lesions, no thrush, oral mucosa moist   Neck:   No adenopathy, supple, trachea midline, no thyromegaly, no   carotid bruit, no JVD   Lungs:     Clear to auscultation,respirations regular, even and                  unlabored    Heart:    irreg irreg   Chest Wall:    No abnormalities observed   Abdomen:     Normal bowel sounds, no masses, no organomegaly, soft        non-tender, non-distended, no guarding, no rebound                tenderness   Extremities:   Moves all extremities well, no edema, no cyanosis, no             redness   Pulses:   Pulses palpable and equal bilaterally   Skin:   No " bleeding, bruising or rash   Lymph nodes:   No palpable adenopathy   Neurologic:   Cranial nerves 2 - 12 grossly intact, sensation intact, DTR       present and equal bilaterally        Results Review:    Lab Results (last 24 hours)     Procedure Component Value Units Date/Time    Blood Culture - Blood, Hand, Right [[id number]] Collected: 10/06/20 0926    Specimen: Blood from Hand, Right Updated: 10/10/20 1000     Blood Culture No growth at 4 days    Magnesium [[id number]]  (Normal) Collected: 10/10/20 0450    Specimen: Blood Updated: 10/10/20 0618     Magnesium 2.4 mg/dL     CBC & Differential [[id number]]  (Abnormal) Collected: 10/10/20 0450    Specimen: Blood Updated: 10/10/20 0608    Narrative:      The following orders were created for panel order CBC & Differential.  Procedure                               Abnormality         Status                     ---------                               -----------         ------                     CBC Auto Differential[[id number]]        Abnormal            Final result                 Please view results for these tests on the individual orders.    CBC Auto Differential [[id number]]  (Abnormal) Collected: 10/10/20 0450    Specimen: Blood Updated: 10/10/20 0608     WBC 7.10 10*3/mm3      RBC 4.98 10*6/mm3      Hemoglobin 13.7 g/dL      Hematocrit 42.8 %      MCV 85.8 fL      MCH 27.4 pg      MCHC 31.9 g/dL      RDW 20.1 %      RDW-SD 58.4 fl      MPV 8.4 fL      Platelets 165 10*3/mm3      Neutrophil % 68.4 %      Lymphocyte % 17.7 %      Monocyte % 10.6 %      Eosinophil % 2.7 %      Basophil % 0.6 %      Neutrophils, Absolute 4.80 10*3/mm3      Lymphocytes, Absolute 1.30 10*3/mm3      Monocytes, Absolute 0.70 10*3/mm3      Eosinophils, Absolute 0.20 10*3/mm3      Basophils, Absolute 0.00 10*3/mm3      nRBC 0.0 /100 WBC     Blood Culture - Blood, Hand, Right [[id number]] Collected: 10/06/20 1608    Specimen: Blood from Hand, Right Updated: 10/09/20 1645     Blood Culture No  growth at 3 days    Blood Culture - Blood, Foot, Left [[id number]] Collected: 10/04/20 1353    Specimen: Blood from Foot, Left Updated: 10/09/20 1415     Blood Culture No growth at 5 days           Imaging Results (Last 24 Hours)     ** No results found for the last 24 hours. **               I reviewed the patient's new clinical results.    Medication Review:   Scheduled Meds:allopurinol, 100 mg, Oral, Daily  aspirin, 81 mg, Oral, Daily  atorvastatin, 10 mg, Oral, Nightly  betamethasone dipropionate, 1 application, Topical, Q12H  dilTIAZem CD, 180 mg, Oral, Q24H  hydrALAZINE, 50 mg, Oral, TID  losartan, 50 mg, Oral, Q24H  metoprolol succinate XL, 50 mg, Oral, Q24H  risperiDONE, 0.5 mg, Oral, Q12H  rivaroxaban, 20 mg, Oral, Daily With Dinner  sodium bicarbonate, 650 mg, Oral, TID  sodium chloride, 10 mL, Intravenous, Q12H  spironolactone, 12.5 mg, Oral, Daily      Continuous Infusions:   PRN Meds:.•  acetaminophen **OR** acetaminophen **OR** acetaminophen  •  bisacodyl  •  calcium carbonate  •  HYDROcodone-acetaminophen  •  influenza vaccine  •  labetalol  •  LORazepam  •  magnesium hydroxide  •  melatonin  •  nitroglycerin  •  ondansetron **OR** ondansetron  •  potassium chloride **OR** potassium chloride **OR** potassium chloride  •  sodium chloride  •  sodium chloride     Assessment/Plan       Atrial fibrillation with RVR (CMS/[facility])    Hypertensive emergency    Chronic atrial fibrillation (CMS/[facility])    Chronic diastolic CHF (congestive heart failure) (CMS/[facility])    CKD (chronic kidney disease) stage 3, GFR 30-59 ml/min    Essential hypertension    Gambling disorder, persistent    Coronary artery disease involving native coronary artery of native heart without angina pectoris    Psoriasis    History of CVA (cerebrovascular accident)    GERD (gastroesophageal reflux disease)    Medically noncompliant    Mild cognitive impairment    Dyspnea    Continue current meds and plan of care.  Awaiting inpatient rehab  placement    Plan for disposition:inpatient rehab    Lisa Kohli MD  10/10/20  11:53 EDT

## 2020-10-11 LAB
ALBUMIN SERPL-MCNC: 3.4 G/DL (ref 3.5–5.2)
ALBUMIN/GLOB SERPL: 1 G/DL
ALP SERPL-CCNC: 65 U/L (ref 39–117)
ALT SERPL W P-5'-P-CCNC: 83 U/L (ref 1–41)
ANION GAP SERPL CALCULATED.3IONS-SCNC: 11 MMOL/L (ref 5–15)
AST SERPL-CCNC: 33 U/L (ref 1–40)
BACTERIA SPEC AEROBE CULT: NORMAL
BACTERIA SPEC AEROBE CULT: NORMAL
BASOPHILS # BLD AUTO: 0 10*3/MM3 (ref 0–0.2)
BASOPHILS NFR BLD AUTO: 0.4 % (ref 0–1.5)
BILIRUB SERPL-MCNC: 0.7 MG/DL (ref 0–1.2)
BUN SERPL-MCNC: 17 MG/DL (ref 8–23)
BUN SERPL-MCNC: ABNORMAL MG/DL
BUN/CREAT SERPL: ABNORMAL
CALCIUM SPEC-SCNC: 8.9 MG/DL (ref 8.6–10.5)
CHLORIDE SERPL-SCNC: 108 MMOL/L (ref 98–107)
CO2 SERPL-SCNC: 21 MMOL/L (ref 22–29)
CREAT SERPL-MCNC: 1.04 MG/DL (ref 0.76–1.27)
DEPRECATED RDW RBC AUTO: 59.1 FL (ref 37–54)
EOSINOPHIL # BLD AUTO: 0.2 10*3/MM3 (ref 0–0.4)
EOSINOPHIL NFR BLD AUTO: 3 % (ref 0.3–6.2)
ERYTHROCYTE [DISTWIDTH] IN BLOOD BY AUTOMATED COUNT: 19.6 % (ref 12.3–15.4)
GFR SERPL CREATININE-BSD FRML MDRD: 72 ML/MIN/1.73
GLOBULIN UR ELPH-MCNC: 3.4 GM/DL
GLUCOSE SERPL-MCNC: 93 MG/DL (ref 65–99)
HCT VFR BLD AUTO: 42.5 % (ref 37.5–51)
HGB BLD-MCNC: 13.8 G/DL (ref 13–17.7)
LYMPHOCYTES # BLD AUTO: 1.2 10*3/MM3 (ref 0.7–3.1)
LYMPHOCYTES NFR BLD AUTO: 15.9 % (ref 19.6–45.3)
MAGNESIUM SERPL-MCNC: 2.4 MG/DL (ref 1.6–2.4)
MCH RBC QN AUTO: 27.7 PG (ref 26.6–33)
MCHC RBC AUTO-ENTMCNC: 32.6 G/DL (ref 31.5–35.7)
MCV RBC AUTO: 85 FL (ref 79–97)
MONOCYTES # BLD AUTO: 0.8 10*3/MM3 (ref 0.1–0.9)
MONOCYTES NFR BLD AUTO: 10.8 % (ref 5–12)
NEUTROPHILS NFR BLD AUTO: 5.1 10*3/MM3 (ref 1.7–7)
NEUTROPHILS NFR BLD AUTO: 69.9 % (ref 42.7–76)
NRBC BLD AUTO-RTO: 0.1 /100 WBC (ref 0–0.2)
PLATELET # BLD AUTO: 170 10*3/MM3 (ref 140–450)
PMV BLD AUTO: 8.3 FL (ref 6–12)
POTASSIUM SERPL-SCNC: 4.3 MMOL/L (ref 3.5–5.2)
PROT SERPL-MCNC: 6.8 G/DL (ref 6–8.5)
RBC # BLD AUTO: 5 10*6/MM3 (ref 4.14–5.8)
SODIUM SERPL-SCNC: 140 MMOL/L (ref 136–145)
WBC # BLD AUTO: 7.3 10*3/MM3 (ref 3.4–10.8)

## 2020-10-11 PROCEDURE — 83735 ASSAY OF MAGNESIUM: CPT | Performed by: FAMILY MEDICINE

## 2020-10-11 PROCEDURE — 85025 COMPLETE CBC W/AUTO DIFF WBC: CPT | Performed by: FAMILY MEDICINE

## 2020-10-11 PROCEDURE — 99232 SBSQ HOSP IP/OBS MODERATE 35: CPT | Performed by: INTERNAL MEDICINE

## 2020-10-11 PROCEDURE — 80053 COMPREHEN METABOLIC PANEL: CPT | Performed by: NURSE PRACTITIONER

## 2020-10-11 RX ADMIN — Medication 10 ML: at 08:39

## 2020-10-11 RX ADMIN — SODIUM BICARBONATE 650 MG TABLET 650 MG: at 08:39

## 2020-10-11 RX ADMIN — METOPROLOL SUCCINATE 50 MG: 50 TABLET, EXTENDED RELEASE ORAL at 08:39

## 2020-10-11 RX ADMIN — ALLOPURINOL 100 MG: 100 TABLET ORAL at 08:39

## 2020-10-11 RX ADMIN — SODIUM BICARBONATE 650 MG TABLET 650 MG: at 19:28

## 2020-10-11 RX ADMIN — SODIUM BICARBONATE 650 MG TABLET 650 MG: at 17:32

## 2020-10-11 RX ADMIN — ASPIRIN 81 MG: 81 TABLET, COATED ORAL at 08:39

## 2020-10-11 RX ADMIN — RISPERIDONE 0.5 MG: 0.25 TABLET ORAL at 19:28

## 2020-10-11 RX ADMIN — Medication 10 ML: at 19:29

## 2020-10-11 RX ADMIN — LABETALOL 20 MG/4 ML (5 MG/ML) INTRAVENOUS SYRINGE 20 MG: at 03:36

## 2020-10-11 RX ADMIN — RISPERIDONE 0.5 MG: 0.25 TABLET ORAL at 08:39

## 2020-10-11 RX ADMIN — HYDRALAZINE HYDROCHLORIDE 50 MG: 25 TABLET, FILM COATED ORAL at 17:32

## 2020-10-11 RX ADMIN — DILTIAZEM HYDROCHLORIDE 180 MG: 180 CAPSULE, COATED, EXTENDED RELEASE ORAL at 08:39

## 2020-10-11 RX ADMIN — SPIRONOLACTONE 12.5 MG: 25 TABLET, FILM COATED ORAL at 08:39

## 2020-10-11 RX ADMIN — HYDRALAZINE HYDROCHLORIDE 50 MG: 25 TABLET, FILM COATED ORAL at 19:28

## 2020-10-11 RX ADMIN — RIVAROXABAN 20 MG: 20 TABLET, FILM COATED ORAL at 17:33

## 2020-10-11 RX ADMIN — ATORVASTATIN CALCIUM 10 MG: 10 TABLET, FILM COATED ORAL at 19:29

## 2020-10-11 RX ADMIN — LOSARTAN POTASSIUM 50 MG: 50 TABLET, FILM COATED ORAL at 08:39

## 2020-10-11 RX ADMIN — HYDRALAZINE HYDROCHLORIDE 50 MG: 25 TABLET, FILM COATED ORAL at 08:39

## 2020-10-11 NOTE — PLAN OF CARE
Goal Outcome Evaluation:  Plan of Care Reviewed With: patient  Progress: improving  Outcome Summary: Pt continues to have some confusion. Vital signs have remained stable. Currently await acceptance into rehab. Will contiune to monitor.

## 2020-10-11 NOTE — PLAN OF CARE
"Goal Outcome Evaluation:  Plan of Care Reviewed With: patient  Progress: improving   Patient is pleasantly confused. Is fully dressed in his room. Patient was wanting \"go home today\" education provided on his current discharge plan to go in patient rehab. Patient is agreeable but seems to forget the discharge plan shortly after explained to him.  "

## 2020-10-11 NOTE — PROGRESS NOTES
Referring Provider: Dr. Stauffer    Reason for follow-up: Fibrillation     Patient Care Team:  Marisol Larson APRN as PCP - General  Shwetha Barber MD as Consulting Physician (Nephrology)  Edy Thomas MD as Consulting Physician (Cardiology)    Subjective .  No shortness of breath or palpitations today    Objective  Lying in bed comfortably     Review of Systems   Constitution: Negative for fever and malaise/fatigue.   HENT: Negative for ear pain and nosebleeds.    Eyes: Negative for blurred vision and double vision.   Cardiovascular: Negative for chest pain, dyspnea on exertion and palpitations.   Respiratory: Negative for cough and shortness of breath.    Skin: Negative for rash.   Musculoskeletal: Negative for joint pain.   Gastrointestinal: Negative for abdominal pain, nausea and vomiting.   Neurological: Negative for focal weakness and headaches.   Psychiatric/Behavioral: Negative for depression. The patient is not nervous/anxious.    All other systems reviewed and are negative.      Ketorolac tromethamine    Scheduled Meds:allopurinol, 100 mg, Oral, Daily  aspirin, 81 mg, Oral, Daily  atorvastatin, 10 mg, Oral, Nightly  betamethasone dipropionate, 1 application, Topical, Q12H  dilTIAZem CD, 180 mg, Oral, Q24H  hydrALAZINE, 50 mg, Oral, TID  losartan, 50 mg, Oral, Q24H  metoprolol succinate XL, 50 mg, Oral, Q24H  risperiDONE, 0.5 mg, Oral, Q12H  rivaroxaban, 20 mg, Oral, Daily With Dinner  sodium bicarbonate, 650 mg, Oral, TID  sodium chloride, 10 mL, Intravenous, Q12H  spironolactone, 12.5 mg, Oral, Daily      Continuous Infusions:   PRN Meds:.•  acetaminophen **OR** acetaminophen **OR** acetaminophen  •  bisacodyl  •  calcium carbonate  •  HYDROcodone-acetaminophen  •  influenza vaccine  •  labetalol  •  LORazepam  •  magnesium hydroxide  •  melatonin  •  nitroglycerin  •  ondansetron **OR** ondansetron  •  potassium chloride **OR** potassium chloride **OR** potassium chloride  •  sodium  "chloride  •  sodium chloride        VITAL SIGNS  Vitals:    10/10/20 1929 10/11/20 0312 10/11/20 0509 10/11/20 1142   BP: 150/93 (!) 189/107 165/99 128/53   BP Location: Right arm Right arm Right arm    Patient Position: Lying Lying Lying    Pulse: 90 87  89   Resp: 16 16  18   Temp: 98.5 °F (36.9 °C) 98.7 °F (37.1 °C)  98.1 °F (36.7 °C)   TempSrc: Oral Oral     SpO2: 95% 97%  96%   Weight:       Height:           Flowsheet Rows      First Filed Value   Admission Height  182.9 cm (72\") Documented at 10/04/2020 1256   Admission Weight  118 kg (259 lb 11.2 oz) Documented at 10/04/2020 1256           TELEMETRY: Atrial fibrillation with controlled medical response    Physical Exam:  Constitutional:       Appearance: Well-developed.   Eyes:      General: No scleral icterus.     Conjunctiva/sclera: Conjunctivae normal.      Pupils: Pupils are equal, round, and reactive to light.   HENT:      Head: Normocephalic and atraumatic.   Neck:      Musculoskeletal: Normal range of motion and neck supple.      Vascular: No carotid bruit or JVD.   Pulmonary:      Effort: Pulmonary effort is normal.      Breath sounds: Normal breath sounds. No wheezing. No rales.   Cardiovascular:      Normal rate. Irregularly irregular rhythm.   Pulses:     Intact distal pulses.   Abdominal:      General: Bowel sounds are normal.      Palpations: Abdomen is soft.   Musculoskeletal: Normal range of motion.   Skin:     General: Skin is warm and dry.      Findings: No rash.   Neurological:      Mental Status: Alert.      Comments: No focal deficits          Results Review:   I reviewed the patient's new clinical results.  Lab Results (last 24 hours)     Procedure Component Value Units Date/Time    Comprehensive Metabolic Panel [[id number]]  (Abnormal) Collected: 10/11/20 0541    Specimen: Blood Updated: 10/11/20 1016     Glucose 93 mg/dL      BUN --     Comment: Testing performed by alternate method        Creatinine 1.04 mg/dL      Sodium 140 mmol/L  "     Potassium 4.3 mmol/L      Comment: Slight hemolysis detected by analyzer. Results may be affected.        Chloride 108 mmol/L      CO2 21.0 mmol/L      Calcium 8.9 mg/dL      Total Protein 6.8 g/dL      Albumin 3.40 g/dL      ALT (SGPT) 83 U/L      AST (SGOT) 33 U/L      Alkaline Phosphatase 65 U/L      Total Bilirubin 0.7 mg/dL      eGFR Non African Amer 72 mL/min/1.73      Globulin 3.4 gm/dL      A/G Ratio 1.0 g/dL      BUN/Creatinine Ratio --     Comment: Testing not performed        Anion Gap 11.0 mmol/L     Narrative:      GFR Normal >60  Chronic Kidney Disease <60  Kidney Failure <15      BUN [[id number]] Collected: 10/11/20 0541    Specimen: Blood Updated: 10/11/20 1016    Blood Culture - Blood, Hand, Right [[id number]] Collected: 10/06/20 0926    Specimen: Blood from Hand, Right Updated: 10/11/20 1000     Blood Culture No growth at 5 days    Magnesium [[id number]]  (Normal) Collected: 10/11/20 0541    Specimen: Blood Updated: 10/11/20 0642     Magnesium 2.4 mg/dL     CBC & Differential [[id number]]  (Abnormal) Collected: 10/11/20 0541    Specimen: Blood Updated: 10/11/20 0630    Narrative:      The following orders were created for panel order CBC & Differential.  Procedure                               Abnormality         Status                     ---------                               -----------         ------                     CBC Auto Differential[[id number]]        Abnormal            Final result                 Please view results for these tests on the individual orders.    CBC Auto Differential [[id number]]  (Abnormal) Collected: 10/11/20 0541    Specimen: Blood Updated: 10/11/20 0630     WBC 7.30 10*3/mm3      RBC 5.00 10*6/mm3      Hemoglobin 13.8 g/dL      Hematocrit 42.5 %      MCV 85.0 fL      MCH 27.7 pg      MCHC 32.6 g/dL      RDW 19.6 %      RDW-SD 59.1 fl      MPV 8.3 fL      Platelets 170 10*3/mm3      Neutrophil % 69.9 %      Lymphocyte % 15.9 %      Monocyte % 10.8 %      Eosinophil  % 3.0 %      Basophil % 0.4 %      Neutrophils, Absolute 5.10 10*3/mm3      Lymphocytes, Absolute 1.20 10*3/mm3      Monocytes, Absolute 0.80 10*3/mm3      Eosinophils, Absolute 0.20 10*3/mm3      Basophils, Absolute 0.00 10*3/mm3      nRBC 0.1 /100 WBC     Blood Culture - Blood, Hand, Right [[id number]] Collected: 10/06/20 1608    Specimen: Blood from Hand, Right Updated: 10/10/20 7991     Blood Culture No growth at 4 days          Imaging Results (Last 24 Hours)     ** No results found for the last 24 hours. **          EKG      I personally viewed and interpreted the patient's EKG/Telemetry data:    ECHOCARDIOGRAM:    STRESS MYOVIEW:    CARDIAC CATHETERIZATION:    OTHER:         Assessment/Plan     Principal Problem:    Atrial fibrillation with RVR (CMS/[facility])  Active Problems:    Hypertensive emergency    Chronic atrial fibrillation (CMS/[facility])    Chronic diastolic CHF (congestive heart failure) (CMS/[facility])    CKD (chronic kidney disease) stage 3, GFR 30-59 ml/min    Essential hypertension    Gambling disorder, persistent    Coronary artery disease involving native coronary artery of native heart without angina pectoris    Psoriasis    History of CVA (cerebrovascular accident)    GERD (gastroesophageal reflux disease)    Medically noncompliant    Mild cognitive impairment    Dyspnea       Patient has atrial fibrillation with rapid response and was placed on IV Cardizem and then switched to oral Cardizem and is better now  Patient is also on anticoagulation with Xarelto  Patient's blood pressure and heart rate are stable  Patient's lipid levels are followed with a primary care doctor  Patient is ruled out for MI and has history of coronary disease  Patient has medical noncompliance and discussed with him several times about being compliant  We will follow him as an outpatient    I discussed the patients findings and my recommendations with patient and nurse    Edy Thomas MD  10/11/20  16:05 EDT

## 2020-10-11 NOTE — PROGRESS NOTES
" LOS: 7 days   Patient Care Team:  Marisol Larson APRN as PCP - General  Shwetha Barber MD as Consulting Physician (Nephrology)  Edy Thomas MD as Consulting Physician (Cardiology)    Subjective     Interval History:     Patient Complaints: pt's discharge was put on hold because it was felt that he was unsafe to go home.  Waiting on inpatient rehab. Pt is \"feeling fine\"    History taken from: patient    Review of Systems   Constitutional: Positive for activity change and fatigue.   HENT: Negative.    Respiratory: Negative for cough, chest tightness, shortness of breath and wheezing.    Cardiovascular: Negative for chest pain, palpitations and leg swelling.   Gastrointestinal: Negative for abdominal pain.   Neurological: Positive for weakness.   Psychiatric/Behavioral: Positive for confusion.           Objective     Vital Signs  Temp:  [98.1 °F (36.7 °C)-98.7 °F (37.1 °C)] 98.1 °F (36.7 °C)  Heart Rate:  [87-90] 89  Resp:  [16-18] 18  BP: (128-189)/() 128/53    Physical Exam:     General Appearance:    Alert, cooperative, in no acute distress   Head:    Normocephalic, without obvious abnormality, atraumatic   Eyes:            Lids and lashes normal, conjunctivae and sclerae normal, no   icterus, no pallor   Ears:    Ears appear intact with no abnormalities noted   Throat:   No oral lesions, no thrush, oral mucosa moist   Neck:   No adenopathy, supple, trachea midline, no thyromegaly, no   carotid bruit, no JVD   Lungs:     Clear to auscultation,respirations regular, even and                  unlabored    Heart:    irreg irreg   Chest Wall:    No abnormalities observed   Abdomen:     Normal bowel sounds, no masses, no organomegaly, soft        non-tender, non-distended, no guarding, no rebound                tenderness   Extremities:   Moves all extremities well, no edema, no cyanosis, no             redness   Pulses:   Pulses palpable and equal bilaterally   Skin:   No bleeding, bruising or rash "   Lymph nodes:   No palpable adenopathy   Neurologic:   Cranial nerves 2 - 12 grossly intact, sensation intact        Results Review:    Lab Results (last 24 hours)     Procedure Component Value Units Date/Time    Blood Culture - Blood, Hand, Right [[id number]] Collected: 10/06/20 1608    Specimen: Blood from Hand, Right Updated: 10/11/20 1645     Blood Culture No growth at 5 days    Comprehensive Metabolic Panel [[id number]]  (Abnormal) Collected: 10/11/20 0541    Specimen: Blood Updated: 10/11/20 1016     Glucose 93 mg/dL      BUN --     Comment: Testing performed by alternate method        Creatinine 1.04 mg/dL      Sodium 140 mmol/L      Potassium 4.3 mmol/L      Comment: Slight hemolysis detected by analyzer. Results may be affected.        Chloride 108 mmol/L      CO2 21.0 mmol/L      Calcium 8.9 mg/dL      Total Protein 6.8 g/dL      Albumin 3.40 g/dL      ALT (SGPT) 83 U/L      AST (SGOT) 33 U/L      Alkaline Phosphatase 65 U/L      Total Bilirubin 0.7 mg/dL      eGFR Non African Amer 72 mL/min/1.73      Globulin 3.4 gm/dL      A/G Ratio 1.0 g/dL      BUN/Creatinine Ratio --     Comment: Testing not performed        Anion Gap 11.0 mmol/L     Narrative:      GFR Normal >60  Chronic Kidney Disease <60  Kidney Failure <15      BUN [[id number]] Collected: 10/11/20 0541    Specimen: Blood Updated: 10/11/20 1016    Blood Culture - Blood, Hand, Right [[id number]] Collected: 10/06/20 0926    Specimen: Blood from Hand, Right Updated: 10/11/20 1000     Blood Culture No growth at 5 days    Magnesium [[id number]]  (Normal) Collected: 10/11/20 0541    Specimen: Blood Updated: 10/11/20 0642     Magnesium 2.4 mg/dL     CBC & Differential [[id number]]  (Abnormal) Collected: 10/11/20 0541    Specimen: Blood Updated: 10/11/20 0630    Narrative:      The following orders were created for panel order CBC & Differential.  Procedure                               Abnormality         Status                     ---------                                -----------         ------                     CBC Auto Differential[[id number]]        Abnormal            Final result                 Please view results for these tests on the individual orders.    CBC Auto Differential [[id number]]  (Abnormal) Collected: 10/11/20 0541    Specimen: Blood Updated: 10/11/20 0630     WBC 7.30 10*3/mm3      RBC 5.00 10*6/mm3      Hemoglobin 13.8 g/dL      Hematocrit 42.5 %      MCV 85.0 fL      MCH 27.7 pg      MCHC 32.6 g/dL      RDW 19.6 %      RDW-SD 59.1 fl      MPV 8.3 fL      Platelets 170 10*3/mm3      Neutrophil % 69.9 %      Lymphocyte % 15.9 %      Monocyte % 10.8 %      Eosinophil % 3.0 %      Basophil % 0.4 %      Neutrophils, Absolute 5.10 10*3/mm3      Lymphocytes, Absolute 1.20 10*3/mm3      Monocytes, Absolute 0.80 10*3/mm3      Eosinophils, Absolute 0.20 10*3/mm3      Basophils, Absolute 0.00 10*3/mm3      nRBC 0.1 /100 WBC            Imaging Results (Last 24 Hours)     ** No results found for the last 24 hours. **               I reviewed the patient's new clinical results.    Medication Review:   Scheduled Meds:allopurinol, 100 mg, Oral, Daily  aspirin, 81 mg, Oral, Daily  atorvastatin, 10 mg, Oral, Nightly  betamethasone dipropionate, 1 application, Topical, Q12H  dilTIAZem CD, 180 mg, Oral, Q24H  hydrALAZINE, 50 mg, Oral, TID  losartan, 50 mg, Oral, Q24H  metoprolol succinate XL, 50 mg, Oral, Q24H  risperiDONE, 0.5 mg, Oral, Q12H  rivaroxaban, 20 mg, Oral, Daily With Dinner  sodium bicarbonate, 650 mg, Oral, TID  sodium chloride, 10 mL, Intravenous, Q12H  spironolactone, 12.5 mg, Oral, Daily      Continuous Infusions:   PRN Meds:.•  acetaminophen **OR** acetaminophen **OR** acetaminophen  •  bisacodyl  •  calcium carbonate  •  HYDROcodone-acetaminophen  •  influenza vaccine  •  labetalol  •  LORazepam  •  magnesium hydroxide  •  melatonin  •  nitroglycerin  •  ondansetron **OR** ondansetron  •  potassium chloride **OR** potassium chloride **OR**  potassium chloride  •  sodium chloride  •  sodium chloride     Assessment/Plan       Atrial fibrillation with RVR (CMS/[facility])    Hypertensive emergency    Chronic atrial fibrillation (CMS/[facility])    Chronic diastolic CHF (congestive heart failure) (CMS/[facility])    CKD (chronic kidney disease) stage 3, GFR 30-59 ml/min    Essential hypertension    Gambling disorder, persistent    Coronary artery disease involving native coronary artery of native heart without angina pectoris    Psoriasis    History of CVA (cerebrovascular accident)    GERD (gastroesophageal reflux disease)    Medically noncompliant    Mild cognitive impairment    Dyspnea    Continue current meds and plan of care.  Awaiting inpatient rehab placement as pt unsafe to go home    Plan for disposition:inpatient rehab    Daylin Burris, APRN  10/11/20  16:55 EDT

## 2020-10-12 LAB
BASOPHILS # BLD AUTO: 0.1 10*3/MM3 (ref 0–0.2)
BASOPHILS NFR BLD AUTO: 0.7 % (ref 0–1.5)
DEPRECATED RDW RBC AUTO: 59.9 FL (ref 37–54)
EOSINOPHIL # BLD AUTO: 0.2 10*3/MM3 (ref 0–0.4)
EOSINOPHIL NFR BLD AUTO: 2.3 % (ref 0.3–6.2)
ERYTHROCYTE [DISTWIDTH] IN BLOOD BY AUTOMATED COUNT: 19.9 % (ref 12.3–15.4)
HCT VFR BLD AUTO: 43.1 % (ref 37.5–51)
HGB BLD-MCNC: 14 G/DL (ref 13–17.7)
LYMPHOCYTES # BLD AUTO: 1.2 10*3/MM3 (ref 0.7–3.1)
LYMPHOCYTES NFR BLD AUTO: 14.7 % (ref 19.6–45.3)
MAGNESIUM SERPL-MCNC: 2.5 MG/DL (ref 1.6–2.4)
MCH RBC QN AUTO: 27.6 PG (ref 26.6–33)
MCHC RBC AUTO-ENTMCNC: 32.4 G/DL (ref 31.5–35.7)
MCV RBC AUTO: 85.4 FL (ref 79–97)
MONOCYTES # BLD AUTO: 0.9 10*3/MM3 (ref 0.1–0.9)
MONOCYTES NFR BLD AUTO: 10.7 % (ref 5–12)
NEUTROPHILS NFR BLD AUTO: 5.9 10*3/MM3 (ref 1.7–7)
NEUTROPHILS NFR BLD AUTO: 71.6 % (ref 42.7–76)
NRBC BLD AUTO-RTO: 0 /100 WBC (ref 0–0.2)
PLATELET # BLD AUTO: 184 10*3/MM3 (ref 140–450)
PMV BLD AUTO: 8.5 FL (ref 6–12)
RBC # BLD AUTO: 5.05 10*6/MM3 (ref 4.14–5.8)
WBC # BLD AUTO: 8.2 10*3/MM3 (ref 3.4–10.8)

## 2020-10-12 PROCEDURE — 85025 COMPLETE CBC W/AUTO DIFF WBC: CPT | Performed by: FAMILY MEDICINE

## 2020-10-12 PROCEDURE — 83735 ASSAY OF MAGNESIUM: CPT | Performed by: FAMILY MEDICINE

## 2020-10-12 PROCEDURE — 99232 SBSQ HOSP IP/OBS MODERATE 35: CPT | Performed by: INTERNAL MEDICINE

## 2020-10-12 PROCEDURE — 97116 GAIT TRAINING THERAPY: CPT

## 2020-10-12 PROCEDURE — 94799 UNLISTED PULMONARY SVC/PX: CPT

## 2020-10-12 RX ADMIN — ALLOPURINOL 100 MG: 100 TABLET ORAL at 10:30

## 2020-10-12 RX ADMIN — RISPERIDONE 0.5 MG: 0.25 TABLET ORAL at 20:54

## 2020-10-12 RX ADMIN — Medication 10 ML: at 10:30

## 2020-10-12 RX ADMIN — SODIUM BICARBONATE 650 MG TABLET 650 MG: at 20:54

## 2020-10-12 RX ADMIN — HYDRALAZINE HYDROCHLORIDE 50 MG: 25 TABLET, FILM COATED ORAL at 20:54

## 2020-10-12 RX ADMIN — ATORVASTATIN CALCIUM 10 MG: 10 TABLET, FILM COATED ORAL at 20:54

## 2020-10-12 RX ADMIN — RIVAROXABAN 20 MG: 20 TABLET, FILM COATED ORAL at 17:23

## 2020-10-12 RX ADMIN — SODIUM BICARBONATE 650 MG TABLET 650 MG: at 10:30

## 2020-10-12 RX ADMIN — Medication 10 ML: at 04:21

## 2020-10-12 RX ADMIN — HYDRALAZINE HYDROCHLORIDE 50 MG: 25 TABLET, FILM COATED ORAL at 10:29

## 2020-10-12 RX ADMIN — SODIUM BICARBONATE 650 MG TABLET 650 MG: at 17:23

## 2020-10-12 RX ADMIN — SPIRONOLACTONE 12.5 MG: 25 TABLET, FILM COATED ORAL at 10:30

## 2020-10-12 RX ADMIN — ASPIRIN 81 MG: 81 TABLET, COATED ORAL at 10:30

## 2020-10-12 RX ADMIN — LABETALOL 20 MG/4 ML (5 MG/ML) INTRAVENOUS SYRINGE 20 MG: at 04:21

## 2020-10-12 RX ADMIN — BETAMETHASONE DIPROPIONATE 1 APPLICATION: 0.5 CREAM TOPICAL at 10:30

## 2020-10-12 RX ADMIN — DILTIAZEM HYDROCHLORIDE 180 MG: 180 CAPSULE, COATED, EXTENDED RELEASE ORAL at 10:30

## 2020-10-12 RX ADMIN — RISPERIDONE 0.5 MG: 0.25 TABLET ORAL at 10:29

## 2020-10-12 RX ADMIN — BETAMETHASONE DIPROPIONATE 1 APPLICATION: 0.5 CREAM TOPICAL at 20:56

## 2020-10-12 RX ADMIN — HYDRALAZINE HYDROCHLORIDE 50 MG: 25 TABLET, FILM COATED ORAL at 17:23

## 2020-10-12 RX ADMIN — Medication 10 ML: at 20:54

## 2020-10-12 RX ADMIN — METOPROLOL SUCCINATE 50 MG: 50 TABLET, EXTENDED RELEASE ORAL at 10:30

## 2020-10-12 RX ADMIN — LOSARTAN POTASSIUM 50 MG: 50 TABLET, FILM COATED ORAL at 10:30

## 2020-10-12 NOTE — PROGRESS NOTES
Continued Stay Note  KAE Hameed     Patient Name: Carlos Whipple  MRN: [id number]  Today's Date: 10/12/2020    Admit Date: 10/4/2020    Discharge Plan     Row Name 10/12/20 1610       Plan    Plan  [facility] able to accept, pending pre-cert. Pre-cert started 10/9. PASRR approved.    Plan Comments  SW noted [facility] liaison texted that [facility] is able to accept pt, pending pre-cert. Liaison started pre-cert on 10/9. ABHILASH noted that PASRR is approved.     Zee Baptiste [facility], LSW    Office: [phone]  Cell: [phone]  Fax: [phone]  E-mail: [email or web address]

## 2020-10-12 NOTE — PROGRESS NOTES
" LOS: 8 days   Patient Care Team:  Marisol Larson APRN as PCP - General  Shwetha Barber MD as Consulting Physician (Nephrology)  Edy Thomas MD as Consulting Physician (Cardiology)    Subjective     Interval History:     Patient Complaints: pt's discharge was put on hold because it was felt that he was unsafe to go home.  Waiting on inpatient rehab. Pt is \"feeling fine\"    History taken from: patient    Review of Systems   Constitutional: Positive for activity change and fatigue.   HENT: Negative.    Respiratory: Negative for cough, chest tightness, shortness of breath and wheezing.    Cardiovascular: Negative for chest pain, palpitations and leg swelling.   Gastrointestinal: Negative for abdominal pain.   Neurological: Positive for weakness.   Psychiatric/Behavioral: Positive for confusion.           Objective     Vital Signs  Temp:  [97.6 °F (36.4 °C)-98.1 °F (36.7 °C)] 97.6 °F (36.4 °C)  Heart Rate:  [79-90] 79  Resp:  [12-18] 12  BP: (128-170)/() 164/97    Physical Exam:     General Appearance:    Alert, cooperative, in no acute distress   Head:    Normocephalic, without obvious abnormality, atraumatic   Eyes:            Lids and lashes normal, conjunctivae and sclerae normal, no   icterus, no pallor   Ears:    Ears appear intact with no abnormalities noted   Throat:   No oral lesions, no thrush, oral mucosa moist   Neck:   No adenopathy, supple, trachea midline, no thyromegaly, no   carotid bruit, no JVD   Lungs:     Clear to auscultation,respirations regular, even and                  unlabored    Heart:    irreg irreg   Chest Wall:    No abnormalities observed   Abdomen:     Normal bowel sounds, no masses, no organomegaly, soft        non-tender, non-distended, no guarding, no rebound                tenderness   Extremities:   Moves all extremities well, no edema, no cyanosis, no             redness   Pulses:   Pulses palpable and equal bilaterally   Skin:   No bleeding, bruising or rash "   Lymph nodes:   No palpable adenopathy   Neurologic:   Cranial nerves 2 - 12 grossly intact, sensation intact        Results Review:    Lab Results (last 24 hours)     Procedure Component Value Units Date/Time    Magnesium [[id number]]  (Abnormal) Collected: 10/12/20 0305    Specimen: Blood Updated: 10/12/20 0422     Magnesium 2.5 mg/dL     CBC & Differential [[id number]]  (Abnormal) Collected: 10/12/20 0305    Specimen: Blood Updated: 10/12/20 0357    Narrative:      The following orders were created for panel order CBC & Differential.  Procedure                               Abnormality         Status                     ---------                               -----------         ------                     CBC Auto Differential[[id number]]        Abnormal            Final result                 Please view results for these tests on the individual orders.    CBC Auto Differential [[id number]]  (Abnormal) Collected: 10/12/20 0305    Specimen: Blood Updated: 10/12/20 0357     WBC 8.20 10*3/mm3      RBC 5.05 10*6/mm3      Hemoglobin 14.0 g/dL      Hematocrit 43.1 %      MCV 85.4 fL      MCH 27.6 pg      MCHC 32.4 g/dL      RDW 19.9 %      RDW-SD 59.9 fl      MPV 8.5 fL      Platelets 184 10*3/mm3      Neutrophil % 71.6 %      Lymphocyte % 14.7 %      Monocyte % 10.7 %      Eosinophil % 2.3 %      Basophil % 0.7 %      Neutrophils, Absolute 5.90 10*3/mm3      Lymphocytes, Absolute 1.20 10*3/mm3      Monocytes, Absolute 0.90 10*3/mm3      Eosinophils, Absolute 0.20 10*3/mm3      Basophils, Absolute 0.10 10*3/mm3      nRBC 0.0 /100 WBC     BUN [[id number]]  (Normal) Collected: 10/11/20 0541    Specimen: Blood Updated: 10/11/20 1729     BUN 17 mg/dL     Blood Culture - Blood, Hand, Right [[id number]] Collected: 10/06/20 1608    Specimen: Blood from Hand, Right Updated: 10/11/20 1645     Blood Culture No growth at 5 days    Comprehensive Metabolic Panel [[id number]]  (Abnormal) Collected: 10/11/20 0541    Specimen: Blood  Updated: 10/11/20 1016     Glucose 93 mg/dL      BUN --     Comment: Testing performed by alternate method        Creatinine 1.04 mg/dL      Sodium 140 mmol/L      Potassium 4.3 mmol/L      Comment: Slight hemolysis detected by analyzer. Results may be affected.        Chloride 108 mmol/L      CO2 21.0 mmol/L      Calcium 8.9 mg/dL      Total Protein 6.8 g/dL      Albumin 3.40 g/dL      ALT (SGPT) 83 U/L      AST (SGOT) 33 U/L      Alkaline Phosphatase 65 U/L      Total Bilirubin 0.7 mg/dL      eGFR Non African Amer 72 mL/min/1.73      Globulin 3.4 gm/dL      A/G Ratio 1.0 g/dL      BUN/Creatinine Ratio --     Comment: Testing not performed        Anion Gap 11.0 mmol/L     Narrative:      GFR Normal >60  Chronic Kidney Disease <60  Kidney Failure <15      Blood Culture - Blood, Hand, Right [[id number]] Collected: 10/06/20 0926    Specimen: Blood from Hand, Right Updated: 10/11/20 1000     Blood Culture No growth at 5 days           Imaging Results (Last 24 Hours)     ** No results found for the last 24 hours. **               I reviewed the patient's new clinical results.    Medication Review:   Scheduled Meds:allopurinol, 100 mg, Oral, Daily  aspirin, 81 mg, Oral, Daily  atorvastatin, 10 mg, Oral, Nightly  betamethasone dipropionate, 1 application, Topical, Q12H  dilTIAZem CD, 180 mg, Oral, Q24H  hydrALAZINE, 50 mg, Oral, TID  losartan, 50 mg, Oral, Q24H  metoprolol succinate XL, 50 mg, Oral, Q24H  risperiDONE, 0.5 mg, Oral, Q12H  rivaroxaban, 20 mg, Oral, Daily With Dinner  sodium bicarbonate, 650 mg, Oral, TID  sodium chloride, 10 mL, Intravenous, Q12H  spironolactone, 12.5 mg, Oral, Daily      Continuous Infusions:   PRN Meds:.•  acetaminophen **OR** acetaminophen **OR** acetaminophen  •  bisacodyl  •  calcium carbonate  •  influenza vaccine  •  labetalol  •  magnesium hydroxide  •  melatonin  •  nitroglycerin  •  ondansetron **OR** ondansetron  •  potassium chloride **OR** potassium chloride **OR** potassium  chloride  •  sodium chloride  •  sodium chloride     Assessment/Plan       Atrial fibrillation with RVR (CMS/[facility])    Hypertensive emergency    Chronic atrial fibrillation (CMS/[facility])    Chronic diastolic CHF (congestive heart failure) (CMS/[facility])    CKD (chronic kidney disease) stage 3, GFR 30-59 ml/min    Essential hypertension    Gambling disorder, persistent    Coronary artery disease involving native coronary artery of native heart without angina pectoris    Psoriasis    History of CVA (cerebrovascular accident)    GERD (gastroesophageal reflux disease)    Medically noncompliant    Mild cognitive impairment    Dyspnea    Continue current meds and plan of care.  Awaiting inpatient rehab placement as pt unsafe to go home    Plan for disposition:inpatient rehab    CLARISSA Luna  10/12/20  06:47 EDT

## 2020-10-12 NOTE — PLAN OF CARE
Goal Outcome Evaluation:  Plan of Care Reviewed With: patient  Progress: improving   Patient currently resting abed, no complaints voiced. He is forgetful and at times very confused. Other times patient is correct when answering questions.

## 2020-10-12 NOTE — PROGRESS NOTES
Discharge Planning Assessment   Yoseph     Patient Name: Carlos Whipple  MRN: [id number]  Today's Date: 10/12/2020    Admit Date: 10/4/2020          Plan    Plan Comments  barriers to discharge is pending pre-cert for rehab       Carol naegele rn  Case management  Office number [phone]  Cell phone [phone]

## 2020-10-12 NOTE — PLAN OF CARE
Problem: Adult Inpatient Plan of Care  Goal: Plan of Care Review  Recent Flowsheet Documentation  Taken 10/12/2020 1638 by Lizet Schneider PTA  Progress: improving  Plan of Care Reviewed With: patient  Outcome Summary: Pt is able to ambulate further distance and perform standing therex, has mild balance impairments with deficits in safety awareness. Continuing to recommend d/c to IP rehab at this time.   PPE: Mask, gloves, safety glasses.

## 2020-10-12 NOTE — THERAPY TREATMENT NOTE
Patient Name: Carlos Whipple  : 1955    MRN: [id number]                              Today's Date: 10/12/2020       Admit Date: 10/4/2020    Visit Dx:     ICD-10-CM ICD-9-CM   1. Dyspnea, unspecified type  R06.00 786.09   2. Atrial fibrillation with RVR (CMS/HCC)  I48.91 427.31   3. Acute congestive heart failure, unspecified heart failure type (CMS/HCC)  I50.9 428.0     Patient Active Problem List   Diagnosis   • Hypertensive emergency   • Chronic atrial fibrillation (CMS/HCC)   • Chronic diastolic CHF (congestive heart failure) (CMS/HCC)   • CKD (chronic kidney disease) stage 3, GFR 30-59 ml/min   • Essential hypertension   • Gambling disorder, persistent   • Shortness of breath   • Coronary artery disease involving native coronary artery of native heart without angina pectoris   • Psoriasis   • History of CVA (cerebrovascular accident)   • DDD (degenerative disc disease), lumbosacral   • Peripheral polyneuropathy   • GERD (gastroesophageal reflux disease)   • Medically noncompliant   • Acute respiratory distress   • Unstable angina (CMS/HCC)   • Cervical disc disorder with radiculopathy   • Completed stroke (CMS/HCC)   • Degeneration of intervertebral disc   • Excessive anticoagulation   • Impaired left ventricular function   • Liver lesion   • Lumbar radiculopathy   • Cervical myelopathy (CMS/HCC)   • Lumbosacral radiculopathy   • Spinal stenosis of lumbar region   • Obesity   • Mild cognitive impairment   • Thoracic back pain   • Thoracic disc disease with myelopathy   • Paroxysmal atrial fibrillation (CMS/HCC)   • Acute congestive heart failure (CMS/HCC)   • Atrial fibrillation with RVR (CMS/HCC)   • Dermatitis associated with moisture   • Dyspnea     Past Medical History:   Diagnosis Date   • A-fib (CMS/HCC)    • Atrial fibrillation with RVR (CMS/HCC)    • CHF (congestive heart failure) (CMS/HCC)    • Chronic atrial fibrillation (CMS/HCC) 2019   • Chronic diastolic CHF (congestive heart  failure) (CMS/[facility]) 11/8/2019   • CKD (chronic kidney disease) stage 3, GFR 30-59 ml/min 11/8/2019   • Coronary artery disease involving native coronary artery of native heart without angina pectoris 11/8/2019   • DDD (degenerative disc disease), lumbosacral 11/8/2019   • Essential hypertension 11/8/2019   • Gambling disorder, persistent 11/8/2019   • GERD (gastroesophageal reflux disease) 11/8/2019   • History of CVA (cerebrovascular accident) 11/8/2019   • Hyperlipidemia    • Hypertension    • Medically noncompliant 11/8/2019   • Peripheral polyneuropathy 11/8/2019   • Psoriasis 11/8/2019   • Stroke (CMS/[facility])      Past Surgical History:   Procedure Laterality Date   • CARDIAC CATHETERIZATION     • CARDIAC CATHETERIZATION N/A 12/10/2019    Procedure: Left Heart Cath and coronary angiogram;  Surgeon: Edy Thomas MD;  Location: [facility] CATH INVASIVE LOCATION;  Service: Cardiovascular     General Information     Row Name 10/12/20 1634          Physical Therapy Time and Intention    Document Type  therapy note (daily note)  -     Mode of Treatment  physical therapy  -     Row Name 10/12/20 1634          Safety Issues, Functional Mobility    Safety Issues Affecting Function (Mobility)  insight into deficits/self-awareness;judgment  -SM       User Key  (r) = Recorded By, (t) = Taken By, (c) = Cosigned By    Initials Name Provider Type     Lizet Schneider PTA Physical Therapy Assistant        Mobility     Row Name 10/12/20 1635          Bed Mobility    All Activities, Rains (Bed Mobility)  contact guard assist  -     Row Name 10/12/20 1635          Gait/Stairs (Locomotion)    Rains Level (Gait)  contact guard  -SM     Distance in Feet (Gait)  80' x 2  -SM     Comment (Gait/Stairs)  fwd flexed posture, easily challenged with fwd ambulation resulting in lateral stepping to prevent LOB  -SM       User Key  (r) = Recorded By, (t) = Taken By, (c) = Cosigned By    Initials Name Provider Type      Lizet Schneider PTA Physical Therapy Assistant        Obj/Interventions     Row Name 10/12/20 1638          Balance    Balance Interventions  standing  -SM     Comment, Balance  H/T Raises, Marching  -SM       User Key  (r) = Recorded By, (t) = Taken By, (c) = Cosigned By    Initials Name Provider Type    Lizet Tobin PTA Physical Therapy Assistant        Goals/Plan    No documentation.       Clinical Impression     Row Name 10/12/20 1638          Pain Scale: Numbers Pre/Post-Treatment    Pretreatment Pain Rating  0/10 - no pain  -SM     Posttreatment Pain Rating  0/10 - no pain  -SM     Row Name 10/12/20 1638          Plan of Care Review    Plan of Care Reviewed With  patient  -     Progress  improving  -     Outcome Summary  Pt is able to ambulate further distance and perform standing therex, has mild balance impairments with deficits in safety awareness. Continuing to recommend d/c to IP rehab at this time.  -SM       User Key  (r) = Recorded By, (t) = Taken By, (c) = Cosigned By    Initials Name Provider Type    Lizet Tobin PTA Physical Therapy Assistant        Outcome Measures     Row Name 10/12/20 1639          How much help from another person do you currently need...    Turning from your back to your side while in flat bed without using bedrails?  3  -SM     Moving from lying on back to sitting on the side of a flat bed without bedrails?  3  -SM     Moving to and from a bed to a chair (including a wheelchair)?  3  -SM     Standing up from a chair using your arms (e.g., wheelchair, bedside chair)?  3  -SM     Climbing 3-5 steps with a railing?  3  -SM     To walk in hospital room?  3  -SM     AM-PAC 6 Clicks Score (PT)  18  -SM     Row Name 10/12/20 1639          Modified [place] Scale    Modified [place] Scale  3 - Moderate disability.  Requiring some help, but able to walk without assistance.  -SM       User Key  (r) = Recorded By, (t) = Taken By, (c) = Cosigned By    Initials Name  Provider Type    Lizet Tobin PTA Physical Therapy Assistant        Physical Therapy Education                 Title: PT OT SLP Therapies (Done)     Topic: Physical Therapy (Done)     Point: Mobility training (Done)     Learning Progress Summary           Patient Acceptance, E,TB, VU by TS at 10/10/2020 1049    Acceptance, E,TB, VU by EL at 10/9/2020 1616    Acceptance, E, VU,NR by  at 10/7/2020 1451    Comment: Reinforced out of bed c staff for safety.    Acceptance, E,TB, VU by EL at 10/6/2020 1608    Acceptance, E,TB, VU,NR by KB at 10/5/2020 1316    Acceptance, E, VU by  at 10/5/2020 0830                   Point: Home exercise program (Done)     Learning Progress Summary           Patient Acceptance, E,TB, VU by  at 10/10/2020 1049    Acceptance, E, VU,NR by  at 10/7/2020 1451    Comment: Reinforced out of bed c staff for safety.    Acceptance, E,TB, VU,NR by  at 10/5/2020 1316                   Point: Body mechanics (Done)     Learning Progress Summary           Patient Acceptance, E,TB, VU by TS at 10/10/2020 1049    Acceptance, E, VU,NR by  at 10/7/2020 1451    Comment: Reinforced out of bed c staff for safety.    Acceptance, E,TB, VU,NR by  at 10/5/2020 1316                   Point: Precautions (Done)     Learning Progress Summary           Patient Acceptance, E,TB, VU by TS at 10/10/2020 1049    Acceptance, E,TB, VU by  at 10/9/2020 1616    Acceptance, E, VU,NR by  at 10/7/2020 1451    Comment: Reinforced out of bed c staff for safety.    Acceptance, E,TB, VU by EL at 10/6/2020 1608    Acceptance, E,TB, VU,NR by  at 10/5/2020 1316    Acceptance, E, VU by  at 10/5/2020 0830                               User Key     Initials Effective Dates Name Provider Type Discipline     03/01/19 -  Amena Hassan PTA Physical Therapy Assistant PT    EL 06/23/20 -  Dung Win PT Physical Therapist PT     03/01/19 -  Zuleika White, RN Registered Nurse Nurse     06/24/19 -  Madeleine  SINDY Alexis Licensed Nurse Nurse     06/15/19 -  Sofy Matt, PT Physical Therapist PT              PT Recommendation and Plan     Plan of Care Reviewed With: patient  Progress: improving  Outcome Summary: Pt is able to ambulate further distance and perform standing therex, has mild balance impairments with deficits in safety awareness. Continuing to recommend d/c to IP rehab at this time.     Time Calculation:   PT Charges     Row Name 10/12/20 1640             Time Calculation    Start Time  1617  -      Stop Time  1632  -      Time Calculation (min)  15 min  -      PT Received On  10/12/20  -      PT - Next Appointment  10/14/20  -         Time Calculation- PT    Total Timed Code Minutes- PT  15 minute(s)  -         Timed Charges    08387 - Gait Training Minutes   15  -SM        User Key  (r) = Recorded By, (t) = Taken By, (c) = Cosigned By    Initials Name Provider Type     Lizet Schneider PTA Physical Therapy Assistant        Therapy Charges for Today     Code Description Service Date Service Provider Modifiers Qty    [id number] HC GAIT TRAINING EA 15 MIN 10/12/2020 Lizet Schneider PTA GP 1          PT G-Codes  Outcome Measure Options: AM-PAC 6 Clicks Daily Activity (OT)  AM-PAC 6 Clicks Score (PT): 18  AM-PAC 6 Clicks Score (OT): 17  Modified [place] Scale: 3 - Moderate disability.  Requiring some help, but able to walk without assistance.    Lizet Schneider PTA  10/12/2020

## 2020-10-12 NOTE — PLAN OF CARE
Goal Outcome Evaluation:  Plan of Care Reviewed With: patient  Progress: improving  Outcome Summary: Patient has had no complaints of pain or discomofort this shift. Still has had some confusion but redirects easily. Waiting for rehab placement. Will continue to monitor.

## 2020-10-12 NOTE — PROGRESS NOTES
Referring Provider: Dr. Stauffer    Reason for follow-up: Fibrillation     Patient Care Team:  Marisol Larson APRN as PCP - General  Shwetha Barber MD as Consulting Physician (Nephrology)  Edy Thomas MD as Consulting Physician (Cardiology)    Subjective .  No shortness of breath or palpitations today    Objective  Lying in bed comfortably     Review of Systems   Constitution: Negative for fever and malaise/fatigue.   HENT: Negative for ear pain and nosebleeds.    Eyes: Negative for blurred vision and double vision.   Cardiovascular: Negative for chest pain, dyspnea on exertion and palpitations.   Respiratory: Negative for cough and shortness of breath.    Skin: Negative for rash.   Musculoskeletal: Negative for joint pain.   Gastrointestinal: Negative for abdominal pain, nausea and vomiting.   Neurological: Negative for focal weakness and headaches.   Psychiatric/Behavioral: Negative for depression. The patient is not nervous/anxious.    All other systems reviewed and are negative.      Ketorolac tromethamine    Scheduled Meds:allopurinol, 100 mg, Oral, Daily  aspirin, 81 mg, Oral, Daily  atorvastatin, 10 mg, Oral, Nightly  betamethasone dipropionate, 1 application, Topical, Q12H  dilTIAZem CD, 180 mg, Oral, Q24H  hydrALAZINE, 50 mg, Oral, TID  losartan, 50 mg, Oral, Q24H  metoprolol succinate XL, 50 mg, Oral, Q24H  risperiDONE, 0.5 mg, Oral, Q12H  rivaroxaban, 20 mg, Oral, Daily With Dinner  sodium bicarbonate, 650 mg, Oral, TID  sodium chloride, 10 mL, Intravenous, Q12H  spironolactone, 12.5 mg, Oral, Daily      Continuous Infusions:   PRN Meds:.•  acetaminophen **OR** acetaminophen **OR** acetaminophen  •  bisacodyl  •  calcium carbonate  •  influenza vaccine  •  labetalol  •  magnesium hydroxide  •  melatonin  •  nitroglycerin  •  ondansetron **OR** ondansetron  •  potassium chloride **OR** potassium chloride **OR** potassium chloride  •  sodium chloride  •  sodium chloride        VITAL  "SIGNS  Vitals:    10/12/20 0455 10/12/20 0512 10/12/20 1029 10/12/20 1125   BP:  164/97 127/87 145/84   BP Location:    Left arm   Patient Position:    Sitting   Pulse:   87 88   Resp:    16   Temp:    98.1 °F (36.7 °C)   TempSrc:    Oral   SpO2:    93%   Weight: 111 kg (243 lb 13.3 oz)      Height:           Flowsheet Rows      First Filed Value   Admission Height  182.9 cm (72\") Documented at 10/04/2020 1256   Admission Weight  118 kg (259 lb 11.2 oz) Documented at 10/04/2020 1256           TELEMETRY: Atrial fibrillation with controlled medical response    Physical Exam:  Constitutional:       Appearance: Well-developed.   Eyes:      General: No scleral icterus.     Conjunctiva/sclera: Conjunctivae normal.      Pupils: Pupils are equal, round, and reactive to light.   HENT:      Head: Normocephalic and atraumatic.   Neck:      Musculoskeletal: Normal range of motion and neck supple.      Vascular: No carotid bruit or JVD.   Pulmonary:      Effort: Pulmonary effort is normal.      Breath sounds: Normal breath sounds. No wheezing. No rales.   Cardiovascular:      Normal rate. Irregularly irregular rhythm.   Pulses:     Intact distal pulses.   Abdominal:      General: Bowel sounds are normal.      Palpations: Abdomen is soft.   Musculoskeletal: Normal range of motion.   Skin:     General: Skin is warm and dry.      Findings: No rash.   Neurological:      Mental Status: Alert.      Comments: No focal deficits          Results Review:   I reviewed the patient's new clinical results.  Lab Results (last 24 hours)     Procedure Component Value Units Date/Time    Magnesium [[id number]]  (Abnormal) Collected: 10/12/20 0305    Specimen: Blood Updated: 10/12/20 0422     Magnesium 2.5 mg/dL     CBC & Differential [[id number]]  (Abnormal) Collected: 10/12/20 0305    Specimen: Blood Updated: 10/12/20 0357    Narrative:      The following orders were created for panel order CBC & Differential.  Procedure                             "   Abnormality         Status                     ---------                               -----------         ------                     CBC Auto Differential[[id number]]        Abnormal            Final result                 Please view results for these tests on the individual orders.    CBC Auto Differential [[id number]]  (Abnormal) Collected: 10/12/20 0305    Specimen: Blood Updated: 10/12/20 0357     WBC 8.20 10*3/mm3      RBC 5.05 10*6/mm3      Hemoglobin 14.0 g/dL      Hematocrit 43.1 %      MCV 85.4 fL      MCH 27.6 pg      MCHC 32.4 g/dL      RDW 19.9 %      RDW-SD 59.9 fl      MPV 8.5 fL      Platelets 184 10*3/mm3      Neutrophil % 71.6 %      Lymphocyte % 14.7 %      Monocyte % 10.7 %      Eosinophil % 2.3 %      Basophil % 0.7 %      Neutrophils, Absolute 5.90 10*3/mm3      Lymphocytes, Absolute 1.20 10*3/mm3      Monocytes, Absolute 0.90 10*3/mm3      Eosinophils, Absolute 0.20 10*3/mm3      Basophils, Absolute 0.10 10*3/mm3      nRBC 0.0 /100 WBC     BUN [[id number]]  (Normal) Collected: 10/11/20 0541    Specimen: Blood Updated: 10/11/20 1729     BUN 17 mg/dL     Blood Culture - Blood, Hand, Right [[id number]] Collected: 10/06/20 1608    Specimen: Blood from Hand, Right Updated: 10/11/20 1645     Blood Culture No growth at 5 days          Imaging Results (Last 24 Hours)     ** No results found for the last 24 hours. **          EKG      I personally viewed and interpreted the patient's EKG/Telemetry data:    ECHOCARDIOGRAM:    STRESS MYOVIEW:    CARDIAC CATHETERIZATION:    OTHER:         Assessment/Plan     Principal Problem:    Atrial fibrillation with RVR (CMS/HCC)  Active Problems:    Hypertensive emergency    Chronic atrial fibrillation (CMS/HCC)    Chronic diastolic CHF (congestive heart failure) (CMS/[facility])    CKD (chronic kidney disease) stage 3, GFR 30-59 ml/min    Essential hypertension    Gambling disorder, persistent    Coronary artery disease involving native coronary artery of native heart  without angina pectoris    Psoriasis    History of CVA (cerebrovascular accident)    GERD (gastroesophageal reflux disease)    Medically noncompliant    Mild cognitive impairment    Dyspnea       Patient has atrial fibrillation with rapid response and was placed on IV Cardizem and then switched to oral Cardizem and is better now  Patient is also on anticoagulation with Xarelto  Patient's blood pressure and heart rate are stable  Patient's lipid levels are followed with a primary care doctor  Patient is ruled out for MI and has history of coronary disease  Patient has medical noncompliance and discussed with him several times about being compliant  We will follow him as an outpatient    I discussed the patients findings and my recommendations with patient and nurse    Eyd Thomas MD  10/12/20  12:11 EDT

## 2020-10-13 LAB
BASOPHILS # BLD AUTO: 0.1 10*3/MM3 (ref 0–0.2)
BASOPHILS NFR BLD AUTO: 1.3 % (ref 0–1.5)
DEPRECATED RDW RBC AUTO: 56.9 FL (ref 37–54)
EOSINOPHIL # BLD AUTO: 0.2 10*3/MM3 (ref 0–0.4)
EOSINOPHIL NFR BLD AUTO: 2 % (ref 0.3–6.2)
ERYTHROCYTE [DISTWIDTH] IN BLOOD BY AUTOMATED COUNT: 19.3 % (ref 12.3–15.4)
HCT VFR BLD AUTO: 42.5 % (ref 37.5–51)
HGB BLD-MCNC: 13.8 G/DL (ref 13–17.7)
LYMPHOCYTES # BLD AUTO: 1.5 10*3/MM3 (ref 0.7–3.1)
LYMPHOCYTES NFR BLD AUTO: 18.8 % (ref 19.6–45.3)
MAGNESIUM SERPL-MCNC: 2.1 MG/DL (ref 1.6–2.4)
MCH RBC QN AUTO: 27.8 PG (ref 26.6–33)
MCHC RBC AUTO-ENTMCNC: 32.6 G/DL (ref 31.5–35.7)
MCV RBC AUTO: 85.3 FL (ref 79–97)
MONOCYTES # BLD AUTO: 0.9 10*3/MM3 (ref 0.1–0.9)
MONOCYTES NFR BLD AUTO: 11.3 % (ref 5–12)
NEUTROPHILS NFR BLD AUTO: 5.5 10*3/MM3 (ref 1.7–7)
NEUTROPHILS NFR BLD AUTO: 66.6 % (ref 42.7–76)
NRBC BLD AUTO-RTO: 0.1 /100 WBC (ref 0–0.2)
PLATELET # BLD AUTO: 182 10*3/MM3 (ref 140–450)
PMV BLD AUTO: 8.1 FL (ref 6–12)
RBC # BLD AUTO: 4.99 10*6/MM3 (ref 4.14–5.8)
WBC # BLD AUTO: 8.2 10*3/MM3 (ref 3.4–10.8)

## 2020-10-13 PROCEDURE — 99232 SBSQ HOSP IP/OBS MODERATE 35: CPT | Performed by: INTERNAL MEDICINE

## 2020-10-13 PROCEDURE — 83735 ASSAY OF MAGNESIUM: CPT | Performed by: FAMILY MEDICINE

## 2020-10-13 PROCEDURE — 85025 COMPLETE CBC W/AUTO DIFF WBC: CPT | Performed by: FAMILY MEDICINE

## 2020-10-13 RX ADMIN — SODIUM BICARBONATE 650 MG TABLET 650 MG: at 09:25

## 2020-10-13 RX ADMIN — RISPERIDONE 0.5 MG: 0.25 TABLET ORAL at 09:25

## 2020-10-13 RX ADMIN — BETAMETHASONE DIPROPIONATE 1 APPLICATION: 0.5 CREAM TOPICAL at 20:38

## 2020-10-13 RX ADMIN — ALLOPURINOL 100 MG: 100 TABLET ORAL at 09:26

## 2020-10-13 RX ADMIN — SODIUM BICARBONATE 650 MG TABLET 650 MG: at 20:37

## 2020-10-13 RX ADMIN — METOPROLOL SUCCINATE 50 MG: 50 TABLET, EXTENDED RELEASE ORAL at 09:25

## 2020-10-13 RX ADMIN — HYDRALAZINE HYDROCHLORIDE 50 MG: 25 TABLET, FILM COATED ORAL at 15:22

## 2020-10-13 RX ADMIN — RIVAROXABAN 20 MG: 20 TABLET, FILM COATED ORAL at 17:13

## 2020-10-13 RX ADMIN — Medication 10 ML: at 09:26

## 2020-10-13 RX ADMIN — LOSARTAN POTASSIUM 50 MG: 50 TABLET, FILM COATED ORAL at 09:25

## 2020-10-13 RX ADMIN — SODIUM BICARBONATE 650 MG TABLET 650 MG: at 15:22

## 2020-10-13 RX ADMIN — HYDRALAZINE HYDROCHLORIDE 50 MG: 25 TABLET, FILM COATED ORAL at 09:25

## 2020-10-13 RX ADMIN — RISPERIDONE 0.5 MG: 0.25 TABLET ORAL at 20:37

## 2020-10-13 RX ADMIN — BETAMETHASONE DIPROPIONATE 1 APPLICATION: 0.5 CREAM TOPICAL at 09:26

## 2020-10-13 RX ADMIN — HYDRALAZINE HYDROCHLORIDE 50 MG: 25 TABLET, FILM COATED ORAL at 20:37

## 2020-10-13 RX ADMIN — SPIRONOLACTONE 12.5 MG: 25 TABLET, FILM COATED ORAL at 09:25

## 2020-10-13 RX ADMIN — LABETALOL 20 MG/4 ML (5 MG/ML) INTRAVENOUS SYRINGE 20 MG: at 04:45

## 2020-10-13 RX ADMIN — ATORVASTATIN CALCIUM 10 MG: 10 TABLET, FILM COATED ORAL at 20:38

## 2020-10-13 RX ADMIN — ASPIRIN 81 MG: 81 TABLET, COATED ORAL at 09:25

## 2020-10-13 RX ADMIN — DILTIAZEM HYDROCHLORIDE 180 MG: 180 CAPSULE, COATED, EXTENDED RELEASE ORAL at 09:25

## 2020-10-13 NOTE — PROGRESS NOTES
Continued Stay Note   Yoseph     Patient Name: Carlos Whipple  MRN: [id number]  Today's Date: 10/13/2020    Admit Date: 10/4/2020    Discharge Plan     Row Name 10/13/20 1351       Plan    Plan  [facility] SW started expedited appeal 10/13. PASRR approved.    Plan Comments  [facility] liaison informed SW and CM via text message that pt's pre-cert had been denied. Per liaison text message, only option available for the pt is an expedited appeal. ABHILASH faxed clinicals to [phone] per [employer] instructions for expedited appeal.        Zee Baptiste, [facility] [place], [place]    Office: [phone]  Cell: [phone]  Fax: [phone]  E-mail: [email or web address]

## 2020-10-13 NOTE — PROGRESS NOTES
Continued Stay Note  KAE Hameed     Patient Name: Carlos Whipple  MRN: [id number]  Today's Date: 10/13/2020    Admit Date: 10/4/2020    Discharge Plan     Row Name 10/13/20 1103       Plan    Plan  [facility] able to accept, pending pre-cert. Pre-cert started 10/9. PASRR approved.    Plan Comments  SW received letter from [facility] regarding a need for additional clinical information from [facility]. SW notified [facility] liaison (Ale) via text message and provided number for [facility] ([phone]). [facility] informed ABHILASH that she sent updates to [facility] at approximately 9:15am.        [facility]ee Baptiste [facility] [place]    Office: [phone]  Cell: [phone]  Fax: [phone]  E-mail: [email or web address]

## 2020-10-13 NOTE — PROGRESS NOTES
Referring Provider: Dr. Stauffer    Reason for follow-up: Fibrillation     Patient Care Team:  Marisol Larson APRN as PCP - General  Shwetha Barber MD as Consulting Physician (Nephrology)  Edy Thomas MD as Consulting Physician (Cardiology)    Subjective .  No shortness of breath or palpitations today    Objective  Lying in bed comfortably     Review of Systems   Constitution: Negative for fever and malaise/fatigue.   HENT: Negative for ear pain and nosebleeds.    Eyes: Negative for blurred vision and double vision.   Cardiovascular: Negative for chest pain, dyspnea on exertion and palpitations.   Respiratory: Negative for cough and shortness of breath.    Skin: Negative for rash.   Musculoskeletal: Negative for joint pain.   Gastrointestinal: Negative for abdominal pain, nausea and vomiting.   Neurological: Negative for focal weakness and headaches.   Psychiatric/Behavioral: Negative for depression. The patient is not nervous/anxious.    All other systems reviewed and are negative.      Ketorolac tromethamine    Scheduled Meds:allopurinol, 100 mg, Oral, Daily  aspirin, 81 mg, Oral, Daily  atorvastatin, 10 mg, Oral, Nightly  betamethasone dipropionate, 1 application, Topical, Q12H  dilTIAZem CD, 180 mg, Oral, Q24H  hydrALAZINE, 50 mg, Oral, TID  losartan, 50 mg, Oral, Q24H  metoprolol succinate XL, 50 mg, Oral, Q24H  risperiDONE, 0.5 mg, Oral, Q12H  rivaroxaban, 20 mg, Oral, Daily With Dinner  sodium bicarbonate, 650 mg, Oral, TID  sodium chloride, 10 mL, Intravenous, Q12H  spironolactone, 12.5 mg, Oral, Daily      Continuous Infusions:   PRN Meds:.•  acetaminophen **OR** acetaminophen **OR** acetaminophen  •  bisacodyl  •  calcium carbonate  •  influenza vaccine  •  labetalol  •  magnesium hydroxide  •  melatonin  •  nitroglycerin  •  ondansetron **OR** ondansetron  •  potassium chloride **OR** potassium chloride **OR** potassium chloride  •  sodium chloride  •  sodium chloride        VITAL  "SIGNS  Vitals:    10/12/20 2051 10/13/20 0354 10/13/20 0355 10/13/20 0922   BP:  (!) 179/101 (!) 153/104 163/97   BP Location:  Left arm Right arm Right arm   Patient Position:  Lying Lying Sitting   Pulse:  82  86   Resp:  19  17   Temp:  97.6 °F (36.4 °C)     TempSrc:  Oral     SpO2: 96% 96%  98%   Weight:  105 kg (232 lb)     Height:           Flowsheet Rows      First Filed Value   Admission Height  182.9 cm (72\") Documented at 10/04/2020 1256   Admission Weight  118 kg (259 lb 11.2 oz) Documented at 10/04/2020 1256           TELEMETRY: Atrial fibrillation with controlled medical response    Physical Exam:  Constitutional:       Appearance: Well-developed.   Eyes:      General: No scleral icterus.     Conjunctiva/sclera: Conjunctivae normal.      Pupils: Pupils are equal, round, and reactive to light.   HENT:      Head: Normocephalic and atraumatic.   Neck:      Musculoskeletal: Normal range of motion and neck supple.      Vascular: No carotid bruit or JVD.   Pulmonary:      Effort: Pulmonary effort is normal.      Breath sounds: Normal breath sounds. No wheezing. No rales.   Cardiovascular:      Normal rate. Irregularly irregular rhythm.   Pulses:     Intact distal pulses.   Abdominal:      General: Bowel sounds are normal.      Palpations: Abdomen is soft.   Musculoskeletal: Normal range of motion.   Skin:     General: Skin is warm and dry.      Findings: No rash.   Neurological:      Mental Status: Alert.      Comments: No focal deficits          Results Review:   I reviewed the patient's new clinical results.  Lab Results (last 24 hours)     Procedure Component Value Units Date/Time    Magnesium [[id number]]  (Normal) Collected: 10/13/20 0335    Specimen: Blood Updated: 10/13/20 0402     Magnesium 2.1 mg/dL     CBC & Differential [[id number]]  (Abnormal) Collected: 10/13/20 0335    Specimen: Blood Updated: 10/13/20 0348    Narrative:      The following orders were created for panel order CBC & " Differential.  Procedure                               Abnormality         Status                     ---------                               -----------         ------                     CBC Auto Differential[[id number]]        Abnormal            Final result                 Please view results for these tests on the individual orders.    CBC Auto Differential [[id number]]  (Abnormal) Collected: 10/13/20 0335    Specimen: Blood Updated: 10/13/20 0348     WBC 8.20 10*3/mm3      RBC 4.99 10*6/mm3      Hemoglobin 13.8 g/dL      Hematocrit 42.5 %      MCV 85.3 fL      MCH 27.8 pg      MCHC 32.6 g/dL      RDW 19.3 %      RDW-SD 56.9 fl      MPV 8.1 fL      Platelets 182 10*3/mm3      Neutrophil % 66.6 %      Lymphocyte % 18.8 %      Monocyte % 11.3 %      Eosinophil % 2.0 %      Basophil % 1.3 %      Neutrophils, Absolute 5.50 10*3/mm3      Lymphocytes, Absolute 1.50 10*3/mm3      Monocytes, Absolute 0.90 10*3/mm3      Eosinophils, Absolute 0.20 10*3/mm3      Basophils, Absolute 0.10 10*3/mm3      nRBC 0.1 /100 WBC           Imaging Results (Last 24 Hours)     ** No results found for the last 24 hours. **          EKG      I personally viewed and interpreted the patient's EKG/Telemetry data:    ECHOCARDIOGRAM:    STRESS MYOVIEW:    CARDIAC CATHETERIZATION:    OTHER:         Assessment/Plan     Principal Problem:    Atrial fibrillation with RVR (CMS/[facility])  Active Problems:    Hypertensive emergency    Chronic atrial fibrillation (CMS/[facility])    Chronic diastolic CHF (congestive heart failure) (CMS/[facility])    CKD (chronic kidney disease) stage 3, GFR 30-59 ml/min    Essential hypertension    Gambling disorder, persistent    Coronary artery disease involving native coronary artery of native heart without angina pectoris    Psoriasis    History of CVA (cerebrovascular accident)    GERD (gastroesophageal reflux disease)    Medically noncompliant    Mild cognitive impairment    Dyspnea       Patient has atrial fibrillation with  rapid response and was placed on IV Cardizem and then switched to oral Cardizem and is better now  Patient is also on anticoagulation with Xarelto  Patient's blood pressure and heart rate are stable  Patient's lipid levels are followed with a primary care doctor  Patient is ruled out for MI and has history of coronary disease  Patient has medical noncompliance and discussed with him several times about being compliant  We will follow him as an outpatient    I discussed the patients findings and my recommendations with patient and nurse    Edy Thomas MD  10/13/20  11:25 EDT

## 2020-10-13 NOTE — PLAN OF CARE
Goal Outcome Evaluation:  Plan of Care Reviewed With: patient  Progress: improving  Outcome Summary: Patient ambulating well in room throughout shift. Alert with confusion. Awaiting on [facility] precert- case management notes. Denies pain. Will continue to monitor.

## 2020-10-13 NOTE — PLAN OF CARE
Goal Outcome Evaluation:  Plan of Care Reviewed With: patient  Progress: improving   Patient resting abed, no distress noted.

## 2020-10-13 NOTE — PLAN OF CARE
OT student attempted to see patient this AM.  Pt became agitated and combative refusing to work with OT student.  Student Jose L Jarvis wore mask, goggles, and gloves.    Zee Shepherd OTR/L

## 2020-10-13 NOTE — DISCHARGE PLACEMENT REQUEST
"**Please evaluate for expedited appeal. Please see below contact information of  for any questions/need for more information.    Ayla Baptiste, KRISW, LSW    Office: [phone]  Cell: [phone]  Fax: [phone]  E-mail: [email or web address]        Carlos Whipple (65 y.o. Male)     Date of Birth Social Security Number Address Home Phone MRN    1955  [address]  [address]  [place] [facility] [phone] [id number]    Anabaptism Marital Status          Spiritism Single       Admission Date Admission Type Admitting Provider Attending Provider Department, Room/Bed    10/4/20 Emergency Dre Stauffer MD Heimer, Brian T, MD [facility] 3C MEDICAL INPATIENT, 362/1    Discharge Date Discharge Disposition Discharge Destination                       Attending Provider: Dre Stauffer MD    Allergies: Ketorolac Tromethamine    Isolation: None   Infection: None   Code Status: CPR    Ht: 182.9 cm (72\")   Wt: 105 kg (232 lb)    Admission Cmt: None   Principal Problem: Atrial fibrillation with RVR (CMS/HCC) [I48.91]                 Active Insurance as of 10/4/2020     Primary Coverage     Payor Plan Insurance Group Employer/Plan Group    HUMANA MEDICARE REPLACEMENT HUMANA MEDICARE REPLACEMENT [id number]     Payor Plan Address Payor Plan Phone Number Payor Plan Fax Number Effective Dates    [address] [address] [phone]  4/1/2020 - None Entered    [facility]ly [facility] [id number]       Subscriber Name Subscriber Birth Date Member ID       CARLOS WHIPPLE 1955 [id number]                 Emergency Contacts      (Rel.) Home Phone Work Phone Mobile Phone    KACEY DOW (Friend) [phone] -- [phone]               History & Physical      Marisol Larson APRN at 10/05/20 1306     Attestation signed by Paty Polk DO at 10/05/20 1607     I have reviewed the above documentation also personally reviewed the orders with the nurse practitioner. " I am in agreement with all of the above.                        Patient Care Team:  Marisol Larson APRN as PCP - General  Shwetha Barber MD as Consulting Physician (Nephrology)  Edy Thomas MD as Consulting Physician (Cardiology)    Chief complaint shortness of breath    Subjective     HPI obtained from chart review as patient is currently very drowsy and intermittently confused. Patient was brought to the emergency room yesterday evening via ambulance for shortness of breath and palpitations.  He was found to be in A. fib with RVR yet again.  It is unclear how long he has been without his medications this time.  He was recently discharged from the hospital with the same problem last time.  Apparently last night he became quite agitated and aggressive and was attempting to hit and kick the nurses along with pulling his IV out.  He was put in restraints overnight.  He is currently out of wrist restraints and sleeping well.  He is still in A. fib with rate in the 130s on a Cardizem drip.      Review of Systems   Unable to perform ROS: Mental status change        Past Medical History:   Diagnosis Date   • A-fib (CMS/[facility])    • Atrial fibrillation with RVR (CMS/[facility])    • CHF (congestive heart failure) (CMS/[facility])    • Chronic atrial fibrillation (CMS/[facility]) 11/8/2019   • Chronic diastolic CHF (congestive heart failure) (CMS/[facility]) 11/8/2019   • CKD (chronic kidney disease) stage 3, GFR 30-59 ml/min 11/8/2019   • Coronary artery disease involving native coronary artery of native heart without angina pectoris 11/8/2019   • DDD (degenerative disc disease), lumbosacral 11/8/2019   • Essential hypertension 11/8/2019   • Gambling disorder, persistent 11/8/2019   • GERD (gastroesophageal reflux disease) 11/8/2019   • History of CVA (cerebrovascular accident) 11/8/2019   • Hyperlipidemia    • Hypertension    • Medically noncompliant 11/8/2019   • Peripheral polyneuropathy 11/8/2019   • Psoriasis 11/8/2019   • Stroke  (CMS/formerly [facility])      Past Surgical History:   Procedure Laterality Date   • CARDIAC CATHETERIZATION     • CARDIAC CATHETERIZATION N/A 12/10/2019    Procedure: Left Heart Cath and coronary angiogram;  Surgeon: Edy Thomas MD;  Location: [facility] CATH INVASIVE LOCATION;  Service: Cardiovascular     Family History   Problem Relation Age of Onset   • Heart disease Mother      Social History     Tobacco Use   • Smoking status: Never Smoker   • Smokeless tobacco: Never Used   Substance Use Topics   • Alcohol use: No     Frequency: Never   • Drug use: No     Medications Prior to Admission   Medication Sig Dispense Refill Last Dose   • aspirin 81 MG EC tablet Take 81 mg by mouth Daily.      • losartan (COZAAR) 50 MG tablet Take 1 tablet by mouth Daily. 30 tablet 1    • allopurinol (ZYLOPRIM) 100 MG tablet Take 1 tablet by mouth Daily. 30 tablet 1    • bumetanide (BUMEX) 1 MG tablet Take 1 tablet by mouth Daily. 30 tablet 1    • hydrALAZINE (APRESOLINE) 100 MG tablet Take 1 tablet by mouth 3 (Three) Times a Day. 90 tablet 1    • Metoprolol Succinate 50 MG capsule extended-release 24 hour sprinkle Take 50 mg by mouth Daily. 30 capsule 1    • pravastatin (PRAVACHOL) 40 MG tablet Take 40 mg by mouth Every Night.      • risperiDONE (risperDAL) 0.5 MG tablet Take 1 tablet by mouth Every 12 (Twelve) Hours. 60 tablet 1    • rivaroxaban (Xarelto) 20 MG tablet Take 1 tablet by mouth Daily With Dinner for 30 days. 30 tablet 5    • sodium bicarbonate 650 MG tablet Take 1 tablet by mouth 3 (Three) Times a Day. 90 tablet 1    • spironolactone (ALDACTONE) 25 MG tablet Take 0.5 tablets by mouth Daily. 15 tablet 1      Allergies:  Ketorolac tromethamine    Objective      Vital Signs  Temp:  [97.6 °F (36.4 °C)-97.8 °F (36.6 °C)] 97.6 °F (36.4 °C)  Heart Rate:  [] 97  Resp:  [15-21] 16  BP: (106-212)/() 128/83    Physical Exam  Vitals signs and nursing note reviewed.   Constitutional:       General: He is sleeping.      Appearance: He  is overweight.   Cardiovascular:      Rate and Rhythm: Tachycardia present. Rhythm irregular.   Pulmonary:      Effort: Pulmonary effort is normal.      Breath sounds: Normal breath sounds.   Skin:     General: Skin is warm and dry.   Neurological:      Mental Status: He is disoriented.         Results Review:  Lab Results (last 24 hours)     Procedure Component Value Units Date/Time    BUN [[id number]]  (Abnormal) Collected: 10/05/20 0611    Specimen: Blood Updated: 10/05/20 1045     BUN 32 mg/dL     Comprehensive Metabolic Panel [[id number]]  (Abnormal) Collected: 10/05/20 0611    Specimen: Blood Updated: 10/05/20 0720     Glucose 92 mg/dL      BUN --     Comment: Testing performed by alternate method        Creatinine 1.20 mg/dL      Sodium 145 mmol/L      Potassium 3.2 mmol/L      Chloride 109 mmol/L      CO2 22.0 mmol/L      Calcium 8.6 mg/dL      Total Protein 6.4 g/dL      Albumin 3.50 g/dL      ALT (SGPT) 453 U/L      AST (SGOT) 175 U/L      Alkaline Phosphatase 71 U/L      Total Bilirubin 1.1 mg/dL      eGFR Non African Amer 61 mL/min/1.73      Globulin 2.9 gm/dL      A/G Ratio 1.2 g/dL      BUN/Creatinine Ratio --     Comment: Testing not performed        Anion Gap 14.0 mmol/L     Narrative:      GFR Normal >60  Chronic Kidney Disease <60  Kidney Failure <15      Magnesium [[id number]]  (Normal) Collected: 10/05/20 0611    Specimen: Blood Updated: 10/05/20 0720     Magnesium 1.9 mg/dL     CBC & Differential [[id number]]  (Abnormal) Collected: 10/05/20 0611    Specimen: Blood Updated: 10/05/20 0620    Narrative:      The following orders were created for panel order CBC & Differential.  Procedure                               Abnormality         Status                     ---------                               -----------         ------                     CBC Auto Differential[[id number]]        Abnormal            Final result                 Please view results for these tests on the individual orders.     CBC Auto Differential [[id number]]  (Abnormal) Collected: 10/05/20 0611    Specimen: Blood Updated: 10/05/20 0620     WBC 9.30 10*3/mm3      RBC 4.65 10*6/mm3      Hemoglobin 12.6 g/dL      Hematocrit 38.9 %      MCV 83.7 fL      MCH 27.0 pg      MCHC 32.3 g/dL      RDW 19.5 %      RDW-SD 56.4 fl      MPV 8.6 fL      Platelets 144 10*3/mm3      Neutrophil % 74.7 %      Lymphocyte % 14.1 %      Monocyte % 8.9 %      Eosinophil % 1.0 %      Basophil % 1.3 %      Neutrophils, Absolute 6.90 10*3/mm3      Lymphocytes, Absolute 1.30 10*3/mm3      Monocytes, Absolute 0.80 10*3/mm3      Eosinophils, Absolute 0.10 10*3/mm3      Basophils, Absolute 0.10 10*3/mm3      nRBC 0.3 /100 WBC     Troponin [[id number]]  (Normal) Collected: 10/04/20 2151    Specimen: Blood Updated: 10/04/20 2242     Troponin T 0.016 ng/mL     Narrative:      Troponin T Reference Range:  <= 0.03 ng/mL-   Negative for AMI  >0.03 ng/mL-     Abnormal for myocardial necrosis.  Clinicians would have to utilize clinical acumen, EKG, Troponin and serial changes to determine if it is an Acute Myocardial Infarction or myocardial injury due to an underlying chronic condition.       Results may be falsely decreased if patient taking Biotin.      TSH [[id number]]  (Normal) Collected: 10/04/20 1355    Specimen: Blood Updated: 10/04/20 1747     TSH 2.970 uIU/mL     Lipid Panel [[id number]]  (Abnormal) Collected: 10/04/20 1355    Specimen: Blood Updated: 10/04/20 1742     Total Cholesterol 108 mg/dL      Triglycerides 79 mg/dL      HDL Cholesterol 22 mg/dL      LDL Cholesterol  70 mg/dL      VLDL Cholesterol 15.8 mg/dL      LDL/HDL Ratio 3.19    Narrative:      Cholesterol Reference Ranges  (U.S. Department of Health and Human Services ATP III Classifications)    Desirable          <200 mg/dL  Borderline High    200-239 mg/dL  High Risk          >240 mg/dL      Triglyceride Reference Ranges  (U.S. Department of Health and Human Services ATP III Classifications)    Normal            <150 mg/dL  Borderline High  150-199 mg/dL  High             200-499 mg/dL  Very High        >500 mg/dL    HDL Reference Ranges  (U.S. Department of Health and Human Services ATP III Classifcations)    Low     <40 mg/dl (major risk factor for CHD)  High    >60 mg/dl ('negative' risk factor for CHD)        LDL Reference Ranges  (U.S. Department of Health and Human Services ATP III Classifcations)    Optimal          <100 mg/dL  Near Optimal     100-129 mg/dL  Borderline High  130-159 mg/dL  High             160-189 mg/dL  Very High        >189 mg/dL    Magnesium [[id number]]  (Normal) Collected: 10/04/20 1355    Specimen: Blood Updated: 10/04/20 1742     Magnesium 2.3 mg/dL     POC Glucose Once [[id number]]  (Normal) Collected: 10/04/20 1700    Specimen: Blood Updated: 10/04/20 1701     Glucose 90 mg/dL      Comment: Serial Number: [id number]:  [id number]       Troponin [[id number]]  (Normal) Collected: 10/04/20 1355    Specimen: Blood Updated: 10/04/20 1553     Troponin T 0.010 ng/mL     Narrative:      Troponin T Reference Range:  <= 0.03 ng/mL-   Negative for AMI  >0.03 ng/mL-     Abnormal for myocardial necrosis.  Clinicians would have to utilize clinical acumen, EKG, Troponin and serial changes to determine if it is an Acute Myocardial Infarction or myocardial injury due to an underlying chronic condition.       Results may be falsely decreased if patient taking Biotin.      BNP [[id number]]  (Abnormal) Collected: 10/04/20 1355    Specimen: Blood Updated: 10/04/20 1541     proBNP 21,355.0 pg/mL     Narrative:      Among patients with dyspnea, NT-proBNP is highly sensitive for the detection of acute congestive heart failure. In addition NT-proBNP of <300 pg/ml effectively rules out acute congestive heart failure with 99% negative predictive value.    Results may be falsely decreased if patient taking Biotin.      [place] Draw [[id number]] Collected: 10/04/20 1353    Specimen: Blood Updated: 10/04/20  1501    Narrative:      The following orders were created for panel order [place] Draw.  Procedure                               Abnormality         Status                     ---------                               -----------         ------                     Light Blue Top[[id number]]                                   Final result               Green Top (Gel)[[id number]]                                  Final result               Lavender Top[[id number]]                                     Final result               Gold Top - SST[[id number]]                                   Final result                 Please view results for these tests on the individual orders.    Light Blue Top [[id number]] Collected: 10/04/20 1353    Specimen: Blood Updated: 10/04/20 1501     Extra Tube hold for add-on     Comment: Auto resulted       Green Top (Gel) [[id number]] Collected: 10/04/20 1353    Specimen: Blood Updated: 10/04/20 1501     Extra Tube Hold for add-ons.     Comment: Auto resulted.       Lavender Top [[id number]] Collected: 10/04/20 1353    Specimen: Blood Updated: 10/04/20 1501     Extra Tube hold for add-on     Comment: Auto resulted       POC Lactate [[id number]]  (Normal) Collected: 10/04/20 1412    Specimen: Blood Updated: 10/04/20 1454     Lactate 1.6 mmol/L      Comment: Serial Number: [id number]:  [id number]       COVID-19,[facility] IN-HOUSE,Nasal Swab No Transport Media 3-4 HR TAT - Swab, Nasal Cavity [[id number]]  (Normal) Collected: 10/04/20 1355    Specimen: Swab from Nasal Cavity Updated: 10/04/20 1448     COVID19 Not Detected    Narrative:      Fact sheet for providers: https://www.fda.gov/media/890962/download     Fact sheet for patients: https://www.fda.gov/media/532015/download    BUN [[id number]]  (Abnormal) Collected: 10/04/20 1353    Specimen: Blood Updated: 10/04/20 1443     BUN 33 mg/dL     Comprehensive Metabolic Panel [[id number]]  (Abnormal) Collected: 10/04/20 1353    Specimen: Blood Updated:  10/04/20 1438     Glucose 134 mg/dL      BUN --     Comment: Testing performed by alternate method        Creatinine 1.40 mg/dL      Sodium 142 mmol/L      Potassium 4.2 mmol/L      Chloride 108 mmol/L      CO2 20.0 mmol/L      Calcium 9.0 mg/dL      Total Protein 6.9 g/dL      Albumin 3.80 g/dL      ALT (SGPT) 625 U/L      AST (SGOT) 433 U/L      Alkaline Phosphatase 79 U/L      Total Bilirubin 1.2 mg/dL      eGFR Non African Amer 51 mL/min/1.73      Globulin 3.1 gm/dL      A/G Ratio 1.2 g/dL      BUN/Creatinine Ratio --     Comment: Testing not performed        Anion Gap 14.0 mmol/L     Narrative:      GFR Normal >60  Chronic Kidney Disease <60  Kidney Failure <15      Gold Top - SST [[id number]] Collected: 10/04/20 1353    Specimen: Blood Updated: 10/04/20 1423    CBC & Differential [[id number]]  (Abnormal) Collected: 10/04/20 1353    Specimen: Blood Updated: 10/04/20 1419    Narrative:      The following orders were created for panel order CBC & Differential.  Procedure                               Abnormality         Status                     ---------                               -----------         ------                     CBC Auto Differential[[id number]]        Abnormal            Final result                 Please view results for these tests on the individual orders.    CBC Auto Differential [[id number]]  (Abnormal) Collected: 10/04/20 1353    Specimen: Blood Updated: 10/04/20 1419     WBC 11.80 10*3/mm3      RBC 4.84 10*6/mm3      Hemoglobin 13.1 g/dL      Hematocrit 40.8 %      MCV 84.3 fL      MCH 27.1 pg      MCHC 32.1 g/dL      RDW 19.3 %      RDW-SD 56.4 fl      MPV 9.4 fL      Platelets 162 10*3/mm3      Neutrophil % 78.0 %      Lymphocyte % 13.4 %      Monocyte % 8.2 %      Eosinophil % 0.0 %      Basophil % 0.4 %      Neutrophils, Absolute 9.20 10*3/mm3      Lymphocytes, Absolute 1.60 10*3/mm3      Monocytes, Absolute 1.00 10*3/mm3      Eosinophils, Absolute 0.00 10*3/mm3      Basophils,  Absolute 0.10 10*3/mm3      nRBC 0.8 /100 WBC     Blood Culture - Blood, Arm, Left [[id number]] Collected: 10/04/20 1410    Specimen: Blood from Arm, Left Updated: 10/04/20 1414    Blood Culture - Blood, Foot, Left [[id number]] Collected: 10/04/20 1353    Specimen: Blood from Foot, Left Updated: 10/04/20 1414    Blood Gas, Arterial [[id number]]  (Abnormal) Collected: 10/04/20 1357    Specimen: Arterial Blood Updated: 10/04/20 1400     Site Right Brachial     Bj's Test N/A     pH, Arterial 7.506 pH units      pCO2, Arterial 23.3 mm Hg      pO2, Arterial 70.2 mm Hg      HCO3, Arterial 18.4 mmol/L      Base Excess, Arterial -3.1 mmol/L      Comment: Serial Number: [id number]:  [id number]        O2 Saturation, Arterial 95.8 %      CO2 Content 19.1 mmol/L      Barometric Pressure for Blood Gas --     Comment: N/A        Modality Room Air     FIO2 21 %      Hemodilution No         Imaging Results (Last 24 Hours)     Procedure Component Value Units Date/Time    XR Chest 1 View [[id number]] Collected: 10/04/20 1445     Updated: 10/04/20 1448    Narrative:      XR CHEST 1 VW-     Date of Exam: 10/4/2020 2:21 PM     Indication: Dyspnea.     Comparison: 09/01/2020     Technique: A single view of the chest was obtained.     FINDINGS:      There is marked cardiomegaly unchanged from prior study.  There is  persistent pulmonary vascular congestion.  Lungs are clear.  No definite  pleural effusion identified.             Impression:         Marked cardiomegaly and pulmonary vascular congestion similar prior  studies.  No focal airspace consolidation.        Electronically Signed By-Elias Rascon On:10/4/2020 2:46 PM  This report was finalized on [id number] by  Elias Rascon, .           I reviewed the patient's new clinical results.      Assessment/Plan       Atrial fibrillation with RVR (CMS/HCC)    Hypertensive emergency    Chronic atrial fibrillation (CMS/HCC)    Chronic diastolic CHF (congestive heart failure) (CMS/HCC)     CKD (chronic kidney disease) stage 3, GFR 30-59 ml/min    Essential hypertension    Gambling disorder, persistent    Coronary artery disease involving native coronary artery of native heart without angina pectoris    Psoriasis    History of CVA (cerebrovascular accident)    GERD (gastroesophageal reflux disease)    Medically noncompliant    Mild cognitive impairment    Dyspnea          Plan:   (Await cardiology consult. His liver enzymes are elevated and haven't been in the past. RN reports that he hasn't complained of any abdominal pain. Check additional labs and monitor closely. ).       I discussed the patients findings and my recommendations with nursing staff and primary care team    CLARISSA Meraz  10/05/20  13:06 EDT        Electronically signed by Paty Polk DO at 10/05/20 1607       {Outbreak/Travel/Exposure Documentation......;  Question Available Choices Patient Response   Outbreak Screen: Do you currently have a new onset of the following symptoms?        Fever/Chils, Cough, Shortness of air, Loss of taste or smell, No, Unknown  (!) Cough;Shortness of Air (10/04/20 1256)   Outbreak Screen: In the last 14 days, have you had contact with anyone who is ill, has show any of the symptoms listed above and/or has been diagnosis with the 2019 Novel Coronavirus? This includes any immediate household members but excludes any patients with whom you have been in contact within your normal work duties wearing proper PPE, if you are a healthcare worker.  Yes, No, Unknown              No (10/04/20 1256)   Outbreak Screen: Who was notified?    Free text  Daisy Prado RN (10/04/20 1256)   Travel Screen: Have you traveled in the last month? If so, to what country have you traveled? If [place] what state? Yes, No, Unknown  List of all countries  List of all States No (10/04/20 1256)  (not recorded)  (not recorded)   Infection Risk: Do you currently have the following symptoms?  (If cough is selected, the  Tuberculosis Screen is performed.) Cough, Fever, Rash, No (!) Cough (10/04/20 1254)   Tuberculosis Screen: Do you have any of the following Tuberculosis Risks?  · Have you lived or spent time with anyone who had or may have TB?  · Have you lived in or visited any of the following areas for more than one month: [place], [place], [place], [place] or [place], the [place] or [place]?  · Do you have HIV/AIDS?  · Have you lived in or worked in a nursing home, homeless shelter, correctional facility, or substance abuse treatment facility?   · No    If Yes do you have any of the following symptoms? Yes responses display to the right    If Yes, symptoms listed are:  Cough greater than or equal to 3 weeks, Loss of appetite, Unexplained weight loss, Night sweats, Bloody sputum or hemoptysis, Hoarseness, Fever, Fatigue, Chest pain, No No (10/04/20 1254)  (not recorded)   Exposure Screen: Have you been exposed to any of these contagious diseases in the last month? Measles, Chickenpox, Meningitis, Pertussis, Whooping Cough, No No (10/04/20 1254)       Current Facility-Administered Medications   Medication Dose Route Frequency Provider Last Rate Last Dose   • acetaminophen (TYLENOL) tablet 650 mg  650 mg Oral Q4H PRN Paty Polk DO        Or   • acetaminophen (TYLENOL) 160 MG/5ML solution 650 mg  650 mg Oral Q4H PRN Paty Polk DO        Or   • acetaminophen (TYLENOL) suppository 650 mg  650 mg Rectal Q4H PRN Paty Polk DO       • allopurinol (ZYLOPRIM) tablet 100 mg  100 mg Oral Daily Paty Polk DO   100 mg at 10/13/20 0926   • aspirin EC tablet 81 mg  81 mg Oral Daily Paty Polk DO   81 mg at 10/13/20 0925   • atorvastatin (LIPITOR) tablet 10 mg  10 mg Oral Nightly Paty Polk DO   10 mg at 10/12/20 2054   • betamethasone dipropionate 0.05 % cream 1 application  1 application Topical Q12H Paty Polk DO   1 application at 10/13/20 0926   • bisacodyl (DULCOLAX) suppository 10 mg  10 mg  Rectal Daily PRN Paty Polk DO       • calcium carbonate (TUMS) chewable tablet 500 mg (200 mg elemental)  2 tablet Oral BID PRN Paty Polk DO       • dilTIAZem CD (CARDIZEM CD) 24 hr capsule 180 mg  180 mg Oral Q24H Edy Thomas MD   180 mg at 10/13/20 0925   • hydrALAZINE (APRESOLINE) tablet 50 mg  50 mg Oral TID Edy Thomas MD   50 mg at 10/13/20 0925   • influenza vac split quad (FLUZONE,FLUARIX,AFLURIA,FLULAVAL) injection 0.5 mL  0.5 mL Intramuscular During Hospitalization Paty Polk DO       • labetalol (NORMODYNE,TRANDATE) injection 20 mg  20 mg Intravenous Q6H PRN Dre Stauffer MD   20 mg at 10/13/20 0445   • losartan (COZAAR) tablet 50 mg  50 mg Oral Q24H Paty Polk DO   50 mg at 10/13/20 0925   • magnesium hydroxide (MILK OF MAGNESIA) suspension 2400 mg/10mL 10 mL  10 mL Oral Daily PRN Paty Polk DO       • melatonin tablet 5 mg  5 mg Oral Nightly PRN Paty Polk DO       • metoprolol succinate XL (TOPROL-XL) 24 hr tablet 50 mg  50 mg Oral Q24H Paty Polk DO   50 mg at 10/13/20 0925   • nitroglycerin (NITROSTAT) SL tablet 0.4 mg  0.4 mg Sublingual Q5 Min PRN Paty Polk DO       • ondansetron (ZOFRAN) tablet 4 mg  4 mg Oral Q6H PRN Paty Polk DO        Or   • ondansetron (ZOFRAN) injection 4 mg  4 mg Intravenous Q6H PRN Paty Polk DO       • potassium chloride (K-DUR,KLOR-CON) CR tablet 40 mEq  40 mEq Oral PRN Dre Stauffer MD   40 mEq at 10/05/20 1536    Or   • potassium chloride (KLOR-CON) packet 40 mEq  40 mEq Oral PRN Dre Stauffer MD        Or   • potassium chloride 10 mEq in 100 mL IVPB  10 mEq Intravenous Q1H PRN Dre Stauffer MD       • risperiDONE (risperDAL) tablet 0.5 mg  0.5 mg Oral Q12H Paty Polk DO   0.5 mg at 10/13/20 0925   • rivaroxaban (XARELTO) tablet 20 mg  20 mg Oral Daily With Dinner Paty Polk DO   20 mg at 10/12/20 1723   • sodium bicarbonate tablet 650 mg  650 mg Oral TID Paty Polk DO   650 mg at  10/13/20 0925   • sodium chloride 0.9 % flush 10 mL  10 mL Intravenous PRN Shani Polkn A, DO       • sodium chloride 0.9 % flush 10 mL  10 mL Intravenous Q12H Polk, Paty A, DO   10 mL at 10/13/20 0926   • sodium chloride 0.9 % flush 10 mL  10 mL Intravenous PRN Jam, Paty A, DO   10 mL at 10/12/20 0421   • spironolactone (ALDACTONE) tablet 12.5 mg  12.5 mg Oral Daily Polk, Paty A, DO   12.5 mg at 10/13/20 0925        Physician Progress Notes (most recent note)      Edy Thomas MD at 10/13/20 1125          Referring Provider: Dr. Stauffer    Reason for follow-up: Fibrillation     Patient Care Team:  Marisol Larson APRN as PCP - General  Shwetha Barber MD as Consulting Physician (Nephrology)  Edy Thomas MD as Consulting Physician (Cardiology)    Subjective .  No shortness of breath or palpitations today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 mask, goggles, and gloves.    Zee Shepherd OTR/L    Electronically signed by Zee Shepherd OT at 10/13/20 1100

## 2020-10-14 LAB
ANION GAP SERPL CALCULATED.3IONS-SCNC: 8 MMOL/L (ref 5–15)
BASOPHILS # BLD AUTO: 0.1 10*3/MM3 (ref 0–0.2)
BASOPHILS NFR BLD AUTO: 0.9 % (ref 0–1.5)
BUN SERPL-MCNC: 21 MG/DL (ref 8–23)
BUN SERPL-MCNC: NORMAL MG/DL
BUN/CREAT SERPL: NORMAL
CALCIUM SPEC-SCNC: 9.1 MG/DL (ref 8.6–10.5)
CHLORIDE SERPL-SCNC: 107 MMOL/L (ref 98–107)
CO2 SERPL-SCNC: 24 MMOL/L (ref 22–29)
CREAT SERPL-MCNC: 1.04 MG/DL (ref 0.76–1.27)
DEPRECATED RDW RBC AUTO: 56 FL (ref 37–54)
EOSINOPHIL # BLD AUTO: 0.1 10*3/MM3 (ref 0–0.4)
EOSINOPHIL NFR BLD AUTO: 1.7 % (ref 0.3–6.2)
ERYTHROCYTE [DISTWIDTH] IN BLOOD BY AUTOMATED COUNT: 18.8 % (ref 12.3–15.4)
GFR SERPL CREATININE-BSD FRML MDRD: 72 ML/MIN/1.73
GLUCOSE SERPL-MCNC: 92 MG/DL (ref 65–99)
HCT VFR BLD AUTO: 44 % (ref 37.5–51)
HGB BLD-MCNC: 14.5 G/DL (ref 13–17.7)
LYMPHOCYTES # BLD AUTO: 1.4 10*3/MM3 (ref 0.7–3.1)
LYMPHOCYTES NFR BLD AUTO: 17.5 % (ref 19.6–45.3)
MAGNESIUM SERPL-MCNC: 2.2 MG/DL (ref 1.6–2.4)
MCH RBC QN AUTO: 27.7 PG (ref 26.6–33)
MCHC RBC AUTO-ENTMCNC: 32.8 G/DL (ref 31.5–35.7)
MCV RBC AUTO: 84.2 FL (ref 79–97)
MONOCYTES # BLD AUTO: 0.8 10*3/MM3 (ref 0.1–0.9)
MONOCYTES NFR BLD AUTO: 10.3 % (ref 5–12)
NEUTROPHILS NFR BLD AUTO: 5.4 10*3/MM3 (ref 1.7–7)
NEUTROPHILS NFR BLD AUTO: 69.6 % (ref 42.7–76)
NRBC BLD AUTO-RTO: 0.2 /100 WBC (ref 0–0.2)
PLATELET # BLD AUTO: 201 10*3/MM3 (ref 140–450)
PMV BLD AUTO: 8.6 FL (ref 6–12)
POTASSIUM SERPL-SCNC: 4.2 MMOL/L (ref 3.5–5.2)
RBC # BLD AUTO: 5.22 10*6/MM3 (ref 4.14–5.8)
SODIUM SERPL-SCNC: 139 MMOL/L (ref 136–145)
WBC # BLD AUTO: 7.8 10*3/MM3 (ref 3.4–10.8)

## 2020-10-14 PROCEDURE — 97530 THERAPEUTIC ACTIVITIES: CPT

## 2020-10-14 PROCEDURE — 85025 COMPLETE CBC W/AUTO DIFF WBC: CPT | Performed by: FAMILY MEDICINE

## 2020-10-14 PROCEDURE — 80048 BASIC METABOLIC PNL TOTAL CA: CPT | Performed by: NURSE PRACTITIONER

## 2020-10-14 PROCEDURE — 99232 SBSQ HOSP IP/OBS MODERATE 35: CPT | Performed by: INTERNAL MEDICINE

## 2020-10-14 PROCEDURE — 97116 GAIT TRAINING THERAPY: CPT

## 2020-10-14 PROCEDURE — 83735 ASSAY OF MAGNESIUM: CPT | Performed by: FAMILY MEDICINE

## 2020-10-14 RX ORDER — LORAZEPAM 1 MG/1
1 TABLET ORAL EVERY 6 HOURS PRN
Status: DISCONTINUED | OUTPATIENT
Start: 2020-10-14 | End: 2020-10-15 | Stop reason: HOSPADM

## 2020-10-14 RX ADMIN — SODIUM BICARBONATE 650 MG TABLET 650 MG: at 21:19

## 2020-10-14 RX ADMIN — BETAMETHASONE DIPROPIONATE 1 APPLICATION: 0.5 CREAM TOPICAL at 21:20

## 2020-10-14 RX ADMIN — DILTIAZEM HYDROCHLORIDE 180 MG: 180 CAPSULE, COATED, EXTENDED RELEASE ORAL at 09:37

## 2020-10-14 RX ADMIN — SPIRONOLACTONE 12.5 MG: 25 TABLET, FILM COATED ORAL at 09:36

## 2020-10-14 RX ADMIN — SODIUM BICARBONATE 650 MG TABLET 650 MG: at 15:25

## 2020-10-14 RX ADMIN — METOPROLOL SUCCINATE 50 MG: 50 TABLET, EXTENDED RELEASE ORAL at 09:35

## 2020-10-14 RX ADMIN — HYDRALAZINE HYDROCHLORIDE 50 MG: 25 TABLET, FILM COATED ORAL at 21:19

## 2020-10-14 RX ADMIN — LOSARTAN POTASSIUM 50 MG: 50 TABLET, FILM COATED ORAL at 09:36

## 2020-10-14 RX ADMIN — ASPIRIN 81 MG: 81 TABLET, COATED ORAL at 09:37

## 2020-10-14 RX ADMIN — SODIUM BICARBONATE 650 MG TABLET 650 MG: at 09:36

## 2020-10-14 RX ADMIN — BETAMETHASONE DIPROPIONATE 1 APPLICATION: 0.5 CREAM TOPICAL at 09:37

## 2020-10-14 RX ADMIN — RIVAROXABAN 20 MG: 20 TABLET, FILM COATED ORAL at 17:32

## 2020-10-14 RX ADMIN — RISPERIDONE 0.5 MG: 0.25 TABLET ORAL at 09:36

## 2020-10-14 RX ADMIN — ATORVASTATIN CALCIUM 10 MG: 10 TABLET, FILM COATED ORAL at 21:19

## 2020-10-14 RX ADMIN — ALLOPURINOL 100 MG: 100 TABLET ORAL at 09:37

## 2020-10-14 RX ADMIN — HYDRALAZINE HYDROCHLORIDE 50 MG: 25 TABLET, FILM COATED ORAL at 09:35

## 2020-10-14 RX ADMIN — RISPERIDONE 0.5 MG: 0.25 TABLET ORAL at 21:19

## 2020-10-14 RX ADMIN — HYDRALAZINE HYDROCHLORIDE 50 MG: 25 TABLET, FILM COATED ORAL at 15:25

## 2020-10-14 NOTE — PROGRESS NOTES
Continued Stay Note   Yoseph     Patient Name: Carlos Whipple  MRN: [id number]  Today's Date: 10/14/2020    Admit Date: 10/4/2020    Discharge Plan     Row Name 10/14/20 1050       Plan    Plan  [facility]. SW started expedited appeal 10/13. PASRR approved.    Plan Comments  [facility] Medicare representative called SW this morning and informed SW that [facility] is faxing Appointment of Representative form to SW for pt to complete so that SW can complete appeal for pt. SW received Appointment of Representative Form and completed at the bedside with pt while wearing appropriate PPE (mask and goggles). [facility] liaison had also informed SW that a representative would be calling pt's room phone to discuss Medicaid eligibility and requested that pt be informed of this phone call. While still in the pt’s room with appropriate ppe (mask and goggles), SW informed pt that this call could be expected and placed the room phone within reach of pt. ABHILASH then faxed completed Appointment of Representative form to [facility] Appeals at [phone]).        Zee [facility]    Office: [phone]  Cell: [phone]  Fax: [phone]  E-mail: [email or web address]

## 2020-10-14 NOTE — THERAPY TREATMENT NOTE
Patient Name: Carlos Whipple  : 1955    MRN: [id number]                              Today's Date: 10/14/2020       Admit Date: 10/4/2020    Visit Dx:     ICD-10-CM ICD-9-CM   1. Dyspnea, unspecified type  R06.00 786.09   2. Atrial fibrillation with RVR (CMS/HCC)  I48.91 427.31   3. Acute congestive heart failure, unspecified heart failure type (CMS/HCC)  I50.9 428.0     Patient Active Problem List   Diagnosis   • Hypertensive emergency   • Chronic atrial fibrillation (CMS/HCC)   • Chronic diastolic CHF (congestive heart failure) (CMS/HCC)   • CKD (chronic kidney disease) stage 3, GFR 30-59 ml/min   • Essential hypertension   • Gambling disorder, persistent   • Shortness of breath   • Coronary artery disease involving native coronary artery of native heart without angina pectoris   • Psoriasis   • History of CVA (cerebrovascular accident)   • DDD (degenerative disc disease), lumbosacral   • Peripheral polyneuropathy   • GERD (gastroesophageal reflux disease)   • Medically noncompliant   • Acute respiratory distress   • Unstable angina (CMS/HCC)   • Cervical disc disorder with radiculopathy   • Completed stroke (CMS/HCC)   • Degeneration of intervertebral disc   • Excessive anticoagulation   • Impaired left ventricular function   • Liver lesion   • Lumbar radiculopathy   • Cervical myelopathy (CMS/HCC)   • Lumbosacral radiculopathy   • Spinal stenosis of lumbar region   • Obesity   • Mild cognitive impairment   • Thoracic back pain   • Thoracic disc disease with myelopathy   • Paroxysmal atrial fibrillation (CMS/HCC)   • Acute congestive heart failure (CMS/HCC)   • Atrial fibrillation with RVR (CMS/HCC)   • Dermatitis associated with moisture   • Dyspnea     Past Medical History:   Diagnosis Date   • A-fib (CMS/HCC)    • Atrial fibrillation with RVR (CMS/HCC)    • CHF (congestive heart failure) (CMS/HCC)    • Chronic atrial fibrillation (CMS/HCC) 2019   • Chronic diastolic CHF (congestive heart  failure) (CMS/[facility]) 11/8/2019   • CKD (chronic kidney disease) stage 3, GFR 30-59 ml/min 11/8/2019   • Coronary artery disease involving native coronary artery of native heart without angina pectoris 11/8/2019   • DDD (degenerative disc disease), lumbosacral 11/8/2019   • Essential hypertension 11/8/2019   • Gambling disorder, persistent 11/8/2019   • GERD (gastroesophageal reflux disease) 11/8/2019   • History of CVA (cerebrovascular accident) 11/8/2019   • Hyperlipidemia    • Hypertension    • Medically noncompliant 11/8/2019   • Peripheral polyneuropathy 11/8/2019   • Psoriasis 11/8/2019   • Stroke (CMS/[facility])      Past Surgical History:   Procedure Laterality Date   • CARDIAC CATHETERIZATION     • CARDIAC CATHETERIZATION N/A 12/10/2019    Procedure: Left Heart Cath and coronary angiogram;  Surgeon: Edy Thomas MD;  Location: [facility] CATH INVASIVE LOCATION;  Service: Cardiovascular     General Information     Row Name 10/14/20 1451          Physical Therapy Time and Intention    Document Type  therapy note (daily note)  -     Mode of Treatment  physical therapy  -     Row Name 10/14/20 1451          Cognition    Orientation Status (Cognition)  oriented to;person;place Decreased stm and requires repetition to learn new tasks.  -     Row Name 10/14/20 1451          Safety Issues, Functional Mobility    Safety Issues Affecting Function (Mobility)  insight into deficits/self-awareness;impulsivity;judgment;positioning of assistive device;problem-solving  -     Impairments Affecting Function (Mobility)  balance;coordination;endurance/activity tolerance;strength  -     Cognitive Impairments, Mobility Safety/Performance  problem-solving/reasoning;safety precaution awareness  -       User Key  (r) = Recorded By, (t) = Taken By, (c) = Cosigned By    Initials Name Provider Type     Amena Hassan PTA Physical Therapy Assistant        Mobility     Row Name 10/14/20 1453          Bed Mobility    Bed Mobility  bed  mobility (all) activities  -     All Activities, Owen (Bed Mobility)  standby assist  -     Assistive Device (Bed Mobility)  bed rails;head of bed elevated  -[facility] Name 10/14/20 1453          Sit-Stand Transfer    Sit-Stand Owen (Transfers)  verbal cues;1 person assist;minimum assist (75% patient effort)  -     Assistive Device (Sit-Stand Transfers)  walker, front-wheeled  -[facility] Name 10/14/20 1453          Gait/Stairs (Locomotion)    Owen Level (Gait)  verbal cues;contact guard;1 person assist  -     Assistive Device (Gait)  walker, front-wheeled  -     Distance in Feet (Gait)  75' x2  -     Deviations/Abnormal Patterns (Gait)  antalgic;gait speed decreased  -     Bilateral Gait Deviations  forward flexed posture;heel strike decreased  -     Comment (Gait/Stairs)  Slowed ricky, guarded trunk rotation, low activity tolerance, decreased righting recovery, soa c mask wearing, required multiple standing rests.  -       User Key  (r) = Recorded By, (t) = Taken By, (c) = Cosigned By    Initials Name Provider Type     Amena Hassan PTA Physical Therapy Assistant        Obj/Interventions     [facility] Name 10/14/20 1456          Strength Comprehensive (MMT)    General Manual Muscle Testing (MMT) Assessment  lower extremity strength deficits identified  -     Comment, General Manual Muscle Testing (MMT) Assessment  Struggling with sit/stand/sit.  -LH     Row Name 10/14/20 1456          Balance    Static Sitting Balance  WFL  -     Dynamic Sitting Balance  moderate impairment  -     Static Standing Balance  mild impairment  -     Dynamic Standing Balance  moderate impairment  -     Comment, Balance  Limited away from midline.  -       User Key  (r) = Recorded By, (t) = Taken By, (c) = Cosigned By    Initials Name Provider Type     Amena Hassan PTA Physical Therapy Assistant        Goals/Plan    No documentation.       Clinical Impression     [facility] Name 10/14/20  1458          Pain    Additional Documentation  Pain Scale: Numbers Pre/Post-Treatment (Group)  -[facility] Name 10/14/20 1458          Pain Scale: Numbers Pre/Post-Treatment    Pretreatment Pain Rating  0/10 - no pain  -     Posttreatment Pain Rating  0/10 - no pain  -[facility] Name 10/14/20 1458          Plan of Care Review    Plan of Care Reviewed With  patient  -     Progress  improving  -     Outcome Summary  Requires sba/min A for safe, functional mobility. Struggling with sit/stand/sit. Amb. 75'x 2 c cga using rwx., ff posture, requiring multiple rests during amb. to complete task. Gabby et step length wfl, vcs for safe body placement in rwx., unable to accept challenge away from midline, weakness present. Will progress as able, will need rehab at d/c to address deficits. PPE Worn: Mask, gloves, shield.  -[facility] Name 10/14/20 1458          Vital Signs    O2 Delivery Pre Treatment  room air  -     O2 Delivery Intra Treatment  room air  -     O2 Delivery Post Treatment  room air  -     Pre Patient Position  Supine  -     Intra Patient Position  Standing  -     Post Patient Position  Sitting  -[facility] Name 10/14/20 1458          Positioning and Restraints    Pre-Treatment Position  in bed  -     Post Treatment Position  bed  -     In Bed  notified nsg;patient within staff view;call light within reach;sitting EOB  -       User Key  (r) = Recorded By, (t) = Taken By, (c) = Cosigned By    Initials Name Provider Type     Amena Hassan PTA Physical Therapy Assistant        Outcome Measures     [facility] Name 10/14/20 1313          How much help from another person do you currently need...    Turning from your back to your side while in flat bed without using bedrails?  4  -LH     Moving from lying on back to sitting on the side of a flat bed without bedrails?  4  -LH     Moving to and from a bed to a chair (including a wheelchair)?  3  -LH     Standing up from a chair using your arms  (e.g., wheelchair, bedside chair)?  3  -     Climbing 3-5 steps with a railing?  2  -     To walk in hospital room?  3  -     AM-PAC 6 Clicks Score (PT)  19  -       User Key  (r) = Recorded By, (t) = Taken By, (c) = Cosigned By    Initials Name Provider Type     Amena Hassan PTA Physical Therapy Assistant        Physical Therapy Education                 Title: PT OT SLP Therapies (Done)     Topic: Physical Therapy (Done)     Point: Mobility training (Done)     Learning Progress Summary           Patient Acceptance, D,E, VU,NR by  at 10/14/2020 1503    Comment: Reinforced safe hand placement c rwx. use and transfers.    Acceptance, E,TB, VU by  at 10/10/2020 1049    Acceptance, E,TB, VU by  at 10/9/2020 1616    Acceptance, E, VU,NR by  at 10/7/2020 1451    Comment: Reinforced out of bed c staff for safety.    Acceptance, E,TB, VU by HINA at 10/6/2020 1608    Acceptance, E,TB, VU,NR by  at 10/5/2020 1316    Acceptance, E, VU by  at 10/5/2020 0830                   Point: Home exercise program (Done)     Learning Progress Summary           Patient Acceptance, D,E, VU,NR by  at 10/14/2020 1503    Comment: Reinforced safe hand placement c rwx. use and transfers.    Acceptance, E,TB, VU by  at 10/10/2020 1049    Acceptance, E, VU,NR by  at 10/7/2020 1451    Comment: Reinforced out of bed c staff for safety.    Acceptance, E,TB, VU,NR by  at 10/5/2020 1316                   Point: Body mechanics (Done)     Learning Progress Summary           Patient Acceptance, D,E, VU,NR by  at 10/14/2020 1503    Comment: Reinforced safe hand placement c rwx. use and transfers.    Acceptance, E,TB, VU by  at 10/10/2020 1049    Acceptance, E, VU,NR by  at 10/7/2020 1451    Comment: Reinforced out of bed c staff for safety.    Acceptance, E,TB, VU,NR by  at 10/5/2020 1316                   Point: Precautions (Done)     Learning Progress Summary           Patient Acceptance, D,E, VU,NR by  at  10/14/2020 1503    Comment: Reinforced safe hand placement c rwx. use and transfers.    Acceptance, E,TB, VU by  at 10/10/2020 1049    Acceptance, E,TB, VU by  at 10/9/2020 1616    Acceptance, E, VU,NR by  at 10/7/2020 1451    Comment: Reinforced out of bed c staff for safety.    Acceptance, E,TB, VU by  at 10/6/2020 1608    Acceptance, E,TB, VU,NR by  at 10/5/2020 1316    Acceptance, E, VU by  at 10/5/2020 0830                               User Key     Initials Effective Dates Name Provider Type [facility] 03/01/19 -  Amena Hassan PTA Physical Therapy Assistant PT     06/23/20 -  Dung Win, JATINDER Physical Therapist PT     03/01/19 -  Zuleika White, RN Registered Nurse Nurse     06/24/19 -  Vanesa Salvador LPN Licensed Nurse Nurse     06/15/19 -  Sofy Matt, PT Physical Therapist PT              PT Recommendation and Plan     Plan of Care Reviewed With: patient  Progress: improving  Outcome Summary: Requires sba/min A for safe, functional mobility. Struggling with sit/stand/sit. Amb. 75'x 2 c cga using rwx., ff posture, requiring multiple rests during amb. to complete task. Gabby et step length wfl, vcs for safe body placement in rwx., unable to accept challenge away from midline, weakness present. Will progress as able, will need rehab at d/c to address deficits. PPE Worn: Mask, gloves, shield.     Time Calculation:   PT Charges     Row Name 10/14/20 1505             Time Calculation    Start Time  0132  -      Stop Time  0155  -      Time Calculation (min)  23 min  -      PT Received On  10/14/20  -      PT - Next Appointment  10/16/20  -         Time Calculation- PT    Total Timed Code Minutes- PT  23 minute(s)  -         Timed Charges    98124 - Gait Training Minutes   10  -      69745 - PT Therapeutic Activity Minutes  13  -        User Key  (r) = Recorded By, (t) = Taken By, (c) = Cosigned By    Initials Name Provider Type     Amena Hassan PTA Physical  Therapy Assistant        Therapy Charges for Today     Code Description Service Date Service Provider Modifiers Qty    [id number]  GAIT TRAINING EA 15 MIN 10/14/2020 Amena Hassan PTA GP 1    [id number]  PT THERAPEUTIC ACT EA 15 MIN 10/14/2020 Amena Hassan PTA GP 1          PT G-Codes  Outcome Measure Options: AM-PAC 6 Clicks Daily Activity (OT)  AM-PAC 6 Clicks Score (PT): 19  AM-PAC 6 Clicks Score (OT): 17  Modified [place] Scale: 3 - Moderate disability.  Requiring some help, but able to walk without assistance.    Amena Hassan PTA  10/14/2020

## 2020-10-14 NOTE — PLAN OF CARE
Goal Outcome Evaluation:  Plan of Care Reviewed With: patient  Progress: improving  Outcome Summary: Patient without complaints of pain on shift. Pt anxious at times. Pt with confusion- Re-oriented pt throughout shift. Awaiting appeal for [facility]. Ativan added prn for agitation. will continue to monitor.

## 2020-10-14 NOTE — PLAN OF CARE
Goal Outcome Evaluation:  Plan of Care Reviewed With: patient  Progress: improving   Patient resting abed, no complaints voiced. Patient has asked several times if he was going to be discharged.

## 2020-10-14 NOTE — PROGRESS NOTES
Continued Stay Note  KAE Hameed     Patient Name: Carlos Whipple  MRN: [id number]  Today's Date: 10/14/2020    Admit Date: 10/4/2020    Discharge Plan     Row Name 10/14/20 1513       Plan    Plan Comments  Expedited appeal started on 10/13 and remains pending - see ABHILASH (Zee) note. Appeal can take up to 72 hours for determination. Explored potential of pt going Medicaid pending to facility - however when facility staff tried to screen pt - he reports he has a home with his sister and has no plans to stay long term at facility, therefore would not be eligible to go pending. Will continue to wait for appeal determination.     SHAKIR Das    Phone: [phone]  Cell: [phone]  Fax: [phone]  [email or web address]

## 2020-10-14 NOTE — PROGRESS NOTES
"   LOS: 10 days   Patient Care Team:  Marisol Larson APRN as PCP - General  Shwetha Barber MD as Consulting Physician (Nephrology)  Edy Thomas MD as Consulting Physician (Cardiology)    Chief Complaint:     Subjective     Awake, in bed, no distress    Subjective:  Symptoms:  He reports shortness of breath.  No chest pain or chest pressure.    Diet:  Adequate intake.    Activity level: Impaired due to weakness.    Pain:  He reports no pain.        History taken from: patient chart RN    Objective     Vital Signs  Temp:  [97.6 °F (36.4 °C)-98.2 °F (36.8 °C)] 97.8 °F (36.6 °C)  Heart Rate:  [63-86] 80  Resp:  [17-20] 20  BP: (139-163)/(81-97) 150/96    Objective:  General Appearance:  Comfortable, well-appearing, in no acute distress and not in pain.    Vital signs: (most recent): Blood pressure 150/96, pulse 80, temperature 97.8 °F (36.6 °C), temperature source Oral, resp. rate 20, height 182.9 cm (72\"), weight 104 kg (230 lb 1.6 oz), SpO2 97 %.  Vital signs are normal.    Output: Producing urine.    Lungs:  Normal effort and normal respiratory rate.  Breath sounds clear to auscultation.    Heart: Normal rate.  Irregular rhythm.    Abdomen: Abdomen is soft.  Bowel sounds are normal.   There is no abdominal tenderness.     Extremities: There is no dependent edema.    Neurological: Patient is alert and oriented to person, place and time.  (Periods of confusion. Very forgetful).    Skin:  Warm and dry.              Results Review:      Lab Results (last 24 hours)     Procedure Component Value Units Date/Time    Magnesium [[id number]]  (Normal) Collected: 10/14/20 0423    Specimen: Blood Updated: 10/14/20 0528     Magnesium 2.2 mg/dL     Basic Metabolic Panel [[id number]] Collected: 10/14/20 0423    Specimen: Blood Updated: 10/14/20 0528     Glucose 92 mg/dL      BUN --     Comment: Testing performed by alternate method        Creatinine 1.04 mg/dL      Sodium 139 mmol/L      Potassium 4.2 mmol/L      " "Chloride 107 mmol/L      CO2 24.0 mmol/L      Calcium 9.1 mg/dL      eGFR Non African Amer 72 mL/min/1.73      BUN/Creatinine Ratio --     Comment: Testing not performed        Anion Gap 8.0 mmol/L     Narrative:      GFR Normal >60  Chronic Kidney Disease <60  Kidney Failure <15      BUN [[id number]] Collected: 10/14/20 0423    Specimen: Blood Updated: 10/14/20 0528    CBC & Differential [[id number]] Collected: 10/14/20 0423    Specimen: Blood Updated: 10/14/20 0504    Narrative:      The following orders were created for panel order CBC & Differential.  Procedure                               Abnormality         Status                     ---------                               -----------         ------                     CBC Auto Differential[[id number]]                            In process                   Please view results for these tests on the individual orders.    CBC Auto Differential [[id number]] Collected: 10/14/20 0423    Specimen: Blood Updated: 10/14/20 0504         Imaging Results (Last 24 Hours)     ** No results found for the last 24 hours. **           I reviewed the patient's new clinical results.    Medication Review:    Hospital Medications (active)       Dose Frequency Start End    acetaminophen (TYLENOL) 160 MG/5ML solution 650 mg 650 mg Every 4 Hours PRN 10/4/2020     Admin Instructions: Do not exceed 4 grams of acetaminophen in a 24 hr period.<BR><BR>If given for pain, use the following pain scale: <BR>Mild Pain = Pain Score of 1-3, CPOT 1-2<BR>Moderate Pain = Pain Score of 4-6, CPOT 3-4<BR>Severe Pain = Pain Score of 7-10, CPOT 5-8    Route: Oral    Linked Group 1: \"Or\" Linked Group Details        acetaminophen (TYLENOL) suppository 650 mg 650 mg Every 4 Hours PRN 10/4/2020     Admin Instructions: Do not exceed 4 grams of acetaminophen in a 24 hr period.<BR><BR>If given for pain, use the following pain scale: <BR>Mild Pain = Pain Score of 1-3, CPOT 1-2<BR>Moderate Pain = Pain Score of " "4-6, CPOT 3-4<BR>Severe Pain = Pain Score of 7-10, CPOT 5-8    Route: Rectal    Linked Group 1: \"Or\" Linked Group Details        acetaminophen (TYLENOL) tablet 650 mg 650 mg Every 4 Hours PRN 10/4/2020     Admin Instructions: Do not exceed 4 grams of acetaminophen in a 24 hr period.<BR><BR>If given for pain, use the following pain scale: <BR>Mild Pain = Pain Score of 1-3, CPOT 1-2<BR>Moderate Pain = Pain Score of 4-6, CPOT 3-4<BR>Severe Pain = Pain Score of 7-10, CPOT 5-8    Route: Oral    Linked Group 1: \"Or\" Linked Group Details        allopurinol (ZYLOPRIM) tablet 100 mg 100 mg Daily 10/5/2020     Admin Instructions: ([facility]) Take with food if GI upset occurs.    Route: Oral    aspirin EC tablet 81 mg 81 mg Daily 10/5/2020     Admin Instructions: Herbal/drug interaction: Avoid use with ginkgo biloba. Do not crush or chew.<BR>Do not exceed 4 grams of aspirin in a 24 hr period.<BR><BR>If given for pain, use the following pain scale: <BR>Mild Pain = Pain Score of 1-3, CPOT 1-2<BR>Moderate Pain = Pain Score of 4-6, CPOT 3-4<BR>Severe Pain = Pain Score of 7-10, CPOT 5-8    Route: Oral    atorvastatin (LIPITOR) tablet 10 mg 10 mg Nightly 10/4/2020     Admin Instructions: Avoid grapefruit juice.    Route: Oral    betamethasone dipropionate 0.05 % cream 1 application 1 application Every 12 Hours Scheduled 10/8/2020     Admin Instructions: Apply to psoriasis    Route: Topical    bisacodyl (DULCOLAX) suppository 10 mg 10 mg Daily PRN 10/4/2020     Route: Rectal    calcium carbonate (TUMS) chewable tablet 500 mg (200 mg elemental) 2 tablet 2 Times Daily PRN 10/4/2020     Admin Instructions: One tablet contains 200 mg elemental calcium.  Take with food.    Route: Oral    dilTIAZem CD (CARDIZEM CD) 24 hr capsule 180 mg 180 mg Every 24 Hours Scheduled 10/5/2020     Admin Instructions: 1st dose now<BR><BR>## monitor to stop diltiazem gtt ##<BR>Caution: Look alike/sound alike drug alert.   Swallow capsule whole. Do not crush, chew, " "or open capsule. Avoid grapefruit juice. Maximum simvastatin dose 10 mg while taking dilTIAZem.    Route: Oral    hydrALAZINE (APRESOLINE) tablet 50 mg 50 mg 3 Times Daily 10/5/2020     Admin Instructions: Caution: Look alike/sound alike drug alert    Route: Oral    influenza vac split quad (FLUZONE,FLUARIX,AFLURIA,FLULAVAL) injection 0.5 mL 0.5 mL During Hospitalization 10/4/2020     Admin Instructions: **Do Not Administer if Temperature Greater Than 102F & Notify Pharmacy** Pneumococcal & Influenza Vaccines May Be Given at the Same Time in SEPARATE Injections.<BR>Do not administer if temperature greater than 102F & notify pharmacy. Pneumococcal and influenza vaccines may be given at the same time in SEPARATE injections.    Route: Intramuscular    labetalol (NORMODYNE,TRANDATE) injection 20 mg 20 mg Every 6 Hours PRN 10/6/2020     Admin Instructions: As needed for SBP greater than 160<BR>Give by slow IV Push each 20mg (or less) over 2 minutes    Route: Intravenous    losartan (COZAAR) tablet 50 mg 50 mg Every 24 Hours Scheduled 10/5/2020     Route: Oral    magnesium hydroxide (MILK OF MAGNESIA) suspension 2400 mg/10mL 10 mL 10 mL Daily PRN 10/4/2020     Route: Oral    melatonin tablet 5 mg 5 mg Nightly PRN 10/4/2020     Route: Oral    metoprolol succinate XL (TOPROL-XL) 24 hr tablet 50 mg 50 mg Every 24 Hours Scheduled 10/5/2020     Admin Instructions: Do not crush or chew.    Route: Oral    nitroglycerin (NITROSTAT) SL tablet 0.4 mg 0.4 mg Every 5 Minutes PRN 10/4/2020     Admin Instructions: If Pain Unrelieved After 3 Doses Notify MD    Route: Sublingual    ondansetron (ZOFRAN) injection 4 mg 4 mg Every 6 Hours PRN 10/4/2020     Admin Instructions: If BOTH ondansetron (ZOFRAN) and promethazine (PHENERGAN) are ordered use ondansetron first and THEN promethazine IF ondansetron is ineffective.    Route: Intravenous    Linked Group 2: \"Or\" Linked Group Details        ondansetron (ZOFRAN) tablet 4 mg 4 mg Every 6 " "Hours PRN 10/4/2020     Admin Instructions: If BOTH ondansetron (ZOFRAN) and promethazine (PHENERGAN) are ordered use ondansetron first and THEN promethazine IF ondansetron is ineffective.    Route: Oral    Linked Group 2: \"Or\" Linked Group Details        potassium chloride (K-DUR,KLOR-CON) CR tablet 40 mEq 40 mEq As Needed 10/5/2020     Admin Instructions: Potassium 3.1 or Less Give KCl 40 mEq q4h x3 Doses <BR>Potassium 3.2 - 3.6 Give KCl 40 mEq q4h x2 Doses <BR><BR>Check Potassium 4 Hours After Last Dose Given <BR>Check Magnesium if Potassium Level Remains Low After Replacement <BR>DO NOT GIVE if CrCl is Less Than 30 mL/minute or Urine Output Less Than 30 mL/hr    Route: Oral    Linked Group 3: \"Or\" Linked Group Details        potassium chloride (KLOR-CON) packet 40 mEq 40 mEq As Needed 10/5/2020     Admin Instructions: Potassium 3.1 or Less Give KCl 40 mEq q4h x3 Doses <BR>Potassium 3.2 - 3.6 Give KCl 40 mEq q4h x2 Doses <BR><BR>Check Potassium 4 Hours After Last Dose Given <BR>Check Magnesium if Potassium Level Remains Low After Replacement <BR>DO NOT GIVE if CrCl is Less Than 30 mL/minute or Urine Output Less Than 30 mL/hr    Route: Oral    Linked Group 3: \"Or\" Linked Group Details        potassium chloride 10 mEq in 100 mL IVPB 10 mEq Every 1 Hour PRN 10/5/2020     Admin Instructions: Peripheral or Central IV<BR>Potassium 3.1 or Less Give KCl 10 mEq/100 mL NS IV q1h x6 Doses<BR>Potassium 3.2 - 3.6 Give KCl 10 mEq/100 mL NS q1h x4 Doses<BR><BR>Check Potassium 4 Hours After Last Dose Given<BR>Check Magnesium if Potassium Remains Low After Replacement<BR>DO NOT GIVE if CrCl is Less Than 30 mL/minute or Urine Output Less Than 30 mL/hr. <BR><BR>Rates Greater Than 10 mEq/hr Require ECG Monitoring.<BR>OUTPATIENT/NON-MONITORED UNITS: Potassium Chloride standard bolus infusion rate is a maximum of 10 mEq/hr on unmonitored patients<BR><BR>MONITORED UNITS: Potassium Chloride standard bolus infusion rate is a maximum of " "20 mEq/hr on ECG monitored patients ONLY<BR>    Route: Intravenous    Linked Group 3: \"Or\" Linked Group Details        risperiDONE (risperDAL) tablet 0.5 mg 0.5 mg Every 12 Hours Scheduled 10/4/2020     Admin Instructions: Caution: Look alike/sound alike drug alert    Route: Oral    rivaroxaban (XARELTO) tablet 20 mg 20 mg Daily With Dinner 10/4/2020     Admin Instructions: If giving by tube: suspend in 50mL water, administer, and immediately follow with enteral feeding.<BR>Give with food.    Route: Oral    sodium bicarbonate tablet 650 mg 650 mg 3 Times Daily 10/4/2020     Route: Oral    sodium chloride 0.9 % flush 10 mL 10 mL As Needed 10/4/2020     Route: Intravenous    Cosign for Ordering: Accepted by Mandeep Ohara MD on 10/4/2020  3:34 PM    sodium chloride 0.9 % flush 10 mL 10 mL Every 12 Hours Scheduled 10/4/2020     Route: Intravenous    sodium chloride 0.9 % flush 10 mL 10 mL As Needed 10/4/2020     Route: Intravenous    spironolactone (ALDACTONE) tablet 12.5 mg 12.5 mg Daily 10/5/2020     Route: Oral           Assessment/Plan       Atrial fibrillation with RVR (CMS/HCC)    Hypertensive emergency    Chronic atrial fibrillation (CMS/HCC)    Chronic diastolic CHF (congestive heart failure) (CMS/[facility])    CKD (chronic kidney disease) stage 3, GFR 30-59 ml/min    Essential hypertension    Gambling disorder, persistent    Coronary artery disease involving native coronary artery of native heart without angina pectoris    Psoriasis    History of CVA (cerebrovascular accident)    GERD (gastroesophageal reflux disease)    Medically noncompliant    Mild cognitive impairment    Dyspnea          Plan:   (Awaiting expedited appeal for patient to be discharged to [facility].  He is unsafe to go home.).       Daylin Burris, CLARISSA  10/14/20  05:35 EDT        "

## 2020-10-14 NOTE — PROGRESS NOTES
Referring Provider: Dr. Stauffer    Reason for follow-up: Fibrillation     Patient Care Team:  Marisol Larson APRN as PCP - General  Shwetha Barber MD as Consulting Physician (Nephrology)  Edy Thomas MD as Consulting Physician (Cardiology)    Subjective .  No shortness of breath or palpitations today    Objective  Lying in bed comfortably     Review of Systems   Constitution: Negative for fever and malaise/fatigue.   HENT: Negative for ear pain and nosebleeds.    Eyes: Negative for blurred vision and double vision.   Cardiovascular: Negative for chest pain, dyspnea on exertion and palpitations.   Respiratory: Negative for cough and shortness of breath.    Skin: Negative for rash.   Musculoskeletal: Negative for joint pain.   Gastrointestinal: Negative for abdominal pain, nausea and vomiting.   Neurological: Negative for focal weakness and headaches.   Psychiatric/Behavioral: Negative for depression. The patient is not nervous/anxious.    All other systems reviewed and are negative.      Ketorolac tromethamine    Scheduled Meds:allopurinol, 100 mg, Oral, Daily  aspirin, 81 mg, Oral, Daily  atorvastatin, 10 mg, Oral, Nightly  betamethasone dipropionate, 1 application, Topical, Q12H  dilTIAZem CD, 180 mg, Oral, Q24H  hydrALAZINE, 50 mg, Oral, TID  losartan, 50 mg, Oral, Q24H  metoprolol succinate XL, 50 mg, Oral, Q24H  risperiDONE, 0.5 mg, Oral, Q12H  rivaroxaban, 20 mg, Oral, Daily With Dinner  sodium bicarbonate, 650 mg, Oral, TID  sodium chloride, 10 mL, Intravenous, Q12H  spironolactone, 12.5 mg, Oral, Daily      Continuous Infusions:   PRN Meds:.•  acetaminophen **OR** acetaminophen **OR** acetaminophen  •  bisacodyl  •  calcium carbonate  •  influenza vaccine  •  labetalol  •  magnesium hydroxide  •  melatonin  •  nitroglycerin  •  ondansetron **OR** ondansetron  •  potassium chloride **OR** potassium chloride **OR** potassium chloride  •  sodium chloride  •  sodium chloride        VITAL  "SIGNS  Vitals:    10/13/20 1518 10/13/20 1940 10/14/20 0341 10/14/20 0930   BP: 145/81 139/90 150/96 152/90   BP Location: Right arm Right arm Right arm Left arm   Patient Position:  Lying Lying    Pulse: 63 73 80 87   Resp:  19 20 18   Temp:  97.6 °F (36.4 °C) 97.8 °F (36.6 °C)    TempSrc:  Oral Oral    SpO2: 95% 96% 97% 94%   Weight:   104 kg (230 lb 1.6 oz)    Height:           Flowsheet Rows      First Filed Value   Admission Height  182.9 cm (72\") Documented at 10/04/2020 1256   Admission Weight  118 kg (259 lb 11.2 oz) Documented at 10/04/2020 1256           TELEMETRY: Atrial fibrillation with controlled medical response    Physical Exam:  Constitutional:       Appearance: Well-developed.   Eyes:      General: No scleral icterus.     Conjunctiva/sclera: Conjunctivae normal.      Pupils: Pupils are equal, round, and reactive to light.   HENT:      Head: Normocephalic and atraumatic.   Neck:      Musculoskeletal: Normal range of motion and neck supple.      Vascular: No carotid bruit or JVD.   Pulmonary:      Effort: Pulmonary effort is normal.      Breath sounds: Normal breath sounds. No wheezing. No rales.   Cardiovascular:      Normal rate. Irregularly irregular rhythm.   Pulses:     Intact distal pulses.   Abdominal:      General: Bowel sounds are normal.      Palpations: Abdomen is soft.   Musculoskeletal: Normal range of motion.   Skin:     General: Skin is warm and dry.      Findings: No rash.   Neurological:      Mental Status: Alert.      Comments: No focal deficits          Results Review:   I reviewed the patient's new clinical results.  Lab Results (last 24 hours)     Procedure Component Value Units Date/Time    BUN [[id number]]  (Normal) Collected: 10/14/20 0423    Specimen: Blood Updated: 10/14/20 0913     BUN 21 mg/dL     CBC & Differential [[id number]]  (Abnormal) Collected: 10/14/20 0423    Specimen: Blood Updated: 10/14/20 0542    Narrative:      The following orders were created for panel order " CBC & Differential.  Procedure                               Abnormality         Status                     ---------                               -----------         ------                     CBC Auto Differential[[id number]]        Abnormal            Final result                 Please view results for these tests on the individual orders.    CBC Auto Differential [[id number]]  (Abnormal) Collected: 10/14/20 0423    Specimen: Blood Updated: 10/14/20 0542     WBC 7.80 10*3/mm3      RBC 5.22 10*6/mm3      Hemoglobin 14.5 g/dL      Hematocrit 44.0 %      MCV 84.2 fL      MCH 27.7 pg      MCHC 32.8 g/dL      RDW 18.8 %      RDW-SD 56.0 fl      MPV 8.6 fL      Platelets 201 10*3/mm3      Neutrophil % 69.6 %      Lymphocyte % 17.5 %      Monocyte % 10.3 %      Eosinophil % 1.7 %      Basophil % 0.9 %      Neutrophils, Absolute 5.40 10*3/mm3      Lymphocytes, Absolute 1.40 10*3/mm3      Monocytes, Absolute 0.80 10*3/mm3      Eosinophils, Absolute 0.10 10*3/mm3      Basophils, Absolute 0.10 10*3/mm3      nRBC 0.2 /100 WBC     Magnesium [[id number]]  (Normal) Collected: 10/14/20 0423    Specimen: Blood Updated: 10/14/20 0528     Magnesium 2.2 mg/dL     Basic Metabolic Panel [[id number]] Collected: 10/14/20 0423    Specimen: Blood Updated: 10/14/20 0528     Glucose 92 mg/dL      BUN --     Comment: Testing performed by alternate method        Creatinine 1.04 mg/dL      Sodium 139 mmol/L      Potassium 4.2 mmol/L      Chloride 107 mmol/L      CO2 24.0 mmol/L      Calcium 9.1 mg/dL      eGFR Non African Amer 72 mL/min/1.73      BUN/Creatinine Ratio --     Comment: Testing not performed        Anion Gap 8.0 mmol/L     Narrative:      GFR Normal >60  Chronic Kidney Disease <60  Kidney Failure <15            Imaging Results (Last 24 Hours)     ** No results found for the last 24 hours. **          EKG      I personally viewed and interpreted the patient's EKG/Telemetry data:    ECHOCARDIOGRAM:    STRESS MYOVIEW:    CARDIAC  CATHETERIZATION:    OTHER:         Assessment/Plan     Principal Problem:    Atrial fibrillation with RVR (CMS/[facility])  Active Problems:    Hypertensive emergency    Chronic atrial fibrillation (CMS/[facility])    Chronic diastolic CHF (congestive heart failure) (CMS/[facility])    CKD (chronic kidney disease) stage 3, GFR 30-59 ml/min    Essential hypertension    Gambling disorder, persistent    Coronary artery disease involving native coronary artery of native heart without angina pectoris    Psoriasis    History of CVA (cerebrovascular accident)    GERD (gastroesophageal reflux disease)    Medically noncompliant    Mild cognitive impairment    Dyspnea       Patient has atrial fibrillation with rapid response and was placed on IV Cardizem and then switched to oral Cardizem and is better now  Patient is also on anticoagulation with Xarelto  Patient's blood pressure and heart rate are stable  Patient's lipid levels are followed with a primary care doctor  Patient is ruled out for MI and has history of coronary disease  Patient has medical noncompliance and discussed with him several times about being compliant  Patient is waiting for placement in a rehab facility  We will follow him as an outpatient    I discussed the patients findings and my recommendations with patient and nurse    Edy Thomas MD  10/14/20  14:08 EDT

## 2020-10-14 NOTE — PLAN OF CARE
Problem: Adult Inpatient Plan of Care  Goal: Plan of Care Review  Outcome: Ongoing, Progressing  Flowsheets (Taken 10/14/2020 7075)  Progress: improving  Plan of Care Reviewed With: patient  Outcome Summary: Requires sba/min A for safe, functional mobility. Struggling with sit/stand/sit. Amb. 75'x 2 c cga using rwx., ff posture, requiring multiple rests during amb. to complete task. Gabby et step length wfl, vcs for safe body placement in rwx., unable to accept challenge away from midline, weakness present. Will progress as able, will need rehab at d/c to address deficits. PPE Worn: Mask, gloves, shield.

## 2020-10-14 NOTE — PROGRESS NOTES
Nutrition Services    Patient Name:  Carlos Whipple  YOB: 1955  MRN: [id number]  Admit Date:  10/4/2020    Progress note/LOS 10 day:    Diet HH/CONCARB. Meal intake %. No swallowing or chewing problems per flow sheet. Skin intake. +BM 10/12/20. Weight loss noted (down 18 lb) 230 lb and confirmed with nrg Sudha that weight loss is most likely d/t fluid and noncompliance with diuretic home medication. Weight loss not nutrition related. Follow again at LOS.       Electronically signed by:  Brook Cano RD  10/14/20 09:23 EDT

## 2020-10-15 VITALS
RESPIRATION RATE: 20 BRPM | HEART RATE: 85 BPM | DIASTOLIC BLOOD PRESSURE: 99 MMHG | TEMPERATURE: 97.5 F | OXYGEN SATURATION: 96 % | SYSTOLIC BLOOD PRESSURE: 172 MMHG | WEIGHT: 230.1 LBS | HEIGHT: 72 IN | BODY MASS INDEX: 31.17 KG/M2

## 2020-10-15 PROBLEM — I16.1 HYPERTENSIVE EMERGENCY: Status: RESOLVED | Noted: 2019-11-08 | Resolved: 2020-10-15

## 2020-10-15 PROBLEM — F63.0: Chronic | Status: RESOLVED | Noted: 2019-11-08 | Resolved: 2020-10-15

## 2020-10-15 PROBLEM — I48.91 ATRIAL FIBRILLATION WITH RVR (HCC): Status: RESOLVED | Noted: 2020-08-23 | Resolved: 2020-10-15

## 2020-10-15 PROBLEM — N18.30 CKD (CHRONIC KIDNEY DISEASE) STAGE 3, GFR 30-59 ML/MIN (HCC): Chronic | Status: RESOLVED | Noted: 2019-11-08 | Resolved: 2020-10-15

## 2020-10-15 PROBLEM — R06.00 DYSPNEA: Status: RESOLVED | Noted: 2020-10-04 | Resolved: 2020-10-15

## 2020-10-15 LAB
BASOPHILS # BLD AUTO: 0.1 10*3/MM3 (ref 0–0.2)
BASOPHILS NFR BLD AUTO: 1.1 % (ref 0–1.5)
DEPRECATED RDW RBC AUTO: 56.4 FL (ref 37–54)
EOSINOPHIL # BLD AUTO: 0.1 10*3/MM3 (ref 0–0.4)
EOSINOPHIL NFR BLD AUTO: 1.6 % (ref 0.3–6.2)
ERYTHROCYTE [DISTWIDTH] IN BLOOD BY AUTOMATED COUNT: 18.9 % (ref 12.3–15.4)
HCT VFR BLD AUTO: 43.8 % (ref 37.5–51)
HGB BLD-MCNC: 14.2 G/DL (ref 13–17.7)
LYMPHOCYTES # BLD AUTO: 1.7 10*3/MM3 (ref 0.7–3.1)
LYMPHOCYTES NFR BLD AUTO: 19.5 % (ref 19.6–45.3)
MAGNESIUM SERPL-MCNC: 2.2 MG/DL (ref 1.6–2.4)
MCH RBC QN AUTO: 27.5 PG (ref 26.6–33)
MCHC RBC AUTO-ENTMCNC: 32.5 G/DL (ref 31.5–35.7)
MCV RBC AUTO: 84.7 FL (ref 79–97)
MONOCYTES # BLD AUTO: 1 10*3/MM3 (ref 0.1–0.9)
MONOCYTES NFR BLD AUTO: 11.6 % (ref 5–12)
NEUTROPHILS NFR BLD AUTO: 5.8 10*3/MM3 (ref 1.7–7)
NEUTROPHILS NFR BLD AUTO: 66.2 % (ref 42.7–76)
NRBC BLD AUTO-RTO: 0 /100 WBC (ref 0–0.2)
PLATELET # BLD AUTO: 217 10*3/MM3 (ref 140–450)
PMV BLD AUTO: 8.6 FL (ref 6–12)
RBC # BLD AUTO: 5.18 10*6/MM3 (ref 4.14–5.8)
WBC # BLD AUTO: 8.8 10*3/MM3 (ref 3.4–10.8)

## 2020-10-15 PROCEDURE — 25010000002 INFLUENZA VAC SPLIT QUAD 0.5 ML SUSPENSION PREFILLED SYRINGE: Performed by: FAMILY MEDICINE

## 2020-10-15 PROCEDURE — 83735 ASSAY OF MAGNESIUM: CPT | Performed by: FAMILY MEDICINE

## 2020-10-15 PROCEDURE — 85025 COMPLETE CBC W/AUTO DIFF WBC: CPT | Performed by: FAMILY MEDICINE

## 2020-10-15 PROCEDURE — 97116 GAIT TRAINING THERAPY: CPT

## 2020-10-15 PROCEDURE — 97530 THERAPEUTIC ACTIVITIES: CPT

## 2020-10-15 PROCEDURE — 99232 SBSQ HOSP IP/OBS MODERATE 35: CPT | Performed by: INTERNAL MEDICINE

## 2020-10-15 PROCEDURE — G0008 ADMIN INFLUENZA VIRUS VAC: HCPCS | Performed by: FAMILY MEDICINE

## 2020-10-15 PROCEDURE — 90686 IIV4 VACC NO PRSV 0.5 ML IM: CPT | Performed by: FAMILY MEDICINE

## 2020-10-15 RX ORDER — LOSARTAN POTASSIUM 50 MG/1
100 TABLET ORAL
Status: DISCONTINUED | OUTPATIENT
Start: 2020-10-15 | End: 2020-10-15 | Stop reason: HOSPADM

## 2020-10-15 RX ORDER — LOSARTAN POTASSIUM 100 MG/1
100 TABLET ORAL
Qty: 30 TABLET | Refills: 1 | Status: SHIPPED | OUTPATIENT
Start: 2020-10-16

## 2020-10-15 RX ADMIN — INFLUENZA VIRUS VACCINE 0.5 ML: 15; 15; 15; 15 SUSPENSION INTRAMUSCULAR at 12:12

## 2020-10-15 RX ADMIN — METOPROLOL SUCCINATE 50 MG: 50 TABLET, EXTENDED RELEASE ORAL at 09:06

## 2020-10-15 RX ADMIN — RISPERIDONE 0.5 MG: 0.25 TABLET ORAL at 09:06

## 2020-10-15 RX ADMIN — ALLOPURINOL 100 MG: 100 TABLET ORAL at 09:06

## 2020-10-15 RX ADMIN — DILTIAZEM HYDROCHLORIDE 180 MG: 180 CAPSULE, COATED, EXTENDED RELEASE ORAL at 09:06

## 2020-10-15 RX ADMIN — HYDRALAZINE HYDROCHLORIDE 50 MG: 25 TABLET, FILM COATED ORAL at 09:06

## 2020-10-15 RX ADMIN — SODIUM BICARBONATE 650 MG TABLET 650 MG: at 09:06

## 2020-10-15 RX ADMIN — LOSARTAN POTASSIUM 100 MG: 50 TABLET, FILM COATED ORAL at 09:06

## 2020-10-15 RX ADMIN — BETAMETHASONE DIPROPIONATE 1 APPLICATION: 0.5 CREAM TOPICAL at 09:09

## 2020-10-15 RX ADMIN — ASPIRIN 81 MG: 81 TABLET, COATED ORAL at 09:06

## 2020-10-15 RX ADMIN — SPIRONOLACTONE 12.5 MG: 25 TABLET, FILM COATED ORAL at 09:06

## 2020-10-15 NOTE — THERAPY TREATMENT NOTE
Patient Name: Carlos Whipple  : 1955    MRN: [id number]                              Today's Date: 10/15/2020       Admit Date: 10/4/2020    Visit Dx:     ICD-10-CM ICD-9-CM   1. Dyspnea, unspecified type  R06.00 786.09   2. Atrial fibrillation with RVR (CMS/HCC)  I48.91 427.31   3. Acute congestive heart failure, unspecified heart failure type (CMS/HCC)  I50.9 428.0     Patient Active Problem List   Diagnosis   • Chronic atrial fibrillation (CMS/HCC)   • Chronic diastolic CHF (congestive heart failure) (CMS/HCC)   • Essential hypertension   • Shortness of breath   • Coronary artery disease involving native coronary artery of native heart without angina pectoris   • Psoriasis   • History of CVA (cerebrovascular accident)   • DDD (degenerative disc disease), lumbosacral   • Peripheral polyneuropathy   • GERD (gastroesophageal reflux disease)   • Medically noncompliant   • Acute respiratory distress   • Unstable angina (CMS/HCC)   • Cervical disc disorder with radiculopathy   • Completed stroke (CMS/[facility])   • Degeneration of intervertebral disc   • Excessive anticoagulation   • Impaired left ventricular function   • Liver lesion   • Lumbar radiculopathy   • Cervical myelopathy (CMS/HCC)   • Lumbosacral radiculopathy   • Spinal stenosis of lumbar region   • Obesity   • Mild cognitive impairment   • Thoracic back pain   • Thoracic disc disease with myelopathy   • Paroxysmal atrial fibrillation (CMS/HCC)   • Acute congestive heart failure (CMS/HCC)   • Dermatitis associated with moisture     Past Medical History:   Diagnosis Date   • A-fib (CMS/HCC)    • Atrial fibrillation with RVR (CMS/HCC)    • CHF (congestive heart failure) (CMS/HCC)    • Chronic atrial fibrillation (CMS/HCC) 2019   • Chronic diastolic CHF (congestive heart failure) (CMS/HCC) 2019   • CKD (chronic kidney disease) stage 3, GFR 30-59 ml/min 2019   • Coronary artery disease involving native coronary artery of native heart without  angina pectoris 11/8/2019   • DDD (degenerative disc disease), lumbosacral 11/8/2019   • Essential hypertension 11/8/2019   • Gambling disorder, persistent 11/8/2019   • GERD (gastroesophageal reflux disease) 11/8/2019   • History of CVA (cerebrovascular accident) 11/8/2019   • Hyperlipidemia    • Hypertension    • Medically noncompliant 11/8/2019   • Peripheral polyneuropathy 11/8/2019   • Psoriasis 11/8/2019   • Stroke (CMS/HCC)      Past Surgical History:   Procedure Laterality Date   • CARDIAC CATHETERIZATION     • CARDIAC CATHETERIZATION N/A 12/10/2019    Procedure: Left Heart Cath and coronary angiogram;  Surgeon: Edy Thomas MD;  Location: [facility] CATH INVASIVE LOCATION;  Service: Cardiovascular     General Information     Row Name 10/15/20 1118          Physical Therapy Time and Intention    Document Type  therapy note (daily note)  -     Mode of Treatment  physical therapy  -     Row Name 10/15/20 1118          General Information    Patient Profile Reviewed  yes  -     Row Name 10/15/20 1118          Cognition    Orientation Status (Cognition)  person;place;oriented to  -     Row Name 10/15/20 1118          Safety Issues, Functional Mobility    Impairments Affecting Function (Mobility)  balance;endurance/activity tolerance  -       User Key  (r) = Recorded By, (t) = Taken By, (c) = Cosigned By    Initials Name Provider Type     Mirna Vines PT Physical Therapist        Mobility     Row Name 10/15/20 1118          Bed Mobility    Bed Mobility  rolling right;rolling left;supine-sit;sit-supine  -     Rolling Right Rich (Bed Mobility)  standby assist  -     Supine-Sit Rich (Bed Mobility)  standby assist  -     Assistive Device (Bed Mobility)  bed rails  -     Row Name 10/15/20 1118          Gait/Stairs (Locomotion)    Rich Level (Gait)  standby assist  -     Assistive Device (Gait)  walker, front-wheeled  -       User Key  (r) = Recorded By, (t) = Taken By,  (c) = Cosigned By    Initials Name Provider Type    Mirna Krause, JATINDER Physical Therapist        Obj/Interventions     Row Name 10/15/20 1119          Balance    Balance Assessment  sitting static balance;sitting dynamic balance;standing static balance;standing dynamic balance  -     Static Sitting Balance  WFL  -WC     Dynamic Sitting Balance  WFL  -WC     Static Standing Balance  WFL  -WC     Dynamic Standing Balance  mild impairment  -       User Key  (r) = Recorded By, (t) = Taken By, (c) = Cosigned By    Initials Name Provider Type    Mirna Krause PT Physical Therapist        Goals/Plan    No documentation.       Clinical Impression     Row Name 10/15/20 1119          Pain Scale: Numbers Pre/Post-Treatment    Pretreatment Pain Rating  0/10 - no pain  -     Posttreatment Pain Rating  0/10 - no pain  -     Row Name 10/15/20 1119          Plan of Care Review    Plan of Care Reviewed With  patient  -     Progress  improving  -     Row Name 10/15/20 1119          Therapy Assessment/Plan (PT)    Criteria for Skilled Interventions Met (PT)  yes;skilled treatment is necessary  -     Row Name 10/15/20 1119          Vital Signs    Pre Patient Position  Supine  -     Intra Patient Position  Standing  -     Post Patient Position  Sitting  -     Row Name 10/15/20 1119          Positioning and Restraints    Pre-Treatment Position  in bed  -     Post Treatment Position  chair  -       User Key  (r) = Recorded By, (t) = Taken By, (c) = Cosigned By    Initials Name Provider Type    Mirna Krause, JATINDER Physical Therapist        Outcome Measures     Row Name 10/15/20 1120          How much help from another person do you currently need...    Turning from your back to your side while in flat bed without using bedrails?  4  -WC     Moving from lying on back to sitting on the side of a flat bed without bedrails?  4  -WC     Moving to and from a bed to a chair (including a wheelchair)?  4  -WC      Standing up from a chair using your arms (e.g., wheelchair, bedside chair)?  3  -WC     Climbing 3-5 steps with a railing?  3  -WC     To walk in hospital room?  3  -WC     AM-PAC 6 Clicks Score (PT)  21  -     Row Name 10/15/20 1120          Modified Hayden Scale    Pre-Stroke Modified Hayden Scale  4 - Moderately severe disability.  Unable to walk without assistance, and unable to attend to own bodily needs without assistance.  -       User Key  (r) = Recorded By, (t) = Taken By, (c) = Cosigned By    Initials Name Provider Type    Mirna Krause, JATINDER Physical Therapist        Physical Therapy Education                 Title: PT OT SLP Therapies (In Progress)     Topic: Physical Therapy (In Progress)     Point: Mobility training (In Progress)     Learning Progress Summary           Patient Acceptance, E,TB, NR by  at 10/15/2020 1121    Acceptance, E,TB, VU by SHERICE at 10/14/2020 2118    Acceptance, D,E, VU,NR by  at 10/14/2020 1503    Comment: Reinforced safe hand placement c rwx. use and transfers.    Acceptance, E,TB, VU by TS at 10/10/2020 1049    Acceptance, E,TB, VU by EL at 10/9/2020 1616    Acceptance, E, VU,NR by  at 10/7/2020 1451    Comment: Reinforced out of bed c staff for safety.    Acceptance, E,TB, VU by EL at 10/6/2020 1608    Acceptance, E,TB, VU,NR by  at 10/5/2020 1316    Acceptance, E, VU by  at 10/5/2020 0830                   Point: Home exercise program (In Progress)     Learning Progress Summary           Patient Acceptance, E,TB, NR by  at 10/15/2020 1121    Acceptance, E,TB, VU by KW at 10/14/2020 2118    Acceptance, D,E, VU,NR by  at 10/14/2020 1503    Comment: Reinforced safe hand placement c rwx. use and transfers.    Acceptance, E,TB, VU by TS at 10/10/2020 1049    Acceptance, E, VU,NR by  at 10/7/2020 1451    Comment: Reinforced out of bed c staff for safety.    Acceptance, E,TB, VU,NR by MARY at 10/5/2020 1316                   Point: Body mechanics (In Progress)      Learning Progress Summary           Patient Acceptance, E,TB, NR by  at 10/15/2020 1121    Acceptance, E,TB, VU by  at 10/14/2020 2118    Acceptance, D,E, VU,NR by  at 10/14/2020 1503    Comment: Reinforced safe hand placement c rwx. use and transfers.    Acceptance, E,TB, VU by TS at 10/10/2020 1049    Acceptance, E, VU,NR by  at 10/7/2020 1451    Comment: Reinforced out of bed c staff for safety.    Acceptance, E,TB, VU,NR by  at 10/5/2020 1316                   Point: Precautions (In Progress)     Learning Progress Summary           Patient Acceptance, E,TB, NR by  at 10/15/2020 1121    Acceptance, E,TB, VU by  at 10/14/2020 2118    Acceptance, D,E, VU,NR by  at 10/14/2020 1503    Comment: Reinforced safe hand placement c rwx. use and transfers.    Acceptance, E,TB, VU by  at 10/10/2020 1049    Acceptance, E,TB, VU by EL at 10/9/2020 1616    Acceptance, E, VU,NR by  at 10/7/2020 1451    Comment: Reinforced out of bed c staff for safety.    Acceptance, E,TB, VU by EL at 10/6/2020 1608    Acceptance, E,TB, VU,NR by  at 10/5/2020 1316    Acceptance, E, VU by  at 10/5/2020 0830                               User Key     Initials Effective Dates Name Provider Type Discipline     03/01/19 -  Amena Hassan, ROBBY Physical Therapy Assistant PT     06/23/20 -  Dung Win, JATINDER Physical Therapist PT     03/01/19 -  Zuleika White, RN Registered Nurse Nurse     06/24/19 -  Evelio Luna LPN Licensed Nurse Nurse    TS 06/24/19 -  Vanesa Salvador LPN Licensed Nurse Nurse     06/15/19 -  Sofy Matt PT Physical Therapist PT     01/07/20 -  Mirna Vines, JATINDER Physical Therapist PT              PT Recommendation and Plan  Pt was oriented x 2. Pt had no c/o pain only cold. Pt completed bed mobility with SBA, sit to stand and gait 75 feet x 2 SBA/MIN A. Pt fatigued after gait. Pt with increased confusion with fatigue. Pt with MIN A dressing lower body.     Recommendation is still IP Rehab.  PPE: Mask, eyeshield, gloves. PPE: Mask, eyeshield, gloves     Plan of Care Reviewed With: patient  Progress: improving     Time Calculation:   PT Charges     Row Name 10/15/20 1126 10/15/20 1124 10/15/20 0636       Time Calculation    Start Time  --  1046  -WC  --    Stop Time  --  1112  -WC  --    Time Calculation (min)  --  26 min  -WC  --    PT Received On  --  10/15/20  -  --    PT - Next Appointment  --  10/16/20  -  10/15/20  -[facility]    PT Goal Re-Cert Due Date  10/19/20  -  --  --       Time Calculation- PT    TCU Minutes- PT  --  15 min  -WC  --      User Key  (r) = Recorded By, (t) = Taken By, (c) = Cosigned By    Initials Name Provider Type    Elida Duran, PT Physical Therapist    Mirna Krause, PT Physical Therapist        Therapy Charges for Today     Code Description Service Date Service Provider Modifiers Qty    91067409612 HC GAIT TRAINING EA 15 MIN 10/15/2020 Mirna Vines, PT GP 1    [id number]  PT THERAPEUTIC ACT EA 15 MIN 10/15/2020 Mirna Vines, PT GP 1          PT G-Codes  Outcome Measure Options: AM-PAC 6 Clicks Daily Activity (OT)  AM-PAC 6 Clicks Score (PT): 21  AM-PAC 6 Clicks Score (OT): 17  Modified Hayden Scale: 3 - Moderate disability.  Requiring some help, but able to walk without assistance.    Mirna Vines, JATINDER  10/15/2020

## 2020-10-15 NOTE — PLAN OF CARE
Goal Outcome Evaluation:  Plan of Care Reviewed With: patient  Progress: improving  Outcome Summary: Patient in bed eyes closed resting at this time.  Patient has been awake off and on throughout the night. Patient confused thinking people have come to visit.  Able to redirectpatient.  No c/o noted.  Will continue to monitor.

## 2020-10-15 NOTE — DISCHARGE SUMMARY
Date of Discharge:  10/15/2020    Discharge Diagnosis: SOA, Afib with RVR, HTN emergency, Chronic CHF, CKD, CAD, Non-compliance, TME, inability to care for himself    Presenting Problem/History of Present Illness  Active Hospital Problems    Diagnosis  POA   • Mild cognitive impairment [G31.84]  Yes   • Chronic atrial fibrillation (CMS/HCC) [I48.20]  Yes   • Chronic diastolic CHF (congestive heart failure) (CMS/HCC) [I50.32]  Yes   • Essential hypertension [I10]  Yes   • Coronary artery disease involving native coronary artery of native heart without angina pectoris [I25.10]  Yes   • Psoriasis [L40.9]  Yes   • History of CVA (cerebrovascular accident) [Z86.73]  Not Applicable   • GERD (gastroesophageal reflux disease) [K21.9]  Yes   • Medically noncompliant [Z91.19]  Not Applicable      Resolved Hospital Problems    Diagnosis Date Resolved POA   • **Atrial fibrillation with RVR (CMS/HCC) [I48.91] 10/15/2020 Yes   • Dyspnea [R06.00] 10/15/2020 Yes   • Hypertensive emergency [I16.1] 10/15/2020 Yes   • CKD (chronic kidney disease) stage 3, GFR 30-59 ml/min [N18.30] 10/15/2020 Yes   • Gambling disorder, persistent [F63.0] 10/15/2020 Yes      Hospital Course  Patient is a 65 y.o. male admitted through ER for SOA and Afib with RVR-seen by cardiology and started on Cardizem drip and then switched to oral meds. BP was uncontrolled and meds were increased/adjusted for that. Due to his non-compliance, mild cognitive disability psych was consulted again and he was deemed unsafe to return home and rehab placement sought    Procedures Performed  none       Consults:   Consults     Date and Time Order Name Status Description    10/8/2020 0843 Inpatient Psychiatrist Consult Completed     10/7/2020 1604 Inpatient Psychiatrist Consult      10/4/2020 1719 Inpatient Cardiology Consult Completed     10/4/2020 1455 Family Medicine Consult Completed     9/9/2020 1548 Inpatient Psychiatrist Consult Completed     9/2/2020 0859 Inpatient  Cardiology Consult Completed     9/1/2020 1940 Inpatient Nephrology Consult Completed     9/1/2020 1512 Family Medicine Consult Completed           Pertinent Test Results:     Condition on Discharge:  Fair    Vital Signs  Temp:  [97.2 °F (36.2 °C)-97.5 °F (36.4 °C)] 97.5 °F (36.4 °C)  Heart Rate:  [65-86] 85  Resp:  [20-22] 20  BP: (140-172)/() 172/99    Physical Exam:     General Appearance:    Alert, cooperative, in no acute distress   Head:    Normocephalic, without obvious abnormality, atraumatic   Eyes:            Lids and lashes normal, conjunctivae and sclerae normal, no   icterus,    Ears:    Ears appear intact with no abnormalities noted   Throat:   No oral lesions, no thrush, oral mucosa moist   Neck:   No adenopathy, supple, trachea midline, no thyromegaly,   Back:     No kyphosis present, no scoliosis present, no skin lesions,      erythema or scars, no tenderness to percussion or                   palpation,   range of motion normal   Lungs:     Clear to auscultation,respirations regular, even and                  unlabored    Heart:    Regular rhythm and normal rate, normal S1 and S2, no            murmur, no gallop, no rub, no click   Chest Wall:    No abnormalities observed   Abdomen:     Normal bowel sounds, no masses, no organomegaly, soft        non-tender, non-distended, no guarding, no rebound                tenderness   Rectal:     Deferred   Extremities:   Moves all extremities well, no edema, no cyanosis, no             redness   Pulses:   Pulses palpable and equal bilaterally   Skin:   No bleeding, bruising or rash   Lymph nodes:   No palpable adenopathy   Neurologic:   Cranial nerves 2 - 12 grossly intact, sensation intact       Discharge Disposition  Rehab Facility or Unit (DC - External)    Discharge Medications     Discharge Medications      New Medications      Instructions Start Date   betamethasone dipropionate 0.05 % cream   1 application, Topical, 2 Times Daily      dilTIAZem   MG 24 hr capsule  Commonly known as: CARDIZEM CD   180 mg, Oral, Every 24 Hours Scheduled         Changes to Medications      Instructions Start Date   losartan 50 MG tablet  Commonly known as: COZAAR  What changed: Another medication with the same name was added. Make sure you understand how and when to take each.   50 mg, Oral, Every 24 Hours Scheduled      losartan 100 MG tablet  Commonly known as: COZAAR  What changed: You were already taking a medication with the same name, and this prescription was added. Make sure you understand how and when to take each.   100 mg, Oral, Every 24 Hours Scheduled   Start Date: October 16, 2020        Continue These Medications      Instructions Start Date   allopurinol 100 MG tablet  Commonly known as: ZYLOPRIM   100 mg, Oral, Daily      aspirin 81 MG EC tablet   81 mg, Oral, Daily      hydrALAZINE 100 MG tablet  Commonly known as: APRESOLINE   100 mg, Oral, 3 Times Daily      Metoprolol Succinate 50 MG capsule extended-release 24 hour sprinkle   50 mg, Oral, Daily      pravastatin 40 MG tablet  Commonly known as: PRAVACHOL   40 mg, Oral, Nightly      risperiDONE 0.5 MG tablet  Commonly known as: risperDAL   0.5 mg, Oral, Every 12 Hours Scheduled      rivaroxaban 20 MG tablet  Commonly known as: Xarelto   20 mg, Oral, Daily With Dinner      sodium bicarbonate 650 MG tablet   650 mg, Oral, 3 Times Daily      spironolactone 25 MG tablet  Commonly known as: ALDACTONE   12.5 mg, Oral, Daily         Stop These Medications    bumetanide 1 MG tablet  Commonly known as: BUMEX        ASK your doctor about these medications      Instructions Start Date   influenza vac split quad 0.5 ML suspension prefilled syringe injection  Commonly known as: FLUZONE,FLUARIX,AFLURIA,FLULAVAL  Ask about: Should I take this medication?   0.5 mL, Intramuscular, Once             Discharge Diet:   Diet Instructions     Diet: Consistent Carbohydrate, Cardiac      Discharge Diet:  Consistent  Carbohydrate  Cardiac             Activity at Discharge:   Activity Instructions     Gradually Increase Activity Until at Pre-Hospitalization Level            Follow-up Appointments  No future appointments.  Additional Instructions for the Follow-ups that You Need to Schedule     Ambulatory Referral to Home Health   As directed      Face to Face Visit Date: 10/8/2020    Follow-up provider for Plan of Care?: I treated the patient in an acute care facility and will not continue treatment after discharge.    Follow-up provider: SHUKRI RUSSELL [[id number]]    Reason/Clinical Findings: CHF,Afib, Sepsis    Describe mobility limitations that make leaving home difficult: CHF,Afib, Sepsis    Nursing/Therapeutic Services Requested: Other (Eval and Treat )    Frequency: 1 Week 1         Discharge Follow-up with PCP   As directed       Currently Documented PCP:    Shukri Russell APRN    PCP Phone Number:    [phone]     Follow Up Details: 2 weeks         Discharge Follow-up with Specified Provider: Cardiology; 1 Month   As directed      To: Cardiology    Follow Up: 1 Month               Test Results Pending at Discharge       CLARISSA Luna  10/15/20  11:27 EDT

## 2020-10-15 NOTE — PLAN OF CARE
Problem: Adult Inpatient Plan of Care  Goal: Plan of Care Review  Recent Flowsheet Documentation  Taken 10/15/2020 1119 by Mirna Vines, PT  Progress: improving  Plan of Care Reviewed With: patient    Pt was oriented x 2. Pt had no c/o pain only cold. Pt completed bed mobility with SBA, sit to stand and gait 75 feet x 2 SBA/MIN A. Pt fatigued after gait. Pt with increased confusion with fatigue. Pt with MIN A dressing lower body. Recommendation is still IP Rehab. PPE: Mask, eyeshield, gloves. PPE: Mask, eyeshield, gloves.

## 2020-10-15 NOTE — PROGRESS NOTES
Continued Stay Note   Yoseph     Patient Name: Carlos Whipple  MRN: [id number]  Today's Date: 10/15/2020    Admit Date: 10/4/2020    Discharge Plan     Row Name 10/15/20 1113       Plan    Plan  DC Plan: dc to [facility]. Pre-cert approved for [facility] 10/15. PASRR approved.    Plan Comments  SW received phone call from Baylee Fisher, who informed SW that pre-cert is approved for [facility] (auth: #[id number]). SW informed [facility] liaison of approval and provided auth #. Liaison informed SW that they will be ready to accept pt today. ABHILASH notified RN, MD, and CM via secure chat. Per APRN, pt will d/c today.        [facility]    Office: [phone]  Cell: [phone]  Fax: [phone]  E-mail: [email or web address]

## 2020-10-15 NOTE — PROGRESS NOTES
Discharge Planning Assessment   Yoseph     Patient Name: Carlos Whipple  MRN: [id number]  Today's Date: 10/15/2020    Admit Date: 10/4/2020          Plan    [facility]       Carol naegele rn  Case management  Office number [phone]  Cell phone [phone]

## 2020-10-15 NOTE — PROGRESS NOTES
Referring Provider: Dr. Stauffer    Reason for follow-up: Fibrillation     Patient Care Team:  Marisol Larson APRN as PCP - General  Shwetha Barber MD as Consulting Physician (Nephrology)  Edy Thomas MD as Consulting Physician (Cardiology)    Subjective .  No shortness of breath or palpitations today    Objective  Lying in bed comfortably     Review of Systems   Constitution: Negative for fever and malaise/fatigue.   HENT: Negative for ear pain and nosebleeds.    Eyes: Negative for blurred vision and double vision.   Cardiovascular: Negative for chest pain, dyspnea on exertion and palpitations.   Respiratory: Negative for cough and shortness of breath.    Skin: Negative for rash.   Musculoskeletal: Negative for joint pain.   Gastrointestinal: Negative for abdominal pain, nausea and vomiting.   Neurological: Negative for focal weakness and headaches.   Psychiatric/Behavioral: Negative for depression. The patient is not nervous/anxious.    All other systems reviewed and are negative.      Ketorolac tromethamine    Scheduled Meds:allopurinol, 100 mg, Oral, Daily  aspirin, 81 mg, Oral, Daily  atorvastatin, 10 mg, Oral, Nightly  betamethasone dipropionate, 1 application, Topical, Q12H  dilTIAZem CD, 180 mg, Oral, Q24H  hydrALAZINE, 50 mg, Oral, TID  losartan, 100 mg, Oral, Q24H  metoprolol succinate XL, 50 mg, Oral, Q24H  risperiDONE, 0.5 mg, Oral, Q12H  rivaroxaban, 20 mg, Oral, Daily With Dinner  sodium bicarbonate, 650 mg, Oral, TID  sodium chloride, 10 mL, Intravenous, Q12H  spironolactone, 12.5 mg, Oral, Daily      Continuous Infusions:   PRN Meds:.•  acetaminophen **OR** acetaminophen **OR** acetaminophen  •  bisacodyl  •  calcium carbonate  •  influenza vaccine  •  labetalol  •  LORazepam  •  magnesium hydroxide  •  melatonin  •  nitroglycerin  •  ondansetron **OR** ondansetron  •  potassium chloride **OR** potassium chloride **OR** potassium chloride  •  sodium chloride  •  sodium  "chloride        VITAL SIGNS  Vitals:    10/14/20 1442 10/14/20 2018 10/15/20 0520 10/15/20 0906   BP: 140/90 143/100 158/100 172/99   BP Location: Left arm Left arm Right arm    Patient Position: Lying Lying Lying    Pulse: 72 65 86 85   Resp: 22 20 20    Temp: 97.2 °F (36.2 °C) 97.5 °F (36.4 °C) 97.5 °F (36.4 °C)    TempSrc: Oral Oral Oral    SpO2: 96% 97% 96%    Weight:       Height:           Flowsheet Rows      First Filed Value   Admission Height  182.9 cm (72\") Documented at 10/04/2020 1256   Admission Weight  118 kg (259 lb 11.2 oz) Documented at 10/04/2020 1256           TELEMETRY: Atrial fibrillation with controlled medical response    Physical Exam:  Constitutional:       Appearance: Well-developed.   Eyes:      General: No scleral icterus.     Conjunctiva/sclera: Conjunctivae normal.      Pupils: Pupils are equal, round, and reactive to light.   HENT:      Head: Normocephalic and atraumatic.   Neck:      Musculoskeletal: Normal range of motion and neck supple.      Vascular: No carotid bruit or JVD.   Pulmonary:      Effort: Pulmonary effort is normal.      Breath sounds: Normal breath sounds. No wheezing. No rales.   Cardiovascular:      Normal rate. Irregularly irregular rhythm.   Pulses:     Intact distal pulses.   Abdominal:      General: Bowel sounds are normal.      Palpations: Abdomen is soft.   Musculoskeletal: Normal range of motion.   Skin:     General: Skin is warm and dry.      Findings: No rash.   Neurological:      Mental Status: Alert.      Comments: No focal deficits          Results Review:   I reviewed the patient's new clinical results.  Lab Results (last 24 hours)     Procedure Component Value Units Date/Time    Magnesium [[id number]]  (Normal) Collected: 10/15/20 0305    Specimen: Blood Updated: 10/15/20 0406     Magnesium 2.2 mg/dL     CBC & Differential [[id number]]  (Abnormal) Collected: 10/15/20 0305    Specimen: Blood Updated: 10/15/20 0349    Narrative:      The following orders " were created for panel order CBC & Differential.  Procedure                               Abnormality         Status                     ---------                               -----------         ------                     CBC Auto Differential[[id number]]        Abnormal            Final result                 Please view results for these tests on the individual orders.    CBC Auto Differential [[id number]]  (Abnormal) Collected: 10/15/20 0305    Specimen: Blood Updated: 10/15/20 0349     WBC 8.80 10*3/mm3      RBC 5.18 10*6/mm3      Hemoglobin 14.2 g/dL      Hematocrit 43.8 %      MCV 84.7 fL      MCH 27.5 pg      MCHC 32.5 g/dL      RDW 18.9 %      RDW-SD 56.4 fl      MPV 8.6 fL      Platelets 217 10*3/mm3      Neutrophil % 66.2 %      Lymphocyte % 19.5 %      Monocyte % 11.6 %      Eosinophil % 1.6 %      Basophil % 1.1 %      Neutrophils, Absolute 5.80 10*3/mm3      Lymphocytes, Absolute 1.70 10*3/mm3      Monocytes, Absolute 1.00 10*3/mm3      Eosinophils, Absolute 0.10 10*3/mm3      Basophils, Absolute 0.10 10*3/mm3      nRBC 0.0 /100 WBC           Imaging Results (Last 24 Hours)     ** No results found for the last 24 hours. **          EKG      I personally viewed and interpreted the patient's EKG/Telemetry data:    ECHOCARDIOGRAM:    STRESS MYOVIEW:    CARDIAC CATHETERIZATION:    OTHER:         Assessment/Plan     Active Problems:    Chronic atrial fibrillation (CMS/HCC)    Chronic diastolic CHF (congestive heart failure) (CMS/[facility])    Essential hypertension    Coronary artery disease involving native coronary artery of native heart without angina pectoris    Psoriasis    History of CVA (cerebrovascular accident)    GERD (gastroesophageal reflux disease)    Medically noncompliant    Mild cognitive impairment       Patient has atrial fibrillation with rapid response and was placed on IV Cardizem and then switched to oral Cardizem and is better now  Patient is also on anticoagulation with Xarelto  Patient's  blood pressure and heart rate are stable  Patient's lipid levels are followed with a primary care doctor  Patient is ruled out for MI and has history of coronary disease  Patient has medical noncompliance and discussed with him several times about being compliant  Patient is waiting for placement in a rehab facility  We will follow him as an outpatient    I discussed the patients findings and my recommendations with patient and nurse    Edy Thomas MD  10/15/20  11:46 EDT

## 2020-10-16 NOTE — PROGRESS NOTES
Discharge Planning Assessment   [facility]     Patient Name: Carlos Whipple  MRN: [id number]  Today's Date: 10/16/2020    Admit Date: 10/4/2020       Plan    Final Discharge Disposition Code  03 - skilled nursing facility (SNF)    Final Note  to [facility] for skilled therapy       Carol naegele rn  Case management  Office number [phone]  Cell phone [phone]

## 2021-01-26 ENCOUNTER — HOSPITAL ENCOUNTER (INPATIENT)
Facility: HOSPITAL | Age: 66
LOS: 9 days | Discharge: NURSING, REHAB OR LONG-TERM CARE | End: 2021-02-04
Attending: EMERGENCY MEDICINE | Admitting: INTERNAL MEDICINE

## 2021-01-26 ENCOUNTER — APPOINTMENT (OUTPATIENT)
Dept: GENERAL RADIOLOGY | Facility: HOSPITAL | Age: 66
End: 2021-01-26

## 2021-01-26 ENCOUNTER — APPOINTMENT (OUTPATIENT)
Dept: CT IMAGING | Facility: HOSPITAL | Age: 66
End: 2021-01-26

## 2021-01-26 DIAGNOSIS — I10 UNCONTROLLED HYPERTENSION: ICD-10-CM

## 2021-01-26 DIAGNOSIS — I61.8 INTRACEREBELLAR AND POSTERIOR FOSSA HEMORRHAGE (HCC): Primary | ICD-10-CM

## 2021-01-26 DIAGNOSIS — I61.4 INTRACEREBELLAR AND POSTERIOR FOSSA HEMORRHAGE (HCC): Primary | ICD-10-CM

## 2021-01-26 PROBLEM — Z79.01 CHRONIC ANTICOAGULATION: Status: ACTIVE | Noted: 2021-01-26
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
## 2021-01-26 LAB
ALBUMIN SERPL-MCNC: 4.2 G/DL (ref 3.5–5.2)
ALBUMIN/GLOB SERPL: 1 G/DL
ALP SERPL-CCNC: 73 U/L (ref 39–117)
ALT SERPL W P-5'-P-CCNC: 20 U/L (ref 1–41)
ANION GAP SERPL CALCULATED.3IONS-SCNC: 14 MMOL/L (ref 5–15)
APTT PPP: 27.9 SECONDS (ref 24–31)
AST SERPL-CCNC: 19 U/L (ref 1–40)
BACTERIA UR QL AUTO: ABNORMAL /HPF
BASOPHILS # BLD AUTO: 0 10*3/MM3 (ref 0–0.2)
BASOPHILS NFR BLD AUTO: 0.2 % (ref 0–1.5)
BILIRUB SERPL-MCNC: 0.6 MG/DL (ref 0–1.2)
BILIRUB UR QL STRIP: NEGATIVE
BUN SERPL-MCNC: 23 MG/DL (ref 8–23)
BUN/CREAT SERPL: 22.5 (ref 7–25)
CALCIUM SPEC-SCNC: 9.3 MG/DL (ref 8.6–10.5)
CHLORIDE SERPL-SCNC: 106 MMOL/L (ref 98–107)
CLARITY UR: CLEAR
CO2 SERPL-SCNC: 23 MMOL/L (ref 22–29)
COLOR UR: YELLOW
CREAT SERPL-MCNC: 1.02 MG/DL (ref 0.76–1.27)
DEPRECATED RDW RBC AUTO: 44.2 FL (ref 37–54)
EOSINOPHIL # BLD AUTO: 0 10*3/MM3 (ref 0–0.4)
EOSINOPHIL NFR BLD AUTO: 0.1 % (ref 0.3–6.2)
ERYTHROCYTE [DISTWIDTH] IN BLOOD BY AUTOMATED COUNT: 14.7 % (ref 12.3–15.4)
GFR SERPL CREATININE-BSD FRML MDRD: 74 ML/MIN/1.73
GLOBULIN UR ELPH-MCNC: 4.2 GM/DL
GLUCOSE BLDC GLUCOMTR-MCNC: 111 MG/DL (ref 70–105)
GLUCOSE BLDC GLUCOMTR-MCNC: 140 MG/DL (ref 70–105)
GLUCOSE SERPL-MCNC: 157 MG/DL (ref 65–99)
GLUCOSE UR STRIP-MCNC: NEGATIVE MG/DL
HCT VFR BLD AUTO: 45.4 % (ref 37.5–51)
HGB BLD-MCNC: 15.1 G/DL (ref 13–17.7)
HGB UR QL STRIP.AUTO: NEGATIVE
HYALINE CASTS UR QL AUTO: ABNORMAL /LPF
INR PPP: 1.16 (ref 0.93–1.1)
KETONES UR QL STRIP: ABNORMAL
LEUKOCYTE ESTERASE UR QL STRIP.AUTO: NEGATIVE
LYMPHOCYTES # BLD AUTO: 0.6 10*3/MM3 (ref 0.7–3.1)
LYMPHOCYTES NFR BLD AUTO: 5.8 % (ref 19.6–45.3)
MAGNESIUM SERPL-MCNC: 1.8 MG/DL (ref 1.6–2.4)
MCH RBC QN AUTO: 29 PG (ref 26.6–33)
MCHC RBC AUTO-ENTMCNC: 33.3 G/DL (ref 31.5–35.7)
MCV RBC AUTO: 87.1 FL (ref 79–97)
MONOCYTES # BLD AUTO: 0.4 10*3/MM3 (ref 0.1–0.9)
MONOCYTES NFR BLD AUTO: 3.4 % (ref 5–12)
NEUTROPHILS NFR BLD AUTO: 9.7 10*3/MM3 (ref 1.7–7)
NEUTROPHILS NFR BLD AUTO: 90.5 % (ref 42.7–76)
NITRITE UR QL STRIP: NEGATIVE
NRBC BLD AUTO-RTO: 0.3 /100 WBC (ref 0–0.2)
PH UR STRIP.AUTO: 6.5 [PH] (ref 5–8)
PLATELET # BLD AUTO: 122 10*3/MM3 (ref 140–450)
PMV BLD AUTO: 8.8 FL (ref 6–12)
POTASSIUM SERPL-SCNC: 3.9 MMOL/L (ref 3.5–5.2)
PROT SERPL-MCNC: 8.4 G/DL (ref 6–8.5)
PROT UR QL STRIP: ABNORMAL
PROTHROMBIN TIME: 12.7 SECONDS (ref 9.6–11.7)
RBC # BLD AUTO: 5.22 10*6/MM3 (ref 4.14–5.8)
RBC # UR: ABNORMAL /HPF
REF LAB TEST METHOD: ABNORMAL
SARS-COV-2 ORF1AB RESP QL NAA+PROBE: NOT DETECTED
SODIUM SERPL-SCNC: 143 MMOL/L (ref 136–145)
SP GR UR STRIP: 1.02 (ref 1–1.03)
SQUAMOUS #/AREA URNS HPF: ABNORMAL /HPF
TROPONIN T SERPL-MCNC: <0.01 NG/ML (ref 0–0.03)
TROPONIN T SERPL-MCNC: <0.01 NG/ML (ref 0–0.03)
UROBILINOGEN UR QL STRIP: ABNORMAL
WBC # BLD AUTO: 10.7 10*3/MM3 (ref 3.4–10.8)
WBC UR QL AUTO: ABNORMAL /HPF

## 2021-01-26 PROCEDURE — 84484 ASSAY OF TROPONIN QUANT: CPT | Performed by: INTERNAL MEDICINE

## 2021-01-26 PROCEDURE — U0004 COV-19 TEST NON-CDC HGH THRU: HCPCS | Performed by: EMERGENCY MEDICINE

## 2021-01-26 PROCEDURE — 99221 1ST HOSP IP/OBS SF/LOW 40: CPT | Performed by: NEUROLOGICAL SURGERY

## 2021-01-26 PROCEDURE — C9132 KCENTRA, PER I.U.: HCPCS | Performed by: EMERGENCY MEDICINE

## 2021-01-26 PROCEDURE — 85730 THROMBOPLASTIN TIME PARTIAL: CPT | Performed by: EMERGENCY MEDICINE

## 2021-01-26 PROCEDURE — 25010000002 ONDANSETRON PER 1 MG: Performed by: EMERGENCY MEDICINE

## 2021-01-26 PROCEDURE — 72125 CT NECK SPINE W/O DYE: CPT

## 2021-01-26 PROCEDURE — 25010000002 PROTHROMBIN COMPLEX CONC HUMAN 1000 UNITS KIT: Performed by: EMERGENCY MEDICINE

## 2021-01-26 PROCEDURE — 71045 X-RAY EXAM CHEST 1 VIEW: CPT

## 2021-01-26 PROCEDURE — 99285 EMERGENCY DEPT VISIT HI MDM: CPT

## 2021-01-26 PROCEDURE — 80053 COMPREHEN METABOLIC PANEL: CPT | Performed by: EMERGENCY MEDICINE

## 2021-01-26 PROCEDURE — 25010000002 PROMETHAZINE PER 50 MG: Performed by: NURSE PRACTITIONER

## 2021-01-26 PROCEDURE — 83735 ASSAY OF MAGNESIUM: CPT | Performed by: EMERGENCY MEDICINE

## 2021-01-26 PROCEDURE — 70450 CT HEAD/BRAIN W/O DYE: CPT

## 2021-01-26 PROCEDURE — 25010000002 ONDANSETRON PER 1 MG: Performed by: NURSE PRACTITIONER

## 2021-01-26 PROCEDURE — 92610 EVALUATE SWALLOWING FUNCTION: CPT

## 2021-01-26 PROCEDURE — 93005 ELECTROCARDIOGRAM TRACING: CPT | Performed by: EMERGENCY MEDICINE

## 2021-01-26 PROCEDURE — 81001 URINALYSIS AUTO W/SCOPE: CPT | Performed by: EMERGENCY MEDICINE

## 2021-01-26 PROCEDURE — 85025 COMPLETE CBC W/AUTO DIFF WBC: CPT | Performed by: EMERGENCY MEDICINE

## 2021-01-26 PROCEDURE — 84484 ASSAY OF TROPONIN QUANT: CPT | Performed by: EMERGENCY MEDICINE

## 2021-01-26 PROCEDURE — 85610 PROTHROMBIN TIME: CPT | Performed by: EMERGENCY MEDICINE

## 2021-01-26 PROCEDURE — 25010000002 PROTHROMBIN COMPLEX CONC HUMAN 500 UNITS KIT: Performed by: EMERGENCY MEDICINE

## 2021-01-26 PROCEDURE — 82962 GLUCOSE BLOOD TEST: CPT

## 2021-01-26 RX ORDER — ATORVASTATIN CALCIUM 10 MG/1
10 TABLET, FILM COATED ORAL DAILY
Status: DISCONTINUED | OUTPATIENT
Start: 2021-01-27 | End: 2021-02-04 | Stop reason: HOSPADM

## 2021-01-26 RX ORDER — NITROGLYCERIN 0.4 MG/1
0.4 TABLET SUBLINGUAL
Status: DISCONTINUED | OUTPATIENT
Start: 2021-01-26 | End: 2021-02-04 | Stop reason: HOSPADM

## 2021-01-26 RX ORDER — ACETAMINOPHEN 325 MG/1
650 TABLET ORAL EVERY 4 HOURS PRN
Status: DISCONTINUED | OUTPATIENT
Start: 2021-01-26 | End: 2021-02-04 | Stop reason: HOSPADM

## 2021-01-26 RX ORDER — ACETAMINOPHEN 325 MG/1
650 TABLET ORAL EVERY 6 HOURS PRN
COMMUNITY

## 2021-01-26 RX ORDER — HYDROXYZINE HYDROCHLORIDE 25 MG/1
25 TABLET, FILM COATED ORAL 2 TIMES DAILY
COMMUNITY

## 2021-01-26 RX ORDER — DEXTROSE MONOHYDRATE 25 G/50ML
25 INJECTION, SOLUTION INTRAVENOUS
Status: DISCONTINUED | OUTPATIENT
Start: 2021-01-26 | End: 2021-02-04 | Stop reason: HOSPADM

## 2021-01-26 RX ORDER — ACETAMINOPHEN 650 MG/1
650 SUPPOSITORY RECTAL EVERY 4 HOURS PRN
Status: DISCONTINUED | OUTPATIENT
Start: 2021-01-26 | End: 2021-02-04 | Stop reason: HOSPADM

## 2021-01-26 RX ORDER — ALLOPURINOL 100 MG/1
100 TABLET ORAL DAILY
Status: DISCONTINUED | OUTPATIENT
Start: 2021-01-27 | End: 2021-02-04 | Stop reason: HOSPADM

## 2021-01-26 RX ORDER — ONDANSETRON 2 MG/ML
4 INJECTION INTRAMUSCULAR; INTRAVENOUS ONCE
Status: COMPLETED | OUTPATIENT
Start: 2021-01-26 | End: 2021-01-26

## 2021-01-26 RX ORDER — ONDANSETRON 2 MG/ML
4 INJECTION INTRAMUSCULAR; INTRAVENOUS EVERY 6 HOURS PRN
Status: DISCONTINUED | OUTPATIENT
Start: 2021-01-26 | End: 2021-02-04 | Stop reason: HOSPADM

## 2021-01-26 RX ORDER — LABETALOL HYDROCHLORIDE 5 MG/ML
10 INJECTION, SOLUTION INTRAVENOUS
Status: DISCONTINUED | OUTPATIENT
Start: 2021-01-26 | End: 2021-02-04 | Stop reason: HOSPADM

## 2021-01-26 RX ORDER — INSULIN LISPRO 100 [IU]/ML
0-7 INJECTION, SOLUTION INTRAVENOUS; SUBCUTANEOUS EVERY 6 HOURS SCHEDULED
Status: DISCONTINUED | OUTPATIENT
Start: 2021-01-26 | End: 2021-02-03

## 2021-01-26 RX ORDER — NICOTINE POLACRILEX 4 MG
15 LOZENGE BUCCAL
Status: DISCONTINUED | OUTPATIENT
Start: 2021-01-26 | End: 2021-02-04 | Stop reason: HOSPADM

## 2021-01-26 RX ORDER — INSULIN LISPRO 100 [IU]/ML
0-7 INJECTION, SOLUTION INTRAVENOUS; SUBCUTANEOUS AS NEEDED
Status: DISCONTINUED | OUTPATIENT
Start: 2021-01-26 | End: 2021-02-03

## 2021-01-26 RX ORDER — ONDANSETRON 4 MG/1
4 TABLET, FILM COATED ORAL EVERY 6 HOURS PRN
Status: DISCONTINUED | OUTPATIENT
Start: 2021-01-26 | End: 2021-02-04 | Stop reason: HOSPADM

## 2021-01-26 RX ORDER — SODIUM CHLORIDE 0.9 % (FLUSH) 0.9 %
10 SYRINGE (ML) INJECTION AS NEEDED
Status: DISCONTINUED | OUTPATIENT
Start: 2021-01-26 | End: 2021-02-04 | Stop reason: HOSPADM

## 2021-01-26 RX ORDER — IPRATROPIUM BROMIDE AND ALBUTEROL SULFATE 2.5; .5 MG/3ML; MG/3ML
3 SOLUTION RESPIRATORY (INHALATION)
Status: DISCONTINUED | OUTPATIENT
Start: 2021-01-26 | End: 2021-02-04 | Stop reason: HOSPADM

## 2021-01-26 RX ORDER — ALUMINA, MAGNESIA, AND SIMETHICONE 2400; 2400; 240 MG/30ML; MG/30ML; MG/30ML
15 SUSPENSION ORAL EVERY 6 HOURS PRN
Status: DISCONTINUED | OUTPATIENT
Start: 2021-01-26 | End: 2021-02-04 | Stop reason: HOSPADM

## 2021-01-26 RX ORDER — FAMOTIDINE 10 MG/ML
20 INJECTION, SOLUTION INTRAVENOUS DAILY
Status: DISCONTINUED | OUTPATIENT
Start: 2021-01-27 | End: 2021-01-28

## 2021-01-26 RX ORDER — RISPERIDONE 0.25 MG/1
0.75 TABLET ORAL 2 TIMES DAILY
COMMUNITY

## 2021-01-26 RX ADMIN — ONDANSETRON 4 MG: 2 INJECTION, SOLUTION INTRAMUSCULAR; INTRAVENOUS at 17:11

## 2021-01-26 RX ADMIN — Medication 5054 UNITS: at 10:35

## 2021-01-26 RX ADMIN — SODIUM CHLORIDE 5 MG/HR: 9 INJECTION, SOLUTION INTRAVENOUS at 10:05

## 2021-01-26 RX ADMIN — ONDANSETRON 4 MG: 2 INJECTION, SOLUTION INTRAMUSCULAR; INTRAVENOUS at 09:51

## 2021-01-26 RX ADMIN — SODIUM CHLORIDE 2.5 MG/HR: 9 INJECTION, SOLUTION INTRAVENOUS at 23:28

## 2021-01-26 RX ADMIN — SODIUM CHLORIDE 5 MG/HR: 900 INJECTION, SOLUTION INTRAVENOUS at 13:35

## 2021-01-27 ENCOUNTER — APPOINTMENT (OUTPATIENT)
Dept: CT IMAGING | Facility: HOSPITAL | Age: 66
End: 2021-01-27

## 2021-01-27 LAB
ALBUMIN SERPL-MCNC: 3.9 G/DL (ref 3.5–5.2)
ALBUMIN/GLOB SERPL: 1.1 G/DL
ALP SERPL-CCNC: 65 U/L (ref 39–117)
ALT SERPL W P-5'-P-CCNC: 16 U/L (ref 1–41)
ANION GAP SERPL CALCULATED.3IONS-SCNC: 9 MMOL/L (ref 5–15)
AST SERPL-CCNC: 17 U/L (ref 1–40)
BASOPHILS # BLD AUTO: 0.1 10*3/MM3 (ref 0–0.2)
BASOPHILS NFR BLD AUTO: 1 % (ref 0–1.5)
BILIRUB SERPL-MCNC: 0.6 MG/DL (ref 0–1.2)
BUN SERPL-MCNC: 20 MG/DL (ref 8–23)
BUN/CREAT SERPL: 21.3 (ref 7–25)
CALCIUM SPEC-SCNC: 9.3 MG/DL (ref 8.6–10.5)
CHLORIDE SERPL-SCNC: 108 MMOL/L (ref 98–107)
CHOLEST SERPL-MCNC: 101 MG/DL (ref 0–200)
CO2 SERPL-SCNC: 27 MMOL/L (ref 22–29)
CREAT SERPL-MCNC: 0.94 MG/DL (ref 0.76–1.27)
DEPRECATED RDW RBC AUTO: 44.6 FL (ref 37–54)
EOSINOPHIL # BLD AUTO: 0.1 10*3/MM3 (ref 0–0.4)
EOSINOPHIL NFR BLD AUTO: 0.5 % (ref 0.3–6.2)
ERYTHROCYTE [DISTWIDTH] IN BLOOD BY AUTOMATED COUNT: 14.8 % (ref 12.3–15.4)
GFR SERPL CREATININE-BSD FRML MDRD: 81 ML/MIN/1.73
GLOBULIN UR ELPH-MCNC: 3.5 GM/DL
GLUCOSE BLDC GLUCOMTR-MCNC: 131 MG/DL (ref 70–105)
GLUCOSE BLDC GLUCOMTR-MCNC: 168 MG/DL (ref 70–105)
GLUCOSE SERPL-MCNC: 100 MG/DL (ref 65–99)
HCT VFR BLD AUTO: 42.3 % (ref 37.5–51)
HDLC SERPL-MCNC: 41 MG/DL (ref 40–60)
HGB BLD-MCNC: 14 G/DL (ref 13–17.7)
LDLC SERPL CALC-MCNC: 48 MG/DL (ref 0–100)
LDLC/HDLC SERPL: 1.21 {RATIO}
LYMPHOCYTES # BLD AUTO: 1.4 10*3/MM3 (ref 0.7–3.1)
LYMPHOCYTES NFR BLD AUTO: 12.7 % (ref 19.6–45.3)
MAGNESIUM SERPL-MCNC: 1.9 MG/DL (ref 1.6–2.4)
MCH RBC QN AUTO: 28.8 PG (ref 26.6–33)
MCHC RBC AUTO-ENTMCNC: 33.1 G/DL (ref 31.5–35.7)
MCV RBC AUTO: 87 FL (ref 79–97)
MONOCYTES # BLD AUTO: 1 10*3/MM3 (ref 0.1–0.9)
MONOCYTES NFR BLD AUTO: 9 % (ref 5–12)
NEUTROPHILS NFR BLD AUTO: 76.8 % (ref 42.7–76)
NEUTROPHILS NFR BLD AUTO: 8.5 10*3/MM3 (ref 1.7–7)
NRBC BLD AUTO-RTO: 0 /100 WBC (ref 0–0.2)
PHOSPHATE SERPL-MCNC: 3 MG/DL (ref 2.5–4.5)
PLATELET # BLD AUTO: 146 10*3/MM3 (ref 140–450)
PMV BLD AUTO: 9.1 FL (ref 6–12)
POTASSIUM SERPL-SCNC: 3.8 MMOL/L (ref 3.5–5.2)
PROT SERPL-MCNC: 7.4 G/DL (ref 6–8.5)
RBC # BLD AUTO: 4.86 10*6/MM3 (ref 4.14–5.8)
SODIUM SERPL-SCNC: 144 MMOL/L (ref 136–145)
TRIGL SERPL-MCNC: 51 MG/DL (ref 0–150)
VLDLC SERPL-MCNC: 12 MG/DL (ref 5–40)
WBC # BLD AUTO: 11.1 10*3/MM3 (ref 3.4–10.8)

## 2021-01-27 PROCEDURE — 84100 ASSAY OF PHOSPHORUS: CPT | Performed by: NURSE PRACTITIONER

## 2021-01-27 PROCEDURE — 97166 OT EVAL MOD COMPLEX 45 MIN: CPT

## 2021-01-27 PROCEDURE — 99232 SBSQ HOSP IP/OBS MODERATE 35: CPT | Performed by: NEUROLOGICAL SURGERY

## 2021-01-27 PROCEDURE — 83735 ASSAY OF MAGNESIUM: CPT | Performed by: NURSE PRACTITIONER

## 2021-01-27 PROCEDURE — 63710000001 INSULIN LISPRO (HUMAN) PER 5 UNITS: Performed by: NURSE PRACTITIONER

## 2021-01-27 PROCEDURE — 92523 SPEECH SOUND LANG COMPREHEN: CPT

## 2021-01-27 PROCEDURE — 92526 ORAL FUNCTION THERAPY: CPT

## 2021-01-27 PROCEDURE — 85025 COMPLETE CBC W/AUTO DIFF WBC: CPT | Performed by: NURSE PRACTITIONER

## 2021-01-27 PROCEDURE — 97530 THERAPEUTIC ACTIVITIES: CPT

## 2021-01-27 PROCEDURE — 70450 CT HEAD/BRAIN W/O DYE: CPT

## 2021-01-27 PROCEDURE — 80053 COMPREHEN METABOLIC PANEL: CPT | Performed by: NURSE PRACTITIONER

## 2021-01-27 PROCEDURE — 80061 LIPID PANEL: CPT | Performed by: NURSE PRACTITIONER

## 2021-01-27 PROCEDURE — 97162 PT EVAL MOD COMPLEX 30 MIN: CPT

## 2021-01-27 PROCEDURE — 82962 GLUCOSE BLOOD TEST: CPT

## 2021-01-27 PROCEDURE — 25010000002 HYDRALAZINE PER 20 MG: Performed by: NURSE PRACTITIONER

## 2021-01-27 RX ORDER — HYDRALAZINE HYDROCHLORIDE 20 MG/ML
10 INJECTION INTRAMUSCULAR; INTRAVENOUS ONCE
Status: COMPLETED | OUTPATIENT
Start: 2021-01-27 | End: 2021-01-27

## 2021-01-27 RX ORDER — DILTIAZEM HYDROCHLORIDE 180 MG/1
180 CAPSULE, COATED, EXTENDED RELEASE ORAL
Status: DISCONTINUED | OUTPATIENT
Start: 2021-01-27 | End: 2021-02-03

## 2021-01-27 RX ADMIN — ATORVASTATIN CALCIUM 10 MG: 10 TABLET, FILM COATED ORAL at 08:25

## 2021-01-27 RX ADMIN — HYDRALAZINE HYDROCHLORIDE 10 MG: 20 INJECTION INTRAMUSCULAR; INTRAVENOUS at 18:46

## 2021-01-27 RX ADMIN — ALLOPURINOL 100 MG: 100 TABLET ORAL at 08:24

## 2021-01-27 RX ADMIN — FAMOTIDINE 20 MG: 10 INJECTION INTRAVENOUS at 08:24

## 2021-01-27 RX ADMIN — METOPROLOL TARTRATE 25 MG: 25 TABLET, FILM COATED ORAL at 19:48

## 2021-01-27 RX ADMIN — LABETALOL 20 MG/4 ML (5 MG/ML) INTRAVENOUS SYRINGE 10 MG: at 21:04

## 2021-01-27 RX ADMIN — SODIUM CHLORIDE 2.5 MG/HR: 900 INJECTION, SOLUTION INTRAVENOUS at 08:25

## 2021-01-27 RX ADMIN — DILTIAZEM HYDROCHLORIDE 180 MG: 180 CAPSULE, COATED, EXTENDED RELEASE ORAL at 10:12

## 2021-01-27 RX ADMIN — METOPROLOL TARTRATE 25 MG: 25 TABLET, FILM COATED ORAL at 08:24

## 2021-01-27 RX ADMIN — INSULIN LISPRO 2 UNITS: 100 INJECTION, SOLUTION INTRAVENOUS; SUBCUTANEOUS at 17:13

## 2021-01-27 RX ADMIN — SODIUM CHLORIDE 2.5 MG/HR: 9 INJECTION, SOLUTION INTRAVENOUS at 08:25

## 2021-01-28 LAB
ALBUMIN SERPL-MCNC: 4 G/DL (ref 3.5–5.2)
ALBUMIN/GLOB SERPL: 1.1 G/DL
ALP SERPL-CCNC: 74 U/L (ref 39–117)
ALT SERPL W P-5'-P-CCNC: 20 U/L (ref 1–41)
ANION GAP SERPL CALCULATED.3IONS-SCNC: 12 MMOL/L (ref 5–15)
AST SERPL-CCNC: 21 U/L (ref 1–40)
BASOPHILS # BLD AUTO: 0.1 10*3/MM3 (ref 0–0.2)
BASOPHILS NFR BLD AUTO: 0.6 % (ref 0–1.5)
BILIRUB SERPL-MCNC: 0.4 MG/DL (ref 0–1.2)
BUN SERPL-MCNC: 24 MG/DL (ref 8–23)
BUN/CREAT SERPL: 21.6 (ref 7–25)
CALCIUM SPEC-SCNC: 9.4 MG/DL (ref 8.6–10.5)
CHLORIDE SERPL-SCNC: 102 MMOL/L (ref 98–107)
CO2 SERPL-SCNC: 28 MMOL/L (ref 22–29)
CREAT SERPL-MCNC: 1.11 MG/DL (ref 0.76–1.27)
DEPRECATED RDW RBC AUTO: 45.9 FL (ref 37–54)
EOSINOPHIL # BLD AUTO: 0.2 10*3/MM3 (ref 0–0.4)
EOSINOPHIL NFR BLD AUTO: 1.9 % (ref 0.3–6.2)
ERYTHROCYTE [DISTWIDTH] IN BLOOD BY AUTOMATED COUNT: 14.9 % (ref 12.3–15.4)
GFR SERPL CREATININE-BSD FRML MDRD: 67 ML/MIN/1.73
GLOBULIN UR ELPH-MCNC: 3.7 GM/DL
GLUCOSE BLDC GLUCOMTR-MCNC: 108 MG/DL (ref 70–105)
GLUCOSE BLDC GLUCOMTR-MCNC: 122 MG/DL (ref 70–105)
GLUCOSE BLDC GLUCOMTR-MCNC: 142 MG/DL (ref 70–105)
GLUCOSE BLDC GLUCOMTR-MCNC: 145 MG/DL (ref 70–105)
GLUCOSE SERPL-MCNC: 102 MG/DL (ref 65–99)
HCT VFR BLD AUTO: 44.4 % (ref 37.5–51)
HGB BLD-MCNC: 14.5 G/DL (ref 13–17.7)
LYMPHOCYTES # BLD AUTO: 1.7 10*3/MM3 (ref 0.7–3.1)
LYMPHOCYTES NFR BLD AUTO: 14.9 % (ref 19.6–45.3)
MAGNESIUM SERPL-MCNC: 1.9 MG/DL (ref 1.6–2.4)
MCH RBC QN AUTO: 28.5 PG (ref 26.6–33)
MCHC RBC AUTO-ENTMCNC: 32.8 G/DL (ref 31.5–35.7)
MCV RBC AUTO: 87 FL (ref 79–97)
MONOCYTES # BLD AUTO: 1.2 10*3/MM3 (ref 0.1–0.9)
MONOCYTES NFR BLD AUTO: 10.3 % (ref 5–12)
NEUTROPHILS NFR BLD AUTO: 72.3 % (ref 42.7–76)
NEUTROPHILS NFR BLD AUTO: 8.3 10*3/MM3 (ref 1.7–7)
NRBC BLD AUTO-RTO: 0.1 /100 WBC (ref 0–0.2)
PHOSPHATE SERPL-MCNC: 3.2 MG/DL (ref 2.5–4.5)
PLATELET # BLD AUTO: 145 10*3/MM3 (ref 140–450)
PMV BLD AUTO: 8.9 FL (ref 6–12)
POTASSIUM SERPL-SCNC: 4 MMOL/L (ref 3.5–5.2)
PROT SERPL-MCNC: 7.7 G/DL (ref 6–8.5)
RBC # BLD AUTO: 5.1 10*6/MM3 (ref 4.14–5.8)
SODIUM SERPL-SCNC: 142 MMOL/L (ref 136–145)
WBC # BLD AUTO: 11.5 10*3/MM3 (ref 3.4–10.8)

## 2021-01-28 PROCEDURE — 92507 TX SP LANG VOICE COMM INDIV: CPT

## 2021-01-28 PROCEDURE — 80053 COMPREHEN METABOLIC PANEL: CPT | Performed by: NURSE PRACTITIONER

## 2021-01-28 PROCEDURE — 85025 COMPLETE CBC W/AUTO DIFF WBC: CPT | Performed by: NURSE PRACTITIONER

## 2021-01-28 PROCEDURE — 99232 SBSQ HOSP IP/OBS MODERATE 35: CPT | Performed by: NEUROLOGICAL SURGERY

## 2021-01-28 PROCEDURE — 84100 ASSAY OF PHOSPHORUS: CPT | Performed by: NURSE PRACTITIONER

## 2021-01-28 PROCEDURE — 92526 ORAL FUNCTION THERAPY: CPT

## 2021-01-28 PROCEDURE — 83735 ASSAY OF MAGNESIUM: CPT | Performed by: NURSE PRACTITIONER

## 2021-01-28 PROCEDURE — 82962 GLUCOSE BLOOD TEST: CPT

## 2021-01-28 RX ADMIN — LABETALOL 20 MG/4 ML (5 MG/ML) INTRAVENOUS SYRINGE 10 MG: at 06:11

## 2021-01-28 RX ADMIN — LABETALOL 20 MG/4 ML (5 MG/ML) INTRAVENOUS SYRINGE 10 MG: at 22:25

## 2021-01-28 RX ADMIN — Medication 10 ML: at 20:09

## 2021-01-28 RX ADMIN — ATORVASTATIN CALCIUM 10 MG: 10 TABLET, FILM COATED ORAL at 08:48

## 2021-01-28 RX ADMIN — Medication 10 ML: at 08:48

## 2021-01-28 RX ADMIN — LABETALOL 20 MG/4 ML (5 MG/ML) INTRAVENOUS SYRINGE 10 MG: at 12:04

## 2021-01-28 RX ADMIN — LABETALOL 20 MG/4 ML (5 MG/ML) INTRAVENOUS SYRINGE 10 MG: at 18:19

## 2021-01-28 RX ADMIN — DILTIAZEM HYDROCHLORIDE 180 MG: 180 CAPSULE, COATED, EXTENDED RELEASE ORAL at 08:48

## 2021-01-28 RX ADMIN — METOPROLOL TARTRATE 25 MG: 25 TABLET, FILM COATED ORAL at 08:48

## 2021-01-28 RX ADMIN — METOPROLOL TARTRATE 25 MG: 25 TABLET, FILM COATED ORAL at 20:09

## 2021-01-28 RX ADMIN — ALLOPURINOL 100 MG: 100 TABLET ORAL at 08:48

## 2021-01-29 LAB
ALBUMIN SERPL-MCNC: 3.8 G/DL (ref 3.5–5.2)
ALBUMIN/GLOB SERPL: 1.1 G/DL
ALP SERPL-CCNC: 63 U/L (ref 39–117)
ALT SERPL W P-5'-P-CCNC: 28 U/L (ref 1–41)
ANION GAP SERPL CALCULATED.3IONS-SCNC: 10 MMOL/L (ref 5–15)
AST SERPL-CCNC: 31 U/L (ref 1–40)
BASOPHILS # BLD AUTO: 0 10*3/MM3 (ref 0–0.2)
BASOPHILS NFR BLD AUTO: 0.3 % (ref 0–1.5)
BILIRUB SERPL-MCNC: 0.3 MG/DL (ref 0–1.2)
BUN SERPL-MCNC: 24 MG/DL (ref 8–23)
BUN/CREAT SERPL: 24.5 (ref 7–25)
CALCIUM SPEC-SCNC: 9.4 MG/DL (ref 8.6–10.5)
CHLORIDE SERPL-SCNC: 106 MMOL/L (ref 98–107)
CO2 SERPL-SCNC: 28 MMOL/L (ref 22–29)
CREAT SERPL-MCNC: 0.98 MG/DL (ref 0.76–1.27)
DEPRECATED RDW RBC AUTO: 44.6 FL (ref 37–54)
EOSINOPHIL # BLD AUTO: 0.3 10*3/MM3 (ref 0–0.4)
EOSINOPHIL NFR BLD AUTO: 3 % (ref 0.3–6.2)
ERYTHROCYTE [DISTWIDTH] IN BLOOD BY AUTOMATED COUNT: 14.7 % (ref 12.3–15.4)
GFR SERPL CREATININE-BSD FRML MDRD: 77 ML/MIN/1.73
GLOBULIN UR ELPH-MCNC: 3.5 GM/DL
GLUCOSE BLDC GLUCOMTR-MCNC: 127 MG/DL (ref 70–105)
GLUCOSE BLDC GLUCOMTR-MCNC: 143 MG/DL (ref 70–105)
GLUCOSE BLDC GLUCOMTR-MCNC: 165 MG/DL (ref 70–105)
GLUCOSE BLDC GLUCOMTR-MCNC: 97 MG/DL (ref 70–105)
GLUCOSE SERPL-MCNC: 101 MG/DL (ref 65–99)
HCT VFR BLD AUTO: 42 % (ref 37.5–51)
HGB BLD-MCNC: 14.2 G/DL (ref 13–17.7)
LYMPHOCYTES # BLD AUTO: 1.3 10*3/MM3 (ref 0.7–3.1)
LYMPHOCYTES NFR BLD AUTO: 14 % (ref 19.6–45.3)
MAGNESIUM SERPL-MCNC: 1.7 MG/DL (ref 1.6–2.4)
MCH RBC QN AUTO: 29.2 PG (ref 26.6–33)
MCHC RBC AUTO-ENTMCNC: 33.8 G/DL (ref 31.5–35.7)
MCV RBC AUTO: 86.3 FL (ref 79–97)
MONOCYTES # BLD AUTO: 0.9 10*3/MM3 (ref 0.1–0.9)
MONOCYTES NFR BLD AUTO: 9.5 % (ref 5–12)
NEUTROPHILS NFR BLD AUTO: 6.7 10*3/MM3 (ref 1.7–7)
NEUTROPHILS NFR BLD AUTO: 73.2 % (ref 42.7–76)
NRBC BLD AUTO-RTO: 0.1 /100 WBC (ref 0–0.2)
PHOSPHATE SERPL-MCNC: 3.4 MG/DL (ref 2.5–4.5)
PLATELET # BLD AUTO: 146 10*3/MM3 (ref 140–450)
PMV BLD AUTO: 8.8 FL (ref 6–12)
POTASSIUM SERPL-SCNC: 4.1 MMOL/L (ref 3.5–5.2)
PROT SERPL-MCNC: 7.3 G/DL (ref 6–8.5)
QT INTERVAL: 419 MS
RBC # BLD AUTO: 4.87 10*6/MM3 (ref 4.14–5.8)
SODIUM SERPL-SCNC: 144 MMOL/L (ref 136–145)
WBC # BLD AUTO: 9.2 10*3/MM3 (ref 3.4–10.8)

## 2021-01-29 PROCEDURE — 85025 COMPLETE CBC W/AUTO DIFF WBC: CPT | Performed by: NURSE PRACTITIONER

## 2021-01-29 PROCEDURE — 97530 THERAPEUTIC ACTIVITIES: CPT

## 2021-01-29 PROCEDURE — 99223 1ST HOSP IP/OBS HIGH 75: CPT | Performed by: INTERNAL MEDICINE

## 2021-01-29 PROCEDURE — 84100 ASSAY OF PHOSPHORUS: CPT | Performed by: NURSE PRACTITIONER

## 2021-01-29 PROCEDURE — 63710000001 INSULIN LISPRO (HUMAN) PER 5 UNITS: Performed by: NURSE PRACTITIONER

## 2021-01-29 PROCEDURE — 92507 TX SP LANG VOICE COMM INDIV: CPT

## 2021-01-29 PROCEDURE — 25010000002 ONDANSETRON PER 1 MG: Performed by: NURSE PRACTITIONER

## 2021-01-29 PROCEDURE — 97112 NEUROMUSCULAR REEDUCATION: CPT

## 2021-01-29 PROCEDURE — 80053 COMPREHEN METABOLIC PANEL: CPT | Performed by: NURSE PRACTITIONER

## 2021-01-29 PROCEDURE — 99232 SBSQ HOSP IP/OBS MODERATE 35: CPT | Performed by: NEUROLOGICAL SURGERY

## 2021-01-29 PROCEDURE — 83735 ASSAY OF MAGNESIUM: CPT | Performed by: NURSE PRACTITIONER

## 2021-01-29 PROCEDURE — 82962 GLUCOSE BLOOD TEST: CPT

## 2021-01-29 RX ORDER — LOSARTAN POTASSIUM 50 MG/1
100 TABLET ORAL
Status: DISCONTINUED | OUTPATIENT
Start: 2021-01-29 | End: 2021-02-04 | Stop reason: HOSPADM

## 2021-01-29 RX ADMIN — DILTIAZEM HYDROCHLORIDE 180 MG: 180 CAPSULE, COATED, EXTENDED RELEASE ORAL at 08:36

## 2021-01-29 RX ADMIN — METOPROLOL TARTRATE 25 MG: 25 TABLET, FILM COATED ORAL at 20:36

## 2021-01-29 RX ADMIN — Medication 10 ML: at 20:36

## 2021-01-29 RX ADMIN — ONDANSETRON 4 MG: 2 INJECTION, SOLUTION INTRAMUSCULAR; INTRAVENOUS at 15:21

## 2021-01-29 RX ADMIN — LOSARTAN POTASSIUM 100 MG: 50 TABLET, FILM COATED ORAL at 11:28

## 2021-01-29 RX ADMIN — METOPROLOL TARTRATE 25 MG: 25 TABLET, FILM COATED ORAL at 08:36

## 2021-01-29 RX ADMIN — ATORVASTATIN CALCIUM 10 MG: 10 TABLET, FILM COATED ORAL at 08:36

## 2021-01-29 RX ADMIN — LABETALOL 20 MG/4 ML (5 MG/ML) INTRAVENOUS SYRINGE 10 MG: at 01:07

## 2021-01-29 RX ADMIN — ALLOPURINOL 100 MG: 100 TABLET ORAL at 08:36

## 2021-01-29 RX ADMIN — LABETALOL 20 MG/4 ML (5 MG/ML) INTRAVENOUS SYRINGE 10 MG: at 22:29

## 2021-01-29 RX ADMIN — SODIUM CHLORIDE 5 MG/HR: 9 INJECTION, SOLUTION INTRAVENOUS at 12:33

## 2021-01-29 RX ADMIN — LABETALOL 20 MG/4 ML (5 MG/ML) INTRAVENOUS SYRINGE 10 MG: at 03:23

## 2021-01-29 RX ADMIN — INSULIN LISPRO 2 UNITS: 100 INJECTION, SOLUTION INTRAVENOUS; SUBCUTANEOUS at 17:14

## 2021-01-29 RX ADMIN — LABETALOL 20 MG/4 ML (5 MG/ML) INTRAVENOUS SYRINGE 10 MG: at 10:27

## 2021-01-30 LAB
ALBUMIN SERPL-MCNC: 4 G/DL (ref 3.5–5.2)
ALBUMIN/GLOB SERPL: 1.3 G/DL
ALP SERPL-CCNC: 69 U/L (ref 39–117)
ALT SERPL W P-5'-P-CCNC: 40 U/L (ref 1–41)
ANION GAP SERPL CALCULATED.3IONS-SCNC: 8 MMOL/L (ref 5–15)
AST SERPL-CCNC: 40 U/L (ref 1–40)
BASOPHILS # BLD AUTO: 0 10*3/MM3 (ref 0–0.2)
BASOPHILS NFR BLD AUTO: 0.4 % (ref 0–1.5)
BILIRUB SERPL-MCNC: 0.5 MG/DL (ref 0–1.2)
BUN SERPL-MCNC: 25 MG/DL (ref 8–23)
BUN/CREAT SERPL: 26 (ref 7–25)
CALCIUM SPEC-SCNC: 9.6 MG/DL (ref 8.6–10.5)
CHLORIDE SERPL-SCNC: 107 MMOL/L (ref 98–107)
CO2 SERPL-SCNC: 27 MMOL/L (ref 22–29)
CREAT SERPL-MCNC: 0.96 MG/DL (ref 0.76–1.27)
DEPRECATED RDW RBC AUTO: 45.5 FL (ref 37–54)
EOSINOPHIL # BLD AUTO: 0.4 10*3/MM3 (ref 0–0.4)
EOSINOPHIL NFR BLD AUTO: 3.8 % (ref 0.3–6.2)
ERYTHROCYTE [DISTWIDTH] IN BLOOD BY AUTOMATED COUNT: 14.9 % (ref 12.3–15.4)
GFR SERPL CREATININE-BSD FRML MDRD: 79 ML/MIN/1.73
GLOBULIN UR ELPH-MCNC: 3 GM/DL
GLUCOSE BLDC GLUCOMTR-MCNC: 119 MG/DL (ref 70–105)
GLUCOSE BLDC GLUCOMTR-MCNC: 128 MG/DL (ref 70–105)
GLUCOSE BLDC GLUCOMTR-MCNC: 97 MG/DL (ref 70–105)
GLUCOSE BLDC GLUCOMTR-MCNC: 99 MG/DL (ref 70–105)
GLUCOSE SERPL-MCNC: 99 MG/DL (ref 65–99)
HCT VFR BLD AUTO: 42.9 % (ref 37.5–51)
HGB BLD-MCNC: 14.5 G/DL (ref 13–17.7)
LYMPHOCYTES # BLD AUTO: 1.5 10*3/MM3 (ref 0.7–3.1)
LYMPHOCYTES NFR BLD AUTO: 16.6 % (ref 19.6–45.3)
MAGNESIUM SERPL-MCNC: 1.9 MG/DL (ref 1.6–2.4)
MCH RBC QN AUTO: 29.2 PG (ref 26.6–33)
MCHC RBC AUTO-ENTMCNC: 33.8 G/DL (ref 31.5–35.7)
MCV RBC AUTO: 86.5 FL (ref 79–97)
MONOCYTES # BLD AUTO: 0.8 10*3/MM3 (ref 0.1–0.9)
MONOCYTES NFR BLD AUTO: 8.8 % (ref 5–12)
NEUTROPHILS NFR BLD AUTO: 6.5 10*3/MM3 (ref 1.7–7)
NEUTROPHILS NFR BLD AUTO: 70.4 % (ref 42.7–76)
NRBC BLD AUTO-RTO: 0.3 /100 WBC (ref 0–0.2)
PHOSPHATE SERPL-MCNC: 4 MG/DL (ref 2.5–4.5)
PLATELET # BLD AUTO: 151 10*3/MM3 (ref 140–450)
PMV BLD AUTO: 9 FL (ref 6–12)
POTASSIUM SERPL-SCNC: 4.3 MMOL/L (ref 3.5–5.2)
PROT SERPL-MCNC: 7 G/DL (ref 6–8.5)
RBC # BLD AUTO: 4.96 10*6/MM3 (ref 4.14–5.8)
SODIUM SERPL-SCNC: 142 MMOL/L (ref 136–145)
WBC # BLD AUTO: 9.3 10*3/MM3 (ref 3.4–10.8)

## 2021-01-30 PROCEDURE — 99231 SBSQ HOSP IP/OBS SF/LOW 25: CPT | Performed by: NEUROLOGICAL SURGERY

## 2021-01-30 PROCEDURE — 84100 ASSAY OF PHOSPHORUS: CPT | Performed by: NURSE PRACTITIONER

## 2021-01-30 PROCEDURE — 85025 COMPLETE CBC W/AUTO DIFF WBC: CPT | Performed by: NURSE PRACTITIONER

## 2021-01-30 PROCEDURE — 82962 GLUCOSE BLOOD TEST: CPT

## 2021-01-30 PROCEDURE — 83735 ASSAY OF MAGNESIUM: CPT | Performed by: NURSE PRACTITIONER

## 2021-01-30 PROCEDURE — 80053 COMPREHEN METABOLIC PANEL: CPT | Performed by: NURSE PRACTITIONER

## 2021-01-30 PROCEDURE — 99232 SBSQ HOSP IP/OBS MODERATE 35: CPT | Performed by: INTERNAL MEDICINE

## 2021-01-30 RX ADMIN — LOSARTAN POTASSIUM 100 MG: 50 TABLET, FILM COATED ORAL at 08:21

## 2021-01-30 RX ADMIN — ALLOPURINOL 100 MG: 100 TABLET ORAL at 08:22

## 2021-01-30 RX ADMIN — ATORVASTATIN CALCIUM 10 MG: 10 TABLET, FILM COATED ORAL at 08:21

## 2021-01-30 RX ADMIN — METOPROLOL TARTRATE 25 MG: 25 TABLET, FILM COATED ORAL at 20:00

## 2021-01-30 RX ADMIN — METOPROLOL TARTRATE 25 MG: 25 TABLET, FILM COATED ORAL at 08:22

## 2021-01-30 RX ADMIN — Medication 10 ML: at 08:22

## 2021-01-30 RX ADMIN — DILTIAZEM HYDROCHLORIDE 180 MG: 180 CAPSULE, COATED, EXTENDED RELEASE ORAL at 08:21

## 2021-01-31 LAB
ALBUMIN SERPL-MCNC: 4 G/DL (ref 3.5–5.2)
ALBUMIN/GLOB SERPL: 1.1 G/DL
ALP SERPL-CCNC: 70 U/L (ref 39–117)
ALT SERPL W P-5'-P-CCNC: 41 U/L (ref 1–41)
ANION GAP SERPL CALCULATED.3IONS-SCNC: 11 MMOL/L (ref 5–15)
AST SERPL-CCNC: 37 U/L (ref 1–40)
BASOPHILS # BLD AUTO: 0 10*3/MM3 (ref 0–0.2)
BASOPHILS NFR BLD AUTO: 0.5 % (ref 0–1.5)
BILIRUB SERPL-MCNC: 0.6 MG/DL (ref 0–1.2)
BUN SERPL-MCNC: 25 MG/DL (ref 8–23)
BUN/CREAT SERPL: 25.8 (ref 7–25)
CALCIUM SPEC-SCNC: 9.7 MG/DL (ref 8.6–10.5)
CHLORIDE SERPL-SCNC: 107 MMOL/L (ref 98–107)
CO2 SERPL-SCNC: 24 MMOL/L (ref 22–29)
CREAT SERPL-MCNC: 0.97 MG/DL (ref 0.76–1.27)
DEPRECATED RDW RBC AUTO: 43.3 FL (ref 37–54)
EOSINOPHIL # BLD AUTO: 0.3 10*3/MM3 (ref 0–0.4)
EOSINOPHIL NFR BLD AUTO: 3.4 % (ref 0.3–6.2)
ERYTHROCYTE [DISTWIDTH] IN BLOOD BY AUTOMATED COUNT: 14.4 % (ref 12.3–15.4)
GFR SERPL CREATININE-BSD FRML MDRD: 78 ML/MIN/1.73
GLOBULIN UR ELPH-MCNC: 3.5 GM/DL
GLUCOSE BLDC GLUCOMTR-MCNC: 106 MG/DL (ref 70–105)
GLUCOSE BLDC GLUCOMTR-MCNC: 119 MG/DL (ref 70–105)
GLUCOSE BLDC GLUCOMTR-MCNC: 123 MG/DL (ref 70–105)
GLUCOSE BLDC GLUCOMTR-MCNC: 139 MG/DL (ref 70–105)
GLUCOSE SERPL-MCNC: 98 MG/DL (ref 65–99)
HCT VFR BLD AUTO: 43.5 % (ref 37.5–51)
HGB BLD-MCNC: 14.7 G/DL (ref 13–17.7)
LYMPHOCYTES # BLD AUTO: 1.5 10*3/MM3 (ref 0.7–3.1)
LYMPHOCYTES NFR BLD AUTO: 15.5 % (ref 19.6–45.3)
MAGNESIUM SERPL-MCNC: 2 MG/DL (ref 1.6–2.4)
MCH RBC QN AUTO: 28.9 PG (ref 26.6–33)
MCHC RBC AUTO-ENTMCNC: 33.7 G/DL (ref 31.5–35.7)
MCV RBC AUTO: 85.7 FL (ref 79–97)
MONOCYTES # BLD AUTO: 0.9 10*3/MM3 (ref 0.1–0.9)
MONOCYTES NFR BLD AUTO: 8.9 % (ref 5–12)
NEUTROPHILS NFR BLD AUTO: 7 10*3/MM3 (ref 1.7–7)
NEUTROPHILS NFR BLD AUTO: 71.7 % (ref 42.7–76)
NRBC BLD AUTO-RTO: 0.1 /100 WBC (ref 0–0.2)
PHOSPHATE SERPL-MCNC: 3.2 MG/DL (ref 2.5–4.5)
PLATELET # BLD AUTO: 157 10*3/MM3 (ref 140–450)
PMV BLD AUTO: 8.8 FL (ref 6–12)
POTASSIUM SERPL-SCNC: 3.9 MMOL/L (ref 3.5–5.2)
PROT SERPL-MCNC: 7.5 G/DL (ref 6–8.5)
RBC # BLD AUTO: 5.08 10*6/MM3 (ref 4.14–5.8)
SODIUM SERPL-SCNC: 142 MMOL/L (ref 136–145)
WBC # BLD AUTO: 9.7 10*3/MM3 (ref 3.4–10.8)

## 2021-01-31 PROCEDURE — 84100 ASSAY OF PHOSPHORUS: CPT | Performed by: NURSE PRACTITIONER

## 2021-01-31 PROCEDURE — 97110 THERAPEUTIC EXERCISES: CPT

## 2021-01-31 PROCEDURE — 80053 COMPREHEN METABOLIC PANEL: CPT | Performed by: NURSE PRACTITIONER

## 2021-01-31 PROCEDURE — 25010000002 HYDRALAZINE PER 20 MG: Performed by: FAMILY MEDICINE

## 2021-01-31 PROCEDURE — 82962 GLUCOSE BLOOD TEST: CPT

## 2021-01-31 PROCEDURE — 85025 COMPLETE CBC W/AUTO DIFF WBC: CPT | Performed by: NURSE PRACTITIONER

## 2021-01-31 PROCEDURE — 83735 ASSAY OF MAGNESIUM: CPT | Performed by: NURSE PRACTITIONER

## 2021-01-31 PROCEDURE — 99232 SBSQ HOSP IP/OBS MODERATE 35: CPT | Performed by: INTERNAL MEDICINE

## 2021-01-31 RX ORDER — HYDRALAZINE HYDROCHLORIDE 25 MG/1
25 TABLET, FILM COATED ORAL EVERY 4 HOURS PRN
Status: DISCONTINUED | OUTPATIENT
Start: 2021-01-31 | End: 2021-01-31 | Stop reason: ALTCHOICE

## 2021-01-31 RX ORDER — HYDRALAZINE HYDROCHLORIDE 20 MG/ML
25 INJECTION INTRAMUSCULAR; INTRAVENOUS EVERY 4 HOURS PRN
Status: DISCONTINUED | OUTPATIENT
Start: 2021-01-31 | End: 2021-02-03

## 2021-01-31 RX ORDER — LABETALOL HYDROCHLORIDE 5 MG/ML
25 INJECTION, SOLUTION INTRAVENOUS EVERY 4 HOURS PRN
Status: DISCONTINUED | OUTPATIENT
Start: 2021-01-31 | End: 2021-01-31

## 2021-01-31 RX ADMIN — LABETALOL 20 MG/4 ML (5 MG/ML) INTRAVENOUS SYRINGE 10 MG: at 01:42

## 2021-01-31 RX ADMIN — METOPROLOL TARTRATE 25 MG: 25 TABLET, FILM COATED ORAL at 20:06

## 2021-01-31 RX ADMIN — DILTIAZEM HYDROCHLORIDE 180 MG: 180 CAPSULE, COATED, EXTENDED RELEASE ORAL at 08:57

## 2021-01-31 RX ADMIN — LABETALOL 20 MG/4 ML (5 MG/ML) INTRAVENOUS SYRINGE 10 MG: at 04:57

## 2021-01-31 RX ADMIN — HYDRALAZINE HYDROCHLORIDE 25 MG: 20 INJECTION INTRAMUSCULAR; INTRAVENOUS at 03:13

## 2021-01-31 RX ADMIN — Medication 10 ML: at 20:06

## 2021-01-31 RX ADMIN — Medication 10 ML: at 08:57

## 2021-01-31 RX ADMIN — METOPROLOL TARTRATE 25 MG: 25 TABLET, FILM COATED ORAL at 08:56

## 2021-01-31 RX ADMIN — LOSARTAN POTASSIUM 100 MG: 50 TABLET, FILM COATED ORAL at 08:57

## 2021-01-31 RX ADMIN — ATORVASTATIN CALCIUM 10 MG: 10 TABLET, FILM COATED ORAL at 08:57

## 2021-01-31 RX ADMIN — ALLOPURINOL 100 MG: 100 TABLET ORAL at 08:56

## 2021-02-01 ENCOUNTER — APPOINTMENT (OUTPATIENT)
Dept: GENERAL RADIOLOGY | Facility: HOSPITAL | Age: 66
End: 2021-02-01

## 2021-02-01 LAB
ALBUMIN SERPL-MCNC: 4 G/DL (ref 3.5–5.2)
ALBUMIN/GLOB SERPL: 1.2 G/DL
ALP SERPL-CCNC: 67 U/L (ref 39–117)
ALT SERPL W P-5'-P-CCNC: 37 U/L (ref 1–41)
ANION GAP SERPL CALCULATED.3IONS-SCNC: 10 MMOL/L (ref 5–15)
AST SERPL-CCNC: 31 U/L (ref 1–40)
BASOPHILS # BLD AUTO: 0 10*3/MM3 (ref 0–0.2)
BASOPHILS NFR BLD AUTO: 0.4 % (ref 0–1.5)
BILIRUB SERPL-MCNC: 0.9 MG/DL (ref 0–1.2)
BUN SERPL-MCNC: 29 MG/DL (ref 8–23)
BUN/CREAT SERPL: 31.2 (ref 7–25)
CALCIUM SPEC-SCNC: 9.5 MG/DL (ref 8.6–10.5)
CHLORIDE SERPL-SCNC: 107 MMOL/L (ref 98–107)
CO2 SERPL-SCNC: 24 MMOL/L (ref 22–29)
CREAT SERPL-MCNC: 0.93 MG/DL (ref 0.76–1.27)
DEPRECATED RDW RBC AUTO: 44.6 FL (ref 37–54)
EOSINOPHIL # BLD AUTO: 0.3 10*3/MM3 (ref 0–0.4)
EOSINOPHIL NFR BLD AUTO: 3.3 % (ref 0.3–6.2)
ERYTHROCYTE [DISTWIDTH] IN BLOOD BY AUTOMATED COUNT: 14.8 % (ref 12.3–15.4)
GFR SERPL CREATININE-BSD FRML MDRD: 82 ML/MIN/1.73
GLOBULIN UR ELPH-MCNC: 3.3 GM/DL
GLUCOSE BLDC GLUCOMTR-MCNC: 121 MG/DL (ref 70–105)
GLUCOSE BLDC GLUCOMTR-MCNC: 122 MG/DL (ref 70–105)
GLUCOSE BLDC GLUCOMTR-MCNC: 135 MG/DL (ref 70–105)
GLUCOSE BLDC GLUCOMTR-MCNC: 92 MG/DL (ref 70–105)
GLUCOSE BLDC GLUCOMTR-MCNC: 97 MG/DL (ref 70–105)
GLUCOSE BLDC GLUCOMTR-MCNC: 99 MG/DL (ref 70–105)
GLUCOSE SERPL-MCNC: 94 MG/DL (ref 65–99)
HCT VFR BLD AUTO: 45.7 % (ref 37.5–51)
HGB BLD-MCNC: 15.2 G/DL (ref 13–17.7)
LYMPHOCYTES # BLD AUTO: 1.5 10*3/MM3 (ref 0.7–3.1)
LYMPHOCYTES NFR BLD AUTO: 15.6 % (ref 19.6–45.3)
MAGNESIUM SERPL-MCNC: 2.1 MG/DL (ref 1.6–2.4)
MCH RBC QN AUTO: 28.9 PG (ref 26.6–33)
MCHC RBC AUTO-ENTMCNC: 33.2 G/DL (ref 31.5–35.7)
MCV RBC AUTO: 86.9 FL (ref 79–97)
MONOCYTES # BLD AUTO: 1.1 10*3/MM3 (ref 0.1–0.9)
MONOCYTES NFR BLD AUTO: 11.1 % (ref 5–12)
NEUTROPHILS NFR BLD AUTO: 6.6 10*3/MM3 (ref 1.7–7)
NEUTROPHILS NFR BLD AUTO: 69.6 % (ref 42.7–76)
NRBC BLD AUTO-RTO: 0.1 /100 WBC (ref 0–0.2)
PHOSPHATE SERPL-MCNC: 3.9 MG/DL (ref 2.5–4.5)
PLATELET # BLD AUTO: 151 10*3/MM3 (ref 140–450)
PMV BLD AUTO: 9.4 FL (ref 6–12)
POTASSIUM SERPL-SCNC: 3.7 MMOL/L (ref 3.5–5.2)
PROT SERPL-MCNC: 7.3 G/DL (ref 6–8.5)
RBC # BLD AUTO: 5.26 10*6/MM3 (ref 4.14–5.8)
SODIUM SERPL-SCNC: 141 MMOL/L (ref 136–145)
T4 FREE SERPL-MCNC: 1.33 NG/DL (ref 0.93–1.7)
TSH SERPL DL<=0.05 MIU/L-ACNC: 2.56 UIU/ML (ref 0.27–4.2)
WBC # BLD AUTO: 9.5 10*3/MM3 (ref 3.4–10.8)

## 2021-02-01 PROCEDURE — 83735 ASSAY OF MAGNESIUM: CPT | Performed by: NURSE PRACTITIONER

## 2021-02-01 PROCEDURE — 99232 SBSQ HOSP IP/OBS MODERATE 35: CPT | Performed by: INTERNAL MEDICINE

## 2021-02-01 PROCEDURE — 63710000001 ONDANSETRON PER 8 MG: Performed by: NURSE PRACTITIONER

## 2021-02-01 PROCEDURE — 84443 ASSAY THYROID STIM HORMONE: CPT | Performed by: FAMILY MEDICINE

## 2021-02-01 PROCEDURE — 84100 ASSAY OF PHOSPHORUS: CPT | Performed by: NURSE PRACTITIONER

## 2021-02-01 PROCEDURE — 84439 ASSAY OF FREE THYROXINE: CPT | Performed by: FAMILY MEDICINE

## 2021-02-01 PROCEDURE — 25010000002 HYDRALAZINE PER 20 MG: Performed by: FAMILY MEDICINE

## 2021-02-01 PROCEDURE — 85025 COMPLETE CBC W/AUTO DIFF WBC: CPT | Performed by: NURSE PRACTITIONER

## 2021-02-01 PROCEDURE — 80053 COMPREHEN METABOLIC PANEL: CPT | Performed by: NURSE PRACTITIONER

## 2021-02-01 PROCEDURE — 82962 GLUCOSE BLOOD TEST: CPT

## 2021-02-01 PROCEDURE — 74018 RADEX ABDOMEN 1 VIEW: CPT

## 2021-02-01 RX ADMIN — DILTIAZEM HYDROCHLORIDE 180 MG: 180 CAPSULE, COATED, EXTENDED RELEASE ORAL at 08:42

## 2021-02-01 RX ADMIN — Medication 10 ML: at 08:42

## 2021-02-01 RX ADMIN — ATORVASTATIN CALCIUM 10 MG: 10 TABLET, FILM COATED ORAL at 08:42

## 2021-02-01 RX ADMIN — NITROGLYCERIN 0.4 MG: 0.4 TABLET, ORALLY DISINTEGRATING SUBLINGUAL at 08:42

## 2021-02-01 RX ADMIN — LOSARTAN POTASSIUM 100 MG: 50 TABLET, FILM COATED ORAL at 08:42

## 2021-02-01 RX ADMIN — ALLOPURINOL 100 MG: 100 TABLET ORAL at 08:42

## 2021-02-01 RX ADMIN — ONDANSETRON HYDROCHLORIDE 4 MG: 4 TABLET, FILM COATED ORAL at 08:42

## 2021-02-01 RX ADMIN — METOPROLOL TARTRATE 25 MG: 25 TABLET, FILM COATED ORAL at 20:04

## 2021-02-01 RX ADMIN — HYDRALAZINE HYDROCHLORIDE 25 MG: 20 INJECTION INTRAMUSCULAR; INTRAVENOUS at 06:11

## 2021-02-01 RX ADMIN — SODIUM CHLORIDE 5 MG/HR: 9 INJECTION, SOLUTION INTRAVENOUS at 08:16

## 2021-02-01 RX ADMIN — Medication 10 ML: at 20:04

## 2021-02-01 RX ADMIN — METOPROLOL TARTRATE 25 MG: 25 TABLET, FILM COATED ORAL at 08:42

## 2021-02-01 NOTE — PLAN OF CARE
Goal Outcome Evaluation:      Patient alert and oriented x2, and currently up in chair maintaining fall precautions, skin has areas of psoriasis throughout. Patient with elevated blood pressure this morning, with po meds not being effective. Patient started back on Cardene 5mg/hr with blood pressures trending down at this time.     Problem: Fall Injury Risk  Goal: Absence of Fall and Fall-Related Injury  Outcome: Ongoing, Progressing  Intervention: Identify and Manage Contributors to Fall Injury Risk  Recent Flowsheet Documentation  Taken 2/1/2021 1600 by Radha Kiser RN  Medication Review/Management: medications reviewed  Taken 2/1/2021 1400 by Radha iKser RN  Medication Review/Management: medications reviewed  Taken 2/1/2021 1200 by Radha Kiser RN  Medication Review/Management: medications reviewed  Taken 2/1/2021 1000 by Radha Kiser RN  Medication Review/Management: medications reviewed  Taken 2/1/2021 0800 by Radha Kiser RN  Medication Review/Management: medications reviewed  Intervention: Promote Injury-Free Environment  Recent Flowsheet Documentation  Taken 2/1/2021 1600 by Margi, Radha, RN  Safety Promotion/Fall Prevention:   safety round/check completed   room organization consistent   nonskid shoes/slippers when out of bed   fall prevention program maintained   clutter free environment maintained   assistive device/personal items within reach   activity supervised  Taken 2/1/2021 1400 by Margi, Radha, RN  Safety Promotion/Fall Prevention:   safety round/check completed   room organization consistent   nonskid shoes/slippers when out of bed   fall prevention program maintained   clutter free environment maintained   assistive device/personal items within reach   activity supervised  Taken 2/1/2021 1200 by [place], Radha, RN  Safety Promotion/Fall Prevention:   safety round/check completed   room organization consistent   nonskid shoes/slippers  when out of bed   fall prevention program maintained   clutter free environment maintained   assistive device/personal items within reach   activity supervised  Taken 2/1/2021 1000 by [place], Radha, RN  Safety Promotion/Fall Prevention:   safety round/check completed   room organization consistent   nonskid shoes/slippers when out of bed   fall prevention program maintained   clutter free environment maintained   assistive device/personal items within reach   activity supervised  Taken 2/1/2021 0800 by Radha Kiser RN  Safety Promotion/Fall Prevention:   safety round/check completed   room organization consistent   nonskid shoes/slippers when out of bed   fall prevention program maintained   clutter free environment maintained   assistive device/personal items within reach   activity supervised     Problem: Skin Injury Risk Increased  Goal: Skin Health and Integrity  Outcome: Ongoing, Progressing  Intervention: Optimize Skin Protection  Recent Flowsheet Documentation  Taken 2/1/2021 0800 by Radha Kiser, RN  Pressure Reduction Techniques: frequent weight shift encouraged  Pressure Reduction Devices: pressure-redistributing mattress utilized  Skin Protection:   adhesive use limited   incontinence pads utilized

## 2021-02-01 NOTE — PROGRESS NOTES
"PULMONARY CRITICAL CARE Progress  NOTE      PATIENT IDENTIFICATION:  Name: Carlos Whipple  MRN: [id number]  :  1955     Age: 65 y.o.  Sex: male    DATE OF Note:  2021   Referring Physician: Kevin Barfield MD                  Subjective:   Feeling better,  no SOB no chest pain, no nausea or vomiting, no change in bowel habit, no dysuria,  no new  skin rash or itching.  BP still elevated, HR more stable today     Objective:  tMax 24 hrs: Temp (24hrs), Av.1 °F (36.7 °C), Min:97.9 °F (36.6 °C), Max:98.3 °F (36.8 °C)      Vitals Ranges:   Temp:  [97.9 °F (36.6 °C)-98.3 °F (36.8 °C)] 98.1 °F (36.7 °C)  Heart Rate:  [70-84] 75  Resp:  [14-20] 20  BP: (149-160)/() 156/113    Intake and Output Last 3 Shifts:   I/O last 3 completed shifts:  In: 832 [P.O.:832]  Out: 775 [Urine:775]    Exam:  BP (!) 156/113 (BP Location: Left arm, Patient Position: Lying)   Pulse 75   Temp 98.1 °F (36.7 °C) (Axillary)   Resp 20   Ht 182.9 cm (72.01\")   Wt 115 kg (253 lb 1.6 oz)   SpO2 94%   BMI 34.32 kg/m²     General Appearance: Alert   HEENT:  Normocephalic, without obvious abnormality, Conjunctiva/corneas clear,.  Normal external ear canals, Nares normal, no drainage     Neck:  Supple, symmetrical, trachea midline. No JVD.  Lungs /Chest wall:   Bilateral basal rhonchi, respirations unlabored symmetrical wall movement.     Heart:  Regular rate and rhythm, systolic murmur PMI left sternal border  Abdomen: Soft, non-tender, no masses, no organomegaly.    Extremities: Trace edema no clubbing or Cyanosis        Medications:    Current Facility-Administered Medications:   •  acetaminophen (TYLENOL) tablet 650 mg, 650 mg, Oral, Q4H PRN **OR** acetaminophen (TYLENOL) suppository 650 mg, 650 mg, Rectal, Q4H PRN, Jose Sosa APRN  •  allopurinol (ZYLOPRIM) tablet 100 mg, 100 mg, Oral, Daily, Jose Sosa APRN, 100 mg at 21 0842  •  aluminum-magnesium hydroxide-simethicone (MAALOX MAX) 400-400-40 MG/5ML " suspension 15 mL, 15 mL, Oral, Q6H PRN, Jose Sosa, APRN  •  atorvastatin (LIPITOR) tablet 10 mg, 10 mg, Oral, Daily, Jose Sosa APRN, 10 mg at 02/01/21 0842  •  dextrose (D50W) 25 g/ 50mL Intravenous Solution 25 g, 25 g, Intravenous, Q15 Min PRN, Jose Sosa, APRN  •  dextrose (GLUTOSE) oral gel 15 g, 15 g, Oral, Q15 Min PRN, Jose Sosa, APRN  •  dilTIAZem (CARDIZEM) 100 mg in 100 mL NS infusion (ADV), 5-15 mg/hr, Intravenous, Titrated, Jose Sosa APRN, Stopped at 01/27/21 1000  •  dilTIAZem CD (CARDIZEM CD) 24 hr capsule 180 mg, 180 mg, Oral, Q24H, Draw, MD Kevin, 180 mg at 02/01/21 0842  •  glucagon (human recombinant) (GLUCAGEN DIAGNOSTIC) injection 1 mg, 1 mg, Subcutaneous, Q15 Min PRN, Jose Sosa, APRN  •  hydrALAZINE (APRESOLINE) injection 25 mg, 25 mg, Intravenous, Q4H PRN, Lisa Kohli MD, 25 mg at 02/01/21 0611  •  insulin lispro (humaLOG, ADMELOG) injection 0-7 Units, 0-7 Units, Subcutaneous, Q6H, 2 Units at 01/29/21 1714 **AND** insulin lispro (humaLOG, ADMELOG) injection 0-7 Units, 0-7 Units, Subcutaneous, PRN, Jose Sosa, APRN  •  ipratropium-albuterol (DUO-NEB) nebulizer solution 3 mL, 3 mL, Nebulization, Q2H PRN, Jose Sosa, APRN  •  labetalol (NORMODYNE,TRANDATE) injection 10 mg, 10 mg, Intravenous, Q2H PRN, Jose Sosa, APRN, 10 mg at 01/31/21 0457  •  losartan (COZAAR) tablet 100 mg, 100 mg, Oral, Q24H, Edy Thomas MD, 100 mg at 02/01/21 0842  •  metoprolol tartrate (LOPRESSOR) tablet 25 mg, 25 mg, Oral, Q12H, Dre Stauffer MD, 25 mg at 02/01/21 0842  •  niCARdipine (CARDENE) 25 mg in 250 mL NS infusion (VTB), 5-15 mg/hr, Intravenous, Titrated, Edy Thomas MD, Last Rate: 50 mL/hr at 02/01/21 0816, 5 mg/hr at 02/01/21 0816  •  nitroglycerin (NITROSTAT) SL tablet 0.4 mg, 0.4 mg, Sublingual, Q5 Min PRN, Jose Sosa, APRCRISTIAN, 0.4 mg at 02/01/21 0842  •  ondansetron (ZOFRAN) tablet 4 mg, 4 mg, Oral, Q6H PRN, 4 mg at 02/01/21 0842 **OR**  ondansetron (ZOFRAN) injection 4 mg, 4 mg, Intravenous, Q6H PRN, Jose Sosa, APRN, 4 mg at 01/29/21 1521  •  [COMPLETED] Insert peripheral IV, , , Once **AND** sodium chloride 0.9 % flush 10 mL, 10 mL, Intravenous, PRN, HotPete morales, DO, 10 mL at 02/01/21 0842    Data Review:  All labs (24hrs):   Recent Results (from the past 24 hour(s))   POC Glucose Once    Collection Time: 01/31/21  1:35 PM    Specimen: Blood   Result Value Ref Range    Glucose 139 (H) 70 - 105 mg/dL   POC Glucose Once    Collection Time: 01/31/21  4:21 PM    Specimen: Blood   Result Value Ref Range    Glucose 106 (H) 70 - 105 mg/dL   POC Glucose Once    Collection Time: 01/31/21  8:35 PM    Specimen: Blood   Result Value Ref Range    Glucose 123 (H) 70 - 105 mg/dL   POC Glucose Once    Collection Time: 02/01/21 12:27 AM    Specimen: Blood   Result Value Ref Range    Glucose 99 70 - 105 mg/dL   Magnesium    Collection Time: 02/01/21  5:31 AM    Specimen: Blood   Result Value Ref Range    Magnesium 2.1 1.6 - 2.4 mg/dL   Phosphorus    Collection Time: 02/01/21  5:31 AM    Specimen: Blood   Result Value Ref Range    Phosphorus 3.9 2.5 - 4.5 mg/dL   Comprehensive Metabolic Panel    Collection Time: 02/01/21  5:31 AM    Specimen: Blood   Result Value Ref Range    Glucose 94 65 - 99 mg/dL    BUN 29 (H) 8 - 23 mg/dL    Creatinine 0.93 0.76 - 1.27 mg/dL    Sodium 141 136 - 145 mmol/L    Potassium 3.7 3.5 - 5.2 mmol/L    Chloride 107 98 - 107 mmol/L    CO2 24.0 22.0 - 29.0 mmol/L    Calcium 9.5 8.6 - 10.5 mg/dL    Total Protein 7.3 6.0 - 8.5 g/dL    Albumin 4.00 3.50 - 5.20 g/dL    ALT (SGPT) 37 1 - 41 U/L    AST (SGOT) 31 1 - 40 U/L    Alkaline Phosphatase 67 39 - 117 U/L    Total Bilirubin 0.9 0.0 - 1.2 mg/dL    eGFR Non African Amer 82 >60 mL/min/1.73    Globulin 3.3 gm/dL    A/G Ratio 1.2 g/dL    BUN/Creatinine Ratio 31.2 (H) 7.0 - 25.0    Anion Gap 10.0 5.0 - 15.0 mmol/L   CBC Auto Differential    Collection Time: 02/01/21  5:31 AM     Specimen: Blood   Result Value Ref Range    WBC 9.50 3.40 - 10.80 10*3/mm3    RBC 5.26 4.14 - 5.80 10*6/mm3    Hemoglobin 15.2 13.0 - 17.7 g/dL    Hematocrit 45.7 37.5 - 51.0 %    MCV 86.9 79.0 - 97.0 fL    MCH 28.9 26.6 - 33.0 pg    MCHC 33.2 31.5 - 35.7 g/dL    RDW 14.8 12.3 - 15.4 %    RDW-SD 44.6 37.0 - 54.0 fl    MPV 9.4 6.0 - 12.0 fL    Platelets 151 140 - 450 10*3/mm3    Neutrophil % 69.6 42.7 - 76.0 %    Lymphocyte % 15.6 (L) 19.6 - 45.3 %    Monocyte % 11.1 5.0 - 12.0 %    Eosinophil % 3.3 0.3 - 6.2 %    Basophil % 0.4 0.0 - 1.5 %    Neutrophils, Absolute 6.60 1.70 - 7.00 10*3/mm3    Lymphocytes, Absolute 1.50 0.70 - 3.10 10*3/mm3    Monocytes, Absolute 1.10 (H) 0.10 - 0.90 10*3/mm3    Eosinophils, Absolute 0.30 0.00 - 0.40 10*3/mm3    Basophils, Absolute 0.00 0.00 - 0.20 10*3/mm3    nRBC 0.1 0.0 - 0.2 /100 WBC   POC Glucose Once    Collection Time: 02/01/21  6:11 AM    Specimen: Blood   Result Value Ref Range    Glucose 92 70 - 105 mg/dL   POC Glucose Once    Collection Time: 02/01/21  7:58 AM    Specimen: Blood   Result Value Ref Range    Glucose 97 70 - 105 mg/dL        Imaging:  XR Abdomen KUB  Narrative: XR ABDOMEN KUB-     Date of Exam: 2/1/2021 8:40 AM     Indication: Nausea; I61.4-Nontraumatic intracerebral hemorrhage in  cerebellum; I61.8-Other nontraumatic intracerebral hemorrhage;  I10-Essential (primary) hypertension.     Comparison Exams: None available.     Technique: AP view of the abdomen was obtained.     FINDINGS:  A nonspecific bowel gas pattern is observed. A small stool burden is  seen throughout the colon. No definitive pathologic calcifications are  seen. No acute osseous abnormalities are noted. The lung bases are  clear.     Impression: A nonspecific bowel gas pattern is observed. A small stool burden is  seen throughout the colon.     Electronically Signed By-Salty Amado MD On:2/1/2021 8:44 AM  This report was finalized on [id number] by  Salty Amado MD.        ASSESSMENT:    Intracerebellar and posterior fossa hemorrhage (CMS/HCC)    Chronic atrial fibrillation (CMS/HCC)    Chronic diastolic CHF (congestive heart failure) (CMS/HCC)    Coronary artery disease involving native coronary artery of native heart without angina pectoris    History of CVA (cerebrovascular accident)    DDD (degenerative disc disease), lumbosacral    GERD (gastroesophageal reflux disease)    Obesity    Paroxysmal atrial fibrillation (CMS/HCC)    Fall    Chronic anticoagulation  Uncontrolled HTN  A-fib with RVR  Nausea       PLAN:  Cardiology to follow   BP control  Bronchodilator  Inhaled corticosteroids  Electrolytes/ glycemic control  PT/OT  DVT and GI prophylaxis.    Discussed with Dr Kimberley Magana, APRN    2/1/2021  09:14 EST     We will sign off will be available if needed  I personally have examined  and interviewed the patient. I have reviewed the history, data, problems, assessment and plan with our NP.  Critical care time in direct medical management (   ) minutes   Samson Chu MD, D,ABSM  2/1/2021

## 2021-02-01 NOTE — PLAN OF CARE
Problem: Fall Injury Risk  Goal: Absence of Fall and Fall-Related Injury  Outcome: Ongoing, Progressing  Intervention: Promote Injury-Free Environment  Recent Flowsheet Documentation  Taken 2/1/2021 0028 by Jessica Allison RN  Safety Promotion/Fall Prevention:   safety round/check completed   nonskid shoes/slippers when out of bed   fall prevention program maintained   elopement precautions   activity supervised   assistive device/personal items within reach  Taken 1/31/2021 2022 by Jessica Allison RN  Safety Promotion/Fall Prevention:   safety round/check completed   room organization consistent   fall prevention program maintained   elopement precautions   clutter free environment maintained   assistive device/personal items within reach   activity supervised   nonskid shoes/slippers when out of bed     Problem: Adult Inpatient Plan of Care  Goal: Patient-Specific Goal (Individualized)  Outcome: Ongoing, Progressing  Goal: Absence of Hospital-Acquired Illness or Injury  Outcome: Ongoing, Progressing  Intervention: Identify and Manage Fall Risk  Recent Flowsheet Documentation  Taken 2/1/2021 0028 by Jessica Allison RN  Safety Promotion/Fall Prevention:   safety round/check completed   nonskid shoes/slippers when out of bed   fall prevention program maintained   elopement precautions   activity supervised   assistive device/personal items within reach  Taken 1/31/2021 2022 by Jessica Allison RN  Safety Promotion/Fall Prevention:   safety round/check completed   room organization consistent   fall prevention program maintained   elopement precautions   clutter free environment maintained   assistive device/personal items within reach   activity supervised   nonskid shoes/slippers when out of bed  Intervention: Prevent Skin Injury  Recent Flowsheet Documentation  Taken 2/1/2021 0028 by Jessica Allison RN  Body Position: position changed independently  Taken 1/31/2021 2022 by Jessica Allison RN  Body Position:   upper extremity elevated,  left   upper extremity elevated, right  Intervention: Prevent and Manage VTE (venous thromboembolism) Risk  Recent Flowsheet Documentation  Taken 1/31/2021 2022 by Jessica Allison RN  VTE Prevention/Management:   bilateral   sequential compression devices off   patient refused intervention  Intervention: Prevent Infection  Recent Flowsheet Documentation  Taken 1/31/2021 2022 by Jessica Allison RN  Infection Prevention:   visitors restricted/screened   single patient room provided   rest/sleep promoted   personal protective equipment utilized   hand hygiene promoted  Goal: Optimal Comfort and Wellbeing  Outcome: Ongoing, Progressing  Intervention: Provide Person-Centered Care  Recent Flowsheet Documentation  Taken 1/31/2021 2022 by Jessica Allison RN  Trust Relationship/Rapport:   thoughts/feelings acknowledged   care explained  Goal: Readiness for Transition of Care  Outcome: Ongoing, Progressing     Problem: Skin Injury Risk Increased  Goal: Skin Health and Integrity  Outcome: Ongoing, Progressing  Intervention: Optimize Skin Protection  Recent Flowsheet Documentation  Taken 1/31/2021 2022 by Jessica Allison RN  Pressure Reduction Techniques:   frequent weight shift encouraged   weight shift assistance provided  Pressure Reduction Devices: pressure-redistributing mattress utilized  Skin Protection:   adhesive use limited   transparent dressing maintained   tubing/devices free from skin contact   Goal Outcome Evaluation:

## 2021-02-01 NOTE — PROGRESS NOTES
Referring Provider: Hospitalist    Reason for follow-up: High blood pressure and atrial fibrillation     Patient Care Team:  Anibal Dalton MD as PCP - General (Family Medicine)  Shwetha Barber MD as Consulting Physician (Nephrology)  Edy Thomas MD as Consulting Physician (Cardiology)    Subjective .  Patient doing well without any chest pain or shortness of breath    Objective  Lying in bed comfortably     Review of Systems   Constitution: Negative for fever and malaise/fatigue.   Cardiovascular: Negative for chest pain, dyspnea on exertion and palpitations.   Respiratory: Negative for cough and shortness of breath.    Skin: Negative for rash.   Gastrointestinal: Negative for abdominal pain, nausea and vomiting.   Neurological: Negative for focal weakness and headaches.   All other systems reviewed and are negative.      Ketorolac tromethamine    Scheduled Meds:allopurinol, 100 mg, Oral, Daily  atorvastatin, 10 mg, Oral, Daily  dilTIAZem CD, 180 mg, Oral, Q24H  insulin lispro, 0-7 Units, Subcutaneous, Q6H  losartan, 100 mg, Oral, Q24H  metoprolol tartrate, 25 mg, Oral, Q12H      Continuous Infusions:dilTIAZem, 5-15 mg/hr, Last Rate: Stopped (01/27/21 1000)  niCARdipine, 5-15 mg/hr, Last Rate: 5 mg/hr (02/01/21 0816)      PRN Meds:.•  acetaminophen **OR** acetaminophen  •  aluminum-magnesium hydroxide-simethicone  •  dextrose  •  dextrose  •  glucagon (human recombinant)  •  hydrALAZINE  •  insulin lispro **AND** insulin lispro  •  ipratropium-albuterol  •  labetalol  •  nitroglycerin  •  ondansetron **OR** ondansetron  •  [COMPLETED] Insert peripheral IV **AND** sodium chloride        VITAL SIGNS  Vitals:    02/01/21 0227 02/01/21 0300 02/01/21 0611 02/01/21 1016   BP: 160/100 149/84 (!) 156/113 (!) 174/115   BP Location: Left arm  Left arm Left arm   Patient Position: Lying  Lying Lying   Pulse: 77 84 75 87   Resp: 14  20 12   Temp: 97.9 °F (36.6 °C)  98.1 °F (36.7 °C) 98.3 °F (36.8 °C)  "  TempSrc: Axillary  Axillary Oral   SpO2: 95% 96% 94%    Weight:   115 kg (253 lb 1.6 oz)    Height:           Flowsheet Rows      First Filed Value   Admission Height  182.9 cm (72\") Documented at 01/26/2021 0721   Admission Weight  104 kg (230 lb) Documented at 01/26/2021 0721           TELEMETRY: Atrial fibrillation    Physical Exam:  Constitutional:       Appearance: Well-developed.   Eyes:      General: No scleral icterus.     Conjunctiva/sclera: Conjunctivae normal.   HENT:      Head: Normocephalic and atraumatic.   Neck:      Musculoskeletal: Normal range of motion and neck supple.      Vascular: No carotid bruit or JVD.   Pulmonary:      Effort: Pulmonary effort is normal.      Breath sounds: Normal breath sounds. No wheezing. No rales.   Cardiovascular:      Normal rate. Irregularly irregular rhythm.   Pulses:     Intact distal pulses.   Abdominal:      General: Bowel sounds are normal.      Palpations: Abdomen is soft.   Skin:     General: Skin is warm and dry.      Findings: No rash.   Neurological:      Mental Status: Alert.          Results Review:   I reviewed the patient's new clinical results.  Lab Results (last 24 hours)     Procedure Component Value Units Date/Time    POC Glucose Once [[id number]]  (Abnormal) Collected: 02/01/21 1115    Specimen: Blood Updated: 02/01/21 1137     Glucose 121 mg/dL      Comment: Serial Number: [id number]:  [id number]       T4, Free [[id number]]  (Normal) Collected: 02/01/21 0531    Specimen: Blood Updated: 02/01/21 1040     Free T4 1.33 ng/dL     Narrative:      Results may be falsely increased if patient taking Biotin.      TSH [[id number]]  (Normal) Collected: 02/01/21 0531    Specimen: Blood Updated: 02/01/21 0948     TSH 2.560 uIU/mL     POC Glucose Once [[id number]]  (Normal) Collected: 02/01/21 0758    Specimen: Blood Updated: 02/01/21 0828     Glucose 97 mg/dL      Comment: Serial Number: [id number]:  [id number]       POC Glucose Once [[id number]]  " (Normal) Collected: 02/01/21 0611    Specimen: Blood Updated: 02/01/21 0637     Glucose 92 mg/dL      Comment: Serial Number: [id number]:  [id number]       Magnesium [[id number]]  (Normal) Collected: 02/01/21 0531    Specimen: Blood Updated: 02/01/21 0635     Magnesium 2.1 mg/dL     Phosphorus [[id number]]  (Normal) Collected: 02/01/21 0531    Specimen: Blood Updated: 02/01/21 0635     Phosphorus 3.9 mg/dL     Comprehensive Metabolic Panel [[id number]]  (Abnormal) Collected: 02/01/21 0531    Specimen: Blood Updated: 02/01/21 0635     Glucose 94 mg/dL      BUN 29 mg/dL      Creatinine 0.93 mg/dL      Sodium 141 mmol/L      Potassium 3.7 mmol/L      Chloride 107 mmol/L      CO2 24.0 mmol/L      Calcium 9.5 mg/dL      Total Protein 7.3 g/dL      Albumin 4.00 g/dL      ALT (SGPT) 37 U/L      AST (SGOT) 31 U/L      Alkaline Phosphatase 67 U/L      Total Bilirubin 0.9 mg/dL      eGFR Non African Amer 82 mL/min/1.73      Globulin 3.3 gm/dL      A/G Ratio 1.2 g/dL      BUN/Creatinine Ratio 31.2     Anion Gap 10.0 mmol/L     Narrative:      GFR Normal >60  Chronic Kidney Disease <60  Kidney Failure <15      CBC & Differential [[id number]]  (Abnormal) Collected: 02/01/21 0531    Specimen: Blood Updated: 02/01/21 0622    Narrative:      The following orders were created for panel order CBC & Differential.  Procedure                               Abnormality         Status                     ---------                               -----------         ------                     CBC Auto Differential[[id number]]        Abnormal            Final result                 Please view results for these tests on the individual orders.    CBC Auto Differential [[id number]]  (Abnormal) Collected: 02/01/21 0531    Specimen: Blood Updated: 02/01/21 0622     WBC 9.50 10*3/mm3      RBC 5.26 10*6/mm3      Hemoglobin 15.2 g/dL      Hematocrit 45.7 %      MCV 86.9 fL      MCH 28.9 pg      MCHC 33.2 g/dL      RDW 14.8 %      RDW-SD 44.6 fl       MPV 9.4 fL      Platelets 151 10*3/mm3      Neutrophil % 69.6 %      Lymphocyte % 15.6 %      Monocyte % 11.1 %      Eosinophil % 3.3 %      Basophil % 0.4 %      Neutrophils, Absolute 6.60 10*3/mm3      Lymphocytes, Absolute 1.50 10*3/mm3      Monocytes, Absolute 1.10 10*3/mm3      Eosinophils, Absolute 0.30 10*3/mm3      Basophils, Absolute 0.00 10*3/mm3      nRBC 0.1 /100 WBC     POC Glucose Once [[id number]]  (Normal) Collected: 02/01/21 0027    Specimen: Blood Updated: 02/01/21 0029     Glucose 99 mg/dL      Comment: Serial Number: [id number]:  [id number]       POC Glucose Once [[id number]]  (Abnormal) Collected: 01/31/21 2035    Specimen: Blood Updated: 01/31/21 2056     Glucose 123 mg/dL      Comment: Serial Number: [id number]:  [id number]       POC Glucose Once [[id number]]  (Abnormal) Collected: 01/31/21 1621    Specimen: Blood Updated: 01/31/21 1648     Glucose 106 mg/dL      Comment: Serial Number: [id number]:  [id number]       POC Glucose Once [[id number]]  (Abnormal) Collected: 01/31/21 1335    Specimen: Blood Updated: 01/31/21 1344     Glucose 139 mg/dL      Comment: Serial Number: [id number]:  [id number]             Imaging Results (Last 24 Hours)     Procedure Component Value Units Date/Time    XR Abdomen KUB [[id number]] Collected: 02/01/21 0843     Updated: 02/01/21 0846    Narrative:      XR ABDOMEN KUB-     Date of Exam: 2/1/2021 8:40 AM     Indication: Nausea; I61.4-Nontraumatic intracerebral hemorrhage in  cerebellum; I61.8-Other nontraumatic intracerebral hemorrhage;  I10-Essential (primary) hypertension.     Comparison Exams: None available.     Technique: AP view of the abdomen was obtained.     FINDINGS:  A nonspecific bowel gas pattern is observed. A small stool burden is  seen throughout the colon. No definitive pathologic calcifications are  seen. No acute osseous abnormalities are noted. The lung bases are  clear.       Impression:      A nonspecific bowel gas pattern  is observed. A small stool burden is  seen throughout the colon.     Electronically Signed By-Salty Amado MD On:2/1/2021 8:44 AM  This report was finalized on [id number] by  Salty Amado MD.          EKG      I personally viewed and interpreted the patient's EKG/Telemetry data:    ECHOCARDIOGRAM:    STRESS MYOVIEW:    CARDIAC CATHETERIZATION:    OTHER:         Assessment/Plan     Principal Problem:    Intracerebellar and posterior fossa hemorrhage (CMS/HCC)  Active Problems:    Chronic atrial fibrillation (CMS/HCC)    Chronic diastolic CHF (congestive heart failure) (CMS/HCC)    Coronary artery disease involving native coronary artery of native heart without angina pectoris    History of CVA (cerebrovascular accident)    DDD (degenerative disc disease), lumbosacral    GERD (gastroesophageal reflux disease)    Obesity    Paroxysmal atrial fibrillation (CMS/HCC)    Fall    Chronic anticoagulation       Patient presented with mental status changes and after a fall and has intracerebral hemorrhage and is seen by neurosurgeons  Patient has been on anticoagulation with Xarelto for A. fib which has been held  Patient's blood pressure has been high and he was started on Cardene drip but now is on oral medicines and his blood pressure is better now  Patient also has history of chronic coronary disease and is ruled out for MI by EKG enzymes  Patient has chronic insufficiency and is followed by nephrologist  Patient will be restarted on anticoagulation when cleared by neurosurgeons.    I discussed the patients findings and my recommendations with patient and nurse    Edy Thomas MD  02/01/21  13:22 EST

## 2021-02-01 NOTE — PROGRESS NOTES
LOS: 6 days   Patient Care Team:  Anibal Dalton MD as PCP - General (Family Medicine)  Shwetha Barber MD as Consulting Physician (Nephrology)  Edy Thomas MD as Consulting Physician (Cardiology)    Subjective     Interval History:     Patient Complaints: Not feeling well, is nauseated this morning.  Had a dose of hydralazine but blood pressure and heart rate markedly elevated this morning.  Denies any chest pain or shortness of breath  History taken from: patient    Review of Systems   Constitutional: Positive for activity change and fatigue.   HENT: Negative.    Respiratory: Negative.    Cardiovascular: Negative.    Gastrointestinal: Positive for nausea. Negative for vomiting.   Neurological: Positive for weakness. Negative for dizziness, tremors, seizures, syncope, facial asymmetry, speech difficulty, light-headedness, numbness and headaches.   Psychiatric/Behavioral: Negative.            Objective     Vital Signs  Temp:  [97.9 °F (36.6 °C)-98.6 °F (37 °C)] 98.1 °F (36.7 °C)  Heart Rate:  [] 75  Resp:  [14-20] 20  BP: (144-160)/() 156/113    Physical Exam:     General Appearance:    Alert, cooperative, mild distress due to nausea, appears somewhat anxious this morning   Head:    Normocephalic, without obvious abnormality, atraumatic   Eyes:            Lids and lashes normal, conjunctivae and sclerae normal, no   icterus, no pallor, corneas clear, PERRLA   Ears:    Ears appear intact with no abnormalities noted   Throat:   No oral lesions, no thrush, oral mucosa moist   Neck:   No adenopathy, supple, trachea midline, no thyromegaly, no   carotid bruit, no JVD   Lungs:     Clear to auscultation,respirations regular, even and                  Unlabored, distant adventitial sounds    Heart:    Irregularly irregular, tachycardic   Chest Wall:    No abnormalities observed   Abdomen:     Normal bowel sounds, no masses, no organomegaly, soft        non-tender, non-distended, no guarding, no  rebound                tenderness   Extremities:   Moves all extremities , no edema, no cyanosis, no             redness   Pulses:   Pulses palpable and equal bilaterally   Skin:   No bleeding, bruising or rash   Lymph nodes:   No palpable adenopathy   Neurologic:   Cranial nerves 2 - 12 grossly intact, sensation intact, DTR       present and equal bilaterally        Results Review:    Lab Results (last 24 hours)     Procedure Component Value Units Date/Time    POC Glucose Once [[id number]]  (Normal) Collected: 02/01/21 0611    Specimen: Blood Updated: 02/01/21 0637     Glucose 92 mg/dL      Comment: Serial Number: [id number]:  [id number]       Magnesium [[id number]]  (Normal) Collected: 02/01/21 0531    Specimen: Blood Updated: 02/01/21 0635     Magnesium 2.1 mg/dL     Phosphorus [[id number]]  (Normal) Collected: 02/01/21 0531    Specimen: Blood Updated: 02/01/21 0635     Phosphorus 3.9 mg/dL     Comprehensive Metabolic Panel [[id number]]  (Abnormal) Collected: 02/01/21 0531    Specimen: Blood Updated: 02/01/21 0635     Glucose 94 mg/dL      BUN 29 mg/dL      Creatinine 0.93 mg/dL      Sodium 141 mmol/L      Potassium 3.7 mmol/L      Chloride 107 mmol/L      CO2 24.0 mmol/L      Calcium 9.5 mg/dL      Total Protein 7.3 g/dL      Albumin 4.00 g/dL      ALT (SGPT) 37 U/L      AST (SGOT) 31 U/L      Alkaline Phosphatase 67 U/L      Total Bilirubin 0.9 mg/dL      eGFR Non African Amer 82 mL/min/1.73      Globulin 3.3 gm/dL      A/G Ratio 1.2 g/dL      BUN/Creatinine Ratio 31.2     Anion Gap 10.0 mmol/L     Narrative:      GFR Normal >60  Chronic Kidney Disease <60  Kidney Failure <15      CBC & Differential [[id number]]  (Abnormal) Collected: 02/01/21 0531    Specimen: Blood Updated: 02/01/21 0622    Narrative:      The following orders were created for panel order CBC & Differential.  Procedure                               Abnormality         Status                     ---------                                -----------         ------                     CBC Auto Differential[[id number]]        Abnormal            Final result                 Please view results for these tests on the individual orders.    CBC Auto Differential [[id number]]  (Abnormal) Collected: 02/01/21 0531    Specimen: Blood Updated: 02/01/21 0622     WBC 9.50 10*3/mm3      RBC 5.26 10*6/mm3      Hemoglobin 15.2 g/dL      Hematocrit 45.7 %      MCV 86.9 fL      MCH 28.9 pg      MCHC 33.2 g/dL      RDW 14.8 %      RDW-SD 44.6 fl      MPV 9.4 fL      Platelets 151 10*3/mm3      Neutrophil % 69.6 %      Lymphocyte % 15.6 %      Monocyte % 11.1 %      Eosinophil % 3.3 %      Basophil % 0.4 %      Neutrophils, Absolute 6.60 10*3/mm3      Lymphocytes, Absolute 1.50 10*3/mm3      Monocytes, Absolute 1.10 10*3/mm3      Eosinophils, Absolute 0.30 10*3/mm3      Basophils, Absolute 0.00 10*3/mm3      nRBC 0.1 /100 WBC     POC Glucose Once [[id number]]  (Normal) Collected: 02/01/21 0027    Specimen: Blood Updated: 02/01/21 0029     Glucose 99 mg/dL      Comment: Serial Number: [id number]:  [id number]       POC Glucose Once [[id number]]  (Abnormal) Collected: 01/31/21 2035    Specimen: Blood Updated: 01/31/21 2056     Glucose 123 mg/dL      Comment: Serial Number: [id number]:  [id number]       POC Glucose Once [[id number]]  (Abnormal) Collected: 01/31/21 1621    Specimen: Blood Updated: 01/31/21 1648     Glucose 106 mg/dL      Comment: Serial Number: [id number]:  [id number]       POC Glucose Once [[id number]]  (Abnormal) Collected: 01/31/21 1335    Specimen: Blood Updated: 01/31/21 1344     Glucose 139 mg/dL      Comment: Serial Number: [id number]:  [id number]       POC Glucose Once [[id number]]  (Abnormal) Collected: 01/31/21 0738    Specimen: Blood Updated: 01/31/21 0808     Glucose 119 mg/dL      Comment: Serial Number: [id number]:  [id number]              Imaging Results (Last 24 Hours)     ** No results found for the last 24 hours. **                I reviewed the patient's new clinical results.    Medication Review:   Scheduled Meds:allopurinol, 100 mg, Oral, Daily  atorvastatin, 10 mg, Oral, Daily  dilTIAZem CD, 180 mg, Oral, Q24H  insulin lispro, 0-7 Units, Subcutaneous, Q6H  losartan, 100 mg, Oral, Q24H  metoprolol tartrate, 25 mg, Oral, Q12H      Continuous Infusions:dilTIAZem, 5-15 mg/hr, Last Rate: Stopped (01/27/21 1000)  niCARdipine, 5-15 mg/hr, Last Rate: Stopped (01/29/21 1517)      PRN Meds:.•  acetaminophen **OR** acetaminophen  •  aluminum-magnesium hydroxide-simethicone  •  dextrose  •  dextrose  •  glucagon (human recombinant)  •  hydrALAZINE  •  insulin lispro **AND** insulin lispro  •  ipratropium-albuterol  •  labetalol  •  nitroglycerin  •  ondansetron **OR** ondansetron  •  [COMPLETED] Insert peripheral IV **AND** sodium chloride     Assessment/Plan      Uncontrolled hypertension-recurrent, new problem today  A. fib RVR-recurrent, new problem today  Nausea without vomiting      Intracerebellar and posterior fossa hemorrhage (CMS/HCC)    Chronic atrial fibrillation (CMS/HCC)    Chronic diastolic CHF (congestive heart failure) (CMS/HCC)    Coronary artery disease involving native coronary artery of native heart without angina pectoris    History of CVA (cerebrovascular accident)    DDD (degenerative disc disease), lumbosacral    GERD (gastroesophageal reflux disease)    Obesity    Paroxysmal atrial fibrillation (CMS/HCC)    Fall    Chronic anticoagulation    Patient will be started back on Cardene drip.  I have asked the nurse to contact cardiology for further management.  Continue rest of current approach, continue antiemetics as needed.      Plan for disposition: [facility] when able    Paty Polk DO  02/01/21  08:06 EST

## 2021-02-01 NOTE — PLAN OF CARE
OT tx attempted, however pt not appropriate this date 2/2 on Cardene drip with increased BP. OT to f/u next service date if appropriate.     Gertrude Bhatt, OTD, OTR

## 2021-02-02 LAB
ALBUMIN SERPL-MCNC: 3.9 G/DL (ref 3.5–5.2)
ALBUMIN/GLOB SERPL: 1.1 G/DL
ALP SERPL-CCNC: 62 U/L (ref 39–117)
ALT SERPL W P-5'-P-CCNC: 37 U/L (ref 1–41)
ANION GAP SERPL CALCULATED.3IONS-SCNC: 10 MMOL/L (ref 5–15)
AST SERPL-CCNC: 32 U/L (ref 1–40)
BASOPHILS # BLD AUTO: 0.1 10*3/MM3 (ref 0–0.2)
BASOPHILS NFR BLD AUTO: 0.6 % (ref 0–1.5)
BILIRUB SERPL-MCNC: 0.7 MG/DL (ref 0–1.2)
BUN SERPL-MCNC: 32 MG/DL (ref 8–23)
BUN/CREAT SERPL: 29.9 (ref 7–25)
CALCIUM SPEC-SCNC: 9.3 MG/DL (ref 8.6–10.5)
CHLORIDE SERPL-SCNC: 108 MMOL/L (ref 98–107)
CO2 SERPL-SCNC: 23 MMOL/L (ref 22–29)
CREAT SERPL-MCNC: 1.07 MG/DL (ref 0.76–1.27)
DEPRECATED RDW RBC AUTO: 45.1 FL (ref 37–54)
EOSINOPHIL # BLD AUTO: 0.3 10*3/MM3 (ref 0–0.4)
EOSINOPHIL NFR BLD AUTO: 3.5 % (ref 0.3–6.2)
ERYTHROCYTE [DISTWIDTH] IN BLOOD BY AUTOMATED COUNT: 14.8 % (ref 12.3–15.4)
GFR SERPL CREATININE-BSD FRML MDRD: 70 ML/MIN/1.73
GLOBULIN UR ELPH-MCNC: 3.4 GM/DL
GLUCOSE BLDC GLUCOMTR-MCNC: 112 MG/DL (ref 70–105)
GLUCOSE BLDC GLUCOMTR-MCNC: 113 MG/DL (ref 70–105)
GLUCOSE BLDC GLUCOMTR-MCNC: 115 MG/DL (ref 70–105)
GLUCOSE BLDC GLUCOMTR-MCNC: 123 MG/DL (ref 70–105)
GLUCOSE BLDC GLUCOMTR-MCNC: 98 MG/DL (ref 70–105)
GLUCOSE SERPL-MCNC: 105 MG/DL (ref 65–99)
HCT VFR BLD AUTO: 44.8 % (ref 37.5–51)
HGB BLD-MCNC: 15 G/DL (ref 13–17.7)
LYMPHOCYTES # BLD AUTO: 1.4 10*3/MM3 (ref 0.7–3.1)
LYMPHOCYTES NFR BLD AUTO: 14.5 % (ref 19.6–45.3)
MAGNESIUM SERPL-MCNC: 2 MG/DL (ref 1.6–2.4)
MCH RBC QN AUTO: 28.9 PG (ref 26.6–33)
MCHC RBC AUTO-ENTMCNC: 33.4 G/DL (ref 31.5–35.7)
MCV RBC AUTO: 86.5 FL (ref 79–97)
MONOCYTES # BLD AUTO: 1.1 10*3/MM3 (ref 0.1–0.9)
MONOCYTES NFR BLD AUTO: 11.9 % (ref 5–12)
NEUTROPHILS NFR BLD AUTO: 6.5 10*3/MM3 (ref 1.7–7)
NEUTROPHILS NFR BLD AUTO: 69.5 % (ref 42.7–76)
NRBC BLD AUTO-RTO: 0.2 /100 WBC (ref 0–0.2)
PHOSPHATE SERPL-MCNC: 3.8 MG/DL (ref 2.5–4.5)
PLATELET # BLD AUTO: 154 10*3/MM3 (ref 140–450)
PMV BLD AUTO: 8.7 FL (ref 6–12)
POTASSIUM SERPL-SCNC: 4.1 MMOL/L (ref 3.5–5.2)
PROT SERPL-MCNC: 7.3 G/DL (ref 6–8.5)
RBC # BLD AUTO: 5.18 10*6/MM3 (ref 4.14–5.8)
SODIUM SERPL-SCNC: 141 MMOL/L (ref 136–145)
WBC # BLD AUTO: 9.4 10*3/MM3 (ref 3.4–10.8)

## 2021-02-02 PROCEDURE — 82962 GLUCOSE BLOOD TEST: CPT

## 2021-02-02 PROCEDURE — 97530 THERAPEUTIC ACTIVITIES: CPT

## 2021-02-02 PROCEDURE — 85025 COMPLETE CBC W/AUTO DIFF WBC: CPT | Performed by: NURSE PRACTITIONER

## 2021-02-02 PROCEDURE — 99232 SBSQ HOSP IP/OBS MODERATE 35: CPT | Performed by: INTERNAL MEDICINE

## 2021-02-02 PROCEDURE — 84100 ASSAY OF PHOSPHORUS: CPT | Performed by: NURSE PRACTITIONER

## 2021-02-02 PROCEDURE — 97535 SELF CARE MNGMENT TRAINING: CPT

## 2021-02-02 PROCEDURE — 80053 COMPREHEN METABOLIC PANEL: CPT | Performed by: NURSE PRACTITIONER

## 2021-02-02 PROCEDURE — 97116 GAIT TRAINING THERAPY: CPT

## 2021-02-02 PROCEDURE — 83735 ASSAY OF MAGNESIUM: CPT | Performed by: NURSE PRACTITIONER

## 2021-02-02 RX ORDER — METOPROLOL TARTRATE 50 MG/1
50 TABLET, FILM COATED ORAL EVERY 12 HOURS SCHEDULED
Status: DISCONTINUED | OUTPATIENT
Start: 2021-02-02 | End: 2021-02-04 | Stop reason: HOSPADM

## 2021-02-02 RX ADMIN — METOPROLOL TARTRATE 25 MG: 25 TABLET, FILM COATED ORAL at 08:03

## 2021-02-02 RX ADMIN — ATORVASTATIN CALCIUM 10 MG: 10 TABLET, FILM COATED ORAL at 08:03

## 2021-02-02 RX ADMIN — ALLOPURINOL 100 MG: 100 TABLET ORAL at 08:03

## 2021-02-02 RX ADMIN — DILTIAZEM HYDROCHLORIDE 180 MG: 180 CAPSULE, COATED, EXTENDED RELEASE ORAL at 08:04

## 2021-02-02 RX ADMIN — LOSARTAN POTASSIUM 100 MG: 50 TABLET, FILM COATED ORAL at 08:04

## 2021-02-02 RX ADMIN — METOPROLOL TARTRATE 25 MG: 25 TABLET, FILM COATED ORAL at 11:26

## 2021-02-02 RX ADMIN — METOPROLOL TARTRATE 50 MG: 50 TABLET, FILM COATED ORAL at 21:54

## 2021-02-02 RX ADMIN — Medication 10 ML: at 08:03

## 2021-02-02 NOTE — PROGRESS NOTES
LOS: 7 days   Patient Care Team:  Anibal Dalton MD as PCP - General (Family Medicine)  Shwetha Barber MD as Consulting Physician (Nephrology)  Edy Thomas MD as Consulting Physician (Cardiology)    Subjective Sleepy but appears comfortable otherwise.    Interval History:     Patient Complaints: Still on Cardene drip and his blood pressures will occasionally spike into well over 150 systolic.  Please refer the chart for summary.  Still just not feeling well overall but no further nausea or vomiting no chest pain.    Review of Systems   Constitutional: Positive for activity change and fatigue.   HENT: Negative.    Respiratory: Negative.    Cardiovascular: Negative.    Gastrointestinal: Negative for nausea and vomiting.   Neurological: Positive for weakness. Negative for dizziness, tremors, seizures, syncope, facial asymmetry, speech difficulty, light-headedness, numbness and headaches.   Psychiatric/Behavioral: Negative.            Objective     Vital Signs  Temp:  [98 °F (36.7 °C)-98.3 °F (36.8 °C)] 98.2 °F (36.8 °C)  Heart Rate:  [] 103  Resp:  [12-28] 18  BP: (129-174)/() 149/105    Physical Exam:     General Appearance:   Sleepy but easily awakened, cooperative   Head:    Normocephalic, without obvious abnormality, atraumatic   Eyes:            Lids and lashes normal, conjunctivae and sclerae normal, no   icterus, no pallor, corneas clear, PERRLA   Ears:    Ears appear intact with no abnormalities noted   Throat:   No oral lesions, no thrush, oral mucosa moist   Neck:   No adenopathy, supple, trachea midline, no thyromegaly, no   carotid bruit, no JVD   Lungs:     Clear to auscultation,respirations regular, even and                  Unlabored, distant adventitial sounds    Heart:    Irregularly irregular, tachycardic   Chest Wall:    No abnormalities observed   Abdomen:     Normal bowel sounds, no masses, no organomegaly, soft        non-tender, non-distended, no guarding, no  rebound                tenderness   Extremities:   Moves all extremities , no edema, no cyanosis, no             redness   Pulses:   Pulses palpable and equal bilaterally   Skin:   No bleeding, bruising or rash   Lymph nodes:   No palpable adenopathy   Neurologic:   Cranial nerves 2 - 12 grossly intact, sensation intact, DTR       present and equal bilaterally        Results Review:    Lab Results (last 24 hours)     Procedure Component Value Units Date/Time    POC Glucose Once [[id number]]  (Normal) Collected: 02/02/21 0738    Specimen: Blood Updated: 02/02/21 0740     Glucose 98 mg/dL      Comment: Serial Number: [id number]:  [id number]       Magnesium [[id number]]  (Normal) Collected: 02/02/21 0514    Specimen: Blood Updated: 02/02/21 0556     Magnesium 2.0 mg/dL     Phosphorus [[id number]]  (Normal) Collected: 02/02/21 0514    Specimen: Blood Updated: 02/02/21 0556     Phosphorus 3.8 mg/dL     Comprehensive Metabolic Panel [[id number]]  (Abnormal) Collected: 02/02/21 0514    Specimen: Blood Updated: 02/02/21 0556     Glucose 105 mg/dL      BUN 32 mg/dL      Creatinine 1.07 mg/dL      Sodium 141 mmol/L      Potassium 4.1 mmol/L      Chloride 108 mmol/L      CO2 23.0 mmol/L      Calcium 9.3 mg/dL      Total Protein 7.3 g/dL      Albumin 3.90 g/dL      ALT (SGPT) 37 U/L      AST (SGOT) 32 U/L      Alkaline Phosphatase 62 U/L      Total Bilirubin 0.7 mg/dL      eGFR Non African Amer 70 mL/min/1.73      Globulin 3.4 gm/dL      A/G Ratio 1.1 g/dL      BUN/Creatinine Ratio 29.9     Anion Gap 10.0 mmol/L     Narrative:      GFR Normal >60  Chronic Kidney Disease <60  Kidney Failure <15      CBC & Differential [[id number]]  (Abnormal) Collected: 02/02/21 0514    Specimen: Blood Updated: 02/02/21 0531    Narrative:      The following orders were created for panel order CBC & Differential.  Procedure                               Abnormality         Status                     ---------                                -----------         ------                     CBC Auto Differential[[id number]]        Abnormal            Final result                 Please view results for these tests on the individual orders.    CBC Auto Differential [[id number]]  (Abnormal) Collected: 02/02/21 0514    Specimen: Blood Updated: 02/02/21 0531     WBC 9.40 10*3/mm3      RBC 5.18 10*6/mm3      Hemoglobin 15.0 g/dL      Hematocrit 44.8 %      MCV 86.5 fL      MCH 28.9 pg      MCHC 33.4 g/dL      RDW 14.8 %      RDW-SD 45.1 fl      MPV 8.7 fL      Platelets 154 10*3/mm3      Neutrophil % 69.5 %      Lymphocyte % 14.5 %      Monocyte % 11.9 %      Eosinophil % 3.5 %      Basophil % 0.6 %      Neutrophils, Absolute 6.50 10*3/mm3      Lymphocytes, Absolute 1.40 10*3/mm3      Monocytes, Absolute 1.10 10*3/mm3      Eosinophils, Absolute 0.30 10*3/mm3      Basophils, Absolute 0.10 10*3/mm3      nRBC 0.2 /100 WBC     POC Glucose Once [[id number]]  (Abnormal) Collected: 02/02/21 0009    Specimen: Blood Updated: 02/02/21 0011     Glucose 113 mg/dL      Comment: Serial Number: [id number]:  [id number]       POC Glucose Once [[id number]]  (Abnormal) Collected: 02/01/21 1930    Specimen: Blood Updated: 02/01/21 1931     Glucose 122 mg/dL      Comment: Serial Number: [id number]:  [id number]       POC Glucose Once [[id number]]  (Abnormal) Collected: 02/01/21 1614    Specimen: Blood Updated: 02/01/21 1617     Glucose 135 mg/dL      Comment: Serial Number: [id number]:  [id number]       POC Glucose Once [[id number]]  (Abnormal) Collected: 02/01/21 1115    Specimen: Blood Updated: 02/01/21 1137     Glucose 121 mg/dL      Comment: Serial Number: [id number]:  [id number]       T4, Free [[id number]]  (Normal) Collected: 02/01/21 0531    Specimen: Blood Updated: 02/01/21 1040     Free T4 1.33 ng/dL     Narrative:      Results may be falsely increased if patient taking Biotin.      TSH [[id number]]  (Normal) Collected: 02/01/21 0596    Specimen: Blood  Updated: 02/01/21 0948     TSH 2.560 uIU/mL     POC Glucose Once [[id number]]  (Normal) Collected: 02/01/21 0758    Specimen: Blood Updated: 02/01/21 0828     Glucose 97 mg/dL      Comment: Serial Number: [id number]:  [id number]              Imaging Results (Last 24 Hours)     Procedure Component Value Units Date/Time    XR Abdomen KUB [[id number]] Collected: 02/01/21 0843     Updated: 02/01/21 0846    Narrative:      XR ABDOMEN KUB-     Date of Exam: 2/1/2021 8:40 AM     Indication: Nausea; I61.4-Nontraumatic intracerebral hemorrhage in  cerebellum; I61.8-Other nontraumatic intracerebral hemorrhage;  I10-Essential (primary) hypertension.     Comparison Exams: None available.     Technique: AP view of the abdomen was obtained.     FINDINGS:  A nonspecific bowel gas pattern is observed. A small stool burden is  seen throughout the colon. No definitive pathologic calcifications are  seen. No acute osseous abnormalities are noted. The lung bases are  clear.       Impression:      A nonspecific bowel gas pattern is observed. A small stool burden is  seen throughout the colon.     Electronically Signed By-Salty Amado MD On:2/1/2021 8:44 AM  This report was finalized on [id number] by  Salty Amado MD.               I reviewed the patient's new clinical results.    Medication Review:   Scheduled Meds:allopurinol, 100 mg, Oral, Daily  atorvastatin, 10 mg, Oral, Daily  dilTIAZem CD, 180 mg, Oral, Q24H  insulin lispro, 0-7 Units, Subcutaneous, Q6H  losartan, 100 mg, Oral, Q24H  metoprolol tartrate, 25 mg, Oral, Q12H      Continuous Infusions:dilTIAZem, 5-15 mg/hr, Last Rate: Stopped (01/27/21 1000)  niCARdipine, 5-15 mg/hr, Last Rate: 5 mg/hr (02/01/21 1847)      PRN Meds:.•  acetaminophen **OR** acetaminophen  •  aluminum-magnesium hydroxide-simethicone  •  dextrose  •  dextrose  •  glucagon (human recombinant)  •  hydrALAZINE  •  insulin lispro **AND** insulin lispro  •  ipratropium-albuterol  •  labetalol  •   nitroglycerin  •  ondansetron **OR** ondansetron  •  [COMPLETED] Insert peripheral IV **AND** sodium chloride     Assessment/Plan      Uncontrolled hypertension-recurrent, new problem today  A. fib RVR-recurrent, new problem today  Nausea without vomiting      Intracerebellar and posterior fossa hemorrhage (CMS/HCC)    Chronic atrial fibrillation (CMS/HCC)    Chronic diastolic CHF (congestive heart failure) (CMS/HCC)    Coronary artery disease involving native coronary artery of native heart without angina pectoris    History of CVA (cerebrovascular accident)    DDD (degenerative disc disease), lumbosacral    GERD (gastroesophageal reflux disease)    Obesity    Paroxysmal atrial fibrillation (CMS/HCC)    Fall    Chronic anticoagulation    Patient will be started back on Cardene drip.  I have asked the nurse to contact cardiology for further management.  Continue rest of current approach, continue antiemetics as needed.    Check renal ultrasound.  Hopefully will be able to transition back to p.o. antihypertensive and come off of Cardene drip.      Plan for disposition: [facility] when able    Paty Polk, DO  02/02/21  07:52 EST

## 2021-02-02 NOTE — PROGRESS NOTES
Continued Stay Note   [facility]     Patient Name: Carlos Whipple  MRN: [id number]  Today's Date: 2/2/2021    Admit Date: 1/26/2021    Discharge Plan     Row Name 02/02/21 1518       Plan    Plan  Patient is from [facility] and ok to return per faciity and sister Baylee. No PASRR or Precert needed      Plan Comments  DC Plan - Patient is from [place] and plans to return. JUNE Aguilar GTT dc'd today and PO meds increased, Monitor B/P.       Mary Pro RN, CM  Office Phone [phone]  Cell [phone]

## 2021-02-02 NOTE — PLAN OF CARE
Goal Outcome Evaluation:  Plan of Care Reviewed With: patient     Outcome Summary: Patient supine in bed with hypertension, but NSG states he has just been given medication and ok to procede with therapy. Pt completes bed mobility with Min a. Transfers bed<>chair with min A, and OT strips bed as it is damp (urine vs. sweat?). Pt completes bathing takss with Mod A, requiring max verbal cues due to cognitive deficits and poor attention span. He is pleasant and cooperative, but very limited due to short-term memory deficits. Continues to require IP rehab at discharge. PPE: Gloves, mask, eyewear

## 2021-02-02 NOTE — PROGRESS NOTES
Nutrition Services    Patient Name: Carlos Whipple  YOB: 1955  MRN: [id number]  Admission date: 1/26/2021    PPE Documentation        PPE Worn By Provider mask and eye protection   PPE Worn By Patient  None     PROGRESS NOTE      Encounter Information: 2/2: Pt evaluated due to LOS. Pt having some nausea today, but medication has helped/relieves this, and pt continues to eat fairly well -- has overall had very good intake throughout admission, and has tolerated PO diet without difficulty. Current diet is meeting needs as ordered -- no additional nutrition interventions indicated presently.        PO Diet: Diet Regular   PO Supplements: None ordered   PO Intake:  Fair-good intake at meals throughout stay       Nutrition support orders: -    Nutrition support review: -       Labs (reviewed below): Reviewed and C/W clinical course         GI Function:  Pt unsure of last BM date - secure message sent to MD to notify of possible need for PRN bowel meds       Nutrition Intervention: Continue Regular diet as ordered, which is meeting estimated needs.       Results from last 7 days   Lab Units 02/02/21  0514 02/01/21  0531 01/31/21  0252   SODIUM mmol/L 141 141 142   POTASSIUM mmol/L 4.1 3.7 3.9   CHLORIDE mmol/L 108* 107 107   CO2 mmol/L 23.0 24.0 24.0   BUN mg/dL 32* 29* 25*   CREATININE mg/dL 1.07 0.93 0.97   CALCIUM mg/dL 9.3 9.5 9.7   BILIRUBIN mg/dL 0.7 0.9 0.6   ALK PHOS U/L 62 67 70   ALT (SGPT) U/L 37 37 41   AST (SGOT) U/L 32 31 37   GLUCOSE mg/dL 105* 94 98     Results from last 7 days   Lab Units 02/02/21  0514 02/01/21  0531 01/31/21  0252  01/27/21  0308   MAGNESIUM mg/dL 2.0 2.1 2.0   < > 1.9   PHOSPHORUS mg/dL 3.8 3.9 3.2   < > 3.0   HEMOGLOBIN g/dL 15.0 15.2 14.7   < > 14.0   HEMATOCRIT % 44.8 45.7 43.5   < > 42.3   TRIGLYCERIDES mg/dL  --   --   --   --  51    < > = values in this interval not displayed.     COVID19   Date Value Ref Range Status   01/26/2021 Not Detected Not Detected - Ref.  Range Final     Lab Results   Component Value Date    HGBA1C 5.5 12/12/2019       RD to follow up per protocol.    Electronically signed by:  Whitney Akers RD  02/02/21 18:23 EST

## 2021-02-02 NOTE — PLAN OF CARE
Goal Outcome Evaluation:         Objective:     Bed mobility - N/A or Not attempted.  Pt already up in the chair  Transfers - CGA with roll walker   Ambulation - 140 feet Min-A  With roll walker, pt min assist for turns, cues to avoid objects.  Loss of balance 2 time drifting to the right.  Min assist to correct.      Plan/Recommendations:   Pt would benefit from Inpatient Rehabilitation placement at discharge from facility and requires no DME at discharge.   Pt desires Inpatient Rehabilitation placement at discharge. Pt cooperative; agreeable to therapeutic recommendations and plan of care.

## 2021-02-02 NOTE — PLAN OF CARE
Problem: Fall Injury Risk  Goal: Absence of Fall and Fall-Related Injury  Outcome: Ongoing, Progressing  Intervention: Promote Injury-Free Environment  Recent Flowsheet Documentation  Taken 2/2/2021 0402 by Grecia Nicolas RN  Safety Promotion/Fall Prevention:   fall prevention program maintained   safety round/check completed     Problem: Adult Inpatient Plan of Care  Goal: Patient-Specific Goal (Individualized)  Outcome: Ongoing, Progressing  Goal: Absence of Hospital-Acquired Illness or Injury  Outcome: Ongoing, Progressing  Intervention: Identify and Manage Fall Risk  Recent Flowsheet Documentation  Taken 2/2/2021 0402 by Grecia Nicolas RN  Safety Promotion/Fall Prevention:   fall prevention program maintained   safety round/check completed  Goal: Optimal Comfort and Wellbeing  Outcome: Ongoing, Progressing  Goal: Readiness for Transition of Care  Outcome: Ongoing, Progressing     Problem: Skin Injury Risk Increased  Goal: Skin Health and Integrity  Outcome: Ongoing, Progressing   Goal Outcome Evaluation:

## 2021-02-02 NOTE — PROGRESS NOTES
"Referring Provider: Hospitalist    Reason for follow-up: High blood pressure and atrial fibrillation     Patient Care Team:  Anibal Dalton MD as PCP - General (Family Medicine)  Shwetha Barber MD as Consulting Physician (Nephrology)  Edy Thomas MD as Consulting Physician (Cardiology)    Subjective .  Patient doing well without any chest pain or shortness of breath    Objective  Lying in bed comfortably     Review of Systems   Constitution: Negative for fever and malaise/fatigue.   Cardiovascular: Negative for chest pain, dyspnea on exertion and palpitations.   Respiratory: Negative for cough and shortness of breath.    Skin: Negative for rash.   Gastrointestinal: Negative for abdominal pain, nausea and vomiting.   Neurological: Negative for focal weakness and headaches.   All other systems reviewed and are negative.      Ketorolac tromethamine    Scheduled Meds:allopurinol, 100 mg, Oral, Daily  atorvastatin, 10 mg, Oral, Daily  dilTIAZem CD, 180 mg, Oral, Q24H  insulin lispro, 0-7 Units, Subcutaneous, Q6H  losartan, 100 mg, Oral, Q24H  metoprolol tartrate, 50 mg, Oral, Q12H      Continuous Infusions:dilTIAZem, 5-15 mg/hr, Last Rate: Stopped (01/27/21 1000)      PRN Meds:.•  acetaminophen **OR** acetaminophen  •  aluminum-magnesium hydroxide-simethicone  •  dextrose  •  dextrose  •  glucagon (human recombinant)  •  hydrALAZINE  •  insulin lispro **AND** insulin lispro  •  ipratropium-albuterol  •  labetalol  •  nitroglycerin  •  ondansetron **OR** ondansetron  •  [COMPLETED] Insert peripheral IV **AND** sodium chloride        VITAL SIGNS  Vitals:    02/02/21 0700 02/02/21 0804 02/02/21 0831 02/02/21 1001   BP: 148/88 (!) 167/108 159/96 160/93   BP Location:    Right arm   Patient Position:    Lying   Pulse: 78 85 85 82   Resp:    12   Temp:    98 °F (36.7 °C)   TempSrc:    Oral   SpO2:       Weight:       Height:           Flowsheet Rows      First Filed Value   Admission Height  182.9 cm (72\") " Documented at 01/26/2021 0721   Admission Weight  104 kg (230 lb) Documented at 01/26/2021 0721           TELEMETRY: Atrial fibrillation    Physical Exam:  Constitutional:       Appearance: Well-developed.   Eyes:      General: No scleral icterus.     Conjunctiva/sclera: Conjunctivae normal.   HENT:      Head: Normocephalic and atraumatic.   Neck:      Musculoskeletal: Normal range of motion and neck supple.      Vascular: No carotid bruit or JVD.   Pulmonary:      Effort: Pulmonary effort is normal.      Breath sounds: Normal breath sounds. No wheezing. No rales.   Cardiovascular:      Normal rate. Irregularly irregular rhythm.   Pulses:     Intact distal pulses.   Abdominal:      General: Bowel sounds are normal.      Palpations: Abdomen is soft.   Skin:     General: Skin is warm and dry.      Findings: No rash.   Neurological:      Mental Status: Alert.          Results Review:   I reviewed the patient's new clinical results.  Lab Results (last 24 hours)     Procedure Component Value Units Date/Time    POC Glucose Once [[id number]]  (Abnormal) Collected: 02/02/21 1111    Specimen: Blood Updated: 02/02/21 1114     Glucose 112 mg/dL      Comment: Serial Number: [id number]:  [id number]       POC Glucose Once [[id number]]  (Normal) Collected: 02/02/21 0738    Specimen: Blood Updated: 02/02/21 0740     Glucose 98 mg/dL      Comment: Serial Number: [id number]:  [id number]       Magnesium [[id number]]  (Normal) Collected: 02/02/21 0514    Specimen: Blood Updated: 02/02/21 0556     Magnesium 2.0 mg/dL     Phosphorus [[id number]]  (Normal) Collected: 02/02/21 0514    Specimen: Blood Updated: 02/02/21 0556     Phosphorus 3.8 mg/dL     Comprehensive Metabolic Panel [[id number]]  (Abnormal) Collected: 02/02/21 0514    Specimen: Blood Updated: 02/02/21 0556     Glucose 105 mg/dL      BUN 32 mg/dL      Creatinine 1.07 mg/dL      Sodium 141 mmol/L      Potassium 4.1 mmol/L      Chloride 108 mmol/L      CO2 23.0 mmol/L       Calcium 9.3 mg/dL      Total Protein 7.3 g/dL      Albumin 3.90 g/dL      ALT (SGPT) 37 U/L      AST (SGOT) 32 U/L      Alkaline Phosphatase 62 U/L      Total Bilirubin 0.7 mg/dL      eGFR Non African Amer 70 mL/min/1.73      Globulin 3.4 gm/dL      A/G Ratio 1.1 g/dL      BUN/Creatinine Ratio 29.9     Anion Gap 10.0 mmol/L     Narrative:      GFR Normal >60  Chronic Kidney Disease <60  Kidney Failure <15      CBC & Differential [[id number]]  (Abnormal) Collected: 02/02/21 0514    Specimen: Blood Updated: 02/02/21 0531    Narrative:      The following orders were created for panel order CBC & Differential.  Procedure                               Abnormality         Status                     ---------                               -----------         ------                     CBC Auto Differential[[id number]]        Abnormal            Final result                 Please view results for these tests on the individual orders.    CBC Auto Differential [[id number]]  (Abnormal) Collected: 02/02/21 0514    Specimen: Blood Updated: 02/02/21 0531     WBC 9.40 10*3/mm3      RBC 5.18 10*6/mm3      Hemoglobin 15.0 g/dL      Hematocrit 44.8 %      MCV 86.5 fL      MCH 28.9 pg      MCHC 33.4 g/dL      RDW 14.8 %      RDW-SD 45.1 fl      MPV 8.7 fL      Platelets 154 10*3/mm3      Neutrophil % 69.5 %      Lymphocyte % 14.5 %      Monocyte % 11.9 %      Eosinophil % 3.5 %      Basophil % 0.6 %      Neutrophils, Absolute 6.50 10*3/mm3      Lymphocytes, Absolute 1.40 10*3/mm3      Monocytes, Absolute 1.10 10*3/mm3      Eosinophils, Absolute 0.30 10*3/mm3      Basophils, Absolute 0.10 10*3/mm3      nRBC 0.2 /100 WBC     POC Glucose Once [[id number]]  (Abnormal) Collected: 02/02/21 0009    Specimen: Blood Updated: 02/02/21 0011     Glucose 113 mg/dL      Comment: Serial Number: [id number]:  [id number]       POC Glucose Once [[id number]]  (Abnormal) Collected: 02/01/21 1930    Specimen: Blood Updated: 02/01/21 1931     Glucose  122 mg/dL      Comment: Serial Number: [id number]:  [id number]       POC Glucose Once [[id number]]  (Abnormal) Collected: 02/01/21 1614    Specimen: Blood Updated: 02/01/21 1617     Glucose 135 mg/dL      Comment: Serial Number: [id number]:  [id number]             Imaging Results (Last 24 Hours)     ** No results found for the last 24 hours. **          EKG      I personally viewed and interpreted the patient's EKG/Telemetry data:    ECHOCARDIOGRAM:    STRESS MYOVIEW:    CARDIAC CATHETERIZATION:    OTHER:         Assessment/Plan     Principal Problem:    Intracerebellar and posterior fossa hemorrhage (CMS/HCC)  Active Problems:    Chronic atrial fibrillation (CMS/HCC)    Chronic diastolic CHF (congestive heart failure) (CMS/HCC)    Coronary artery disease involving native coronary artery of native heart without angina pectoris    History of CVA (cerebrovascular accident)    DDD (degenerative disc disease), lumbosacral    GERD (gastroesophageal reflux disease)    Obesity    Paroxysmal atrial fibrillation (CMS/HCC)    Fall    Chronic anticoagulation       Patient presented with mental status changes and after a fall and has intracerebral hemorrhage and is seen by neurosurgeons  Patient has been on anticoagulation with Xarelto for A. fib which has been held  Patient's blood pressure has been high and he was started on Cardene drip but now is on oral medicines and his blood pressure is better now  We will switch Cardene to oral medicines.  Patient also has history of chronic coronary disease and is ruled out for MI by EKG enzymes  Patient has chronic insufficiency and is followed by nephrologist  Patient will be restarted on anticoagulation when cleared by neurosurgeons.    I discussed the patients findings and my recommendations with patient and nurse    Edy Thomas MD  02/02/21  12:34 EST

## 2021-02-02 NOTE — THERAPY TREATMENT NOTE
.Subjective: Pt agreeable to therapeutic plan of care.    Objective:     Bed mobility - N/A or Not attempted.  Pt already up in the chair  Transfers - CGA with roll walker   Ambulation - 140 feet Min-A  With roll walker, pt min assist for turns, cues to avoid objects.  Loss of balance 2 time drifting to the right.  Min assist to correct.      Pain: 0 VAS  No pain reported   Education: Provided education on importance of mobility and skilled verbal / tactile cueing throughout intervention.     Assessment: Carlos Whipple presents with functional mobility impairments which indicate the need for skilled intervention.  Pt still with impaired balance and needing  min assist for gait training.  Pt with confusion but follow direction well.  Tolerating session today without incident. Will continue to follow and progress as tolerated.     Plan/Recommendations:   Pt would benefit from Inpatient Rehabilitation placement at discharge from facility and requires no DME at discharge.   Pt desires Inpatient Rehabilitation placement at discharge. Pt cooperative; agreeable to therapeutic recommendations and plan of care.     Basic Mobility 6-click:  Rollin = Total, A lot = 2, A little = 3; 4 = None  Supine>Sit:   1 = Total, A lot = 2, A little = 3; 4 = None   Sit>Stand with arms:  1 = Total, A lot = 2, A little = 3; 4 = None  Bed>Chair:   1 = Total, A lot = 2, A little = 3; 4 = None  Ambulate in room:  1 = Total, A lot = 2, A little = 3; 4 = None  3-5 Steps with railin = Total, A lot = 2, A little = 3; 4 = None  Score: 17    Modified Yates: 4 = Moderately severe disability (Unable to attend to own bodily needs without assistance, and unable to walk unassisted)     Post-Tx Position: Up in Chair, Alarms activated and Call light and personal items within reach  PPE: gloves, surgical mask, eyewear protection

## 2021-02-02 NOTE — THERAPY TREATMENT NOTE
Patient Name: Carlos Whipple  : 1955    MRN: [id number]                              Today's Date: 2021       Admit Date: 2021    Visit Dx:     ICD-10-CM ICD-9-CM   1. Intracerebellar and posterior fossa hemorrhage (CMS/HCC)  I61.4 431    I61.8    2. Uncontrolled hypertension  I10 401.9     Patient Active Problem List   Diagnosis   • Chronic atrial fibrillation (CMS/HCC)   • Chronic diastolic CHF (congestive heart failure) (CMS/HCC)   • Essential hypertension   • Shortness of breath   • Coronary artery disease involving native coronary artery of native heart without angina pectoris   • Psoriasis   • History of CVA (cerebrovascular accident)   • DDD (degenerative disc disease), lumbosacral   • Peripheral polyneuropathy   • GERD (gastroesophageal reflux disease)   • Medically noncompliant   • Acute respiratory distress   • Unstable angina (CMS/HCC)   • Cervical disc disorder with radiculopathy   • Completed stroke (CMS/HCC)   • Degeneration of intervertebral disc   • Excessive anticoagulation   • Impaired left ventricular function   • Liver lesion   • Lumbar radiculopathy   • Cervical myelopathy (CMS/HCC)   • Lumbosacral radiculopathy   • Spinal stenosis of lumbar region   • Obesity   • Mild cognitive impairment   • Thoracic back pain   • Thoracic disc disease with myelopathy   • Paroxysmal atrial fibrillation (CMS/HCC)   • Acute congestive heart failure (CMS/HCC)   • Dermatitis associated with moisture   • Intracerebellar and posterior fossa hemorrhage (CMS/HCC)   • Fall   • Chronic anticoagulation     Past Medical History:   Diagnosis Date   • A-fib (CMS/HCC)    • Atrial fibrillation with RVR (CMS/HCC)    • CHF (congestive heart failure) (CMS/HCC)    • Chronic atrial fibrillation (CMS/HCC) 2019   • Chronic diastolic CHF (congestive heart failure) (CMS/HCC) 2019   • CKD (chronic kidney disease) stage 3, GFR 30-59 ml/min (CMS/HCC) 2019   • Coronary artery disease involving native  coronary artery of native heart without angina pectoris 11/8/2019   • DDD (degenerative disc disease), lumbosacral 11/8/2019   • Essential hypertension 11/8/2019   • Gambling disorder, persistent 11/8/2019   • GERD (gastroesophageal reflux disease) 11/8/2019   • History of CVA (cerebrovascular accident) 11/8/2019   • Hyperlipidemia    • Hypertension    • Medically noncompliant 11/8/2019   • Peripheral polyneuropathy 11/8/2019   • Psoriasis 11/8/2019   • Stroke (CMS/HCC)      Past Surgical History:   Procedure Laterality Date   • CARDIAC CATHETERIZATION     • CARDIAC CATHETERIZATION N/A 12/10/2019    Procedure: Left Heart Cath and coronary angiogram;  Surgeon: Edy Thomas MD;  Location: [facility] CATH INVASIVE LOCATION;  Service: Cardiovascular     General Information     Row Name 02/02/21 1552          OT Time and Intention    Document Type  therapy note (daily note)  -ES     Mode of Treatment  physical therapy  -ES     Row Name 02/02/21 1552          General Information    Patient Profile Reviewed  yes  -ES     Existing Precautions/Restrictions  fall;cardiac;other (see comments)  -ES     Row Name 02/02/21 1552          Safety Issues, Functional Mobility    Impairments Affecting Function (Mobility)  balance;cognition  -ES     Cognitive Impairments, Mobility Safety/Performance  attention;awareness, need for assistance;impulsivity;insight into deficits/self-awareness;judgment;problem-solving/reasoning;safety precaution awareness;safety precaution follow-through;sequencing abilities  -ES       User Key  (r) = Recorded By, (t) = Taken By, (c) = Cosigned By    Initials Name Provider Type    ES Allison Friedman OT Occupational Therapist          Mobility/ADL's     Row Name 02/02/21 1552          Bed Mobility    Bed Mobility  supine-sit  -ES     Supine-Sit [place] (Bed Mobility)  minimum assist (75% patient effort)  -ES     Row Name 02/02/21 1552          Transfers    Transfers  sit-stand transfer  -ES      Bed-Chair [place] (Transfers)  verbal cues;minimum assist (75% patient effort)  -ES     Assistive Device (Bed-Chair Transfers)  walker, front-wheeled  -ES     Sit-Stand [place] (Transfers)  set up;minimum assist (75% patient effort);1 person assist  -ES     Row Name 02/02/21 1552          Sit-Stand Transfer    Assistive Device (Sit-Stand Transfers)  walker, front-wheeled  -ES     Row Name 02/02/21 1552          Functional Mobility    Functional Mobility- Ind. Level  contact guard assist;1 person;minimum assist (75% patient effort)  -ES     Functional Mobility- Device  rolling walker  -ES     Row Name 02/02/21 1552          Activities of Daily Living    BADL Assessment/Intervention  bathing;lower body dressing;grooming  -     Row Name 02/02/21 1552          Lower Body Dressing Assessment/Training    [place] Level (Lower Body Dressing)  don;socks;standby assist  -ES     Position (Lower Body Dressing)  supported sitting  -ES     Row Name 02/02/21 1552          Bathing Assessment/Intervention    [place] Level (Bathing)  moderate assist (50% patient effort)  -ES     Comment (Bathing)  cuing for sequencing and safety. poor attention span  -     Row Name 02/02/21 1552          Grooming Assessment/Training    [place] Level (Grooming)  minimum assist (75% patient effort)  -ES       User Key  (r) = Recorded By, (t) = Taken By, (c) = Cosigned By    Initials Name Provider Type     Allison Friedman OT Occupational Therapist        Obj/Interventions     Row Name 02/02/21 1553          Sensory Assessment (Somatosensory)    Sensory Assessment (Somatosensory)  -- intermittent numbness/tingling  -[facility] Name 02/02/21 1553          Vision Assessment/Intervention    Visual Impairment/Limitations  WFL;corrective lenses for reading  -     Row Name 02/02/21 1553          Range of Motion Comprehensive    General Range of Motion  no range of motion deficits identified  -     Row Name 02/02/21 1553           Strength Comprehensive (MMT)    General Manual Muscle Testing (MMT) Assessment  no strength deficits identified  -ES     Row Name 02/02/21 155          Balance    Balance Assessment  sitting static balance;sitting dynamic balance;sit to stand dynamic balance;standing static balance;standing dynamic balance  -ES     Static Sitting Balance  WNL  -ES     Dynamic Sitting Balance  WFL  -ES     Static Standing Balance  mild impairment  -ES     Dynamic Standing Balance  mild impairment  -ES     Balance Interventions  sitting;standing;sit to stand;supported;static;dynamic;occupation based/functional task  -ES       User Key  (r) = Recorded By, (t) = Taken By, (c) = Cosigned By    Initials Name Provider Type    ES Allison Friedman OT Occupational Therapist        Goals/Plan    No documentation.       Clinical Impression     Row Name 02/02/21 1556          Pain Scale: Numbers Pre/Post-Treatment    Pretreatment Pain Rating  0/10 - no pain  -ES     Posttreatment Pain Rating  0/10 - no pain  -ES     Row Name 02/02/21 1557          Pain Scale: FACES Pre/Post-Treatment    Pain: FACES Scale, Pretreatment  0-->no hurt  -ES     Posttreatment Pain Rating  0-->no hurt  -ES     Row Name 02/02/21 1558          Plan of Care Review    Plan of Care Reviewed With  patient  -ES     Outcome Summary  Patient supine in bed with hypertension, but NSG states he has just been given medication and ok to procede with therapy. Pt completes bed mobility with Min a. Transfers bed<>chair with min A, and OT strips bed as it is damp (urine vs. sweat?). Pt completes bathing takss with Mod A, requiring max verbal cues due to cognitive deficits and poor attention span. He is pleasant and cooperative, but very limited due to short-term memory deficits. Continues to require IP rehab at discharge. PPE: Gloves, mask, eyewear  -ES     Row Name 02/02/21 1558          Therapy Plan Review/Discharge Plan (OT)    Anticipated Discharge Disposition (OT)   skilled nursing facility  -     Row Name 02/02/21 1555          Vital Signs    Pre Systolic BP Rehab  157  -ES     Pre Treatment Diastolic BP  100  -ES     Intra Systolic BP Rehab  167  -ES     Intra Treatment Diastolic BP  112  -ES     Post Systolic BP Rehab  150 NSG ok to leav eup in chair  -ES     Post Treatment Diastolic BP  100  -ES     Pre Patient Position  Supine  -ES     Intra Patient Position  Standing  -ES     Post Patient Position  Sitting  -ES     Row Name 02/02/21 1555          Positioning and Restraints    Pre-Treatment Position  in bed  -ES     Post Treatment Position  chair  -ES     In Chair  notified nsg;call light within reach;encouraged to call for assist;exit alarm on  -ES       User Key  (r) = Recorded By, (t) = Taken By, (c) = Cosigned By    Initials Name Provider Type    Allison Beck OT Occupational Therapist        Outcome Measures    No documentation.       Occupational Therapy Education                 Title: PT OT SLP Therapies (Done)     Topic: Occupational Therapy (Done)     Point: ADL training (Done)     Description:   Instruct learner(s) on proper safety adaptation and remediation techniques during self care or transfers.   Instruct in proper use of assistive devices.              Learning Progress Summary           Patient Acceptance, E,TB, VU,NR by MC at 1/31/2021 1309    Acceptance, E, NR by AD at 1/31/2021 1021    Acceptance, E, NR by AD at 1/30/2021 0931    Acceptance, E,TB, VU by ERIC at 1/27/2021 1227                   Point: Home exercise program (Done)     Description:   Instruct learner(s) on appropriate technique for monitoring, assisting and/or progressing therapeutic exercises/activities.              Learning Progress Summary           Patient Acceptance, E,TB, VU,NR by MC at 1/31/2021 1309    Acceptance, E, NR by AD at 1/31/2021 1021    Acceptance, E, NR by AD at 1/30/2021 0931    Acceptance, E,TB, VU by ERIC at 1/27/2021 1227                   Point: Precautions  (Done)     Description:   Instruct learner(s) on prescribed precautions during self-care and functional transfers.              Learning Progress Summary           Patient Acceptance, E,TB, VU,NR by  at 1/31/2021 1309    Acceptance, E, NR by AD at 1/31/2021 1021    Acceptance, E, NR by AD at 1/30/2021 0931    Acceptance, E,TB, VU by ERIC at 1/27/2021 1227                   Point: Body mechanics (Done)     Description:   Instruct learner(s) on proper positioning and spine alignment during self-care, functional mobility activities and/or exercises.              Learning Progress Summary           Patient Acceptance, E,TB, VU,NR by  at 1/31/2021 1309    Acceptance, E, NR by AD at 1/31/2021 1021    Acceptance, E, NR by AD at 1/30/2021 0931    Acceptance, E,TB, VU by ERIC at 1/27/2021 1227                               User Key     Initials Effective Dates Name Provider Type Discipline     03/01/19 -  Marilee Casey PTA Physical Therapy Assistant PT     03/25/19 -  Juliana Liu, RN Registered Nurse Nurse     07/15/20 -  Jeanine Carey OT Occupational Therapist OT              OT Recommendation and Plan     Plan of Care Review  Plan of Care Reviewed With: patient  Outcome Summary: Patient supine in bed with hypertension, but NSG states he has just been given medication and ok to procede with therapy. Pt completes bed mobility with Min a. Transfers bed<>chair with min A, and OT strips bed as it is damp (urine vs. sweat?). Pt completes bathing takss with Mod A, requiring max verbal cues due to cognitive deficits and poor attention span. He is pleasant and cooperative, but very limited due to short-term memory deficits. Continues to require IP rehab at discharge. PPE: Gloves, mask, eyewear     Time Calculation:   Time Calculation- OT     Row Name 02/02/21 1602             Time Calculation- OT    OT Start Time  1130  -ES      OT Stop Time  1157  -ES      OT Time Calculation (min)  27 min  -ES      Total  Timed Code Minutes- OT  27 minute(s)  -ES      OT Received On  02/02/21  -ES      OT - Next Appointment  02/03/21  -ES        User Key  (r) = Recorded By, (t) = Taken By, (c) = Cosigned By    Initials Name Provider Type    Allison Beck OT Occupational Therapist        Therapy Charges for Today     Code Description Service Date Service Provider Modifiers Qty    [id number] HC OT SELF CARE/MGMT/TRAIN EA 15 MIN 2/2/2021 Allison Friedman OT GO 2               Allison Friedman OT  2/2/2021

## 2021-02-02 NOTE — PLAN OF CARE
Problem: Fall Injury Risk  Goal: Absence of Fall and Fall-Related Injury  Outcome: Ongoing, Progressing  Intervention: Identify and Manage Contributors to Fall Injury Risk  Recent Flowsheet Documentation  Taken 2/2/2021 1600 by Cynthia Cardona RN  Self-Care Promotion: independence encouraged  Taken 2/2/2021 1200 by Cynthia Cardona RN  Medication Review/Management: medications reviewed  Self-Care Promotion: independence encouraged  Taken 2/2/2021 0800 by Cynthia Cardona RN  Medication Review/Management: medications reviewed  Self-Care Promotion: independence encouraged  Intervention: Promote Injury-Free Environment  Recent Flowsheet Documentation  Taken 2/2/2021 1600 by Cynthia Cardona RN  Safety Promotion/Fall Prevention:   assistive device/personal items within reach   clutter free environment maintained   fall prevention program maintained   nonskid shoes/slippers when out of bed   room organization consistent   safety round/check completed  Taken 2/2/2021 1400 by Cynthia Cardona RN  Safety Promotion/Fall Prevention: safety round/check completed  Taken 2/2/2021 1200 by Cynthia Cardona RN  Safety Promotion/Fall Prevention:   assistive device/personal items within reach   clutter free environment maintained   fall prevention program maintained   nonskid shoes/slippers when out of bed   room organization consistent   safety round/check completed  Taken 2/2/2021 1000 by Cynthia Cardona RN  Safety Promotion/Fall Prevention: safety round/check completed  Taken 2/2/2021 0800 by Cynthia Cardona RN  Safety Promotion/Fall Prevention:   assistive device/personal items within reach   clutter free environment maintained   fall prevention program maintained   nonskid shoes/slippers when out of bed   room organization consistent   safety round/check completed     Problem: Adult Inpatient Plan of Care  Goal: Patient-Specific Goal (Individualized)  Outcome: Ongoing, Progressing  Goal: Absence of  Hospital-Acquired Illness or Injury  Outcome: Ongoing, Progressing  Intervention: Identify and Manage Fall Risk  Recent Flowsheet Documentation  Taken 2/2/2021 1600 by Cynthia Cardona RN  Safety Promotion/Fall Prevention:   assistive device/personal items within reach   clutter free environment maintained   fall prevention program maintained   nonskid shoes/slippers when out of bed   room organization consistent   safety round/check completed  Taken 2/2/2021 1400 by Cynthia Cardona RN  Safety Promotion/Fall Prevention: safety round/check completed  Taken 2/2/2021 1200 by Cynthia Cardona RN  Safety Promotion/Fall Prevention:   assistive device/personal items within reach   clutter free environment maintained   fall prevention program maintained   nonskid shoes/slippers when out of bed   room organization consistent   safety round/check completed  Taken 2/2/2021 1000 by Cynthia Cardona RN  Safety Promotion/Fall Prevention: safety round/check completed  Taken 2/2/2021 0800 by Cynthia Cardona RN  Safety Promotion/Fall Prevention:   assistive device/personal items within reach   clutter free environment maintained   fall prevention program maintained   nonskid shoes/slippers when out of bed   room organization consistent   safety round/check completed  Intervention: Prevent Skin Injury  Recent Flowsheet Documentation  Taken 2/2/2021 1600 by Cynthia Cardona RN  Body Position: position changed independently  Taken 2/2/2021 1200 by Cynthia Cardona RN  Body Position: position changed independently  Taken 2/2/2021 0800 by Cynthia Cardona RN  Body Position: position changed independently  Goal: Optimal Comfort and Wellbeing  Outcome: Ongoing, Progressing  Goal: Readiness for Transition of Care  Outcome: Ongoing, Progressing     Problem: Skin Injury Risk Increased  Goal: Skin Health and Integrity  Outcome: Ongoing, Progressing  Intervention: Optimize Skin Protection  Recent Flowsheet Documentation  Taken  2/2/2021 1600 by Cynthia Cardona RN  Pressure Reduction Techniques: frequent weight shift encouraged  Head of Bed (HOB): (Patient is sitting in the chair) other (see comments)  Pressure Reduction Devices: pressure-redistributing mattress utilized  Skin Protection:   adhesive use limited   tubing/devices free from skin contact  Taken 2/2/2021 1200 by Cynthia Cardona RN  Pressure Reduction Techniques: frequent weight shift encouraged  Head of Bed (HOB): (Patient is sitting in the chair) other (see comments)  Pressure Reduction Devices: pressure-redistributing mattress utilized  Skin Protection:   adhesive use limited   tubing/devices free from skin contact  Taken 2/2/2021 0800 by Cynthia Cardona RN  Pressure Reduction Techniques: frequent weight shift encouraged  Head of Bed (HOB): HOB at 30-45 degrees  Pressure Reduction Devices: pressure-redistributing mattress utilized  Skin Protection:   adhesive use limited   tubing/devices free from skin contact   Goal Outcome Evaluation:      Patient had a renal ultrasound today. The patient did not have any other tests or procedures. The patient is now off of the cardene gtt. Patient's BP is remaining stable with the medication adjustments today. The patient does not have any complaints and remains on room air. Will continue to monitor.

## 2021-02-03 ENCOUNTER — APPOINTMENT (OUTPATIENT)
Dept: ULTRASOUND IMAGING | Facility: HOSPITAL | Age: 66
End: 2021-02-03

## 2021-02-03 LAB
ALBUMIN SERPL-MCNC: 3.9 G/DL (ref 3.5–5.2)
ALBUMIN/GLOB SERPL: 1.2 G/DL
ALP SERPL-CCNC: 68 U/L (ref 39–117)
ALT SERPL W P-5'-P-CCNC: 38 U/L (ref 1–41)
ANION GAP SERPL CALCULATED.3IONS-SCNC: 6 MMOL/L (ref 5–15)
AST SERPL-CCNC: 25 U/L (ref 1–40)
BASOPHILS # BLD AUTO: 0.1 10*3/MM3 (ref 0–0.2)
BASOPHILS NFR BLD AUTO: 0.6 % (ref 0–1.5)
BILIRUB SERPL-MCNC: 0.7 MG/DL (ref 0–1.2)
BUN SERPL-MCNC: 28 MG/DL (ref 8–23)
BUN/CREAT SERPL: 26.7 (ref 7–25)
CALCIUM SPEC-SCNC: 9.1 MG/DL (ref 8.6–10.5)
CHLORIDE SERPL-SCNC: 107 MMOL/L (ref 98–107)
CO2 SERPL-SCNC: 27 MMOL/L (ref 22–29)
CREAT SERPL-MCNC: 1.05 MG/DL (ref 0.76–1.27)
DEPRECATED RDW RBC AUTO: 44.6 FL (ref 37–54)
EOSINOPHIL # BLD AUTO: 0.4 10*3/MM3 (ref 0–0.4)
EOSINOPHIL NFR BLD AUTO: 4.1 % (ref 0.3–6.2)
ERYTHROCYTE [DISTWIDTH] IN BLOOD BY AUTOMATED COUNT: 14.7 % (ref 12.3–15.4)
GFR SERPL CREATININE-BSD FRML MDRD: 71 ML/MIN/1.73
GLOBULIN UR ELPH-MCNC: 3.2 GM/DL
GLUCOSE BLDC GLUCOMTR-MCNC: 129 MG/DL (ref 70–105)
GLUCOSE BLDC GLUCOMTR-MCNC: 99 MG/DL (ref 70–105)
GLUCOSE BLDC GLUCOMTR-MCNC: 99 MG/DL (ref 70–105)
GLUCOSE SERPL-MCNC: 99 MG/DL (ref 65–99)
HCT VFR BLD AUTO: 41.6 % (ref 37.5–51)
HGB BLD-MCNC: 14.2 G/DL (ref 13–17.7)
LYMPHOCYTES # BLD AUTO: 1.3 10*3/MM3 (ref 0.7–3.1)
LYMPHOCYTES NFR BLD AUTO: 14.2 % (ref 19.6–45.3)
MAGNESIUM SERPL-MCNC: 2.1 MG/DL (ref 1.6–2.4)
MCH RBC QN AUTO: 29.4 PG (ref 26.6–33)
MCHC RBC AUTO-ENTMCNC: 34 G/DL (ref 31.5–35.7)
MCV RBC AUTO: 86.5 FL (ref 79–97)
MONOCYTES # BLD AUTO: 1.1 10*3/MM3 (ref 0.1–0.9)
MONOCYTES NFR BLD AUTO: 12.9 % (ref 5–12)
NEUTROPHILS NFR BLD AUTO: 6 10*3/MM3 (ref 1.7–7)
NEUTROPHILS NFR BLD AUTO: 68.2 % (ref 42.7–76)
NRBC BLD AUTO-RTO: 0.1 /100 WBC (ref 0–0.2)
PHOSPHATE SERPL-MCNC: 3.1 MG/DL (ref 2.5–4.5)
PLATELET # BLD AUTO: 149 10*3/MM3 (ref 140–450)
PMV BLD AUTO: 9 FL (ref 6–12)
POTASSIUM SERPL-SCNC: 4.1 MMOL/L (ref 3.5–5.2)
PROT SERPL-MCNC: 7.1 G/DL (ref 6–8.5)
RBC # BLD AUTO: 4.81 10*6/MM3 (ref 4.14–5.8)
SODIUM SERPL-SCNC: 140 MMOL/L (ref 136–145)
WBC # BLD AUTO: 8.8 10*3/MM3 (ref 3.4–10.8)

## 2021-02-03 PROCEDURE — 84100 ASSAY OF PHOSPHORUS: CPT | Performed by: NURSE PRACTITIONER

## 2021-02-03 PROCEDURE — 99232 SBSQ HOSP IP/OBS MODERATE 35: CPT | Performed by: INTERNAL MEDICINE

## 2021-02-03 PROCEDURE — 83735 ASSAY OF MAGNESIUM: CPT | Performed by: NURSE PRACTITIONER

## 2021-02-03 PROCEDURE — 76775 US EXAM ABDO BACK WALL LIM: CPT

## 2021-02-03 PROCEDURE — 80053 COMPREHEN METABOLIC PANEL: CPT | Performed by: NURSE PRACTITIONER

## 2021-02-03 PROCEDURE — 85025 COMPLETE CBC W/AUTO DIFF WBC: CPT | Performed by: NURSE PRACTITIONER

## 2021-02-03 PROCEDURE — 82962 GLUCOSE BLOOD TEST: CPT

## 2021-02-03 PROCEDURE — 97535 SELF CARE MNGMENT TRAINING: CPT

## 2021-02-03 RX ORDER — CLONIDINE HYDROCHLORIDE 0.1 MG/1
0.1 TABLET ORAL ONCE
Status: COMPLETED | OUTPATIENT
Start: 2021-02-03 | End: 2021-02-03

## 2021-02-03 RX ORDER — BISACODYL 5 MG/1
10 TABLET, DELAYED RELEASE ORAL DAILY PRN
Status: DISCONTINUED | OUTPATIENT
Start: 2021-02-03 | End: 2021-02-04 | Stop reason: HOSPADM

## 2021-02-03 RX ORDER — INSULIN LISPRO 100 [IU]/ML
0-7 INJECTION, SOLUTION INTRAVENOUS; SUBCUTANEOUS AS NEEDED
Status: DISCONTINUED | OUTPATIENT
Start: 2021-02-03 | End: 2021-02-04 | Stop reason: HOSPADM

## 2021-02-03 RX ORDER — INSULIN LISPRO 100 [IU]/ML
0-7 INJECTION, SOLUTION INTRAVENOUS; SUBCUTANEOUS
Status: DISCONTINUED | OUTPATIENT
Start: 2021-02-04 | End: 2021-02-04 | Stop reason: HOSPADM

## 2021-02-03 RX ORDER — DILTIAZEM HYDROCHLORIDE 240 MG/1
240 CAPSULE, COATED, EXTENDED RELEASE ORAL
Status: DISCONTINUED | OUTPATIENT
Start: 2021-02-04 | End: 2021-02-04 | Stop reason: HOSPADM

## 2021-02-03 RX ADMIN — METOPROLOL TARTRATE 50 MG: 50 TABLET, FILM COATED ORAL at 08:01

## 2021-02-03 RX ADMIN — LOSARTAN POTASSIUM 100 MG: 50 TABLET, FILM COATED ORAL at 08:01

## 2021-02-03 RX ADMIN — METOPROLOL TARTRATE 50 MG: 50 TABLET, FILM COATED ORAL at 20:19

## 2021-02-03 RX ADMIN — ALLOPURINOL 100 MG: 100 TABLET ORAL at 08:01

## 2021-02-03 RX ADMIN — CLONIDINE HYDROCHLORIDE 0.1 MG: 0.1 TABLET ORAL at 06:48

## 2021-02-03 RX ADMIN — BISACODYL 10 MG: 5 TABLET, COATED ORAL at 06:48

## 2021-02-03 RX ADMIN — DILTIAZEM HYDROCHLORIDE 180 MG: 180 CAPSULE, COATED, EXTENDED RELEASE ORAL at 08:01

## 2021-02-03 RX ADMIN — ATORVASTATIN CALCIUM 10 MG: 10 TABLET, FILM COATED ORAL at 08:01

## 2021-02-03 RX ADMIN — Medication 10 ML: at 20:19

## 2021-02-03 NOTE — PLAN OF CARE
Problem: Fall Injury Risk  Goal: Absence of Fall and Fall-Related Injury  Outcome: Ongoing, Progressing  Intervention: Promote Injury-Free Environment  Recent Flowsheet Documentation  Taken 2/3/2021 0310 by Grecia Nicolas RN  Safety Promotion/Fall Prevention:   fall prevention program maintained   safety round/check completed  Taken 2/3/2021 0052 by Grecia Nicolas RN  Safety Promotion/Fall Prevention:   fall prevention program maintained   safety round/check completed  Taken 2/2/2021 2155 by Grecia Nicolas RN  Safety Promotion/Fall Prevention:   fall prevention program maintained   safety round/check completed     Problem: Adult Inpatient Plan of Care  Goal: Patient-Specific Goal (Individualized)  Outcome: Ongoing, Progressing  Goal: Absence of Hospital-Acquired Illness or Injury  Outcome: Ongoing, Progressing  Intervention: Identify and Manage Fall Risk  Recent Flowsheet Documentation  Taken 2/3/2021 0310 by Grecia Nicolas RN  Safety Promotion/Fall Prevention:   fall prevention program maintained   safety round/check completed  Taken 2/3/2021 0052 by Grecia Nicolas RN  Safety Promotion/Fall Prevention:   fall prevention program maintained   safety round/check completed  Taken 2/2/2021 2155 by Grecia Nicolas RN  Safety Promotion/Fall Prevention:   fall prevention program maintained   safety round/check completed  Goal: Optimal Comfort and Wellbeing  Outcome: Ongoing, Progressing  Goal: Readiness for Transition of Care  Outcome: Ongoing, Progressing     Problem: Skin Injury Risk Increased  Goal: Skin Health and Integrity  Outcome: Ongoing, Progressing   Goal Outcome Evaluation:

## 2021-02-03 NOTE — PROGRESS NOTES
Referring Provider: Hospitalist    Reason for follow-up: High blood pressure and atrial fibrillation     Patient Care Team:  Anibal Dalton MD as PCP - General (Family Medicine)  Shwetha Barber MD as Consulting Physician (Nephrology)  Edy Thomas MD as Consulting Physician (Cardiology)    Subjective .  Patient doing well without any chest pain or shortness of breath    Objective  Lying in bed comfortably     Review of Systems   Constitution: Negative for fever and malaise/fatigue.   Cardiovascular: Negative for chest pain, dyspnea on exertion and palpitations.   Respiratory: Negative for cough and shortness of breath.    Skin: Negative for rash.   Gastrointestinal: Negative for abdominal pain, nausea and vomiting.   Neurological: Negative for focal weakness and headaches.   All other systems reviewed and are negative.      Ketorolac tromethamine    Scheduled Meds:allopurinol, 100 mg, Oral, Daily  atorvastatin, 10 mg, Oral, Daily  [START ON 2/4/2021] dilTIAZem CD, 240 mg, Oral, Q24H  insulin lispro, 0-7 Units, Subcutaneous, Q6H  losartan, 100 mg, Oral, Q24H  metoprolol tartrate, 50 mg, Oral, Q12H      Continuous Infusions:dilTIAZem, 5-15 mg/hr, Last Rate: Stopped (01/27/21 1000)      PRN Meds:.•  acetaminophen **OR** acetaminophen  •  aluminum-magnesium hydroxide-simethicone  •  bisacodyl  •  dextrose  •  dextrose  •  glucagon (human recombinant)  •  insulin lispro **AND** insulin lispro  •  ipratropium-albuterol  •  labetalol  •  nitroglycerin  •  ondansetron **OR** ondansetron  •  [COMPLETED] Insert peripheral IV **AND** sodium chloride        VITAL SIGNS  Vitals:    02/02/21 1811 02/02/21 2200 02/03/21 0258 02/03/21 0627   BP: 147/92 153/90 (!) 143/122 (!) 154/101   BP Location: Right arm Left arm Left arm Left arm   Patient Position: Lying Lying Lying Lying   Pulse: 75 59 69 65   Resp: 19 16 18 18   Temp: 97.5 °F (36.4 °C) 97.8 °F (36.6 °C) 97.6 °F (36.4 °C) 98 °F (36.7 °C)   TempSrc: Oral Oral  "Oral Oral   SpO2:  96% 96% 97%   Weight:    118 kg (260 lb 12.9 oz)   Height:           Flowsheet Rows      First Filed Value   Admission Height  182.9 cm (72\") Documented at 01/26/2021 0721   Admission Weight  104 kg (230 lb) Documented at 01/26/2021 0721           TELEMETRY: Atrial fibrillation    Physical Exam:  Constitutional:       Appearance: Well-developed.   Eyes:      General: No scleral icterus.     Conjunctiva/sclera: Conjunctivae normal.   HENT:      Head: Normocephalic and atraumatic.   Neck:      Musculoskeletal: Normal range of motion and neck supple.      Vascular: No carotid bruit or JVD.   Pulmonary:      Effort: Pulmonary effort is normal.      Breath sounds: Normal breath sounds. No wheezing. No rales.   Cardiovascular:      Normal rate. Irregularly irregular rhythm.   Pulses:     Intact distal pulses.   Abdominal:      General: Bowel sounds are normal.      Palpations: Abdomen is soft.   Skin:     General: Skin is warm and dry.      Findings: No rash.   Neurological:      Mental Status: Alert.          Results Review:   I reviewed the patient's new clinical results.  Lab Results (last 24 hours)     Procedure Component Value Units Date/Time    POC Glucose Once [[id number]]  (Normal) Collected: 02/03/21 0715    Specimen: Blood Updated: 02/03/21 0720     Glucose 99 mg/dL      Comment: Serial Number: [id number]:  [id number]       Phosphorus [[id number]]  (Normal) Collected: 02/03/21 0528    Specimen: Blood Updated: 02/03/21 0621     Phosphorus 3.1 mg/dL     Comprehensive Metabolic Panel [[id number]]  (Abnormal) Collected: 02/03/21 0528    Specimen: Blood Updated: 02/03/21 0621     Glucose 99 mg/dL      BUN 28 mg/dL      Creatinine 1.05 mg/dL      Sodium 140 mmol/L      Potassium 4.1 mmol/L      Chloride 107 mmol/L      CO2 27.0 mmol/L      Calcium 9.1 mg/dL      Total Protein 7.1 g/dL      Albumin 3.90 g/dL      ALT (SGPT) 38 U/L      AST (SGOT) 25 U/L      Alkaline Phosphatase 68 U/L      Total " Bilirubin 0.7 mg/dL      eGFR Non African Amer 71 mL/min/1.73      Globulin 3.2 gm/dL      A/G Ratio 1.2 g/dL      BUN/Creatinine Ratio 26.7     Anion Gap 6.0 mmol/L     Narrative:      GFR Normal >60  Chronic Kidney Disease <60  Kidney Failure <15      Magnesium [[id number]]  (Normal) Collected: 02/03/21 0528    Specimen: Blood Updated: 02/03/21 0621     Magnesium 2.1 mg/dL     CBC & Differential [[id number]]  (Abnormal) Collected: 02/03/21 0528    Specimen: Blood Updated: 02/03/21 0605    Narrative:      The following orders were created for panel order CBC & Differential.  Procedure                               Abnormality         Status                     ---------                               -----------         ------                     CBC Auto Differential[[id number]]        Abnormal            Final result                 Please view results for these tests on the individual orders.    CBC Auto Differential [[id number]]  (Abnormal) Collected: 02/03/21 0528    Specimen: Blood Updated: 02/03/21 0605     WBC 8.80 10*3/mm3      RBC 4.81 10*6/mm3      Hemoglobin 14.2 g/dL      Hematocrit 41.6 %      MCV 86.5 fL      MCH 29.4 pg      MCHC 34.0 g/dL      RDW 14.7 %      RDW-SD 44.6 fl      MPV 9.0 fL      Platelets 149 10*3/mm3      Neutrophil % 68.2 %      Lymphocyte % 14.2 %      Monocyte % 12.9 %      Eosinophil % 4.1 %      Basophil % 0.6 %      Neutrophils, Absolute 6.00 10*3/mm3      Lymphocytes, Absolute 1.30 10*3/mm3      Monocytes, Absolute 1.10 10*3/mm3      Eosinophils, Absolute 0.40 10*3/mm3      Basophils, Absolute 0.10 10*3/mm3      nRBC 0.1 /100 WBC     POC Glucose Once [[id number]]  (Abnormal) Collected: 02/02/21 1946    Specimen: Blood Updated: 02/02/21 1947     Glucose 115 mg/dL      Comment: Serial Number: [id number]:  [id number]       POC Glucose Once [[id number]]  (Abnormal) Collected: 02/02/21 1643    Specimen: Blood Updated: 02/02/21 1647     Glucose 123 mg/dL      Comment: Serial  Number: [id number]:  [id number]       POC Glucose Once [[id number]]  (Abnormal) Collected: 02/02/21 1111    Specimen: Blood Updated: 02/02/21 1114     Glucose 112 mg/dL      Comment: Serial Number: [id number]:  [id number]             Imaging Results (Last 24 Hours)     Procedure Component Value Units Date/Time    US Renal Bilateral [[id number]] Collected: 02/03/21 0621     Updated: 02/03/21 0623    Narrative:      EXAMINATION: COMPLETE RENAL RETROPERITONEAL  ULTRASOUND    DATE OF EXAMINATION: 2/3/2021.     INDICATION: Uncontrolled hypertension.     COMPARISON: None available.     FINDINGS:     Right Kidney: The right kidney measures 10.3 x 5.5 x 6.3 cm.   The renal cortical echotexture is normal. There is no hydronephrosis or mass.     Left Kidney: The left kidney measures 9.0 x 5.3 x 5.2 cm.  The renal cortical echotexture is normal. There is no hydronephrosis or mass.     Bladder:  No wall thickening.      Impression:      1. No renal abnormality seen and no hydronephrosis.       Electronically signed by:  Elias Schwartz D.O.    2/3/2021 4:22 AM          EKG      I personally viewed and interpreted the patient's EKG/Telemetry data:    ECHOCARDIOGRAM:    STRESS MYOVIEW:    CARDIAC CATHETERIZATION:    OTHER:         Assessment/Plan     Principal Problem:    Intracerebellar and posterior fossa hemorrhage (CMS/HCC)  Active Problems:    Chronic atrial fibrillation (CMS/HCC)    Chronic diastolic CHF (congestive heart failure) (CMS/HCC)    Coronary artery disease involving native coronary artery of native heart without angina pectoris    History of CVA (cerebrovascular accident)    DDD (degenerative disc disease), lumbosacral    GERD (gastroesophageal reflux disease)    Obesity    Paroxysmal atrial fibrillation (CMS/HCC)    Fall    Chronic anticoagulation       Patient presented with mental status changes and after a fall and has intracerebral hemorrhage and is seen by neurosurgeons  Patient has been on  anticoagulation with Xarelto for A. fib which has been held  Patient's blood pressure has been high and he was started on Cardene drip but now is on oral medicines and his blood pressure is better now  Patient is off Cardene and he is on oral medicines and his blood pressure still has been high and hence his medications are being adjusted daily.  Patient also has history of chronic coronary disease and is ruled out for MI by EKG enzymes  Patient has chronic insufficiency and is followed by nephrologist  Patient will be restarted on anticoagulation when cleared by neurosurgeons.    I discussed the patients findings and my recommendations with patient and nurse    Edy Thomas MD  02/03/21  09:29 EST

## 2021-02-03 NOTE — THERAPY TREATMENT NOTE
Patient Name: Carlos Whipple  : 1955    MRN: [id number]                              Today's Date: 2/3/2021       Admit Date: 2021    Visit Dx:     ICD-10-CM ICD-9-CM   1. Intracerebellar and posterior fossa hemorrhage (CMS/HCC)  I61.4 431    I61.8    2. Uncontrolled hypertension  I10 401.9     Patient Active Problem List   Diagnosis   • Chronic atrial fibrillation (CMS/HCC)   • Chronic diastolic CHF (congestive heart failure) (CMS/HCC)   • Essential hypertension   • Shortness of breath   • Coronary artery disease involving native coronary artery of native heart without angina pectoris   • Psoriasis   • History of CVA (cerebrovascular accident)   • DDD (degenerative disc disease), lumbosacral   • Peripheral polyneuropathy   • GERD (gastroesophageal reflux disease)   • Medically noncompliant   • Acute respiratory distress   • Unstable angina (CMS/HCC)   • Cervical disc disorder with radiculopathy   • Completed stroke (CMS/HCC)   • Degeneration of intervertebral disc   • Excessive anticoagulation   • Impaired left ventricular function   • Liver lesion   • Lumbar radiculopathy   • Cervical myelopathy (CMS/HCC)   • Lumbosacral radiculopathy   • Spinal stenosis of lumbar region   • Obesity   • Mild cognitive impairment   • Thoracic back pain   • Thoracic disc disease with myelopathy   • Paroxysmal atrial fibrillation (CMS/HCC)   • Acute congestive heart failure (CMS/HCC)   • Dermatitis associated with moisture   • Intracerebellar and posterior fossa hemorrhage (CMS/HCC)   • Fall   • Chronic anticoagulation     Past Medical History:   Diagnosis Date   • A-fib (CMS/HCC)    • Atrial fibrillation with RVR (CMS/HCC)    • CHF (congestive heart failure) (CMS/HCC)    • Chronic atrial fibrillation (CMS/HCC) 2019   • Chronic diastolic CHF (congestive heart failure) (CMS/HCC) 2019   • CKD (chronic kidney disease) stage 3, GFR 30-59 ml/min (CMS/HCC) 2019   • Coronary artery disease involving native  coronary artery of native heart without angina pectoris 11/8/2019   • DDD (degenerative disc disease), lumbosacral 11/8/2019   • Essential hypertension 11/8/2019   • Gambling disorder, persistent 11/8/2019   • GERD (gastroesophageal reflux disease) 11/8/2019   • History of CVA (cerebrovascular accident) 11/8/2019   • Hyperlipidemia    • Hypertension    • Medically noncompliant 11/8/2019   • Peripheral polyneuropathy 11/8/2019   • Psoriasis 11/8/2019   • Stroke (CMS/HCC)      Past Surgical History:   Procedure Laterality Date   • CARDIAC CATHETERIZATION     • CARDIAC CATHETERIZATION N/A 12/10/2019    Procedure: Left Heart Cath and coronary angiogram;  Surgeon: Edy Thomas MD;  Location: [facility] CATH INVASIVE LOCATION;  Service: Cardiovascular     General Information    No documentation.         Mobility/ADL's     Row Name 02/03/21 1624          Bed Mobility    Bed Mobility  supine-sit  -     Supine-Sit [place] (Bed Mobility)  set up  -     Bed Mobility, Safety Issues  cognitive deficits limit understanding  -     Row Name 02/03/21 1624          Transfers    Transfers  sit-stand transfer;bed-chair transfer  -     Bed-Chair [place] (Transfers)  standby assist;verbal cues;set up  -     Assistive Device (Bed-Chair Transfers)  walker, front-wheeled  -     Sit-Stand [place] (Transfers)  set up;verbal cues;contact guard  -     Row Name 02/03/21 1624          Sit-Stand Transfer    Assistive Device (Sit-Stand Transfers)  walker, front-wheeled  -     Row Name 02/03/21 1624          Functional Mobility    Functional Mobility- Ind. Level  contact guard assist  -     Functional Mobility- Device  rolling walker  -     Functional Mobility-Distance (Feet)  5  -     Functional Mobility- Comment  pt reports he is not able to walk into the bathroom but appears able to do so. Pt is not aware of his recentl timelines & has very impaired memory due to chronic alcohol abuse resulting in neurological &  "memory impairments.  -[facility] Name 02/03/21 1624          Activities of Daily Living    BADL Assessment/Intervention  grooming;toileting  -[facility] Name 02/03/21 1624          Lower Body Dressing Assessment/Training    [place] Level (Lower Body Dressing)  don;socks;set up;verbal cues;standby assist  -     Position (Lower Body Dressing)  edge of bed sitting  -[facility] Name 02/03/21 1624          Grooming Assessment/Training    [place] Level (Grooming)  wash face, hands;oral care regimen;set up;verbal cues;standby assist  -     Comment (Grooming)  gums bleeding w/ oral care. Pt states, \"This is such a treat!\" He demonstrates severe self care deficit. Asked if he wishes to shave Pt replies that he does but that he wants the aides to do this for him, so OT did not persue this activity.  -[facility] Name 02/03/21 1624          Toileting Assessment/Training    Comment (Toileting)  pt is incontinent of urine  -       User Key  (r) = Recorded By, (t) = Taken By, (c) = Cosigned By    Initials Name Provider Type    Mena Mckeon OT Occupational Therapist        Obj/Interventions     [facility] Name 02/03/21 1629          Range of Motion Comprehensive    General Range of Motion  no range of motion deficits identified  -[facility] Name 02/03/21 1629          Strength Comprehensive (MMT)    General Manual Muscle Testing (MMT) Assessment  no strength deficits identified  -[facility] Name 02/03/21 1629          Balance    Balance Assessment  sitting static balance;sitting dynamic balance;standing static balance;standing dynamic balance  -     Static Sitting Balance  WNL  -     Dynamic Sitting Balance  WFL  -     Static Standing Balance  WFL;supported  -     Dynamic Standing Balance  mild impairment;supported  -       User Key  (r) = Recorded By, (t) = Taken By, (c) = Cosigned By    Initials Name Provider Type    Mena Mckeon, ADEBAYO Occupational Therapist        Goals/Plan    No documentation.   "     Clinical Impression     [facility] Name 02/03/21 1629          Pain Scale: Numbers Pre/Post-Treatment    Pretreatment Pain Rating  0/10 - no pain  -     Posttreatment Pain Rating  0/10 - no pain  -[facility] Name 02/03/21 1629          Plan of Care Review    Plan of Care Reviewed With  patient  -     Progress  no change  -     Outcome Summary  Pt continues to sweat and report incontinence when it appears to be sweating. Pt is incontinent at times & demonstrates severe sefl care deficit due to his memory deficits. Needs setup & supervision/cues for all self care. Assist for bathing & dressing. Pt mobilizes well for short distances w/ RW and CGA. Pt is likely to need ongoing nursing support long term due to his chronic memory and self care deficits. Recommend d/c to a nursing home w/ rehab therapies. PPE worn: mask, gloves, safety glasses.  -[facility] Name 02/03/21 1629          Therapy Plan Review/Discharge Plan (OT)    Anticipated Discharge Disposition (OT)  skilled nursing facility  -[facility] Name 02/03/21 1629          Vital Signs    O2 Delivery Pre Treatment  room air  -     O2 Delivery Intra Treatment  room air  -     O2 Delivery Post Treatment  room air  -     Pre Patient Position  Supine  -     Intra Patient Position  Standing  -     Post Patient Position  Sitting  -[facility] Name 02/03/21 1629          Positioning and Restraints    Pre-Treatment Position  in bed  -     Post Treatment Position  chair  -     In Chair  notified nsg;sitting;call light within reach;encouraged to call for assist;exit alarm on  -       User Key  (r) = Recorded By, (t) = Taken By, (c) = Cosigned By    Initials Name Provider Type     Mena Menjivar, OT Occupational Therapist        Outcome Measures     Row Name 02/03/21 1636          How much help from another is currently needed...    Putting on and taking off regular lower body clothing?  3  -     Bathing (including washing, rinsing, and drying)  2  -      Toileting (which includes using toilet bed pan or urinal)  2  -     Putting on and taking off regular upper body clothing  2  -     Taking care of personal grooming (such as brushing teeth)  2  -     Eating meals  3  -     AM-PAC 6 Clicks Score (OT)  14  -     Row Name 02/03/21 1634          Modified [place] Scale    Modified [place] Scale  4 - Moderately severe disability.  Unable to walk without assistance, and unable to attend to own bodily needs without assistance.  -     Row Name 02/03/21 1634          Functional Assessment    Outcome Measure Options  AM-PAC 6 Clicks Daily Activity (OT)  -       User Key  (r) = Recorded By, (t) = Taken By, (c) = Cosigned By    Initials Name Provider Type    Mena Mckeon OT Occupational Therapist        Occupational Therapy Education                 Title: PT OT SLP Therapies (Done)     Topic: Occupational Therapy (Done)     Point: ADL training (Done)     Description:   Instruct learner(s) on proper safety adaptation and remediation techniques during self care or transfers.   Instruct in proper use of assistive devices.              Learning Progress Summary           Patient Acceptance, E,TB,D, VU,NL,NR by  at 2/3/2021 1634    Acceptance, E,TB, VU,NR by  at 1/31/2021 1309    Acceptance, E, NR by AD at 1/31/2021 1021    Acceptance, E, NR by AD at 1/30/2021 0931    Acceptance, E,TB, VU by ERIC at 1/27/2021 1227                   Point: Home exercise program (Done)     Description:   Instruct learner(s) on appropriate technique for monitoring, assisting and/or progressing therapeutic exercises/activities.              Learning Progress Summary           Patient Acceptance, E,TB, VU,NR by  at 1/31/2021 1309    Acceptance, E, NR by AD at 1/31/2021 1021    Acceptance, E, NR by AD at 1/30/2021 0931    Acceptance, E,TB, VU by ERIC at 1/27/2021 1227                   Point: Precautions (Done)     Description:   Instruct learner(s) on prescribed precautions during  self-care and functional transfers.              Learning Progress Summary           Patient Acceptance, E,TB,D, VU,NL,NR by  at 2/3/2021 1634    Acceptance, E,TB, VU,NR by  at 1/31/2021 1309    Acceptance, E, NR by AD at 1/31/2021 1021    Acceptance, E, NR by AD at 1/30/2021 0931    Acceptance, E,TB, VU by ERIC at 1/27/2021 1227                   Point: Body mechanics (Done)     Description:   Instruct learner(s) on proper positioning and spine alignment during self-care, functional mobility activities and/or exercises.              Learning Progress Summary           Patient Acceptance, E,TB,D, VU,NL,NR by  at 2/3/2021 1634    Acceptance, E,TB, VU,NR by  at 1/31/2021 1309    Acceptance, E, NR by AD at 1/31/2021 1021    Acceptance, E, NR by AD at 1/30/2021 0931    Acceptance, E,TB, VU by ERIC at 1/27/2021 1227                               User Key     Initials Effective Dates Name Provider Type Discipline     03/01/19 -  Mena Menjivar OT Occupational Therapist OT     03/01/19 -  Marilee Casey PTA Physical Therapy Assistant PT     03/25/19 -  Juliana Liu, RN Registered Nurse Nurse     07/15/20 -  Jeanine Carey OT Occupational Therapist OT              OT Recommendation and Plan     Plan of Care Review  Plan of Care Reviewed With: patient  Progress: no change  Outcome Summary: Pt continues to sweat and report incontinence when it appears to be sweating. Pt is incontinent at times & demonstrates severe sefl care deficit due to his memory deficits. Needs setup & supervision/cues for all self care. Assist for bathing & dressing. Pt mobilizes well for short distances w/ RW and CGA. Pt is likely to need ongoing nursing support long term due to his chronic memory and self care deficits. Recommend d/c to a nursing home w/ rehab therapies. PPE worn: mask, gloves, safety glasses.     Time Calculation:   Time Calculation- OT     Row Name 02/03/21 1635             Time Calculation- OT    OT Start  Time  1520  -      OT Stop Time  1530  -      OT Time Calculation (min)  10 min  -      Total Timed Code Minutes- OT  10 minute(s)  -      OT Received On  02/03/21  -      OT - Next Appointment  02/05/21  -        User Key  (r) = Recorded By, (t) = Taken By, (c) = Cosigned By    Initials Name Provider Type     Mena Menjivar OT Occupational Therapist        Therapy Charges for Today     Code Description Service Date Service Provider Modifiers Qty    [id number]  OT SELF CARE/MGMT/TRAIN EA 15 MIN 2/3/2021 Mena Menjivar OT GO 1               Mena Menjivar OT  2/3/2021

## 2021-02-03 NOTE — PLAN OF CARE
Goal Outcome Evaluation:  Plan of Care Reviewed With: patient  Progress: no change  Outcome Summary: Pt continues to sweat and report incontinence when it appears to be sweating. Pt is incontinent at times & demonstrates severe sefl care deficit due to his memory deficits. Needs setup & supervision/cues for all self care. Assist for bathing & dressing. Pt mobilizes well for short distances w/ RW and CGA. Pt is likely to need ongoing nursing support long term due to his chronic memory and self care deficits. Recommend d/c to a nursing home w/ rehab therapies. PPE worn: mask, gloves, safety glasses.

## 2021-02-03 NOTE — NURSING NOTE
On call physician Dr. Stauffer was called this AM d/t patient refusing to have an IV, /101, and no BM for the past few days. MD ordered clonidine x1 and dulcolax PO.

## 2021-02-03 NOTE — PROGRESS NOTES
LOS: 8 days   Patient Care Team:  Aniabl Dalton MD as PCP - General (Family Medicine)  Shwetha Barber MD as Consulting Physician (Nephrology)  Edy Thomas MD as Consulting Physician (Cardiology)    Subjective Alert and up in chair, ordering breakfast and drinking coffee.  Appears comfortable and offers no complaints today.  Still at his baseline confusion but no worse than usual.    Interval History:     Patient Complaints: None at this juncture note of labile hypertension once again with systolics well above 150+ usually during night shift    Review of Systems   Constitutional: Positive for activity change and fatigue.   HENT: Negative.    Respiratory: Negative.    Cardiovascular: Negative.    Gastrointestinal: Negative for nausea and vomiting.   Neurological: Positive for weakness. Negative for dizziness, tremors, seizures, syncope, facial asymmetry, speech difficulty, light-headedness, numbness and headaches.   Psychiatric/Behavioral: Negative.            Objective     Vital Signs  Temp:  [97.5 °F (36.4 °C)-98 °F (36.7 °C)] 98 °F (36.7 °C)  Heart Rate:  [59-85] 65  Resp:  [12-19] 18  BP: (116-160)/() 154/101    Physical Exam:     General Appearance:   Forgetful, pleasantly confused but cooperative, no acute distress   Head:    Normocephalic, without obvious abnormality, atraumatic   Eyes:            Lids and lashes normal, conjunctivae and sclerae normal, no   icterus, no pallor, corneas clear, PERRLA   Ears:    Ears appear intact with no abnormalities noted   Throat:   No oral lesions, no thrush, oral mucosa moist   Neck:   No adenopathy, supple, trachea midline, no thyromegaly, no   carotid bruit, no JVD   Lungs:     Clear to auscultation,respirations regular, even and                  Unlabored, distant adventitial sounds    Heart:    Irregularly irregular, rate controlled   Chest Wall:    No abnormalities observed   Abdomen:     Normal bowel sounds, no masses, no organomegaly, soft         non-tender, non-distended, no guarding, no rebound                tenderness   Extremities:   Moves all extremities , no edema, no cyanosis, no             redness   Pulses:   Pulses palpable and equal bilaterally   Skin:   No bleeding, bruising or rash   Lymph nodes:   No palpable adenopathy   Neurologic:   Cranial nerves 2 - 12 grossly intact, sensation intact, DTR       present and equal bilaterally        Results Review:    Lab Results (last 24 hours)     Procedure Component Value Units Date/Time    POC Glucose Once [[id number]]  (Normal) Collected: 02/03/21 0715    Specimen: Blood Updated: 02/03/21 0720     Glucose 99 mg/dL      Comment: Serial Number: [id number]:  [id number]       Phosphorus [[id number]]  (Normal) Collected: 02/03/21 0528    Specimen: Blood Updated: 02/03/21 0621     Phosphorus 3.1 mg/dL     Comprehensive Metabolic Panel [[id number]]  (Abnormal) Collected: 02/03/21 0528    Specimen: Blood Updated: 02/03/21 0621     Glucose 99 mg/dL      BUN 28 mg/dL      Creatinine 1.05 mg/dL      Sodium 140 mmol/L      Potassium 4.1 mmol/L      Chloride 107 mmol/L      CO2 27.0 mmol/L      Calcium 9.1 mg/dL      Total Protein 7.1 g/dL      Albumin 3.90 g/dL      ALT (SGPT) 38 U/L      AST (SGOT) 25 U/L      Alkaline Phosphatase 68 U/L      Total Bilirubin 0.7 mg/dL      eGFR Non African Amer 71 mL/min/1.73      Globulin 3.2 gm/dL      A/G Ratio 1.2 g/dL      BUN/Creatinine Ratio 26.7     Anion Gap 6.0 mmol/L     Narrative:      GFR Normal >60  Chronic Kidney Disease <60  Kidney Failure <15      Magnesium [[id number]]  (Normal) Collected: 02/03/21 0528    Specimen: Blood Updated: 02/03/21 0621     Magnesium 2.1 mg/dL     CBC & Differential [[id number]]  (Abnormal) Collected: 02/03/21 0528    Specimen: Blood Updated: 02/03/21 0605    Narrative:      The following orders were created for panel order CBC & Differential.  Procedure                               Abnormality         Status                      ---------                               -----------         ------                     CBC Auto Differential[[id number]]        Abnormal            Final result                 Please view results for these tests on the individual orders.    CBC Auto Differential [[id number]]  (Abnormal) Collected: 02/03/21 0528    Specimen: Blood Updated: 02/03/21 0605     WBC 8.80 10*3/mm3      RBC 4.81 10*6/mm3      Hemoglobin 14.2 g/dL      Hematocrit 41.6 %      MCV 86.5 fL      MCH 29.4 pg      MCHC 34.0 g/dL      RDW 14.7 %      RDW-SD 44.6 fl      MPV 9.0 fL      Platelets 149 10*3/mm3      Neutrophil % 68.2 %      Lymphocyte % 14.2 %      Monocyte % 12.9 %      Eosinophil % 4.1 %      Basophil % 0.6 %      Neutrophils, Absolute 6.00 10*3/mm3      Lymphocytes, Absolute 1.30 10*3/mm3      Monocytes, Absolute 1.10 10*3/mm3      Eosinophils, Absolute 0.40 10*3/mm3      Basophils, Absolute 0.10 10*3/mm3      nRBC 0.1 /100 WBC     POC Glucose Once [[id number]]  (Abnormal) Collected: 02/02/21 1946    Specimen: Blood Updated: 02/02/21 1947     Glucose 115 mg/dL      Comment: Serial Number: [id number]:  [id number]       POC Glucose Once [[id number]]  (Abnormal) Collected: 02/02/21 1643    Specimen: Blood Updated: 02/02/21 1647     Glucose 123 mg/dL      Comment: Serial Number: [id number]:  [id number]       POC Glucose Once [[id number]]  (Abnormal) Collected: 02/02/21 1111    Specimen: Blood Updated: 02/02/21 1114     Glucose 112 mg/dL      Comment: Serial Number: [id number]:  [id number]              Imaging Results (Last 24 Hours)     Procedure Component Value Units Date/Time    US Renal Bilateral [[id number]] Collected: 02/03/21 0621     Updated: 02/03/21 0623    Narrative:      EXAMINATION: COMPLETE RENAL RETROPERITONEAL  ULTRASOUND    DATE OF EXAMINATION: 2/3/2021.     INDICATION: Uncontrolled hypertension.     COMPARISON: None available.     FINDINGS:     Right Kidney: The right kidney measures 10.3 x 5.5 x 6.3 cm.    The renal cortical echotexture is normal. There is no hydronephrosis or mass.     Left Kidney: The left kidney measures 9.0 x 5.3 x 5.2 cm.  The renal cortical echotexture is normal. There is no hydronephrosis or mass.     Bladder:  No wall thickening.      Impression:      1. No renal abnormality seen and no hydronephrosis.       Electronically signed by:  Elias Schwartz D.O.    2/3/2021 4:22 AM               I reviewed the patient's new clinical results.    Medication Review:   Scheduled Meds:allopurinol, 100 mg, Oral, Daily  atorvastatin, 10 mg, Oral, Daily  [START ON 2/4/2021] dilTIAZem CD, 240 mg, Oral, Q24H  insulin lispro, 0-7 Units, Subcutaneous, Q6H  losartan, 100 mg, Oral, Q24H  metoprolol tartrate, 50 mg, Oral, Q12H      Continuous Infusions:dilTIAZem, 5-15 mg/hr, Last Rate: Stopped (01/27/21 1000)      PRN Meds:.•  acetaminophen **OR** acetaminophen  •  aluminum-magnesium hydroxide-simethicone  •  bisacodyl  •  dextrose  •  dextrose  •  glucagon (human recombinant)  •  insulin lispro **AND** insulin lispro  •  ipratropium-albuterol  •  labetalol  •  nitroglycerin  •  ondansetron **OR** ondansetron  •  [COMPLETED] Insert peripheral IV **AND** sodium chloride     Assessment/Plan      Uncontrolled hypertension-recurrent, new problem today  A. fib RVR-recurrent, new problem today  Nausea without vomiting      Intracerebellar and posterior fossa hemorrhage (CMS/HCC)    Chronic atrial fibrillation (CMS/HCC)    Chronic diastolic CHF (congestive heart failure) (CMS/HCC)    Coronary artery disease involving native coronary artery of native heart without angina pectoris    History of CVA (cerebrovascular accident)    DDD (degenerative disc disease), lumbosacral    GERD (gastroesophageal reflux disease)    Obesity    Paroxysmal atrial fibrillation (CMS/HCC)    Fall    Chronic anticoagulation    Patient now on 50 mg metoprolol per cardiology.  Also increase his Cardizem to 240 mg.  Increase activity as much as  patient can tolerate while on monitor.  Hopefully evening blood pressures will come under better control and patient will be able to discharge back to [place].  However at this juncture, blood pressure still quite labile especially during the night.     Cardiology input greatly appreciated, thank you.  Will need input if and when patient can safely restart anticoagulation given history of Intracerebellar and posterior fossa hemorrhage.    Renal ultrasound normal.      [facility]    Paty Polk, DO  02/03/21  08:24 EST

## 2021-02-03 NOTE — PLAN OF CARE
Problem: Fall Injury Risk  Goal: Absence of Fall and Fall-Related Injury  Outcome: Ongoing, Progressing  Intervention: Identify and Manage Contributors to Fall Injury Risk  Recent Flowsheet Documentation  Taken 2/3/2021 1600 by Cynthia Cardona RN  Self-Care Promotion: independence encouraged  Taken 2/3/2021 1200 by Cynthia Cardona RN  Medication Review/Management: medications reviewed  Self-Care Promotion: independence encouraged  Taken 2/3/2021 0800 by Cynthia Cardona RN  Medication Review/Management: medications reviewed  Self-Care Promotion: independence encouraged  Intervention: Promote Injury-Free Environment  Recent Flowsheet Documentation  Taken 2/3/2021 1600 by Cynthia Cardona RN  Safety Promotion/Fall Prevention:   assistive device/personal items within reach   clutter free environment maintained   fall prevention program maintained   nonskid shoes/slippers when out of bed   room organization consistent   safety round/check completed  Taken 2/3/2021 1400 by Cynthia Cardona RN  Safety Promotion/Fall Prevention: safety round/check completed  Taken 2/3/2021 1200 by Cynthia Cardona RN  Safety Promotion/Fall Prevention:   assistive device/personal items within reach   clutter free environment maintained   fall prevention program maintained   nonskid shoes/slippers when out of bed   safety round/check completed   room organization consistent  Taken 2/3/2021 1000 by Cynthia Cardona RN  Safety Promotion/Fall Prevention: safety round/check completed  Taken 2/3/2021 0800 by Cynthia Cardona RN  Safety Promotion/Fall Prevention:   assistive device/personal items within reach   clutter free environment maintained   fall prevention program maintained   nonskid shoes/slippers when out of bed   room organization consistent   safety round/check completed     Problem: Adult Inpatient Plan of Care  Goal: Patient-Specific Goal (Individualized)  Outcome: Ongoing, Progressing  Goal: Absence of  Hospital-Acquired Illness or Injury  Outcome: Ongoing, Progressing  Intervention: Identify and Manage Fall Risk  Recent Flowsheet Documentation  Taken 2/3/2021 1600 by Cynthia Cardona RN  Safety Promotion/Fall Prevention:   assistive device/personal items within reach   clutter free environment maintained   fall prevention program maintained   nonskid shoes/slippers when out of bed   room organization consistent   safety round/check completed  Taken 2/3/2021 1400 by Cynthia Cardona RN  Safety Promotion/Fall Prevention: safety round/check completed  Taken 2/3/2021 1200 by Cynthia Cardona RN  Safety Promotion/Fall Prevention:   assistive device/personal items within reach   clutter free environment maintained   fall prevention program maintained   nonskid shoes/slippers when out of bed   safety round/check completed   room organization consistent  Taken 2/3/2021 1000 by Cynthia Cardona RN  Safety Promotion/Fall Prevention: safety round/check completed  Taken 2/3/2021 0800 by Cynthia Cardona RN  Safety Promotion/Fall Prevention:   assistive device/personal items within reach   clutter free environment maintained   fall prevention program maintained   nonskid shoes/slippers when out of bed   room organization consistent   safety round/check completed  Intervention: Prevent Skin Injury  Recent Flowsheet Documentation  Taken 2/3/2021 1600 by Cynthia Cardona RN  Body Position: position changed independently  Taken 2/3/2021 1200 by Cynthia Cardona RN  Body Position: position changed independently  Taken 2/3/2021 0800 by Cynthia Cardona RN  Body Position: position changed independently  Goal: Optimal Comfort and Wellbeing  Outcome: Ongoing, Progressing  Goal: Readiness for Transition of Care  Outcome: Ongoing, Progressing     Problem: Skin Injury Risk Increased  Goal: Skin Health and Integrity  Outcome: Ongoing, Progressing  Intervention: Optimize Skin Protection  Recent Flowsheet Documentation  Taken  2/3/2021 1600 by Cynthia Cardona RN  Pressure Reduction Techniques: frequent weight shift encouraged  Head of Bed (HOB): HOB at 30-45 degrees  Pressure Reduction Devices: pressure-redistributing mattress utilized  Skin Protection:   adhesive use limited   tubing/devices free from skin contact  Taken 2/3/2021 1200 by Cynthia Cardona RN  Pressure Reduction Techniques: frequent weight shift encouraged  Head of Bed (HOB): HOB at 20-30 degrees  Pressure Reduction Devices: pressure-redistributing mattress utilized  Skin Protection:   adhesive use limited   tubing/devices free from skin contact  Taken 2/3/2021 0800 by Cynthia Cardona RN  Pressure Reduction Techniques: frequent weight shift encouraged  Head of Bed (HOB): (Patient is sitting in the chair) other (see comments)  Pressure Reduction Devices: pressure-redistributing mattress utilized  Skin Protection:   adhesive use limited   tubing/devices free from skin contact   Goal Outcome Evaluation:      Patient did not have any new tests or procedures today. The patient remains confused and forgetful. The patient did not have any complaints. The patient remains on room air. Will continue to monitor.

## 2021-02-04 VITALS
DIASTOLIC BLOOD PRESSURE: 98 MMHG | OXYGEN SATURATION: 97 % | TEMPERATURE: 98.6 F | WEIGHT: 260.8 LBS | HEART RATE: 63 BPM | RESPIRATION RATE: 12 BRPM | SYSTOLIC BLOOD PRESSURE: 133 MMHG | HEIGHT: 72 IN | BODY MASS INDEX: 35.33 KG/M2
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
## 2021-02-04 LAB
ALBUMIN SERPL-MCNC: 3.9 G/DL (ref 3.5–5.2)
ALBUMIN/GLOB SERPL: 1.2 G/DL
ALP SERPL-CCNC: 71 U/L (ref 39–117)
ALT SERPL W P-5'-P-CCNC: 38 U/L (ref 1–41)
ANION GAP SERPL CALCULATED.3IONS-SCNC: 11 MMOL/L (ref 5–15)
AST SERPL-CCNC: 25 U/L (ref 1–40)
BASOPHILS # BLD AUTO: 0 10*3/MM3 (ref 0–0.2)
BASOPHILS NFR BLD AUTO: 0.5 % (ref 0–1.5)
BILIRUB SERPL-MCNC: 0.7 MG/DL (ref 0–1.2)
BUN SERPL-MCNC: 23 MG/DL (ref 8–23)
BUN/CREAT SERPL: 25.8 (ref 7–25)
CALCIUM SPEC-SCNC: 9.2 MG/DL (ref 8.6–10.5)
CHLORIDE SERPL-SCNC: 109 MMOL/L (ref 98–107)
CO2 SERPL-SCNC: 22 MMOL/L (ref 22–29)
CREAT SERPL-MCNC: 0.89 MG/DL (ref 0.76–1.27)
DEPRECATED RDW RBC AUTO: 44.6 FL (ref 37–54)
EOSINOPHIL # BLD AUTO: 0.3 10*3/MM3 (ref 0–0.4)
EOSINOPHIL NFR BLD AUTO: 3.5 % (ref 0.3–6.2)
ERYTHROCYTE [DISTWIDTH] IN BLOOD BY AUTOMATED COUNT: 14.6 % (ref 12.3–15.4)
GFR SERPL CREATININE-BSD FRML MDRD: 86 ML/MIN/1.73
GLOBULIN UR ELPH-MCNC: 3.2 GM/DL
GLUCOSE BLDC GLUCOMTR-MCNC: 106 MG/DL (ref 70–105)
GLUCOSE BLDC GLUCOMTR-MCNC: 108 MG/DL (ref 70–105)
GLUCOSE BLDC GLUCOMTR-MCNC: 137 MG/DL (ref 70–105)
GLUCOSE BLDC GLUCOMTR-MCNC: 164 MG/DL (ref 70–105)
GLUCOSE SERPL-MCNC: 90 MG/DL (ref 65–99)
HCT VFR BLD AUTO: 43.7 % (ref 37.5–51)
HGB BLD-MCNC: 14.7 G/DL (ref 13–17.7)
LYMPHOCYTES # BLD AUTO: 1.4 10*3/MM3 (ref 0.7–3.1)
LYMPHOCYTES NFR BLD AUTO: 15.5 % (ref 19.6–45.3)
MAGNESIUM SERPL-MCNC: 2 MG/DL (ref 1.6–2.4)
MCH RBC QN AUTO: 29.4 PG (ref 26.6–33)
MCHC RBC AUTO-ENTMCNC: 33.7 G/DL (ref 31.5–35.7)
MCV RBC AUTO: 87.1 FL (ref 79–97)
MONOCYTES # BLD AUTO: 1 10*3/MM3 (ref 0.1–0.9)
MONOCYTES NFR BLD AUTO: 11.3 % (ref 5–12)
NEUTROPHILS NFR BLD AUTO: 6.1 10*3/MM3 (ref 1.7–7)
NEUTROPHILS NFR BLD AUTO: 69.2 % (ref 42.7–76)
NRBC BLD AUTO-RTO: 0.1 /100 WBC (ref 0–0.2)
PHOSPHATE SERPL-MCNC: 3.3 MG/DL (ref 2.5–4.5)
PLATELET # BLD AUTO: 150 10*3/MM3 (ref 140–450)
PMV BLD AUTO: 9.5 FL (ref 6–12)
POTASSIUM SERPL-SCNC: 4 MMOL/L (ref 3.5–5.2)
PROT SERPL-MCNC: 7.1 G/DL (ref 6–8.5)
RBC # BLD AUTO: 5.01 10*6/MM3 (ref 4.14–5.8)
SODIUM SERPL-SCNC: 142 MMOL/L (ref 136–145)
WBC # BLD AUTO: 8.8 10*3/MM3 (ref 3.4–10.8)

## 2021-02-04 PROCEDURE — 82962 GLUCOSE BLOOD TEST: CPT

## 2021-02-04 PROCEDURE — 85025 COMPLETE CBC W/AUTO DIFF WBC: CPT | Performed by: NURSE PRACTITIONER

## 2021-02-04 PROCEDURE — 84100 ASSAY OF PHOSPHORUS: CPT | Performed by: NURSE PRACTITIONER

## 2021-02-04 PROCEDURE — 80053 COMPREHEN METABOLIC PANEL: CPT | Performed by: NURSE PRACTITIONER

## 2021-02-04 PROCEDURE — 99232 SBSQ HOSP IP/OBS MODERATE 35: CPT | Performed by: INTERNAL MEDICINE

## 2021-02-04 PROCEDURE — 83735 ASSAY OF MAGNESIUM: CPT | Performed by: NURSE PRACTITIONER

## 2021-02-04 RX ORDER — AMOXICILLIN AND CLAVULANATE POTASSIUM 875; 125 MG/1; MG/1
1 TABLET, FILM COATED ORAL EVERY 12 HOURS SCHEDULED
Status: DISCONTINUED | OUTPATIENT
Start: 2021-02-04 | End: 2021-02-04 | Stop reason: HOSPADM

## 2021-02-04 RX ORDER — CLONIDINE HYDROCHLORIDE 0.1 MG/1
0.1 TABLET ORAL EVERY 12 HOURS SCHEDULED
Qty: 30 TABLET | Refills: 1 | Status: SHIPPED | OUTPATIENT
Start: 2021-02-04

## 2021-02-04 RX ORDER — CLONIDINE HYDROCHLORIDE 0.1 MG/1
0.1 TABLET ORAL EVERY 12 HOURS SCHEDULED
Status: DISCONTINUED | OUTPATIENT
Start: 2021-02-04 | End: 2021-02-04 | Stop reason: HOSPADM

## 2021-02-04 RX ORDER — AMOXICILLIN AND CLAVULANATE POTASSIUM 875; 125 MG/1; MG/1
1 TABLET, FILM COATED ORAL EVERY 12 HOURS SCHEDULED
Qty: 14 TABLET | Refills: 0 | Status: SHIPPED | OUTPATIENT
Start: 2021-02-04 | End: 2021-02-11

## 2021-02-04 RX ORDER — METOPROLOL TARTRATE 50 MG/1
50 TABLET, FILM COATED ORAL EVERY 12 HOURS SCHEDULED
Qty: 60 TABLET | Refills: 1 | Status: SHIPPED | OUTPATIENT
Start: 2021-02-04

## 2021-02-04 RX ORDER — DILTIAZEM HYDROCHLORIDE 240 MG/1
240 CAPSULE, COATED, EXTENDED RELEASE ORAL
Qty: 30 CAPSULE | Refills: 1 | Status: SHIPPED | OUTPATIENT
Start: 2021-02-05

## 2021-02-04 RX ADMIN — LOSARTAN POTASSIUM 100 MG: 50 TABLET, FILM COATED ORAL at 09:03

## 2021-02-04 RX ADMIN — CLONIDINE HYDROCHLORIDE 0.1 MG: 0.1 TABLET ORAL at 09:03

## 2021-02-04 RX ADMIN — DILTIAZEM HYDROCHLORIDE 240 MG: 240 CAPSULE, COATED, EXTENDED RELEASE ORAL at 09:03

## 2021-02-04 RX ADMIN — Medication 10 ML: at 09:03

## 2021-02-04 RX ADMIN — METOPROLOL TARTRATE 50 MG: 50 TABLET, FILM COATED ORAL at 09:03

## 2021-02-04 RX ADMIN — ALLOPURINOL 100 MG: 100 TABLET ORAL at 09:03

## 2021-02-04 RX ADMIN — AMOXICILLIN AND CLAVULANATE POTASSIUM 1 TABLET: 875; 125 TABLET, FILM COATED ORAL at 16:54

## 2021-02-04 RX ADMIN — ATORVASTATIN CALCIUM 10 MG: 10 TABLET, FILM COATED ORAL at 09:03

## 2021-02-04 NOTE — PROGRESS NOTES
LOS: 9 days   Patient Care Team:  Anibal Dalton MD as PCP - General (Family Medicine)  Shwetha Barber MD as Consulting Physician (Nephrology)  Edy Thomas MD as Consulting Physician (Cardiology)    Subjective Alert awake in bed  Appears comfortable and offers no complaints today.  Still at his baseline confusion but no worse than usual.    Interval History:   BP very high again through the night    Patient Complaints: None at this juncture note of labile hypertension once again with systolics well above 150 and diastolic up to 111 usually during night shift    Review of Systems   Constitutional: Positive for activity change and fatigue.   HENT: Negative.    Respiratory: Negative.    Cardiovascular: Negative.    Gastrointestinal: Negative for nausea and vomiting.   Neurological: Positive for weakness. Negative for dizziness, tremors, seizures, syncope, facial asymmetry, speech difficulty, light-headedness, numbness and headaches.   Psychiatric/Behavioral: Negative.            Objective     Vital Signs  Temp:  [97.5 °F (36.4 °C)-98.8 °F (37.1 °C)] 98.4 °F (36.9 °C)  Heart Rate:  [57-84] 80  Resp:  [10-17] 17  BP: (123-159)/(82-99) 156/99    Physical Exam:     General Appearance:   Forgetful, pleasantly confused but cooperative, no acute distress   Head:    Normocephalic, without obvious abnormality, atraumatic   Eyes:            Lids and lashes normal, conjunctivae and sclerae normal, no   icterus   Ears:    Ears appear intact with no abnormalities noted   Throat:   No oral lesions, no thrush, oral mucosa moist   Neck:   No adenopathy, supple, trachea midline, no thyromegaly   Lungs:     Clear to auscultation,respirations regular, even and                  Unlabored, distant adventitial sounds    Heart:    Irregularly irregular, rate controlled   Chest Wall:    No abnormalities observed   Abdomen:     Normal bowel sounds, no masses, no organomegaly, soft        non-tender, non-distended, no  guarding, no rebound                tenderness   Extremities:   Moves all extremities , no edema, no cyanosis, no             redness   Pulses:   Pulses palpable and equal bilaterally   Skin:   No bleeding, bruising or rash   Lymph nodes:   No palpable adenopathy   Neurologic:   Cranial nerves 2 - 12 grossly intact, sensation intact        Results Review:    Lab Results (last 24 hours)     Procedure Component Value Units Date/Time    Comprehensive Metabolic Panel [[id number]]  (Abnormal) Collected: 02/04/21 0654    Specimen: Blood Updated: 02/04/21 0804     Glucose 90 mg/dL      BUN 23 mg/dL      Creatinine 0.89 mg/dL      Sodium 142 mmol/L      Potassium 4.0 mmol/L      Chloride 109 mmol/L      CO2 22.0 mmol/L      Calcium 9.2 mg/dL      Total Protein 7.1 g/dL      Albumin 3.90 g/dL      ALT (SGPT) 38 U/L      AST (SGOT) 25 U/L      Alkaline Phosphatase 71 U/L      Total Bilirubin 0.7 mg/dL      eGFR Non African Amer 86 mL/min/1.73      Globulin 3.2 gm/dL      A/G Ratio 1.2 g/dL      BUN/Creatinine Ratio 25.8     Anion Gap 11.0 mmol/L     Narrative:      GFR Normal >60  Chronic Kidney Disease <60  Kidney Failure <15      POC Glucose Once [[id number]]  (Abnormal) Collected: 02/04/21 0735    Specimen: Blood Updated: 02/04/21 0736     Glucose 106 mg/dL      Comment: Serial Number: [id number]:  [id number]       Magnesium [[id number]]  (Normal) Collected: 02/04/21 0430    Specimen: Blood Updated: 02/04/21 0619     Magnesium 2.0 mg/dL     Phosphorus [[id number]]  (Normal) Collected: 02/04/21 0430    Specimen: Blood Updated: 02/04/21 0619     Phosphorus 3.3 mg/dL     CBC & Differential [[id number]]  (Abnormal) Collected: 02/04/21 0430    Specimen: Blood Updated: 02/04/21 0523    Narrative:      The following orders were created for panel order CBC & Differential.  Procedure                               Abnormality         Status                     ---------                               -----------         ------                      CBC Auto Differential[[id number]]        Abnormal            Final result                 Please view results for these tests on the individual orders.    CBC Auto Differential [[id number]]  (Abnormal) Collected: 02/04/21 0430    Specimen: Blood Updated: 02/04/21 0523     WBC 8.80 10*3/mm3      RBC 5.01 10*6/mm3      Hemoglobin 14.7 g/dL      Hematocrit 43.7 %      MCV 87.1 fL      MCH 29.4 pg      MCHC 33.7 g/dL      RDW 14.6 %      RDW-SD 44.6 fl      MPV 9.5 fL      Platelets 150 10*3/mm3      Neutrophil % 69.2 %      Lymphocyte % 15.5 %      Monocyte % 11.3 %      Eosinophil % 3.5 %      Basophil % 0.5 %      Neutrophils, Absolute 6.10 10*3/mm3      Lymphocytes, Absolute 1.40 10*3/mm3      Monocytes, Absolute 1.00 10*3/mm3      Eosinophils, Absolute 0.30 10*3/mm3      Basophils, Absolute 0.00 10*3/mm3      nRBC 0.1 /100 WBC     POC Glucose Once [[id number]]  (Abnormal) Collected: 02/03/21 1619    Specimen: Blood Updated: 02/03/21 1640     Glucose 129 mg/dL      Comment: Serial Number: [id number]:  [id number]       POC Glucose Once [[id number]]  (Normal) Collected: 02/03/21 1113    Specimen: Blood Updated: 02/03/21 1128     Glucose 99 mg/dL      Comment: Serial Number: [id number]:  [id number]              Imaging Results (Last 24 Hours)     ** No results found for the last 24 hours. **               I reviewed the patient's new clinical results.    Medication Review:   Scheduled Meds:allopurinol, 100 mg, Oral, Daily  atorvastatin, 10 mg, Oral, Daily  dilTIAZem CD, 240 mg, Oral, Q24H  insulin lispro, 0-7 Units, Subcutaneous, 4x Daily With Meals & Nightly  losartan, 100 mg, Oral, Q24H  metoprolol tartrate, 50 mg, Oral, Q12H      Continuous Infusions:dilTIAZem, 5-15 mg/hr, Last Rate: Stopped (01/27/21 1000)      PRN Meds:.•  acetaminophen **OR** acetaminophen  •  aluminum-magnesium hydroxide-simethicone  •  bisacodyl  •  dextrose  •  dextrose  •  glucagon (human recombinant)  •  insulin lispro  **AND** insulin lispro  •  ipratropium-albuterol  •  labetalol  •  nitroglycerin  •  ondansetron **OR** ondansetron  •  [COMPLETED] Insert peripheral IV **AND** sodium chloride     Assessment/Plan      Uncontrolled hypertension-recurrent, new problem today  A. fib RVR-recurrent, new problem today  Nausea without vomiting      Intracerebellar and posterior fossa hemorrhage (CMS/HCC)    Chronic atrial fibrillation (CMS/HCC)    Chronic diastolic CHF (congestive heart failure) (CMS/HCC)    Coronary artery disease involving native coronary artery of native heart without angina pectoris    History of CVA (cerebrovascular accident)    DDD (degenerative disc disease), lumbosacral    GERD (gastroesophageal reflux disease)    Obesity    Paroxysmal atrial fibrillation (CMS/HCC)    Fall    Chronic anticoagulation    Patient continues to have very high labile BP Increase activity as much as patient can tolerate while on monitor.  Hopefully evening blood pressures will come under better control and patient will be able to discharge back to [place].  However at this juncture, blood pressure still quite labile especially during the night.   Will add Clonidine BID and cont to monitor    Cardiology input greatly appreciated, thank you.  Will need input if and when patient can safely restart anticoagulation given history of Intracerebellar and posterior fossa hemorrhage.    Renal ultrasound normal.      Plan for disposition: [place] when able    Daylin Burris, CLARISSA  02/04/21  08:04 EST

## 2021-02-04 NOTE — PROGRESS NOTES
Referring Provider: Hospitalist    Reason for follow-up: High blood pressure and atrial fibrillation     Patient Care Team:  Anibal Dalton MD as PCP - General (Family Medicine)  Shwetha Barber MD as Consulting Physician (Nephrology)  Edy Thomas MD as Consulting Physician (Cardiology)    Subjective .  Patient doing well without any chest pain or shortness of breath    Objective  Lying in bed comfortably     Review of Systems   Constitution: Negative for fever and malaise/fatigue.   Cardiovascular: Negative for chest pain, dyspnea on exertion and palpitations.   Respiratory: Negative for cough and shortness of breath.    Skin: Negative for rash.   Gastrointestinal: Negative for abdominal pain, nausea and vomiting.   Neurological: Negative for focal weakness and headaches.   All other systems reviewed and are negative.      Ketorolac tromethamine    Scheduled Meds:allopurinol, 100 mg, Oral, Daily  atorvastatin, 10 mg, Oral, Daily  cloNIDine, 0.1 mg, Oral, Q12H  dilTIAZem CD, 240 mg, Oral, Q24H  insulin lispro, 0-7 Units, Subcutaneous, 4x Daily With Meals & Nightly  losartan, 100 mg, Oral, Q24H  metoprolol tartrate, 50 mg, Oral, Q12H      Continuous Infusions:dilTIAZem, 5-15 mg/hr, Last Rate: Stopped (01/27/21 1000)      PRN Meds:.•  acetaminophen **OR** acetaminophen  •  aluminum-magnesium hydroxide-simethicone  •  bisacodyl  •  dextrose  •  dextrose  •  glucagon (human recombinant)  •  insulin lispro **AND** insulin lispro  •  ipratropium-albuterol  •  labetalol  •  nitroglycerin  •  ondansetron **OR** ondansetron  •  [COMPLETED] Insert peripheral IV **AND** sodium chloride        VITAL SIGNS  Vitals:    02/04/21 0643 02/04/21 0903 02/04/21 1007 02/04/21 1200   BP: 156/99  154/87 139/93   BP Location:   Left arm    Patient Position:   Lying    Pulse: 80 76 76 70   Resp:   15    Temp:   97.9 °F (36.6 °C)    TempSrc:   Oral    SpO2:       Weight:       Height:           Flowsheet Rows      First  "Filed Value   Admission Height  182.9 cm (72\") Documented at 01/26/2021 0721   Admission Weight  104 kg (230 lb) Documented at 01/26/2021 0721           TELEMETRY: Atrial fibrillation    Physical Exam:  Constitutional:       Appearance: Well-developed.   Eyes:      General: No scleral icterus.     Conjunctiva/sclera: Conjunctivae normal.   HENT:      Head: Normocephalic and atraumatic.   Neck:      Musculoskeletal: Normal range of motion and neck supple.      Vascular: No carotid bruit or JVD.   Pulmonary:      Effort: Pulmonary effort is normal.      Breath sounds: Normal breath sounds. No wheezing. No rales.   Cardiovascular:      Normal rate. Irregularly irregular rhythm.   Pulses:     Intact distal pulses.   Abdominal:      General: Bowel sounds are normal.      Palpations: Abdomen is soft.   Skin:     General: Skin is warm and dry.      Findings: No rash.   Neurological:      Mental Status: Alert.          Results Review:   I reviewed the patient's new clinical results.  Lab Results (last 24 hours)     Procedure Component Value Units Date/Time    POC Glucose Once [[id number]]  (Abnormal) Collected: 02/04/21 1124    Specimen: Blood Updated: 02/04/21 1132     Glucose 137 mg/dL      Comment: Serial Number: [id number]:  [id number]       Comprehensive Metabolic Panel [[id number]]  (Abnormal) Collected: 02/04/21 0654    Specimen: Blood Updated: 02/04/21 0804     Glucose 90 mg/dL      BUN 23 mg/dL      Creatinine 0.89 mg/dL      Sodium 142 mmol/L      Potassium 4.0 mmol/L      Chloride 109 mmol/L      CO2 22.0 mmol/L      Calcium 9.2 mg/dL      Total Protein 7.1 g/dL      Albumin 3.90 g/dL      ALT (SGPT) 38 U/L      AST (SGOT) 25 U/L      Alkaline Phosphatase 71 U/L      Total Bilirubin 0.7 mg/dL      eGFR Non African Amer 86 mL/min/1.73      Globulin 3.2 gm/dL      A/G Ratio 1.2 g/dL      BUN/Creatinine Ratio 25.8     Anion Gap 11.0 mmol/L     Narrative:      GFR Normal >60  Chronic Kidney Disease <60  Kidney " Failure <15      POC Glucose Once [[id number]]  (Abnormal) Collected: 02/04/21 0735    Specimen: Blood Updated: 02/04/21 0736     Glucose 106 mg/dL      Comment: Serial Number: [id number]:  [id number]       Magnesium [[id number]]  (Normal) Collected: 02/04/21 0430    Specimen: Blood Updated: 02/04/21 0619     Magnesium 2.0 mg/dL     Phosphorus [[id number]]  (Normal) Collected: 02/04/21 0430    Specimen: Blood Updated: 02/04/21 0619     Phosphorus 3.3 mg/dL     CBC & Differential [[id number]]  (Abnormal) Collected: 02/04/21 0430    Specimen: Blood Updated: 02/04/21 0523    Narrative:      The following orders were created for panel order CBC & Differential.  Procedure                               Abnormality         Status                     ---------                               -----------         ------                     CBC Auto Differential[[id number]]        Abnormal            Final result                 Please view results for these tests on the individual orders.    CBC Auto Differential [[id number]]  (Abnormal) Collected: 02/04/21 0430    Specimen: Blood Updated: 02/04/21 0523     WBC 8.80 10*3/mm3      RBC 5.01 10*6/mm3      Hemoglobin 14.7 g/dL      Hematocrit 43.7 %      MCV 87.1 fL      MCH 29.4 pg      MCHC 33.7 g/dL      RDW 14.6 %      RDW-SD 44.6 fl      MPV 9.5 fL      Platelets 150 10*3/mm3      Neutrophil % 69.2 %      Lymphocyte % 15.5 %      Monocyte % 11.3 %      Eosinophil % 3.5 %      Basophil % 0.5 %      Neutrophils, Absolute 6.10 10*3/mm3      Lymphocytes, Absolute 1.40 10*3/mm3      Monocytes, Absolute 1.00 10*3/mm3      Eosinophils, Absolute 0.30 10*3/mm3      Basophils, Absolute 0.00 10*3/mm3      nRBC 0.1 /100 WBC     POC Glucose Once [[id number]]  (Abnormal) Collected: 02/03/21 1619    Specimen: Blood Updated: 02/03/21 1640     Glucose 129 mg/dL      Comment: Serial Number: [id number]:  [id number]             Imaging Results (Last 24 Hours)     ** No results found for the  last 24 hours. **          EKG      I personally viewed and interpreted the patient's EKG/Telemetry data:    ECHOCARDIOGRAM:    STRESS MYOVIEW:    CARDIAC CATHETERIZATION:    OTHER:         Assessment/Plan     Principal Problem:    Intracerebellar and posterior fossa hemorrhage (CMS/HCC)  Active Problems:    Chronic atrial fibrillation (CMS/HCC)    Chronic diastolic CHF (congestive heart failure) (CMS/HCC)    Coronary artery disease involving native coronary artery of native heart without angina pectoris    History of CVA (cerebrovascular accident)    DDD (degenerative disc disease), lumbosacral    GERD (gastroesophageal reflux disease)    Obesity    Paroxysmal atrial fibrillation (CMS/HCC)    Fall    Chronic anticoagulation       Patient presented with mental status changes and after a fall and has intracerebral hemorrhage and is seen by neurosurgeons  Patient has been on anticoagulation with Xarelto for A. fib which has been held  Patient's blood pressure has been high and he was started on Cardene drip but now is on oral medicines and his blood pressure is better now  Patient is off Cardene and he is on oral medicines and his blood pressure still has been high and hence his medications are being adjusted daily.  Patient also has history of chronic coronary disease and is ruled out for MI by EKG enzymes  Patient has chronic insufficiency and is followed by nephrologist  Patient will be restarted on anticoagulation when cleared by neurosurgeons.    I discussed the patients findings and my recommendations with patient and nurse    Edy Thomas MD  02/04/21  12:58 EST

## 2021-02-04 NOTE — DISCHARGE SUMMARY
Date of Discharge:  2/4/2021    Discharge Diagnosis: HTN crisis, Intracerebellar and posterior fossa hemorrhage, Paroxysmal atrial fibrillation     Presenting Problem/History of Present Illness  Active Hospital Problems    Diagnosis  POA   • **Intracerebellar and posterior fossa hemorrhage (CMS/HCC) [I61.4, I61.8]  Yes   • Chronic anticoagulation [Z79.01]  Not Applicable   • Paroxysmal atrial fibrillation (CMS/HCC) [I48.0]  Yes   • Obesity [E66.9]  Yes   • History of CVA (cerebrovascular accident) [Z86.73]  Not Applicable   • Chronic atrial fibrillation (CMS/HCC) [I48.20]  Yes   • DDD (degenerative disc disease), lumbosacral [M51.37]  Yes   • Coronary artery disease involving native coronary artery of native heart without angina pectoris [I25.10]  Yes   • GERD (gastroesophageal reflux disease) [K21.9]  Yes   • Chronic diastolic CHF (congestive heart failure) (CMS/[facility]) [I50.32]  Yes      Resolved Hospital Problems    Diagnosis Date Resolved POA   • Fall [W19.XXXA] 02/04/2021 Yes        Hospital Course  Patient is a 65 y.o. male admitted after a fall at his SNF and head injury-found to have brain hemorrhage and admitted. Neurosurgery and neurology consulted and pt had serial head CT that did not show increase in the hemorrhage. He was monitored first in the ICU and then transferred to [facility]. After admission he developed severely uncontrolled BP requiring IV drip and oral med increases/changes several times before this was controlled. He remained mentally stable and at baseline with occ confusion/forgetfulness. On day of DC his BP was stable and controlled. He will be DC back to his SNF    Procedures Performed     none    Consults:   Consults     Date and Time Order Name Status Description    1/29/2021 0826 Inpatient Cardiology Consult Completed     1/27/2021 1200 Inpatient Hospitalist Consult      1/26/2021 1058 Inpatient Neurosurgery Consult Completed     1/26/2021 1058 Inpatient Family Practice Consult Completed      1/26/2021 0913 Neurosurgery (on-call MD unless specified) Completed           Pertinent Test Results:     Condition on Discharge:  Fair    Vital Signs  Temp:  [97.3 °F (36.3 °C)-98.8 °F (37.1 °C)] 97.3 °F (36.3 °C)  Heart Rate:  [67-84] 68  Resp:  [13-17] 13  BP: (139-159)/(84-99) 145/84    Physical Exam:     General Appearance:    Alert, cooperative, in no acute distress   Head:    Normocephalic, without obvious abnormality, atraumatic   Eyes:            Lids and lashes normal, conjunctivae and sclerae normal, no   icterus, no pallor   Ears:    Ears appear intact with no abnormalities noted   Throat:   No oral lesions, no thrush, oral mucosa moist   Neck:   No adenopathy, supple, trachea midline,   Back:     No kyphosis present, no scoliosis present, no skin lesions,      erythema or scars, no tenderness to percussion or                   palpation,   range of motion normal   Lungs:     Clear to auscultation,respirations regular, even and                  unlabored    Heart:    Regular rhythm and normal rate, normal S1 and S2, no            murmur, no gallop, no rub, no click   Chest Wall:    No abnormalities observed   Abdomen:     Normal bowel sounds, no masses, no organomegaly, soft        non-tender, non-distended, no guarding, no rebound                tenderness   Rectal:     Deferred   Extremities:   Moves all extremities well, no edema, no cyanosis, no             redness   Pulses:   Pulses palpable and equal bilaterally   Skin:   No bleeding, bruising or rash   Lymph nodes:   No palpable adenopathy   Neurologic:   Cranial nerves 2 - 12 grossly intact, sensation intact       Discharge Disposition  Skilled Nursing Facility (DC - External)    Discharge Medications     Discharge Medications      New Medications      Instructions Start Date   amoxicillin-clavulanate 875-125 MG per tablet  Commonly known as: AUGMENTIN   1 tablet, Oral, Every 12 Hours Scheduled      cloNIDine 0.1 MG tablet  Commonly known as:  CATAPRES   0.1 mg, Oral, Every 12 Hours Scheduled         Changes to Medications      Instructions Start Date   dilTIAZem  MG 24 hr capsule  Commonly known as: CARDIZEM CD  What changed:   · medication strength  · how much to take   240 mg, Oral, Every 24 Hours Scheduled   Start Date: February 5, 2021     metoprolol tartrate 50 MG tablet  Commonly known as: LOPRESSOR  What changed:   · medication strength  · how much to take  · when to take this  · additional instructions   50 mg, Oral, Every 12 Hours Scheduled         Continue These Medications      Instructions Start Date   acetaminophen 325 MG tablet  Commonly known as: TYLENOL   650 mg, Oral, Every 6 Hours PRN      allopurinol 100 MG tablet  Commonly known as: ZYLOPRIM   100 mg, Oral, Daily      aspirin 81 MG EC tablet   81 mg, Oral, Daily      betamethasone dipropionate 0.05 % cream   1 application, Topical, 2 Times Daily      hydrOXYzine 25 MG tablet  Commonly known as: ATARAX   25 mg, Oral, 2 times daily      losartan 100 MG tablet  Commonly known as: COZAAR   100 mg, Oral, Every 24 Hours Scheduled      pravastatin 40 MG tablet  Commonly known as: PRAVACHOL   40 mg, Oral, Nightly      risperiDONE 0.25 MG tablet  Commonly known as: risperDAL   0.75 mg, Oral, 2 Times Daily      rivaroxaban 20 MG tablet  Commonly known as: XARELTO   20 mg, Oral, Daily      sodium bicarbonate 650 MG tablet   650 mg, Oral, 3 Times Daily      spironolactone 25 MG tablet  Commonly known as: ALDACTONE   12.5 mg, Oral, Daily             Discharge Diet: HH    Activity at Discharge: AS pop    Follow-up Appointments  No future appointments.      Test Results Pending at Discharge       Daylin Burris, CLARISSA  02/04/21  16:20 EST

## 2021-02-04 NOTE — PLAN OF CARE
Goal Outcome Evaluation:         Patient resting abed, no pain noted, alert to self and place, blood pressure WNL, will continue to monitor.

## 2021-02-04 NOTE — PLAN OF CARE
Pt to be d/kim back to [facility]. Pt transportation via EMS. I left a voice mail for sister informing her of d/c. I spoke with RN at [place] for report and reviewed all changes in medications.   Problem: Fall Injury Risk  Goal: Absence of Fall and Fall-Related Injury  Outcome: Adequate for Care Transition  Intervention: Identify and Manage Contributors to Fall Injury Risk  Recent Flowsheet Documentation  Taken 2/4/2021 1639 by Autumn Henson RN  Medication Review/Management: medications reviewed  Taken 2/4/2021 1221 by Autumn Henson RN  Medication Review/Management: medications reviewed  Taken 2/4/2021 0845 by Autumn Henson RN  Medication Review/Management: medications reviewed  Intervention: Promote Injury-Free Environment  Recent Flowsheet Documentation  Taken 2/4/2021 1639 by Autumn Henson RN  Safety Promotion/Fall Prevention:   safety round/check completed   fall prevention program maintained  Taken 2/4/2021 1221 by Autumn Henson RN  Safety Promotion/Fall Prevention:   safety round/check completed   fall prevention program maintained  Taken 2/4/2021 0845 by Autumn Henson RN  Safety Promotion/Fall Prevention:   safety round/check completed   fall prevention program maintained     Problem: Adult Inpatient Plan of Care  Goal: Patient-Specific Goal (Individualized)  Outcome: Adequate for Care Transition  Goal: Absence of Hospital-Acquired Illness or Injury  Outcome: Adequate for Care Transition  Intervention: Identify and Manage Fall Risk  Recent Flowsheet Documentation  Taken 2/4/2021 1639 by Autumn Henson RN  Safety Promotion/Fall Prevention:   safety round/check completed   fall prevention program maintained  Taken 2/4/2021 1221 by Autumn Henson RN  Safety Promotion/Fall Prevention:   safety round/check completed   fall prevention program maintained  Taken 2/4/2021 0845 by Autumn Henson RN  Safety Promotion/Fall  Prevention:   safety round/check completed   fall prevention program maintained  Intervention: Prevent and Manage VTE (venous thromboembolism) Risk  Recent Flowsheet Documentation  Taken 2/4/2021 0845 by Autumn Henson RN  VTE Prevention/Management: sequential compression devices off  Goal: Optimal Comfort and Wellbeing  Outcome: Adequate for Care Transition  Intervention: Provide Person-Centered Care  Recent Flowsheet Documentation  Taken 2/4/2021 1221 by Autumn Henson RN  Trust Relationship/Rapport:   care explained   choices provided   emotional support provided  Taken 2/4/2021 0845 by Autumn Henson RN  Trust Relationship/Rapport:   choices provided   care explained   emotional support provided   empathic listening provided  Goal: Readiness for Transition of Care  Outcome: Adequate for Care Transition     Problem: Skin Injury Risk Increased  Goal: Skin Health and Integrity  Outcome: Adequate for Care Transition   Goal Outcome Evaluation:

## 2021-02-05 NOTE — PROGRESS NOTES
Case Management Discharge Note      Final Note: [facility]         Selected Continued Care - Discharged on 2/4/2021 Admission date: 1/26/2021 - Discharge disposition: Skilled Nursing Facility (DC - External)                 Final Discharge Disposition Code: 04 - intermediate care facility

## 2022-03-21 ENCOUNTER — HOSPITAL ENCOUNTER (EMERGENCY)
Facility: HOSPITAL | Age: 67
Discharge: HOME | End: 2022-03-21
Attending: EMERGENCY MEDICINE | Admitting: EMERGENCY MEDICINE

## 2022-03-21 VITALS
HEIGHT: 72 IN | RESPIRATION RATE: 18 BRPM | DIASTOLIC BLOOD PRESSURE: 90 MMHG | HEART RATE: 66 BPM | WEIGHT: 234 LBS | BODY MASS INDEX: 31.69 KG/M2 | OXYGEN SATURATION: 100 % | SYSTOLIC BLOOD PRESSURE: 154 MMHG | TEMPERATURE: 98 F

## 2022-03-21 DIAGNOSIS — R04.0 EPISTAXIS: Primary | ICD-10-CM

## 2022-03-21 PROCEDURE — 99283 EMERGENCY DEPT VISIT LOW MDM: CPT

## 2022-03-21 RX ORDER — CEPHALEXIN 500 MG/1
500 CAPSULE ORAL ONCE
Status: COMPLETED | OUTPATIENT
Start: 2022-03-21 | End: 2022-03-21

## 2022-03-21 RX ORDER — CEPHALEXIN 500 MG/1
500 CAPSULE ORAL 3 TIMES DAILY
Qty: 12 CAPSULE | Refills: 0 | OUTPATIENT
Start: 2022-03-21 | End: 2022-03-26

## 2022-03-21 RX ADMIN — CEPHALEXIN 500 MG: 500 CAPSULE ORAL at 13:40

## 2022-03-21 RX ADMIN — PHENYLEPHRINE HYDROCHLORIDE 2 SPRAY: 0.5 SPRAY NASAL at 13:16

## 2022-03-21 RX ADMIN — SILVER NITRATE APPLICATORS 1 APPLICATION: 25; 75 STICK TOPICAL at 13:17

## 2022-03-21 NOTE — ED PROVIDER NOTES
Subjective   History of Present Illness  Nosebleed  66-year-old male states he had some bleeding from his right nare this morning.  He denies trauma.  He reports no other bruising or bleeding.  He states he does take blood thinner.  States the bleeding had resolved upon arrival here.  Review of Systems   Constitutional: Negative for fever.   HENT: Positive for nosebleeds.    Skin: Negative for rash and wound.   Neurological: Negative for headaches.   Hematological: Does not bruise/bleed easily.       Past Medical History:   Diagnosis Date   • A-fib (CMS/[facility])    • Atrial fibrillation with RVR (CMS/[facility])    • CHF (congestive heart failure) (CMS/[facility])    • Chronic atrial fibrillation (CMS/[facility]) 11/8/2019   • Chronic diastolic CHF (congestive heart failure) (CMS/[facility]) 11/8/2019   • CKD (chronic kidney disease) stage 3, GFR 30-59 ml/min (CMS/[facility]) 11/8/2019   • Coronary artery disease involving native coronary artery of native heart without angina pectoris 11/8/2019   • DDD (degenerative disc disease), lumbosacral 11/8/2019   • Essential hypertension 11/8/2019   • Gambling disorder, persistent 11/8/2019   • GERD (gastroesophageal reflux disease) 11/8/2019   • History of CVA (cerebrovascular accident) 11/8/2019   • Hyperlipidemia    • Hypertension    • Medically noncompliant 11/8/2019   • Peripheral polyneuropathy 11/8/2019   • Psoriasis 11/8/2019   • Stroke (CMS/[facility])        Allergies   Allergen Reactions   • Ketorolac Tromethamine Anaphylaxis     unknown       Past Surgical History:   Procedure Laterality Date   • CARDIAC CATHETERIZATION     • CARDIAC CATHETERIZATION N/A 12/10/2019    Procedure: Left Heart Cath and coronary angiogram;  Surgeon: Edy Thomas MD;  Location: [facility] CATH INVASIVE LOCATION;  Service: Cardiovascular       Family History   Problem Relation Age of Onset   • Heart disease Mother        Social History     Socioeconomic History   • Marital status: Single   Tobacco Use   • Smoking status: Never Smoker    • Smokeless tobacco: Never Used   Substance and Sexual Activity   • Alcohol use: No   • Drug use: No   • Sexual activity: Not Currently       Prior to Admission medications    Medication Sig Start Date End Date Taking? Authorizing Provider   acetaminophen (TYLENOL) 325 MG tablet Take 650 mg by mouth Every 6 (Six) Hours As Needed for Mild Pain .    Celso Mendosa MD   allopurinol (ZYLOPRIM) 100 MG tablet Take 1 tablet by mouth Daily. 9/13/20   Daylin Burris APRN   aspirin 81 MG EC tablet Take 81 mg by mouth Daily.    Celso Mendosa MD   betamethasone dipropionate 0.05 % cream Apply 1 application topically to the appropriate area as directed 2 (Two) Times a Day. 10/9/20   Paty Polk DO   cloNIDine (CATAPRES) 0.1 MG tablet Take 1 tablet by mouth Every 12 (Twelve) Hours. 2/4/21   Daylin Burris APRN   dilTIAZem CD (CARDIZEM CD) 240 MG 24 hr capsule Take 1 capsule by mouth Daily. 2/5/21   Daylin Burris APRN   hydrOXYzine (ATARAX) 25 MG tablet Take 25 mg by mouth 2 (two) times a day.    Celso Mendosa MD   losartan (COZAAR) 100 MG tablet Take 1 tablet by mouth Daily. 10/16/20   Daylin Burris APRN   metoprolol tartrate (LOPRESSOR) 50 MG tablet Take 1 tablet by mouth Every 12 (Twelve) Hours. 2/4/21   Daylin Burris APRN   pravastatin (PRAVACHOL) 40 MG tablet Take 40 mg by mouth Every Night.    Celso Mendosa MD   risperiDONE (risperDAL) 0.25 MG tablet Take 0.75 mg by mouth 2 (Two) Times a Day.    Celso Mendosa MD   rivaroxaban (XARELTO) 20 MG tablet Take 20 mg by mouth Daily.    Celso Mendosa MD   sodium bicarbonate 650 MG tablet Take 1 tablet by mouth 3 (Three) Times a Day. 9/12/20   Daylin Burris APRN   spironolactone (ALDACTONE) 25 MG tablet Take 0.5 tablets by mouth Daily. 9/13/20   Daylin Burris APRN     /98 (BP Location: Right arm, Patient Position: Lying)   Pulse 64   Temp 97.6 °F (36.4 °C) (Oral)   Resp 18   Ht 182.9 cm  "(72\")   Wt 106 kg (234 lb)   SpO2 100%   BMI 31.74 kg/m²   I examined the patient using the appropriate personal protective equipment.        Objective   Physical Exam  General: Well-appearing, no acute distress  Psych: Oriented, pleasant affect  Respirations: Clear, nonlabored respirations  Skin: No rash, normal color, no petechiae or purpura  Intranasal exam there is some dried blood around the nostrils but there is no active bleeding in the nares.  There is no posterior bleeding his throat is clear there is no stridor, no signs of trauma    Procedures           ED Course      Patient was observed for couple of hours in the emergency room without any bleeding.  Just before we were getting ready to discharge him nurses caught him picking at his nose and he did start some bleeding on the right nare.   I examined the nostril with speculum exam at that point and he had some mild bleeding in the anterior septum he did not really tolerate silver nitrate cauterization well and so a rapid Rhino rocket was placed in the nare after it was soaked with sterile saline it was passed and was inflated with about 3 cc of air and he tolerated this well the nose was hemostatic.  This was a 4.5 cm rapid Rhino.                                          MDM  Patient had anterior epistaxis that recurred during the emergency room course.  This was packed with a rapid Rhino Rocket and successful hemostasis was achieved.  He is discharged to follow-up with ENT.  He is prescribed a short course of Keflex.  He was given warning signs for return.  Final diagnoses:   Epistaxis       ED Disposition  ED Disposition     ED Disposition   Discharge    Condition   Stable    Comment   --             Anibal Dalton MD  [address] [id number]  [phone]      As needed         Medication List      No changes were made to your prescriptions during this visit.          Theo Fuentes MD  03/21/22 1320    "

## 2022-03-21 NOTE — ED NOTES
Pt found standing up in room with gown off rubbing and picking at his nose. Advised pt to please stay in bed and not climb over bed rails; use call light that is on stretcher.   Pt nose bleeding at this time. Nose clamped, bleeding controlled and Dr Funetes advised.

## 2022-03-21 NOTE — ED NOTES
Spoke with pt's sister Baylee, who states she will come and get Mr. Whipple and transport him to [place]

## 2022-03-26 ENCOUNTER — HOSPITAL ENCOUNTER (EMERGENCY)
Facility: HOSPITAL | Age: 67
Discharge: NURSING, REHAB OR LONG-TERM CARE | End: 2022-03-26
Attending: EMERGENCY MEDICINE | Admitting: EMERGENCY MEDICINE

## 2022-03-26 VITALS
DIASTOLIC BLOOD PRESSURE: 89 MMHG | SYSTOLIC BLOOD PRESSURE: 143 MMHG | HEART RATE: 95 BPM | RESPIRATION RATE: 18 BRPM | OXYGEN SATURATION: 98 % | WEIGHT: 234 LBS | TEMPERATURE: 98 F | BODY MASS INDEX: 33.5 KG/M2 | HEIGHT: 70 IN

## 2022-03-26 DIAGNOSIS — R04.0 EPISTAXIS: Primary | ICD-10-CM

## 2022-03-26 PROCEDURE — 99283 EMERGENCY DEPT VISIT LOW MDM: CPT

## 2022-03-26 RX ORDER — CEPHALEXIN 500 MG/1
500 CAPSULE ORAL 3 TIMES DAILY
Qty: 15 CAPSULE | Refills: 0 | Status: SHIPPED | OUTPATIENT
Start: 2022-03-26 | End: 2022-03-26 | Stop reason: SDUPTHER

## 2022-03-26 RX ORDER — CEPHALEXIN 500 MG/1
500 CAPSULE ORAL 3 TIMES DAILY
Qty: 15 CAPSULE | Refills: 0 | Status: SHIPPED | OUTPATIENT
Start: 2022-03-26 | End: 2022-03-31

## 2022-03-26 RX ORDER — OXYMETAZOLINE HYDROCHLORIDE 0.05 G/100ML
2 SPRAY NASAL 2 TIMES DAILY
Status: DISCONTINUED | OUTPATIENT
Start: 2022-03-26 | End: 2022-03-26 | Stop reason: HOSPADM

## 2022-03-26 RX ADMIN — NASAL DECONGESTANT 2 SPRAY: 0.05 SPRAY NASAL at 10:06

## 2022-03-26 NOTE — ED NOTES
Pt arrived via EMS with c/o continued nose bleed. Pt from [facility] and was seen in ED Monday for the same. Dried blood noted to right nostril; bleeding controlled on arrival. Pt cleaned and gown placed on pt. Will continue to monitor.

## 2022-03-26 NOTE — DISCHARGE INSTRUCTIONS
Follow-up with ENT as directed.  Return to the emergency room for any new or worsening symptoms or if you have any other questions or concerns.  Take antibiotic as prescribed.

## 2022-03-26 NOTE — ED NOTES
Pt found to have taken off all monitors again and standing at end of bed. Pt placed back in bed; Nonskid socks placed on pt. Side rails up x 2 and call light within reach.

## 2022-03-26 NOTE — ED PROVIDER NOTES
Subjective   Chief complaint: Nosebleed    66-year-old male presents with a nosebleed.  Patient has a history of atrial fibrillation and is on Xarelto.  He presents from a nursing home with a nosebleed.  He was seen here a few days ago for nosebleed as well.  Patient denies any trauma.  The bleeding has been present for about 2 hours.  EMS states bleeding has pretty much subsided at this point.      History provided by:  Patient and EMS personnel      Review of Systems   Constitutional: Negative for fever.   HENT: Positive for nosebleeds.    Respiratory: Negative for shortness of breath.    Cardiovascular: Negative for chest pain.   Gastrointestinal: Negative for abdominal pain and vomiting.   Neurological: Negative for headaches.       Past Medical History:   Diagnosis Date   • A-fib (CMS/[facility])    • Atrial fibrillation with RVR (CMS/[facility])    • CHF (congestive heart failure) (CMS/[facility])    • Chronic atrial fibrillation (CMS/HCC) 11/8/2019   • Chronic diastolic CHF (congestive heart failure) (CMS/[facility]) 11/8/2019   • CKD (chronic kidney disease) stage 3, GFR 30-59 ml/min (CMS/[facility]) 11/8/2019   • Coronary artery disease involving native coronary artery of native heart without angina pectoris 11/8/2019   • DDD (degenerative disc disease), lumbosacral 11/8/2019   • Essential hypertension 11/8/2019   • Gambling disorder, persistent 11/8/2019   • GERD (gastroesophageal reflux disease) 11/8/2019   • History of CVA (cerebrovascular accident) 11/8/2019   • Hyperlipidemia    • Hypertension    • Medically noncompliant 11/8/2019   • Peripheral polyneuropathy 11/8/2019   • Psoriasis 11/8/2019   • Stroke (CMS/[facility])        Allergies   Allergen Reactions   • Ketorolac Tromethamine Anaphylaxis     unknown       Past Surgical History:   Procedure Laterality Date   • CARDIAC CATHETERIZATION     • CARDIAC CATHETERIZATION N/A 12/10/2019    Procedure: Left Heart Cath and coronary angiogram;  Surgeon: Edy Thomas MD;  Location: [facility]  "INVASIVE LOCATION;  Service: Cardiovascular       Family History   Problem Relation Age of Onset   • Heart disease Mother        Social History     Socioeconomic History   • Marital status: Single   Tobacco Use   • Smoking status: Never Smoker   • Smokeless tobacco: Never Used   Substance and Sexual Activity   • Alcohol use: No   • Drug use: No   • Sexual activity: Not Currently       /85   Pulse 79   Temp 98.1 °F (36.7 °C)   Resp 16   Ht 177.8 cm (70\")   Wt 106 kg (234 lb)   SpO2 98%   BMI 33.58 kg/m²       Objective   Physical Exam  Vitals and nursing note reviewed.   Constitutional:       Appearance: Normal appearance.   HENT:      Head: Normocephalic and atraumatic.      Nose:      Comments: There is dried blood around the right naris.  There is no active bleeding at this time.  Posterior oropharynx is clear with no blood.  Cardiovascular:      Rate and Rhythm: Normal rate and regular rhythm.   Pulmonary:      Effort: Pulmonary effort is normal. No respiratory distress.   Skin:     General: Skin is warm and dry.   Neurological:      Mental Status: He is alert.         Epistaxis Management    Date/Time: 3/26/2022 11:52 AM  Performed by: Palmer Fuentes MD  Authorized by: Palmer Fuentes MD     Consent:     Consent obtained:  Verbal    Consent given by:  Patient  Universal protocol:     Patient identity confirmed:  Verbally with patient  Procedure details:     Treatment site:  R anterior    Treatment method:  Anterior pack (4.5 cm Rhino Rocket)    Treatment episode: recurring    Post-procedure details:     Assessment:  Bleeding stopped    Procedure completion:  Tolerated well, no immediate complications  Comments:      Afrin was initially applied to the right naris and a nasal clamp was placed.  Patient continued to have bleeding so patient had a 4.5 cm Rhino Rocket placed.  Patient had good hemostasis with this.               ED Course                                                 MDM   On arrival " to the emergency room the patient did not have any active bleeding however shortly into his ER stay he started bleeding again.  A 4.5 cm Rhino Rocket was placed as detailed above.  He was observed for couple hours in the emergency room and had no additional bleeding.  Patient will be discharged with a prescription for Keflex.  He was encouraged to follow-up with ENT.      Final diagnoses:   Epistaxis       ED Disposition  ED Disposition     ED Disposition   Discharge    Condition   Stable    Comment   --             [facility]  [address]  [facility] [id number] [phone]  Call in 2 days           Where to Get Your Medications      These medications were sent to [facility] [address] [place] [address]    Hours: Mon-Fri 7:00AM-7:00PM Phone: [phone]   · cephalexin 500 MG capsule        Medication List      No changes were made to your prescriptions during this visit.          Palmer Fuentes MD  03/26/22 6478

## 2022-03-26 NOTE — ED NOTES
Pt found trying to get out of bed; pulling off pulse ox; Advised pt to please stay in bed and not take equipment off. Pt has call light on lap and attached to bedrail. Pt reeducated on use of call light.

## 2022-05-27 NOTE — PLAN OF CARE
Problem: Patient Care Overview  Goal: Plan of Care Review  Outcome: Ongoing (interventions implemented as appropriate)  Flowsheets (Taken 9/2/2020 1201)  Plan of Care Reviewed With: patient  Outcome Summary: Pt is 65 yo male admitted for SOA, A-fib with RVR, HTN, fatigue, cough.  Covid 19 testing (-).  Pt presents with SOA during mobility, weakness and impaired balance.  Gait assessed using RW due to impaired balance, however pt requiring Janell for pt safety.  Pt does not appear safe to return home alone due to weakness and risk for falls.  PT is recommending IP rehab to address deficits and increase pt safety.  PT will follow 5x/week while at [facility].  PPE donned: mask with faceshield, gloves.      Performed By: Lesley Arnold LPN

## 2024-01-20 NOTE — THERAPY TREATMENT NOTE
Patient Name: Carlos Whipple  : 1955    MRN: [id number]                              Today's Date: 10/7/2020       Admit Date: 10/4/2020    Visit Dx:     ICD-10-CM ICD-9-CM   1. Dyspnea, unspecified type  R06.00 786.09   2. Atrial fibrillation with RVR (CMS/HCC)  I48.91 427.31     Patient Active Problem List   Diagnosis   • Hypertensive emergency   • Chronic atrial fibrillation (CMS/HCC)   • Chronic diastolic CHF (congestive heart failure) (CMS/HCC)   • CKD (chronic kidney disease) stage 3, GFR 30-59 ml/min   • Essential hypertension   • Gambling disorder, persistent   • Shortness of breath   • Coronary artery disease involving native coronary artery of native heart without angina pectoris   • Psoriasis   • History of CVA (cerebrovascular accident)   • DDD (degenerative disc disease), lumbosacral   • Peripheral polyneuropathy   • GERD (gastroesophageal reflux disease)   • Medically noncompliant   • Acute respiratory distress   • Unstable angina (CMS/HCC)   • Cervical disc disorder with radiculopathy   • Completed stroke (CMS/HCC)   • Degeneration of intervertebral disc   • Excessive anticoagulation   • Impaired left ventricular function   • Liver lesion   • Lumbar radiculopathy   • Cervical myelopathy (CMS/HCC)   • Lumbosacral radiculopathy   • Spinal stenosis of lumbar region   • Obesity   • Mild cognitive impairment   • Thoracic back pain   • Thoracic disc disease with myelopathy   • Paroxysmal atrial fibrillation (CMS/HCC)   • Acute congestive heart failure (CMS/HCC)   • Atrial fibrillation with RVR (CMS/HCC)   • Dermatitis associated with moisture   • Dyspnea     Past Medical History:   Diagnosis Date   • A-fib (CMS/HCC)    • Atrial fibrillation with RVR (CMS/HCC)    • CHF (congestive heart failure) (CMS/HCC)    • Chronic atrial fibrillation (CMS/HCC) 2019   • Chronic diastolic CHF (congestive heart failure) (CMS/HCC) 2019   • CKD (chronic kidney disease) stage 3, GFR 30-59 ml/min 2019    • Coronary artery disease involving native coronary artery of native heart without angina pectoris 11/8/2019   • DDD (degenerative disc disease), lumbosacral 11/8/2019   • Essential hypertension 11/8/2019   • Gambling disorder, persistent 11/8/2019   • GERD (gastroesophageal reflux disease) 11/8/2019   • History of CVA (cerebrovascular accident) 11/8/2019   • Hyperlipidemia    • Hypertension    • Medically noncompliant 11/8/2019   • Peripheral polyneuropathy 11/8/2019   • Psoriasis 11/8/2019   • Stroke (CMS/HCC)      Past Surgical History:   Procedure Laterality Date   • CARDIAC CATHETERIZATION     • CARDIAC CATHETERIZATION N/A 12/10/2019    Procedure: Left Heart Cath and coronary angiogram;  Surgeon: Edy Thomas MD;  Location: [facility] CATH INVASIVE LOCATION;  Service: Cardiovascular     General Information     Row Name 10/07/20 1442          Physical Therapy Time and Intention    Document Type  therapy note (daily note)  -     Mode of Treatment  physical therapy  -     Row Name 10/07/20 1442          Living Environment    Lives With  alone  -     Row Name 10/07/20 1442          Home Main Entrance    Number of Stairs, Main Entrance  two  -     Row Name 10/07/20 1442          Cognition    Orientation Status (Cognition)  oriented to;person;place  -     Row Name 10/07/20 1442          Safety Issues, Functional Mobility    Safety Issues Affecting Function (Mobility)  impulsivity  -     Impairments Affecting Function (Mobility)  balance;cognition;coordination;endurance/activity tolerance;strength  -       User Key  (r) = Recorded By, (t) = Taken By, (c) = Cosigned By    Initials Name Provider Type     Amena Hassan PTA Physical Therapy Assistant        Mobility     Row Name 10/07/20 1443          Bed Mobility    Bed Mobility  bed mobility (all) activities  -     All Activities, [place] (Bed Mobility)  minimum assist (75% patient effort)  -     Assistive Device (Bed Mobility)  bed rails;head  of bed elevated  -     Comment (Bed Mobility)  Slow to progress to eob.  -[facility] Name 10/07/20 1443          Sit-Stand Transfer    Sit-Stand [place] (Transfers)  nonverbal cues (demo/gesture);verbal cues;minimum assist (75% patient effort)  -     Assistive Device (Sit-Stand Transfers)  -- HHA c utilized gait belt.  -[facility] Name 10/07/20 1443          Gait/Stairs (Locomotion)    [place] Level (Gait)  minimum assist (75% patient effort);1 person assist  -     Assistive Device (Gait)  -- HHA c utilized gait belt.  -     Distance in Feet (Gait)  40'  -     Deviations/Abnormal Patterns (Gait)  ricky decreased;gait speed decreased  -     Bilateral Gait Deviations  heel strike decreased;forward flexed posture  -     Comment (Gait/Stairs)  Guarded trunk rotation, instability present c multiple deviations from midline.High falls risk, unable to accept challenge away from midline.  -       User Key  (r) = Recorded By, (t) = Taken By, (c) = Cosigned By    Initials Name Provider Type     Amena Hassan PTA Physical Therapy Assistant        Obj/Interventions     [facility] Name 10/07/20 1446          Range of Motion Comprehensive    General Range of Motion  bilateral upper extremity ROM WFL  -     Comment, General Range of Motion  Seated arom exercises using 4Es in available planes. Low activity tolerance.  -[facility] Name 10/07/20 1446          Balance    Static Sitting Balance  WFL  -     Dynamic Sitting Balance  mild impairment  -     Static Standing Balance  mild impairment  -     Dynamic Standing Balance  moderate impairment  -       User Key  (r) = Recorded By, (t) = Taken By, (c) = Cosigned By    Initials Name Provider Type     Amena Hassan PTA Physical Therapy Assistant        Goals/Plan    No documentation.       Clinical Impression     [facility] Name 10/07/20 1448          Pain    Additional Documentation  Pain Scale: Numbers Pre/Post-Treatment (Group)  -LH     Row Name 10/07/20  1448          Pain Scale: Numbers Pre/Post-Treatment    Pretreatment Pain Rating  0/10 - no pain  -     Posttreatment Pain Rating  0/10 - no pain  -     Row Name 10/07/20 1448          Plan of Care Review    Progress  improving  -     Outcome Summary  Required min A for safe, functional mobility. Struggling to eob, c/o dizziness intially. Min A to stand and amb. 40' in room, HHA c utilized gait belt. Instabilty/weakness present, falls risk. Unable to accept challenge, wiill progress as able. Will need rehab at d/c to address deficits. PPE worn: Mask, gloves, shield.  -     Row Name 10/07/20 1448          Vital Signs    O2 Delivery Pre Treatment  room air  -     O2 Delivery Intra Treatment  room air  -     O2 Delivery Post Treatment  room air  -     Pre Patient Position  Supine  -     Intra Patient Position  Standing  -     Post Patient Position  Sitting  -     Row Name 10/07/20 1448          Positioning and Restraints    Pre-Treatment Position  in bed  -     Post Treatment Position  chair  -     In Chair  notified nsg;sitting;call light within reach;exit alarm on  -       User Key  (r) = Recorded By, (t) = Taken By, (c) = Cosigned By    Initials Name Provider Type     Amena Hassan PTA Physical Therapy Assistant        Outcome Measures     Row Name 10/07/20 1451          How much help from another person do you currently need...    Turning from your back to your side while in flat bed without using bedrails?  3  -LH     Moving from lying on back to sitting on the side of a flat bed without bedrails?  3  -LH     Moving to and from a bed to a chair (including a wheelchair)?  3  -LH     Standing up from a chair using your arms (e.g., wheelchair, bedside chair)?  3  -LH     Climbing 3-5 steps with a railing?  3  -LH     To walk in hospital room?  3  -     AM-PAC 6 Clicks Score (PT)  18  -       User Key  (r) = Recorded By, (t) = Taken By, (c) = Cosigned By    Initials Name Provider Type      Amena Hassan PTA Physical Therapy Assistant        Physical Therapy Education                 Title: PT OT SLP Therapies (Done)     Topic: Physical Therapy (Done)     Point: Mobility training (Done)     Learning Progress Summary           Patient Acceptance, E, VU,NR by  at 10/7/2020 1451    Comment: Reinforced out of bed c staff for safety.    Acceptance, E,TB, VU by  at 10/6/2020 1608    Acceptance, E,TB, VU,NR by  at 10/5/2020 1316    Acceptance, E, VU by  at 10/5/2020 0830                   Point: Home exercise program (Done)     Learning Progress Summary           Patient Acceptance, E, VU,NR by  at 10/7/2020 1451    Comment: Reinforced out of bed c staff for safety.    Acceptance, E,TB, VU,NR by  at 10/5/2020 1316                   Point: Body mechanics (Done)     Learning Progress Summary           Patient Acceptance, E, VU,NR by  at 10/7/2020 1451    Comment: Reinforced out of bed c staff for safety.    Acceptance, E,TB, VU,NR by  at 10/5/2020 1316                   Point: Precautions (Done)     Learning Progress Summary           Patient Acceptance, E, VU,NR by  at 10/7/2020 1451    Comment: Reinforced out of bed c staff for safety.    Acceptance, E,TB, VU by  at 10/6/2020 1608    Acceptance, E,TB, VU,NR by  at 10/5/2020 1316    Acceptance, E, VU by  at 10/5/2020 0830                               User Key     Initials Effective Dates Name Provider Type Discipline     03/01/19 -  Amena Hassan PTA Physical Therapy Assistant PT     06/23/20 -  Dung Win, PT Physical Therapist PT     03/01/19 -  Zuleika White, JULIEN Registered Nurse Nurse     06/15/19 -  Sofy Matt PT Physical Therapist PT              PT Recommendation and Plan     Progress: improving  Outcome Summary: Required min A for safe, functional mobility. Struggling to eob, c/o dizziness intially. Min A to stand and amb. 40' in room, HHA c utilized gait belt. Instabilty/weakness present, falls risk. Unable to  accept challenge, wiill progress as able. Will need rehab at d/c to address deficits. PPE worn: Mask, gloves, shield.     Time Calculation:   PT Charges     Row Name 10/07/20 1453             Time Calculation    Start Time  1040  -      Stop Time  1105  -      Time Calculation (min)  25 min  -      PT Received On  10/07/20  -      PT - Next Appointment  10/08/20  -         Timed Charges    47216 - PT Therapeutic Exercise Minutes  15  -      36125 - Gait Training Minutes   10  -        User Key  (r) = Recorded By, (t) = Taken By, (c) = Cosigned By    Initials Name Provider Type     Amena Hassan PTA Physical Therapy Assistant        Therapy Charges for Today     Code Description Service Date Service Provider Modifiers Qty    87769190011 HC PT THER PROC EA 15 MIN 10/7/2020 Amena Hassan PTA GP 1    09750270294 HC GAIT TRAINING EA 15 MIN 10/7/2020 Amena Hassan PTA GP 1          PT G-Codes  Outcome Measure Options: AM-PAC 6 Clicks Basic Mobility (PT), Modified [place]  AM-PAC 6 Clicks Score (PT): 18  Modified [place] Scale: 4 - Moderately severe disability.  Unable to walk without assistance, and unable to attend to own bodily needs without assistance.    Amena Hassan PTA  10/7/2020     Yes

## 2024-03-10 ENCOUNTER — HOSPITAL ENCOUNTER (EMERGENCY)
Facility: HOSPITAL | Age: 69
Discharge: NURSING, REHAB OR LONG-TERM CARE | End: 2024-03-10
Attending: EMERGENCY MEDICINE | Admitting: EMERGENCY MEDICINE
Payer: MEDICARE

## 2024-03-10 ENCOUNTER — APPOINTMENT (OUTPATIENT)
Dept: CT IMAGING | Facility: HOSPITAL | Age: 69
End: 2024-03-10
Payer: MEDICARE

## 2024-03-10 ENCOUNTER — APPOINTMENT (OUTPATIENT)
Dept: GENERAL RADIOLOGY | Facility: HOSPITAL | Age: 69
End: 2024-03-10
Payer: MEDICARE

## 2024-03-10 VITALS
BODY MASS INDEX: 33.5 KG/M2 | RESPIRATION RATE: 15 BRPM | SYSTOLIC BLOOD PRESSURE: 145 MMHG | WEIGHT: 234 LBS | HEIGHT: 70 IN | OXYGEN SATURATION: 96 % | HEART RATE: 75 BPM | TEMPERATURE: 97.9 F | DIASTOLIC BLOOD PRESSURE: 77 MMHG

## 2024-03-10 DIAGNOSIS — J90 PLEURAL EFFUSION, LEFT: ICD-10-CM

## 2024-03-10 DIAGNOSIS — R40.4 TRANSIENT ALTERATION OF AWARENESS: Primary | ICD-10-CM

## 2024-03-10 LAB
ALBUMIN SERPL-MCNC: 3.4 G/DL (ref 3.5–5.2)
ALBUMIN/GLOB SERPL: 0.9 G/DL
ALP SERPL-CCNC: 67 U/L (ref 39–117)
ALT SERPL W P-5'-P-CCNC: 15 U/L (ref 1–41)
ANION GAP SERPL CALCULATED.3IONS-SCNC: 10 MMOL/L (ref 5–15)
AST SERPL-CCNC: 26 U/L (ref 1–40)
BASOPHILS # BLD AUTO: 0.1 10*3/MM3 (ref 0–0.2)
BASOPHILS NFR BLD AUTO: 0.9 % (ref 0–1.5)
BILIRUB SERPL-MCNC: 0.4 MG/DL (ref 0–1.2)
BILIRUB UR QL STRIP: NEGATIVE
BUN SERPL-MCNC: 18 MG/DL (ref 8–23)
BUN/CREAT SERPL: 17.1 (ref 7–25)
CALCIUM SPEC-SCNC: 8.9 MG/DL (ref 8.6–10.5)
CHLORIDE SERPL-SCNC: 109 MMOL/L (ref 98–107)
CLARITY UR: CLEAR
CO2 SERPL-SCNC: 21 MMOL/L (ref 22–29)
COLOR UR: YELLOW
CREAT SERPL-MCNC: 1.05 MG/DL (ref 0.76–1.27)
DEPRECATED RDW RBC AUTO: 51.2 FL (ref 37–54)
EGFRCR SERPLBLD CKD-EPI 2021: 77.8 ML/MIN/1.73
EOSINOPHIL # BLD AUTO: 0.2 10*3/MM3 (ref 0–0.4)
EOSINOPHIL NFR BLD AUTO: 1.7 % (ref 0.3–6.2)
ERYTHROCYTE [DISTWIDTH] IN BLOOD BY AUTOMATED COUNT: 20.8 % (ref 12.3–15.4)
GLOBULIN UR ELPH-MCNC: 3.8 GM/DL
GLUCOSE BLDC GLUCOMTR-MCNC: 86 MG/DL (ref 70–105)
GLUCOSE SERPL-MCNC: 84 MG/DL (ref 65–99)
GLUCOSE UR STRIP-MCNC: NEGATIVE MG/DL
HCT VFR BLD AUTO: 30.6 % (ref 37.5–51)
HGB BLD-MCNC: 8.9 G/DL (ref 13–17.7)
HGB UR QL STRIP.AUTO: NEGATIVE
KETONES UR QL STRIP: NEGATIVE
LEUKOCYTE ESTERASE UR QL STRIP.AUTO: NEGATIVE
LYMPHOCYTES # BLD AUTO: 1.1 10*3/MM3 (ref 0.7–3.1)
LYMPHOCYTES NFR BLD AUTO: 9.5 % (ref 19.6–45.3)
MCH RBC QN AUTO: 19 PG (ref 26.6–33)
MCHC RBC AUTO-ENTMCNC: 29.1 G/DL (ref 31.5–35.7)
MCV RBC AUTO: 65.1 FL (ref 79–97)
MONOCYTES # BLD AUTO: 0.9 10*3/MM3 (ref 0.1–0.9)
MONOCYTES NFR BLD AUTO: 8.3 % (ref 5–12)
NEUTROPHILS NFR BLD AUTO: 79.6 % (ref 42.7–76)
NEUTROPHILS NFR BLD AUTO: 8.9 10*3/MM3 (ref 1.7–7)
NITRITE UR QL STRIP: NEGATIVE
NRBC BLD AUTO-RTO: 0.1 /100 WBC (ref 0–0.2)
PH UR STRIP.AUTO: 7.5 [PH] (ref 5–8)
PLATELET # BLD AUTO: 216 10*3/MM3 (ref 140–450)
PMV BLD AUTO: 9.2 FL (ref 6–12)
POTASSIUM SERPL-SCNC: 4.8 MMOL/L (ref 3.5–5.2)
PROT SERPL-MCNC: 7.2 G/DL (ref 6–8.5)
PROT UR QL STRIP: NEGATIVE
RBC # BLD AUTO: 4.7 10*6/MM3 (ref 4.14–5.8)
SODIUM SERPL-SCNC: 140 MMOL/L (ref 136–145)
SP GR UR STRIP: 1.01 (ref 1–1.03)
UROBILINOGEN UR QL STRIP: NORMAL
WBC NRBC COR # BLD AUTO: 11.1 10*3/MM3 (ref 3.4–10.8)

## 2024-03-10 PROCEDURE — 71045 X-RAY EXAM CHEST 1 VIEW: CPT

## 2024-03-10 PROCEDURE — 99284 EMERGENCY DEPT VISIT MOD MDM: CPT

## 2024-03-10 PROCEDURE — 80053 COMPREHEN METABOLIC PANEL: CPT | Performed by: PHYSICIAN ASSISTANT

## 2024-03-10 PROCEDURE — 82948 REAGENT STRIP/BLOOD GLUCOSE: CPT | Performed by: PHYSICIAN ASSISTANT

## 2024-03-10 PROCEDURE — 81003 URINALYSIS AUTO W/O SCOPE: CPT | Performed by: PHYSICIAN ASSISTANT

## 2024-03-10 PROCEDURE — 70450 CT HEAD/BRAIN W/O DYE: CPT

## 2024-03-10 PROCEDURE — P9612 CATHETERIZE FOR URINE SPEC: HCPCS

## 2024-03-10 PROCEDURE — 85025 COMPLETE CBC W/AUTO DIFF WBC: CPT | Performed by: PHYSICIAN ASSISTANT

## 2024-03-10 RX ORDER — NALTREXONE HYDROCHLORIDE 50 MG/1
50 TABLET, FILM COATED ORAL DAILY
COMMUNITY

## 2024-03-10 RX ORDER — SODIUM CHLORIDE 0.9 % (FLUSH) 0.9 %
10 SYRINGE (ML) INJECTION AS NEEDED
Status: DISCONTINUED | OUTPATIENT
Start: 2024-03-10 | End: 2024-03-10 | Stop reason: HOSPADM

## 2024-03-10 RX ORDER — PAROXETINE HYDROCHLORIDE 40 MG/1
40 TABLET, FILM COATED ORAL EVERY MORNING
COMMUNITY

## 2024-03-10 RX ORDER — MEMANTINE HYDROCHLORIDE 10 MG/1
10 TABLET ORAL 2 TIMES DAILY
COMMUNITY

## 2024-03-10 RX ORDER — MEDROXYPROGESTERONE ACETATE 150 MG/ML
150 INJECTION, SUSPENSION INTRAMUSCULAR
COMMUNITY

## 2024-03-10 RX ORDER — POLYETHYLENE GLYCOL 3350 17 G/17G
17 POWDER, FOR SOLUTION ORAL DAILY
COMMUNITY

## 2024-03-10 NOTE — ED PROVIDER NOTES
Subjective   History of Present Illness  Patient is a 68-year-old male PMH significant for A-fib, CHF, CKD, CAD, GERD, hypertension, hyperlipidemia, history of CVA brought in via EMS from nursing facility with reports of difficulty waking this morning.  Patient is normally alert and oriented x 1 at baseline which is his current status.  Patient states he is not sure why he came to the hospital.  Patient himself has no complaints including chest pain, shortness of breath, abdominal pain, headache, nausea, vomiting, diarrhea, urinary complaints.  Patient is a poor historian therefore history is limited.  No reported falls or fever.    History provided by:  Patient and EMS personnel      Review of Systems   Unable to perform ROS: Other (only A&O x1 at baseline)       Past Medical History:   Diagnosis Date    A-fib     Atrial fibrillation with RVR     CHF (congestive heart failure)     Chronic atrial fibrillation 11/8/2019    Chronic diastolic CHF (congestive heart failure) 11/8/2019    CKD (chronic kidney disease) stage 3, GFR 30-59 ml/min 11/8/2019    Coronary artery disease involving native coronary artery of native heart without angina pectoris 11/8/2019    DDD (degenerative disc disease), lumbosacral 11/8/2019    Essential hypertension 11/8/2019    Gambling disorder, persistent 11/8/2019    GERD (gastroesophageal reflux disease) 11/8/2019    History of CVA (cerebrovascular accident) 11/8/2019    Hyperlipidemia     Hypertension     Medically noncompliant 11/8/2019    Peripheral polyneuropathy 11/8/2019    Psoriasis 11/8/2019    Stroke        Allergies   Allergen Reactions    Ketorolac Tromethamine Anaphylaxis     unknown       Past Surgical History:   Procedure Laterality Date    CARDIAC CATHETERIZATION      CARDIAC CATHETERIZATION N/A 12/10/2019    Procedure: Left Heart Cath and coronary angiogram;  Surgeon: Edy Thomas MD;  Location: [facility] CATH INVASIVE LOCATION;  Service: Cardiovascular       Family History    Problem Relation Age of Onset    Heart disease Mother        Social History     Socioeconomic History    Marital status: Single   Tobacco Use    Smoking status: Never    Smokeless tobacco: Never   Substance and Sexual Activity    Alcohol use: No    Drug use: No    Sexual activity: Not Currently           Objective   Physical Exam  Vitals and nursing note reviewed.   Constitutional:       General: He is not in acute distress.     Appearance: Normal appearance. He is well-developed. He is not ill-appearing, toxic-appearing or diaphoretic.   HENT:      Head: Normocephalic and atraumatic.      Mouth/Throat:      Mouth: Mucous membranes are moist.      Pharynx: Oropharynx is clear.   Eyes:      General: No scleral icterus.     Extraocular Movements: Extraocular movements intact.      Pupils: Pupils are equal, round, and reactive to light.   Cardiovascular:      Rate and Rhythm: Normal rate and regular rhythm.      Pulses: Normal pulses.      Heart sounds: Normal heart sounds. No murmur heard.     No friction rub. No gallop.   Pulmonary:      Effort: Pulmonary effort is normal. No tachypnea, accessory muscle usage or respiratory distress.      Breath sounds: Normal breath sounds. No stridor. No decreased breath sounds, wheezing, rhonchi or rales.   Chest:      Chest wall: No mass, deformity, tenderness or crepitus.   Abdominal:      General: Bowel sounds are normal.      Palpations: Abdomen is soft.      Tenderness: There is no abdominal tenderness. There is no guarding or rebound.   Musculoskeletal:      Cervical back: Normal range of motion. No rigidity.   Skin:     General: Skin is warm.      Capillary Refill: Capillary refill takes less than 2 seconds.      Findings: No rash.   Neurological:      General: No focal deficit present.      Mental Status: He is alert. Mental status is at baseline.      GCS: GCS eye subscore is 4. GCS verbal subscore is 5. GCS motor subscore is 6.   Psychiatric:         Mood and Affect:  "Mood normal.         Behavior: Behavior normal.         Procedures           ED Course    /90   Pulse 69   Temp 97.7 °F (36.5 °C)   Resp 15   Ht 177.8 cm (70\")   Wt 106 kg (234 lb)   SpO2 96%   BMI 33.58 kg/m²   Medications   sodium chloride 0.9 % flush 10 mL (has no administration in time range)     Labs Reviewed   COMPREHENSIVE METABOLIC PANEL - Abnormal; Notable for the following components:       Result Value    Chloride 109 (*)     CO2 21.0 (*)     Albumin 3.4 (*)     All other components within normal limits    Narrative:     GFR Normal >60  Chronic Kidney Disease <60  Kidney Failure <15     CBC WITH AUTO DIFFERENTIAL - Abnormal; Notable for the following components:    WBC 11.10 (*)     Hemoglobin 8.9 (*)     Hematocrit 30.6 (*)     MCV 65.1 (*)     MCH 19.0 (*)     MCHC 29.1 (*)     RDW 20.8 (*)     Neutrophil % 79.6 (*)     Lymphocyte % 9.5 (*)     Neutrophils, Absolute 8.90 (*)     All other components within normal limits   URINALYSIS W/ CULTURE IF INDICATED - Normal    Narrative:     In absence of clinical symptoms, the presence of pyuria, bacteria, and/or nitrites on the urinalysis result does not correlate with infection.  Urine microscopic not indicated.   POCT GLUCOSE FINGERSTICK - Normal   CBC AND DIFFERENTIAL    Narrative:     The following orders were created for panel order CBC & Differential.  Procedure                               Abnormality         Status                     ---------                               -----------         ------                     CBC Auto Differential[[id number]]        Abnormal            Final result               Scan Slide[[id number]]                                                                    Please view results for these tests on the individual orders.     CT Head Without Contrast   Final Result   Impression:   1.Old insult left occipital lobe   2.Extensive white matter changes which could reflect more chronic small vessel ischemic " change.   3.Old lacunar type infarct right thalamus         Electronically Signed: Aftab Vega MD     3/10/2024 8:35 AM EDT     Workstation ID: [id number]      XR Chest 1 View   Final Result   Impression:   Mild pulmonary edema pattern with small left-sided pleural effusion. There is cardiomegaly.            Electronically Signed: Ramya Crouch MD     3/10/2024 7:51 AM EDT     Workstation ID: [id number]                                                 Medical Decision Making  Chart Review: Discharge summary reviewed from 2/4/2021 admitted for hypertensive crisis with intracellular posterior fossa hemorrhage and paroxysmal A-fib.  He presented after a fall with head injury found to have brain hemorrhage and admitted patient was monitored for send ICU transferred to [facility] he remained mentally stable at baseline had improvement of blood pressure throughout hospitalization    Differentials: UTI, electrolyte abnormality, stroke, pneumonia     ;this list is not all inclusive and does not constitute the entirety of considered causes  Labs: as above   Radiology: My interpretation chest x-ray shows no obvious pneumothorax, correlated with radiologist interpretation  CT Head Without Contrast   Final Result    Impression:    1.Old insult left occipital lobe    2.Extensive white matter changes which could reflect more chronic small vessel ischemic change.    3.Old lacunar type infarct right thalamus       Electronically Signed: Aftab Vega MD      3/10/2024 8:35 AM EDT      Workstation ID: [id number]     XR Chest 1 View   Final Result    Impression:    Mild pulmonary edema pattern with small left-sided pleural effusion. There is cardiomegaly.        Electronically Signed: Ramya Crouch MD      3/10/2024 7:51 AM EDT      Workstation ID: [id number]       Disposition/Treatment:  Appropriate PPE was worn during exam and throughout all encounters with the patient.  Patient presents to the ED from [facility] with reports of difficulty woken up around  3 AM this morning.  Is currently alert and oriented x 1 which is his baseline.  There are no new focal deficits noted on exam.  Patient has no complaints.  No reports of recent fever or illness from the facility.  Labs and imaging were obtained.    CBC shows a white count of 11.1 hemoglobin 8.9 hematocrit 30.6 platelets 216.  No signs of active bleeding.  Most recent labs we have to review are from 3 years ago when hemoglobin was normal.  Metabolic panel fairly unremarkable no signs of severe electrolyte abnormality or signs of dehydration.  He had normal kidney and liver function.  POC glucose was normal upon arrival.  Urinalysis showed no signs of UTI.  Chest x-ray shows pleural effusion with signs of possible pulmonary edema.  Patient is not hypoxic there is no signs of fluid overload on physical exam patient denies any dyspnea.  Also no concerning signs for pneumonia.  Again no recent chest x-rays to review for comparison.  CT head showed no acute intracranial abnormalities.  Patient stayed at baseline neurologically throughout ED stay.  Upon reassessment patient is asleep but easily arousable.  As were discussed with the patient who was in agreement with plan discharge back to facility.  Patient voiced understanding and discharge all questions were answered.    This document is intended for medical expert use only. Reading of this document by patients and/or patient's family without participating medical staff guidance may result in misinterpretation and unintended morbidity.  Any interpretation of such data is the responsibility of the patient and/or family member responsible for the patient in concert with their primary or specialist providers, not to be left for sources of online searches such as [employer], [employer] or similar queries. Relying on these approaches to knowledge may result in misinterpretation, misguided goals of care and even death should patients or family members try recommendations outside of the  realm of professional medical care in a supervised inpatient environment.       Problems Addressed:  Transient alteration of awareness: acute illness or injury    Amount and/or Complexity of Data Reviewed  Labs: ordered. Decision-making details documented in ED Course.  Radiology: ordered.    Risk  Prescription drug management.        Final diagnoses:   Transient alteration of awareness   Pleural effusion, left       ED Disposition  ED Disposition       ED Disposition   Discharge    Condition   Stable    Comment   --               Anibal Dalton MD  [address] [facility] [address]  [phone]    Schedule an appointment as soon as possible for a visit in 3 days      [facility] EMERGENCY DEPARTMENT  [address] [place] [id number] [phone]  Go to   If symptoms worsen         Medication List      No changes were made to your prescriptions during this visit.            Jean Yanez PA  03/10/24 1019

## 2024-03-10 NOTE — DISCHARGE INSTRUCTIONS
Continue medications as previously directed.    Follow-up with your primary care provider in 3-5 days.  If you do not have a primary care provider call [phone] for help in finding one, or you may follow up with [facility] at [phone].    Return to ED for any new or worsening symptoms

## 2024-07-17 ENCOUNTER — OFFICE (OUTPATIENT)
Dept: URBAN - METROPOLITAN AREA CLINIC 64 | Facility: CLINIC | Age: 69
End: 2024-07-17
Payer: MEDICAID

## 2024-07-17 VITALS
SYSTOLIC BLOOD PRESSURE: 144 MMHG | DIASTOLIC BLOOD PRESSURE: 94 MMHG | HEART RATE: 70 BPM | SYSTOLIC BLOOD PRESSURE: 122 MMHG | HEIGHT: 72 IN | WEIGHT: 290.2 LBS | DIASTOLIC BLOOD PRESSURE: 66 MMHG

## 2024-07-17 DIAGNOSIS — Z86.010 PERSONAL HISTORY OF COLONIC POLYPS: ICD-10-CM

## 2024-07-17 DIAGNOSIS — D50.9 IRON DEFICIENCY ANEMIA, UNSPECIFIED: ICD-10-CM

## 2024-07-17 PROCEDURE — 99204 OFFICE O/P NEW MOD 45 MIN: CPT | Performed by: INTERNAL MEDICINE

## 2024-10-08 VITALS — BODY MASS INDEX: 33.5 KG/M2 | HEIGHT: 70 IN | WEIGHT: 234 LBS

## 2024-10-17 ENCOUNTER — HOSPITAL ENCOUNTER (OUTPATIENT)
Facility: HOSPITAL | Age: 69
Setting detail: HOSPITAL OUTPATIENT SURGERY
Discharge: NURSING, REHAB OR LONG-TERM CARE | End: 2024-10-17
Attending: INTERNAL MEDICINE | Admitting: INTERNAL MEDICINE
Payer: MEDICARE

## 2024-10-17 PROCEDURE — G0463 HOSPITAL OUTPT CLINIC VISIT: HCPCS | Performed by: INTERNAL MEDICINE

## 2024-10-17 NOTE — PERIOPERATIVE NURSING NOTE
Patient came to endoscopy from [facility] for an EGD/Colonoscopy. Patient unable to answer questions appropriately. I called the facility and the nurse stated that he was not prepped for a colonoscopy as he did not have orders for this. I spoke with Dr. Ferguson and he said to reschedule for a later date. Facility aware.

## 2024-10-22 ENCOUNTER — APPOINTMENT (OUTPATIENT)
Dept: CT IMAGING | Facility: HOSPITAL | Age: 69
End: 2024-10-22
Payer: MEDICARE

## 2024-10-22 ENCOUNTER — HOSPITAL ENCOUNTER (EMERGENCY)
Facility: HOSPITAL | Age: 69
Discharge: NURSING, REHAB OR LONG-TERM CARE | End: 2024-10-22
Attending: EMERGENCY MEDICINE | Admitting: EMERGENCY MEDICINE
Payer: MEDICARE

## 2024-10-22 VITALS
SYSTOLIC BLOOD PRESSURE: 108 MMHG | TEMPERATURE: 98 F | HEART RATE: 68 BPM | WEIGHT: 233.69 LBS | DIASTOLIC BLOOD PRESSURE: 56 MMHG | HEIGHT: 69 IN | OXYGEN SATURATION: 91 % | BODY MASS INDEX: 34.61 KG/M2 | RESPIRATION RATE: 20 BRPM

## 2024-10-22 DIAGNOSIS — S62.305A CLOSED NONDISPLACED FRACTURE OF FOURTH METACARPAL BONE OF LEFT HAND, UNSPECIFIED PORTION OF METACARPAL, INITIAL ENCOUNTER: ICD-10-CM
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
## 2024-10-22 PROCEDURE — 72125 CT NECK SPINE W/O DYE: CPT

## 2024-10-22 PROCEDURE — 99284 EMERGENCY DEPT VISIT MOD MDM: CPT

## 2024-10-22 PROCEDURE — 70450 CT HEAD/BRAIN W/O DYE: CPT

## 2024-10-22 NOTE — DISCHARGE INSTRUCTIONS
Wear splint until otherwise specified by orthopedist.    Follow-up with your primary care provider in 3-5 days.  If you do not have a primary care provider call [phone] for help in finding one, or you may follow up with [facility] at [phone].    Return to ED for any new or worsening symptoms   room air

## 2024-10-22 NOTE — ED PROVIDER NOTES
Subjective   History of Present Illness  Patient is a 69-year-old male presenting to the ED via EMS from [facility] with reports of an unwitnessed fall.  Patient does have history of dementia therefore history is somewhat limited apparently patient was using the restroom when he fell.  This happened around 5 AM.  No signs of head trauma per faculty at the facility.  Is at baseline neurologically per report.  Initially patient was complaining of some left hand pain they are they did do an x-ray at the facility was found to have a fracture involving the fourth metacarpal with no displacement and some associated soft tissue swelling.  Patient currently denies any pain including pain in his hand no reports of syncope or vomiting.    History limited by:  Dementia      Review of Systems   Unable to perform ROS: Dementia   Musculoskeletal:  Positive for arthralgias.       Past Medical History:   Diagnosis Date    A-fib     Atrial fibrillation with RVR     CHF (congestive heart failure)     Chronic atrial fibrillation 11/8/2019    Chronic diastolic CHF (congestive heart failure) 11/8/2019    CKD (chronic kidney disease) stage 3, GFR 30-59 ml/min 11/8/2019    Coronary artery disease involving native coronary artery of native heart without angina pectoris 11/8/2019    DDD (degenerative disc disease), lumbosacral 11/8/2019    Essential hypertension 11/8/2019    Gambling disorder, persistent 11/8/2019    GERD (gastroesophageal reflux disease) 11/8/2019    History of CVA (cerebrovascular accident) 11/8/2019    Hyperlipidemia     Hypertension     Medically noncompliant 11/8/2019    Peripheral polyneuropathy 11/8/2019    Psoriasis 11/8/2019    Stroke        Allergies   Allergen Reactions    Ketorolac Anaphylaxis       Past Surgical History:   Procedure Laterality Date    CARDIAC CATHETERIZATION      CARDIAC CATHETERIZATION N/A 12/10/2019    Procedure: Left Heart Cath and coronary angiogram;  Surgeon: Edy Thomas MD;  Location: [facility]  CATH INVASIVE LOCATION;  Service: Cardiovascular       Family History   Problem Relation Age of Onset    Heart disease Mother        Social History     Socioeconomic History    Marital status: Single   Tobacco Use    Smoking status: Never    Smokeless tobacco: Never   Substance and Sexual Activity    Alcohol use: No    Drug use: No    Sexual activity: Not Currently           Objective   Physical Exam  Vitals and nursing note reviewed.   Constitutional:       General: He is not in acute distress.     Appearance: Normal appearance. He is well-developed. He is not ill-appearing, toxic-appearing or diaphoretic.   HENT:      Head: Normocephalic. No raccoon eyes, Jenkins's sign, contusion or laceration.      Mouth/Throat:      Mouth: Mucous membranes are moist.      Pharynx: Oropharynx is clear.   Eyes:      General: No scleral icterus.     Extraocular Movements: Extraocular movements intact.      Pupils: Pupils are equal, round, and reactive to light.   Cardiovascular:      Rate and Rhythm: Normal rate and regular rhythm.      Pulses: Normal pulses.      Heart sounds: No murmur heard.     No friction rub. No gallop.   Pulmonary:      Effort: Pulmonary effort is normal. No tachypnea or accessory muscle usage.      Breath sounds: Normal breath sounds. No decreased breath sounds, wheezing, rhonchi or rales.   Chest:      Chest wall: No mass, deformity, tenderness or crepitus.   Abdominal:      General: There is no distension. There are no signs of injury.      Palpations: Abdomen is soft.      Tenderness: There is no abdominal tenderness. There is no guarding or rebound.   Musculoskeletal:      Cervical back: No spinous process tenderness or muscular tenderness. Normal range of motion.      Comments: Cervical, thoracic, lumbar spine are midline with no midline tenderness or step-offs.    Peripheral pulses are intact compartments are soft of bilateral upper and lower extremities.  No joint tenderness to palpation of the  shoulders, elbows, wrist bilaterally.  Does have some soft tissue swelling noted of the left hand no significant tenderness to palpation.  Even in fourth metacarpal region.  Equal  strength bilaterally.  Radial, median, ulnar nerve intact bilaterally.  Has no joint tenderness to palpation of the hips, knees, feet, ankle.   Skin:     General: Skin is warm.      Capillary Refill: Capillary refill takes less than 2 seconds.      Findings: No rash.   Neurological:      Mental Status: He is alert. Mental status is at baseline.      GCS: GCS eye subscore is 4. GCS verbal subscore is 5. GCS motor subscore is 6.   Psychiatric:         Mood and Affect: Mood normal.         Behavior: Behavior normal.         Splint - Cast - Strapping    Date/Time: 10/22/2024 10:14 AM    Performed by: Jean Yanez PA  Authorized by: Palmer Fuentes MD    Consent:     Consent obtained:  Verbal    Consent given by:  Patient    Risks, benefits, and alternatives were discussed: yes      Risks discussed:  Discoloration, numbness, pain and swelling    Alternatives discussed:  No treatment and delayed treatment  Universal protocol:     Procedure explained and questions answered to patient or proxy's satisfaction: yes      Immediately prior to procedure a time out was called: yes      Patient identity confirmed:  Arm band  Pre-procedure details:     Distal neurologic exam:  Normal    Distal perfusion: distal pulses strong and brisk capillary refill    Procedure details:     Location:  Hand    Hand location:  L hand    Strapping: no      Cast type:  Short arm    Splint type:  Ulnar gutter    Supplies:  Fiberglass    Attestation: Splint applied and adjusted personally by me    Post-procedure details:     Distal neurologic exam:  Normal    Distal perfusion: distal pulses strong      Procedure completion:  Tolerated well, no immediate complications             ED Course      /72 (BP Location: Right arm, Patient Position: Sitting)    "Pulse 70   Temp 98 °F (36.7 °C) (Oral)   Resp 20   Ht 175.3 cm (69\")   Wt 106 kg (233 lb 11 oz)   SpO2 96%   BMI 34.51 kg/m²   Medications - No data to display  Labs Reviewed - No data to display  CT Head Without Contrast   Final Result   Impression:   1. Negative for acute intracranial hemorrhage or depressed skull fracture in the setting of trauma. Chronic findings detailed above are without significant change.   2. Negative for displaced fracture or traumatic malalignment of the cervical spine. Multilevel degenerative change without high-grade central spinal canal stenosis. Multiple levels of severe neuroforaminal stenosis detailed above without significant    change from prior.         Electronically Signed: Ritchie Colón MD     10/22/2024 10:40 AM EDT     Workstation ID: [id number]      CT Cervical Spine Without Contrast   Final Result   Impression:   1. Negative for acute intracranial hemorrhage or depressed skull fracture in the setting of trauma. Chronic findings detailed above are without significant change.   2. Negative for displaced fracture or traumatic malalignment of the cervical spine. Multilevel degenerative change without high-grade central spinal canal stenosis. Multiple levels of severe neuroforaminal stenosis detailed above without significant    change from prior.         Electronically Signed: Ritchie Colón MD     10/22/2024 10:40 AM EDT     Workstation ID: [id number]                                                 Medical Decision Making  Differentials: Brain bleed, fracture, contusion     ;this list is not all inclusive and does not constitute the entirety of considered causes  EKG: Interpreted by myself and physician shows  Labs:   Radiology: CT head and cervical spine reviewed by myself and independently interpreted by Dr. Fuentes shows no obvious brain bleed or cervical fracture correlated with radiologist interpretation  CT Head Without Contrast   Final Result    Impression:    1. " Negative for acute intracranial hemorrhage or depressed skull fracture in the setting of trauma. Chronic findings detailed above are without significant change.    2. Negative for displaced fracture or traumatic malalignment of the cervical spine. Multilevel degenerative change without high-grade central spinal canal stenosis. Multiple levels of severe neuroforaminal stenosis detailed above without significant     change from prior.        Electronically Signed: Ritchie Colón MD      10/22/2024 10:40 AM EDT      Workstation ID: [id number]     CT Cervical Spine Without Contrast   Final Result    Impression:    1. Negative for acute intracranial hemorrhage or depressed skull fracture in the setting of trauma. Chronic findings detailed above are without significant change.    2. Negative for displaced fracture or traumatic malalignment of the cervical spine. Multilevel degenerative change without high-grade central spinal canal stenosis. Multiple levels of severe neuroforaminal stenosis detailed above without significant     change from prior.        Electronically Signed: Ritchie Colón MD      10/22/2024 10:40 AM EDT      Workstation ID: [id number]    Disposition/Treatment:  Appropriate PPE was worn during exam and throughout all encounters with the patient.  Patient presented to the ED from [facility] with reports of an unwitnessed fall around 5 AM this morning at the facility he was complaining of some left hand pain and they did do an x-ray and sent in interpretation he was found to have a fourth metacarpal fracture patient was placed in a splint as above and tolerated well will be referred outpatient to hand specialist.  Due to the fall being unwitnessed with unknown head injury CT head and neck were obtained which showed no acute findings chronic findings as above.    Upon reassessment patient is resting quietly findings were discussed with the patient voiced understanding and discharge along signs  symptoms to return.    This document is intended for medical expert use only. Reading of this document by patients and/or patient's family without participating medical staff guidance may result in misinterpretation and unintended morbidity.  Any interpretation of such data is the responsibility of the patient and/or family member responsible for the patient in concert with their primary or specialist providers, not to be left for sources of online searches such as [employer], [employer] or similar queries. Relying on these approaches to knowledge may result in misinterpretation, misguided goals of care and even death should patients or family members try recommendations outside of the realm of professional medical care in a supervised inpatient environment.       Problems Addressed:  Closed nondisplaced fracture of fourth metacarpal bone of left hand, unspecified portion of metacarpal, initial encounter: acute illness or injury  Fall, initial encounter: acute illness or injury    Amount and/or Complexity of Data Reviewed  Radiology: ordered. Decision-making details documented in ED Course.        Final diagnoses:   Fall, initial encounter   Closed nondisplaced fracture of fourth metacarpal bone of left hand, unspecified portion of metacarpal, initial encounter       ED Disposition  ED Disposition       ED Disposition   Discharge    Condition   Stable    Comment   --               Anibal Dalton MD  [address] [id number]  [phone]    Schedule an appointment as soon as possible for a visit in 3 days      [facility] EMERGENCY DEPARTMENT  [address] [facility] [address] [phone]  Go to   If symptoms worsen    [facility] [place]  [address] [place] [address]  [facility] [address] [phone]  Schedule an appointment as soon as possible for a visit            Medication List      No changes were made to your prescriptions during this visit.            Beau  STARLA Keyes  10/22/24 1138
